# Patient Record
Sex: MALE | Race: WHITE | NOT HISPANIC OR LATINO | Employment: OTHER | URBAN - METROPOLITAN AREA
[De-identification: names, ages, dates, MRNs, and addresses within clinical notes are randomized per-mention and may not be internally consistent; named-entity substitution may affect disease eponyms.]

---

## 2017-02-07 ENCOUNTER — APPOINTMENT (OUTPATIENT)
Dept: LAB | Facility: CLINIC | Age: 82
End: 2017-02-07
Payer: COMMERCIAL

## 2017-02-07 ENCOUNTER — TRANSCRIBE ORDERS (OUTPATIENT)
Dept: LAB | Facility: CLINIC | Age: 82
End: 2017-02-07

## 2017-02-07 DIAGNOSIS — E78.5 HYPERLIPIDEMIA, UNSPECIFIED HYPERLIPIDEMIA TYPE: ICD-10-CM

## 2017-02-07 DIAGNOSIS — I10 ESSENTIAL HYPERTENSION, MALIGNANT: ICD-10-CM

## 2017-02-07 DIAGNOSIS — IMO0001 UNCONTROLLED TYPE 2 DIABETES MELLITUS WITHOUT COMPLICATION, WITH LONG-TERM CURRENT USE OF INSULIN: Primary | ICD-10-CM

## 2017-02-07 LAB
ALBUMIN SERPL BCP-MCNC: 3 G/DL (ref 3.5–5)
ALP SERPL-CCNC: 80 U/L (ref 46–116)
ALT SERPL W P-5'-P-CCNC: 20 U/L (ref 12–78)
ANION GAP SERPL CALCULATED.3IONS-SCNC: 8 MMOL/L (ref 4–13)
AST SERPL W P-5'-P-CCNC: 17 U/L (ref 5–45)
BASOPHILS # BLD AUTO: 0.02 THOUSANDS/ΜL (ref 0–0.1)
BASOPHILS NFR BLD AUTO: 0 % (ref 0–1)
BILIRUB SERPL-MCNC: 0.35 MG/DL (ref 0.2–1)
BUN SERPL-MCNC: 17 MG/DL (ref 5–25)
CALCIUM SERPL-MCNC: 8.6 MG/DL (ref 8.3–10.1)
CHLORIDE SERPL-SCNC: 102 MMOL/L (ref 100–108)
CHOLEST SERPL-MCNC: 107 MG/DL (ref 50–200)
CO2 SERPL-SCNC: 30 MMOL/L (ref 21–32)
CREAT SERPL-MCNC: 1 MG/DL (ref 0.6–1.3)
EOSINOPHIL # BLD AUTO: 0.37 THOUSAND/ΜL (ref 0–0.61)
EOSINOPHIL NFR BLD AUTO: 6 % (ref 0–6)
ERYTHROCYTE [DISTWIDTH] IN BLOOD BY AUTOMATED COUNT: 15.2 % (ref 11.6–15.1)
GFR SERPL CREATININE-BSD FRML MDRD: >60 ML/MIN/1.73SQ M
GLUCOSE SERPL-MCNC: 205 MG/DL (ref 65–140)
HCT VFR BLD AUTO: 35.6 % (ref 36.5–49.3)
HDLC SERPL-MCNC: 44 MG/DL (ref 40–60)
HGB BLD-MCNC: 11.6 G/DL (ref 12–17)
LDLC SERPL CALC-MCNC: 42 MG/DL (ref 0–100)
LYMPHOCYTES # BLD AUTO: 0.9 THOUSANDS/ΜL (ref 0.6–4.47)
LYMPHOCYTES NFR BLD AUTO: 14 % (ref 14–44)
MCH RBC QN AUTO: 29.6 PG (ref 26.8–34.3)
MCHC RBC AUTO-ENTMCNC: 32.6 G/DL (ref 31.4–37.4)
MCV RBC AUTO: 91 FL (ref 82–98)
MONOCYTES # BLD AUTO: 0.93 THOUSAND/ΜL (ref 0.17–1.22)
MONOCYTES NFR BLD AUTO: 15 % (ref 4–12)
NEUTROPHILS # BLD AUTO: 4.12 THOUSANDS/ΜL (ref 1.85–7.62)
NEUTS SEG NFR BLD AUTO: 65 % (ref 43–75)
NRBC BLD AUTO-RTO: 0 /100 WBCS
PLATELET # BLD AUTO: 177 THOUSANDS/UL (ref 149–390)
PMV BLD AUTO: 10.4 FL (ref 8.9–12.7)
POTASSIUM SERPL-SCNC: 3.9 MMOL/L (ref 3.5–5.3)
PROT SERPL-MCNC: 6.8 G/DL (ref 6.4–8.2)
RBC # BLD AUTO: 3.92 MILLION/UL (ref 3.88–5.62)
SODIUM SERPL-SCNC: 140 MMOL/L (ref 136–145)
TRIGL SERPL-MCNC: 103 MG/DL
TSH SERPL DL<=0.05 MIU/L-ACNC: 1.92 UIU/ML (ref 0.36–3.74)
WBC # BLD AUTO: 6.35 THOUSAND/UL (ref 4.31–10.16)

## 2017-02-07 PROCEDURE — 80053 COMPREHEN METABOLIC PANEL: CPT

## 2017-02-07 PROCEDURE — 84443 ASSAY THYROID STIM HORMONE: CPT

## 2017-02-07 PROCEDURE — 36415 COLL VENOUS BLD VENIPUNCTURE: CPT

## 2017-02-07 PROCEDURE — 80061 LIPID PANEL: CPT

## 2017-02-07 PROCEDURE — 85025 COMPLETE CBC W/AUTO DIFF WBC: CPT

## 2017-02-08 ENCOUNTER — TRANSCRIBE ORDERS (OUTPATIENT)
Dept: LAB | Facility: CLINIC | Age: 82
End: 2017-02-08

## 2017-02-08 ENCOUNTER — APPOINTMENT (OUTPATIENT)
Dept: LAB | Facility: CLINIC | Age: 82
End: 2017-02-08
Payer: COMMERCIAL

## 2017-02-08 DIAGNOSIS — IMO0001 UNCONTROLLED TYPE 2 DIABETES MELLITUS WITHOUT COMPLICATION, WITH LONG-TERM CURRENT USE OF INSULIN: Primary | ICD-10-CM

## 2017-02-08 LAB
BACTERIA UR QL AUTO: ABNORMAL /HPF
BILIRUB UR QL STRIP: NEGATIVE
CAOX CRY URNS QL MICRO: ABNORMAL /HPF
CLARITY UR: CLEAR
COLOR UR: ABNORMAL
GLUCOSE UR STRIP-MCNC: ABNORMAL MG/DL
HGB UR QL STRIP.AUTO: NEGATIVE
KETONES UR STRIP-MCNC: NEGATIVE MG/DL
LEUKOCYTE ESTERASE UR QL STRIP: ABNORMAL
NITRITE UR QL STRIP: NEGATIVE
NON-SQ EPI CELLS URNS QL MICRO: ABNORMAL /HPF
PH UR STRIP.AUTO: 5.5 [PH] (ref 4.5–8)
PROT UR STRIP-MCNC: ABNORMAL MG/DL
RBC #/AREA URNS AUTO: ABNORMAL /HPF
SP GR UR STRIP.AUTO: 1.04 (ref 1–1.03)
UROBILINOGEN UR QL STRIP.AUTO: 0.2 E.U./DL
WBC #/AREA URNS AUTO: ABNORMAL /HPF

## 2017-02-08 PROCEDURE — 81001 URINALYSIS AUTO W/SCOPE: CPT

## 2017-08-09 ENCOUNTER — TRANSCRIBE ORDERS (OUTPATIENT)
Dept: LAB | Facility: CLINIC | Age: 82
End: 2017-08-09

## 2017-08-09 ENCOUNTER — APPOINTMENT (OUTPATIENT)
Dept: LAB | Facility: CLINIC | Age: 82
End: 2017-08-09
Payer: COMMERCIAL

## 2017-08-09 DIAGNOSIS — C61 MALIGNANT NEOPLASM OF PROSTATE (HCC): Primary | ICD-10-CM

## 2017-08-09 LAB — PSA SERPL-MCNC: 0.2 NG/ML (ref 0–4)

## 2017-08-09 PROCEDURE — G0103 PSA SCREENING: HCPCS

## 2017-08-09 PROCEDURE — 36415 COLL VENOUS BLD VENIPUNCTURE: CPT

## 2018-05-02 ENCOUNTER — APPOINTMENT (OUTPATIENT)
Dept: LAB | Facility: CLINIC | Age: 83
End: 2018-05-02
Payer: COMMERCIAL

## 2018-05-02 ENCOUNTER — TRANSCRIBE ORDERS (OUTPATIENT)
Dept: LAB | Facility: CLINIC | Age: 83
End: 2018-05-02

## 2018-05-02 DIAGNOSIS — E78.2 MIXED HYPERLIPIDEMIA: ICD-10-CM

## 2018-05-02 DIAGNOSIS — I48.0 PAROXYSMAL ATRIAL FIBRILLATION (HCC): Primary | ICD-10-CM

## 2018-05-02 DIAGNOSIS — I25.10 ATHEROSCLEROSIS OF NATIVE CORONARY ARTERY WITHOUT ANGINA PECTORIS, UNSPECIFIED WHETHER NATIVE OR TRANSPLANTED HEART: ICD-10-CM

## 2018-05-02 DIAGNOSIS — Z79.4 ENCOUNTER FOR LONG-TERM (CURRENT) USE OF INSULIN (HCC): ICD-10-CM

## 2018-05-02 DIAGNOSIS — E11.8 DIABETIC COMPLICATION (HCC): ICD-10-CM

## 2018-05-02 DIAGNOSIS — Z79.01 LONG TERM (CURRENT) USE OF ANTICOAGULANTS: ICD-10-CM

## 2018-05-02 LAB
ALBUMIN SERPL BCP-MCNC: 3.3 G/DL (ref 3.5–5)
ALP SERPL-CCNC: 125 U/L (ref 46–116)
ALT SERPL W P-5'-P-CCNC: 20 U/L (ref 12–78)
ANION GAP SERPL CALCULATED.3IONS-SCNC: 6 MMOL/L (ref 4–13)
AST SERPL W P-5'-P-CCNC: 17 U/L (ref 5–45)
BILIRUB SERPL-MCNC: 0.59 MG/DL (ref 0.2–1)
BUN SERPL-MCNC: 17 MG/DL (ref 5–25)
CALCIUM SERPL-MCNC: 8.9 MG/DL (ref 8.3–10.1)
CHLORIDE SERPL-SCNC: 103 MMOL/L (ref 100–108)
CHOLEST SERPL-MCNC: 118 MG/DL (ref 50–200)
CO2 SERPL-SCNC: 32 MMOL/L (ref 21–32)
CREAT SERPL-MCNC: 1.03 MG/DL (ref 0.6–1.3)
DIGOXIN SERPL-MCNC: 0.6 NG/ML (ref 0.8–2)
ERYTHROCYTE [DISTWIDTH] IN BLOOD BY AUTOMATED COUNT: 15.3 % (ref 11.6–15.1)
EST. AVERAGE GLUCOSE BLD GHB EST-MCNC: 301 MG/DL
GFR SERPL CREATININE-BSD FRML MDRD: 68 ML/MIN/1.73SQ M
GLUCOSE P FAST SERPL-MCNC: 79 MG/DL (ref 65–99)
HBA1C MFR BLD: 12.1 % (ref 4.2–6.3)
HCT VFR BLD AUTO: 46.3 % (ref 36.5–49.3)
HDLC SERPL-MCNC: 38 MG/DL (ref 40–60)
HGB BLD-MCNC: 14.7 G/DL (ref 12–17)
LDLC SERPL CALC-MCNC: 58 MG/DL (ref 0–100)
MCH RBC QN AUTO: 28.2 PG (ref 26.8–34.3)
MCHC RBC AUTO-ENTMCNC: 31.7 G/DL (ref 31.4–37.4)
MCV RBC AUTO: 89 FL (ref 82–98)
NONHDLC SERPL-MCNC: 80 MG/DL
PLATELET # BLD AUTO: 254 THOUSANDS/UL (ref 149–390)
PMV BLD AUTO: 11.5 FL (ref 8.9–12.7)
POTASSIUM SERPL-SCNC: 3.4 MMOL/L (ref 3.5–5.3)
PROT SERPL-MCNC: 7.2 G/DL (ref 6.4–8.2)
RBC # BLD AUTO: 5.21 MILLION/UL (ref 3.88–5.62)
SODIUM SERPL-SCNC: 141 MMOL/L (ref 136–145)
TRIGL SERPL-MCNC: 108 MG/DL
WBC # BLD AUTO: 8.69 THOUSAND/UL (ref 4.31–10.16)

## 2018-05-02 PROCEDURE — 80162 ASSAY OF DIGOXIN TOTAL: CPT

## 2018-05-02 PROCEDURE — 85027 COMPLETE CBC AUTOMATED: CPT

## 2018-05-02 PROCEDURE — 80053 COMPREHEN METABOLIC PANEL: CPT

## 2018-05-02 PROCEDURE — 83036 HEMOGLOBIN GLYCOSYLATED A1C: CPT

## 2018-05-02 PROCEDURE — 80061 LIPID PANEL: CPT

## 2018-05-02 PROCEDURE — 36415 COLL VENOUS BLD VENIPUNCTURE: CPT

## 2018-08-17 ENCOUNTER — OFFICE VISIT (OUTPATIENT)
Dept: UROLOGY | Facility: CLINIC | Age: 83
End: 2018-08-17
Payer: COMMERCIAL

## 2018-08-17 VITALS — DIASTOLIC BLOOD PRESSURE: 72 MMHG | HEART RATE: 65 BPM | SYSTOLIC BLOOD PRESSURE: 142 MMHG

## 2018-08-17 DIAGNOSIS — C61 MALIGNANT NEOPLASM OF PROSTATE (HCC): Primary | ICD-10-CM

## 2018-08-17 LAB
SL AMB  POCT GLUCOSE, UA: NORMAL
SL AMB LEUKOCYTE ESTERASE,UA: NORMAL
SL AMB POCT BILIRUBIN,UA: NORMAL
SL AMB POCT BLOOD,UA: NORMAL
SL AMB POCT CLARITY,UA: CLEAR
SL AMB POCT COLOR,UA: YELLOW
SL AMB POCT KETONES,UA: NORMAL
SL AMB POCT NITRITE,UA: NORMAL
SL AMB POCT PH,UA: 5
SL AMB POCT SPECIFIC GRAVITY,UA: 1.01
SL AMB POCT URINE PROTEIN: NORMAL
SL AMB POCT UROBILINOGEN: NORMAL

## 2018-08-17 PROCEDURE — 99213 OFFICE O/P EST LOW 20 MIN: CPT | Performed by: PHYSICIAN ASSISTANT

## 2018-08-17 PROCEDURE — 81002 URINALYSIS NONAUTO W/O SCOPE: CPT | Performed by: PHYSICIAN ASSISTANT

## 2018-08-17 RX ORDER — CEPHALEXIN 500 MG/1
CAPSULE ORAL
COMMUNITY
Start: 2018-08-16 | End: 2020-05-06

## 2018-08-17 NOTE — PROGRESS NOTES
UROLOGY PROGRESS NOTE   Patient Identifiers: David Freeman (MRN [id number])  Date of Service: 8/17/2018    Subjective:      61-year-old male with history [place] 8 prostate cancer  He had a  40% single focus of 1 core  He was treated with 6 months of ADT and external beam radiation  His last PSA was 0 2  He has occasional leak  Urine is clear  Patient has  no complaints  Objective:     VITALS:    Vitals:    08/17/18 1140   BP: 142/72   Pulse: 65     AUA SYMPTOM SCORE      Most Recent Value   AUA SYMPTOM SCORE   How often have you had a sensation of not emptying your bladder completely after you finished urinating? 3   How often have you had to urinate again less than two hours after you finished urinating? 2   How often have you found you stopped and started again several times when you urinate? 3   How often have you found it difficult to postpone urination? 2   How often have you had a weak urinary stream?  2   How often have you had to push or strain to begin urination? 0   How many times did you most typically get up to urinate from the time you went to bed at night until the time you got up in the morning?   2   Quality of Life: If you were to spend the rest of your life with your urinary condition just the way it is now, how would you feel about that?  0   AUA SYMPTOM SCORE  14            LABS:  Lab Results   Component Value Date    HGB 14 7 05/02/2018    HCT 46 3 05/02/2018    WBC 8 69 05/02/2018     05/02/2018   ]    Lab Results   Component Value Date     05/02/2018    K 3 4 (L) 05/02/2018     05/02/2018    CO2 32 05/02/2018    BUN 17 05/02/2018    CREATININE 1 03 05/02/2018    CALCIUM 8 9 05/02/2018    GLUCOSE 205 (H) 02/07/2017   ]        INPATIENT MEDS:    Current Outpatient Prescriptions:     cephalexin (KEFLEX) 500 mg capsule, , Disp: , Rfl:     insulin glargine (LANTUS) 100 units/mL subcutaneous injection, Inject 52 Units under the skin daily at bedtime  , Disp: , Rfl:     amLODIPine (NORVASC) 10 mg tablet, Take 10 mg by mouth 2 (two) times a day , Disp: , Rfl:     calcium carbonate (OS-SHEILA) 600 MG tablet, Take 600 mg by mouth 2 (two) times a day with meals  , Disp: , Rfl:     carvedilol (COREG) 25 mg tablet, Take 25 mg by mouth 2 (two) times a day with meals  , Disp: , Rfl:     cloNIDine (CATAPRES) 0 1 mg tablet, Take 0 1 mg by mouth 2 (two) times a day , Disp: , Rfl:     cyanocobalamin 500 MCG tablet, Take 500 mcg by mouth daily  , Disp: , Rfl:     lisinopril (ZESTRIL) 20 mg tablet, Take 20 mg by mouth daily  , Disp: , Rfl:     Multiple Vitamins-Minerals (MULTIVITAMIN WITH MINERALS) tablet, Take 1 tablet by mouth daily  , Disp: , Rfl:     nitroglycerin (NITROSTAT) 0 4 mg SL tablet, Place 0 4 mg under the tongue every 5 (five) minutes as needed for chest pain , Disp: , Rfl:     simvastatin (ZOCOR) 5 MG tablet, Take 5 mg by mouth daily at bedtime  , Disp: , Rfl:     sitaGLIPtin-metFORMIN (JANUMET)  MG per tablet, Take 1 tablet by mouth 2 (two) times a day with meals  , Disp: , Rfl:     tamsulosin (FLOMAX) 0 4 mg, Take 0 4 mg by mouth daily with dinner , Disp: , Rfl:       Physical Exam:   /72 (Patient Position: Sitting, Cuff Size: Adult)   Pulse 65   GEN: no acute distress    RESP: breathing comfortably with no accessory muscle use    ABD: soft, non-tender, non-distended   INCISION:    EXT: no significant peripheral edema   (Male): Penis uncircumcised, phallus normal, meatus patent  Testicles descended into scrotum bilaterally without masses nor tenderness  No inguinal hernias bilaterally  ZACHARY: Prostate is not enlarged at 30 grams  The prostate is not boggy  The prostate is not tender  No nodules noted    RADIOLOGY:     None     Assessment:    1  Prostate cancer     Plan:   - follow-up in 1 year with PSA prior to visit    -  -  -

## 2020-01-11 ENCOUNTER — APPOINTMENT (EMERGENCY)
Dept: RADIOLOGY | Facility: HOSPITAL | Age: 85
DRG: 552 | End: 2020-01-11
Payer: COMMERCIAL

## 2020-01-11 ENCOUNTER — HOSPITAL ENCOUNTER (INPATIENT)
Facility: HOSPITAL | Age: 85
LOS: 6 days | Discharge: NURSING, REHAB OR LONG-TERM CARE | DRG: 552 | End: 2020-01-17
Attending: SURGERY | Admitting: SURGERY
Payer: COMMERCIAL
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
## 2020-01-11 DIAGNOSIS — L89.152 SACRAL DECUBITUS ULCER, STAGE II (HCC): ICD-10-CM

## 2020-01-11 DIAGNOSIS — S02.2XXA NASAL BONE FRACTURES: ICD-10-CM

## 2020-01-11 DIAGNOSIS — S12.000A C1 CERVICAL FRACTURE (HCC): ICD-10-CM

## 2020-01-11 DIAGNOSIS — S12.110A TYPE II DENS FRACTURE OF SECOND CERVICAL VERTEBRA (HCC): Primary | ICD-10-CM

## 2020-01-11 DIAGNOSIS — S01.81XA: ICD-10-CM

## 2020-01-11 DIAGNOSIS — S06.4X9A TRAUMATIC EPIDURAL HEMATOMA (HCC): ICD-10-CM

## 2020-01-11 PROBLEM — S06.4XAA TRAUMATIC EPIDURAL HEMATOMA: Status: ACTIVE | Noted: 2020-01-11

## 2020-01-11 LAB
ALBUMIN SERPL BCP-MCNC: 3.1 G/DL (ref 3.5–5)
ALP SERPL-CCNC: 114 U/L (ref 46–116)
ALT SERPL W P-5'-P-CCNC: 41 U/L (ref 12–78)
ANION GAP SERPL CALCULATED.3IONS-SCNC: 7 MMOL/L (ref 4–13)
AST SERPL W P-5'-P-CCNC: 71 U/L (ref 5–45)
BASE EXCESS BLDA CALC-SCNC: 6 MMOL/L (ref -2–3)
BILIRUB SERPL-MCNC: 0.53 MG/DL (ref 0.2–1)
BUN SERPL-MCNC: 20 MG/DL (ref 5–25)
CA-I BLD-SCNC: 1.11 MMOL/L (ref 1.12–1.32)
CA-I BLD-SCNC: 1.12 MMOL/L (ref 1.12–1.32)
CALCIUM SERPL-MCNC: 9.1 MG/DL (ref 8.3–10.1)
CHLORIDE SERPL-SCNC: 106 MMOL/L (ref 100–108)
CK MB SERPL-MCNC: 2 NG/ML (ref 0–5)
CK MB SERPL-MCNC: <1 % (ref 0–2.5)
CK SERPL-CCNC: 262 U/L (ref 39–308)
CO2 SERPL-SCNC: 29 MMOL/L (ref 21–32)
CREAT SERPL-MCNC: 1.12 MG/DL (ref 0.6–1.3)
ERYTHROCYTE [DISTWIDTH] IN BLOOD BY AUTOMATED COUNT: 15.7 % (ref 11.6–15.1)
GFR SERPL CREATININE-BSD FRML MDRD: 61 ML/MIN/1.73SQ M
GLUCOSE SERPL-MCNC: 119 MG/DL (ref 65–140)
GLUCOSE SERPL-MCNC: 134 MG/DL (ref 65–140)
GLUCOSE SERPL-MCNC: 39 MG/DL (ref 65–140)
GLUCOSE SERPL-MCNC: 56 MG/DL (ref 65–140)
GLUCOSE SERPL-MCNC: 64 MG/DL (ref 65–140)
HCO3 BLDA-SCNC: 32 MMOL/L (ref 24–30)
HCT VFR BLD AUTO: 44 % (ref 36.5–49.3)
HCT VFR BLD CALC: 45 % (ref 36.5–49.3)
HGB BLD-MCNC: 14 G/DL (ref 12–17)
HGB BLDA-MCNC: 15.3 G/DL (ref 12–17)
LACTATE SERPL-SCNC: 2.1 MMOL/L (ref 0.5–2)
MAGNESIUM SERPL-MCNC: 2.1 MG/DL (ref 1.6–2.6)
MCH RBC QN AUTO: 27.7 PG (ref 26.8–34.3)
MCHC RBC AUTO-ENTMCNC: 31.8 G/DL (ref 31.4–37.4)
MCV RBC AUTO: 87 FL (ref 82–98)
PCO2 BLD: 34 MMOL/L (ref 21–32)
PCO2 BLD: 50.4 MM HG (ref 42–50)
PH BLD: 7.41 [PH] (ref 7.3–7.4)
PHOSPHATE SERPL-MCNC: 2.6 MG/DL (ref 2.3–4.1)
PLATELET # BLD AUTO: 237 THOUSANDS/UL (ref 149–390)
PMV BLD AUTO: 12.4 FL (ref 8.9–12.7)
PO2 BLD: 29 MM HG (ref 35–45)
POTASSIUM BLD-SCNC: 5.2 MMOL/L (ref 3.5–5.3)
POTASSIUM SERPL-SCNC: 5.3 MMOL/L (ref 3.5–5.3)
PROT SERPL-MCNC: 7.4 G/DL (ref 6.4–8.2)
RBC # BLD AUTO: 5.06 MILLION/UL (ref 3.88–5.62)
SAO2 % BLD FROM PO2: 54 % (ref 60–85)
SODIUM BLD-SCNC: 141 MMOL/L (ref 136–145)
SODIUM SERPL-SCNC: 142 MMOL/L (ref 136–145)
SPECIMEN SOURCE: ABNORMAL
TROPONIN I SERPL-MCNC: 0.04 NG/ML
WBC # BLD AUTO: 8.03 THOUSAND/UL (ref 4.31–10.16)

## 2020-01-11 PROCEDURE — 0HQ1XZZ REPAIR FACE SKIN, EXTERNAL APPROACH: ICD-10-PCS | Performed by: SURGERY

## 2020-01-11 PROCEDURE — 93005 ELECTROCARDIOGRAM TRACING: CPT

## 2020-01-11 PROCEDURE — 36415 COLL VENOUS BLD VENIPUNCTURE: CPT | Performed by: PHYSICIAN ASSISTANT

## 2020-01-11 PROCEDURE — 83605 ASSAY OF LACTIC ACID: CPT | Performed by: PHYSICIAN ASSISTANT

## 2020-01-11 PROCEDURE — 82947 ASSAY GLUCOSE BLOOD QUANT: CPT

## 2020-01-11 PROCEDURE — 70496 CT ANGIOGRAPHY HEAD: CPT

## 2020-01-11 PROCEDURE — 82553 CREATINE MB FRACTION: CPT | Performed by: PHYSICIAN ASSISTANT

## 2020-01-11 PROCEDURE — 85027 COMPLETE CBC AUTOMATED: CPT | Performed by: PHYSICIAN ASSISTANT

## 2020-01-11 PROCEDURE — 90715 TDAP VACCINE 7 YRS/> IM: CPT | Performed by: PHYSICIAN ASSISTANT

## 2020-01-11 PROCEDURE — 82330 ASSAY OF CALCIUM: CPT | Performed by: PHYSICIAN ASSISTANT

## 2020-01-11 PROCEDURE — 82550 ASSAY OF CK (CPK): CPT | Performed by: PHYSICIAN ASSISTANT

## 2020-01-11 PROCEDURE — 70498 CT ANGIOGRAPHY NECK: CPT

## 2020-01-11 PROCEDURE — 83735 ASSAY OF MAGNESIUM: CPT | Performed by: PHYSICIAN ASSISTANT

## 2020-01-11 PROCEDURE — 99282 EMERGENCY DEPT VISIT SF MDM: CPT | Performed by: EMERGENCY MEDICINE

## 2020-01-11 PROCEDURE — 84484 ASSAY OF TROPONIN QUANT: CPT | Performed by: PHYSICIAN ASSISTANT

## 2020-01-11 PROCEDURE — 12011 RPR F/E/E/N/L/M 2.5 CM/<: CPT | Performed by: PHYSICIAN ASSISTANT

## 2020-01-11 PROCEDURE — 90471 IMMUNIZATION ADMIN: CPT

## 2020-01-11 PROCEDURE — 84100 ASSAY OF PHOSPHORUS: CPT | Performed by: PHYSICIAN ASSISTANT

## 2020-01-11 PROCEDURE — 84295 ASSAY OF SERUM SODIUM: CPT

## 2020-01-11 PROCEDURE — 85014 HEMATOCRIT: CPT

## 2020-01-11 PROCEDURE — 84132 ASSAY OF SERUM POTASSIUM: CPT

## 2020-01-11 PROCEDURE — NC001 PR NO CHARGE: Performed by: PHYSICIAN ASSISTANT

## 2020-01-11 PROCEDURE — 99285 EMERGENCY DEPT VISIT HI MDM: CPT

## 2020-01-11 PROCEDURE — 80053 COMPREHEN METABOLIC PANEL: CPT | Performed by: PHYSICIAN ASSISTANT

## 2020-01-11 PROCEDURE — 72125 CT NECK SPINE W/O DYE: CPT

## 2020-01-11 PROCEDURE — 82948 REAGENT STRIP/BLOOD GLUCOSE: CPT

## 2020-01-11 PROCEDURE — 82803 BLOOD GASES ANY COMBINATION: CPT

## 2020-01-11 PROCEDURE — 99223 1ST HOSP IP/OBS HIGH 75: CPT | Performed by: SURGERY

## 2020-01-11 PROCEDURE — NC001 PR NO CHARGE: Performed by: NEUROLOGICAL SURGERY

## 2020-01-11 PROCEDURE — 82330 ASSAY OF CALCIUM: CPT

## 2020-01-11 PROCEDURE — 99291 CRITICAL CARE FIRST HOUR: CPT | Performed by: PHYSICIAN ASSISTANT

## 2020-01-11 PROCEDURE — 70450 CT HEAD/BRAIN W/O DYE: CPT

## 2020-01-11 RX ORDER — DEXTROSE, SODIUM CHLORIDE, AND POTASSIUM CHLORIDE 5; .45; .15 G/100ML; G/100ML; G/100ML
75 INJECTION INTRAVENOUS CONTINUOUS
Status: DISCONTINUED | OUTPATIENT
Start: 2020-01-11 | End: 2020-01-13

## 2020-01-11 RX ORDER — METOPROLOL TARTRATE 5 MG/5ML
5 INJECTION INTRAVENOUS EVERY 6 HOURS PRN
Status: DISCONTINUED | OUTPATIENT
Start: 2020-01-11 | End: 2020-01-11

## 2020-01-11 RX ORDER — INSULIN GLARGINE 100 [IU]/ML
30 INJECTION, SOLUTION SUBCUTANEOUS DAILY
COMMUNITY
End: 2020-06-23 | Stop reason: HOSPADM

## 2020-01-11 RX ORDER — DEXTROSE MONOHYDRATE 25 G/50ML
50 INJECTION, SOLUTION INTRAVENOUS ONCE
Status: COMPLETED | OUTPATIENT
Start: 2020-01-11 | End: 2020-01-11

## 2020-01-11 RX ORDER — LABETALOL 20 MG/4 ML (5 MG/ML) INTRAVENOUS SYRINGE
10 EVERY 4 HOURS PRN
Status: DISCONTINUED | OUTPATIENT
Start: 2020-01-11 | End: 2020-01-18 | Stop reason: HOSPADM

## 2020-01-11 RX ORDER — ATORVASTATIN CALCIUM 20 MG/1
1 TABLET, FILM COATED ORAL DAILY
Status: ON HOLD | COMMUNITY

## 2020-01-11 RX ORDER — DEXTROSE AND SODIUM CHLORIDE 5; .9 G/100ML; G/100ML
100 INJECTION, SOLUTION INTRAVENOUS CONTINUOUS
Status: DISCONTINUED | OUTPATIENT
Start: 2020-01-11 | End: 2020-01-11

## 2020-01-11 RX ORDER — ONDANSETRON 2 MG/ML
4 INJECTION INTRAMUSCULAR; INTRAVENOUS EVERY 6 HOURS PRN
Status: DISCONTINUED | OUTPATIENT
Start: 2020-01-11 | End: 2020-01-18 | Stop reason: HOSPADM

## 2020-01-11 RX ORDER — FENTANYL CITRATE 50 UG/ML
25 INJECTION, SOLUTION INTRAMUSCULAR; INTRAVENOUS EVERY 2 HOUR PRN
Status: DISCONTINUED | OUTPATIENT
Start: 2020-01-11 | End: 2020-01-12

## 2020-01-11 RX ORDER — FUROSEMIDE 40 MG/1
40 TABLET ORAL 2 TIMES DAILY
COMMUNITY
End: 2020-05-06

## 2020-01-11 RX ORDER — METOPROLOL TARTRATE 5 MG/5ML
2.5 INJECTION INTRAVENOUS EVERY 6 HOURS
Status: DISCONTINUED | OUTPATIENT
Start: 2020-01-11 | End: 2020-01-12

## 2020-01-11 RX ORDER — PANTOPRAZOLE SODIUM 40 MG/1
40 INJECTION, POWDER, FOR SOLUTION INTRAVENOUS
Status: DISCONTINUED | OUTPATIENT
Start: 2020-01-12 | End: 2020-01-13

## 2020-01-11 RX ORDER — DILTIAZEM HYDROCHLORIDE 120 MG/1
240 TABLET, FILM COATED ORAL DAILY
COMMUNITY
End: 2020-01-17 | Stop reason: HOSPADM

## 2020-01-11 RX ORDER — CHLORHEXIDINE GLUCONATE 0.12 MG/ML
15 RINSE ORAL EVERY 12 HOURS SCHEDULED
Status: DISCONTINUED | OUTPATIENT
Start: 2020-01-11 | End: 2020-01-11

## 2020-01-11 RX ORDER — METOPROLOL TARTRATE 50 MG/1
1 TABLET, FILM COATED ORAL EVERY 12 HOURS SCHEDULED
COMMUNITY
End: 2021-08-24 | Stop reason: ALTCHOICE

## 2020-01-11 RX ORDER — DABIGATRAN ETEXILATE 150 MG/1
150 CAPSULE, COATED PELLETS ORAL 2 TIMES DAILY
COMMUNITY
End: 2020-01-17 | Stop reason: HOSPADM

## 2020-01-11 RX ORDER — OMEPRAZOLE 40 MG/1
1 CAPSULE, DELAYED RELEASE ORAL DAILY
Status: ON HOLD | COMMUNITY

## 2020-01-11 RX ADMIN — FENTANYL CITRATE 25 MCG: 50 INJECTION, SOLUTION INTRAMUSCULAR; INTRAVENOUS at 20:28

## 2020-01-11 RX ADMIN — IDARUCIZUMAB 2.5 G: 50 INJECTION INTRAVENOUS at 15:28

## 2020-01-11 RX ADMIN — DEXTROSE, SODIUM CHLORIDE, AND POTASSIUM CHLORIDE 75 ML/HR: 5; .45; .15 INJECTION INTRAVENOUS at 20:27

## 2020-01-11 RX ADMIN — FENTANYL CITRATE 25 MCG: 50 INJECTION, SOLUTION INTRAMUSCULAR; INTRAVENOUS at 23:54

## 2020-01-11 RX ADMIN — TETANUS TOXOID, REDUCED DIPHTHERIA TOXOID AND ACELLULAR PERTUSSIS VACCINE, ADSORBED 0.5 ML: 5; 2.5; 8; 8; 2.5 SUSPENSION INTRAMUSCULAR at 14:39

## 2020-01-11 RX ADMIN — ONDANSETRON 4 MG: 2 INJECTION INTRAMUSCULAR; INTRAVENOUS at 16:08

## 2020-01-11 RX ADMIN — DEXTROSE 50 % IN WATER (D50W) INTRAVENOUS SYRINGE 50 ML: at 16:12

## 2020-01-11 RX ADMIN — DEXTROSE AND SODIUM CHLORIDE 100 ML/HR: 5; .9 INJECTION, SOLUTION INTRAVENOUS at 16:32

## 2020-01-11 RX ADMIN — LABETALOL 20 MG/4 ML (5 MG/ML) INTRAVENOUS SYRINGE 10 MG: at 18:37

## 2020-01-11 RX ADMIN — METOPROLOL TARTRATE 2.5 MG: 5 INJECTION INTRAVENOUS at 20:29

## 2020-01-11 RX ADMIN — IDARUCIZUMAB 2.5 G: 50 INJECTION INTRAVENOUS at 15:22

## 2020-01-11 RX ADMIN — IOHEXOL 85 ML: 350 INJECTION, SOLUTION INTRAVENOUS at 15:42

## 2020-01-12 ENCOUNTER — APPOINTMENT (INPATIENT)
Dept: RADIOLOGY | Facility: HOSPITAL | Age: 85
DRG: 552 | End: 2020-01-12
Payer: COMMERCIAL

## 2020-01-12 LAB
ANION GAP SERPL CALCULATED.3IONS-SCNC: 4 MMOL/L (ref 4–13)
ATRIAL RATE: 113 BPM
BASOPHILS # BLD AUTO: 0.05 THOUSANDS/ΜL (ref 0–0.1)
BASOPHILS NFR BLD AUTO: 1 % (ref 0–1)
BUN SERPL-MCNC: 11 MG/DL (ref 5–25)
CALCIUM SERPL-MCNC: 9.1 MG/DL (ref 8.3–10.1)
CHLORIDE SERPL-SCNC: 107 MMOL/L (ref 100–108)
CO2 SERPL-SCNC: 29 MMOL/L (ref 21–32)
CREAT SERPL-MCNC: 0.92 MG/DL (ref 0.6–1.3)
EOSINOPHIL # BLD AUTO: 0.13 THOUSAND/ΜL (ref 0–0.61)
EOSINOPHIL NFR BLD AUTO: 1 % (ref 0–6)
ERYTHROCYTE [DISTWIDTH] IN BLOOD BY AUTOMATED COUNT: 15.7 % (ref 11.6–15.1)
GFR SERPL CREATININE-BSD FRML MDRD: 77 ML/MIN/1.73SQ M
GLUCOSE SERPL-MCNC: 113 MG/DL (ref 65–140)
GLUCOSE SERPL-MCNC: 178 MG/DL (ref 65–140)
GLUCOSE SERPL-MCNC: 272 MG/DL (ref 65–140)
GLUCOSE SERPL-MCNC: 62 MG/DL (ref 65–140)
HCT VFR BLD AUTO: 43.9 % (ref 36.5–49.3)
HGB BLD-MCNC: 13.9 G/DL (ref 12–17)
IMM GRANULOCYTES # BLD AUTO: 0.04 THOUSAND/UL (ref 0–0.2)
IMM GRANULOCYTES NFR BLD AUTO: 0 % (ref 0–2)
LYMPHOCYTES # BLD AUTO: 0.82 THOUSANDS/ΜL (ref 0.6–4.47)
LYMPHOCYTES NFR BLD AUTO: 7 % (ref 14–44)
MCH RBC QN AUTO: 27.4 PG (ref 26.8–34.3)
MCHC RBC AUTO-ENTMCNC: 31.7 G/DL (ref 31.4–37.4)
MCV RBC AUTO: 87 FL (ref 82–98)
MONOCYTES # BLD AUTO: 0.74 THOUSAND/ΜL (ref 0.17–1.22)
MONOCYTES NFR BLD AUTO: 7 % (ref 4–12)
NEUTROPHILS # BLD AUTO: 9.28 THOUSANDS/ΜL (ref 1.85–7.62)
NEUTS SEG NFR BLD AUTO: 84 % (ref 43–75)
NRBC BLD AUTO-RTO: 0 /100 WBCS
PLATELET # BLD AUTO: 207 THOUSANDS/UL (ref 149–390)
PMV BLD AUTO: 10.8 FL (ref 8.9–12.7)
POTASSIUM SERPL-SCNC: 4.3 MMOL/L (ref 3.5–5.3)
QRS AXIS: 243 DEGREES
QRSD INTERVAL: 164 MS
QT INTERVAL: 436 MS
QTC INTERVAL: 521 MS
RBC # BLD AUTO: 5.07 MILLION/UL (ref 3.88–5.62)
SODIUM SERPL-SCNC: 140 MMOL/L (ref 136–145)
T WAVE AXIS: 235 DEGREES
VENTRICULAR RATE: 86 BPM
WBC # BLD AUTO: 11.06 THOUSAND/UL (ref 4.31–10.16)

## 2020-01-12 PROCEDURE — 85025 COMPLETE CBC W/AUTO DIFF WBC: CPT | Performed by: EMERGENCY MEDICINE

## 2020-01-12 PROCEDURE — 93005 ELECTROCARDIOGRAM TRACING: CPT

## 2020-01-12 PROCEDURE — 72141 MRI NECK SPINE W/O DYE: CPT

## 2020-01-12 PROCEDURE — 93010 ELECTROCARDIOGRAM REPORT: CPT | Performed by: INTERNAL MEDICINE

## 2020-01-12 PROCEDURE — 92610 EVALUATE SWALLOWING FUNCTION: CPT

## 2020-01-12 PROCEDURE — 80048 BASIC METABOLIC PNL TOTAL CA: CPT | Performed by: EMERGENCY MEDICINE

## 2020-01-12 PROCEDURE — 99232 SBSQ HOSP IP/OBS MODERATE 35: CPT | Performed by: SURGERY

## 2020-01-12 PROCEDURE — 99223 1ST HOSP IP/OBS HIGH 75: CPT | Performed by: NEUROLOGICAL SURGERY

## 2020-01-12 PROCEDURE — C9113 INJ PANTOPRAZOLE SODIUM, VIA: HCPCS | Performed by: STUDENT IN AN ORGANIZED HEALTH CARE EDUCATION/TRAINING PROGRAM

## 2020-01-12 PROCEDURE — 82948 REAGENT STRIP/BLOOD GLUCOSE: CPT

## 2020-01-12 RX ORDER — HYDRALAZINE HYDROCHLORIDE 20 MG/ML
10 INJECTION INTRAMUSCULAR; INTRAVENOUS EVERY 6 HOURS PRN
Status: DISCONTINUED | OUTPATIENT
Start: 2020-01-12 | End: 2020-01-16

## 2020-01-12 RX ORDER — FENTANYL CITRATE 50 UG/ML
50 INJECTION, SOLUTION INTRAMUSCULAR; INTRAVENOUS EVERY 2 HOUR PRN
Status: DISCONTINUED | OUTPATIENT
Start: 2020-01-12 | End: 2020-01-12

## 2020-01-12 RX ORDER — HYDRALAZINE HYDROCHLORIDE 20 MG/ML
5 INJECTION INTRAMUSCULAR; INTRAVENOUS EVERY 6 HOURS PRN
Status: DISCONTINUED | OUTPATIENT
Start: 2020-01-12 | End: 2020-01-12

## 2020-01-12 RX ORDER — DILTIAZEM HYDROCHLORIDE 240 MG/1
240 CAPSULE, COATED, EXTENDED RELEASE ORAL DAILY
Status: DISCONTINUED | OUTPATIENT
Start: 2020-01-12 | End: 2020-01-18 | Stop reason: HOSPADM

## 2020-01-12 RX ORDER — ACETAMINOPHEN 325 MG/1
975 TABLET ORAL EVERY 8 HOURS SCHEDULED
Status: DISCONTINUED | OUTPATIENT
Start: 2020-01-12 | End: 2020-01-18 | Stop reason: HOSPADM

## 2020-01-12 RX ORDER — METOPROLOL TARTRATE 5 MG/5ML
5 INJECTION INTRAVENOUS ONCE
Status: COMPLETED | OUTPATIENT
Start: 2020-01-12 | End: 2020-01-12

## 2020-01-12 RX ORDER — DILTIAZEM HYDROCHLORIDE 5 MG/ML
10 INJECTION INTRAVENOUS ONCE
Status: COMPLETED | OUTPATIENT
Start: 2020-01-12 | End: 2020-01-12

## 2020-01-12 RX ORDER — DEXTROSE MONOHYDRATE 25 G/50ML
50 INJECTION, SOLUTION INTRAVENOUS ONCE
Status: COMPLETED | OUTPATIENT
Start: 2020-01-12 | End: 2020-01-12

## 2020-01-12 RX ORDER — LABETALOL 20 MG/4 ML (5 MG/ML) INTRAVENOUS SYRINGE
10 ONCE
Status: COMPLETED | OUTPATIENT
Start: 2020-01-12 | End: 2020-01-12

## 2020-01-12 RX ORDER — METOPROLOL TARTRATE 5 MG/5ML
5 INJECTION INTRAVENOUS ONCE
Status: DISCONTINUED | OUTPATIENT
Start: 2020-01-12 | End: 2020-01-12

## 2020-01-12 RX ORDER — OXYCODONE HYDROCHLORIDE 10 MG/1
10 TABLET ORAL EVERY 4 HOURS PRN
Status: DISCONTINUED | OUTPATIENT
Start: 2020-01-12 | End: 2020-01-16

## 2020-01-12 RX ORDER — METOPROLOL TARTRATE 5 MG/5ML
5 INJECTION INTRAVENOUS EVERY 6 HOURS
Status: DISCONTINUED | OUTPATIENT
Start: 2020-01-12 | End: 2020-01-12

## 2020-01-12 RX ORDER — DEXTROSE MONOHYDRATE 25 G/50ML
INJECTION, SOLUTION INTRAVENOUS
Status: COMPLETED
Start: 2020-01-12 | End: 2020-01-12

## 2020-01-12 RX ORDER — METHOCARBAMOL 500 MG/1
500 TABLET, FILM COATED ORAL EVERY 6 HOURS SCHEDULED
Status: DISCONTINUED | OUTPATIENT
Start: 2020-01-12 | End: 2020-01-18 | Stop reason: HOSPADM

## 2020-01-12 RX ORDER — OXYCODONE HYDROCHLORIDE 5 MG/1
5 TABLET ORAL EVERY 4 HOURS PRN
Status: DISCONTINUED | OUTPATIENT
Start: 2020-01-12 | End: 2020-01-16

## 2020-01-12 RX ORDER — METOPROLOL TARTRATE 50 MG/1
50 TABLET, FILM COATED ORAL EVERY 12 HOURS SCHEDULED
Status: DISCONTINUED | OUTPATIENT
Start: 2020-01-12 | End: 2020-01-18 | Stop reason: HOSPADM

## 2020-01-12 RX ADMIN — LABETALOL HYDROCHLORIDE 10 MG: 5 INJECTION INTRAVENOUS at 16:32

## 2020-01-12 RX ADMIN — POTASSIUM PHOSPHATE, MONOBASIC AND POTASSIUM PHOSPHATE, DIBASIC 12 MMOL: 224; 236 INJECTION, SOLUTION INTRAVENOUS at 11:00

## 2020-01-12 RX ADMIN — ACETAMINOPHEN 975 MG: 325 TABLET ORAL at 22:43

## 2020-01-12 RX ADMIN — DILTIAZEM HYDROCHLORIDE 2.5 MG/HR: 5 INJECTION INTRAVENOUS at 18:22

## 2020-01-12 RX ADMIN — FENTANYL CITRATE 50 MCG: 50 INJECTION, SOLUTION INTRAMUSCULAR; INTRAVENOUS at 05:43

## 2020-01-12 RX ADMIN — DILTIAZEM HYDROCHLORIDE 7.5 MG/HR: 5 INJECTION INTRAVENOUS at 19:25

## 2020-01-12 RX ADMIN — HYDRALAZINE HYDROCHLORIDE 5 MG: 20 INJECTION INTRAMUSCULAR; INTRAVENOUS at 13:11

## 2020-01-12 RX ADMIN — DILTIAZEM HYDROCHLORIDE 10 MG: 5 INJECTION INTRAVENOUS at 18:02

## 2020-01-12 RX ADMIN — OXYCODONE HYDROCHLORIDE 5 MG: 5 TABLET ORAL at 17:23

## 2020-01-12 RX ADMIN — DEXTROSE 50 % IN WATER (D50W) INTRAVENOUS SYRINGE 50 ML: at 01:46

## 2020-01-12 RX ADMIN — LABETALOL 20 MG/4 ML (5 MG/ML) INTRAVENOUS SYRINGE 10 MG: at 12:18

## 2020-01-12 RX ADMIN — METOPROLOL TARTRATE 5 MG: 5 INJECTION INTRAVENOUS at 07:47

## 2020-01-12 RX ADMIN — HYDRALAZINE HYDROCHLORIDE 10 MG: 20 INJECTION INTRAMUSCULAR; INTRAVENOUS at 17:23

## 2020-01-12 RX ADMIN — FENTANYL CITRATE 25 MCG: 50 INJECTION, SOLUTION INTRAMUSCULAR; INTRAVENOUS at 02:25

## 2020-01-12 RX ADMIN — DILTIAZEM HYDROCHLORIDE 240 MG: 240 CAPSULE, COATED, EXTENDED RELEASE ORAL at 15:57

## 2020-01-12 RX ADMIN — METOPROLOL TARTRATE 5 MG: 5 INJECTION INTRAVENOUS at 13:44

## 2020-01-12 RX ADMIN — LABETALOL 20 MG/4 ML (5 MG/ML) INTRAVENOUS SYRINGE 10 MG: at 02:25

## 2020-01-12 RX ADMIN — FENTANYL CITRATE 50 MCG: 50 INJECTION, SOLUTION INTRAMUSCULAR; INTRAVENOUS at 09:33

## 2020-01-12 RX ADMIN — METOPROLOL TARTRATE 5 MG: 5 INJECTION INTRAVENOUS at 09:45

## 2020-01-12 RX ADMIN — METOPROLOL TARTRATE 50 MG: 50 TABLET, FILM COATED ORAL at 17:25

## 2020-01-12 RX ADMIN — METHOCARBAMOL TABLETS 500 MG: 500 TABLET, COATED ORAL at 13:44

## 2020-01-12 RX ADMIN — PANTOPRAZOLE SODIUM 40 MG: 40 INJECTION, POWDER, FOR SOLUTION INTRAVENOUS at 11:01

## 2020-01-12 RX ADMIN — DEXTROSE, SODIUM CHLORIDE, AND POTASSIUM CHLORIDE 75 ML/HR: 5; .45; .15 INJECTION INTRAVENOUS at 07:46

## 2020-01-12 RX ADMIN — DEXTROSE MONOHYDRATE 50 ML: 25 INJECTION, SOLUTION INTRAVENOUS at 01:46

## 2020-01-12 RX ADMIN — ACETAMINOPHEN 975 MG: 325 TABLET ORAL at 13:44

## 2020-01-12 RX ADMIN — METHOCARBAMOL TABLETS 500 MG: 500 TABLET, COATED ORAL at 17:23

## 2020-01-12 RX ADMIN — METOPROLOL TARTRATE 2.5 MG: 5 INJECTION INTRAVENOUS at 01:38

## 2020-01-13 ENCOUNTER — APPOINTMENT (INPATIENT)
Dept: RADIOLOGY | Facility: HOSPITAL | Age: 85
DRG: 552 | End: 2020-01-13
Payer: COMMERCIAL

## 2020-01-13 PROBLEM — I50.9 CHF (CONGESTIVE HEART FAILURE) (HCC): Status: ACTIVE | Noted: 2020-01-13

## 2020-01-13 PROBLEM — R29.6 MULTIPLE FALLS: Status: ACTIVE | Noted: 2020-01-13

## 2020-01-13 PROBLEM — I10 HTN (HYPERTENSION): Status: ACTIVE | Noted: 2020-01-13

## 2020-01-13 PROBLEM — E11.9 TYPE 2 DIABETES MELLITUS (HCC): Status: ACTIVE | Noted: 2020-01-13

## 2020-01-13 PROBLEM — S01.81XA FOREHEAD LACERATION: Status: ACTIVE | Noted: 2020-01-13

## 2020-01-13 PROBLEM — E78.5 HLD (HYPERLIPIDEMIA): Status: ACTIVE | Noted: 2020-01-13

## 2020-01-13 PROBLEM — I77.74 VERTEBRAL ARTERY DISSECTION (HCC): Status: ACTIVE | Noted: 2020-01-13

## 2020-01-13 PROBLEM — R04.0 EPISTAXIS: Status: ACTIVE | Noted: 2020-01-13

## 2020-01-13 PROBLEM — I48.91 ATRIAL FIBRILLATION WITH RVR (HCC): Status: ACTIVE | Noted: 2020-01-13

## 2020-01-13 LAB
ATRIAL RATE: 144 BPM
ATRIAL RATE: 93 BPM
ATRIAL RATE: 95 BPM
GLUCOSE SERPL-MCNC: 295 MG/DL (ref 65–140)
GLUCOSE SERPL-MCNC: 308 MG/DL (ref 65–140)
GLUCOSE SERPL-MCNC: 316 MG/DL (ref 65–140)
GLUCOSE SERPL-MCNC: 335 MG/DL (ref 65–140)
GLUCOSE SERPL-MCNC: 340 MG/DL (ref 65–140)
P AXIS: 108 DEGREES
P AXIS: 85 DEGREES
PR INTERVAL: 317 MS
PR INTERVAL: 429 MS
QRS AXIS: -88 DEGREES
QRS AXIS: 224 DEGREES
QRS AXIS: 268 DEGREES
QRSD INTERVAL: 154 MS
QRSD INTERVAL: 158 MS
QRSD INTERVAL: 163 MS
QT INTERVAL: 329 MS
QT INTERVAL: 346 MS
QT INTERVAL: 433 MS
QTC INTERVAL: 499 MS
QTC INTERVAL: 510 MS
QTC INTERVAL: 539 MS
T WAVE AXIS: -58 DEGREES
T WAVE AXIS: 109 DEGREES
T WAVE AXIS: 157 DEGREES
VENTRICULAR RATE: 125 BPM
VENTRICULAR RATE: 144 BPM
VENTRICULAR RATE: 93 BPM

## 2020-01-13 PROCEDURE — 82948 REAGENT STRIP/BLOOD GLUCOSE: CPT

## 2020-01-13 PROCEDURE — 72040 X-RAY EXAM NECK SPINE 2-3 VW: CPT

## 2020-01-13 PROCEDURE — 93010 ELECTROCARDIOGRAM REPORT: CPT | Performed by: INTERNAL MEDICINE

## 2020-01-13 PROCEDURE — 99233 SBSQ HOSP IP/OBS HIGH 50: CPT | Performed by: NEUROLOGICAL SURGERY

## 2020-01-13 PROCEDURE — 92526 ORAL FUNCTION THERAPY: CPT

## 2020-01-13 PROCEDURE — 99222 1ST HOSP IP/OBS MODERATE 55: CPT | Performed by: PHYSICIAN ASSISTANT

## 2020-01-13 PROCEDURE — 99232 SBSQ HOSP IP/OBS MODERATE 35: CPT | Performed by: SURGERY

## 2020-01-13 RX ORDER — FUROSEMIDE 40 MG/1
40 TABLET ORAL
Status: DISCONTINUED | OUTPATIENT
Start: 2020-01-13 | End: 2020-01-13

## 2020-01-13 RX ORDER — HEPARIN SODIUM 5000 [USP'U]/ML
5000 INJECTION, SOLUTION INTRAVENOUS; SUBCUTANEOUS EVERY 8 HOURS SCHEDULED
Status: DISCONTINUED | OUTPATIENT
Start: 2020-01-14 | End: 2020-01-16

## 2020-01-13 RX ORDER — ATORVASTATIN CALCIUM 20 MG/1
20 TABLET, FILM COATED ORAL
Status: DISCONTINUED | OUTPATIENT
Start: 2020-01-13 | End: 2020-01-18 | Stop reason: HOSPADM

## 2020-01-13 RX ORDER — PANTOPRAZOLE SODIUM 40 MG/1
40 TABLET, DELAYED RELEASE ORAL
Status: DISCONTINUED | OUTPATIENT
Start: 2020-01-13 | End: 2020-01-18 | Stop reason: HOSPADM

## 2020-01-13 RX ORDER — FUROSEMIDE 40 MG/1
40 TABLET ORAL
Status: DISCONTINUED | OUTPATIENT
Start: 2020-01-14 | End: 2020-01-18 | Stop reason: HOSPADM

## 2020-01-13 RX ADMIN — METHOCARBAMOL TABLETS 500 MG: 500 TABLET, COATED ORAL at 23:56

## 2020-01-13 RX ADMIN — ENOXAPARIN SODIUM 40 MG: 40 INJECTION SUBCUTANEOUS at 09:55

## 2020-01-13 RX ADMIN — ACETAMINOPHEN 975 MG: 325 TABLET ORAL at 14:49

## 2020-01-13 RX ADMIN — INSULIN LISPRO 5 UNITS: 100 INJECTION, SOLUTION INTRAVENOUS; SUBCUTANEOUS at 18:00

## 2020-01-13 RX ADMIN — ACETAMINOPHEN 975 MG: 325 TABLET ORAL at 05:11

## 2020-01-13 RX ADMIN — METHOCARBAMOL TABLETS 500 MG: 500 TABLET, COATED ORAL at 00:29

## 2020-01-13 RX ADMIN — METHOCARBAMOL TABLETS 500 MG: 500 TABLET, COATED ORAL at 12:17

## 2020-01-13 RX ADMIN — METHOCARBAMOL TABLETS 500 MG: 500 TABLET, COATED ORAL at 18:06

## 2020-01-13 RX ADMIN — ACETAMINOPHEN 975 MG: 325 TABLET ORAL at 21:30

## 2020-01-13 RX ADMIN — INSULIN LISPRO 5 UNITS: 100 INJECTION, SOLUTION INTRAVENOUS; SUBCUTANEOUS at 21:31

## 2020-01-13 RX ADMIN — METOPROLOL TARTRATE 50 MG: 50 TABLET, FILM COATED ORAL at 21:30

## 2020-01-13 RX ADMIN — PANTOPRAZOLE SODIUM 40 MG: 40 TABLET, DELAYED RELEASE ORAL at 08:05

## 2020-01-13 RX ADMIN — DILTIAZEM HYDROCHLORIDE 240 MG: 240 CAPSULE, COATED, EXTENDED RELEASE ORAL at 09:52

## 2020-01-13 RX ADMIN — INSULIN LISPRO 12 UNITS: 100 INJECTION, SOLUTION INTRAVENOUS; SUBCUTANEOUS at 08:50

## 2020-01-13 RX ADMIN — ATORVASTATIN CALCIUM 20 MG: 20 TABLET, FILM COATED ORAL at 16:57

## 2020-01-13 RX ADMIN — INSULIN LISPRO 4 UNITS: 100 INJECTION, SOLUTION INTRAVENOUS; SUBCUTANEOUS at 12:19

## 2020-01-13 RX ADMIN — METOPROLOL TARTRATE 50 MG: 50 TABLET, FILM COATED ORAL at 09:53

## 2020-01-13 RX ADMIN — FUROSEMIDE 40 MG: 40 TABLET ORAL at 08:05

## 2020-01-13 RX ADMIN — METHOCARBAMOL TABLETS 500 MG: 500 TABLET, COATED ORAL at 05:11

## 2020-01-14 ENCOUNTER — APPOINTMENT (INPATIENT)
Dept: RADIOLOGY | Facility: HOSPITAL | Age: 85
DRG: 552 | End: 2020-01-14
Payer: COMMERCIAL

## 2020-01-14 PROBLEM — R13.10 DYSPHAGIA: Status: ACTIVE | Noted: 2020-01-14

## 2020-01-14 PROBLEM — R04.0 EPISTAXIS: Status: RESOLVED | Noted: 2020-01-13 | Resolved: 2020-01-14

## 2020-01-14 LAB
ANION GAP SERPL CALCULATED.3IONS-SCNC: 4 MMOL/L (ref 4–13)
BASOPHILS # BLD AUTO: 0.07 THOUSANDS/ΜL (ref 0–0.1)
BASOPHILS NFR BLD AUTO: 1 % (ref 0–1)
BUN SERPL-MCNC: 15 MG/DL (ref 5–25)
CALCIUM SERPL-MCNC: 8.4 MG/DL (ref 8.3–10.1)
CHLORIDE SERPL-SCNC: 106 MMOL/L (ref 100–108)
CO2 SERPL-SCNC: 29 MMOL/L (ref 21–32)
CREAT SERPL-MCNC: 0.8 MG/DL (ref 0.6–1.3)
EOSINOPHIL # BLD AUTO: 0.26 THOUSAND/ΜL (ref 0–0.61)
EOSINOPHIL NFR BLD AUTO: 2 % (ref 0–6)
ERYTHROCYTE [DISTWIDTH] IN BLOOD BY AUTOMATED COUNT: 15.5 % (ref 11.6–15.1)
GFR SERPL CREATININE-BSD FRML MDRD: 83 ML/MIN/1.73SQ M
GLUCOSE SERPL-MCNC: 207 MG/DL (ref 65–140)
GLUCOSE SERPL-MCNC: 255 MG/DL (ref 65–140)
GLUCOSE SERPL-MCNC: 265 MG/DL (ref 65–140)
GLUCOSE SERPL-MCNC: 331 MG/DL (ref 65–140)
GLUCOSE SERPL-MCNC: 344 MG/DL (ref 65–140)
GLUCOSE SERPL-MCNC: 81 MG/DL (ref 65–140)
HCT VFR BLD AUTO: 41.5 % (ref 36.5–49.3)
HGB BLD-MCNC: 13.3 G/DL (ref 12–17)
IMM GRANULOCYTES # BLD AUTO: 0.05 THOUSAND/UL (ref 0–0.2)
IMM GRANULOCYTES NFR BLD AUTO: 1 % (ref 0–2)
LYMPHOCYTES # BLD AUTO: 0.95 THOUSANDS/ΜL (ref 0.6–4.47)
LYMPHOCYTES NFR BLD AUTO: 9 % (ref 14–44)
MAGNESIUM SERPL-MCNC: 2.1 MG/DL (ref 1.6–2.6)
MCH RBC QN AUTO: 27.6 PG (ref 26.8–34.3)
MCHC RBC AUTO-ENTMCNC: 32 G/DL (ref 31.4–37.4)
MCV RBC AUTO: 86 FL (ref 82–98)
MONOCYTES # BLD AUTO: 0.97 THOUSAND/ΜL (ref 0.17–1.22)
MONOCYTES NFR BLD AUTO: 9 % (ref 4–12)
NEUTROPHILS # BLD AUTO: 8.32 THOUSANDS/ΜL (ref 1.85–7.62)
NEUTS SEG NFR BLD AUTO: 78 % (ref 43–75)
NRBC BLD AUTO-RTO: 0 /100 WBCS
PLATELET # BLD AUTO: 219 THOUSANDS/UL (ref 149–390)
PMV BLD AUTO: 11.6 FL (ref 8.9–12.7)
POTASSIUM SERPL-SCNC: 3.9 MMOL/L (ref 3.5–5.3)
RBC # BLD AUTO: 4.82 MILLION/UL (ref 3.88–5.62)
SODIUM SERPL-SCNC: 139 MMOL/L (ref 136–145)
WBC # BLD AUTO: 10.62 THOUSAND/UL (ref 4.31–10.16)

## 2020-01-14 PROCEDURE — 99233 SBSQ HOSP IP/OBS HIGH 50: CPT | Performed by: PHYSICIAN ASSISTANT

## 2020-01-14 PROCEDURE — 97163 PT EVAL HIGH COMPLEX 45 MIN: CPT

## 2020-01-14 PROCEDURE — 97167 OT EVAL HIGH COMPLEX 60 MIN: CPT

## 2020-01-14 PROCEDURE — 99232 SBSQ HOSP IP/OBS MODERATE 35: CPT | Performed by: EMERGENCY MEDICINE

## 2020-01-14 PROCEDURE — 99222 1ST HOSP IP/OBS MODERATE 55: CPT | Performed by: INTERNAL MEDICINE

## 2020-01-14 PROCEDURE — 82948 REAGENT STRIP/BLOOD GLUCOSE: CPT

## 2020-01-14 PROCEDURE — 92526 ORAL FUNCTION THERAPY: CPT

## 2020-01-14 PROCEDURE — 83735 ASSAY OF MAGNESIUM: CPT | Performed by: STUDENT IN AN ORGANIZED HEALTH CARE EDUCATION/TRAINING PROGRAM

## 2020-01-14 PROCEDURE — 80048 BASIC METABOLIC PNL TOTAL CA: CPT | Performed by: STUDENT IN AN ORGANIZED HEALTH CARE EDUCATION/TRAINING PROGRAM

## 2020-01-14 PROCEDURE — 85025 COMPLETE CBC W/AUTO DIFF WBC: CPT | Performed by: STUDENT IN AN ORGANIZED HEALTH CARE EDUCATION/TRAINING PROGRAM

## 2020-01-14 RX ORDER — INSULIN GLARGINE 100 [IU]/ML
20 INJECTION, SOLUTION SUBCUTANEOUS EVERY 12 HOURS SCHEDULED
Status: DISCONTINUED | OUTPATIENT
Start: 2020-01-14 | End: 2020-01-18 | Stop reason: HOSPADM

## 2020-01-14 RX ORDER — INSULIN GLARGINE 100 [IU]/ML
12 INJECTION, SOLUTION SUBCUTANEOUS EVERY 12 HOURS SCHEDULED
Status: DISCONTINUED | OUTPATIENT
Start: 2020-01-14 | End: 2020-01-14

## 2020-01-14 RX ADMIN — METOPROLOL TARTRATE 50 MG: 50 TABLET, FILM COATED ORAL at 08:09

## 2020-01-14 RX ADMIN — METHOCARBAMOL TABLETS 500 MG: 500 TABLET, COATED ORAL at 23:44

## 2020-01-14 RX ADMIN — FUROSEMIDE 40 MG: 40 TABLET ORAL at 05:30

## 2020-01-14 RX ADMIN — METHOCARBAMOL TABLETS 500 MG: 500 TABLET, COATED ORAL at 05:30

## 2020-01-14 RX ADMIN — PANTOPRAZOLE SODIUM 40 MG: 40 TABLET, DELAYED RELEASE ORAL at 05:30

## 2020-01-14 RX ADMIN — HEPARIN SODIUM 5000 UNITS: 5000 INJECTION INTRAVENOUS; SUBCUTANEOUS at 13:21

## 2020-01-14 RX ADMIN — ACETAMINOPHEN 975 MG: 325 TABLET ORAL at 22:02

## 2020-01-14 RX ADMIN — HEPARIN SODIUM 5000 UNITS: 5000 INJECTION INTRAVENOUS; SUBCUTANEOUS at 22:04

## 2020-01-14 RX ADMIN — METHOCARBAMOL TABLETS 500 MG: 500 TABLET, COATED ORAL at 17:58

## 2020-01-14 RX ADMIN — INSULIN LISPRO 8 UNITS: 100 INJECTION, SOLUTION INTRAVENOUS; SUBCUTANEOUS at 18:02

## 2020-01-14 RX ADMIN — ATORVASTATIN CALCIUM 20 MG: 20 TABLET, FILM COATED ORAL at 18:02

## 2020-01-14 RX ADMIN — HEPARIN SODIUM 5000 UNITS: 5000 INJECTION INTRAVENOUS; SUBCUTANEOUS at 05:30

## 2020-01-14 RX ADMIN — INSULIN LISPRO 5 UNITS: 100 INJECTION, SOLUTION INTRAVENOUS; SUBCUTANEOUS at 18:03

## 2020-01-14 RX ADMIN — DILTIAZEM HYDROCHLORIDE 240 MG: 240 CAPSULE, COATED, EXTENDED RELEASE ORAL at 08:09

## 2020-01-14 RX ADMIN — INSULIN LISPRO 16 UNITS: 100 INJECTION, SOLUTION INTRAVENOUS; SUBCUTANEOUS at 13:22

## 2020-01-14 RX ADMIN — INSULIN GLARGINE 12 UNITS: 100 INJECTION, SOLUTION SUBCUTANEOUS at 08:08

## 2020-01-14 RX ADMIN — METHOCARBAMOL TABLETS 500 MG: 500 TABLET, COATED ORAL at 13:21

## 2020-01-14 RX ADMIN — ACETAMINOPHEN 975 MG: 325 TABLET ORAL at 05:30

## 2020-01-14 RX ADMIN — INSULIN LISPRO 12 UNITS: 100 INJECTION, SOLUTION INTRAVENOUS; SUBCUTANEOUS at 08:08

## 2020-01-14 RX ADMIN — METOPROLOL TARTRATE 50 MG: 50 TABLET, FILM COATED ORAL at 22:03

## 2020-01-14 RX ADMIN — INSULIN LISPRO 8 UNITS: 100 INJECTION, SOLUTION INTRAVENOUS; SUBCUTANEOUS at 13:21

## 2020-01-14 RX ADMIN — ACETAMINOPHEN 975 MG: 325 TABLET ORAL at 13:21

## 2020-01-14 RX ADMIN — FUROSEMIDE 40 MG: 40 TABLET ORAL at 17:58

## 2020-01-14 RX ADMIN — INSULIN LISPRO 8 UNITS: 100 INJECTION, SOLUTION INTRAVENOUS; SUBCUTANEOUS at 08:08

## 2020-01-15 ENCOUNTER — TELEPHONE (OUTPATIENT)
Dept: NEUROSURGERY | Facility: CLINIC | Age: 85
End: 2020-01-15

## 2020-01-15 LAB
GLUCOSE SERPL-MCNC: 102 MG/DL (ref 65–140)
GLUCOSE SERPL-MCNC: 219 MG/DL (ref 65–140)
GLUCOSE SERPL-MCNC: 234 MG/DL (ref 65–140)
GLUCOSE SERPL-MCNC: 236 MG/DL (ref 65–140)
GLUCOSE SERPL-MCNC: 294 MG/DL (ref 65–140)

## 2020-01-15 PROCEDURE — 82948 REAGENT STRIP/BLOOD GLUCOSE: CPT

## 2020-01-15 PROCEDURE — 99233 SBSQ HOSP IP/OBS HIGH 50: CPT | Performed by: PHYSICIAN ASSISTANT

## 2020-01-15 PROCEDURE — 99232 SBSQ HOSP IP/OBS MODERATE 35: CPT | Performed by: PHYSICIAN ASSISTANT

## 2020-01-15 RX ORDER — ASPIRIN 81 MG/1
81 TABLET, CHEWABLE ORAL DAILY
Status: DISCONTINUED | OUTPATIENT
Start: 2020-01-15 | End: 2020-01-18 | Stop reason: HOSPADM

## 2020-01-15 RX ADMIN — INSULIN LISPRO 8 UNITS: 100 INJECTION, SOLUTION INTRAVENOUS; SUBCUTANEOUS at 09:00

## 2020-01-15 RX ADMIN — METHOCARBAMOL TABLETS 500 MG: 500 TABLET, COATED ORAL at 18:05

## 2020-01-15 RX ADMIN — HEPARIN SODIUM 5000 UNITS: 5000 INJECTION INTRAVENOUS; SUBCUTANEOUS at 18:05

## 2020-01-15 RX ADMIN — METHOCARBAMOL TABLETS 500 MG: 500 TABLET, COATED ORAL at 05:19

## 2020-01-15 RX ADMIN — HEPARIN SODIUM 5000 UNITS: 5000 INJECTION INTRAVENOUS; SUBCUTANEOUS at 05:19

## 2020-01-15 RX ADMIN — METOPROLOL TARTRATE 50 MG: 50 TABLET, FILM COATED ORAL at 08:54

## 2020-01-15 RX ADMIN — ATORVASTATIN CALCIUM 20 MG: 20 TABLET, FILM COATED ORAL at 18:05

## 2020-01-15 RX ADMIN — ACETAMINOPHEN 975 MG: 325 TABLET ORAL at 18:05

## 2020-01-15 RX ADMIN — INSULIN LISPRO 8 UNITS: 100 INJECTION, SOLUTION INTRAVENOUS; SUBCUTANEOUS at 18:12

## 2020-01-15 RX ADMIN — ACETAMINOPHEN 975 MG: 325 TABLET ORAL at 05:19

## 2020-01-15 RX ADMIN — METOPROLOL TARTRATE 50 MG: 50 TABLET, FILM COATED ORAL at 21:53

## 2020-01-15 RX ADMIN — METHOCARBAMOL TABLETS 500 MG: 500 TABLET, COATED ORAL at 12:44

## 2020-01-15 RX ADMIN — INSULIN LISPRO 12 UNITS: 100 INJECTION, SOLUTION INTRAVENOUS; SUBCUTANEOUS at 12:47

## 2020-01-15 RX ADMIN — PANTOPRAZOLE SODIUM 40 MG: 40 TABLET, DELAYED RELEASE ORAL at 05:19

## 2020-01-15 RX ADMIN — INSULIN LISPRO 4 UNITS: 100 INJECTION, SOLUTION INTRAVENOUS; SUBCUTANEOUS at 21:55

## 2020-01-15 RX ADMIN — DILTIAZEM HYDROCHLORIDE 240 MG: 240 CAPSULE, COATED, EXTENDED RELEASE ORAL at 08:54

## 2020-01-15 RX ADMIN — INSULIN GLARGINE 20 UNITS: 100 INJECTION, SOLUTION SUBCUTANEOUS at 21:54

## 2020-01-15 RX ADMIN — INSULIN GLARGINE 20 UNITS: 100 INJECTION, SOLUTION SUBCUTANEOUS at 08:59

## 2020-01-15 RX ADMIN — HEPARIN SODIUM 5000 UNITS: 5000 INJECTION INTRAVENOUS; SUBCUTANEOUS at 21:53

## 2020-01-15 RX ADMIN — ASPIRIN 81 MG 81 MG: 81 TABLET ORAL at 12:44

## 2020-01-15 RX ADMIN — FUROSEMIDE 40 MG: 40 TABLET ORAL at 18:05

## 2020-01-15 RX ADMIN — ACETAMINOPHEN 975 MG: 325 TABLET ORAL at 21:53

## 2020-01-15 RX ADMIN — FUROSEMIDE 40 MG: 40 TABLET ORAL at 08:54

## 2020-01-15 NOTE — TELEPHONE ENCOUNTER
01/16/2020-PT [address]    01/15/2020-PT [place] [address] 01/29/2020 APT W/CTA AND XRAY      01/14/2020-(TSERING)OUTPATIENT FOLLOW UP 01/29 WITH UPRIGHT XR CERVICAL AND CTA HEAD AND NECK  SIGNED OFF

## 2020-01-16 LAB
ANION GAP SERPL CALCULATED.3IONS-SCNC: 4 MMOL/L (ref 4–13)
BASOPHILS # BLD AUTO: 0.07 THOUSANDS/ΜL (ref 0–0.1)
BASOPHILS NFR BLD AUTO: 1 % (ref 0–1)
BUN SERPL-MCNC: 22 MG/DL (ref 5–25)
CALCIUM SERPL-MCNC: 8.5 MG/DL (ref 8.3–10.1)
CHLORIDE SERPL-SCNC: 105 MMOL/L (ref 100–108)
CO2 SERPL-SCNC: 33 MMOL/L (ref 21–32)
CREAT SERPL-MCNC: 0.97 MG/DL (ref 0.6–1.3)
EOSINOPHIL # BLD AUTO: 0.41 THOUSAND/ΜL (ref 0–0.61)
EOSINOPHIL NFR BLD AUTO: 4 % (ref 0–6)
ERYTHROCYTE [DISTWIDTH] IN BLOOD BY AUTOMATED COUNT: 15.8 % (ref 11.6–15.1)
GFR SERPL CREATININE-BSD FRML MDRD: 72 ML/MIN/1.73SQ M
GLUCOSE SERPL-MCNC: 126 MG/DL (ref 65–140)
GLUCOSE SERPL-MCNC: 136 MG/DL (ref 65–140)
GLUCOSE SERPL-MCNC: 158 MG/DL (ref 65–140)
GLUCOSE SERPL-MCNC: 164 MG/DL (ref 65–140)
GLUCOSE SERPL-MCNC: 181 MG/DL (ref 65–140)
GLUCOSE SERPL-MCNC: 199 MG/DL (ref 65–140)
GLUCOSE SERPL-MCNC: 200 MG/DL (ref 65–140)
GLUCOSE SERPL-MCNC: 41 MG/DL (ref 65–140)
GLUCOSE SERPL-MCNC: 58 MG/DL (ref 65–140)
HCT VFR BLD AUTO: 39.2 % (ref 36.5–49.3)
HGB BLD-MCNC: 12.5 G/DL (ref 12–17)
IMM GRANULOCYTES # BLD AUTO: 0.04 THOUSAND/UL (ref 0–0.2)
IMM GRANULOCYTES NFR BLD AUTO: 0 % (ref 0–2)
LYMPHOCYTES # BLD AUTO: 1.15 THOUSANDS/ΜL (ref 0.6–4.47)
LYMPHOCYTES NFR BLD AUTO: 12 % (ref 14–44)
MCH RBC QN AUTO: 27.6 PG (ref 26.8–34.3)
MCHC RBC AUTO-ENTMCNC: 31.9 G/DL (ref 31.4–37.4)
MCV RBC AUTO: 87 FL (ref 82–98)
MONOCYTES # BLD AUTO: 0.92 THOUSAND/ΜL (ref 0.17–1.22)
MONOCYTES NFR BLD AUTO: 10 % (ref 4–12)
NEUTROPHILS # BLD AUTO: 6.8 THOUSANDS/ΜL (ref 1.85–7.62)
NEUTS SEG NFR BLD AUTO: 73 % (ref 43–75)
NRBC BLD AUTO-RTO: 0 /100 WBCS
PLATELET # BLD AUTO: 262 THOUSANDS/UL (ref 149–390)
PMV BLD AUTO: 10.9 FL (ref 8.9–12.7)
POTASSIUM SERPL-SCNC: 3.4 MMOL/L (ref 3.5–5.3)
RBC # BLD AUTO: 4.53 MILLION/UL (ref 3.88–5.62)
SODIUM SERPL-SCNC: 142 MMOL/L (ref 136–145)
WBC # BLD AUTO: 9.39 THOUSAND/UL (ref 4.31–10.16)

## 2020-01-16 PROCEDURE — 99232 SBSQ HOSP IP/OBS MODERATE 35: CPT | Performed by: PHYSICIAN ASSISTANT

## 2020-01-16 PROCEDURE — 99232 SBSQ HOSP IP/OBS MODERATE 35: CPT | Performed by: INTERNAL MEDICINE

## 2020-01-16 PROCEDURE — 85025 COMPLETE CBC W/AUTO DIFF WBC: CPT | Performed by: PHYSICIAN ASSISTANT

## 2020-01-16 PROCEDURE — 80048 BASIC METABOLIC PNL TOTAL CA: CPT | Performed by: PHYSICIAN ASSISTANT

## 2020-01-16 PROCEDURE — 82948 REAGENT STRIP/BLOOD GLUCOSE: CPT

## 2020-01-16 RX ORDER — POTASSIUM CHLORIDE 20MEQ/15ML
40 LIQUID (ML) ORAL ONCE
Status: COMPLETED | OUTPATIENT
Start: 2020-01-16 | End: 2020-01-16

## 2020-01-16 RX ORDER — DEXTROSE MONOHYDRATE 25 G/50ML
INJECTION, SOLUTION INTRAVENOUS
Status: COMPLETED
Start: 2020-01-16 | End: 2020-01-16

## 2020-01-16 RX ORDER — HYDROMORPHONE HCL/PF 1 MG/ML
0.2 SYRINGE (ML) INJECTION ONCE
Status: COMPLETED | OUTPATIENT
Start: 2020-01-16 | End: 2020-01-16

## 2020-01-16 RX ORDER — HYDROMORPHONE HCL/PF 1 MG/ML
0.5 SYRINGE (ML) INJECTION ONCE
Status: DISCONTINUED | OUTPATIENT
Start: 2020-01-16 | End: 2020-01-16

## 2020-01-16 RX ORDER — OXYCODONE HYDROCHLORIDE 5 MG/1
2.5 TABLET ORAL EVERY 4 HOURS PRN
Status: DISCONTINUED | OUTPATIENT
Start: 2020-01-16 | End: 2020-01-18 | Stop reason: HOSPADM

## 2020-01-16 RX ORDER — DEXTROSE MONOHYDRATE 25 G/50ML
50 INJECTION, SOLUTION INTRAVENOUS ONCE
Status: COMPLETED | OUTPATIENT
Start: 2020-01-16 | End: 2020-01-16

## 2020-01-16 RX ORDER — OXYCODONE HYDROCHLORIDE 5 MG/1
5 TABLET ORAL EVERY 4 HOURS PRN
Status: DISCONTINUED | OUTPATIENT
Start: 2020-01-16 | End: 2020-01-18 | Stop reason: HOSPADM

## 2020-01-16 RX ADMIN — INSULIN LISPRO 4 UNITS: 100 INJECTION, SOLUTION INTRAVENOUS; SUBCUTANEOUS at 09:16

## 2020-01-16 RX ADMIN — ACETAMINOPHEN 975 MG: 325 TABLET ORAL at 13:45

## 2020-01-16 RX ADMIN — METHOCARBAMOL TABLETS 500 MG: 500 TABLET, COATED ORAL at 05:32

## 2020-01-16 RX ADMIN — ENOXAPARIN SODIUM 30 MG: 30 INJECTION SUBCUTANEOUS at 22:20

## 2020-01-16 RX ADMIN — METHOCARBAMOL TABLETS 500 MG: 500 TABLET, COATED ORAL at 23:34

## 2020-01-16 RX ADMIN — HEPARIN SODIUM 5000 UNITS: 5000 INJECTION INTRAVENOUS; SUBCUTANEOUS at 05:32

## 2020-01-16 RX ADMIN — INSULIN LISPRO 5 UNITS: 100 INJECTION, SOLUTION INTRAVENOUS; SUBCUTANEOUS at 09:16

## 2020-01-16 RX ADMIN — METHOCARBAMOL TABLETS 500 MG: 500 TABLET, COATED ORAL at 16:30

## 2020-01-16 RX ADMIN — DEXTROSE MONOHYDRATE 25 ML: 25 INJECTION, SOLUTION INTRAVENOUS at 17:32

## 2020-01-16 RX ADMIN — ACETAMINOPHEN 975 MG: 325 TABLET ORAL at 05:32

## 2020-01-16 RX ADMIN — ACETAMINOPHEN 975 MG: 325 TABLET ORAL at 22:10

## 2020-01-16 RX ADMIN — DEXTROSE MONOHYDRATE 25 ML: 500 INJECTION PARENTERAL at 17:32

## 2020-01-16 RX ADMIN — INSULIN LISPRO 2 UNITS: 100 INJECTION, SOLUTION INTRAVENOUS; SUBCUTANEOUS at 22:15

## 2020-01-16 RX ADMIN — INSULIN GLARGINE 20 UNITS: 100 INJECTION, SOLUTION SUBCUTANEOUS at 09:11

## 2020-01-16 RX ADMIN — ASPIRIN 81 MG 81 MG: 81 TABLET ORAL at 09:11

## 2020-01-16 RX ADMIN — PANTOPRAZOLE SODIUM 40 MG: 40 TABLET, DELAYED RELEASE ORAL at 05:32

## 2020-01-16 RX ADMIN — INSULIN LISPRO 5 UNITS: 100 INJECTION, SOLUTION INTRAVENOUS; SUBCUTANEOUS at 13:53

## 2020-01-16 RX ADMIN — HYDROMORPHONE HYDROCHLORIDE 0.2 MG: 1 INJECTION, SOLUTION INTRAMUSCULAR; INTRAVENOUS; SUBCUTANEOUS at 17:47

## 2020-01-16 RX ADMIN — DEXTROSE MONOHYDRATE 25 ML: 500 INJECTION PARENTERAL at 17:57

## 2020-01-16 RX ADMIN — POTASSIUM CHLORIDE 40 MEQ: 1.5 SOLUTION ORAL at 09:11

## 2020-01-16 RX ADMIN — FUROSEMIDE 40 MG: 40 TABLET ORAL at 16:30

## 2020-01-16 RX ADMIN — METOPROLOL TARTRATE 50 MG: 50 TABLET, FILM COATED ORAL at 22:10

## 2020-01-16 RX ADMIN — DILTIAZEM HYDROCHLORIDE 240 MG: 240 CAPSULE, COATED, EXTENDED RELEASE ORAL at 09:12

## 2020-01-16 RX ADMIN — ATORVASTATIN CALCIUM 20 MG: 20 TABLET, FILM COATED ORAL at 16:30

## 2020-01-16 RX ADMIN — METOPROLOL TARTRATE 50 MG: 50 TABLET, FILM COATED ORAL at 09:12

## 2020-01-16 RX ADMIN — INSULIN LISPRO 4 UNITS: 100 INJECTION, SOLUTION INTRAVENOUS; SUBCUTANEOUS at 13:54

## 2020-01-16 RX ADMIN — FUROSEMIDE 40 MG: 40 TABLET ORAL at 09:12

## 2020-01-17 VITALS
RESPIRATION RATE: 20 BRPM | DIASTOLIC BLOOD PRESSURE: 102 MMHG | HEIGHT: 68 IN | WEIGHT: 189.6 LBS | OXYGEN SATURATION: 97 % | HEART RATE: 90 BPM | BODY MASS INDEX: 28.73 KG/M2 | TEMPERATURE: 98.1 F | SYSTOLIC BLOOD PRESSURE: 172 MMHG

## 2020-01-17 LAB
ANION GAP SERPL CALCULATED.3IONS-SCNC: 6 MMOL/L (ref 4–13)
BASOPHILS # BLD AUTO: 0.07 THOUSANDS/ΜL (ref 0–0.1)
BASOPHILS NFR BLD AUTO: 1 % (ref 0–1)
BUN SERPL-MCNC: 16 MG/DL (ref 5–25)
CALCIUM SERPL-MCNC: 8.9 MG/DL (ref 8.3–10.1)
CHLORIDE SERPL-SCNC: 104 MMOL/L (ref 100–108)
CO2 SERPL-SCNC: 31 MMOL/L (ref 21–32)
CREAT SERPL-MCNC: 0.8 MG/DL (ref 0.6–1.3)
EOSINOPHIL # BLD AUTO: 0.48 THOUSAND/ΜL (ref 0–0.61)
EOSINOPHIL NFR BLD AUTO: 6 % (ref 0–6)
ERYTHROCYTE [DISTWIDTH] IN BLOOD BY AUTOMATED COUNT: 15.9 % (ref 11.6–15.1)
GFR SERPL CREATININE-BSD FRML MDRD: 83 ML/MIN/1.73SQ M
GLUCOSE SERPL-MCNC: 108 MG/DL (ref 65–140)
GLUCOSE SERPL-MCNC: 143 MG/DL (ref 65–140)
GLUCOSE SERPL-MCNC: 229 MG/DL (ref 65–140)
GLUCOSE SERPL-MCNC: 239 MG/DL (ref 65–140)
GLUCOSE SERPL-MCNC: 342 MG/DL (ref 65–140)
HCT VFR BLD AUTO: 40.6 % (ref 36.5–49.3)
HGB BLD-MCNC: 13.1 G/DL (ref 12–17)
IMM GRANULOCYTES # BLD AUTO: 0.06 THOUSAND/UL (ref 0–0.2)
IMM GRANULOCYTES NFR BLD AUTO: 1 % (ref 0–2)
LYMPHOCYTES # BLD AUTO: 1.14 THOUSANDS/ΜL (ref 0.6–4.47)
LYMPHOCYTES NFR BLD AUTO: 13 % (ref 14–44)
MCH RBC QN AUTO: 27.5 PG (ref 26.8–34.3)
MCHC RBC AUTO-ENTMCNC: 32.3 G/DL (ref 31.4–37.4)
MCV RBC AUTO: 85 FL (ref 82–98)
MONOCYTES # BLD AUTO: 0.9 THOUSAND/ΜL (ref 0.17–1.22)
MONOCYTES NFR BLD AUTO: 11 % (ref 4–12)
NEUTROPHILS # BLD AUTO: 5.84 THOUSANDS/ΜL (ref 1.85–7.62)
NEUTS SEG NFR BLD AUTO: 68 % (ref 43–75)
NRBC BLD AUTO-RTO: 0 /100 WBCS
PLATELET # BLD AUTO: 281 THOUSANDS/UL (ref 149–390)
PMV BLD AUTO: 10.7 FL (ref 8.9–12.7)
POTASSIUM SERPL-SCNC: 3.4 MMOL/L (ref 3.5–5.3)
RBC # BLD AUTO: 4.76 MILLION/UL (ref 3.88–5.62)
SODIUM SERPL-SCNC: 141 MMOL/L (ref 136–145)
WBC # BLD AUTO: 8.49 THOUSAND/UL (ref 4.31–10.16)

## 2020-01-17 PROCEDURE — 97535 SELF CARE MNGMENT TRAINING: CPT

## 2020-01-17 PROCEDURE — 97530 THERAPEUTIC ACTIVITIES: CPT

## 2020-01-17 PROCEDURE — 85025 COMPLETE CBC W/AUTO DIFF WBC: CPT | Performed by: PHYSICIAN ASSISTANT

## 2020-01-17 PROCEDURE — 92526 ORAL FUNCTION THERAPY: CPT

## 2020-01-17 PROCEDURE — 82948 REAGENT STRIP/BLOOD GLUCOSE: CPT

## 2020-01-17 PROCEDURE — 99238 HOSP IP/OBS DSCHRG MGMT 30/<: CPT | Performed by: NURSE PRACTITIONER

## 2020-01-17 PROCEDURE — 97116 GAIT TRAINING THERAPY: CPT

## 2020-01-17 PROCEDURE — 80048 BASIC METABOLIC PNL TOTAL CA: CPT | Performed by: PHYSICIAN ASSISTANT

## 2020-01-17 PROCEDURE — NC001 PR NO CHARGE: Performed by: NURSE PRACTITIONER

## 2020-01-17 RX ORDER — DILTIAZEM HYDROCHLORIDE 240 MG/1
240 CAPSULE, COATED, EXTENDED RELEASE ORAL DAILY
Qty: 10 CAPSULE | Refills: 0
Start: 2020-01-18 | End: 2021-03-11 | Stop reason: ALTCHOICE

## 2020-01-17 RX ORDER — ASPIRIN 81 MG/1
81 TABLET, CHEWABLE ORAL DAILY
Qty: 10 TABLET | Refills: 0
Start: 2020-01-18 | End: 2021-03-11 | Stop reason: ALTCHOICE

## 2020-01-17 RX ORDER — OXYCODONE HYDROCHLORIDE 5 MG/1
2.5 TABLET ORAL EVERY 4 HOURS PRN
Qty: 30 TABLET | Refills: 0 | Status: SHIPPED | OUTPATIENT
Start: 2020-01-17 | End: 2020-01-27

## 2020-01-17 RX ORDER — METHOCARBAMOL 500 MG/1
500 TABLET, FILM COATED ORAL EVERY 6 HOURS SCHEDULED
Qty: 10 TABLET | Refills: 0
Start: 2020-01-17 | End: 2021-06-22

## 2020-01-17 RX ORDER — POTASSIUM CHLORIDE 20 MEQ/1
20 TABLET, EXTENDED RELEASE ORAL ONCE
Status: COMPLETED | OUTPATIENT
Start: 2020-01-17 | End: 2020-01-17

## 2020-01-17 RX ORDER — ACETAMINOPHEN 325 MG/1
975 TABLET ORAL EVERY 8 HOURS SCHEDULED
Qty: 30 TABLET | Refills: 0 | Status: ON HOLD
Start: 2020-01-17 | End: 2021-03-10 | Stop reason: SDUPTHER

## 2020-01-17 RX ADMIN — POTASSIUM CHLORIDE 20 MEQ: 1500 TABLET, EXTENDED RELEASE ORAL at 17:29

## 2020-01-17 RX ADMIN — ATORVASTATIN CALCIUM 20 MG: 20 TABLET, FILM COATED ORAL at 17:29

## 2020-01-17 RX ADMIN — METHOCARBAMOL TABLETS 500 MG: 500 TABLET, COATED ORAL at 13:34

## 2020-01-17 RX ADMIN — METHOCARBAMOL TABLETS 500 MG: 500 TABLET, COATED ORAL at 17:29

## 2020-01-17 RX ADMIN — ACETAMINOPHEN 975 MG: 325 TABLET ORAL at 13:34

## 2020-01-17 RX ADMIN — METOPROLOL TARTRATE 50 MG: 50 TABLET, FILM COATED ORAL at 09:27

## 2020-01-17 RX ADMIN — FUROSEMIDE 40 MG: 40 TABLET ORAL at 17:29

## 2020-01-17 RX ADMIN — DILTIAZEM HYDROCHLORIDE 240 MG: 240 CAPSULE, COATED, EXTENDED RELEASE ORAL at 09:27

## 2020-01-17 RX ADMIN — ASPIRIN 81 MG 81 MG: 81 TABLET ORAL at 09:27

## 2020-01-17 RX ADMIN — ENOXAPARIN SODIUM 30 MG: 30 INJECTION SUBCUTANEOUS at 13:27

## 2020-01-17 RX ADMIN — METOPROLOL TARTRATE 50 MG: 50 TABLET, FILM COATED ORAL at 21:27

## 2020-01-17 RX ADMIN — FUROSEMIDE 40 MG: 40 TABLET ORAL at 09:27

## 2020-01-17 RX ADMIN — PANTOPRAZOLE SODIUM 40 MG: 40 TABLET, DELAYED RELEASE ORAL at 05:49

## 2020-01-17 RX ADMIN — METHOCARBAMOL TABLETS 500 MG: 500 TABLET, COATED ORAL at 05:48

## 2020-01-17 RX ADMIN — INSULIN LISPRO 5 UNITS: 100 INJECTION, SOLUTION INTRAVENOUS; SUBCUTANEOUS at 13:26

## 2020-01-17 RX ADMIN — INSULIN LISPRO 5 UNITS: 100 INJECTION, SOLUTION INTRAVENOUS; SUBCUTANEOUS at 17:35

## 2020-01-17 RX ADMIN — ACETAMINOPHEN 975 MG: 325 TABLET ORAL at 05:48

## 2020-01-17 RX ADMIN — ACETAMINOPHEN 975 MG: 325 TABLET ORAL at 21:24

## 2020-01-19 ENCOUNTER — TELEPHONE (OUTPATIENT)
Dept: OTHER | Facility: OTHER | Age: 85
End: 2020-01-19

## 2020-01-19 NOTE — TELEPHONE ENCOUNTER
Christian frankd @ [id number]  [phone] ext 774/[facility]/Pt   Sameer KIM/03-/PVJLQN due to Stat results

## 2020-01-20 ENCOUNTER — NURSING HOME VISIT (OUTPATIENT)
Dept: GERIATRICS | Facility: OTHER | Age: 85
End: 2020-01-20
Payer: COMMERCIAL

## 2020-01-20 VITALS
SYSTOLIC BLOOD PRESSURE: 119 MMHG | WEIGHT: 173.2 LBS | TEMPERATURE: 97.4 F | HEART RATE: 87 BPM | DIASTOLIC BLOOD PRESSURE: 71 MMHG | RESPIRATION RATE: 20 BRPM | BODY MASS INDEX: 26.33 KG/M2

## 2020-01-20 DIAGNOSIS — E11.9 TYPE 2 DIABETES MELLITUS WITHOUT COMPLICATION, UNSPECIFIED WHETHER LONG TERM INSULIN USE (HCC): ICD-10-CM

## 2020-01-20 DIAGNOSIS — R63.0 POOR APPETITE: Primary | ICD-10-CM

## 2020-01-20 PROCEDURE — 99308 SBSQ NF CARE LOW MDM 20: CPT | Performed by: NURSE PRACTITIONER

## 2020-01-20 NOTE — TELEPHONE ENCOUNTER
01/20/2020-CALLED [address], SPOKE TO MEAGAN, CONFIRMED 01/23/2020 CTA AND 01/29/2020 F/U APT

## 2020-01-20 NOTE — PROGRESS NOTES
[address] [address] Steve Bunch  [phone]  [facility]  STR Acute Note        NAME: Haley Wilder  AGE: 82 y o  SEX: male    DATE OF ENCOUNTER: 1/20/2020    Assessment and Plan     Problem List Items Addressed This Visit        Endocrine    Type 2 diabetes mellitus ([facility] )     · BS running high all throughout the day    · Asymptomatic  · Increase basaglar to 24units BID  · Increase humalog to 8units before meals  · algorithm #2  · Monitor BG closely              Other    Poor appetite - Primary     · RN reports pt not eating or drinking much  · Continue to monitor intake  · Dietician to evaluate pt and make recommendations  · Encourage meals and hydration  · Monitor BS               No orders of the defined types were placed in this encounter       - Counseling Documentation: patient was counseled regarding: diagnostic results, instructions for management, risk factor reductions, prognosis, patient and family education, impressions, risks and benefits of treatment options and importance of compliance with treatment    History of Present Illness     Haley Wilder 82 y o  male seen for follow-up of care and treatment plan for ambulatory dysfunction doing well at DM2 controlled with insulin, poor appetite addressed by dietician     The following portions of the patient's history were reviewed and updated as appropriate: allergies, current medications, past family history, past medical history, past social history, past surgical history and problem list     Active Problem List     Patient Active Problem List   Diagnosis    Fall    Type II dens fracture of second cervical vertebra ([facility] )    C1 cervical fracture ([facility] )    Nasal bone fractures    Atrial fibrillation with RVR ([facility] )    CHF (congestive heart failure) ([facility] )    HTN (hypertension)    HLD (hyperlipidemia)    Type 2 diabetes mellitus ([address] )    Multiple falls    Forehead laceration    Vertebral artery dissection ([address] )    Dysphagia    Poor appetite       Objective     /71   Pulse 87   Temp (!) 97 4 °F (36 3 °C)   Resp 20   Wt 78 6 kg (173 lb 3 2 oz)   BMI 26 33 kg/m²     Physical Exam   Constitutional: Vital signs are normal  He appears well-developed  He appears lethargic  HENT:   Head: Normocephalic and atraumatic  Eyes: Conjunctivae are normal    Neck: Normal range of motion  Cardiovascular: Normal rate, regular rhythm, S1 normal, S2 normal and normal heart sounds  No murmur heard  Pulmonary/Chest: Effort normal and breath sounds normal  No respiratory distress  He has no wheezes  Abdominal: Soft  Bowel sounds are normal  He exhibits no distension  There is no tenderness  Musculoskeletal: Normal range of motion  Neurological: He appears lethargic  Skin: Skin is warm, dry and intact  Psychiatric: He has a normal mood and affect  His speech is normal and behavior is normal  Thought content normal  Cognition and memory are impaired  He exhibits abnormal recent memory and abnormal remote memory  Vitals reviewed  Pertinent Laboratory/Diagnostic Studies:  HFM labs reviewed    Current Medications   Medication list reviewed and updated today  Please see paper chart for updated medication list      [facility]  20205:13 PM      Name: Azul Mendez  : 1935  MRN: [id number]  DOS: 2020    Diagnoses:   Diagnosis ICD-10-CM Associated Orders   1  Poor appetite R63 0    2   Type 2 diabetes mellitus without complication, unspecified whether long term insulin use (HCC) E11 9

## 2020-01-20 NOTE — ASSESSMENT & PLAN NOTE
· RN reports pt not eating or drinking much  · Continue to monitor intake  · Dietician to evaluate pt and make recommendations  · Encourage meals and hydration  · Monitor BS

## 2020-01-20 NOTE — ASSESSMENT & PLAN NOTE
· BS running high all throughout the day    · Asymptomatic  · Increase basaglar to 24units BID  · Increase humalog to 8units before meals  · algorithm #2  · Monitor BG closely

## 2020-01-21 ENCOUNTER — NURSING HOME VISIT (OUTPATIENT)
Dept: GERIATRICS | Facility: OTHER | Age: 85
End: 2020-01-21
Payer: COMMERCIAL

## 2020-01-21 DIAGNOSIS — I10 ESSENTIAL HYPERTENSION: ICD-10-CM

## 2020-01-21 DIAGNOSIS — E11.65 TYPE 2 DIABETES MELLITUS WITH HYPERGLYCEMIA, UNSPECIFIED WHETHER LONG TERM INSULIN USE (HCC): ICD-10-CM

## 2020-01-21 DIAGNOSIS — R26.2 AMBULATORY DYSFUNCTION: ICD-10-CM

## 2020-01-21 DIAGNOSIS — R13.10 DYSPHAGIA, UNSPECIFIED TYPE: ICD-10-CM

## 2020-01-21 DIAGNOSIS — I50.9 CHRONIC CONGESTIVE HEART FAILURE, UNSPECIFIED HEART FAILURE TYPE (HCC): ICD-10-CM

## 2020-01-21 DIAGNOSIS — S12.000D CLOSED DISPLACED FRACTURE OF FIRST CERVICAL VERTEBRA WITH ROUTINE HEALING, UNSPECIFIED FRACTURE MORPHOLOGY, SUBSEQUENT ENCOUNTER: Primary | ICD-10-CM

## 2020-01-21 DIAGNOSIS — S12.110D CLOSED ODONTOID FRACTURE WITH TYPE II MORPHOLOGY, ANTERIOR DISPLACEMENT, AND ROUTINE HEALING, SUBSEQUENT ENCOUNTER: ICD-10-CM

## 2020-01-21 DIAGNOSIS — I48.91 ATRIAL FIBRILLATION WITH RVR (HCC): ICD-10-CM

## 2020-01-21 DIAGNOSIS — S02.2XXD CLOSED FRACTURE OF NASAL BONE WITH ROUTINE HEALING, SUBSEQUENT ENCOUNTER: ICD-10-CM

## 2020-01-21 DIAGNOSIS — I77.74 VERTEBRAL ARTERY DISSECTION (HCC): ICD-10-CM

## 2020-01-21 PROCEDURE — 99306 1ST NF CARE HIGH MDM 50: CPT | Performed by: FAMILY MEDICINE

## 2020-01-21 NOTE — ASSESSMENT & PLAN NOTE
· Patient is not felt to be a good surgical candidate as per Neurosurgery  · Continue with conservative measures with [place] collar at all times  · Follow-up with neurosurgery as planned  · Monitor for new focal neurological symptoms  · Continue pain control

## 2020-01-21 NOTE — ASSESSMENT & PLAN NOTE
· Had episode of tachycardia in the hospital   · Currently rate controlled  Continue Cardizem and Metoprolol  · Patient with [id number] score of 5, would benefit from anticoagulation, however, per records reviewed, after discussion with family, decision was made to hold off on resuming Pradaxa due to significant ambulatory dysfunction with multiple recurrent falls, recent SDH and worsening balance/gait issues with wearing Aspen colllar

## 2020-01-21 NOTE — ASSESSMENT & PLAN NOTE
Wt Readings from Last 3 Encounters:   01/20/20 78 6 kg (173 lb 3 2 oz)   01/13/20 86 kg (189 lb 9 5 oz)   01/11/20 86 kg (189 lb 9 5 oz)   · euvolemic on exam   · Continue to monitor daily weights, fluid intake  · Monitor renal function and electrolytes

## 2020-01-21 NOTE — PROGRESS NOTES
[address] [facility] [address]  [address] [place] [address]  [phone]    [facility]  HISTORY & PHYSICAL        NAME: Vipul Marquez  AGE: 82 y o  SEX: male  : 1935  DATE OF ENCOUNTER: 20  POS: 31 (SNF)  PCP: Conrad Martel MD    Chief Complaint     Seen and examined today for admission to short term rehabilitation unit at [facility]  History of Present Illness     81 yo Gentleman with CAD, atrial fibrillation on Pradaxa, chronic combined congestive heart failure, atherosclerosis, mixed hyperlipidemia, COPD, essential hypertension, type 2 diabetes, will a alyx dysfunction and recurrent falls, history of previous TBI admitted to [facility] 2020-2020 after falling off the couch while attempting to get up  Per records reviewed, patient had fallen forward hitting his head on a 1st step of the staircase, uncertain whether patient had loss consciousness, patient was down for about 30-45 minutes before EMS arrived  EMS had found patient to be hypoxic with saturations down to 90% on room air, placed on 2 L nasal cannula  Larri Cely Upon initial evaluation in the ED he was found to have a frontal laceration, a CT H showed no acute intracranial abnormality  Bilateral nasal bone fractures  Overlying soft tissue injury extending superiorly, anterior to the frontal bone  CT C-spine positive for multilevel mild cervical spondylitic degenerative changes  Mild canal stenosis and foraminal narrowing  There was also finding of an acute type 2 dens fracture with posterior displacement of the dens approximately 50% and relation to the body of C2  Also visualized multiple fractures of the ring of C1 including an anterior comminuted fracture at 12:00 p m  position and the right-sided fracture at the 9 o'clock position    CTA of the head showed a small focal, acute dissection and a non dominant right vertebral artery which terminates in the posterior inferior cerebral artery, localize in the region of V3-V4 junction  Frontal laceration sutured in ED  Patient was admitted to ICU for frequent neurochecks and close monitoring  Evaluated by OMFS for nasal bone fracture, non-operative management recommended  Evaluated per Neurosurgery  MRI of the cervical spine showed an acute comminuted fracture of the anterior ring of C1, acute type 2 dens fracture with 2-3 mm of posterior distraction  No cord compression or cord signal abnormality  No epidural hematoma  Scattered mild to moderate spondylosis without critical central canal narrowing  Per Neurosurgical notes, patient felt not to be a good surgical candidate given other comorbidities and physical deconditioning  Conservative management was then recommended  Patient with dysphagia, recommendation made for pureed diet with NTL  He was also evaluated by wound care team in the hospital due to mild maceration of R sacral/buttock region, treated with Calazime TID and PRN  Heels found with blanchable erythema  Once considered medically stable, patient was discharged to [facility] for subacute rehab services  Today, patient seen and examined for admission to short-term rehab unit at [facility]  Patient is sitting comfortably in wheelchair, he reports feeling tired but otherwise denies any acute complaints  He is a very poor historian and mildly confused  However he is able to answer questions appropriately and follow single step commands  Denies any pain at this time  Review of Systems     Review of Systems   Constitutional: Positive for activity change and fatigue  Negative for appetite change and unexpected weight change  HENT: Positive for trouble swallowing  Negative for congestion, dental problem, hearing loss, mouth sores and voice change  Eyes: Negative for visual disturbance  Respiratory: Negative for cough, chest tightness and shortness of breath      Cardiovascular: Negative for chest pain, palpitations and leg swelling  Gastrointestinal: Negative for abdominal distention, abdominal pain, constipation, diarrhea, nausea and vomiting  Genitourinary: Negative for dysuria  Musculoskeletal: Positive for arthralgias and gait problem  Negative for back pain  Skin: Negative for color change, pallor, rash and wound  Neurological: Positive for weakness  Negative for light-headedness  All other systems reviewed and are negative  History   No Known Allergies    Past Medical History:   Diagnosis Date    Cardiac disease     COPD (chronic obstructive pulmonary disease) ([facility] )     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[facility] )     Hypertension      Past Surgical History:   Procedure Laterality Date    HAND SURGERY         Family History: non-contributory  Social History     Tobacco Use    Smoking status: Former Smoker    Smokeless tobacco: Never Used   Substance Use Topics    Alcohol use: Never     Frequency: Never    Drug use: Never         He lives with his wife Parul Polo in a split-level house with no steps to enter  There are 6 steps down to the basement, sick steps to the kitchen and living room and sick steps to bathroom and bedroom  Prior to admission patient ambulating minimally with roller walker/single-point cane  Objective   Vital Signs   BP:   119/49    HR:  73     T:  97 7°     RR:  18     O2Sat:  99% on RA      W:  171 lb    Physical Exam   Constitutional: He appears well-developed  No distress  HENT:   Head: Normocephalic  Mouth/Throat: Oropharynx is clear and moist  No oropharyngeal exudate  Small scabbed lesion on frontal area   Eyes: Pupils are equal, round, and reactive to light  Conjunctivae and EOM are normal  No scleral icterus  Neck:   [place] collar in place  Cardiovascular: Normal rate and normal heart sounds  No murmur heard  Irregular rhythm   Pulmonary/Chest: Effort normal and breath sounds normal  No respiratory distress  Abdominal: Soft   Bowel sounds are normal  He exhibits no distension  There is no tenderness  Genitourinary: Penile tenderness (there are erythematous lesions on the glans, no aparent open lesions  ) present  Musculoskeletal: He exhibits no edema, tenderness or deformity  Neurological: He is alert  He has normal reflexes  No cranial nerve deficit  He exhibits normal muscle tone  Oriented to self  It thinks is at the [facility] it is [facility]  He knows the president is Sara Gardner  Skin: Skin is warm and dry  No rash noted  He is not diaphoretic  No erythema  No pallor  Maceration present on right buttock with a small area measuring about 2 mm x 2 mm by 1 mm that is open with pink granulation tissue  Psychiatric:   Mood seems depressed  Vitals reviewed  Pertinent Laboratory/Diagnostic Studies:     Lab Results   Component Value Date    WBC 8 49 01/17/2020    HGB 13 1 01/17/2020    HCT 40 6 01/17/2020    MCV 85 01/17/2020     01/17/2020     Lab Results   Component Value Date    GLUCOSE 64 (L) 01/11/2020    CALCIUM 8 9 01/17/2020    K 3 4 (L) 01/17/2020    CO2 31 01/17/2020     01/17/2020    BUN 16 01/17/2020    CREATININE 0 80 01/17/2020     No results found for: [id number], TSH  No results found for: HGBA1C      Current Medications     All medications reviewed and updated in [facility] EMR/Chart  Assessment and Plan     C1 cervical fracture ([facility] [address] )  · Evaluated per Neurosurgery who felt patient is not a good candidate for surgery  · Continue [place] collar at all times except when showering, 1 of CR a collar should be worn  · Follow-up with neurosurgery 01/29/2020  Type II dens fracture of second cervical vertebra [facility])  · Patient is not felt to be a good surgical candidate as per Neurosurgery  · Continue with conservative measures with [place] collar at all times  · Follow-up with neurosurgery as planned  · Monitor for new focal neurological symptoms  · Continue pain control      Ambulatory dysfunction  · Patient with worsening ambulatory dysfunction in the past 3-4 months  · He has had recurrent falls and multiple ED/hospital visits  · PT/OT to evaluate and treat as appropriate  · Continue to assist with transfers as needed, patient most likely will benefit from higher level of care upon discharge  Dysphagia  · Secondary to cervical spine injury and presence of cervical collar  · Speech therapy to evaluate and treat as appropriate  · Continue pureed diet and nectar thickened liquids as recommended  · Aspiration precautions  Type 2 diabetes mellitus ([facility] )  · Most recent A1c available in epic chart done in May 2018 very elevated at 12  1  · Patient has had hyperglycemia since admission to [facility], yesterday evaluated by ARISTEO and his Basaglar insulin was increased to 24 units b i d  Ritchie Hubbard · Continue insulin sliding scale and Humalog t i d  with meals 8 units  · Continue to monitor closely  · Hypoglycemic precautions are advised  Atrial fibrillation with RVR ([facility] )  · Had episode of tachycardia in the hospital   · Currently rate controlled  Continue Cardizem and Metoprolol  · Patient with [id number] score of 5, would benefit from anticoagulation, however, per records reviewed, after discussion with family, decision was made to hold off on resuming Pradaxa due to significant ambulatory dysfunction with multiple recurrent falls, recent SDH and worsening balance/gait issues with wearing Aspen colllar  CHF (congestive heart failure) (HCC)  Wt Readings from Last 3 Encounters:   01/20/20 78 6 kg (173 lb 3 2 oz)   01/13/20 86 kg (189 lb 9 5 oz)   01/11/20 86 kg (189 lb 9 5 oz)   · euvolemic on exam   · Continue to monitor daily weights, fluid intake  · Monitor renal function and electrolytes  HTN (hypertension)  · Stable and well controlled  · Continue current regimen  Vertebral artery dissection (HCC)  · Continue ASA daily as per Neurosurgery recommendation       Nasal bone fractures  · Non operative management per OMFS  · Continue PRN OMFS follow up  Laurel Garvey MD  Geriatric Medicine  01/21/20    Please note:  Voice-recognition software may have been used in the preparation of this document  Occasional wrong word or "sound-alike" substitutions may have occurred due to the inherent limitations of voice recognition software  Interpretation should be guided by context

## 2020-01-21 NOTE — ASSESSMENT & PLAN NOTE
· Evaluated per Neurosurgery who felt patient is not a good candidate for surgery  · Continue [place] collar at all times except when showering, 1 of CR a collar should be worn  · Follow-up with neurosurgery 01/29/2020

## 2020-01-21 NOTE — ASSESSMENT & PLAN NOTE
· Most recent A1c available in epic chart done in May 2018 very elevated at 12  1  · Patient has had hyperglycemia since admission to [facility], yesterday evaluated by ARISTEO and his Basaglar insulin was increased to 24 units b i d  Radha Woods · Continue insulin sliding scale and Humalog t i d  with meals 8 units  · Continue to monitor closely  · Hypoglycemic precautions are advised

## 2020-01-21 NOTE — ASSESSMENT & PLAN NOTE
· Patient with worsening ambulatory dysfunction in the past 3-4 months  · He has had recurrent falls and multiple ED/hospital visits  · PT/OT to evaluate and treat as appropriate  · Continue to assist with transfers as needed, patient most likely will benefit from higher level of care upon discharge

## 2020-01-21 NOTE — ASSESSMENT & PLAN NOTE
· Secondary to cervical spine injury and presence of cervical collar  · Speech therapy to evaluate and treat as appropriate  · Continue pureed diet and nectar thickened liquids as recommended  · Aspiration precautions

## 2020-01-23 ENCOUNTER — TRANSCRIBE ORDERS (OUTPATIENT)
Dept: RADIOLOGY | Facility: HOSPITAL | Age: 85
End: 2020-01-23

## 2020-01-23 ENCOUNTER — HOSPITAL ENCOUNTER (OUTPATIENT)
Dept: RADIOLOGY | Facility: HOSPITAL | Age: 85
Discharge: HOME | End: 2020-01-23
Payer: COMMERCIAL

## 2020-01-23 DIAGNOSIS — S12.000A C1 CERVICAL FRACTURE (HCC): ICD-10-CM

## 2020-01-23 DIAGNOSIS — S12.110A TYPE II DENS FRACTURE OF SECOND CERVICAL VERTEBRA (HCC): ICD-10-CM

## 2020-01-23 PROCEDURE — 70496 CT ANGIOGRAPHY HEAD: CPT

## 2020-01-23 PROCEDURE — 72040 X-RAY EXAM NECK SPINE 2-3 VW: CPT

## 2020-01-23 PROCEDURE — 70498 CT ANGIOGRAPHY NECK: CPT

## 2020-01-23 RX ADMIN — IOHEXOL 85 ML: 350 INJECTION, SOLUTION INTRAVENOUS at 19:07

## 2020-01-27 ENCOUNTER — NURSING HOME VISIT (OUTPATIENT)
Dept: GERIATRICS | Facility: OTHER | Age: 85
End: 2020-01-27
Payer: COMMERCIAL

## 2020-01-27 VITALS
TEMPERATURE: 96.8 F | SYSTOLIC BLOOD PRESSURE: 139 MMHG | DIASTOLIC BLOOD PRESSURE: 74 MMHG | RESPIRATION RATE: 16 BRPM | WEIGHT: 171 LBS | HEART RATE: 83 BPM | BODY MASS INDEX: 26 KG/M2

## 2020-01-27 DIAGNOSIS — I10 ESSENTIAL HYPERTENSION: ICD-10-CM

## 2020-01-27 DIAGNOSIS — S12.000D CLOSED DISPLACED FRACTURE OF FIRST CERVICAL VERTEBRA WITH ROUTINE HEALING, UNSPECIFIED FRACTURE MORPHOLOGY, SUBSEQUENT ENCOUNTER: ICD-10-CM

## 2020-01-27 DIAGNOSIS — R26.2 AMBULATORY DYSFUNCTION: Primary | ICD-10-CM

## 2020-01-27 DIAGNOSIS — S12.110D CLOSED ODONTOID FRACTURE WITH TYPE II MORPHOLOGY, ANTERIOR DISPLACEMENT, AND ROUTINE HEALING, SUBSEQUENT ENCOUNTER: ICD-10-CM

## 2020-01-27 DIAGNOSIS — R13.10 DYSPHAGIA, UNSPECIFIED TYPE: ICD-10-CM

## 2020-01-27 DIAGNOSIS — E11.65 TYPE 2 DIABETES MELLITUS WITH HYPERGLYCEMIA, UNSPECIFIED WHETHER LONG TERM INSULIN USE (HCC): ICD-10-CM

## 2020-01-27 DIAGNOSIS — I50.9 CHRONIC CONGESTIVE HEART FAILURE, UNSPECIFIED HEART FAILURE TYPE (HCC): ICD-10-CM

## 2020-01-27 DIAGNOSIS — I48.91 ATRIAL FIBRILLATION WITH RVR (HCC): ICD-10-CM

## 2020-01-27 PROCEDURE — 99309 SBSQ NF CARE MODERATE MDM 30: CPT | Performed by: NURSE PRACTITIONER

## 2020-01-27 NOTE — ASSESSMENT & PLAN NOTE
· Doing well on mechanical soft diet with thin liquids  · Continue with ST  · [place] collar at all times  · Aspirate precautions

## 2020-01-27 NOTE — ASSESSMENT & PLAN NOTE
· Doing well at [facility]  · Continue with restorative care for ADL and IADL  · Fall precautions  · PT/OT

## 2020-01-27 NOTE — PROGRESS NOTES
[facility]  [address]  [phone]  [facility]  STR Progress Note        NAME: Keiry Hankins  AGE: 82 y o   SEX: male    DATE OF ENCOUNTER: 1/27/2020    Assessment and Plan     Problem List Items Addressed This Visit        Digestive    Dysphagia     · Doing well on mechanical soft diet with thin liquids  · Continue with ST  · Maskell collar at all times  · Aspirate precautions            Endocrine    Type 2 diabetes mellitus (HCC)     · Lower blood sugars in am  · Change basaglar to 20units in PM and 24units in PM  · Continue humalog  · Monitor BS daily            Cardiovascular and Mediastinum    Atrial fibrillation with RVR (HCC)     · Rate controlled  · Continue with metoprolol and aspirin  · Monitor BP and HR         CHF (congestive heart failure) ([facility])     · Asymptomatic at this time  · Continue to monitor daily weights  · Continue with furosemide             HTN (hypertension)     · Controlled  · Continue with diltiazem, metoprolol, furosemide  · Monitor BP            Musculoskeletal and Integument    Type II dens fracture of second cervical vertebra ([facility])     · Stable at this time  · Continue to wear collar at all times  · Pain currently controlled  · Monitor for any neuro changes  · F/u neurosurgery on 1/29/20         C1 cervical fracture ([address] )     · Seen by neurosurgery in hospital  · Continue with aspen collar at all times  · F/u neurosurgery on 1/29/20  · Continue with PT/OT            Other    Ambulatory dysfunction - Primary     · Doing well at STR  · Continue with restorative care for ADL and IADL  · Fall precautions  · PT/OT               No orders of the defined types were placed in this encounter       - Counseling Documentation: patient was counseled regarding: diagnostic results, instructions for management, risk factor reductions, prognosis, patient and family education, impressions, risks and benefits of treatment options and importance of compliance with treatment    History of Present Illness     Mark Bucio 82 y o  male seen for follow-up of care and treatment plan for ambulatory dysfunction doing well at STR, afib rate controlled with metoprolol, C1 fx healing conservatively, CHF asymptomatic, dysphagia doing well with mechanical soft diet, HTN controlled with BP meds     The following portions of the patient's history were reviewed and updated as appropriate: allergies, current medications, past family history, past medical history, past social history, past surgical history and problem list     Review of Systems     Review of Systems   Constitutional: Negative for activity change, appetite change and fatigue  HENT: Negative  Eyes: Negative  Respiratory: Negative for cough and shortness of breath  Cardiovascular: Negative for chest pain and leg swelling  Gastrointestinal: Negative for abdominal distention, abdominal pain, constipation and diarrhea  Genitourinary: Negative for difficulty urinating  Musculoskeletal: Negative  Skin: Negative for rash  Neurological: Positive for weakness  Negative for dizziness, light-headedness and headaches  Psychiatric/Behavioral: Positive for agitation  The patient is not nervous/anxious  Active Problem List     Patient Active Problem List   Diagnosis    Fall    Type II dens fracture of second cervical vertebra (HCC)    C1 cervical fracture (HCC)    Nasal bone fractures    Atrial fibrillation with RVR (HCC)    CHF (congestive heart failure) (HCC)    HTN (hypertension)    HLD (hyperlipidemia)    Type 2 diabetes mellitus (HCC)    Multiple falls    Forehead laceration    Vertebral artery dissection (HCC)    Dysphagia    Poor appetite    Ambulatory dysfunction       Objective     /74   Pulse 83   Temp (!) 96 8 °F (36 °C)   Resp 16   Wt 77 6 kg (171 lb)   BMI 26 00 kg/m²     Physical Exam   Constitutional: Vital signs are normal  He appears well-developed and well-nourished  HENT:   Head: Normocephalic and atraumatic  Eyes: Conjunctivae are normal    Neck: Normal range of motion  Cardiovascular: Normal rate, regular rhythm, S1 normal, S2 normal and normal heart sounds  No murmur heard  Pulmonary/Chest: Effort normal and breath sounds normal  No respiratory distress  He has no wheezes  Abdominal: Soft  Bowel sounds are normal  He exhibits no distension  There is no tenderness  Musculoskeletal: Normal range of motion  Decreased ROM head and neck   Neurological: He is alert  C1 fx   Skin: Skin is warm, dry and intact  Psychiatric: His speech is normal and behavior is normal  Thought content normal  Cognition and memory are impaired  He exhibits a depressed mood  Vitals reviewed  Pertinent Laboratory/Diagnostic Studies:  HFM labs reviewed    Current Medications   Medication list reviewed and updated today  Please see paper chart for updated medication list      Tai Belinda  202012:42 PM      Name: Shaheen Andersen  : 1935  MRN: [id number]  DOS: 2020    Diagnoses:   Diagnosis ICD-10-CM Associated Orders   1  Ambulatory dysfunction R26 2    2  Atrial fibrillation with RVR ([facility]) I48 91    3  Closed displaced fracture of first cervical vertebra with routine healing, unspecified fracture morphology, subsequent encounter S12 000D    4  Chronic congestive heart failure, unspecified heart failure type ([facility] ) I50 9    5  Dysphagia, unspecified type R13 10    6  Essential hypertension I10    7  Closed odontoid fracture with type II morphology, anterior displacement, and routine healing, subsequent encounter S12 110D    8   Type 2 diabetes mellitus with hyperglycemia, unspecified whether long term insulin use ([facility]) E11 65

## 2020-01-27 NOTE — ASSESSMENT & PLAN NOTE
· Lower blood sugars in am  · Change basaglar to 20units in PM and 24units in PM  · Continue humalog  · Monitor BS daily

## 2020-01-27 NOTE — ASSESSMENT & PLAN NOTE
· Seen by neurosurgery in hospital  · Continue with aspen collar at all times  · F/u neurosurgery on 1/29/20  · Continue with PT/OT

## 2020-01-27 NOTE — ASSESSMENT & PLAN NOTE
· Stable at this time  · Continue to wear collar at all times  · Pain currently controlled  · Monitor for any neuro changes  · F/u neurosurgery on 1/29/20

## 2020-01-29 ENCOUNTER — OFFICE VISIT (OUTPATIENT)
Dept: NEUROSURGERY | Facility: CLINIC | Age: 85
End: 2020-01-29
Payer: COMMERCIAL

## 2020-01-29 ENCOUNTER — NURSING HOME VISIT (OUTPATIENT)
Dept: GERIATRICS | Facility: OTHER | Age: 85
End: 2020-01-29
Payer: COMMERCIAL

## 2020-01-29 VITALS
HEIGHT: 68 IN | WEIGHT: 175 LBS | DIASTOLIC BLOOD PRESSURE: 82 MMHG | HEART RATE: 88 BPM | TEMPERATURE: 97.5 F | SYSTOLIC BLOOD PRESSURE: 120 MMHG | RESPIRATION RATE: 16 BRPM | BODY MASS INDEX: 26.52 KG/M2

## 2020-01-29 VITALS
DIASTOLIC BLOOD PRESSURE: 83 MMHG | WEIGHT: 171.6 LBS | TEMPERATURE: 96.8 F | RESPIRATION RATE: 20 BRPM | SYSTOLIC BLOOD PRESSURE: 159 MMHG | BODY MASS INDEX: 26.09 KG/M2 | HEART RATE: 85 BPM

## 2020-01-29 DIAGNOSIS — N48.89 PENIS PAIN: Primary | ICD-10-CM

## 2020-01-29 DIAGNOSIS — I77.74 VERTEBRAL ARTERY DISSECTION (HCC): ICD-10-CM

## 2020-01-29 DIAGNOSIS — R26.2 AMBULATORY DYSFUNCTION: ICD-10-CM

## 2020-01-29 DIAGNOSIS — S12.111D CLOSED ODONTOID FRACTURE WITH TYPE II MORPHOLOGY, POSTERIOR DISPLACEMENT, AND ROUTINE HEALING, SUBSEQUENT ENCOUNTER: ICD-10-CM

## 2020-01-29 DIAGNOSIS — S12.001D: Primary | ICD-10-CM

## 2020-01-29 PROCEDURE — 99308 SBSQ NF CARE LOW MDM 20: CPT | Performed by: FAMILY MEDICINE

## 2020-01-29 PROCEDURE — 99213 OFFICE O/P EST LOW 20 MIN: CPT | Performed by: PHYSICIAN ASSISTANT

## 2020-01-29 NOTE — ASSESSMENT & PLAN NOTE
· Doing well at [address]  · Continue with restorative care for ADL and IADL  · Fall precautions  · PT/OT

## 2020-01-29 NOTE — LETTER
January 29, 2020     Marjorie Joiner, [address] [id number]    Patient: Alonzo Rosas   YOB: 1935   Date of Visit: 1/29/2020       Dear Dr Immanuel Reynoso:    Thank you for referring Alonzo Rosas to me for evaluation  Below are my notes for this consultation  If you have questions, please do not hesitate to call me  I look forward to following your patient along with you  Sincerely,        Manish Rebolledo PA-C        CC: [facility]  Manish Rebolledo, [place]  1/29/2020 11:55 AM  Sign at close encounter  Patient ID: Alonzo Rosas is a 82 y o  male  Diagnoses and all orders for this visit:    Open nondisplaced fracture of first cervical vertebra with routine healing, unspecified fracture morphology, subsequent encounter  -     XR spine cervical 2 or 3 vw injury; Future    Closed odontoid fracture with type II morphology, posterior displacement, and routine healing, subsequent encounter  -     XR spine cervical 2 or 3 vw injury; Future    Vertebral artery dissection (HCC)  -     CTA head and neck w wo contrast; Future          Assessment/Plan:    Pleasant 29-year-old male, accompanied by his wife, arrives by wheelchair today, presents for hospital follow-up, approximately 10 days  He has a history of a fall, he was reported and found on the floor in front of his sofa thought to have fallen forward  He was initially seen at [facility], 1/11/20, had inpatient stay 1/11/20 through 1/17/20, studies at that time revealed a C1 comminuted fracture anterior arch, and a C2 dens type fracture with posterior displacement, as well as right vertebral artery injury (dissection)  He was subsequently discharged to [facility] were he remains  He arrives today with cervical collar in place, appropriately applied  The pads appear quite soiled, it is unclear as to whether they have been washed with any degree regularity    An Additional set of pads was provided today  He has been advised to continue aspirin 81 mg on a daily basis according to the chart this is ordered at [facility]  He reports he has minimal neck pain reports his 1-2 at the most on a 1-10 scale  He denies any upper extremity issues, motor or sensory difficulties, decreased strength  He has had updated imaging of his cervical spine 1/23/19 overall alignment appears stable when compared with prior studies of 1/13/20, and CT cervical, 1/11/20  The posterior displacement of the dens is again noted and appears to be stable at this time when compared to prior studies  He has also had a repeat CTA head and neck 1/23/20, this study was compared with prior study 1/11/20, the prior identified right vertebral artery dissection at about the V3/V4 junction repairs to be unchanged  On examination today motor exam of the upper extremities is 5 x 5 for power reflexes were intact and symmetric, sensation is also grossly intact  At this juncture further follow-up of is planned in approximately 4 weeks with repeat plain films of the cervical spine including open mouth views and neurosurgical consultation to follow  Relative to his vertebral artery dissection, a repeat CTA is planned in approximately 8 weeks and has been requested  Clinical follow-up with Neurosurgery will also be planned following this repeat study  In the interim he is to continue on aspirin 81 mg daily  He is to continue with cervical collar at all times, a Twyla collar is to be used for shower time  His cervical collar pads are to be changed every 1-2 days, wash with a gentle soap and water, allowed to air dry and replaced with alternating pads  Activities remain restricted at no lifting, pushing, pulling greater than 10 lb  Avoid bending  Ambulation as tolerated with appropriate assistance  Any new changes return to Neurosurgery sooner for reassessment      These findings, impressions and recommendations are reviewed in great detail with the patient and  his wife, they expressed understanding and agreement, their questions were answered completely and to their satisfaction  Return in about 4 weeks (around 2/26/2020) for Review plain films of the cervical spine  Chief Complaint  History of fall, history of cervical fracture, neck pain, history of a vertebral artery dissection  HPI       The following portions of the patient's history were reviewed and updated as appropriate: allergies, current medications, past family history, past medical history, past social history and past surgical history  Review of Systems   Constitutional: Positive for fatigue  HENT: Positive for trouble swallowing  Eyes: Negative  Respiratory: Negative  Gastrointestinal: Negative  Endocrine: Negative  Genitourinary: Negative  Musculoskeletal: Positive for gait problem (ambulates with walker) and neck stiffness (mild  )  Skin: Negative  Psychiatric/Behavioral: Negative  Objective:    Physical Exam   Constitutional: He is oriented to person, place, and time  He appears well-developed and well-nourished  HENT:   Head: Normocephalic and atraumatic  Eyes: Pupils are equal, round, and reactive to light  EOM are normal    Cardiovascular: Normal rate  Pulmonary/Chest: Effort normal and breath sounds normal    Neurological: He is alert and oriented to person, place, and time  Skin: Skin is warm and dry  Psychiatric: He has a normal mood and affect  Vitals reviewed  Neurologic Exam     Mental Status   Oriented to person, place, and time  Cranial Nerves     CN III, IV, VI   Pupils are equal, round, and reactive to light  Extraocular motions are normal               CERVICAL SPINE   1/23/20     INDICATION:   S12 110A: Anterior displaced type ii dens fracture, initial encounter for closed fracture   S12 000A: Unspecified displaced fracture of first cervical vertebra, initial encounter for closed fracture      COMPARISON:  1/13/2020 x-rays     VIEWS:  XR SPINE CERVICAL 2 OR 3 VW INJURY   Images: 6     FINDINGS:     Unchanged appearance of previously acute displaced type II odontoid process fracture and comminuted C1 fracture     Multilevel degenerative spondylosis     The prevertebral soft tissues are within normal limits        The lung apices are clear      IMPRESSION:     Unchanged alignment of patient's known previously acute displaced C2 fracture      CTA NECK AND BRAIN WITH AND WITHOUT CONTRAST   1/23/20     INDICATION: S12 110A: Anterior displaced type ii dens fracture, initial encounter for closed fracture  S12 000A: Unspecified displaced fracture of first cervical vertebra, initial encounter for closed fracture     COMPARISON:   1/11/2020 CTA     TECHNIQUE:  Routine CT imaging of the Brain without contrast   Post contrast imaging was performed after administration of iodinated contrast through the neck and brain  Post contrast axial 0 625 mm images timed to opacify the arterial system        3D rendering was performed on an independent workstation  MIP reconstructions performed  Coronal reconstructions were performed of the noncontrast portion of the brain        Radiation dose length product (DLP) for this visit:  1418 34 mGy-cm   This examination, like all CT scans performed in the [facility], was performed utilizing techniques to minimize radiation dose exposure, including the use of   iterative reconstruction and automated exposure control         IV Contrast:  85 mL of iohexol (OMNIPAQUE)     IMAGE QUALITY:   Diagnostic     FINDINGS:  NONCONTRAST BRAIN  PARENCHYMA:  No intracranial mass, mass effect or midline shift  No CT signs of acute infarction  No acute parenchymal hemorrhage  Advanced chronic microangiopathic changes involve supratentorial white matter  Small chronic basal ganglia lacunar   infarctions bilaterally  Chronic microangiopathic changes involve brainstem  Similar to prior study      VENTRICLES AND EXTRA-AXIAL SPACES:  Normal for the patient's age      VISUALIZED ORBITS AND PARANASAL SINUSES:  Persistent bilateral nasal bone fractures     CERVICAL VASCULATURE  AORTIC ARCH AND GREAT VESSELS no significant stenosis or dissection     RIGHT VERTEBRAL ARTERY CERVICAL SEGMENT:  Normal origin  The vessel is normal in caliber throughout the neck      LEFT VERTEBRAL ARTERY CERVICAL SEGMENT:  Normal origin  The vessel is normal in caliber throughout the neck      RIGHT EXTRACRANIAL CAROTID SEGMENT: mild atherosclerotic changes involve the distal common carotid and proximal internal carotid artery without significant stenosis, unchanged     LEFT EXTRACRANIAL CAROTID SEGMENT: Mild atherosclerotic changes involve the distal common carotid and proximal internal carotid artery without significant stenosis, unchanged      NASCET criteria was used to determine the degree of internal carotid artery diameter stenosis        INTRACRANIAL VASCULATURE   INTERNAL CAROTID ARTERIES: cavernous ICA calcification bilaterally  Normal ophthalmic artery origins  Normal ICA terminus       ANTERIOR CIRCULATION:  Symmetric A1 segments and anterior cerebral arteries with normal enhancement  Normal anterior communicating artery      MIDDLE CEREBRAL ARTERY CIRCULATION:  M1 segment and middle cerebral artery branches demonstrate normal enhancement bilaterally      DISTAL VERTEBRAL ARTERIES:  Tiny focal intimal flap at the V3/V4 junction of the right vertebral artery appears unchanged  Right vertebral artery terminates in PICA  Left PICA normal origin  Normal vertebral basilar junction      BASILAR ARTERY:  Basilar artery is normal in caliber  Normal superior cerebellar arteries      POSTERIOR CEREBRAL ARTERIES: Both posterior cerebral arteries arises from the basilar tip    Fetal origin of the left posterior cerebral artery      DURAL VENOUS SINUSES:  Normal         NON VASCULAR ANATOMY  BONY STRUCTURES:    Type II displaced odontoid fracture is again evident, consistent with prior CT  Approximately 50% posterior displacement of the dens  Multiple C1 ring fractures are unchanged    These findings were acute on the prior study      SOFT TISSUES OF THE NECK:  Multiple right-sided thyroid nodules     THORACIC INLET:  Unremarkable      IMPRESSION:  Residual focal dissection of the right vertebral artery at the V3/V4 junction, unchanged     Persistent displaced type II odontoid fracture     Similar to previous CTA

## 2020-01-29 NOTE — PATIENT INSTRUCTIONS
Continue with cervical collar at also times (24/7)  Continue with restricted activities, avoid lifting, pushing, or pulling greater than 10 lb  Avoid bending  Ambulation as tolerated  Continue aspirin 81 mg once daily  Change pads on cervical collar every 1-2 days, wash with mild soap and water, allowed to air dry  (additional set of pads were provided today)    Utilize Twyla collar for bath time  Further follow-up with Neurosurgery in approximately 4 weeks  Cervical spine x-ray few days prior to follow-up preferably at a [facility]  Further follow-up with Neurosurgery in approximately 8 weeks as well  CTA head and neck to assess vertebral artery injury a day or so prior to follow-up visit in 8 weeks

## 2020-01-29 NOTE — PROGRESS NOTES
Patient ID: Farhan Gurrola is a 82 y o  male  Diagnoses and all orders for this visit:    Open nondisplaced fracture of first cervical vertebra with routine healing, unspecified fracture morphology, subsequent encounter  -     XR spine cervical 2 or 3 vw injury; Future    Closed odontoid fracture with type II morphology, posterior displacement, and routine healing, subsequent encounter  -     XR spine cervical 2 or 3 vw injury; Future    Vertebral artery dissection (HCC)  -     CTA head and neck w wo contrast; Future          Assessment/Plan:    Pleasant 72-year-old male, accompanied by his wife, arrives by wheelchair today, presents for hospital follow-up, approximately 10 days  He has a history of a fall, he was reported and found on the floor in front of his sofa thought to have fallen forward  He was initially seen at [facility] Emergency Department, 1/11/20, had inpatient stay 1/11/20 through 1/17/20, studies at that time revealed a C1 comminuted fracture anterior arch, and a C2 dens type fracture with posterior displacement, as well as right vertebral artery injury (dissection)  He was subsequently discharged to [facility] were he remains  He arrives today with cervical collar in place, appropriately applied  The pads appear quite soiled, it is unclear as to whether they have been washed with any degree regularity  An Additional set of pads was provided today  He has been advised to continue aspirin 81 mg on a daily basis according to the chart this is ordered at [facility]  He reports he has minimal neck pain reports his 1-2 at the most on a 1-10 scale  He denies any upper extremity issues, motor or sensory difficulties, decreased strength  He has had updated imaging of his cervical spine 1/23/19 overall alignment appears stable when compared with prior studies of 1/13/20, and CT cervical, 1/11/20    The posterior displacement of the dens is again noted and appears to be stable at this time when compared to prior studies  He has also had a repeat CTA head and neck 1/23/20, this study was compared with prior study 1/11/20, the prior identified right vertebral artery dissection at about the V3/V4 junction repairs to be unchanged  On examination today motor exam of the upper extremities is 5 x 5 for power reflexes were intact and symmetric, sensation is also grossly intact  At this juncture further follow-up of is planned in approximately 4 weeks with repeat plain films of the cervical spine including open mouth views and neurosurgical consultation to follow  Relative to his vertebral artery dissection, a repeat CTA is planned in approximately 8 weeks and has been requested  Clinical follow-up with Neurosurgery will also be planned following this repeat study  In the interim he is to continue on aspirin 81 mg daily  He is to continue with cervical collar at all times, a Twyla collar is to be used for shower time  His cervical collar pads are to be changed every 1-2 days, wash with a gentle soap and water, allowed to air dry and replaced with alternating pads  Activities remain restricted at no lifting, pushing, pulling greater than 10 lb  Avoid bending  Ambulation as tolerated with appropriate assistance  Any new changes return to Neurosurgery sooner for reassessment  These findings, impressions and recommendations are reviewed in great detail with the patient and  his wife, they expressed understanding and agreement, their questions were answered completely and to their satisfaction  Return in about 4 weeks (around 2/26/2020) for Review plain films of the cervical spine  Chief Complaint  History of fall, history of cervical fracture, neck pain, history of a vertebral artery dissection      HPI       The following portions of the patient's history were reviewed and updated as appropriate: allergies, current medications, past family history, past medical history, past social history and past surgical history  Review of Systems   Constitutional: Positive for fatigue  HENT: Positive for trouble swallowing  Eyes: Negative  Respiratory: Negative  Gastrointestinal: Negative  Endocrine: Negative  Genitourinary: Negative  Musculoskeletal: Positive for gait problem (ambulates with walker) and neck stiffness (mild  )  Skin: Negative  Psychiatric/Behavioral: Negative  Objective:    Physical Exam   Constitutional: He is oriented to person, place, and time  He appears well-developed and well-nourished  HENT:   Head: Normocephalic and atraumatic  Eyes: Pupils are equal, round, and reactive to light  EOM are normal    Cardiovascular: Normal rate  Pulmonary/Chest: Effort normal and breath sounds normal    Neurological: He is alert and oriented to person, place, and time  Skin: Skin is warm and dry  Psychiatric: He has a normal mood and affect  Vitals reviewed  Neurologic Exam     Mental Status   Oriented to person, place, and time  Cranial Nerves     CN III, IV, VI   Pupils are equal, round, and reactive to light  Extraocular motions are normal               CERVICAL SPINE   1/23/20     INDICATION:   S12 110A: Anterior displaced type ii dens fracture, initial encounter for closed fracture   S12 000A: Unspecified displaced fracture of first cervical vertebra, initial encounter for closed fracture      COMPARISON:  1/13/2020 x-rays     VIEWS:  XR SPINE CERVICAL 2 OR 3 VW INJURY   Images: 6     FINDINGS:     Unchanged appearance of previously acute displaced type II odontoid process fracture and comminuted C1 fracture     Multilevel degenerative spondylosis     The prevertebral soft tissues are within normal limits        The lung apices are clear      IMPRESSION:     Unchanged alignment of patient's known previously acute displaced C2 fracture      CTA NECK AND BRAIN WITH AND WITHOUT CONTRAST   1/23/20     INDICATION: S12 110A: Anterior displaced type ii dens fracture, initial encounter for closed fracture  S12 000A: Unspecified displaced fracture of first cervical vertebra, initial encounter for closed fracture     COMPARISON:   1/11/2020 CTA     TECHNIQUE:  Routine CT imaging of the Brain without contrast   Post contrast imaging was performed after administration of iodinated contrast through the neck and brain  Post contrast axial 0 625 mm images timed to opacify the arterial system        3D rendering was performed on an independent workstation  MIP reconstructions performed  Coronal reconstructions were performed of the noncontrast portion of the brain        Radiation dose length product (DLP) for this visit:  1418 34 mGy-cm   This examination, like all CT scans performed in the [facility], was performed utilizing techniques to minimize radiation dose exposure, including the use of   iterative reconstruction and automated exposure control         IV Contrast:  85 mL of iohexol (OMNIPAQUE)     IMAGE QUALITY:   Diagnostic     FINDINGS:  NONCONTRAST BRAIN  PARENCHYMA:  No intracranial mass, mass effect or midline shift  No CT signs of acute infarction  No acute parenchymal hemorrhage  Advanced chronic microangiopathic changes involve supratentorial white matter  Small chronic basal ganglia lacunar   infarctions bilaterally  Chronic microangiopathic changes involve brainstem  Similar to prior study      VENTRICLES AND EXTRA-AXIAL SPACES:  Normal for the patient's age      VISUALIZED ORBITS AND PARANASAL SINUSES:  Persistent bilateral nasal bone fractures     CERVICAL VASCULATURE  AORTIC ARCH AND GREAT VESSELS no significant stenosis or dissection     RIGHT VERTEBRAL ARTERY CERVICAL SEGMENT:  Normal origin  The vessel is normal in caliber throughout the neck      LEFT VERTEBRAL ARTERY CERVICAL SEGMENT:  Normal origin   The vessel is normal in caliber throughout the neck      RIGHT EXTRACRANIAL CAROTID SEGMENT: mild atherosclerotic changes involve the distal common carotid and proximal internal carotid artery without significant stenosis, unchanged     LEFT EXTRACRANIAL CAROTID SEGMENT: Mild atherosclerotic changes involve the distal common carotid and proximal internal carotid artery without significant stenosis, unchanged      NASCET criteria was used to determine the degree of internal carotid artery diameter stenosis        INTRACRANIAL VASCULATURE   INTERNAL CAROTID ARTERIES: cavernous ICA calcification bilaterally  Normal ophthalmic artery origins  Normal ICA terminus       ANTERIOR CIRCULATION:  Symmetric A1 segments and anterior cerebral arteries with normal enhancement  Normal anterior communicating artery      MIDDLE CEREBRAL ARTERY CIRCULATION:  M1 segment and middle cerebral artery branches demonstrate normal enhancement bilaterally      DISTAL VERTEBRAL ARTERIES:  Tiny focal intimal flap at the V3/V4 junction of the right vertebral artery appears unchanged  Right vertebral artery terminates in PICA  Left PICA normal origin  Normal vertebral basilar junction      BASILAR ARTERY:  Basilar artery is normal in caliber  Normal superior cerebellar arteries      POSTERIOR CEREBRAL ARTERIES: Both posterior cerebral arteries arises from the basilar tip  Fetal origin of the left posterior cerebral artery      DURAL VENOUS SINUSES:  Normal         NON VASCULAR ANATOMY  BONY STRUCTURES:    Type II displaced odontoid fracture is again evident, consistent with prior CT  Approximately 50% posterior displacement of the dens  Multiple C1 ring fractures are unchanged    These findings were acute on the prior study      SOFT TISSUES OF THE NECK:  Multiple right-sided thyroid nodules     THORACIC INLET:  Unremarkable      IMPRESSION:  Residual focal dissection of the right vertebral artery at the V3/V4 junction, unchanged     Persistent displaced type II odontoid fracture     Similar to previous CTA

## 2020-01-29 NOTE — ASSESSMENT & PLAN NOTE
· POA penis pain  · Tip of penis very red and tender  · Pt states it burns  · No swelling at this time  · No blisters visible  · Apply nystatin ointment to reddened area under foreskin BID  · Pain control

## 2020-01-29 NOTE — PROGRESS NOTES
[id number] [id number] Steve Bunch  [phone]  [facility]  STR Acute Note        NAME: Lupe Traylor  AGE: 82 y o  SEX: male    DATE OF ENCOUNTER: 1/29/2020    Assessment and Plan     Problem List Items Addressed This Visit        Other    Ambulatory dysfunction     · Doing well at STR  · Continue with restorative care for ADL and IADL  · Fall precautions  · PT/OT         Penis pain - Primary     · POA penis pain  · Tip of penis very red and tender  · Pt states it burns  · No swelling at this time  · No blisters visible  · Apply nystatin ointment to reddened area under foreskin BID  · Pain control               No orders of the defined types were placed in this encounter       - Counseling Documentation: patient was counseled regarding: diagnostic results, instructions for management, risk factor reductions, prognosis, patient and family education, impressions, risks and benefits of treatment options and importance of compliance with treatment    History of Present Illness     Lupe Traylor 82 y o  male seen for follow-up of care and treatment plan for ambulatory dysfunction doing well at STR, penis pain controlled with pain meds and nystatin oitment      The following portions of the patient's history were reviewed and updated as appropriate: allergies, current medications, past family history, past medical history, past social history, past surgical history and problem list     Review of Systems     Review of Systems   Constitutional: Negative for activity change, appetite change and fatigue  HENT: Negative  Eyes: Negative  Respiratory: Negative for cough and shortness of breath  Cardiovascular: Negative for chest pain and leg swelling  Gastrointestinal: Negative for abdominal distention, abdominal pain, constipation and diarrhea  Genitourinary: Positive for penile pain  Negative for difficulty urinating, discharge, penile swelling and scrotal swelling     Musculoskeletal: Negative  Skin: Negative for rash  Neurological: Negative for headaches  Psychiatric/Behavioral: The patient is not nervous/anxious  Active Problem List     Patient Active Problem List   Diagnosis    Fall    Type II dens fracture of second cervical vertebra (HCC)    C1 cervical fracture (HCC)    Nasal bone fractures    Atrial fibrillation with RVR (HCC)    CHF (congestive heart failure) (HCC)    HTN (hypertension)    HLD (hyperlipidemia)    Type 2 diabetes mellitus (HCC)    Multiple falls    Forehead laceration    Vertebral artery dissection (HCC)    Dysphagia    Poor appetite    Ambulatory dysfunction    Penis pain       Objective     /83   Pulse 85   Temp (!) 96 8 °F (36 °C)   Resp 20   Wt 77 8 kg (171 lb 9 6 oz)   BMI 26 09 kg/m²     Physical Exam   Constitutional: Vital signs are normal  He appears well-developed and well-nourished  HENT:   Head: Normocephalic and atraumatic  Eyes: Conjunctivae are normal    Neck: Normal range of motion  Cardiovascular: Normal rate, regular rhythm, S1 normal, S2 normal and normal heart sounds  No murmur heard  Pulmonary/Chest: Effort normal and breath sounds normal  No respiratory distress  He has no wheezes  Abdominal: Soft  Bowel sounds are normal  He exhibits no distension  There is no tenderness  Genitourinary: Uncircumcised  Penile erythema and penile tenderness present  Musculoskeletal: Normal range of motion  Neurological: He is alert  Skin: Skin is warm, dry and intact  Psychiatric: He has a normal mood and affect  His speech is normal and behavior is normal  Thought content normal  Cognition and memory are normal    Vitals reviewed  Pertinent Laboratory/Diagnostic Studies:  HFM labs reviewed    Current Medications   Medication list reviewed and updated today   Please see paper chart for updated medication list      Maite Maria  20203:03 PM      Name: Enrico Aranda  : 1935  MRN: [id number]  DOS: 1/29/2020    Diagnoses:   Diagnosis ICD-10-CM Associated Orders   1  Penis pain N48 89    2   Ambulatory dysfunction R26 2

## 2020-02-03 ENCOUNTER — NURSING HOME VISIT (OUTPATIENT)
Dept: GERIATRICS | Facility: OTHER | Age: 85
End: 2020-02-03
Payer: COMMERCIAL

## 2020-02-03 DIAGNOSIS — S12.001D: ICD-10-CM

## 2020-02-03 DIAGNOSIS — I48.91 ATRIAL FIBRILLATION WITH RVR (HCC): ICD-10-CM

## 2020-02-03 DIAGNOSIS — I10 ESSENTIAL HYPERTENSION: ICD-10-CM

## 2020-02-03 DIAGNOSIS — R26.2 AMBULATORY DYSFUNCTION: Primary | ICD-10-CM

## 2020-02-03 DIAGNOSIS — E11.65 TYPE 2 DIABETES MELLITUS WITH HYPERGLYCEMIA, UNSPECIFIED WHETHER LONG TERM INSULIN USE (HCC): ICD-10-CM

## 2020-02-03 DIAGNOSIS — I50.9 CHRONIC CONGESTIVE HEART FAILURE, UNSPECIFIED HEART FAILURE TYPE (HCC): ICD-10-CM

## 2020-02-03 PROCEDURE — 99309 SBSQ NF CARE MODERATE MDM 30: CPT | Performed by: NURSE PRACTITIONER

## 2020-02-04 VITALS
WEIGHT: 171.6 LBS | TEMPERATURE: 97.8 F | RESPIRATION RATE: 18 BRPM | DIASTOLIC BLOOD PRESSURE: 74 MMHG | BODY MASS INDEX: 26.09 KG/M2 | HEART RATE: 80 BPM | SYSTOLIC BLOOD PRESSURE: 142 MMHG

## 2020-02-04 NOTE — ASSESSMENT & PLAN NOTE
· Doing well at [address] [place]  · Continue with restorative care for ADL and IADL  · Fall precautions  · PT/OT

## 2020-02-04 NOTE — ASSESSMENT & PLAN NOTE
· asymptomatic  · Slight left wheeze  · Continue to monitor   · Continue to encourage incentive spirometer  · PT/OT

## 2020-02-04 NOTE — ASSESSMENT & PLAN NOTE
· Conservative healing  · Continue with neck brace at all times  · Enoxaparin  · Continue with supportive care and pain management

## 2020-02-04 NOTE — PROGRESS NOTES
[address]   [phone]  [facility]  STR PRogress Note        NAME: Yoselin Gilman  AGE: 82 y o   SEX: male    DATE OF ENCOUNTER: 2/3/20    Assessment and Plan     Problem List Items Addressed This Visit        Endocrine    Type 2 diabetes mellitus ([address] )     · Controlled  · Continue with current insulin  · Monitor BS            Cardiovascular and Mediastinum    Atrial fibrillation with RVR (HCC)     · Rate controlled  · Continue with metoprolol  · Monitor BP and HR         CHF (congestive heart failure) (HCC)     · asymptomatic  · Slight left wheeze  · Continue to monitor   · Continue to encourage incentive spirometer  · PT/OT             HTN (hypertension)     · Doing well  · Continue with metoprolol, diltiazem, furosemide  · monitor            Musculoskeletal and Integument    C1 cervical fracture (HCC)     · Conservative healing  · Continue with neck brace at all times  · Enoxaparin  · Continue with supportive care and pain management            Other    Ambulatory dysfunction - Primary     · Doing well at STR  · Continue with restorative care for ADL and IADL  · Fall precautions  · PT/OT               No orders of the defined types were placed in this encounter       - Counseling Documentation: patient was counseled regarding: diagnostic results, instructions for management, risk factor reductions, prognosis, patient and family education, impressions, risks and benefits of treatment options and importance of compliance with treatment    History of Present Illness     Yoselin Gilman 82 y o  male seen for follow-up of care and treatment plan for ambulatory dysfunction doing well at STR, afib rate controlled, CHF asymptomatic, neck fx conservative healing, HTN controlled with BP meds, DM2 controlled with insulin    The following portions of the patient's history were reviewed and updated as appropriate: allergies, current medications, past family history, past medical history, past social history, past surgical history and problem list     Review of Systems     Review of Systems   Constitutional: Negative for activity change, appetite change and fatigue  HENT: Negative  Eyes: Negative  Respiratory: Negative for cough and shortness of breath  Cardiovascular: Negative for chest pain and leg swelling  Gastrointestinal: Negative for abdominal distention, abdominal pain, constipation and diarrhea  Genitourinary: Negative for difficulty urinating  Musculoskeletal: Positive for neck pain  Skin: Negative for rash  Neurological: Negative for headaches  Psychiatric/Behavioral: The patient is not nervous/anxious  Active Problem List     Patient Active Problem List   Diagnosis    Fall    Type II dens fracture of second cervical vertebra ([facility])    C1 cervical fracture ([facility])    Nasal bone fractures    Atrial fibrillation with RVR ([facility])    CHF (congestive heart failure) ([facility])    HTN (hypertension)    HLD (hyperlipidemia)    Type 2 diabetes mellitus ([facility])    Multiple falls    Forehead laceration    Vertebral artery dissection ([facility])    Dysphagia    Poor appetite    Ambulatory dysfunction    Penis pain       Objective     /74   Pulse 80   Temp 97 8 °F (36 6 °C)   Resp 18   Wt 77 8 kg (171 lb 9 6 oz)   BMI 26 09 kg/m²     Physical Exam   Constitutional: Vital signs are normal  He appears well-developed and well-nourished  HENT:   Head: Normocephalic and atraumatic  Eyes: Conjunctivae are normal    Neck: Normal range of motion  Cardiovascular: Normal rate, regular rhythm, S1 normal, S2 normal and normal heart sounds  No murmur heard  Pulmonary/Chest: Effort normal and breath sounds normal  No respiratory distress  He has no wheezes  Abdominal: Soft  Bowel sounds are normal  He exhibits no distension  There is no tenderness  Musculoskeletal: Normal range of motion  Neurological: He is alert  Skin: Skin is warm, dry and intact  Psychiatric: He has a normal mood and affect  His speech is normal  Thought content normal  He is withdrawn  Cognition and memory are impaired  Vitals reviewed  Pertinent Laboratory/Diagnostic Studies:  HFM labs reviewed    Current Medications   Medication list reviewed and updated today  Please see paper chart for updated medication list      Julieth Noble  :02 PM      Name: Haley Wilder  : 1935  MRN: [id number]  DOS: 2/3/20    Diagnoses:   Diagnosis ICD-10-CM Associated Orders   1  Ambulatory dysfunction R26 2    2  Atrial fibrillation with RVR ([facility]) I48 91    3  Chronic congestive heart failure, unspecified heart failure type ([facility]) I50 9    4  Open nondisplaced fracture of first cervical vertebra with routine healing, unspecified fracture morphology, subsequent encounter S12 001D    5  Essential hypertension I10    6   Type 2 diabetes mellitus with hyperglycemia, unspecified whether long term insulin use ([facility]) E11 65

## 2020-02-13 ENCOUNTER — NURSING HOME VISIT (OUTPATIENT)
Dept: GERIATRICS | Facility: OTHER | Age: 85
End: 2020-02-13
Payer: COMMERCIAL

## 2020-02-13 DIAGNOSIS — I50.9 CHRONIC CONGESTIVE HEART FAILURE, UNSPECIFIED HEART FAILURE TYPE (HCC): ICD-10-CM

## 2020-02-13 DIAGNOSIS — R26.2 AMBULATORY DYSFUNCTION: Primary | ICD-10-CM

## 2020-02-13 DIAGNOSIS — E11.65 TYPE 2 DIABETES MELLITUS WITH HYPERGLYCEMIA, UNSPECIFIED WHETHER LONG TERM INSULIN USE (HCC): ICD-10-CM

## 2020-02-13 DIAGNOSIS — I48.91 ATRIAL FIBRILLATION WITH RVR (HCC): ICD-10-CM

## 2020-02-13 DIAGNOSIS — I10 ESSENTIAL HYPERTENSION: ICD-10-CM

## 2020-02-13 PROCEDURE — 99309 SBSQ NF CARE MODERATE MDM 30: CPT | Performed by: NURSE PRACTITIONER

## 2020-02-14 ENCOUNTER — NURSING HOME VISIT (OUTPATIENT)
Dept: GERIATRICS | Facility: OTHER | Age: 85
End: 2020-02-14
Payer: COMMERCIAL

## 2020-02-14 DIAGNOSIS — E11.65 TYPE 2 DIABETES MELLITUS WITH HYPERGLYCEMIA, UNSPECIFIED WHETHER LONG TERM INSULIN USE (HCC): ICD-10-CM

## 2020-02-14 DIAGNOSIS — R26.2 AMBULATORY DYSFUNCTION: Primary | ICD-10-CM

## 2020-02-14 DIAGNOSIS — I10 ESSENTIAL HYPERTENSION: ICD-10-CM

## 2020-02-14 DIAGNOSIS — I50.9 CHRONIC CONGESTIVE HEART FAILURE, UNSPECIFIED HEART FAILURE TYPE (HCC): ICD-10-CM

## 2020-02-14 PROCEDURE — 99309 SBSQ NF CARE MODERATE MDM 30: CPT | Performed by: NURSE PRACTITIONER

## 2020-02-17 VITALS
DIASTOLIC BLOOD PRESSURE: 63 MMHG | SYSTOLIC BLOOD PRESSURE: 128 MMHG | HEART RATE: 65 BPM | RESPIRATION RATE: 20 BRPM | BODY MASS INDEX: 26.15 KG/M2 | TEMPERATURE: 97.3 F | WEIGHT: 172 LBS

## 2020-02-18 NOTE — ASSESSMENT & PLAN NOTE
· Asymptomatic at this time  · Continue to monitor weights  · Continue to regulate salt intake  · Monitor for any increased edema

## 2020-02-18 NOTE — PROGRESS NOTES
[address]   [phone]  [facility]  STR progress Note        NAME: Yoselin Gilman  AGE: 82 y o  SEX: male    DATE OF ENCOUNTER:  02/13/2020    Assessment and Plan     Problem List Items Addressed This Visit        Endocrine    Type 2 diabetes mellitus ([address] )     · Controlled  · Continue with insulin  · Continue to monitor daily            Cardiovascular and Mediastinum    Atrial fibrillation with RVR (HCC)     · Controlled at this time  · Continue with metoprolol  · Monitor BP and heart rate         CHF (congestive heart failure) (HCC)     · Asymptomatic at this time  · Continue to monitor weights  · Continue to regulate salt intake  · Monitor for any increased edema             HTN (hypertension)     · Controlled  · Continue with metoprolol, furosemide and diltiazem  · Continue to monitor BP            Other    Ambulatory dysfunction - Primary     · Doing well at short-term rehab  · Continue with fall precautions  · PT OT               No orders of the defined types were placed in this encounter       - Counseling Documentation: patient was counseled regarding: diagnostic results, instructions for management, risk factor reductions, prognosis, patient and family education, impressions, risks and benefits of treatment options and importance of compliance with treatment    History of Present Illness     Yoselin Gilman 82 y o  male seen for follow-up of care and treatment plan for ambulatory dysfunction doing well at STR, AFib rate controlled, CHF asymptomatic at this time, HTN controlled with BP meds, dm 2 doing well with insulin    The following portions of the patient's history were reviewed and updated as appropriate: allergies, current medications, past family history, past medical history, past social history, past surgical history and problem list     Review of Systems     Review of Systems   Constitutional: Negative for activity change, appetite change and fatigue  HENT: Negative  Eyes: Negative  Respiratory: Negative for cough and shortness of breath  Cardiovascular: Negative for chest pain and leg swelling  Gastrointestinal: Negative for abdominal distention, abdominal pain, constipation and diarrhea  Genitourinary: Positive for penile pain  Negative for difficulty urinating  Musculoskeletal: Positive for gait problem and neck pain  Skin: Negative for rash  Neurological: Negative for headaches  Psychiatric/Behavioral: Positive for confusion  The patient is not nervous/anxious  Active Problem List     Patient Active Problem List   Diagnosis    Fall    Type II dens fracture of second cervical vertebra ([facility])    C1 cervical fracture (HCC)    Nasal bone fractures    Atrial fibrillation with RVR ([facility])    CHF (congestive heart failure) ([facility])    HTN (hypertension)    HLD (hyperlipidemia)    Type 2 diabetes mellitus ([facility])    Multiple falls    Forehead laceration    Vertebral artery dissection ([facility])    Dysphagia    Poor appetite    Ambulatory dysfunction    Penis pain       Objective     /63   Pulse 65   Temp (!) 97 3 °F (36 3 °C)   Resp 20   Wt 78 kg (172 lb)   BMI 26 15 kg/m²     Physical Exam   Constitutional: Vital signs are normal  He appears well-developed and well-nourished  HENT:   Head: Normocephalic and atraumatic  Eyes: Conjunctivae are normal    Neck: Decreased range of motion present  Cardiovascular: Normal rate, regular rhythm, S1 normal, S2 normal and normal heart sounds  No murmur heard  Pulmonary/Chest: Effort normal and breath sounds normal  No respiratory distress  He has no wheezes  Abdominal: Soft  Bowel sounds are normal  He exhibits no distension  There is no tenderness  Neurological: He is alert  Skin: Skin is warm, dry and intact  Psychiatric: He has a normal mood and affect  His speech is normal and behavior is normal  Thought content normal  Cognition and memory are impaired     Vitals reviewed  Pertinent Laboratory/Diagnostic Studies:  HFM labs reviewed    Current Medications   Medication list reviewed and updated today  Please see paper chart for updated medication list      Price Wren  /43654:43 PM      Name: Anais Ceron  : 1935  MRN: [id number]  DOS:  2020    Diagnoses:   Diagnosis ICD-10-CM Associated Orders   1  Ambulatory dysfunction R26 2    2  Atrial fibrillation with RVR ([facility]) I48 91    3  Chronic congestive heart failure, unspecified heart failure type ([facility]) I50 9    4  Essential hypertension I10    5   Type 2 diabetes mellitus with hyperglycemia, unspecified whether long term insulin use ([facility]) E11 65

## 2020-02-23 VITALS
RESPIRATION RATE: 18 BRPM | HEART RATE: 93 BPM | SYSTOLIC BLOOD PRESSURE: 119 MMHG | WEIGHT: 174 LBS | BODY MASS INDEX: 26.46 KG/M2 | DIASTOLIC BLOOD PRESSURE: 75 MMHG | TEMPERATURE: 98.4 F

## 2020-02-24 NOTE — ASSESSMENT & PLAN NOTE
· Doing well at [facility]  · Continue with restorative care for ADL and IADL  · Fall precautions  · PT/OT

## 2020-02-24 NOTE — PROGRESS NOTES
[address]  [address]  [phone]  [facility]  STR Progress Note        NAME: Vanessa Schmid  AGE: 82 y o  SEX: male    DATE OF ENCOUNTER: 2/21/20    Assessment and Plan     Problem List Items Addressed This Visit        Endocrine    Type 2 diabetes mellitus ([address] )     · Controlled  · Continue with insulin  · Continue to monitor BS            Cardiovascular and Mediastinum    CHF (congestive heart failure) (HCC)     · Asymptomatic  · Continue to monitor weight  · Low Na diet  · Continue with furosemide             HTN (hypertension)     · Doing well  · Continue with diltiazem, metoprolol, furosemide  · Continue to monitor            Other    Ambulatory dysfunction - Primary     · Doing well at STR  · Continue with restorative care for ADL and IADL  · Fall precautions  · PT/OT               No orders of the defined types were placed in this encounter       - Counseling Documentation: patient was counseled regarding: diagnostic results, instructions for management, risk factor reductions, prognosis, patient and family education, impressions, risks and benefits of treatment options and importance of compliance with treatment    History of Present Illness     Vanessa Schmid 82 y o  male seen for follow-up of care and treatment plan for ambulatory dysfunction doing well at STR, CHF asymptomatic, HTN controlled with BP meds, DM2 controlled with insulin,     The following portions of the patient's history were reviewed and updated as appropriate: allergies, current medications, past family history, past medical history, past social history, past surgical history and problem list     Review of Systems     Review of Systems   Constitutional: Negative for activity change, appetite change and fatigue  HENT: Negative  Eyes: Negative  Respiratory: Negative for cough and shortness of breath  Cardiovascular: Negative for chest pain and leg swelling     Gastrointestinal: Negative for abdominal distention, abdominal pain, constipation and diarrhea  Genitourinary: Negative for difficulty urinating  Musculoskeletal: Negative  Skin: Negative for rash  Neurological: Negative for headaches  Psychiatric/Behavioral: The patient is not nervous/anxious  Active Problem List     Patient Active Problem List   Diagnosis    Fall    Type II dens fracture of second cervical vertebra (HCC)    [facility] cervical fracture ([facility])    Nasal bone fractures    Atrial fibrillation with RVR ([facility])    CHF (congestive heart failure) ([facility])    HTN (hypertension)    HLD (hyperlipidemia)    Type 2 diabetes mellitus ([facility])    Multiple falls    Forehead laceration    Vertebral artery dissection ([facility])    Dysphagia    Poor appetite    Ambulatory dysfunction    Penis pain       Objective     /75   Pulse 93   Temp 98 4 °F (36 9 °C)   Resp 18   Wt 78 9 kg (174 lb)   BMI 26 46 kg/m²     Physical Exam   Constitutional: Vital signs are normal  He appears well-developed and well-nourished  HENT:   Head: Normocephalic and atraumatic  Eyes: Conjunctivae are normal    Neck: Normal range of motion  Cardiovascular: Normal rate, regular rhythm, S1 normal, S2 normal and normal heart sounds  No murmur heard  Pulmonary/Chest: Effort normal and breath sounds normal  No respiratory distress  He has no wheezes  Abdominal: Soft  Bowel sounds are normal  He exhibits no distension  There is no tenderness  Musculoskeletal: Normal range of motion  Neurological: He is alert  Skin: Skin is warm, dry and intact  Psychiatric: He has a normal mood and affect  His speech is normal and behavior is normal  Thought content normal  Cognition and memory are impaired  Vitals reviewed  Pertinent Laboratory/Diagnostic Studies:  M labs reviewed    Current Medications   Medication list reviewed and updated today   Please see paper chart for updated medication list      Kan Rudd  8/03/85923:20 PM      Name: Mark Bucio  : 1935  MRN: [id number]  DOS: 20    Diagnoses:   Diagnosis ICD-10-CM Associated Orders   1  Ambulatory dysfunction R26 2    2  Chronic congestive heart failure, unspecified heart failure type ([facility] ) I50 9    3  Essential hypertension I10    4   Type 2 diabetes mellitus with hyperglycemia, unspecified whether long term insulin use (HCC) E11 65

## 2020-02-26 ENCOUNTER — OFFICE VISIT (OUTPATIENT)
Dept: NEUROSURGERY | Facility: CLINIC | Age: 85
End: 2020-02-26
Payer: COMMERCIAL

## 2020-02-26 ENCOUNTER — HOSPITAL ENCOUNTER (OUTPATIENT)
Dept: RADIOLOGY | Facility: HOSPITAL | Age: 85
Discharge: HOME | End: 2020-02-26
Payer: COMMERCIAL

## 2020-02-26 ENCOUNTER — TRANSCRIBE ORDERS (OUTPATIENT)
Dept: RADIOLOGY | Facility: HOSPITAL | Age: 85
End: 2020-02-26

## 2020-02-26 VITALS
TEMPERATURE: 98.2 F | RESPIRATION RATE: 16 BRPM | HEIGHT: 68 IN | DIASTOLIC BLOOD PRESSURE: 72 MMHG | HEART RATE: 76 BPM | SYSTOLIC BLOOD PRESSURE: 116 MMHG | BODY MASS INDEX: 26.46 KG/M2

## 2020-02-26 DIAGNOSIS — S12.110D CLOSED ODONTOID FRACTURE WITH TYPE II MORPHOLOGY, ANTERIOR DISPLACEMENT, AND ROUTINE HEALING, SUBSEQUENT ENCOUNTER: ICD-10-CM

## 2020-02-26 DIAGNOSIS — S12.001D CLOSED NONDISPLACED FRACTURE OF FIRST CERVICAL VERTEBRA WITH ROUTINE HEALING, UNSPECIFIED FRACTURE MORPHOLOGY, SUBSEQUENT ENCOUNTER: Primary | ICD-10-CM

## 2020-02-26 DIAGNOSIS — I77.74 VERTEBRAL ARTERY DISSECTION (HCC): ICD-10-CM

## 2020-02-26 DIAGNOSIS — S12.001D CLOSED NONDISPLACED FRACTURE OF FIRST CERVICAL VERTEBRA WITH ROUTINE HEALING, UNSPECIFIED FRACTURE MORPHOLOGY, SUBSEQUENT ENCOUNTER: ICD-10-CM

## 2020-02-26 PROCEDURE — 99213 OFFICE O/P EST LOW 20 MIN: CPT | Performed by: PHYSICIAN ASSISTANT

## 2020-02-26 PROCEDURE — 72040 X-RAY EXAM NECK SPINE 2-3 VW: CPT

## 2020-02-26 RX ORDER — INSULIN GLARGINE 100 [IU]/ML
INJECTION, SOLUTION SUBCUTANEOUS
COMMUNITY
Start: 2020-02-14 | End: 2020-05-06

## 2020-02-26 RX ORDER — OXYCODONE HYDROCHLORIDE 5 MG/1
5 CAPSULE ORAL EVERY 4 HOURS PRN
COMMUNITY
End: 2020-06-23 | Stop reason: HOSPADM

## 2020-02-26 NOTE — PATIENT INSTRUCTIONS
Continue with cervical collar at all times  (24/7)    Continue with restricted activities, avoid lifting pushing pulling greater than 10 lb  Ambulation as tolerated with assistance is encouraged  Utilize Twyla collar for shower,/bath time  Change pads on cervical collar every 1-2 days wash with mild soap, allowed to air dry  Continue aspirin 81 mg daily  Further follow-up with Neurosurgery, Dr Edmar [facility]) in approximately 1 month  CTA head and neck prior to follow-up visit in 1 month (currently scheduled for 3/30/20)    Plain films of the cervical spine prior to follow-up visit with Neurosurgery (note these may be performed the same day as the CTA head and neck )    Return sooner with any new changes

## 2020-02-26 NOTE — PROGRESS NOTES
Patient ID: Sneha Wilkerson is a 82 y o  male  Diagnoses and all orders for this visit:    Closed nondisplaced fracture of first cervical vertebra with routine healing, unspecified fracture morphology, subsequent encounter  -     XR spine cervical 2 or 3 vw injury; Future  -     XR spine cervical 2 or 3 vw injury; Future    Closed odontoid fracture with type II morphology, anterior displacement, and routine healing, subsequent encounter  -     XR spine cervical 2 or 3 vw injury; Future  -     XR spine cervical 2 or 3 vw injury; Future    Vertebral artery dissection (HCC)    Other orders  -     oxyCODONE (OXY-IR) 5 MG capsule; Take 5 mg by mouth every 4 (four) hours as needed for moderate pain  -     BASAGLAR KWIKPEN 100 units/mL injection pen          Assessment/Plan:  Pleasant 71-year-old male, arrives by wheelchair today, accompanied by his wife, one-month follow-up history of C1 and C2 fractures  History of a fall 1/11/20  Addendum addition sustained a right vertebral artery dissection (V3-V4 junction)  MAR from the nursing facility indicates he is taking aspirin 81 mg daily    He reports no cervical or neck pain regardless of activity or position at visit today  He is not sure but he thinks his collar is on at all times his wife concurs with this  He does arrive today missing the chin pad on his cervical collar  I did contact the nursing facility, [facility], they are unsure as to the reason but will investigate  He was provided with 2 additional sets of pads today, 1 set was placed on his collar, the other his wife is returning to the facility with  He denies motor sensory difficulties in his upper extremities, or bowel or bladder incontinence  There is no imaging prior to visit today, he will be sent for study upon completion of office visit  In addition a contacted the nursing facility and they are unsure as to why this was overlooked      On exam today he is awake, alert and oriented x3, there is no pain with palpation or percussion about cervical spine  Motor exam of the upper extremities is 5 x 5 for power, reflexes are intact and symmetric, Sarah was negative sensation was grossly intact  Further follow-up with Neurosurgery is planned in approximately 4 weeks  CTA head and neck prior to follow-up visit (currently scheduled for 3/30/20)  Plain films of the cervical spine including open mouth views prior to follow-up visit  Continue aspirin 81 mg daily  Continue to wear cervical collar 24/7  Utilize Twyla collar for shower/bath time  Change pads on cervical collar every 1-2 days, wash with mild soap and allowed to air dry  Continue with restricted activities, specifically avoid lifting, pushing, pulling greater than 10 lb  Ambulation with assistance as tolerated is encouraged  Return sooner with any new neck pain or new neurologic symptoms or changes  These findings, impressions and recommendations are reviewed in great detail with the patient and  his wife, they expressed understanding and agreement, their questions were answered completely and to their satisfaction  Return in about 4 weeks (around 3/25/2020) for Review CTA head, and plain films of the cervical spine  Chief Complaint  History of a fall, history of cervical fractures, denies neck pain at visit today    HPI       The following portions of the patient's history were reviewed and updated as appropriate: allergies, current medications, past family history, past medical history, past social history and past surgical history   Review of Systems   Constitutional: Negative  HENT: Negative  Eyes: Negative  Respiratory: Negative  Cardiovascular: Negative  Gastrointestinal: Negative  Endocrine: Negative  Genitourinary: Negative  Musculoskeletal: Negative  Skin: Negative  Allergic/Immunologic: Negative  Neurological: Negative      Hematological: Bruises/bleeds easily (on aspirin)  Psychiatric/Behavioral: Negative  All other systems reviewed and are negative  Objective:    Physical Exam   Constitutional: He is oriented to person, place, and time  He appears well-developed and well-nourished  HENT:   Head: Normocephalic and atraumatic  Eyes: Pupils are equal, round, and reactive to light  EOM are normal    Cardiovascular: Normal rate  Pulmonary/Chest: Effort normal and breath sounds normal    Neurological: He is alert and oriented to person, place, and time  Skin: Skin is warm and dry  Psychiatric: He has a normal mood and affect  Vitals reviewed  Neurologic Exam     Mental Status   Oriented to person, place, and time  Cranial Nerves     CN III, IV, VI   Pupils are equal, round, and reactive to light    Extraocular motions are normal

## 2020-02-27 ENCOUNTER — NURSING HOME VISIT (OUTPATIENT)
Dept: GERIATRICS | Facility: OTHER | Age: 85
End: 2020-02-27
Payer: COMMERCIAL

## 2020-02-27 DIAGNOSIS — R26.2 AMBULATORY DYSFUNCTION: Primary | ICD-10-CM

## 2020-02-27 DIAGNOSIS — S12.110D CLOSED ODONTOID FRACTURE WITH TYPE II MORPHOLOGY, ANTERIOR DISPLACEMENT, AND ROUTINE HEALING, SUBSEQUENT ENCOUNTER: ICD-10-CM

## 2020-02-27 DIAGNOSIS — E11.65 TYPE 2 DIABETES MELLITUS WITH HYPERGLYCEMIA, UNSPECIFIED WHETHER LONG TERM INSULIN USE (HCC): ICD-10-CM

## 2020-02-27 DIAGNOSIS — I10 ESSENTIAL HYPERTENSION: ICD-10-CM

## 2020-02-27 DIAGNOSIS — I50.9 CHRONIC CONGESTIVE HEART FAILURE, UNSPECIFIED HEART FAILURE TYPE (HCC): ICD-10-CM

## 2020-02-27 DIAGNOSIS — I48.91 ATRIAL FIBRILLATION WITH RVR (HCC): ICD-10-CM

## 2020-02-27 PROCEDURE — 99309 SBSQ NF CARE MODERATE MDM 30: CPT | Performed by: NURSE PRACTITIONER

## 2020-02-28 VITALS
SYSTOLIC BLOOD PRESSURE: 127 MMHG | HEART RATE: 97 BPM | WEIGHT: 176.9 LBS | TEMPERATURE: 97.1 F | BODY MASS INDEX: 26.9 KG/M2 | RESPIRATION RATE: 16 BRPM | DIASTOLIC BLOOD PRESSURE: 67 MMHG

## 2020-02-28 PROBLEM — S02.2XXA NASAL BONE FRACTURES: Status: RESOLVED | Noted: 2020-01-11 | Resolved: 2020-02-28

## 2020-02-28 PROBLEM — N48.89 PENIS PAIN: Status: RESOLVED | Noted: 2020-01-29 | Resolved: 2020-02-28
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
## 2020-02-28 PROBLEM — I77.74 VERTEBRAL ARTERY DISSECTION (HCC): Status: RESOLVED | Noted: 2020-01-13 | Resolved: 2020-02-28

## 2020-02-28 PROBLEM — S01.81XA FOREHEAD LACERATION: Status: RESOLVED | Noted: 2020-01-13 | Resolved: 2020-02-28

## 2020-02-28 NOTE — PROGRESS NOTES
[place] [address]  [phone]  [facility]  LTCF Progress Note        NAME: Mark Bucio  AGE: 82 y o   SEX: male    DATE OF ENCOUNTER: 2/27/20    Assessment and Plan     Problem List Items Addressed This Visit        Endocrine    Type 2 diabetes mellitus ([address] )     · Doing well  · Continue with insulin  · Monitor BS daily            Cardiovascular and Mediastinum    Atrial fibrillation with RVR (HCC)     · Rate controlled  · continue with metoprolol  · Continue to monitor BP and HR         CHF (congestive heart failure) (HCC)     · Asymptomatic  · Continue to monitor weights  · Continue to monitor for any increased edema           HTN (hypertension)     · Controlled  · Continue diltiazem, metoprolol, furosemide  · Continue to monitor            Musculoskeletal and Integument    Type II dens fracture of second cervical vertebra (HCC)     · Continue with brace at tall times  · May shower  · F/u with ortho            Other    Ambulatory dysfunction - Primary     · Doing well at LTCF  · Continue restorative care for ADL and IADL  · Fall precautions               No orders of the defined types were placed in this encounter       - Counseling Documentation: patient was counseled regarding: diagnostic results, instructions for management, risk factor reductions, prognosis, patient and family education, impressions, risks and benefits of treatment options and importance of compliance with treatment    History of Present Illness     Mark Bucio 82 y o  male seen for follow-up of care and treatment plan for ambulatory dysfunction doing well at LTCF, afib controlled, CHF asymptomatic, HTN controlled with BP meds, DM2 controlled with insulin, C2 fx healing     The following portions of the patient's history were reviewed and updated as appropriate: allergies, current medications, past family history, past medical history, past social history, past surgical history and problem list     Review of Systems     Review of Systems   Constitutional: Negative for activity change, appetite change and fatigue  HENT: Negative  Eyes: Negative  Respiratory: Negative for cough, shortness of breath and wheezing  Cardiovascular: Negative for chest pain and leg swelling  Gastrointestinal: Negative for abdominal distention, abdominal pain, constipation and diarrhea  Genitourinary: Negative for difficulty urinating  Musculoskeletal: Positive for neck stiffness  Skin: Negative for rash  Neurological: Negative for headaches  Psychiatric/Behavioral: The patient is not nervous/anxious  Active Problem List     Patient Active Problem List   Diagnosis    Type II dens fracture of second cervical vertebra (HCC)    C1 cervical fracture (HCC)    Atrial fibrillation with RVR (HCC)    [facility]) [facility]    HTN (hypertension)    HLD (hyperlipidemia)    Type 2 diabetes mellitus ([facility])    Multiple falls    Dysphagia    Poor appetite    Ambulatory dysfunction       Objective     /67   Pulse 97   Temp (!) 97 1 °F (36 2 °C)   Resp 16   Wt 80 2 kg (176 lb 14 4 oz)   BMI 26 90 kg/m²     Physical Exam   Constitutional: Vital signs are normal  He appears well-developed and well-nourished  HENT:   Head: Normocephalic and atraumatic  Eyes: Conjunctivae are normal    Neck: Decreased range of motion present  Cardiovascular: Normal rate, regular rhythm, S1 normal, S2 normal and normal heart sounds  No murmur heard  Pulmonary/Chest: Effort normal and breath sounds normal  No respiratory distress  He has no wheezes  Abdominal: Soft  Bowel sounds are normal  He exhibits no distension  There is no tenderness  Neurological: He is alert  Skin: Skin is warm, dry and intact  Psychiatric: He has a normal mood and affect  His speech is normal and behavior is normal  Thought content normal  Cognition and memory are impaired  Vitals reviewed      Pertinent Laboratory/Diagnostic Studies:  HFM labs reviewed    Current Medications   Medication list reviewed and updated today  Please see paper chart for updated medication list      David Mary  15312:23 PM      Name: Charity Lyon  : 1935  MRN: [id number]  DOS: 20    Diagnoses:   Diagnosis ICD-10-CM Associated Orders   1  Ambulatory dysfunction R26 2    2  Atrial fibrillation with RVR ([facility]) I48 91    3  Chronic congestive heart failure, unspecified heart failure type ([facility]) I50 9    4  Essential hypertension I10    5  Type 2 diabetes mellitus with hyperglycemia, unspecified whether long term insulin use ([facility]) E11 65    6   Closed odontoid fracture with type II morphology, anterior displacement, and routine healing, subsequent encounter S12 110D

## 2020-03-02 ENCOUNTER — TELEPHONE (OUTPATIENT)
Dept: NEUROSURGERY | Facility: CLINIC | Age: 85
End: 2020-03-02

## 2020-03-02 NOTE — TELEPHONE ENCOUNTER
Received a call from [address] [place] [address] requesting clearance to d/c lovenox  Noted this was prescribed by Trauma and he was discharged with it  Directed this question to trauma and provided their contact number

## 2020-03-03 ENCOUNTER — TELEPHONE (OUTPATIENT)
Dept: SURGERY | Facility: CLINIC | Age: 85
End: 2020-03-03

## 2020-03-05 ENCOUNTER — NURSING HOME VISIT (OUTPATIENT)
Dept: GERIATRICS | Facility: OTHER | Age: 85
End: 2020-03-05
Payer: COMMERCIAL

## 2020-03-05 VITALS
WEIGHT: 176.2 LBS | HEART RATE: 90 BPM | SYSTOLIC BLOOD PRESSURE: 108 MMHG | RESPIRATION RATE: 18 BRPM | DIASTOLIC BLOOD PRESSURE: 71 MMHG | TEMPERATURE: 96.9 F | BODY MASS INDEX: 26.79 KG/M2

## 2020-03-05 DIAGNOSIS — R26.2 AMBULATORY DYSFUNCTION: Primary | ICD-10-CM

## 2020-03-05 DIAGNOSIS — E11.65 TYPE 2 DIABETES MELLITUS WITH HYPERGLYCEMIA, UNSPECIFIED WHETHER LONG TERM INSULIN USE (HCC): ICD-10-CM

## 2020-03-05 PROCEDURE — 99308 SBSQ NF CARE LOW MDM 20: CPT | Performed by: NURSE PRACTITIONER

## 2020-03-06 NOTE — PROGRESS NOTES
[address] [place] [address] [address]  [phone]  [address] Acute Note        NAME: Lenny Mcknight  AGE: 82 y o   SEX: male    DATE OF ENCOUNTER: 3/5/2020    Assessment and Plan     Problem List Items Addressed This Visit        Endocrine    Type 2 diabetes mellitus (HCC)     · Low BS in am  · Continue with DM2 diet  · PM basaglar change to 16units  · Monitor BS daily  · Encourage snack prior to bed            Other    Ambulatory dysfunction - Primary     · Doing well at LTCF  · Continue with restorative care   · Fall precautions  · Ambulates with wheelchair               No orders of the defined types were placed in this encounter       - Counseling Documentation: patient was counseled regarding: diagnostic results, instructions for management, risk factor reductions, prognosis, patient and family education, impressions, risks and benefits of treatment options and importance of compliance with treatment    History of Present Illness     Lenny Mcknight 82 y o  male seen for follow-up of care and treatment plan for ambulatory dysfunction doing well at LTCF, DM2 controlled with insulin     The following portions of the patient's history were reviewed and updated as appropriate: allergies, current medications, past family history, past medical history, past social history, past surgical history and problem list     Active Problem List     Patient Active Problem List   Diagnosis    Type II dens fracture of second cervical vertebra ([facility]    C1 cervical fracture ([facility]    Atrial fibrillation with RVR ([facility]    CHF (congestive heart failure) ([facility]    HTN (hypertension)    HLD (hyperlipidemia)    Type 2 diabetes mellitus ([facility]    Multiple falls    Dysphagia    Poor appetite    Ambulatory dysfunction       Objective     /71   Pulse 90   Temp (!) 96 9 °F (36 1 °C)   Resp 18   Wt 79 9 kg (176 lb 3 2 oz)   BMI 26 79 kg/m²     Physical Exam   Constitutional: Vital signs are normal  He appears well-developed and well-nourished  HENT:   Head: Normocephalic and atraumatic  Eyes: Conjunctivae are normal    Neck: Normal range of motion  Cardiovascular: Normal rate, regular rhythm, S1 normal, S2 normal and normal heart sounds  No murmur heard  Pulmonary/Chest: Effort normal and breath sounds normal  No respiratory distress  He has no wheezes  Abdominal: Soft  Bowel sounds are normal  He exhibits no distension  There is no tenderness  Musculoskeletal: Normal range of motion  Neurological: He is alert  Skin: Skin is warm, dry and intact  Psychiatric: He has a normal mood and affect  His speech is normal and behavior is normal  Thought content normal  Cognition and memory are impaired  Vitals reviewed  Pertinent Laboratory/Diagnostic Studies:  HFM labs reviewed    Current Medications   Medication list reviewed and updated today  Please see paper chart for updated medication list      Shu Jewell  23076:41 PM      Name: Segun Grimm  : 1935  MRN: [id number]  DOS: 3/5/2020    Diagnoses:   Diagnosis ICD-10-CM Associated Orders   1  Ambulatory dysfunction R26 2    2   Type 2 diabetes mellitus with hyperglycemia, unspecified whether long term insulin use (HCC) E11 65

## 2020-03-06 NOTE — ASSESSMENT & PLAN NOTE
· Doing well at LTCF  · Continue with restorative care   · Fall precautions  · Ambulates with wheelchair

## 2020-03-06 NOTE — ASSESSMENT & PLAN NOTE
· Low BS in am  · Continue with DM2 diet  · PM basaglar change to 16units  · Monitor BS daily  · Encourage snack prior to bed

## 2020-03-25 ENCOUNTER — TELEPHONE (OUTPATIENT)
Dept: NEUROSURGERY | Facility: CLINIC | Age: 85
End: 2020-03-25

## 2020-03-25 NOTE — TELEPHONE ENCOUNTER
Spoke with [facility] and they will still be taking patient for his xr and CTA scheduled on 3/30/20  I informed them that we arent sure if the visit on 4/1/20 is going to be virtual or in person yet until the doctor reviews patient  They just asked that someone calls them to let them know if it's going to be virtual because they will cancel transportation

## 2020-03-27 ENCOUNTER — TELEPHONE (OUTPATIENT)
Dept: NEUROSURGERY | Facility: CLINIC | Age: 85
End: 2020-03-27

## 2020-03-27 NOTE — TELEPHONE ENCOUNTER
Received a call from [facility] at [facility] requesting if ok to cancel patients scheduled CTA and xray with OV 4/1/2020  Discussed with Dr Annamaria Hollis who said CTA can rescheduled for 3m from now and xray should be completed in the next 4-6 weeks  Rescheduled his OV with Dr Annamaria Hollis to 5/6/2020  With xray and if possible CTA  IF they are still uncomfortable sending the patient out for CTA at this time we can hold off longer but xray should be completed and if done there disc should be sent to the office  She was in agreement and states understanding

## 2020-04-03 ENCOUNTER — TELEMEDICINE (OUTPATIENT)
Dept: GERIATRICS | Facility: OTHER | Age: 85
End: 2020-04-03
Payer: COMMERCIAL

## 2020-04-03 VITALS
HEART RATE: 77 BPM | SYSTOLIC BLOOD PRESSURE: 125 MMHG | DIASTOLIC BLOOD PRESSURE: 68 MMHG | WEIGHT: 175.9 LBS | TEMPERATURE: 97.1 F | OXYGEN SATURATION: 97 % | BODY MASS INDEX: 26.75 KG/M2

## 2020-04-03 DIAGNOSIS — S12.001D CLOSED NONDISPLACED FRACTURE OF FIRST CERVICAL VERTEBRA WITH ROUTINE HEALING, UNSPECIFIED FRACTURE MORPHOLOGY, SUBSEQUENT ENCOUNTER: ICD-10-CM

## 2020-04-03 DIAGNOSIS — I50.9 CHRONIC CONGESTIVE HEART FAILURE, UNSPECIFIED HEART FAILURE TYPE (HCC): ICD-10-CM

## 2020-04-03 DIAGNOSIS — I10 ESSENTIAL HYPERTENSION: ICD-10-CM

## 2020-04-03 DIAGNOSIS — S12.110D CLOSED ODONTOID FRACTURE WITH TYPE II MORPHOLOGY, ANTERIOR DISPLACEMENT, AND ROUTINE HEALING, SUBSEQUENT ENCOUNTER: ICD-10-CM

## 2020-04-03 DIAGNOSIS — R13.10 DYSPHAGIA, UNSPECIFIED TYPE: ICD-10-CM

## 2020-04-03 DIAGNOSIS — E11.65 TYPE 2 DIABETES MELLITUS WITH HYPERGLYCEMIA, UNSPECIFIED WHETHER LONG TERM INSULIN USE (HCC): Primary | ICD-10-CM

## 2020-04-03 DIAGNOSIS — E78.2 MIXED HYPERLIPIDEMIA: ICD-10-CM

## 2020-04-03 DIAGNOSIS — R63.0 POOR APPETITE: ICD-10-CM

## 2020-04-03 DIAGNOSIS — R26.2 AMBULATORY DYSFUNCTION: ICD-10-CM

## 2020-04-03 DIAGNOSIS — I48.91 ATRIAL FIBRILLATION WITH RVR (HCC): ICD-10-CM

## 2020-04-03 PROCEDURE — 99309 SBSQ NF CARE MODERATE MDM 30: CPT | Performed by: FAMILY MEDICINE

## 2020-05-06 ENCOUNTER — TELEMEDICINE (OUTPATIENT)
Dept: NEUROSURGERY | Facility: CLINIC | Age: 85
End: 2020-05-06
Payer: COMMERCIAL

## 2020-05-06 VITALS — HEIGHT: 68 IN | WEIGHT: 180 LBS | BODY MASS INDEX: 27.28 KG/M2

## 2020-05-06 DIAGNOSIS — S12.110D CLOSED ODONTOID FRACTURE WITH TYPE II MORPHOLOGY, ANTERIOR DISPLACEMENT, AND ROUTINE HEALING, SUBSEQUENT ENCOUNTER: Primary | ICD-10-CM

## 2020-05-06 DIAGNOSIS — I77.74 VERTEBRAL ARTERY DISSECTION (HCC): ICD-10-CM

## 2020-05-06 DIAGNOSIS — S12.001D CLOSED NONDISPLACED FRACTURE OF FIRST CERVICAL VERTEBRA WITH ROUTINE HEALING, UNSPECIFIED FRACTURE MORPHOLOGY, SUBSEQUENT ENCOUNTER: ICD-10-CM

## 2020-05-06 PROCEDURE — G2012 BRIEF CHECK IN BY MD/QHP: HCPCS | Performed by: PHYSICIAN ASSISTANT

## 2020-05-06 RX ORDER — BISACODYL 10 MG
10 SUPPOSITORY, RECTAL RECTAL AS NEEDED
Status: ON HOLD | COMMUNITY

## 2020-05-06 RX ORDER — SODIUM PHOSPHATE, DIBASIC AND SODIUM PHOSPHATE, MONOBASIC 7; 19 G/133ML; G/133ML
1 ENEMA RECTAL ONCE AS NEEDED
COMMUNITY
End: 2020-06-23 | Stop reason: HOSPADM

## 2020-05-12 ENCOUNTER — TELEPHONE (OUTPATIENT)
Dept: GERIATRICS | Age: 85
End: 2020-05-12

## 2020-05-13 ENCOUNTER — TELEPHONE (OUTPATIENT)
Dept: NEUROSURGERY | Facility: CLINIC | Age: 85
End: 2020-05-13

## 2020-05-14 ENCOUNTER — TELEMEDICINE (OUTPATIENT)
Dept: NEUROSURGERY | Facility: CLINIC | Age: 85
End: 2020-05-14
Payer: COMMERCIAL

## 2020-05-14 DIAGNOSIS — S12.001D CLOSED NONDISPLACED FRACTURE OF FIRST CERVICAL VERTEBRA WITH ROUTINE HEALING, UNSPECIFIED FRACTURE MORPHOLOGY, SUBSEQUENT ENCOUNTER: Primary | ICD-10-CM

## 2020-05-14 DIAGNOSIS — I77.74 VERTEBRAL ARTERY DISSECTION (HCC): ICD-10-CM

## 2020-05-14 DIAGNOSIS — S12.111D CLOSED ODONTOID FRACTURE WITH TYPE II MORPHOLOGY, POSTERIOR DISPLACEMENT, AND ROUTINE HEALING, SUBSEQUENT ENCOUNTER: ICD-10-CM

## 2020-05-14 PROCEDURE — G2012 BRIEF CHECK IN BY MD/QHP: HCPCS | Performed by: PHYSICIAN ASSISTANT

## 2020-05-14 RX ORDER — FUROSEMIDE 40 MG/1
1.5 TABLET ORAL 2 TIMES DAILY
Status: ON HOLD | COMMUNITY

## 2020-05-14 RX ORDER — DABIGATRAN ETEXILATE 150 MG/1
150 CAPSULE, COATED PELLETS ORAL 2 TIMES DAILY
COMMUNITY
End: 2020-10-30 | Stop reason: HOSPADM

## 2020-05-22 ENCOUNTER — HOSPITAL ENCOUNTER (OUTPATIENT)
Dept: RADIOLOGY | Age: 85
End: 2020-05-22
Payer: COMMERCIAL

## 2020-05-22 ENCOUNTER — NURSING HOME VISIT (OUTPATIENT)
Dept: GERIATRICS | Facility: OTHER | Age: 85
End: 2020-05-22
Payer: COMMERCIAL

## 2020-05-22 ENCOUNTER — HOSPITAL ENCOUNTER (OUTPATIENT)
Dept: RADIOLOGY | Age: 85
Discharge: HOME | End: 2020-05-22
Payer: COMMERCIAL

## 2020-05-22 VITALS — BODY MASS INDEX: 28.13 KG/M2 | WEIGHT: 185 LBS

## 2020-05-22 DIAGNOSIS — S12.111D CLOSED ODONTOID FRACTURE WITH TYPE II MORPHOLOGY, POSTERIOR DISPLACEMENT, AND ROUTINE HEALING, SUBSEQUENT ENCOUNTER: ICD-10-CM

## 2020-05-22 DIAGNOSIS — I10 ESSENTIAL HYPERTENSION: ICD-10-CM

## 2020-05-22 DIAGNOSIS — I48.91 ATRIAL FIBRILLATION WITH RVR (HCC): ICD-10-CM

## 2020-05-22 DIAGNOSIS — E11.65 TYPE 2 DIABETES MELLITUS WITH HYPERGLYCEMIA, UNSPECIFIED WHETHER LONG TERM INSULIN USE (HCC): Primary | ICD-10-CM

## 2020-05-22 DIAGNOSIS — R63.0 POOR APPETITE: ICD-10-CM

## 2020-05-22 DIAGNOSIS — I77.74 VERTEBRAL ARTERY DISSECTION (HCC): ICD-10-CM

## 2020-05-22 DIAGNOSIS — I50.9 CHRONIC CONGESTIVE HEART FAILURE, UNSPECIFIED HEART FAILURE TYPE (HCC): ICD-10-CM

## 2020-05-22 DIAGNOSIS — E78.2 MIXED HYPERLIPIDEMIA: ICD-10-CM

## 2020-05-22 DIAGNOSIS — S12.001D CLOSED NONDISPLACED FRACTURE OF FIRST CERVICAL VERTEBRA WITH ROUTINE HEALING, UNSPECIFIED FRACTURE MORPHOLOGY, SUBSEQUENT ENCOUNTER: ICD-10-CM

## 2020-05-22 DIAGNOSIS — R26.2 AMBULATORY DYSFUNCTION: ICD-10-CM

## 2020-05-22 DIAGNOSIS — R13.10 DYSPHAGIA, UNSPECIFIED TYPE: ICD-10-CM

## 2020-05-22 PROCEDURE — 72125 CT NECK SPINE W/O DYE: CPT

## 2020-05-22 PROCEDURE — 70498 CT ANGIOGRAPHY NECK: CPT

## 2020-05-22 PROCEDURE — 99309 SBSQ NF CARE MODERATE MDM 30: CPT | Performed by: FAMILY MEDICINE

## 2020-05-22 PROCEDURE — 70496 CT ANGIOGRAPHY HEAD: CPT

## 2020-05-22 RX ADMIN — IOHEXOL 85 ML: 350 INJECTION, SOLUTION INTRAVENOUS at 12:24

## 2020-05-26 PROBLEM — R63.0 POOR APPETITE: Status: RESOLVED | Noted: 2020-01-20 | Resolved: 2020-05-26

## 2020-06-01 ENCOUNTER — TELEPHONE (OUTPATIENT)
Dept: NEUROSURGERY | Facility: CLINIC | Age: 85
End: 2020-06-01

## 2020-06-02 ENCOUNTER — OFFICE VISIT (OUTPATIENT)
Dept: NEUROSURGERY | Facility: CLINIC | Age: 85
End: 2020-06-02
Payer: COMMERCIAL

## 2020-06-02 DIAGNOSIS — S12.090K: ICD-10-CM

## 2020-06-02 DIAGNOSIS — S12.111G CLOSED ODONTOID FRACTURE WITH TYPE II MORPHOLOGY, POSTERIOR DISPLACEMENT, AND DELAYED HEALING, SUBSEQUENT ENCOUNTER: Primary | ICD-10-CM

## 2020-06-02 PROCEDURE — 99213 OFFICE O/P EST LOW 20 MIN: CPT | Performed by: PHYSICIAN ASSISTANT

## 2020-06-02 RX ORDER — LOPERAMIDE HYDROCHLORIDE 2 MG/1
2 CAPSULE ORAL AS NEEDED
COMMUNITY
End: 2020-06-16

## 2020-06-16 ENCOUNTER — NURSING HOME VISIT (OUTPATIENT)
Dept: GERIATRICS | Facility: OTHER | Age: 85
End: 2020-06-16
Payer: COMMERCIAL

## 2020-06-16 ENCOUNTER — TELEPHONE (OUTPATIENT)
Dept: NEUROSURGERY | Facility: CLINIC | Age: 85
End: 2020-06-16

## 2020-06-16 ENCOUNTER — TELEMEDICINE (OUTPATIENT)
Dept: NEUROSURGERY | Facility: CLINIC | Age: 85
End: 2020-06-16
Payer: COMMERCIAL

## 2020-06-16 DIAGNOSIS — E11.65 TYPE 2 DIABETES MELLITUS WITH HYPERGLYCEMIA, UNSPECIFIED WHETHER LONG TERM INSULIN USE (HCC): Primary | ICD-10-CM

## 2020-06-16 DIAGNOSIS — R26.2 AMBULATORY DYSFUNCTION: ICD-10-CM

## 2020-06-16 DIAGNOSIS — S12.100K CLOSED ODONTOID FRACTURE WITH NONUNION, SUBSEQUENT ENCOUNTER: ICD-10-CM

## 2020-06-16 DIAGNOSIS — Z11.59 SCREENING FOR VIRAL DISEASE: ICD-10-CM

## 2020-06-16 DIAGNOSIS — S12.000K: Primary | ICD-10-CM

## 2020-06-16 PROCEDURE — 99442 PR PHYS/QHP TELEPHONE EVALUATION 11-20 MIN: CPT | Performed by: NEUROLOGICAL SURGERY

## 2020-06-16 PROCEDURE — 99308 SBSQ NF CARE LOW MDM 20: CPT | Performed by: NURSE PRACTITIONER

## 2020-06-16 RX ORDER — CHLORHEXIDINE GLUCONATE 0.12 MG/ML
15 RINSE ORAL ONCE
Status: CANCELLED | OUTPATIENT
Start: 2020-06-16 | End: 2020-06-16

## 2020-06-16 RX ORDER — VANCOMYCIN HYDROCHLORIDE 1 G/200ML
1000 INJECTION, SOLUTION INTRAVENOUS ONCE
Status: CANCELLED | OUTPATIENT
Start: 2020-06-16 | End: 2020-06-16

## 2020-06-19 ENCOUNTER — NURSING HOME VISIT (OUTPATIENT)
Dept: GERIATRICS | Facility: OTHER | Age: 85
End: 2020-06-19
Payer: COMMERCIAL

## 2020-06-19 ENCOUNTER — HOSPITAL ENCOUNTER (INPATIENT)
Facility: HOSPITAL | Age: 85
LOS: 4 days | Discharge: NURSING, REHAB OR LONG-TERM CARE | DRG: 309 | End: 2020-06-23
Attending: INTERNAL MEDICINE | Admitting: INTERNAL MEDICINE
Payer: COMMERCIAL

## 2020-06-19 ENCOUNTER — APPOINTMENT (EMERGENCY)
Dept: RADIOLOGY | Facility: HOSPITAL | Age: 85
DRG: 309 | End: 2020-06-19
Payer: COMMERCIAL

## 2020-06-19 VITALS
RESPIRATION RATE: 17 BRPM | DIASTOLIC BLOOD PRESSURE: 60 MMHG | TEMPERATURE: 97.6 F | HEART RATE: 62 BPM | SYSTOLIC BLOOD PRESSURE: 138 MMHG

## 2020-06-19 DIAGNOSIS — R94.31 ABNORMAL EKG: Primary | ICD-10-CM

## 2020-06-19 DIAGNOSIS — R26.2 AMBULATORY DYSFUNCTION: Primary | ICD-10-CM

## 2020-06-19 DIAGNOSIS — I10 UNCONTROLLED HYPERTENSION: ICD-10-CM

## 2020-06-19 DIAGNOSIS — R29.6 MULTIPLE FALLS: ICD-10-CM

## 2020-06-19 PROBLEM — I50.32 CHRONIC DIASTOLIC CHF (CONGESTIVE HEART FAILURE) (HCC): Status: ACTIVE | Noted: 2020-06-19

## 2020-06-19 LAB
ANION GAP SERPL CALCULATED.3IONS-SCNC: 8 MMOL/L (ref 4–13)
ATRIAL RATE: 214 BPM
BASE EXCESS BLDA CALC-SCNC: 7 MMOL/L (ref -2–3)
BASOPHILS # BLD AUTO: 0.09 THOUSANDS/ΜL (ref 0–0.1)
BASOPHILS NFR BLD AUTO: 1 % (ref 0–1)
BUN SERPL-MCNC: 20 MG/DL (ref 5–25)
CA-I BLD-SCNC: 1.12 MMOL/L (ref 1.12–1.32)
CALCIUM SERPL-MCNC: 8.6 MG/DL (ref 8.3–10.1)
CHLORIDE SERPL-SCNC: 99 MMOL/L (ref 100–108)
CO2 SERPL-SCNC: 29 MMOL/L (ref 21–32)
CREAT SERPL-MCNC: 1.03 MG/DL (ref 0.6–1.3)
EOSINOPHIL # BLD AUTO: 0.68 THOUSAND/ΜL (ref 0–0.61)
EOSINOPHIL NFR BLD AUTO: 7 % (ref 0–6)
ERYTHROCYTE [DISTWIDTH] IN BLOOD BY AUTOMATED COUNT: 15.6 % (ref 11.6–15.1)
GFR SERPL CREATININE-BSD FRML MDRD: 67 ML/MIN/1.73SQ M
GLUCOSE SERPL-MCNC: 161 MG/DL (ref 65–140)
GLUCOSE SERPL-MCNC: 204 MG/DL (ref 65–140)
GLUCOSE SERPL-MCNC: 205 MG/DL (ref 65–140)
HCO3 BLDA-SCNC: 33.1 MMOL/L (ref 24–30)
HCT VFR BLD AUTO: 41.5 % (ref 36.5–49.3)
HCT VFR BLD CALC: 43 % (ref 36.5–49.3)
HGB BLD-MCNC: 13.4 G/DL (ref 12–17)
HGB BLDA-MCNC: 14.6 G/DL (ref 12–17)
IMM GRANULOCYTES # BLD AUTO: 0.05 THOUSAND/UL (ref 0–0.2)
IMM GRANULOCYTES NFR BLD AUTO: 1 % (ref 0–2)
LYMPHOCYTES # BLD AUTO: 1.52 THOUSANDS/ΜL (ref 0.6–4.47)
LYMPHOCYTES NFR BLD AUTO: 16 % (ref 14–44)
MCH RBC QN AUTO: 29.4 PG (ref 26.8–34.3)
MCHC RBC AUTO-ENTMCNC: 32.3 G/DL (ref 31.4–37.4)
MCV RBC AUTO: 91 FL (ref 82–98)
MONOCYTES # BLD AUTO: 0.84 THOUSAND/ΜL (ref 0.17–1.22)
MONOCYTES NFR BLD AUTO: 9 % (ref 4–12)
NEUTROPHILS # BLD AUTO: 6.18 THOUSANDS/ΜL (ref 1.85–7.62)
NEUTS SEG NFR BLD AUTO: 66 % (ref 43–75)
NRBC BLD AUTO-RTO: 0 /100 WBCS
P AXIS: 82 DEGREES
PCO2 BLD: 35 MMOL/L (ref 21–32)
PCO2 BLD: 50.5 MM HG (ref 42–50)
PH BLD: 7.42 [PH] (ref 7.3–7.4)
PLATELET # BLD AUTO: 285 THOUSANDS/UL (ref 149–390)
PMV BLD AUTO: 11.2 FL (ref 8.9–12.7)
PO2 BLD: 21 MM HG (ref 35–45)
POTASSIUM BLD-SCNC: 5.6 MMOL/L (ref 3.5–5.3)
POTASSIUM SERPL-SCNC: 4.1 MMOL/L (ref 3.5–5.3)
QRS AXIS: 241 DEGREES
QRSD INTERVAL: 164 MS
QT INTERVAL: 478 MS
QTC INTERVAL: 501 MS
RBC # BLD AUTO: 4.56 MILLION/UL (ref 3.88–5.62)
SAO2 % BLD FROM PO2: 35 % (ref 60–85)
SODIUM BLD-SCNC: 138 MMOL/L (ref 136–145)
SODIUM SERPL-SCNC: 136 MMOL/L (ref 136–145)
SPECIMEN SOURCE: ABNORMAL
T WAVE AXIS: 79 DEGREES
TROPONIN I SERPL-MCNC: 0.03 NG/ML
TROPONIN I SERPL-MCNC: <0.02 NG/ML
TROPONIN I SERPL-MCNC: <0.02 NG/ML
VENTRICULAR RATE: 66 BPM
WBC # BLD AUTO: 9.36 THOUSAND/UL (ref 4.31–10.16)

## 2020-06-19 PROCEDURE — 36415 COLL VENOUS BLD VENIPUNCTURE: CPT | Performed by: STUDENT IN AN ORGANIZED HEALTH CARE EDUCATION/TRAINING PROGRAM

## 2020-06-19 PROCEDURE — 80048 BASIC METABOLIC PNL TOTAL CA: CPT | Performed by: INTERNAL MEDICINE

## 2020-06-19 PROCEDURE — 82948 REAGENT STRIP/BLOOD GLUCOSE: CPT

## 2020-06-19 PROCEDURE — 85025 COMPLETE CBC W/AUTO DIFF WBC: CPT | Performed by: INTERNAL MEDICINE

## 2020-06-19 PROCEDURE — 93005 ELECTROCARDIOGRAM TRACING: CPT

## 2020-06-19 PROCEDURE — 84484 ASSAY OF TROPONIN QUANT: CPT | Performed by: INTERNAL MEDICINE

## 2020-06-19 PROCEDURE — 99308 SBSQ NF CARE LOW MDM 20: CPT | Performed by: NURSE PRACTITIONER

## 2020-06-19 PROCEDURE — 82947 ASSAY GLUCOSE BLOOD QUANT: CPT

## 2020-06-19 PROCEDURE — 82330 ASSAY OF CALCIUM: CPT

## 2020-06-19 PROCEDURE — 72125 CT NECK SPINE W/O DYE: CPT

## 2020-06-19 PROCEDURE — 99285 EMERGENCY DEPT VISIT HI MDM: CPT

## 2020-06-19 PROCEDURE — NC001 PR NO CHARGE: Performed by: EMERGENCY MEDICINE

## 2020-06-19 PROCEDURE — 99285 EMERGENCY DEPT VISIT HI MDM: CPT | Performed by: SURGERY

## 2020-06-19 PROCEDURE — 84484 ASSAY OF TROPONIN QUANT: CPT | Performed by: EMERGENCY MEDICINE

## 2020-06-19 PROCEDURE — 82803 BLOOD GASES ANY COMBINATION: CPT

## 2020-06-19 PROCEDURE — 96374 THER/PROPH/DIAG INJ IV PUSH: CPT

## 2020-06-19 PROCEDURE — 93010 ELECTROCARDIOGRAM REPORT: CPT | Performed by: INTERNAL MEDICINE

## 2020-06-19 PROCEDURE — 85014 HEMATOCRIT: CPT

## 2020-06-19 PROCEDURE — 99223 1ST HOSP IP/OBS HIGH 75: CPT | Performed by: INTERNAL MEDICINE

## 2020-06-19 PROCEDURE — 70450 CT HEAD/BRAIN W/O DYE: CPT

## 2020-06-19 PROCEDURE — 84132 ASSAY OF SERUM POTASSIUM: CPT

## 2020-06-19 PROCEDURE — 84295 ASSAY OF SERUM SODIUM: CPT

## 2020-06-19 RX ORDER — FUROSEMIDE 40 MG/1
40 TABLET ORAL 2 TIMES DAILY
Status: DISCONTINUED | OUTPATIENT
Start: 2020-06-19 | End: 2020-06-23 | Stop reason: HOSPADM

## 2020-06-19 RX ORDER — METHOCARBAMOL 500 MG/1
500 TABLET, FILM COATED ORAL EVERY 8 HOURS SCHEDULED
Status: DISCONTINUED | OUTPATIENT
Start: 2020-06-19 | End: 2020-06-23 | Stop reason: HOSPADM

## 2020-06-19 RX ORDER — ASPIRIN 81 MG/1
81 TABLET, CHEWABLE ORAL DAILY
Status: DISCONTINUED | OUTPATIENT
Start: 2020-06-20 | End: 2020-06-23 | Stop reason: HOSPADM

## 2020-06-19 RX ORDER — DILTIAZEM HYDROCHLORIDE 240 MG/1
240 CAPSULE, COATED, EXTENDED RELEASE ORAL DAILY
Status: DISCONTINUED | OUTPATIENT
Start: 2020-06-20 | End: 2020-06-23 | Stop reason: HOSPADM

## 2020-06-19 RX ORDER — ONDANSETRON 2 MG/ML
4 INJECTION INTRAMUSCULAR; INTRAVENOUS EVERY 6 HOURS PRN
Status: DISCONTINUED | OUTPATIENT
Start: 2020-06-19 | End: 2020-06-23 | Stop reason: HOSPADM

## 2020-06-19 RX ORDER — ACETAMINOPHEN 325 MG/1
975 TABLET ORAL EVERY 8 HOURS SCHEDULED
Status: DISCONTINUED | OUTPATIENT
Start: 2020-06-19 | End: 2020-06-23 | Stop reason: HOSPADM

## 2020-06-19 RX ORDER — INSULIN GLARGINE 100 [IU]/ML
10 INJECTION, SOLUTION SUBCUTANEOUS
Status: DISCONTINUED | OUTPATIENT
Start: 2020-06-19 | End: 2020-06-23 | Stop reason: HOSPADM

## 2020-06-19 RX ORDER — DABIGATRAN ETEXILATE 150 MG/1
150 CAPSULE, COATED PELLETS ORAL 2 TIMES DAILY
Status: DISCONTINUED | OUTPATIENT
Start: 2020-06-19 | End: 2020-06-23 | Stop reason: HOSPADM

## 2020-06-19 RX ORDER — ONDANSETRON 2 MG/ML
4 INJECTION INTRAMUSCULAR; INTRAVENOUS ONCE
Status: COMPLETED | OUTPATIENT
Start: 2020-06-19 | End: 2020-06-19

## 2020-06-19 RX ORDER — BISACODYL 10 MG
10 SUPPOSITORY, RECTAL RECTAL DAILY PRN
Status: DISCONTINUED | OUTPATIENT
Start: 2020-06-19 | End: 2020-06-23 | Stop reason: HOSPADM

## 2020-06-19 RX ORDER — ONDANSETRON 2 MG/ML
INJECTION INTRAMUSCULAR; INTRAVENOUS
Status: COMPLETED
Start: 2020-06-19 | End: 2020-06-19

## 2020-06-19 RX ORDER — METOPROLOL TARTRATE 50 MG/1
50 TABLET, FILM COATED ORAL EVERY 12 HOURS SCHEDULED
Status: DISCONTINUED | OUTPATIENT
Start: 2020-06-19 | End: 2020-06-23 | Stop reason: HOSPADM

## 2020-06-19 RX ORDER — ATORVASTATIN CALCIUM 20 MG/1
20 TABLET, FILM COATED ORAL DAILY
Status: DISCONTINUED | OUTPATIENT
Start: 2020-06-20 | End: 2020-06-23 | Stop reason: HOSPADM

## 2020-06-19 RX ORDER — OXYCODONE HYDROCHLORIDE 5 MG/1
5 TABLET ORAL EVERY 4 HOURS PRN
Status: DISCONTINUED | OUTPATIENT
Start: 2020-06-19 | End: 2020-06-23 | Stop reason: HOSPADM

## 2020-06-19 RX ORDER — PANTOPRAZOLE SODIUM 40 MG/1
40 TABLET, DELAYED RELEASE ORAL
Status: DISCONTINUED | OUTPATIENT
Start: 2020-06-20 | End: 2020-06-23 | Stop reason: HOSPADM

## 2020-06-19 RX ADMIN — METHOCARBAMOL TABLETS 500 MG: 500 TABLET, COATED ORAL at 23:02

## 2020-06-19 RX ADMIN — INSULIN GLARGINE 10 UNITS: 100 INJECTION, SOLUTION SUBCUTANEOUS at 23:03

## 2020-06-19 RX ADMIN — FUROSEMIDE 40 MG: 40 TABLET ORAL at 23:07

## 2020-06-19 RX ADMIN — METOPROLOL TARTRATE 50 MG: 50 TABLET, FILM COATED ORAL at 23:02

## 2020-06-19 RX ADMIN — DABIGATRAN ETEXILATE MESYLATE 150 MG: 150 CAPSULE ORAL at 23:02

## 2020-06-19 RX ADMIN — ACETAMINOPHEN 975 MG: 325 TABLET ORAL at 23:03

## 2020-06-19 RX ADMIN — ONDANSETRON 4 MG: 2 INJECTION INTRAMUSCULAR; INTRAVENOUS at 15:31

## 2020-06-20 LAB
ALBUMIN SERPL BCP-MCNC: 3.3 G/DL (ref 3.5–5)
ALP SERPL-CCNC: 104 U/L (ref 46–116)
ALT SERPL W P-5'-P-CCNC: 12 U/L (ref 12–78)
ANION GAP SERPL CALCULATED.3IONS-SCNC: 5 MMOL/L (ref 4–13)
AST SERPL W P-5'-P-CCNC: 8 U/L (ref 5–45)
BASOPHILS # BLD AUTO: 0.06 THOUSANDS/ΜL (ref 0–0.1)
BASOPHILS NFR BLD AUTO: 1 % (ref 0–1)
BILIRUB SERPL-MCNC: 0.49 MG/DL (ref 0.2–1)
BUN SERPL-MCNC: 16 MG/DL (ref 5–25)
CALCIUM SERPL-MCNC: 9.2 MG/DL (ref 8.3–10.1)
CHLORIDE SERPL-SCNC: 105 MMOL/L (ref 100–108)
CO2 SERPL-SCNC: 30 MMOL/L (ref 21–32)
CREAT SERPL-MCNC: 0.97 MG/DL (ref 0.6–1.3)
EOSINOPHIL # BLD AUTO: 0.27 THOUSAND/ΜL (ref 0–0.61)
EOSINOPHIL NFR BLD AUTO: 3 % (ref 0–6)
ERYTHROCYTE [DISTWIDTH] IN BLOOD BY AUTOMATED COUNT: 15.3 % (ref 11.6–15.1)
GFR SERPL CREATININE-BSD FRML MDRD: 72 ML/MIN/1.73SQ M
GLUCOSE SERPL-MCNC: 174 MG/DL (ref 65–140)
GLUCOSE SERPL-MCNC: 212 MG/DL (ref 65–140)
GLUCOSE SERPL-MCNC: 85 MG/DL (ref 65–140)
GLUCOSE SERPL-MCNC: 90 MG/DL (ref 65–140)
GLUCOSE SERPL-MCNC: 99 MG/DL (ref 65–140)
HCT VFR BLD AUTO: 41.1 % (ref 36.5–49.3)
HGB BLD-MCNC: 13.4 G/DL (ref 12–17)
IMM GRANULOCYTES # BLD AUTO: 0.02 THOUSAND/UL (ref 0–0.2)
IMM GRANULOCYTES NFR BLD AUTO: 0 % (ref 0–2)
LYMPHOCYTES # BLD AUTO: 1.25 THOUSANDS/ΜL (ref 0.6–4.47)
LYMPHOCYTES NFR BLD AUTO: 16 % (ref 14–44)
MAGNESIUM SERPL-MCNC: 2.1 MG/DL (ref 1.6–2.6)
MCH RBC QN AUTO: 28.7 PG (ref 26.8–34.3)
MCHC RBC AUTO-ENTMCNC: 32.6 G/DL (ref 31.4–37.4)
MCV RBC AUTO: 88 FL (ref 82–98)
MONOCYTES # BLD AUTO: 0.79 THOUSAND/ΜL (ref 0.17–1.22)
MONOCYTES NFR BLD AUTO: 10 % (ref 4–12)
NEUTROPHILS # BLD AUTO: 5.63 THOUSANDS/ΜL (ref 1.85–7.62)
NEUTS SEG NFR BLD AUTO: 70 % (ref 43–75)
NRBC BLD AUTO-RTO: 0 /100 WBCS
PHOSPHATE SERPL-MCNC: 3.2 MG/DL (ref 2.3–4.1)
PLATELET # BLD AUTO: 266 THOUSANDS/UL (ref 149–390)
PMV BLD AUTO: 10 FL (ref 8.9–12.7)
POTASSIUM SERPL-SCNC: 3.5 MMOL/L (ref 3.5–5.3)
PROT SERPL-MCNC: 7.4 G/DL (ref 6.4–8.2)
RBC # BLD AUTO: 4.67 MILLION/UL (ref 3.88–5.62)
SODIUM SERPL-SCNC: 140 MMOL/L (ref 136–145)
TROPONIN I SERPL-MCNC: 0.03 NG/ML
WBC # BLD AUTO: 8.02 THOUSAND/UL (ref 4.31–10.16)

## 2020-06-20 PROCEDURE — 80053 COMPREHEN METABOLIC PANEL: CPT | Performed by: INTERNAL MEDICINE

## 2020-06-20 PROCEDURE — 99221 1ST HOSP IP/OBS SF/LOW 40: CPT | Performed by: INTERNAL MEDICINE

## 2020-06-20 PROCEDURE — 99232 SBSQ HOSP IP/OBS MODERATE 35: CPT | Performed by: PHYSICIAN ASSISTANT

## 2020-06-20 PROCEDURE — 82948 REAGENT STRIP/BLOOD GLUCOSE: CPT

## 2020-06-20 PROCEDURE — 84100 ASSAY OF PHOSPHORUS: CPT | Performed by: INTERNAL MEDICINE

## 2020-06-20 PROCEDURE — 99232 SBSQ HOSP IP/OBS MODERATE 35: CPT | Performed by: INTERNAL MEDICINE

## 2020-06-20 PROCEDURE — 84484 ASSAY OF TROPONIN QUANT: CPT | Performed by: INTERNAL MEDICINE

## 2020-06-20 PROCEDURE — 83735 ASSAY OF MAGNESIUM: CPT | Performed by: INTERNAL MEDICINE

## 2020-06-20 PROCEDURE — 85025 COMPLETE CBC W/AUTO DIFF WBC: CPT | Performed by: INTERNAL MEDICINE

## 2020-06-20 RX ORDER — HYDRALAZINE HYDROCHLORIDE 25 MG/1
25 TABLET, FILM COATED ORAL EVERY 8 HOURS SCHEDULED
Status: DISCONTINUED | OUTPATIENT
Start: 2020-06-20 | End: 2020-06-23 | Stop reason: HOSPADM

## 2020-06-20 RX ADMIN — DABIGATRAN ETEXILATE MESYLATE 150 MG: 150 CAPSULE ORAL at 08:16

## 2020-06-20 RX ADMIN — ACETAMINOPHEN 975 MG: 325 TABLET ORAL at 21:56

## 2020-06-20 RX ADMIN — ACETAMINOPHEN 975 MG: 325 TABLET ORAL at 17:12

## 2020-06-20 RX ADMIN — FUROSEMIDE 40 MG: 40 TABLET ORAL at 17:11

## 2020-06-20 RX ADMIN — INSULIN LISPRO 1 UNITS: 100 INJECTION, SOLUTION INTRAVENOUS; SUBCUTANEOUS at 12:46

## 2020-06-20 RX ADMIN — METHOCARBAMOL TABLETS 500 MG: 500 TABLET, COATED ORAL at 21:56

## 2020-06-20 RX ADMIN — INSULIN LISPRO 5 UNITS: 100 INJECTION, SOLUTION INTRAVENOUS; SUBCUTANEOUS at 12:46

## 2020-06-20 RX ADMIN — ATORVASTATIN CALCIUM 20 MG: 20 TABLET, FILM COATED ORAL at 08:15

## 2020-06-20 RX ADMIN — DABIGATRAN ETEXILATE MESYLATE 150 MG: 150 CAPSULE ORAL at 17:14

## 2020-06-20 RX ADMIN — METOPROLOL TARTRATE 50 MG: 50 TABLET, FILM COATED ORAL at 08:12

## 2020-06-20 RX ADMIN — ASPIRIN 81 MG 81 MG: 81 TABLET ORAL at 08:15

## 2020-06-20 RX ADMIN — METHOCARBAMOL TABLETS 500 MG: 500 TABLET, COATED ORAL at 17:12

## 2020-06-20 RX ADMIN — HYDRALAZINE HYDROCHLORIDE 25 MG: 25 TABLET, FILM COATED ORAL at 17:12

## 2020-06-20 RX ADMIN — FUROSEMIDE 40 MG: 40 TABLET ORAL at 08:15

## 2020-06-20 RX ADMIN — HYDRALAZINE HYDROCHLORIDE 25 MG: 25 TABLET, FILM COATED ORAL at 21:58

## 2020-06-20 RX ADMIN — METOPROLOL TARTRATE 50 MG: 50 TABLET, FILM COATED ORAL at 21:58

## 2020-06-20 RX ADMIN — INSULIN GLARGINE 10 UNITS: 100 INJECTION, SOLUTION SUBCUTANEOUS at 21:56

## 2020-06-20 RX ADMIN — INSULIN LISPRO 1 UNITS: 100 INJECTION, SOLUTION INTRAVENOUS; SUBCUTANEOUS at 21:55

## 2020-06-20 RX ADMIN — DILTIAZEM HYDROCHLORIDE 240 MG: 240 CAPSULE, COATED, EXTENDED RELEASE ORAL at 08:12

## 2020-06-20 RX ADMIN — INSULIN LISPRO 5 UNITS: 100 INJECTION, SOLUTION INTRAVENOUS; SUBCUTANEOUS at 08:16

## 2020-06-21 LAB
GLUCOSE SERPL-MCNC: 156 MG/DL (ref 65–140)
GLUCOSE SERPL-MCNC: 242 MG/DL (ref 65–140)
GLUCOSE SERPL-MCNC: 258 MG/DL (ref 65–140)
GLUCOSE SERPL-MCNC: 261 MG/DL (ref 65–140)

## 2020-06-21 PROCEDURE — 99232 SBSQ HOSP IP/OBS MODERATE 35: CPT | Performed by: INTERNAL MEDICINE

## 2020-06-21 PROCEDURE — 82948 REAGENT STRIP/BLOOD GLUCOSE: CPT

## 2020-06-21 PROCEDURE — 97163 PT EVAL HIGH COMPLEX 45 MIN: CPT

## 2020-06-21 RX ADMIN — INSULIN LISPRO 5 UNITS: 100 INJECTION, SOLUTION INTRAVENOUS; SUBCUTANEOUS at 11:23

## 2020-06-21 RX ADMIN — INSULIN LISPRO 2 UNITS: 100 INJECTION, SOLUTION INTRAVENOUS; SUBCUTANEOUS at 16:52

## 2020-06-21 RX ADMIN — ACETAMINOPHEN 975 MG: 325 TABLET ORAL at 05:59

## 2020-06-21 RX ADMIN — HYDRALAZINE HYDROCHLORIDE 25 MG: 25 TABLET, FILM COATED ORAL at 21:50

## 2020-06-21 RX ADMIN — PANTOPRAZOLE SODIUM 40 MG: 40 TABLET, DELAYED RELEASE ORAL at 05:59

## 2020-06-21 RX ADMIN — HYDRALAZINE HYDROCHLORIDE 25 MG: 25 TABLET, FILM COATED ORAL at 06:06

## 2020-06-21 RX ADMIN — INSULIN LISPRO 2 UNITS: 100 INJECTION, SOLUTION INTRAVENOUS; SUBCUTANEOUS at 11:24

## 2020-06-21 RX ADMIN — ASPIRIN 81 MG 81 MG: 81 TABLET ORAL at 08:30

## 2020-06-21 RX ADMIN — ATORVASTATIN CALCIUM 20 MG: 20 TABLET, FILM COATED ORAL at 08:30

## 2020-06-21 RX ADMIN — INSULIN GLARGINE 10 UNITS: 100 INJECTION, SOLUTION SUBCUTANEOUS at 21:49

## 2020-06-21 RX ADMIN — INSULIN LISPRO 2 UNITS: 100 INJECTION, SOLUTION INTRAVENOUS; SUBCUTANEOUS at 21:50

## 2020-06-21 RX ADMIN — DABIGATRAN ETEXILATE MESYLATE 150 MG: 150 CAPSULE ORAL at 17:40

## 2020-06-21 RX ADMIN — METOPROLOL TARTRATE 50 MG: 50 TABLET, FILM COATED ORAL at 08:30

## 2020-06-21 RX ADMIN — METHOCARBAMOL TABLETS 500 MG: 500 TABLET, COATED ORAL at 21:49

## 2020-06-21 RX ADMIN — DABIGATRAN ETEXILATE MESYLATE 150 MG: 150 CAPSULE ORAL at 08:30

## 2020-06-21 RX ADMIN — FUROSEMIDE 40 MG: 40 TABLET ORAL at 08:30

## 2020-06-21 RX ADMIN — ACETAMINOPHEN 975 MG: 325 TABLET ORAL at 13:47

## 2020-06-21 RX ADMIN — ACETAMINOPHEN 975 MG: 325 TABLET ORAL at 21:49

## 2020-06-21 RX ADMIN — HYDRALAZINE HYDROCHLORIDE 25 MG: 25 TABLET, FILM COATED ORAL at 13:47

## 2020-06-21 RX ADMIN — INSULIN LISPRO 1 UNITS: 100 INJECTION, SOLUTION INTRAVENOUS; SUBCUTANEOUS at 08:29

## 2020-06-21 RX ADMIN — FUROSEMIDE 40 MG: 40 TABLET ORAL at 17:40

## 2020-06-21 RX ADMIN — METHOCARBAMOL TABLETS 500 MG: 500 TABLET, COATED ORAL at 06:06

## 2020-06-21 RX ADMIN — METHOCARBAMOL TABLETS 500 MG: 500 TABLET, COATED ORAL at 13:48

## 2020-06-21 RX ADMIN — DILTIAZEM HYDROCHLORIDE 240 MG: 240 CAPSULE, COATED, EXTENDED RELEASE ORAL at 08:30

## 2020-06-21 RX ADMIN — INSULIN LISPRO 5 UNITS: 100 INJECTION, SOLUTION INTRAVENOUS; SUBCUTANEOUS at 08:32

## 2020-06-21 RX ADMIN — METOPROLOL TARTRATE 50 MG: 50 TABLET, FILM COATED ORAL at 21:50

## 2020-06-22 LAB
GLUCOSE SERPL-MCNC: 241 MG/DL (ref 65–140)
GLUCOSE SERPL-MCNC: 274 MG/DL (ref 65–140)
GLUCOSE SERPL-MCNC: 297 MG/DL (ref 65–140)
GLUCOSE SERPL-MCNC: 334 MG/DL (ref 65–140)

## 2020-06-22 PROCEDURE — 97167 OT EVAL HIGH COMPLEX 60 MIN: CPT

## 2020-06-22 PROCEDURE — 82948 REAGENT STRIP/BLOOD GLUCOSE: CPT

## 2020-06-22 PROCEDURE — 99239 HOSP IP/OBS DSCHRG MGMT >30: CPT | Performed by: PHYSICIAN ASSISTANT

## 2020-06-22 RX ORDER — OXYCODONE HYDROCHLORIDE 5 MG/1
5 CAPSULE ORAL EVERY 4 HOURS PRN
Qty: 5 CAPSULE | Refills: 0 | Status: CANCELLED | OUTPATIENT
Start: 2020-06-22 | End: 2020-07-02

## 2020-06-22 RX ADMIN — DABIGATRAN ETEXILATE MESYLATE 150 MG: 150 CAPSULE ORAL at 08:40

## 2020-06-22 RX ADMIN — INSULIN LISPRO 2 UNITS: 100 INJECTION, SOLUTION INTRAVENOUS; SUBCUTANEOUS at 21:22

## 2020-06-22 RX ADMIN — ACETAMINOPHEN 975 MG: 325 TABLET ORAL at 05:14

## 2020-06-22 RX ADMIN — METHOCARBAMOL TABLETS 500 MG: 500 TABLET, COATED ORAL at 21:23

## 2020-06-22 RX ADMIN — HYDRALAZINE HYDROCHLORIDE 25 MG: 25 TABLET, FILM COATED ORAL at 17:31

## 2020-06-22 RX ADMIN — FUROSEMIDE 40 MG: 40 TABLET ORAL at 17:31

## 2020-06-22 RX ADMIN — INSULIN LISPRO 2 UNITS: 100 INJECTION, SOLUTION INTRAVENOUS; SUBCUTANEOUS at 08:40

## 2020-06-22 RX ADMIN — ATORVASTATIN CALCIUM 20 MG: 20 TABLET, FILM COATED ORAL at 08:39

## 2020-06-22 RX ADMIN — INSULIN LISPRO 2 UNITS: 100 INJECTION, SOLUTION INTRAVENOUS; SUBCUTANEOUS at 17:32

## 2020-06-22 RX ADMIN — METHOCARBAMOL TABLETS 500 MG: 500 TABLET, COATED ORAL at 05:14

## 2020-06-22 RX ADMIN — HYDRALAZINE HYDROCHLORIDE 25 MG: 25 TABLET, FILM COATED ORAL at 21:23

## 2020-06-22 RX ADMIN — PANTOPRAZOLE SODIUM 40 MG: 40 TABLET, DELAYED RELEASE ORAL at 05:14

## 2020-06-22 RX ADMIN — METOPROLOL TARTRATE 50 MG: 50 TABLET, FILM COATED ORAL at 21:23

## 2020-06-22 RX ADMIN — ACETAMINOPHEN 975 MG: 325 TABLET ORAL at 17:31

## 2020-06-22 RX ADMIN — INSULIN LISPRO 5 UNITS: 100 INJECTION, SOLUTION INTRAVENOUS; SUBCUTANEOUS at 08:40

## 2020-06-22 RX ADMIN — DILTIAZEM HYDROCHLORIDE 240 MG: 240 CAPSULE, COATED, EXTENDED RELEASE ORAL at 08:39

## 2020-06-22 RX ADMIN — INSULIN GLARGINE 10 UNITS: 100 INJECTION, SOLUTION SUBCUTANEOUS at 21:22

## 2020-06-22 RX ADMIN — METOPROLOL TARTRATE 50 MG: 50 TABLET, FILM COATED ORAL at 08:39

## 2020-06-22 RX ADMIN — ASPIRIN 81 MG 81 MG: 81 TABLET ORAL at 08:39

## 2020-06-22 RX ADMIN — FUROSEMIDE 40 MG: 40 TABLET ORAL at 08:39

## 2020-06-22 RX ADMIN — METHOCARBAMOL TABLETS 500 MG: 500 TABLET, COATED ORAL at 17:31

## 2020-06-22 RX ADMIN — ACETAMINOPHEN 975 MG: 325 TABLET ORAL at 21:23

## 2020-06-22 RX ADMIN — INSULIN LISPRO 5 UNITS: 100 INJECTION, SOLUTION INTRAVENOUS; SUBCUTANEOUS at 11:48

## 2020-06-22 RX ADMIN — INSULIN LISPRO 3 UNITS: 100 INJECTION, SOLUTION INTRAVENOUS; SUBCUTANEOUS at 11:48

## 2020-06-22 RX ADMIN — HYDRALAZINE HYDROCHLORIDE 25 MG: 25 TABLET, FILM COATED ORAL at 05:14

## 2020-06-22 RX ADMIN — DABIGATRAN ETEXILATE MESYLATE 150 MG: 150 CAPSULE ORAL at 17:32

## 2020-06-23 ENCOUNTER — TELEPHONE (OUTPATIENT)
Dept: OTHER | Facility: OTHER | Age: 85
End: 2020-06-23

## 2020-06-23 LAB
GLUCOSE SERPL-MCNC: 220 MG/DL (ref 65–140)
GLUCOSE SERPL-MCNC: 249 MG/DL (ref 65–140)
GLUCOSE SERPL-MCNC: 251 MG/DL (ref 65–140)
GLUCOSE SERPL-MCNC: 457 MG/DL (ref 65–140)
SARS-COV-2 RNA RESP QL NAA+PROBE: NEGATIVE

## 2020-06-23 PROCEDURE — 99232 SBSQ HOSP IP/OBS MODERATE 35: CPT | Performed by: PHYSICIAN ASSISTANT

## 2020-06-23 PROCEDURE — 97110 THERAPEUTIC EXERCISES: CPT

## 2020-06-23 PROCEDURE — 82948 REAGENT STRIP/BLOOD GLUCOSE: CPT

## 2020-06-23 PROCEDURE — 97535 SELF CARE MNGMENT TRAINING: CPT

## 2020-06-23 PROCEDURE — U0003 INFECTIOUS AGENT DETECTION BY NUCLEIC ACID (DNA OR RNA); SEVERE ACUTE RESPIRATORY SYNDROME CORONAVIRUS 2 (SARS-COV-2) (CORONAVIRUS DISEASE [COVID-19]), AMPLIFIED PROBE TECHNIQUE, MAKING USE OF HIGH THROUGHPUT TECHNOLOGIES AS DESCRIBED BY CMS-2020-01-R: HCPCS | Performed by: PHYSICIAN ASSISTANT

## 2020-06-23 PROCEDURE — 97116 GAIT TRAINING THERAPY: CPT

## 2020-06-23 RX ORDER — HYDRALAZINE HYDROCHLORIDE 25 MG/1
25 TABLET, FILM COATED ORAL EVERY 8 HOURS SCHEDULED
Qty: 90 TABLET | Refills: 0
Start: 2020-06-23 | End: 2020-10-19 | Stop reason: DRUGHIGH

## 2020-06-23 RX ADMIN — ATORVASTATIN CALCIUM 20 MG: 20 TABLET, FILM COATED ORAL at 09:52

## 2020-06-23 RX ADMIN — HYDRALAZINE HYDROCHLORIDE 25 MG: 25 TABLET, FILM COATED ORAL at 06:01

## 2020-06-23 RX ADMIN — DILTIAZEM HYDROCHLORIDE 240 MG: 240 CAPSULE, COATED, EXTENDED RELEASE ORAL at 09:52

## 2020-06-23 RX ADMIN — PANTOPRAZOLE SODIUM 40 MG: 40 TABLET, DELAYED RELEASE ORAL at 06:01

## 2020-06-23 RX ADMIN — METHOCARBAMOL TABLETS 500 MG: 500 TABLET, COATED ORAL at 14:07

## 2020-06-23 RX ADMIN — HYDRALAZINE HYDROCHLORIDE 25 MG: 25 TABLET, FILM COATED ORAL at 14:07

## 2020-06-23 RX ADMIN — INSULIN LISPRO 2 UNITS: 100 INJECTION, SOLUTION INTRAVENOUS; SUBCUTANEOUS at 09:53

## 2020-06-23 RX ADMIN — INSULIN LISPRO 5 UNITS: 100 INJECTION, SOLUTION INTRAVENOUS; SUBCUTANEOUS at 09:53

## 2020-06-23 RX ADMIN — ACETAMINOPHEN 975 MG: 325 TABLET ORAL at 06:01

## 2020-06-23 RX ADMIN — FUROSEMIDE 40 MG: 40 TABLET ORAL at 17:26

## 2020-06-23 RX ADMIN — DABIGATRAN ETEXILATE MESYLATE 150 MG: 150 CAPSULE ORAL at 17:26

## 2020-06-23 RX ADMIN — METHOCARBAMOL TABLETS 500 MG: 500 TABLET, COATED ORAL at 06:01

## 2020-06-23 RX ADMIN — METOPROLOL TARTRATE 50 MG: 50 TABLET, FILM COATED ORAL at 09:52

## 2020-06-23 RX ADMIN — INSULIN LISPRO 5 UNITS: 100 INJECTION, SOLUTION INTRAVENOUS; SUBCUTANEOUS at 11:18

## 2020-06-23 RX ADMIN — DABIGATRAN ETEXILATE MESYLATE 150 MG: 150 CAPSULE ORAL at 09:52

## 2020-06-23 RX ADMIN — INSULIN LISPRO 2 UNITS: 100 INJECTION, SOLUTION INTRAVENOUS; SUBCUTANEOUS at 17:27

## 2020-06-23 RX ADMIN — ACETAMINOPHEN 975 MG: 325 TABLET ORAL at 14:07

## 2020-06-23 RX ADMIN — ASPIRIN 81 MG 81 MG: 81 TABLET ORAL at 09:52

## 2020-06-23 RX ADMIN — FUROSEMIDE 40 MG: 40 TABLET ORAL at 09:52

## 2020-06-24 VITALS
SYSTOLIC BLOOD PRESSURE: 146 MMHG | OXYGEN SATURATION: 97 % | HEART RATE: 73 BPM | BODY MASS INDEX: 28.23 KG/M2 | DIASTOLIC BLOOD PRESSURE: 93 MMHG | WEIGHT: 186.29 LBS | HEIGHT: 68 IN | RESPIRATION RATE: 16 BRPM | TEMPERATURE: 100.1 F

## 2020-06-25 ENCOUNTER — NURSING HOME VISIT (OUTPATIENT)
Dept: GERIATRICS | Facility: OTHER | Age: 85
End: 2020-06-25
Payer: COMMERCIAL

## 2020-06-25 DIAGNOSIS — I50.32 CHRONIC DIASTOLIC CHF (CONGESTIVE HEART FAILURE) (HCC): ICD-10-CM

## 2020-06-25 DIAGNOSIS — R26.2 AMBULATORY DYSFUNCTION: ICD-10-CM

## 2020-06-25 DIAGNOSIS — I10 UNCONTROLLED HYPERTENSION: ICD-10-CM

## 2020-06-25 DIAGNOSIS — R13.10 DYSPHAGIA, UNSPECIFIED TYPE: ICD-10-CM

## 2020-06-25 DIAGNOSIS — R94.31 ABNORMAL EKG: ICD-10-CM

## 2020-06-25 DIAGNOSIS — S12.111G CLOSED ODONTOID FRACTURE WITH TYPE II MORPHOLOGY, POSTERIOR DISPLACEMENT, AND DELAYED HEALING, SUBSEQUENT ENCOUNTER: ICD-10-CM

## 2020-06-25 DIAGNOSIS — E78.2 MIXED HYPERLIPIDEMIA: ICD-10-CM

## 2020-06-25 DIAGNOSIS — E11.65 TYPE 2 DIABETES MELLITUS WITH HYPERGLYCEMIA, UNSPECIFIED WHETHER LONG TERM INSULIN USE (HCC): ICD-10-CM

## 2020-06-25 DIAGNOSIS — I48.91 ATRIAL FIBRILLATION, UNSPECIFIED TYPE (HCC): Primary | ICD-10-CM

## 2020-06-25 PROBLEM — I50.9 CHF (CONGESTIVE HEART FAILURE) (HCC): Status: RESOLVED | Noted: 2020-01-13 | Resolved: 2020-06-25

## 2020-06-25 PROCEDURE — 99306 1ST NF CARE HIGH MDM 50: CPT | Performed by: FAMILY MEDICINE

## 2020-06-29 ENCOUNTER — NURSING HOME VISIT (OUTPATIENT)
Dept: GERIATRICS | Facility: OTHER | Age: 85
End: 2020-06-29
Payer: COMMERCIAL

## 2020-06-29 VITALS
HEART RATE: 78 BPM | BODY MASS INDEX: 28.13 KG/M2 | RESPIRATION RATE: 18 BRPM | DIASTOLIC BLOOD PRESSURE: 76 MMHG | TEMPERATURE: 98.2 F | WEIGHT: 185 LBS | SYSTOLIC BLOOD PRESSURE: 138 MMHG

## 2020-06-29 DIAGNOSIS — E11.65 TYPE 2 DIABETES MELLITUS WITH HYPERGLYCEMIA, UNSPECIFIED WHETHER LONG TERM INSULIN USE (HCC): ICD-10-CM

## 2020-06-29 DIAGNOSIS — R26.2 AMBULATORY DYSFUNCTION: Primary | ICD-10-CM

## 2020-06-29 DIAGNOSIS — I48.91 ATRIAL FIBRILLATION, UNSPECIFIED TYPE (HCC): ICD-10-CM

## 2020-06-29 DIAGNOSIS — I50.32 CHRONIC DIASTOLIC CHF (CONGESTIVE HEART FAILURE) (HCC): ICD-10-CM

## 2020-06-29 PROCEDURE — 99309 SBSQ NF CARE MODERATE MDM 30: CPT | Performed by: NURSE PRACTITIONER

## 2020-06-30 ENCOUNTER — NURSING HOME VISIT (OUTPATIENT)
Dept: GERIATRICS | Facility: OTHER | Age: 85
End: 2020-06-30
Payer: COMMERCIAL

## 2020-06-30 DIAGNOSIS — I50.32 CHRONIC DIASTOLIC CHF (CONGESTIVE HEART FAILURE) (HCC): ICD-10-CM

## 2020-06-30 DIAGNOSIS — R26.2 AMBULATORY DYSFUNCTION: Primary | ICD-10-CM

## 2020-06-30 PROCEDURE — 99308 SBSQ NF CARE LOW MDM 20: CPT | Performed by: NURSE PRACTITIONER

## 2020-07-01 VITALS
RESPIRATION RATE: 1 BRPM | DIASTOLIC BLOOD PRESSURE: 60 MMHG | SYSTOLIC BLOOD PRESSURE: 142 MMHG | BODY MASS INDEX: 28.28 KG/M2 | WEIGHT: 186 LBS | TEMPERATURE: 97.1 F | HEART RATE: 86 BPM

## 2020-07-01 NOTE — PROGRESS NOTES
[place] [address] [address]  [phone]  [address] Acute Note        NAME: Heidi Hood  AGE: 82 y o  SEX: male    DATE OF ENCOUNTER: 6/30/20    Assessment and Plan     Problem List Items Addressed This Visit        Cardiovascular and Mediastinum    Chronic diastolic CHF (congestive heart failure) (HCC)     · Increase in weight  · Increase lasix to 60mg x 3 days  · Continue to monitor weights  · Lungs clear  · Monitor diet with Na  · monitor for any edema                Other    Ambulatory dysfunction - Primary     · Doing well at LTCF  · Continue with restorative care  · Assist with ADL and IADL  · Fall precautions  · Ambulates with wheelchair independantly, wheeled walker with supervision               No orders of the defined types were placed in this encounter       - Counseling Documentation: patient was counseled regarding: diagnostic results, instructions for management, risk factor reductions, prognosis, patient and family education, impressions, risks and benefits of treatment options and importance of compliance with treatment    History of Present Illness     Heidi Hood 82 y o  male seen for follow-up of care and treatment plan for ambulatory dysfunction doing well at LTCF, CHF increase in weights     The following portions of the patient's history were reviewed and updated as appropriate: allergies, current medications, past family history, past medical history, past social history, past surgical history and problem list     Review of Systems     Review of Systems   Constitutional: Negative for activity change, appetite change and fatigue  HENT: Negative  Eyes: Negative  Respiratory: Negative for cough and shortness of breath  Cardiovascular: Negative for chest pain and leg swelling  Gastrointestinal: Negative for abdominal distention, abdominal pain, constipation and diarrhea  Genitourinary: Negative for difficulty urinating     Musculoskeletal: Positive for gait problem  Skin: Negative for rash  Neurological: Negative for headaches  Psychiatric/Behavioral: The patient is not nervous/anxious  Active Problem List     Patient Active Problem List   Diagnosis    Fall    Type II dens fracture of second cervical vertebra (HCC)    C1 cervical fracture (HCC)    Atrial fibrillation (HCC)    Uncontrolled hypertension    HLD (hyperlipidemia)    Type 2 diabetes mellitus (HCC)    Multiple falls    Vertebral artery dissection (HCC)    Dysphagia    Ambulatory dysfunction    Abnormal EKG    Chronic diastolic CHF (congestive heart failure) (HCC)       Objective     /60   Pulse 86   Temp (!) 97 1 °F (36 2 °C)   Resp (!) 1   Wt 84 4 kg (186 lb)   BMI 28 28 kg/m²     Physical Exam   Constitutional: Vital signs are normal  He appears well-developed and well-nourished  HENT:   Head: Normocephalic and atraumatic  Eyes: Conjunctivae are normal    Neck: Normal range of motion  Cardiovascular: Normal rate, regular rhythm, S1 normal, S2 normal and normal heart sounds  No murmur heard  Pulmonary/Chest: Effort normal and breath sounds normal  No respiratory distress  He has no wheezes  Abdominal: Soft  Bowel sounds are normal  He exhibits no distension  There is no tenderness  Musculoskeletal: Normal range of motion  Neurological: He is alert  Skin: Skin is warm, dry and intact  Psychiatric: He has a normal mood and affect  His speech is normal and behavior is normal  Thought content normal  Cognition and memory are normal    Vitals reviewed  Pertinent Laboratory/Diagnostic Studies:  HFM Labs reviewed    Current Medications   Medication list reviewed and updated today  Please see paper chart for updated medication list      Dinorah Eagle  20202:18 PM      Name: Pamela Sandoval  : 1935  MRN: [id number]  DOS: 20    Diagnoses:   Diagnosis ICD-10-CM Associated Orders   1  Ambulatory dysfunction R26 2    2   Chronic diastolic CHF (congestive heart failure) ([facility]) I50 32

## 2020-07-01 NOTE — ASSESSMENT & PLAN NOTE
· Doing well at LTCF  · Continue with restorative care  · Assist with ADL and IADL  · Fall precautions  · Ambulates with wheelchair independantly, wheeled walker with supervision

## 2020-07-01 NOTE — ASSESSMENT & PLAN NOTE
· Increase in weight  · Increase lasix to 60mg x 3 days  · Continue to monitor weights  · Lungs clear  · Monitor diet with Na  · monitor for any edema

## 2020-08-05 ENCOUNTER — NURSING HOME VISIT (OUTPATIENT)
Dept: GERIATRICS | Facility: OTHER | Age: 85
End: 2020-08-05
Payer: COMMERCIAL

## 2020-08-05 DIAGNOSIS — I10 UNCONTROLLED HYPERTENSION: ICD-10-CM

## 2020-08-05 DIAGNOSIS — I48.91 ATRIAL FIBRILLATION, UNSPECIFIED TYPE (HCC): ICD-10-CM

## 2020-08-05 DIAGNOSIS — R13.10 DYSPHAGIA, UNSPECIFIED TYPE: ICD-10-CM

## 2020-08-05 DIAGNOSIS — E11.65 TYPE 2 DIABETES MELLITUS WITH HYPERGLYCEMIA, UNSPECIFIED WHETHER LONG TERM INSULIN USE (HCC): ICD-10-CM

## 2020-08-05 DIAGNOSIS — I50.32 CHRONIC DIASTOLIC CHF (CONGESTIVE HEART FAILURE) (HCC): ICD-10-CM

## 2020-08-05 DIAGNOSIS — R26.2 AMBULATORY DYSFUNCTION: Primary | ICD-10-CM

## 2020-08-05 DIAGNOSIS — S12.111G CLOSED ODONTOID FRACTURE WITH TYPE II MORPHOLOGY, POSTERIOR DISPLACEMENT, AND DELAYED HEALING, SUBSEQUENT ENCOUNTER: ICD-10-CM

## 2020-08-05 PROCEDURE — 99309 SBSQ NF CARE MODERATE MDM 30: CPT | Performed by: NURSE PRACTITIONER

## 2020-08-06 VITALS
SYSTOLIC BLOOD PRESSURE: 130 MMHG | DIASTOLIC BLOOD PRESSURE: 74 MMHG | WEIGHT: 187.8 LBS | BODY MASS INDEX: 28.55 KG/M2 | TEMPERATURE: 97.1 F | RESPIRATION RATE: 20 BRPM

## 2020-08-06 NOTE — ASSESSMENT & PLAN NOTE
· Weights remain stable  · Continue with furosemide 40mg twice daily  · Monitor weights  · Monitor for any increased edema

## 2020-08-06 NOTE — ASSESSMENT & PLAN NOTE
· Doing well at LTCF  · Continue with restorative care  · Assist with ADL and IADL  · Fall precautions  · ambulates with wheelchair independently

## 2020-08-06 NOTE — PROGRESS NOTES
[address]  [address] [place]  [phone]  [facility]  LTCF Progress Note        NAME: Misbah Venegas  AGE: 82 y o   SEX: male    DATE OF ENCOUNTER: 8/5/20    Assessment and Plan     Problem List Items Addressed This Visit        Digestive    Dysphagia     · Doing well  · Currently with regular diet with thin liquids  · Continue with aspirate precautions            Endocrine    Type 2 diabetes mellitus (HCC)     · BS continue to be elevated  · Will increase humalog to 8 units at breakfast and lunch  · Monitor BS daily            Cardiovascular and Mediastinum    Atrial fibrillation (HCC)     · Rate controlled  · Continue with pradaxa for anticoagulation         Uncontrolled hypertension     · Doing well with lowered hydrazaline dose (10mg TID)  · Continue with diltiazem, furosemide, hydralazine, metoprolol  · Continue to monitor BP  · Monitor renal function, labs at baseline as of 7/6/20         Chronic diastolic CHF (congestive heart failure) (HCC)     · Weights remain stable  · Continue with furosemide 40mg twice daily  · Monitor weights  · Monitor for any increased edema                Musculoskeletal and Integument    Type II dens fracture of second cervical vertebra (HCC)     · Non healing C2 fx  · Continues with cervical collar at all times  · Recommendation for posterior cervical lateral fixation  · Pt has surgery date scheduled but still is a bit reluctant  · Continue with supportive care for cervical collar  · Awaiting surgery            Other    Ambulatory dysfunction - Primary     · Doing well at LTCF  · Continue with restorative care  · Assist with ADL and IADL  · Fall precautions  · ambulates with wheelchair independently               No orders of the defined types were placed in this encounter       - Counseling Documentation: patient was counseled regarding: diagnostic results, instructions for management, risk factor reductions, prognosis, patient and family education, impressions, risks and benefits of treatment options and importance of compliance with treatment    History of Present Illness     Swapnil Latham 82 y o  male seen for follow-up of care and treatment plan for ambulatory dysfunction doing well at LTCF, afib rate controlled, CHF asymptomatic, dysphagia doing well with regular diet, C1/C2 fx stable with Ccollar, HTN doing well with current BP meds, DM2 controlled with increased insulin     The following portions of the patient's history were reviewed and updated as appropriate: allergies, current medications, past family history, past medical history, past social history, past surgical history and problem list     Review of Systems     Review of Systems   Constitutional: Negative for activity change, appetite change and fatigue  HENT: Negative  Eyes: Negative  Respiratory: Negative for cough, shortness of breath and wheezing  Cardiovascular: Negative for chest pain and leg swelling  Gastrointestinal: Negative for abdominal distention, abdominal pain, constipation and diarrhea  Genitourinary: Negative for difficulty urinating  Musculoskeletal: Positive for gait problem and neck stiffness (cervical collar)  Skin: Negative for rash  Neurological: Negative for headaches  Psychiatric/Behavioral: Positive for confusion (at times)  The patient is not nervous/anxious          Active Problem List     Patient Active Problem List   Diagnosis    Fall    Type II dens fracture of second cervical vertebra (HCC)    C1 cervical fracture (HCC)    Atrial fibrillation (HCC)    Uncontrolled hypertension    HLD (hyperlipidemia)    Type 2 diabetes mellitus (HCC)    Multiple falls    Vertebral artery dissection (HCC)    Dysphagia    Ambulatory dysfunction    Abnormal EKG    Chronic diastolic CHF (congestive heart failure) (HCC)       Objective     /74   Temp (!) 97 1 °F (36 2 °C)   Resp 20   Wt 85 2 kg (187 lb 12 8 oz)   BMI 28 55 kg/m² Physical Exam   Constitutional: He appears well-developed  HENT:   Head: Normocephalic and atraumatic  Eyes: Conjunctivae are normal    Neck: Normal range of motion  Cardiovascular: Normal rate, regular rhythm, S1 normal, S2 normal and normal heart sounds  No murmur heard  Pulmonary/Chest: Effort normal and breath sounds normal  No respiratory distress  He has no wheezes  Abdominal: Soft  Bowel sounds are normal  He exhibits no distension  There is no abdominal tenderness  Musculoskeletal:      Cervical back: He exhibits decreased range of motion  Comments: Generalized weakness   Neurological: He is alert  Skin: Skin is warm and dry  Psychiatric: His speech is normal and behavior is normal  Thought content normal  Cognition and memory are impaired  Vitals reviewed  Pertinent Laboratory/Diagnostic Studies:  HFM labs reviewed    Current Medications   Medication list reviewed and updated today  Please see paper chart for updated medication list      Argueta Ce  7384:23 AM      Name: Misbah Venegas  : 1935  MRN: [id number]  DOS: 20    Diagnoses:   Diagnosis ICD-10-CM Associated Orders   1  Ambulatory dysfunction  R26 2    2  Atrial fibrillation, unspecified type ([facility] )  I48 91    3  Chronic diastolic CHF (congestive heart failure) ([facility])  I50 32    4  Dysphagia, unspecified type  R13 10    5  Closed odontoid fracture with type II morphology, posterior displacement, and delayed healing, subsequent encounter  S12 111G    6  Uncontrolled hypertension  I10    7   Type 2 diabetes mellitus with hyperglycemia, unspecified whether long term insulin use ([facility])  E11 65

## 2020-08-06 NOTE — ASSESSMENT & PLAN NOTE
· BS continue to be elevated  · Will increase humalog to 8 units at breakfast and lunch  · Monitor BS daily

## 2020-08-06 NOTE — ASSESSMENT & PLAN NOTE
· Non healing C2 fx  · Continues with cervical collar at all times  · Recommendation for posterior cervical lateral fixation  · Pt has surgery date scheduled but still is a bit reluctant  · Continue with supportive care for cervical collar  · Awaiting surgery

## 2020-08-06 NOTE — ASSESSMENT & PLAN NOTE
· Doing well with lowered hydrazaline dose (10mg TID)  · Continue with diltiazem, furosemide, hydralazine, metoprolol  · Continue to monitor BP  · Monitor renal function, labs at baseline as of 7/6/20

## 2020-08-19 ENCOUNTER — NURSING HOME VISIT (OUTPATIENT)
Dept: GERIATRICS | Facility: OTHER | Age: 85
End: 2020-08-19
Payer: COMMERCIAL

## 2020-08-19 VITALS
WEIGHT: 191.6 LBS | DIASTOLIC BLOOD PRESSURE: 62 MMHG | BODY MASS INDEX: 29.13 KG/M2 | RESPIRATION RATE: 20 BRPM | HEART RATE: 65 BPM | SYSTOLIC BLOOD PRESSURE: 120 MMHG | TEMPERATURE: 97.1 F

## 2020-08-19 DIAGNOSIS — R26.2 AMBULATORY DYSFUNCTION: Primary | ICD-10-CM

## 2020-08-19 DIAGNOSIS — I50.32 CHRONIC DIASTOLIC CHF (CONGESTIVE HEART FAILURE) (HCC): ICD-10-CM

## 2020-08-19 PROCEDURE — 99308 SBSQ NF CARE LOW MDM 20: CPT | Performed by: NURSE PRACTITIONER

## 2020-08-19 NOTE — PROGRESS NOTES
[facility]  [address]  [phone]  [address] Acute Note        NAME: Tanner Rivera  AGE: 82 y o  SEX: male    DATE OF ENCOUNTER: 8/19/2020    Assessment and Plan     Problem List Items Addressed This Visit        Cardiovascular and Mediastinum    Chronic diastolic CHF (congestive heart failure) ([facility] )     · Weights are increasing  · 7/19/20 187 4lb; 8/19/20 191 6lb  · Pt not always compliant with low Na or DM diet  · Denies shortness of breath  · Trace bilateral lower extremity edema  · No wheezing  · Increase furosemide to 60mg BID  · BMP in 2 weeks to monitor renal function  · Monitor weights daily                Other    Ambulatory dysfunction - Primary     · Doing well at long-term care facility  · Continue with restorative care  · Ambulates in wheelchair independently  · Fall precautions               No orders of the defined types were placed in this encounter       - Counseling Documentation: patient was counseled regarding: diagnostic results, instructions for management, risk factor reductions, prognosis, patient and family education, impressions, risks and benefits of treatment options and importance of compliance with treatment    History of Present Illness     Tanner Rivera 82 y o  male seen for follow-up of care and treatment plan for ambulatory dysfunction doing well at LTCF, weight gain controlled with furosemide    The following portions of the patient's history were reviewed and updated as appropriate: allergies, current medications, past family history, past medical history, past social history, past surgical history and problem list     Review of Systems     Review of Systems   Constitutional: Positive for unexpected weight change (gain)  Negative for activity change, appetite change and fatigue  HENT: Negative  Eyes: Negative  Respiratory: Negative for cough and shortness of breath  Cardiovascular: Positive for leg swelling   Negative for chest pain  Gastrointestinal: Negative for abdominal distention, abdominal pain, constipation and diarrhea  Genitourinary: Negative for difficulty urinating  Musculoskeletal: Positive for gait problem  Skin: Negative for rash  Neurological: Positive for weakness  Negative for headaches  Psychiatric/Behavioral: Positive for confusion (at times)  The patient is not nervous/anxious  Active Problem List     Patient Active Problem List   Diagnosis    Fall    Type II dens fracture of second cervical vertebra (HCC)    C1 cervical fracture (HCC)    Atrial fibrillation (HCC)    Uncontrolled hypertension    HLD (hyperlipidemia)    Type 2 diabetes mellitus (HCC)    Multiple falls    Vertebral artery dissection (HCC)    Dysphagia    Ambulatory dysfunction    Abnormal EKG    Chronic diastolic CHF (congestive heart failure) ([facility])       Objective     /62   Pulse 65   Temp (!) 97 1 °F (36 2 °C)   Resp 20   Wt 86 9 kg (191 lb 9 6 oz)   BMI 29 13 kg/m²     Physical Exam  Vitals signs reviewed  Constitutional:       Appearance: He is well-developed  HENT:      Head: Normocephalic and atraumatic  Eyes:      Conjunctiva/sclera: Conjunctivae normal    Neck:      Musculoskeletal: Normal range of motion  Cardiovascular:      Rate and Rhythm: Normal rate and regular rhythm  Heart sounds: Normal heart sounds, S1 normal and S2 normal  No murmur  Pulmonary:      Effort: Pulmonary effort is normal  No respiratory distress  Breath sounds: Normal breath sounds  No wheezing  Abdominal:      General: Bowel sounds are normal  There is no distension  Palpations: Abdomen is soft  Tenderness: There is no abdominal tenderness  Musculoskeletal: Normal range of motion  Right lower leg: Edema present  Left lower leg: Edema present  Skin:     General: Skin is warm and dry  Neurological:      Mental Status: He is alert     Psychiatric:         Attention and Perception: Attention normal          Mood and Affect: Mood normal          Speech: Speech normal          Behavior: Behavior normal          Thought Content: Thought content normal          Cognition and Memory: Cognition is impaired  Pertinent Laboratory/Diagnostic Studies:  HFM labs reviewed    Current Medications   Medication list reviewed and updated today  Please see paper chart for updated medication list      Abby Morales  20203:29 PM      Name: Kathi Funes  : 1935  MRN: [id number]  DOS: 2020    Diagnoses:   Diagnosis ICD-10-CM Associated Orders   1  Ambulatory dysfunction  R26 2    2   Chronic diastolic CHF (congestive heart failure) (HCC)  I50 32

## 2020-08-19 NOTE — ASSESSMENT & PLAN NOTE
· Weights are increasing  · 7/19/20 187 4lb; 8/19/20 191 6lb  · Pt not always compliant with low Na or DM diet  · Denies shortness of breath  · Trace bilateral lower extremity edema  · No wheezing  · Increase furosemide to 60mg BID  · BMP in 2 weeks to monitor renal function  · Monitor weights daily

## 2020-08-19 NOTE — ASSESSMENT & PLAN NOTE
· Doing well at long-term care facility  · Continue with restorative care  · Ambulates in wheelchair independently  · Fall precautions

## 2020-09-09 ENCOUNTER — NURSING HOME VISIT (OUTPATIENT)
Dept: GERIATRICS | Facility: OTHER | Age: 85
End: 2020-09-09
Payer: COMMERCIAL

## 2020-09-09 DIAGNOSIS — R63.5 WEIGHT GAIN: ICD-10-CM

## 2020-09-09 DIAGNOSIS — I10 ESSENTIAL HYPERTENSION: ICD-10-CM

## 2020-09-09 DIAGNOSIS — E78.2 MIXED HYPERLIPIDEMIA: ICD-10-CM

## 2020-09-09 DIAGNOSIS — S12.111G CLOSED ODONTOID FRACTURE WITH TYPE II MORPHOLOGY, POSTERIOR DISPLACEMENT, AND DELAYED HEALING, SUBSEQUENT ENCOUNTER: ICD-10-CM

## 2020-09-09 DIAGNOSIS — R94.31 ABNORMAL EKG: ICD-10-CM

## 2020-09-09 DIAGNOSIS — R26.2 AMBULATORY DYSFUNCTION: ICD-10-CM

## 2020-09-09 DIAGNOSIS — I48.91 ATRIAL FIBRILLATION, UNSPECIFIED TYPE (HCC): ICD-10-CM

## 2020-09-09 DIAGNOSIS — E11.65 TYPE 2 DIABETES MELLITUS WITH HYPERGLYCEMIA, UNSPECIFIED WHETHER LONG TERM INSULIN USE (HCC): Primary | ICD-10-CM

## 2020-09-09 DIAGNOSIS — I50.32 CHRONIC DIASTOLIC CHF (CONGESTIVE HEART FAILURE) (HCC): ICD-10-CM

## 2020-09-09 PROCEDURE — 99309 SBSQ NF CARE MODERATE MDM 30: CPT | Performed by: FAMILY MEDICINE

## 2020-09-09 NOTE — ASSESSMENT & PLAN NOTE
Lab Results   Component Value Date    HGBA1C 8 7 (H) 04/06/2020   ·  Due for repeat A1c, will order now  · Blood sugar trends reviewed and stable between 90s-210s  · Continue Humalog 12 units t i d  with meals and Lantus 30 units daily  · Will await hemoglobin A1c results and react just insulin as necessary

## 2020-09-09 NOTE — ASSESSMENT & PLAN NOTE
· Patient has gained significant amount of weight slowly over time since admission in January 2020  · Per records reviewed, he has gained about 27 lb since admission  · Dietitian on board to monitor caloric intake  · Per nursing staff, patient upon admission had very poor oral intake which has improved significantly  He eats 100% of his meals old time plus snacks in between meals  · Check CMP, CBC and lipid panel

## 2020-09-09 NOTE — ASSESSMENT & PLAN NOTE
· Along with C1 fracture status post fall in January 2020  · Patient initially managed with conservative measures  · During most recent telemedicine visit with Neurosurgery was felt his C2 fracture is not healing appropriately and recommendation was made to consider a posterior cervical lateral fixation fusion  · At this time patient is still hesitant to undergoing surgery  He does not understand why this is not healing as he has been compliant with the collar  · We had discussed at length the 3 possibilities offered previously per nurse surgery including wearing the vista collar for the rest of his life versus undergoing surgical procedure versus early weaning off of the collar and taking the risk for re-injury  · Will schedule a follow-up with Neurosurgery, patient will think about it and discuss with his wife  · Patient will require cardiology clearance

## 2020-09-09 NOTE — ASSESSMENT & PLAN NOTE
· Blood pressure currently stable and well controlled  · During most recent hospitalization patient had poorly controlled blood pressure and hydralazine was added to his regimen  · Continue to monitor renal function electrolytes

## 2020-09-09 NOTE — PROGRESS NOTES
Amanda [address]  [address] [facility] [address]  [phone]    [facility]  PROGRESS NOTE        NAME: Arash Johns  AGE: 82 y o  SEX: male  :  1935  DATE OF ENCOUNTER:   2020  POS: 28 ([facility])    Assessment and Plan     Type 2 diabetes mellitus ([facility] [address] )    Lab Results   Component Value Date    HGBA1C 8 7 (H) 2020   ·  Due for repeat A1c, will order now  · Blood sugar trends reviewed and stable between 90s-210s  · Continue Humalog 12 units t i d  with meals and Lantus 30 units daily  · Will await hemoglobin A1c results and react just insulin as necessary  Atrial fibrillation (HCC)  · Currently rate controlled  Continue metoprolol  · Continue Pradaxa for anticoagulation  Essential hypertension  · Blood pressure currently stable and well controlled  · During most recent hospitalization patient had poorly controlled blood pressure and hydralazine was added to his regimen  · Continue to monitor renal function electrolytes  Chronic diastolic CHF (congestive heart failure) ([facility])  Wt Readings from Last 3 Encounters:   20 86 9 kg (191 lb 9 6 oz)   20 85 2 kg (187 lb 12 8 oz)   20 84 4 kg (186 lb)   ·  Euvolemic on exam   · Continue furosemide 40 mg b i d  Gilbert Bernal · Continue to monitor renal function and electrolytes  Type II dens fracture of second cervical vertebra ([facility] [address] )  · Along with C1 fracture status post fall in 2020  · Patient initially managed with conservative measures  · During most recent telemedicine visit with Neurosurgery was felt his C2 fracture is not healing appropriately and recommendation was made to consider a posterior cervical lateral fixation fusion  · At this time patient is still hesitant to undergoing surgery  He does not understand why this is not healing as he has been compliant with the collar    · We had discussed at length the 3 possibilities offered previously per nurse surgery including wearing the [place] collar for the rest of his life versus undergoing surgical procedure versus early weaning off of the collar and taking the risk for re-injury  · Will schedule a follow-up with Neurosurgery, patient will think about it and discuss with his wife  · Patient will require cardiology clearance  Ambulatory dysfunction  · Continue restorative therapy  · Patient is ambulating about 100 ft with walker and supervision  HLD (hyperlipidemia)  · Continue atorvastatin 20 mg daily  · In the setting of recent weight gain, check lipid panel  Weight gain  · Patient has gained significant amount of weight slowly over time since admission in January 2020  · Per records reviewed, he has gained about 27 lb since admission  · Dietitian on board to monitor caloric intake  · Per nursing staff, patient upon admission had very poor oral intake which has improved significantly  He eats 100% of his meals old time plus snacks in between meals  · Check CMP, CBC and lipid panel  Abnormal EKG  · Abnormal EKG noted during most recent hospitalization  · Evaluated by cardiology who felt there was no evidence of complete heart block/high-grade AV block on ECG and no further evaluation was recommended at that time  · Patient follows regularly with [facility]iology, will request follow-up appointment  Dylon Ma MD  Geriatric Medicine  09/09/2020       Chief Complaint   Patient seen and examined for follow up on chronic conditions  History of Present Illness     Manus Jose is a 82 y o  male with CAD, atrial fibrillation on Pradaxa, chronic combined congestive heart failure, atherosclerosis, mixed hyperlipidemia, COPD, and statin shell hypertension, type 2 diabetes, ambulatory dysfunction and recurrent falls    Back in mid January 2020 patient had a fall and sustained bilateral nasal bone fractures that were managed nonoperatively per OMFS at the hospital   He was also found to have acute type 2 dens fracture with posterior displacement of the dense approximately 50% in relation to the body of C2, as well as multiple fractures of the ring of C1 including in the anterior comminuted fracture at 12:00 o'clock  He was evaluated by neurosurgical team MRI was done that did not show any evidence of cord compression or signal abnormality  He was felt not to be a good surgical candidate given other multiple comorbidities and physical deconditioning  Conservative management was then recommended      Following that hospitalization, patient was discharged to [facility] for short-term rehab services  After completion of his rehab course patient was transitioned to long-term care at this facility where he has been residing since      Unfortunately, Mr Martín Sterling fell while transferring to the toilet at the facility  He hit his head which resulted in a frontal hematoma  He was evaluated the same day per CRNP, no acute issues found after falling, he was being monitored with neurochecks, but later that day he was noted to be more confused, and given that he is on chronic anticoagulation for atrial fibrillation, he was sent out to the ED for further evaluation      Initially evaluated by trauma team  No acute osseous abnormality on trauma survey  Initial chest Xray showed no acure cardiopulmonary abnormality  Mild cardiomegaly and aortic atherosclerotic disease  CT H showed atrophy of the brain, changes consistent with microangiopathic changes and chronic lacunar infarcts but NO evidence of acute intracranial abnormality  CT C-Spine showed overall no appreciable change in the appearance of pre-existing C1 and type II C2 odontoid fractures as compared with study from 5/22/2020  No new fractures seen  No appreciable critical central canal stenosis      EKG in the ED showed atrial flutter with variable A-V block, left bundle branch block and was admitted for further work up      Evaluated by cardiology    Modifications were made to his hypertension medication regimen due to poorly controlled hypertension, hydralazine 25 mg p o  t i d  was added to his regimen      Patient was evaluated by PT/OT and recommendation was made for subacute rehab services       Patient and was readmitted to short-term rehab unit at [facility] and eventually transitioned back to his previous long-term care unit      He was seen and examined with nursing staff today for follow-up on chronic conditions  He is sitting in wheelchair  Reports feeling well  Denies any pain at this time  He reports feeling well  Appetite is great  Per nursing staff, patient has been eating very well  He admits to feeling stressed about his cervical fracture  states he is constantly worrying about how to proceed  He has been given 3 options by neurosurgery including proceding with surgical intervention vs continuing to wear [address] for the rest of his life or weaning off of the collar with a great risk for re-injury  Denies any other acute complaints  Denies any fever/chills, chest pain/shortness of breath, abdominal discomfort, nausea/vomiting, diarrhea/constipation or dysuria  The following portions of the patient's history were reviewed and updated as appropriate: allergies, current medications, past family history, past medical history, past social history, past surgical history and problem list     Review of Systems     A complete review of systems was performed  All negative, except as per HPI      History     Past Medical History:   Diagnosis Date    A-fib ([facility])     Cancer ([facility])     Cardiac disease     [facility]congestive [facility]) ([facility])     Chronic obstructive pulmonary disease (COPD) ([facility])     COPD (chronic obstructive pulmonary disease) (HCC)     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HCC)     Dysphagia     Fracture of nasal bone     Gait instability     H/O cervical fracture     Hypertension     Muscle weakness     TBI (traumatic brain injury) ([facility]) Allergies   Allergen Reactions    Amoxicillin Diarrhea       Objective     Vital Signs  BP:   100/71       HR:  90 T:  97 3    RR:  18 O2Sat:  95% on RA W:  194 4 lb    Physical Exam  GEN:         NAD  Non-toxic appearing  Well nourished  HEENT:     NC/AT  JG  EIOMI  Oropharynx moist and clear  No oral lesions  [place] Collar in place  CV:            S1, S2  Irregular rhythm, regular rate  No murmur appreciated  PULM:       Non-labored respirations  CTA bilaterally  ABD:          Soft, NT/ND  BSx4  EXT:          No peripheral edema  NEURO:          Awake, alert and oriented to self, year/month, still thinks he is at the                       hospital   [facility]:           Mood stable, talkative and very pleasant  Pertinent Laboratory/Diagnostic Studies     09/02/2020:  , K 4 4, Cl 105, CO2 29, BUN 20, CR 0 96, , EGFR 73  06/29/2020:  WBC 5 7, HB 12 8, HCT 39 7,   04/06/2020:  A1c 8 7%    Current Medications     Current Medications Reviewed and updated in EMR  Monthly orders reviewed and signed in chart  Please note:  Voice-recognition software may have been used in the preparation of this document  Occasional wrong word or "sound-alike" substitutions may have occurred due to the inherent limitations of voice recognition software  Interpretation should be guided by context

## 2020-09-09 NOTE — ASSESSMENT & PLAN NOTE
Wt Readings from Last 3 Encounters:   08/19/20 86 9 kg (191 lb 9 6 oz)   08/06/20 85 2 kg (187 lb 12 8 oz)   07/01/20 84 4 kg (186 lb)   ·  Euvolemic on exam   · Continue furosemide 40 mg b i d  [facility] · Continue to monitor renal function and electrolytes

## 2020-09-09 NOTE — ASSESSMENT & PLAN NOTE
· Abnormal EKG noted during most recent hospitalization  · Evaluated by cardiology who felt there was no evidence of complete heart block/high-grade AV block on ECG and no further evaluation was recommended at that time  · Patient follows regularly with [facility]iology, will request follow-up appointment

## 2020-10-06 ENCOUNTER — NURSING HOME VISIT (OUTPATIENT)
Dept: GERIATRICS | Age: 85
End: 2020-10-06
Payer: COMMERCIAL

## 2020-10-06 ENCOUNTER — PREP FOR PROCEDURE (OUTPATIENT)
Dept: NEUROSURGERY | Facility: CLINIC | Age: 85
End: 2020-10-06

## 2020-10-06 DIAGNOSIS — S12.000K: Primary | ICD-10-CM

## 2020-10-06 DIAGNOSIS — Z71.89 COUNSELING REGARDING ADVANCE CARE PLANNING AND GOALS OF CARE: ICD-10-CM

## 2020-10-06 DIAGNOSIS — S12.100K: ICD-10-CM

## 2020-10-06 DIAGNOSIS — S12.090K: ICD-10-CM

## 2020-10-06 DIAGNOSIS — Z11.59 SCREENING FOR VIRAL DISEASE: ICD-10-CM

## 2020-10-06 DIAGNOSIS — R26.2 AMBULATORY DYSFUNCTION: Primary | ICD-10-CM

## 2020-10-06 PROCEDURE — 99308 SBSQ NF CARE LOW MDM 20: CPT | Performed by: NURSE PRACTITIONER

## 2020-10-07 PROBLEM — Z71.89 COUNSELING REGARDING ADVANCE CARE PLANNING AND GOALS OF CARE: Status: ACTIVE | Noted: 2020-10-07

## 2020-10-12 ENCOUNTER — NURSING HOME VISIT (OUTPATIENT)
Dept: GERIATRICS | Age: 85
End: 2020-10-12
Payer: COMMERCIAL

## 2020-10-12 VITALS
SYSTOLIC BLOOD PRESSURE: 141 MMHG | WEIGHT: 194.8 LBS | RESPIRATION RATE: 18 BRPM | HEART RATE: 52 BPM | BODY MASS INDEX: 29.62 KG/M2 | TEMPERATURE: 97.3 F | DIASTOLIC BLOOD PRESSURE: 68 MMHG

## 2020-10-12 DIAGNOSIS — I48.91 ATRIAL FIBRILLATION, UNSPECIFIED TYPE (HCC): ICD-10-CM

## 2020-10-12 DIAGNOSIS — I50.32 CHRONIC DIASTOLIC CHF (CONGESTIVE HEART FAILURE) (HCC): ICD-10-CM

## 2020-10-12 DIAGNOSIS — I10 ESSENTIAL HYPERTENSION: ICD-10-CM

## 2020-10-12 DIAGNOSIS — R26.2 AMBULATORY DYSFUNCTION: Primary | ICD-10-CM

## 2020-10-12 DIAGNOSIS — S12.111G CLOSED ODONTOID FRACTURE WITH TYPE II MORPHOLOGY, POSTERIOR DISPLACEMENT, AND DELAYED HEALING, SUBSEQUENT ENCOUNTER: ICD-10-CM

## 2020-10-12 DIAGNOSIS — R13.10 DYSPHAGIA, UNSPECIFIED TYPE: ICD-10-CM

## 2020-10-12 PROCEDURE — 99309 SBSQ NF CARE MODERATE MDM 30: CPT | Performed by: NURSE PRACTITIONER

## 2020-10-19 RX ORDER — HYDRALAZINE HYDROCHLORIDE 10 MG/1
10 TABLET, FILM COATED ORAL 3 TIMES DAILY
Status: ON HOLD | COMMUNITY

## 2020-10-22 ENCOUNTER — OFFICE VISIT (OUTPATIENT)
Dept: NEUROSURGERY | Facility: CLINIC | Age: 85
End: 2020-10-22
Payer: COMMERCIAL

## 2020-10-22 ENCOUNTER — TELEPHONE (OUTPATIENT)
Dept: NEUROSURGERY | Facility: CLINIC | Age: 85
End: 2020-10-22

## 2020-10-22 VITALS
BODY MASS INDEX: 28.79 KG/M2 | HEIGHT: 68 IN | RESPIRATION RATE: 16 BRPM | HEART RATE: 86 BPM | TEMPERATURE: 97.5 F | SYSTOLIC BLOOD PRESSURE: 133 MMHG | WEIGHT: 190 LBS | DIASTOLIC BLOOD PRESSURE: 63 MMHG

## 2020-10-22 DIAGNOSIS — S12.100D CLOSED DISPLACED FRACTURE OF SECOND CERVICAL VERTEBRA WITH ROUTINE HEALING, UNSPECIFIED FRACTURE MORPHOLOGY, SUBSEQUENT ENCOUNTER: ICD-10-CM

## 2020-10-22 DIAGNOSIS — S12.000D CLOSED DISPLACED FRACTURE OF FIRST CERVICAL VERTEBRA WITH ROUTINE HEALING, UNSPECIFIED FRACTURE MORPHOLOGY, SUBSEQUENT ENCOUNTER: Primary | ICD-10-CM

## 2020-10-22 PROCEDURE — 99213 OFFICE O/P EST LOW 20 MIN: CPT | Performed by: NEUROLOGICAL SURGERY

## 2020-10-22 RX ORDER — CHLORHEXIDINE GLUCONATE 0.12 MG/ML
15 RINSE ORAL ONCE
Status: CANCELLED | OUTPATIENT
Start: 2020-10-22 | End: 2020-10-22

## 2020-10-22 RX ORDER — SODIUM PHOSPHATE, DIBASIC AND SODIUM PHOSPHATE, MONOBASIC 7; 19 G/133ML; G/133ML
1 ENEMA RECTAL ONCE AS NEEDED
Status: ON HOLD | COMMUNITY

## 2020-10-23 ENCOUNTER — ANESTHESIA EVENT (OUTPATIENT)
Dept: PERIOP | Facility: HOSPITAL | Age: 85
DRG: 472 | End: 2020-10-23
Payer: COMMERCIAL

## 2020-10-23 ENCOUNTER — DOCUMENTATION (OUTPATIENT)
Dept: NEUROSURGERY | Facility: CLINIC | Age: 85
End: 2020-10-23

## 2020-10-26 ENCOUNTER — HOSPITAL ENCOUNTER (INPATIENT)
Facility: HOSPITAL | Age: 85
LOS: 4 days | Discharge: NURSING, REHAB OR LONG-TERM CARE | DRG: 472 | End: 2020-10-30
Attending: NEUROLOGICAL SURGERY | Admitting: NEUROLOGICAL SURGERY
Payer: COMMERCIAL

## 2020-10-26 ENCOUNTER — APPOINTMENT (OUTPATIENT)
Dept: RADIOLOGY | Facility: HOSPITAL | Age: 85
DRG: 472 | End: 2020-10-26
Payer: COMMERCIAL

## 2020-10-26 ENCOUNTER — ANESTHESIA (OUTPATIENT)
Dept: PERIOP | Facility: HOSPITAL | Age: 85
DRG: 472 | End: 2020-10-26
Payer: COMMERCIAL

## 2020-10-26 VITALS — HEART RATE: 69 BPM

## 2020-10-26 DIAGNOSIS — R73.09 HEMOGLOBIN A1C GREATER THAN 8.0%: Primary | ICD-10-CM

## 2020-10-26 DIAGNOSIS — R29.6 MULTIPLE FALLS: ICD-10-CM

## 2020-10-26 DIAGNOSIS — Z98.1 STATUS POST CERVICAL SPINAL FUSION: ICD-10-CM

## 2020-10-26 DIAGNOSIS — R41.0 CONFUSION: ICD-10-CM

## 2020-10-26 DIAGNOSIS — S12.111G CLOSED ODONTOID FRACTURE WITH TYPE II MORPHOLOGY, POSTERIOR DISPLACEMENT, AND DELAYED HEALING, SUBSEQUENT ENCOUNTER: ICD-10-CM

## 2020-10-26 DIAGNOSIS — S12.090K: ICD-10-CM

## 2020-10-26 DIAGNOSIS — E11.65 TYPE 2 DIABETES MELLITUS WITH HYPERGLYCEMIA, UNSPECIFIED WHETHER LONG TERM INSULIN USE (HCC): ICD-10-CM

## 2020-10-26 DIAGNOSIS — R77.0 ABNORMAL ALBUMIN: ICD-10-CM

## 2020-10-26 LAB
ABO GROUP BLD: NORMAL
BASE EXCESS BLDA CALC-SCNC: 0 MMOL/L (ref -2–3)
BLD GP AB SCN SERPL QL: NEGATIVE
CA-I BLD-SCNC: 1.12 MMOL/L (ref 1.12–1.32)
GLUCOSE SERPL-MCNC: 150 MG/DL (ref 65–140)
GLUCOSE SERPL-MCNC: 154 MG/DL (ref 65–140)
GLUCOSE SERPL-MCNC: 205 MG/DL (ref 65–140)
GLUCOSE SERPL-MCNC: 221 MG/DL (ref 65–140)
GLUCOSE SERPL-MCNC: 268 MG/DL (ref 65–140)
HCO3 BLDA-SCNC: 24.6 MMOL/L (ref 24–30)
HCT VFR BLD CALC: 34 % (ref 36.5–49.3)
HGB BLDA-MCNC: 11.6 G/DL (ref 12–17)
PCO2 BLD: 26 MMOL/L (ref 21–32)
PCO2 BLD: 39.4 MM HG (ref 42–50)
PH BLD: 7.4 [PH] (ref 7.3–7.4)
PLATELET # BLD AUTO: 274 THOUSANDS/UL (ref 149–390)
PMV BLD AUTO: 10.5 FL (ref 8.9–12.7)
PO2 BLD: 130 MM HG (ref 35–45)
POTASSIUM BLD-SCNC: 3.9 MMOL/L (ref 3.5–5.3)
RH BLD: NEGATIVE
SAO2 % BLD FROM PO2: 99 % (ref 60–85)
SODIUM BLD-SCNC: 142 MMOL/L (ref 136–145)
SPECIMEN EXPIRATION DATE: NORMAL
SPECIMEN SOURCE: ABNORMAL

## 2020-10-26 PROCEDURE — C1713 ANCHOR/SCREW BN/BN,TIS/BN: HCPCS | Performed by: NEUROLOGICAL SURGERY

## 2020-10-26 PROCEDURE — 20930 SP BONE ALGRFT MORSEL ADD-ON: CPT | Performed by: NEUROLOGICAL SURGERY

## 2020-10-26 PROCEDURE — 4A10X4G MONITORING OF CENTRAL NERVOUS ELECTRICAL ACTIVITY, INTRAOPERATIVE, EXTERNAL APPROACH: ICD-10-PCS | Performed by: NEUROLOGICAL SURGERY

## 2020-10-26 PROCEDURE — 86901 BLOOD TYPING SEROLOGIC RH(D): CPT | Performed by: NEUROLOGICAL SURGERY

## 2020-10-26 PROCEDURE — 0RG20K1 FUSION OF 2 OR MORE CERVICAL VERTEBRAL JOINTS WITH NONAUTOLOGOUS TISSUE SUBSTITUTE, POSTERIOR APPROACH, POSTERIOR COLUMN, OPEN APPROACH: ICD-10-PCS | Performed by: NEUROLOGICAL SURGERY

## 2020-10-26 PROCEDURE — 72040 X-RAY EXAM NECK SPINE 2-3 VW: CPT

## 2020-10-26 PROCEDURE — 82803 BLOOD GASES ANY COMBINATION: CPT

## 2020-10-26 PROCEDURE — 82330 ASSAY OF CALCIUM: CPT

## 2020-10-26 PROCEDURE — 82948 REAGENT STRIP/BLOOD GLUCOSE: CPT

## 2020-10-26 PROCEDURE — 85049 AUTOMATED PLATELET COUNT: CPT | Performed by: PHYSICIAN ASSISTANT

## 2020-10-26 PROCEDURE — 22600 ARTHRD PST TQ 1NTRSPC CRV: CPT | Performed by: NEUROLOGICAL SURGERY

## 2020-10-26 PROCEDURE — 86850 RBC ANTIBODY SCREEN: CPT | Performed by: NEUROLOGICAL SURGERY

## 2020-10-26 PROCEDURE — 22590 ARTHRD PST TQ CRANIOCERVICAL: CPT | Performed by: NEUROLOGICAL SURGERY

## 2020-10-26 PROCEDURE — 84295 ASSAY OF SERUM SODIUM: CPT

## 2020-10-26 PROCEDURE — 0RG00K1 FUSION OF OCCIPITAL-CERVICAL JOINT WITH NONAUTOLOGOUS TISSUE SUBSTITUTE, POSTERIOR APPROACH, POSTERIOR COLUMN, OPEN APPROACH: ICD-10-PCS | Performed by: NEUROLOGICAL SURGERY

## 2020-10-26 PROCEDURE — 84132 ASSAY OF SERUM POTASSIUM: CPT

## 2020-10-26 PROCEDURE — 82947 ASSAY GLUCOSE BLOOD QUANT: CPT

## 2020-10-26 PROCEDURE — 85014 HEMATOCRIT: CPT

## 2020-10-26 PROCEDURE — 86900 BLOOD TYPING SEROLOGIC ABO: CPT | Performed by: NEUROLOGICAL SURGERY

## 2020-10-26 PROCEDURE — 22842 INSERT SPINE FIXATION DEVICE: CPT | Performed by: NEUROLOGICAL SURGERY

## 2020-10-26 PROCEDURE — 22614 ARTHRD PST TQ 1NTRSPC EA ADD: CPT | Performed by: NEUROLOGICAL SURGERY

## 2020-10-26 DEVICE — SET SCREW 3600315 STANDARD
Type: IMPLANTABLE DEVICE | Site: SPINE CERVICAL | Status: FUNCTIONAL
Brand: INFINITY™ OCCIPITOCERVICAL UPPER THORACIC SYSTEM

## 2020-10-26 DEVICE — DBM 006010 PROGENIX PLUS 10CC
Type: IMPLANTABLE DEVICE | Site: SPINE CERVICAL | Status: FUNCTIONAL
Brand: PROGENIX® PUTTY AND PROGENIX® PLUS

## 2020-10-26 RX ORDER — KETAMINE HYDROCHLORIDE 50 MG/ML
INJECTION, SOLUTION, CONCENTRATE INTRAMUSCULAR; INTRAVENOUS AS NEEDED
Status: DISCONTINUED | OUTPATIENT
Start: 2020-10-26 | End: 2020-10-26

## 2020-10-26 RX ORDER — DEXMEDETOMIDINE HYDROCHLORIDE 100 UG/ML
INJECTION, SOLUTION INTRAVENOUS AS NEEDED
Status: DISCONTINUED | OUTPATIENT
Start: 2020-10-26 | End: 2020-10-26

## 2020-10-26 RX ORDER — SODIUM PHOSPHATE, DIBASIC AND SODIUM PHOSPHATE, MONOBASIC 7; 19 G/133ML; G/133ML
1 ENEMA RECTAL ONCE AS NEEDED
Status: DISCONTINUED | OUTPATIENT
Start: 2020-10-26 | End: 2020-10-27

## 2020-10-26 RX ORDER — CALCIUM CARBONATE 200(500)MG
1000 TABLET,CHEWABLE ORAL DAILY PRN
Status: DISCONTINUED | OUTPATIENT
Start: 2020-10-26 | End: 2020-10-30 | Stop reason: HOSPADM

## 2020-10-26 RX ORDER — ACETAMINOPHEN 325 MG/1
975 TABLET ORAL EVERY 8 HOURS SCHEDULED
Status: DISCONTINUED | OUTPATIENT
Start: 2020-10-26 | End: 2020-10-30 | Stop reason: HOSPADM

## 2020-10-26 RX ORDER — OXYCODONE HYDROCHLORIDE 5 MG/1
2.5 TABLET ORAL EVERY 4 HOURS PRN
Status: DISCONTINUED | OUTPATIENT
Start: 2020-10-26 | End: 2020-10-30 | Stop reason: HOSPADM

## 2020-10-26 RX ORDER — SODIUM CHLORIDE 9 MG/ML
INJECTION, SOLUTION INTRAVENOUS CONTINUOUS PRN
Status: DISCONTINUED | OUTPATIENT
Start: 2020-10-26 | End: 2020-10-26

## 2020-10-26 RX ORDER — FENTANYL CITRATE/PF 50 MCG/ML
25 SYRINGE (ML) INJECTION
Status: DISCONTINUED | OUTPATIENT
Start: 2020-10-26 | End: 2020-10-26 | Stop reason: HOSPADM

## 2020-10-26 RX ORDER — ONDANSETRON 2 MG/ML
INJECTION INTRAMUSCULAR; INTRAVENOUS AS NEEDED
Status: DISCONTINUED | OUTPATIENT
Start: 2020-10-26 | End: 2020-10-26

## 2020-10-26 RX ORDER — METHOCARBAMOL 500 MG/1
250 TABLET, FILM COATED ORAL EVERY 6 HOURS PRN
Status: DISCONTINUED | OUTPATIENT
Start: 2020-10-26 | End: 2020-10-30 | Stop reason: HOSPADM

## 2020-10-26 RX ORDER — ONDANSETRON 2 MG/ML
4 INJECTION INTRAMUSCULAR; INTRAVENOUS ONCE AS NEEDED
Status: DISCONTINUED | OUTPATIENT
Start: 2020-10-26 | End: 2020-10-26 | Stop reason: HOSPADM

## 2020-10-26 RX ORDER — LIDOCAINE HYDROCHLORIDE 10 MG/ML
INJECTION, SOLUTION EPIDURAL; INFILTRATION; INTRACAUDAL; PERINEURAL AS NEEDED
Status: DISCONTINUED | OUTPATIENT
Start: 2020-10-26 | End: 2020-10-26 | Stop reason: HOSPADM

## 2020-10-26 RX ORDER — ALBUMIN, HUMAN INJ 5% 5 %
SOLUTION INTRAVENOUS CONTINUOUS PRN
Status: DISCONTINUED | OUTPATIENT
Start: 2020-10-26 | End: 2020-10-26

## 2020-10-26 RX ORDER — ATORVASTATIN CALCIUM 20 MG/1
20 TABLET, FILM COATED ORAL
Status: DISCONTINUED | OUTPATIENT
Start: 2020-10-26 | End: 2020-10-30 | Stop reason: HOSPADM

## 2020-10-26 RX ORDER — DILTIAZEM HYDROCHLORIDE 240 MG/1
240 CAPSULE, COATED, EXTENDED RELEASE ORAL DAILY
Status: DISCONTINUED | OUTPATIENT
Start: 2020-10-27 | End: 2020-10-30 | Stop reason: HOSPADM

## 2020-10-26 RX ORDER — PANTOPRAZOLE SODIUM 40 MG/1
40 TABLET, DELAYED RELEASE ORAL
Status: DISCONTINUED | OUTPATIENT
Start: 2020-10-27 | End: 2020-10-30 | Stop reason: HOSPADM

## 2020-10-26 RX ORDER — EPHEDRINE SULFATE 50 MG/ML
INJECTION INTRAVENOUS AS NEEDED
Status: DISCONTINUED | OUTPATIENT
Start: 2020-10-26 | End: 2020-10-26

## 2020-10-26 RX ORDER — FENTANYL CITRATE 50 UG/ML
INJECTION, SOLUTION INTRAMUSCULAR; INTRAVENOUS AS NEEDED
Status: DISCONTINUED | OUTPATIENT
Start: 2020-10-26 | End: 2020-10-26

## 2020-10-26 RX ORDER — DEXAMETHASONE SODIUM PHOSPHATE 10 MG/ML
INJECTION, SOLUTION INTRAMUSCULAR; INTRAVENOUS AS NEEDED
Status: DISCONTINUED | OUTPATIENT
Start: 2020-10-26 | End: 2020-10-26

## 2020-10-26 RX ORDER — VANCOMYCIN HYDROCHLORIDE 1 G/200ML
1000 INJECTION, SOLUTION INTRAVENOUS EVERY 12 HOURS
Status: COMPLETED | OUTPATIENT
Start: 2020-10-26 | End: 2020-10-26

## 2020-10-26 RX ORDER — BISACODYL 10 MG
10 SUPPOSITORY, RECTAL RECTAL DAILY PRN
Status: DISCONTINUED | OUTPATIENT
Start: 2020-10-26 | End: 2020-10-27

## 2020-10-26 RX ORDER — PROPOFOL 10 MG/ML
INJECTION, EMULSION INTRAVENOUS CONTINUOUS PRN
Status: DISCONTINUED | OUTPATIENT
Start: 2020-10-26 | End: 2020-10-26

## 2020-10-26 RX ORDER — SODIUM CHLORIDE, SODIUM LACTATE, POTASSIUM CHLORIDE, CALCIUM CHLORIDE 600; 310; 30; 20 MG/100ML; MG/100ML; MG/100ML; MG/100ML
INJECTION, SOLUTION INTRAVENOUS CONTINUOUS PRN
Status: DISCONTINUED | OUTPATIENT
Start: 2020-10-26 | End: 2020-10-26

## 2020-10-26 RX ORDER — VANCOMYCIN HYDROCHLORIDE 1 G/20ML
INJECTION, POWDER, LYOPHILIZED, FOR SOLUTION INTRAVENOUS AS NEEDED
Status: DISCONTINUED | OUTPATIENT
Start: 2020-10-26 | End: 2020-10-26 | Stop reason: HOSPADM

## 2020-10-26 RX ORDER — PROPOFOL 10 MG/ML
INJECTION, EMULSION INTRAVENOUS AS NEEDED
Status: DISCONTINUED | OUTPATIENT
Start: 2020-10-26 | End: 2020-10-26

## 2020-10-26 RX ORDER — ONDANSETRON 2 MG/ML
4 INJECTION INTRAMUSCULAR; INTRAVENOUS EVERY 6 HOURS PRN
Status: DISCONTINUED | OUTPATIENT
Start: 2020-10-26 | End: 2020-10-30 | Stop reason: HOSPADM

## 2020-10-26 RX ORDER — LIDOCAINE HYDROCHLORIDE 10 MG/ML
INJECTION, SOLUTION EPIDURAL; INFILTRATION; INTRACAUDAL; PERINEURAL
Status: DISPENSED
Start: 2020-10-26 | End: 2020-10-26

## 2020-10-26 RX ORDER — LIDOCAINE HYDROCHLORIDE AND EPINEPHRINE 10; 10 MG/ML; UG/ML
INJECTION, SOLUTION INFILTRATION; PERINEURAL AS NEEDED
Status: DISCONTINUED | OUTPATIENT
Start: 2020-10-26 | End: 2020-10-26 | Stop reason: HOSPADM

## 2020-10-26 RX ORDER — INSULIN GLARGINE 100 [IU]/ML
30 INJECTION, SOLUTION SUBCUTANEOUS
Status: DISCONTINUED | OUTPATIENT
Start: 2020-10-27 | End: 2020-10-28

## 2020-10-26 RX ORDER — METHOCARBAMOL 500 MG/1
500 TABLET, FILM COATED ORAL EVERY 6 HOURS SCHEDULED
Status: DISCONTINUED | OUTPATIENT
Start: 2020-10-26 | End: 2020-10-26

## 2020-10-26 RX ORDER — HYDROMORPHONE HCL/PF 1 MG/ML
0.2 SYRINGE (ML) INJECTION
Status: DISCONTINUED | OUTPATIENT
Start: 2020-10-26 | End: 2020-10-26 | Stop reason: HOSPADM

## 2020-10-26 RX ORDER — CHLORHEXIDINE GLUCONATE 0.12 MG/ML
15 RINSE ORAL ONCE
Status: COMPLETED | OUTPATIENT
Start: 2020-10-26 | End: 2020-10-26

## 2020-10-26 RX ORDER — HYDRALAZINE HYDROCHLORIDE 10 MG/1
10 TABLET, FILM COATED ORAL 3 TIMES DAILY
Status: DISCONTINUED | OUTPATIENT
Start: 2020-10-26 | End: 2020-10-30 | Stop reason: HOSPADM

## 2020-10-26 RX ORDER — SODIUM CHLORIDE 9 MG/ML
75 INJECTION, SOLUTION INTRAVENOUS CONTINUOUS
Status: DISCONTINUED | OUTPATIENT
Start: 2020-10-26 | End: 2020-10-27

## 2020-10-26 RX ORDER — HEPARIN SODIUM 5000 [USP'U]/ML
5000 INJECTION, SOLUTION INTRAVENOUS; SUBCUTANEOUS EVERY 8 HOURS SCHEDULED
Status: DISCONTINUED | OUTPATIENT
Start: 2020-10-27 | End: 2020-10-30 | Stop reason: HOSPADM

## 2020-10-26 RX ORDER — METOPROLOL TARTRATE 50 MG/1
50 TABLET, FILM COATED ORAL EVERY 12 HOURS SCHEDULED
Status: DISCONTINUED | OUTPATIENT
Start: 2020-10-26 | End: 2020-10-30 | Stop reason: HOSPADM

## 2020-10-26 RX ORDER — BUPIVACAINE HYDROCHLORIDE AND EPINEPHRINE 5; 5 MG/ML; UG/ML
INJECTION, SOLUTION EPIDURAL; INTRACAUDAL; PERINEURAL AS NEEDED
Status: DISCONTINUED | OUTPATIENT
Start: 2020-10-26 | End: 2020-10-26 | Stop reason: HOSPADM

## 2020-10-26 RX ADMIN — ALBUMIN (HUMAN): 12.5 INJECTION, SOLUTION INTRAVENOUS at 08:24

## 2020-10-26 RX ADMIN — INSULIN HUMAN 4 UNITS: 100 INJECTION, SOLUTION PARENTERAL at 11:35

## 2020-10-26 RX ADMIN — ACETAMINOPHEN 975 MG: 325 TABLET, FILM COATED ORAL at 21:00

## 2020-10-26 RX ADMIN — KETAMINE HYDROCHLORIDE 50 MG: 50 INJECTION, SOLUTION INTRAMUSCULAR; INTRAVENOUS at 07:48

## 2020-10-26 RX ADMIN — PHENYLEPHRINE HYDROCHLORIDE 50 MCG/MIN: 10 INJECTION INTRAVENOUS at 07:53

## 2020-10-26 RX ADMIN — PROPOFOL 50 MG: 10 INJECTION, EMULSION INTRAVENOUS at 08:04

## 2020-10-26 RX ADMIN — FENTANYL CITRATE 50 MCG: 50 INJECTION, SOLUTION INTRAMUSCULAR; INTRAVENOUS at 08:04

## 2020-10-26 RX ADMIN — PROPOFOL 100 MCG/KG/MIN: 10 INJECTION, EMULSION INTRAVENOUS at 07:53

## 2020-10-26 RX ADMIN — VANCOMYCIN HYDROCHLORIDE 1000 MG: 1 INJECTION, SOLUTION INTRAVENOUS at 20:49

## 2020-10-26 RX ADMIN — ACETAMINOPHEN 975 MG: 325 TABLET, FILM COATED ORAL at 14:19

## 2020-10-26 RX ADMIN — METOPROLOL TARTRATE 50 MG: 50 TABLET, FILM COATED ORAL at 20:59

## 2020-10-26 RX ADMIN — SODIUM CHLORIDE, SODIUM LACTATE, POTASSIUM CHLORIDE, AND CALCIUM CHLORIDE: .6; .31; .03; .02 INJECTION, SOLUTION INTRAVENOUS at 07:35

## 2020-10-26 RX ADMIN — SODIUM CHLORIDE 75 ML/HR: 0.9 INJECTION, SOLUTION INTRAVENOUS at 12:55

## 2020-10-26 RX ADMIN — EPHEDRINE SULFATE 5 MG: 50 INJECTION, SOLUTION INTRAVENOUS at 08:49

## 2020-10-26 RX ADMIN — CHLORHEXIDINE GLUCONATE 15 ML: 1.2 RINSE ORAL at 06:11

## 2020-10-26 RX ADMIN — INSULIN LISPRO 2 UNITS: 100 INJECTION, SOLUTION INTRAVENOUS; SUBCUTANEOUS at 18:07

## 2020-10-26 RX ADMIN — DEXMEDETOMIDINE 45 MCG: 100 INJECTION, SOLUTION, CONCENTRATE INTRAVENOUS at 07:48

## 2020-10-26 RX ADMIN — SODIUM CHLORIDE: 0.9 INJECTION, SOLUTION INTRAVENOUS at 07:45

## 2020-10-26 RX ADMIN — INSULIN LISPRO 2 UNITS: 100 INJECTION, SOLUTION INTRAVENOUS; SUBCUTANEOUS at 21:01

## 2020-10-26 RX ADMIN — ATORVASTATIN CALCIUM 20 MG: 20 TABLET, FILM COATED ORAL at 17:20

## 2020-10-26 RX ADMIN — PHENYLEPHRINE HYDROCHLORIDE 100 MCG: 10 INJECTION INTRAVENOUS at 10:53

## 2020-10-26 RX ADMIN — METHOCARBAMOL TABLETS 500 MG: 500 TABLET, COATED ORAL at 17:34

## 2020-10-26 RX ADMIN — Medication 2000 MG: at 07:54

## 2020-10-26 RX ADMIN — HYDRALAZINE HYDROCHLORIDE 10 MG: 10 TABLET, FILM COATED ORAL at 20:59

## 2020-10-26 RX ADMIN — PROPOFOL 90 MG: 10 INJECTION, EMULSION INTRAVENOUS at 07:48

## 2020-10-26 RX ADMIN — FUROSEMIDE 60 MG: 40 TABLET ORAL at 17:20

## 2020-10-26 RX ADMIN — DEXAMETHASONE SODIUM PHOSPHATE 6 MG: 10 INJECTION, SOLUTION INTRAMUSCULAR; INTRAVENOUS at 08:24

## 2020-10-26 RX ADMIN — HYDRALAZINE HYDROCHLORIDE 10 MG: 10 TABLET, FILM COATED ORAL at 17:20

## 2020-10-26 RX ADMIN — DEXMEDETOMIDINE 0.4 MCG/KG/HR: 100 INJECTION, SOLUTION, CONCENTRATE INTRAVENOUS at 07:53

## 2020-10-26 RX ADMIN — PHENYLEPHRINE HYDROCHLORIDE 100 MCG: 10 INJECTION INTRAVENOUS at 08:18

## 2020-10-26 RX ADMIN — ONDANSETRON 4 MG: 2 INJECTION INTRAMUSCULAR; INTRAVENOUS at 08:24

## 2020-10-27 ENCOUNTER — APPOINTMENT (INPATIENT)
Dept: RADIOLOGY | Facility: HOSPITAL | Age: 85
DRG: 472 | End: 2020-10-27
Payer: COMMERCIAL

## 2020-10-27 LAB
ANION GAP SERPL CALCULATED.3IONS-SCNC: 5 MMOL/L (ref 4–13)
BUN SERPL-MCNC: 25 MG/DL (ref 5–25)
CALCIUM SERPL-MCNC: 9.3 MG/DL (ref 8.3–10.1)
CHLORIDE SERPL-SCNC: 104 MMOL/L (ref 100–108)
CO2 SERPL-SCNC: 29 MMOL/L (ref 21–32)
CREAT SERPL-MCNC: 1.26 MG/DL (ref 0.6–1.3)
ERYTHROCYTE [DISTWIDTH] IN BLOOD BY AUTOMATED COUNT: 15 % (ref 11.6–15.1)
GFR SERPL CREATININE-BSD FRML MDRD: 52 ML/MIN/1.73SQ M
GLUCOSE SERPL-MCNC: 190 MG/DL (ref 65–140)
GLUCOSE SERPL-MCNC: 296 MG/DL (ref 65–140)
GLUCOSE SERPL-MCNC: 313 MG/DL (ref 65–140)
GLUCOSE SERPL-MCNC: 338 MG/DL (ref 65–140)
GLUCOSE SERPL-MCNC: 387 MG/DL (ref 65–140)
HCT VFR BLD AUTO: 36.6 % (ref 36.5–49.3)
HGB BLD-MCNC: 11.8 G/DL (ref 12–17)
INR PPP: 1.05 (ref 0.84–1.19)
MCH RBC QN AUTO: 27.9 PG (ref 26.8–34.3)
MCHC RBC AUTO-ENTMCNC: 32.2 G/DL (ref 31.4–37.4)
MCV RBC AUTO: 87 FL (ref 82–98)
PLATELET # BLD AUTO: 281 THOUSANDS/UL (ref 149–390)
PMV BLD AUTO: 10.9 FL (ref 8.9–12.7)
POTASSIUM SERPL-SCNC: 4.4 MMOL/L (ref 3.5–5.3)
PROTHROMBIN TIME: 13.7 SECONDS (ref 11.6–14.5)
RBC # BLD AUTO: 4.23 MILLION/UL (ref 3.88–5.62)
SODIUM SERPL-SCNC: 138 MMOL/L (ref 136–145)
WBC # BLD AUTO: 13.3 THOUSAND/UL (ref 4.31–10.16)

## 2020-10-27 PROCEDURE — 97163 PT EVAL HIGH COMPLEX 45 MIN: CPT

## 2020-10-27 PROCEDURE — 82948 REAGENT STRIP/BLOOD GLUCOSE: CPT

## 2020-10-27 PROCEDURE — 80048 BASIC METABOLIC PNL TOTAL CA: CPT | Performed by: PHYSICIAN ASSISTANT

## 2020-10-27 PROCEDURE — 72040 X-RAY EXAM NECK SPINE 2-3 VW: CPT

## 2020-10-27 PROCEDURE — 97167 OT EVAL HIGH COMPLEX 60 MIN: CPT

## 2020-10-27 PROCEDURE — 99222 1ST HOSP IP/OBS MODERATE 55: CPT | Performed by: INTERNAL MEDICINE

## 2020-10-27 PROCEDURE — 99024 POSTOP FOLLOW-UP VISIT: CPT | Performed by: NURSE PRACTITIONER

## 2020-10-27 PROCEDURE — 85610 PROTHROMBIN TIME: CPT | Performed by: PHYSICIAN ASSISTANT

## 2020-10-27 PROCEDURE — 85027 COMPLETE CBC AUTOMATED: CPT | Performed by: PHYSICIAN ASSISTANT

## 2020-10-27 RX ORDER — SODIUM PHOSPHATE, DIBASIC AND SODIUM PHOSPHATE, MONOBASIC 7; 19 G/133ML; G/133ML
1 ENEMA RECTAL ONCE AS NEEDED
Status: DISCONTINUED | OUTPATIENT
Start: 2020-10-27 | End: 2020-10-30 | Stop reason: HOSPADM

## 2020-10-27 RX ORDER — BISACODYL 10 MG
10 SUPPOSITORY, RECTAL RECTAL DAILY PRN
Status: DISCONTINUED | OUTPATIENT
Start: 2020-10-27 | End: 2020-10-30 | Stop reason: HOSPADM

## 2020-10-27 RX ADMIN — INSULIN LISPRO 12 UNITS: 100 INJECTION, SOLUTION INTRAVENOUS; SUBCUTANEOUS at 12:51

## 2020-10-27 RX ADMIN — ATORVASTATIN CALCIUM 20 MG: 20 TABLET, FILM COATED ORAL at 17:49

## 2020-10-27 RX ADMIN — HYDRALAZINE HYDROCHLORIDE 10 MG: 10 TABLET, FILM COATED ORAL at 17:49

## 2020-10-27 RX ADMIN — INSULIN LISPRO 17 UNITS: 100 INJECTION, SOLUTION INTRAVENOUS; SUBCUTANEOUS at 17:51

## 2020-10-27 RX ADMIN — INSULIN LISPRO 1 UNITS: 100 INJECTION, SOLUTION INTRAVENOUS; SUBCUTANEOUS at 22:05

## 2020-10-27 RX ADMIN — INSULIN GLARGINE 30 UNITS: 100 INJECTION, SOLUTION SUBCUTANEOUS at 09:11

## 2020-10-27 RX ADMIN — INSULIN LISPRO 6 UNITS: 100 INJECTION, SOLUTION INTRAVENOUS; SUBCUTANEOUS at 12:51

## 2020-10-27 RX ADMIN — HYDRALAZINE HYDROCHLORIDE 10 MG: 10 TABLET, FILM COATED ORAL at 22:05

## 2020-10-27 RX ADMIN — INSULIN LISPRO 5 UNITS: 100 INJECTION, SOLUTION INTRAVENOUS; SUBCUTANEOUS at 09:07

## 2020-10-27 RX ADMIN — INSULIN LISPRO 4 UNITS: 100 INJECTION, SOLUTION INTRAVENOUS; SUBCUTANEOUS at 17:51

## 2020-10-27 RX ADMIN — METOPROLOL TARTRATE 50 MG: 50 TABLET, FILM COATED ORAL at 22:05

## 2020-10-27 RX ADMIN — INSULIN LISPRO 12 UNITS: 100 INJECTION, SOLUTION INTRAVENOUS; SUBCUTANEOUS at 09:07

## 2020-10-27 RX ADMIN — METOPROLOL TARTRATE 50 MG: 50 TABLET, FILM COATED ORAL at 09:07

## 2020-10-27 RX ADMIN — ACETAMINOPHEN 975 MG: 325 TABLET, FILM COATED ORAL at 22:05

## 2020-10-27 RX ADMIN — PANTOPRAZOLE SODIUM 40 MG: 40 TABLET, DELAYED RELEASE ORAL at 05:05

## 2020-10-27 RX ADMIN — FUROSEMIDE 60 MG: 40 TABLET ORAL at 09:06

## 2020-10-27 RX ADMIN — ACETAMINOPHEN 975 MG: 325 TABLET, FILM COATED ORAL at 05:05

## 2020-10-27 RX ADMIN — HYDRALAZINE HYDROCHLORIDE 10 MG: 10 TABLET, FILM COATED ORAL at 09:06

## 2020-10-27 RX ADMIN — DILTIAZEM HYDROCHLORIDE 240 MG: 240 CAPSULE, COATED, EXTENDED RELEASE ORAL at 09:06

## 2020-10-27 RX ADMIN — HEPARIN SODIUM 5000 UNITS: 5000 INJECTION INTRAVENOUS; SUBCUTANEOUS at 22:05

## 2020-10-27 RX ADMIN — HEPARIN SODIUM 5000 UNITS: 5000 INJECTION INTRAVENOUS; SUBCUTANEOUS at 15:00

## 2020-10-27 RX ADMIN — ACETAMINOPHEN 975 MG: 325 TABLET, FILM COATED ORAL at 12:53

## 2020-10-27 RX ADMIN — FUROSEMIDE 60 MG: 40 TABLET ORAL at 17:49

## 2020-10-28 LAB
ANION GAP SERPL CALCULATED.3IONS-SCNC: 4 MMOL/L (ref 4–13)
BUN SERPL-MCNC: 31 MG/DL (ref 5–25)
CALCIUM SERPL-MCNC: 8.5 MG/DL (ref 8.3–10.1)
CHLORIDE SERPL-SCNC: 103 MMOL/L (ref 100–108)
CO2 SERPL-SCNC: 32 MMOL/L (ref 21–32)
CREAT SERPL-MCNC: 1.08 MG/DL (ref 0.6–1.3)
ERYTHROCYTE [DISTWIDTH] IN BLOOD BY AUTOMATED COUNT: 15.2 % (ref 11.6–15.1)
GFR SERPL CREATININE-BSD FRML MDRD: 63 ML/MIN/1.73SQ M
GLUCOSE SERPL-MCNC: 229 MG/DL (ref 65–140)
GLUCOSE SERPL-MCNC: 250 MG/DL (ref 65–140)
GLUCOSE SERPL-MCNC: 327 MG/DL (ref 65–140)
GLUCOSE SERPL-MCNC: 82 MG/DL (ref 65–140)
GLUCOSE SERPL-MCNC: 92 MG/DL (ref 65–140)
GLUCOSE SERPL-MCNC: 93 MG/DL (ref 65–140)
HCT VFR BLD AUTO: 36.3 % (ref 36.5–49.3)
HGB BLD-MCNC: 11.2 G/DL (ref 12–17)
MCH RBC QN AUTO: 26.9 PG (ref 26.8–34.3)
MCHC RBC AUTO-ENTMCNC: 30.9 G/DL (ref 31.4–37.4)
MCV RBC AUTO: 87 FL (ref 82–98)
PLATELET # BLD AUTO: 275 THOUSANDS/UL (ref 149–390)
PMV BLD AUTO: 11.3 FL (ref 8.9–12.7)
POTASSIUM SERPL-SCNC: 3.8 MMOL/L (ref 3.5–5.3)
RBC # BLD AUTO: 4.16 MILLION/UL (ref 3.88–5.62)
SODIUM SERPL-SCNC: 139 MMOL/L (ref 136–145)
WBC # BLD AUTO: 13.61 THOUSAND/UL (ref 4.31–10.16)

## 2020-10-28 PROCEDURE — 85027 COMPLETE CBC AUTOMATED: CPT | Performed by: NURSE PRACTITIONER

## 2020-10-28 PROCEDURE — 80048 BASIC METABOLIC PNL TOTAL CA: CPT | Performed by: NURSE PRACTITIONER

## 2020-10-28 PROCEDURE — 99232 SBSQ HOSP IP/OBS MODERATE 35: CPT | Performed by: INTERNAL MEDICINE

## 2020-10-28 PROCEDURE — 99024 POSTOP FOLLOW-UP VISIT: CPT | Performed by: PHYSICIAN ASSISTANT

## 2020-10-28 PROCEDURE — 82948 REAGENT STRIP/BLOOD GLUCOSE: CPT

## 2020-10-28 RX ORDER — INSULIN GLARGINE 100 [IU]/ML
40 INJECTION, SOLUTION SUBCUTANEOUS
Status: DISCONTINUED | OUTPATIENT
Start: 2020-10-29 | End: 2020-10-28

## 2020-10-28 RX ORDER — INSULIN GLARGINE 100 [IU]/ML
40 INJECTION, SOLUTION SUBCUTANEOUS
Status: DISCONTINUED | OUTPATIENT
Start: 2020-10-28 | End: 2020-10-29

## 2020-10-28 RX ADMIN — METOPROLOL TARTRATE 50 MG: 50 TABLET, FILM COATED ORAL at 08:49

## 2020-10-28 RX ADMIN — INSULIN LISPRO 16 UNITS: 100 INJECTION, SOLUTION INTRAVENOUS; SUBCUTANEOUS at 12:27

## 2020-10-28 RX ADMIN — DILTIAZEM HYDROCHLORIDE 240 MG: 240 CAPSULE, COATED, EXTENDED RELEASE ORAL at 08:49

## 2020-10-28 RX ADMIN — FUROSEMIDE 60 MG: 40 TABLET ORAL at 08:50

## 2020-10-28 RX ADMIN — ACETAMINOPHEN 975 MG: 325 TABLET, FILM COATED ORAL at 06:01

## 2020-10-28 RX ADMIN — INSULIN LISPRO 5 UNITS: 100 INJECTION, SOLUTION INTRAVENOUS; SUBCUTANEOUS at 12:27

## 2020-10-28 RX ADMIN — HYDRALAZINE HYDROCHLORIDE 10 MG: 10 TABLET, FILM COATED ORAL at 17:34

## 2020-10-28 RX ADMIN — INSULIN GLARGINE 40 UNITS: 100 INJECTION, SOLUTION SUBCUTANEOUS at 11:17

## 2020-10-28 RX ADMIN — ACETAMINOPHEN 975 MG: 325 TABLET, FILM COATED ORAL at 14:31

## 2020-10-28 RX ADMIN — HYDRALAZINE HYDROCHLORIDE 10 MG: 10 TABLET, FILM COATED ORAL at 08:50

## 2020-10-28 RX ADMIN — HEPARIN SODIUM 5000 UNITS: 5000 INJECTION INTRAVENOUS; SUBCUTANEOUS at 06:01

## 2020-10-28 RX ADMIN — INSULIN LISPRO 16 UNITS: 100 INJECTION, SOLUTION INTRAVENOUS; SUBCUTANEOUS at 17:52

## 2020-10-28 RX ADMIN — FUROSEMIDE 60 MG: 40 TABLET ORAL at 17:34

## 2020-10-28 RX ADMIN — ATORVASTATIN CALCIUM 20 MG: 20 TABLET, FILM COATED ORAL at 17:34

## 2020-10-28 RX ADMIN — HYDRALAZINE HYDROCHLORIDE 10 MG: 10 TABLET, FILM COATED ORAL at 21:46

## 2020-10-28 RX ADMIN — PANTOPRAZOLE SODIUM 40 MG: 40 TABLET, DELAYED RELEASE ORAL at 06:01

## 2020-10-28 RX ADMIN — INSULIN LISPRO 16 UNITS: 100 INJECTION, SOLUTION INTRAVENOUS; SUBCUTANEOUS at 08:52

## 2020-10-28 RX ADMIN — INSULIN LISPRO 3 UNITS: 100 INJECTION, SOLUTION INTRAVENOUS; SUBCUTANEOUS at 08:52

## 2020-10-28 RX ADMIN — ACETAMINOPHEN 975 MG: 325 TABLET, FILM COATED ORAL at 21:46

## 2020-10-28 RX ADMIN — METOPROLOL TARTRATE 50 MG: 50 TABLET, FILM COATED ORAL at 21:46

## 2020-10-28 RX ADMIN — HEPARIN SODIUM 5000 UNITS: 5000 INJECTION INTRAVENOUS; SUBCUTANEOUS at 21:46

## 2020-10-29 LAB
GLUCOSE SERPL-MCNC: 103 MG/DL (ref 65–140)
GLUCOSE SERPL-MCNC: 190 MG/DL (ref 65–140)
GLUCOSE SERPL-MCNC: 204 MG/DL (ref 65–140)
GLUCOSE SERPL-MCNC: 220 MG/DL (ref 65–140)
GLUCOSE SERPL-MCNC: 273 MG/DL (ref 65–140)

## 2020-10-29 PROCEDURE — 82948 REAGENT STRIP/BLOOD GLUCOSE: CPT

## 2020-10-29 PROCEDURE — 99232 SBSQ HOSP IP/OBS MODERATE 35: CPT | Performed by: INTERNAL MEDICINE

## 2020-10-29 PROCEDURE — 92610 EVALUATE SWALLOWING FUNCTION: CPT

## 2020-10-29 PROCEDURE — 97535 SELF CARE MNGMENT TRAINING: CPT

## 2020-10-29 PROCEDURE — 99024 POSTOP FOLLOW-UP VISIT: CPT | Performed by: PHYSICIAN ASSISTANT

## 2020-10-29 PROCEDURE — 99222 1ST HOSP IP/OBS MODERATE 55: CPT | Performed by: INTERNAL MEDICINE

## 2020-10-29 PROCEDURE — 97530 THERAPEUTIC ACTIVITIES: CPT

## 2020-10-29 PROCEDURE — 97110 THERAPEUTIC EXERCISES: CPT

## 2020-10-29 RX ORDER — ASPIRIN 81 MG/1
81 TABLET, CHEWABLE ORAL DAILY
Status: DISCONTINUED | OUTPATIENT
Start: 2020-10-30 | End: 2020-10-30 | Stop reason: HOSPADM

## 2020-10-29 RX ORDER — INSULIN GLARGINE 100 [IU]/ML
44 INJECTION, SOLUTION SUBCUTANEOUS
Status: DISCONTINUED | OUTPATIENT
Start: 2020-10-30 | End: 2020-10-30 | Stop reason: HOSPADM

## 2020-10-29 RX ORDER — DOCUSATE SODIUM 100 MG/1
100 CAPSULE, LIQUID FILLED ORAL 2 TIMES DAILY
Status: DISCONTINUED | OUTPATIENT
Start: 2020-10-29 | End: 2020-10-30 | Stop reason: HOSPADM

## 2020-10-29 RX ORDER — SENNOSIDES 8.6 MG
1 TABLET ORAL
Status: DISCONTINUED | OUTPATIENT
Start: 2020-10-29 | End: 2020-10-30 | Stop reason: HOSPADM

## 2020-10-29 RX ADMIN — METOPROLOL TARTRATE 50 MG: 50 TABLET, FILM COATED ORAL at 09:11

## 2020-10-29 RX ADMIN — INSULIN LISPRO 2 UNITS: 100 INJECTION, SOLUTION INTRAVENOUS; SUBCUTANEOUS at 09:09

## 2020-10-29 RX ADMIN — PANTOPRAZOLE SODIUM 40 MG: 40 TABLET, DELAYED RELEASE ORAL at 05:52

## 2020-10-29 RX ADMIN — ATORVASTATIN CALCIUM 20 MG: 20 TABLET, FILM COATED ORAL at 16:37

## 2020-10-29 RX ADMIN — HYDRALAZINE HYDROCHLORIDE 10 MG: 10 TABLET, FILM COATED ORAL at 16:37

## 2020-10-29 RX ADMIN — INSULIN LISPRO 16 UNITS: 100 INJECTION, SOLUTION INTRAVENOUS; SUBCUTANEOUS at 13:00

## 2020-10-29 RX ADMIN — HYDRALAZINE HYDROCHLORIDE 10 MG: 10 TABLET, FILM COATED ORAL at 22:35

## 2020-10-29 RX ADMIN — HEPARIN SODIUM 5000 UNITS: 5000 INJECTION INTRAVENOUS; SUBCUTANEOUS at 22:32

## 2020-10-29 RX ADMIN — FUROSEMIDE 60 MG: 40 TABLET ORAL at 09:10

## 2020-10-29 RX ADMIN — INSULIN LISPRO 16 UNITS: 100 INJECTION, SOLUTION INTRAVENOUS; SUBCUTANEOUS at 09:09

## 2020-10-29 RX ADMIN — ACETAMINOPHEN 975 MG: 325 TABLET, FILM COATED ORAL at 13:00

## 2020-10-29 RX ADMIN — INSULIN LISPRO 4 UNITS: 100 INJECTION, SOLUTION INTRAVENOUS; SUBCUTANEOUS at 13:00

## 2020-10-29 RX ADMIN — HEPARIN SODIUM 5000 UNITS: 5000 INJECTION INTRAVENOUS; SUBCUTANEOUS at 05:52

## 2020-10-29 RX ADMIN — HEPARIN SODIUM 5000 UNITS: 5000 INJECTION INTRAVENOUS; SUBCUTANEOUS at 13:00

## 2020-10-29 RX ADMIN — HYDRALAZINE HYDROCHLORIDE 10 MG: 10 TABLET, FILM COATED ORAL at 09:10

## 2020-10-29 RX ADMIN — DILTIAZEM HYDROCHLORIDE 240 MG: 240 CAPSULE, COATED, EXTENDED RELEASE ORAL at 09:10

## 2020-10-29 RX ADMIN — INSULIN LISPRO 16 UNITS: 100 INJECTION, SOLUTION INTRAVENOUS; SUBCUTANEOUS at 16:39

## 2020-10-29 RX ADMIN — ACETAMINOPHEN 975 MG: 325 TABLET, FILM COATED ORAL at 22:34

## 2020-10-29 RX ADMIN — ACETAMINOPHEN 975 MG: 325 TABLET, FILM COATED ORAL at 05:52

## 2020-10-29 RX ADMIN — SENNOSIDES 8.6 MG: 8.6 TABLET, FILM COATED ORAL at 22:34

## 2020-10-29 RX ADMIN — INSULIN LISPRO 2 UNITS: 100 INJECTION, SOLUTION INTRAVENOUS; SUBCUTANEOUS at 16:39

## 2020-10-29 RX ADMIN — METOPROLOL TARTRATE 50 MG: 50 TABLET, FILM COATED ORAL at 22:35

## 2020-10-29 RX ADMIN — FUROSEMIDE 60 MG: 40 TABLET ORAL at 16:37

## 2020-10-29 RX ADMIN — INSULIN GLARGINE 40 UNITS: 100 INJECTION, SOLUTION SUBCUTANEOUS at 09:08

## 2020-10-30 VITALS
RESPIRATION RATE: 18 BRPM | SYSTOLIC BLOOD PRESSURE: 139 MMHG | HEIGHT: 68 IN | HEART RATE: 69 BPM | TEMPERATURE: 98.9 F | DIASTOLIC BLOOD PRESSURE: 69 MMHG | BODY MASS INDEX: 30.16 KG/M2 | WEIGHT: 199 LBS | OXYGEN SATURATION: 94 %

## 2020-10-30 LAB
GLUCOSE SERPL-MCNC: 241 MG/DL (ref 65–140)
GLUCOSE SERPL-MCNC: 271 MG/DL (ref 65–140)
SARS-COV-2 RNA RESP QL NAA+PROBE: NEGATIVE

## 2020-10-30 PROCEDURE — 82948 REAGENT STRIP/BLOOD GLUCOSE: CPT

## 2020-10-30 PROCEDURE — 99232 SBSQ HOSP IP/OBS MODERATE 35: CPT | Performed by: INTERNAL MEDICINE

## 2020-10-30 PROCEDURE — U0003 INFECTIOUS AGENT DETECTION BY NUCLEIC ACID (DNA OR RNA); SEVERE ACUTE RESPIRATORY SYNDROME CORONAVIRUS 2 (SARS-COV-2) (CORONAVIRUS DISEASE [COVID-19]), AMPLIFIED PROBE TECHNIQUE, MAKING USE OF HIGH THROUGHPUT TECHNOLOGIES AS DESCRIBED BY CMS-2020-01-R: HCPCS | Performed by: PHYSICIAN ASSISTANT

## 2020-10-30 PROCEDURE — 99024 POSTOP FOLLOW-UP VISIT: CPT | Performed by: NEUROLOGICAL SURGERY

## 2020-10-30 PROCEDURE — 99024 POSTOP FOLLOW-UP VISIT: CPT | Performed by: PHYSICIAN ASSISTANT

## 2020-10-30 RX ORDER — OXYCODONE HYDROCHLORIDE 5 MG/1
5 TABLET ORAL EVERY 4 HOURS PRN
Qty: 10 TABLET | Refills: 0
Start: 2020-10-30 | End: 2020-10-30 | Stop reason: HOSPADM

## 2020-10-30 RX ORDER — OXYCODONE HYDROCHLORIDE 5 MG/1
2.5 TABLET ORAL EVERY 4 HOURS PRN
Qty: 10 TABLET | Refills: 0
Start: 2020-10-30 | End: 2020-10-30

## 2020-10-30 RX ORDER — OXYCODONE HYDROCHLORIDE 5 MG/1
2.5 TABLET ORAL EVERY 4 HOURS PRN
Qty: 10 TABLET | Refills: 0
Start: 2020-10-30 | End: 2020-10-30 | Stop reason: HOSPADM

## 2020-10-30 RX ADMIN — DOCUSATE SODIUM 100 MG: 100 CAPSULE ORAL at 09:00

## 2020-10-30 RX ADMIN — FUROSEMIDE 60 MG: 40 TABLET ORAL at 09:00

## 2020-10-30 RX ADMIN — DILTIAZEM HYDROCHLORIDE 240 MG: 240 CAPSULE, COATED, EXTENDED RELEASE ORAL at 09:00

## 2020-10-30 RX ADMIN — HEPARIN SODIUM 5000 UNITS: 5000 INJECTION INTRAVENOUS; SUBCUTANEOUS at 05:59

## 2020-10-30 RX ADMIN — INSULIN LISPRO 3 UNITS: 100 INJECTION, SOLUTION INTRAVENOUS; SUBCUTANEOUS at 09:01

## 2020-10-30 RX ADMIN — ASPIRIN 81 MG CHEWABLE TABLET 81 MG: 81 TABLET CHEWABLE at 09:00

## 2020-10-30 RX ADMIN — INSULIN LISPRO 16 UNITS: 100 INJECTION, SOLUTION INTRAVENOUS; SUBCUTANEOUS at 09:02

## 2020-10-30 RX ADMIN — HEPARIN SODIUM 5000 UNITS: 5000 INJECTION INTRAVENOUS; SUBCUTANEOUS at 13:29

## 2020-10-30 RX ADMIN — PANTOPRAZOLE SODIUM 40 MG: 40 TABLET, DELAYED RELEASE ORAL at 05:59

## 2020-10-30 RX ADMIN — HYDRALAZINE HYDROCHLORIDE 10 MG: 10 TABLET, FILM COATED ORAL at 09:00

## 2020-10-30 RX ADMIN — INSULIN LISPRO 4 UNITS: 100 INJECTION, SOLUTION INTRAVENOUS; SUBCUTANEOUS at 13:31

## 2020-10-30 RX ADMIN — INSULIN GLARGINE 44 UNITS: 100 INJECTION, SOLUTION SUBCUTANEOUS at 09:01

## 2020-10-30 RX ADMIN — ACETAMINOPHEN 975 MG: 325 TABLET, FILM COATED ORAL at 05:59

## 2020-10-30 RX ADMIN — METOPROLOL TARTRATE 50 MG: 50 TABLET, FILM COATED ORAL at 09:00

## 2020-10-30 RX ADMIN — INSULIN LISPRO 16 UNITS: 100 INJECTION, SOLUTION INTRAVENOUS; SUBCUTANEOUS at 13:30

## 2020-11-02 ENCOUNTER — TELEPHONE (OUTPATIENT)
Dept: NEUROSURGERY | Facility: CLINIC | Age: 85
End: 2020-11-02

## 2020-11-03 ENCOUNTER — NURSING HOME VISIT (OUTPATIENT)
Dept: GERIATRICS | Facility: OTHER | Age: 85
End: 2020-11-03
Payer: COMMERCIAL

## 2020-11-03 DIAGNOSIS — Z79.4 TYPE 2 DIABETES MELLITUS WITH HYPERGLYCEMIA, WITH LONG-TERM CURRENT USE OF INSULIN (HCC): ICD-10-CM

## 2020-11-03 DIAGNOSIS — I48.91 ATRIAL FIBRILLATION, UNSPECIFIED TYPE (HCC): ICD-10-CM

## 2020-11-03 DIAGNOSIS — E78.5 HYPERLIPIDEMIA, UNSPECIFIED HYPERLIPIDEMIA TYPE: ICD-10-CM

## 2020-11-03 DIAGNOSIS — I50.32 CHRONIC DIASTOLIC CHF (CONGESTIVE HEART FAILURE) (HCC): ICD-10-CM

## 2020-11-03 DIAGNOSIS — I77.74 VERTEBRAL ARTERY DISSECTION (HCC): ICD-10-CM

## 2020-11-03 DIAGNOSIS — I10 HYPERTENSION, UNSPECIFIED TYPE: ICD-10-CM

## 2020-11-03 DIAGNOSIS — E11.65 TYPE 2 DIABETES MELLITUS WITH HYPERGLYCEMIA, WITH LONG-TERM CURRENT USE OF INSULIN (HCC): ICD-10-CM

## 2020-11-03 DIAGNOSIS — S12.111G CLOSED ODONTOID FRACTURE WITH TYPE II MORPHOLOGY, POSTERIOR DISPLACEMENT, AND DELAYED HEALING, SUBSEQUENT ENCOUNTER: ICD-10-CM

## 2020-11-03 DIAGNOSIS — S12.090A OTHER CLOSED DISPLACED FRACTURE OF FIRST CERVICAL VERTEBRA, INITIAL ENCOUNTER (HCC): Primary | ICD-10-CM

## 2020-11-03 PROCEDURE — 99305 1ST NF CARE MODERATE MDM 35: CPT | Performed by: INTERNAL MEDICINE

## 2020-11-03 RX ORDER — DABIGATRAN ETEXILATE 150 MG/1
1 CAPSULE, COATED PELLETS ORAL 2 TIMES DAILY
COMMUNITY
End: 2021-12-14 | Stop reason: ALTCHOICE

## 2020-11-09 ENCOUNTER — CLINICAL SUPPORT (OUTPATIENT)
Dept: NEUROSURGERY | Facility: CLINIC | Age: 85
End: 2020-11-09

## 2020-11-09 VITALS — SYSTOLIC BLOOD PRESSURE: 128 MMHG | TEMPERATURE: 97.7 F | DIASTOLIC BLOOD PRESSURE: 76 MMHG

## 2020-11-09 DIAGNOSIS — Z98.890 POST-OPERATIVE STATE: Primary | ICD-10-CM

## 2020-11-09 PROCEDURE — 99024 POSTOP FOLLOW-UP VISIT: CPT

## 2020-11-10 ENCOUNTER — NURSING HOME VISIT (OUTPATIENT)
Dept: GERIATRICS | Facility: OTHER | Age: 85
End: 2020-11-10
Payer: COMMERCIAL

## 2020-11-10 DIAGNOSIS — I50.32 CHRONIC DIASTOLIC CHF (CONGESTIVE HEART FAILURE) (HCC): ICD-10-CM

## 2020-11-10 DIAGNOSIS — Z79.4 TYPE 2 DIABETES MELLITUS WITH HYPERGLYCEMIA, WITH LONG-TERM CURRENT USE OF INSULIN (HCC): ICD-10-CM

## 2020-11-10 DIAGNOSIS — E11.65 TYPE 2 DIABETES MELLITUS WITH HYPERGLYCEMIA, WITH LONG-TERM CURRENT USE OF INSULIN (HCC): ICD-10-CM

## 2020-11-10 DIAGNOSIS — R13.10 DYSPHAGIA, UNSPECIFIED TYPE: ICD-10-CM

## 2020-11-10 DIAGNOSIS — I10 HYPERTENSION, UNSPECIFIED TYPE: ICD-10-CM

## 2020-11-10 DIAGNOSIS — I77.74 VERTEBRAL ARTERY DISSECTION (HCC): ICD-10-CM

## 2020-11-10 DIAGNOSIS — I48.91 ATRIAL FIBRILLATION, UNSPECIFIED TYPE (HCC): Primary | ICD-10-CM

## 2020-11-10 PROCEDURE — 99309 SBSQ NF CARE MODERATE MDM 30: CPT | Performed by: NURSE PRACTITIONER

## 2020-11-11 VITALS
SYSTOLIC BLOOD PRESSURE: 130 MMHG | HEART RATE: 71 BPM | WEIGHT: 193.8 LBS | DIASTOLIC BLOOD PRESSURE: 64 MMHG | TEMPERATURE: 98.1 F | BODY MASS INDEX: 29.47 KG/M2 | RESPIRATION RATE: 18 BRPM

## 2020-11-12 ENCOUNTER — NURSING HOME VISIT (OUTPATIENT)
Dept: GERIATRICS | Facility: OTHER | Age: 85
End: 2020-11-12
Payer: COMMERCIAL

## 2020-11-12 DIAGNOSIS — R26.2 AMBULATORY DYSFUNCTION: Primary | ICD-10-CM

## 2020-11-12 DIAGNOSIS — L30.9 DERMATITIS: ICD-10-CM

## 2020-11-12 PROCEDURE — 99308 SBSQ NF CARE LOW MDM 20: CPT | Performed by: NURSE PRACTITIONER

## 2020-11-16 VITALS
TEMPERATURE: 97.4 F | BODY MASS INDEX: 29.8 KG/M2 | WEIGHT: 196 LBS | HEART RATE: 69 BPM | RESPIRATION RATE: 18 BRPM | DIASTOLIC BLOOD PRESSURE: 72 MMHG | SYSTOLIC BLOOD PRESSURE: 133 MMHG

## 2020-11-16 PROBLEM — L30.9 DERMATITIS: Status: ACTIVE | Noted: 2020-11-16

## 2020-12-07 ENCOUNTER — HOSPITAL ENCOUNTER (OUTPATIENT)
Dept: RADIOLOGY | Facility: HOSPITAL | Age: 85
Discharge: HOME | End: 2020-12-07
Payer: COMMERCIAL

## 2020-12-07 ENCOUNTER — TRANSCRIBE ORDERS (OUTPATIENT)
Dept: RADIOLOGY | Facility: HOSPITAL | Age: 85
End: 2020-12-07

## 2020-12-07 DIAGNOSIS — S12.111G CLOSED ODONTOID FRACTURE WITH TYPE II MORPHOLOGY, POSTERIOR DISPLACEMENT, AND DELAYED HEALING, SUBSEQUENT ENCOUNTER: ICD-10-CM

## 2020-12-07 DIAGNOSIS — S12.090K: ICD-10-CM

## 2020-12-07 DIAGNOSIS — Z98.1 STATUS POST CERVICAL SPINAL FUSION: ICD-10-CM

## 2020-12-07 PROCEDURE — 72040 X-RAY EXAM NECK SPINE 2-3 VW: CPT

## 2020-12-08 ENCOUNTER — TELEMEDICINE (OUTPATIENT)
Dept: NEUROSURGERY | Facility: CLINIC | Age: 85
End: 2020-12-08

## 2020-12-08 ENCOUNTER — NURSING HOME VISIT (OUTPATIENT)
Dept: GERIATRICS | Facility: OTHER | Age: 85
End: 2020-12-08
Payer: COMMERCIAL

## 2020-12-08 VITALS
HEART RATE: 65 BPM | BODY MASS INDEX: 29.68 KG/M2 | DIASTOLIC BLOOD PRESSURE: 66 MMHG | RESPIRATION RATE: 17 BRPM | WEIGHT: 195.2 LBS | TEMPERATURE: 97.4 F | SYSTOLIC BLOOD PRESSURE: 132 MMHG

## 2020-12-08 DIAGNOSIS — E11.65 TYPE 2 DIABETES MELLITUS WITH HYPERGLYCEMIA, WITH LONG-TERM CURRENT USE OF INSULIN (HCC): ICD-10-CM

## 2020-12-08 DIAGNOSIS — I50.32 CHRONIC DIASTOLIC CHF (CONGESTIVE HEART FAILURE) (HCC): ICD-10-CM

## 2020-12-08 DIAGNOSIS — Z98.1 ARTHRODESIS STATUS: Primary | ICD-10-CM

## 2020-12-08 DIAGNOSIS — I10 HYPERTENSION, UNSPECIFIED TYPE: ICD-10-CM

## 2020-12-08 DIAGNOSIS — Z79.4 TYPE 2 DIABETES MELLITUS WITH HYPERGLYCEMIA, WITH LONG-TERM CURRENT USE OF INSULIN (HCC): ICD-10-CM

## 2020-12-08 DIAGNOSIS — I48.91 ATRIAL FIBRILLATION, UNSPECIFIED TYPE (HCC): Primary | ICD-10-CM

## 2020-12-08 DIAGNOSIS — R13.10 DYSPHAGIA, UNSPECIFIED TYPE: ICD-10-CM

## 2020-12-08 DIAGNOSIS — S12.111G CLOSED ODONTOID FRACTURE WITH TYPE II MORPHOLOGY, POSTERIOR DISPLACEMENT, AND DELAYED HEALING, SUBSEQUENT ENCOUNTER: ICD-10-CM

## 2020-12-08 PROCEDURE — 99024 POSTOP FOLLOW-UP VISIT: CPT | Performed by: NEUROLOGICAL SURGERY

## 2020-12-08 PROCEDURE — 99309 SBSQ NF CARE MODERATE MDM 30: CPT | Performed by: NURSE PRACTITIONER

## 2020-12-08 RX ORDER — KETOCONAZOLE 20 MG/G
CREAM TOPICAL DAILY
COMMUNITY
End: 2021-01-14 | Stop reason: ALTCHOICE

## 2020-12-12 ENCOUNTER — TELEPHONE (OUTPATIENT)
Dept: OTHER | Facility: OTHER | Age: 85
End: 2020-12-12

## 2020-12-22 ENCOUNTER — NURSING HOME VISIT (OUTPATIENT)
Dept: GERIATRICS | Facility: OTHER | Age: 85
End: 2020-12-22
Payer: COMMERCIAL

## 2020-12-22 DIAGNOSIS — U07.1 COVID-19: Primary | ICD-10-CM

## 2020-12-22 PROCEDURE — 99308 SBSQ NF CARE LOW MDM 20: CPT | Performed by: NURSE PRACTITIONER

## 2020-12-23 ENCOUNTER — NURSING HOME VISIT (OUTPATIENT)
Dept: GERIATRICS | Facility: OTHER | Age: 85
End: 2020-12-23
Payer: COMMERCIAL

## 2020-12-23 VITALS
TEMPERATURE: 98.3 F | DIASTOLIC BLOOD PRESSURE: 75 MMHG | RESPIRATION RATE: 15 BRPM | BODY MASS INDEX: 29.95 KG/M2 | HEART RATE: 78 BPM | SYSTOLIC BLOOD PRESSURE: 126 MMHG | WEIGHT: 197 LBS

## 2020-12-23 DIAGNOSIS — I50.32 CHRONIC DIASTOLIC CHF (CONGESTIVE HEART FAILURE) (HCC): ICD-10-CM

## 2020-12-23 DIAGNOSIS — Z79.4 TYPE 2 DIABETES MELLITUS WITH HYPERGLYCEMIA, WITH LONG-TERM CURRENT USE OF INSULIN (HCC): ICD-10-CM

## 2020-12-23 DIAGNOSIS — U07.1 COVID-19: Primary | ICD-10-CM

## 2020-12-23 DIAGNOSIS — E11.65 TYPE 2 DIABETES MELLITUS WITH HYPERGLYCEMIA, WITH LONG-TERM CURRENT USE OF INSULIN (HCC): ICD-10-CM

## 2020-12-23 PROCEDURE — 99310 SBSQ NF CARE HIGH MDM 45: CPT | Performed by: NURSE PRACTITIONER

## 2021-01-04 ENCOUNTER — NURSING HOME VISIT (OUTPATIENT)
Dept: GERIATRICS | Facility: OTHER | Age: 86
End: 2021-01-04
Payer: COMMERCIAL

## 2021-01-04 DIAGNOSIS — R13.10 DYSPHAGIA, UNSPECIFIED TYPE: ICD-10-CM

## 2021-01-04 DIAGNOSIS — E11.65 TYPE 2 DIABETES MELLITUS WITH HYPERGLYCEMIA, WITH LONG-TERM CURRENT USE OF INSULIN (HCC): ICD-10-CM

## 2021-01-04 DIAGNOSIS — Z79.4 TYPE 2 DIABETES MELLITUS WITH HYPERGLYCEMIA, WITH LONG-TERM CURRENT USE OF INSULIN (HCC): ICD-10-CM

## 2021-01-04 DIAGNOSIS — U07.1 COVID-19: Primary | ICD-10-CM

## 2021-01-04 PROCEDURE — 99309 SBSQ NF CARE MODERATE MDM 30: CPT | Performed by: NURSE PRACTITIONER

## 2021-01-05 VITALS
WEIGHT: 194.2 LBS | SYSTOLIC BLOOD PRESSURE: 134 MMHG | RESPIRATION RATE: 18 BRPM | BODY MASS INDEX: 29.53 KG/M2 | DIASTOLIC BLOOD PRESSURE: 77 MMHG | TEMPERATURE: 97.9 F | HEART RATE: 74 BPM

## 2021-01-05 NOTE — ASSESSMENT & PLAN NOTE
· Asymptomatic  · Latest test negative  · Pt to be moved off of the covid unit back to second floor  · Continue to  Monitor pt fluid intake and meal completion  · Assist with ADL and IADL  · Restorative care

## 2021-01-05 NOTE — PROGRESS NOTES
[facility]  [address]  [phone]  [address] Acute Note        NAME: Hannah Rivas  AGE: 82 y o  SEX: male    DATE OF ENCOUNTER: 1/4/21    Assessment and Plan     1  COVID-19  Assessment & Plan:  · Asymptomatic  · Latest test negative  · Pt to be moved off of the covid unit back to second floor  · Continue to  Monitor pt fluid intake and meal completion  · Assist with ADL and IADL  · Restorative care      2  Dysphagia, unspecified type  Assessment & Plan:  · Doing well with altered texture diet  · Continue with aspiration precautions      3  Type 2 diabetes mellitus with hyperglycemia, with long-term current use of insulin (HCC)  Assessment & Plan:  · Daily control  · Continue with insulin  · Monitor BS daily          No orders of the defined types were placed in this encounter  History of Present Illness     Hannah Rivas 82 y o  male seen for follow-up of care and treatment plan for ambulatory dysfunction doing well at LTCF, [address] [facility] asymptomatic, dysphagia doing well with altered texture diet, DM2 daily BS control    The following portions of the patient's history were reviewed and updated as appropriate: allergies, current medications, past family history, past medical history, past social history, past surgical history and problem list     Review of Systems     Review of Systems   Constitutional: Positive for activity change and fatigue  Negative for chills and fever  HENT: Negative for ear pain and sore throat  Eyes: Negative for pain and visual disturbance  Respiratory: Negative for cough and shortness of breath  Cardiovascular: Negative for chest pain and palpitations  Gastrointestinal: Negative for abdominal pain, constipation, diarrhea and vomiting  Genitourinary: Negative for dysuria and hematuria  Musculoskeletal: Positive for gait problem  Negative for arthralgias and back pain  Skin: Negative for color change and rash     Neurological: Positive for weakness  Negative for seizures and syncope  All other systems reviewed and are negative  Active Problem List     Patient Active Problem List   Diagnosis    Fall    Type II dens fracture of second cervical vertebra (HCC)    C1 cervical fracture (HCC)    Atrial fibrillation (HCC)    Hypertension    Hyperlipidemia    Type 2 diabetes mellitus (HCC)    Multiple falls    Vertebral artery dissection (HCC)    Dysphagia    Ambulatory dysfunction    Abnormal EKG    Chronic diastolic CHF (congestive heart failure) (HCC)    Weight gain    Counseling regarding advance care planning and goals of care    Dermatitis    COVID-19       Objective     /77   Pulse 74   Temp 97 9 °F (36 6 °C)   Resp 18   Wt 88 1 kg (194 lb 3 2 oz)   BMI 29 53 kg/m²     Physical Exam  Vitals signs reviewed  Constitutional:       Appearance: He is well-developed  HENT:      Head: Normocephalic and atraumatic  Eyes:      Conjunctiva/sclera: Conjunctivae normal    Neck:      Musculoskeletal: Normal range of motion  Cardiovascular:      Rate and Rhythm: Normal rate and regular rhythm  Heart sounds: Normal heart sounds, S1 normal and S2 normal  No murmur  Pulmonary:      Effort: Pulmonary effort is normal  No respiratory distress  Breath sounds: Normal breath sounds  No wheezing  Abdominal:      General: Bowel sounds are normal  There is no distension  Palpations: Abdomen is soft  Tenderness: There is no abdominal tenderness  Musculoskeletal: Normal range of motion  Comments: Generalized weakness   Skin:     General: Skin is warm and dry  Neurological:      Mental Status: He is alert  Psychiatric:         Attention and Perception: Attention normal          Mood and Affect: Mood normal          Speech: Speech normal          Behavior: Behavior normal          Thought Content: Thought content normal          Cognition and Memory: Cognition is impaired       Pertinent Laboratory/Diagnostic Studies:  HFM Labs Reviewed    Current Medications   Medication list reviewed and updated today  Please see paper chart for updated medication list      Sathish Mohs  13:27 PM      Name: Rupali Krishnan  : 1935  MRN: [id number]  DOS: 21    Diagnoses:   Diagnosis ICD-10-CM Associated Orders   1  COVID-19  U07 1    2  Dysphagia, unspecified type  R13 10    3   Type 2 diabetes mellitus with hyperglycemia, with long-term current use of insulin ([facility])  E11 65     Z79 4

## 2021-01-13 ENCOUNTER — NURSING HOME VISIT (OUTPATIENT)
Dept: GERIATRICS | Facility: OTHER | Age: 86
End: 2021-01-13
Payer: COMMERCIAL

## 2021-01-13 VITALS
DIASTOLIC BLOOD PRESSURE: 84 MMHG | TEMPERATURE: 97.1 F | BODY MASS INDEX: 29.71 KG/M2 | SYSTOLIC BLOOD PRESSURE: 161 MMHG | WEIGHT: 195.4 LBS | RESPIRATION RATE: 18 BRPM | HEART RATE: 65 BPM

## 2021-01-13 DIAGNOSIS — H61.23 CERUMEN DEBRIS ON TYMPANIC MEMBRANE OF BOTH EARS: Primary | ICD-10-CM

## 2021-01-13 PROCEDURE — 69210 REMOVE IMPACTED EAR WAX UNI: CPT | Performed by: NURSE PRACTITIONER

## 2021-01-13 PROCEDURE — 99307 SBSQ NF CARE SF MDM 10: CPT | Performed by: NURSE PRACTITIONER

## 2021-01-13 NOTE — PROGRESS NOTES
Ear cerumen removal    Date/Time: 1/13/2021 2:13 PM  Performed by: ARISTEO Castellano  Authorized by: ARISTEO Castellano   Universal Protocol:  Consent: Verbal consent obtained  Written consent not obtained  Risks and benefits: risks, benefits and alternatives were discussed  Consent given by: patient  Patient understanding: patient states understanding of the procedure being performed  Patient identity confirmed: verbally with patient      Patient location:  Bedside  Procedure details:     Local anesthetic:  None    Location:  L ear and R ear    Procedure type: irrigation with instrumentation      Instrumentation: curette      Approach:  External  Post-procedure details:     Complication:  None    Hearing quality:  Normal    Patient tolerance of procedure:   Tolerated well, no immediate complications

## 2021-01-14 ENCOUNTER — NURSING HOME VISIT (OUTPATIENT)
Dept: GERIATRICS | Facility: OTHER | Age: 86
End: 2021-01-14
Payer: COMMERCIAL

## 2021-01-14 DIAGNOSIS — E11.65 TYPE 2 DIABETES MELLITUS WITH HYPERGLYCEMIA, WITH LONG-TERM CURRENT USE OF INSULIN (HCC): ICD-10-CM

## 2021-01-14 DIAGNOSIS — I50.32 CHRONIC HEART FAILURE WITH PRESERVED EJECTION FRACTION (HCC): ICD-10-CM

## 2021-01-14 DIAGNOSIS — D64.9 ANEMIA, UNSPECIFIED TYPE: ICD-10-CM

## 2021-01-14 DIAGNOSIS — Z71.89 GOALS OF CARE, COUNSELING/DISCUSSION: ICD-10-CM

## 2021-01-14 DIAGNOSIS — Z79.4 TYPE 2 DIABETES MELLITUS WITH HYPERGLYCEMIA, WITH LONG-TERM CURRENT USE OF INSULIN (HCC): ICD-10-CM

## 2021-01-14 DIAGNOSIS — L01.00 IMPETIGO: ICD-10-CM

## 2021-01-14 DIAGNOSIS — I48.91 ATRIAL FIBRILLATION, UNSPECIFIED TYPE (HCC): ICD-10-CM

## 2021-01-14 DIAGNOSIS — R53.81 DEBILITY: Primary | ICD-10-CM
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
## 2021-01-14 PROBLEM — R29.6 MULTIPLE FALLS: Status: RESOLVED | Noted: 2020-01-13 | Resolved: 2021-01-14

## 2021-01-14 PROBLEM — Z79.899 MEDICATION MANAGEMENT: Status: ACTIVE | Noted: 2021-01-14

## 2021-01-14 PROBLEM — Z86.16 HISTORY OF 2019 NOVEL CORONAVIRUS DISEASE (COVID-19): Status: ACTIVE | Noted: 2020-12-23

## 2021-01-14 PROCEDURE — 99309 SBSQ NF CARE MODERATE MDM 30: CPT | Performed by: INTERNAL MEDICINE

## 2021-01-15 NOTE — ASSESSMENT & PLAN NOTE
· Review of his weight log shows that he is doing well with furosemide 60 mg twice daily  · I will have him continue with this medication  · Will continue with clinical and periodic laboratory monitoring for change in condition

## 2021-01-15 NOTE — ASSESSMENT & PLAN NOTE
· Will treat with mupirocin ointment 3 times daily for 7 days  · Continue to monitor for change in condition

## 2021-01-15 NOTE — ASSESSMENT & PLAN NOTE
· He is doing well with Lantus and Humalog insulins  · I will have him continue with these medications  · Will continue with clinical and periodic laboratory monitoring for change in condition

## 2021-01-15 NOTE — PROGRESS NOTES
Amanda 11  [address]  Facility: [facility]    NAME: Azul Mendez  AGE: 82 y o  SEX: male    DATE OF ENCOUNTER: 1/14/2021    Code status:  CPR    Assessment and Plan     1  Debility  Assessment & Plan:  · Secondary to his chronic medical conditions  · He continues to require 24/7 care/support of his ADLs  · I recommend continued care/support of his ADLs as a long-term resident at the nursing facility  · Continue to monitor for change in condition      2  Impetigo  Assessment & Plan:  · Will treat with mupirocin ointment 3 times daily for 7 days  · Continue to monitor for change in condition      3  Type 2 diabetes mellitus with hyperglycemia, with long-term current use of insulin ([facility])  Assessment & Plan:    · He is doing well with Lantus and Humalog insulins  · I will have him continue with these medications  · Will continue with clinical and periodic laboratory monitoring for change in condition      4  Atrial fibrillation, unspecified type [facility])  Assessment & Plan:  · He is doing well with metoprolol tartrate for rate control  · He is doing well with dabigatran for anticoagulation  · I will have him continue with these medications  · Will continue with clinical and periodic laboratory monitoring for change in condition      5  Anemia, unspecified type  Assessment & Plan:  · Question related to blood loss from prior surgery verses secondary to acute illness  · Will continue with clinical and periodic laboratory monitoring for change in condition      6  Chronic heart failure with preserved ejection fraction (HCC)  Assessment & Plan:  · Review of his weight log shows that he is doing well with furosemide 60 mg twice daily  · I will have him continue with this medication  · Will continue with clinical and periodic laboratory monitoring for change in condition          7   Goals of care, counseling/discussion  Assessment & Plan:  · His resuscitation status is CPR         All medications and routine orders were reviewed and updated as needed  Plan discussed with:  Resident and nursing staff    Chief Complaint     He is seen for a follow-up visit to update the care and treatment of his debility secondary to his chronic medical conditions, atrial fibrillation, chronic heart failure with preserved ejection fraction, and insulin-requiring type 2 diabetes mellitus  History of Present Illness     He is an 28-year-old gentleman who is seen for a follow-up visit to update the care and treatment of his debility secondary to his chronic medical conditions-he still requires 24/7 care/support of his ADLs, atrial fibrillation-doing well with metoprolol tartrate/dabigatran, chronic heart failure with preserved ejection fraction-doing well with furosemide, and insulin-requiring type 2 diabetes mellitus-doing well with Lantus/Humalog  He would like me to look at a rash on the back of his neck  He endorses that it is not itchy  He feels that it has been present for about a week  He is not sure if it is getting better or worse  The following portions of the patient's history were reviewed and updated as appropriate: current medications, past family history, past medical history, past social history, past surgical history and problem list     Allergies: Allergies   Allergen Reactions    Amoxicillin Diarrhea       Review of Systems     Review of Systems   Constitutional: Negative for appetite change  Respiratory: Negative for chest tightness and shortness of breath  Cardiovascular: Positive for leg swelling  Negative for chest pain  Skin: Positive for rash  Medications and orders     All medications reviewed and updated in Nursing Home EMR  Objective     Vitals:  Weight 195 4 lb, pulse 89, blood pressure 144/59, fingerstick fasting blood sugar 238  Physical Exam  Vitals signs reviewed     Constitutional:       General: He is awake  Appearance: Normal appearance  He is well-developed  He is obese  Comments: He appears comfortable sitting in his wheelchair, stated age, and frail  HENT:      Head: Normocephalic and atraumatic  Eyes:      Conjunctiva/sclera: Conjunctivae normal    Cardiovascular:      Rate and Rhythm: Normal rate and regular rhythm  Heart sounds: Normal heart sounds  No murmur  No friction rub  No gallop  Pulmonary:      Effort: Pulmonary effort is normal  No respiratory distress  Breath sounds: Normal breath sounds  No stridor  No wheezing, rhonchi or rales  Abdominal:      General: Abdomen is flat  There is no distension  Palpations: Abdomen is soft  There is no mass  Tenderness: There is no abdominal tenderness  There is no guarding or rebound  Skin:     Findings: Rash present  Comments: Rash consistent with impetigo on posterior left scalp  Crusty yellowish discharge present  Neurological:      Mental Status: He is alert  Psychiatric:         Mood and Affect: Mood normal          Behavior: Behavior normal  Behavior is cooperative  Pertinent Laboratory/Diagnostic Studies: The following labs were reviewed please see chart or hospital paperwork for details  October 15, 2020:    Hemoglobin A1c: 8 1%    January 5, 2021:    CBC with diff:  WBC count 5 1, hemoglobin 10 7, hematocrit 32 5, platelet count 196,636    BMP:  Sodium 141, potassium 4 2, BUN 20, creatinine 0 81, fasting blood sugar 55, EGFR 79    - Monthly order summary reviewed and signed  Treatment plan reviewed with resident      CLIFF Burgess   1/14/2021 10:39 PM

## 2021-01-15 NOTE — ASSESSMENT & PLAN NOTE
· He is doing well with metoprolol tartrate for rate control  · He is doing well with dabigatran for anticoagulation  · I will have him continue with these medications  · Will continue with clinical and periodic laboratory monitoring for change in condition

## 2021-01-15 NOTE — ASSESSMENT & PLAN NOTE
· Secondary to his chronic medical conditions  · He continues to require 24/7 care/support of his ADLs  · I recommend continued care/support of his ADLs as a long-term resident at the nursing facility  · Continue to monitor for change in condition

## 2021-01-15 NOTE — ASSESSMENT & PLAN NOTE
· Question related to blood loss from prior surgery verses secondary to acute illness  · Will continue with clinical and periodic laboratory monitoring for change in condition

## 2021-01-21 ENCOUNTER — NURSING HOME VISIT (OUTPATIENT)
Dept: GERIATRICS | Facility: OTHER | Age: 86
End: 2021-01-21
Payer: COMMERCIAL

## 2021-01-21 VITALS
RESPIRATION RATE: 18 BRPM | HEART RATE: 68 BPM | WEIGHT: 201 LBS | SYSTOLIC BLOOD PRESSURE: 134 MMHG | BODY MASS INDEX: 30.56 KG/M2 | DIASTOLIC BLOOD PRESSURE: 74 MMHG | TEMPERATURE: 96.9 F

## 2021-01-21 DIAGNOSIS — R53.81 DEBILITY: ICD-10-CM

## 2021-01-21 DIAGNOSIS — L01.00 IMPETIGO: Primary | ICD-10-CM

## 2021-01-21 PROBLEM — L30.9 DERMATITIS: Status: RESOLVED | Noted: 2020-11-16 | Resolved: 2021-01-21

## 2021-01-21 PROCEDURE — 99308 SBSQ NF CARE LOW MDM 20: CPT | Performed by: NURSE PRACTITIONER

## 2021-01-21 NOTE — ASSESSMENT & PLAN NOTE
· Continues to do well at LTCF  · Assist with ADL and IADL  · Fall precautions  · Ambulates in wheelchair independant

## 2021-01-21 NOTE — PROGRESS NOTES
[address] [address] Steve Bunch  [phone]  [address] Acute Note        NAME: Mark Garner  AGE: 82 y o  SEX: male    DATE OF ENCOUNTER: 1/21/2021    Assessment and Plan     1  Impetigo  Assessment & Plan:  · Resolved  · Continue to periodically monitor for reoccurrence       2  Debility  Assessment & Plan:  · Continues to do well at LTCF  · Assist with ADL and IADL  · Fall precautions  · Ambulates in wheelchair independant          No orders of the defined types were placed in this encounter  History of Present Illness     Mark Garner 82 y o  male seen for follow-up of care and treatment plan for ambulatory dysfunction doing well at LTCF, impetigo resolved    The following portions of the patient's history were reviewed and updated as appropriate: allergies, current medications, past family history, past medical history, past social history, past surgical history and problem list     Review of Systems     Review of Systems   Constitutional: Negative for chills and fever  HENT: Negative for ear pain and sore throat  Eyes: Negative for pain and visual disturbance  Respiratory: Negative for cough and shortness of breath  Cardiovascular: Negative for chest pain and palpitations  Gastrointestinal: Negative for abdominal pain and vomiting  Genitourinary: Negative for dysuria and hematuria  Musculoskeletal: Positive for gait problem  Negative for arthralgias and back pain  Skin: Negative for color change and rash  Neurological: Positive for weakness  Negative for seizures and syncope  Psychiatric/Behavioral: Positive for confusion (at times)  All other systems reviewed and are negative        Active Problem List     Patient Active Problem List   Diagnosis    Type II dens fracture of second cervical vertebra (HCC)    C1 cervical fracture (HCC)    Atrial fibrillation (HCC)    Hypertension    Hyperlipidemia    Type 2 diabetes mellitus ([facility] [address] )    Vertebral artery dissection (HCC)    Dysphagia    Ambulatory dysfunction    Abnormal EKG    Chronic heart failure with preserved ejection fraction (HCC)    Weight gain    Counseling regarding advance care planning and goals of care    History of 2019 novel coronavirus disease (COVID-19)    Debility    Impetigo    Medication management    Goals of care, counseling/discussion    Anemia       Objective     /74   Pulse 68   Temp (!) 96 9 °F (36 1 °C)   Resp 18   Wt 91 2 kg (201 lb)   BMI 30 56 kg/m²     Physical Exam  Vitals signs reviewed  Constitutional:       Appearance: He is well-developed  HENT:      Head: Normocephalic and atraumatic  Eyes:      Conjunctiva/sclera: Conjunctivae normal    Neck:      Musculoskeletal: Normal range of motion  Cardiovascular:      Rate and Rhythm: Normal rate and regular rhythm  Heart sounds: Normal heart sounds, S1 normal and S2 normal  No murmur  Pulmonary:      Effort: Pulmonary effort is normal  No respiratory distress  Breath sounds: Normal breath sounds  No wheezing  Abdominal:      General: Bowel sounds are normal  There is no distension  Palpations: Abdomen is soft  Tenderness: There is no abdominal tenderness  Musculoskeletal: Normal range of motion  Comments: Generalized weakness   Skin:     General: Skin is warm and dry  Neurological:      Mental Status: He is alert  Psychiatric:         Attention and Perception: Attention normal          Mood and Affect: Mood normal          Speech: Speech normal          Behavior: Behavior normal          Thought Content: Thought content normal          Cognition and Memory: Cognition is impaired  Pertinent Laboratory/Diagnostic Studies:  HFM Labs Reviewed    Current Medications   Medication list reviewed and updated today   Please see paper chart for updated medication list      Purnima Rodriguez  112:15 PM      Name: Ruba Jenkins  : 1935  MRN: [id number]  DOS: 1/21/2021    Diagnoses:   Diagnosis ICD-10-CM Associated Orders   1  Impetigo  L01 00    2   Debility  R53 81

## 2021-02-19 ENCOUNTER — NURSING HOME VISIT (OUTPATIENT)
Dept: GERIATRICS | Facility: OTHER | Age: 86
End: 2021-02-19
Payer: COMMERCIAL

## 2021-02-19 VITALS
RESPIRATION RATE: 18 BRPM | HEART RATE: 65 BPM | SYSTOLIC BLOOD PRESSURE: 140 MMHG | DIASTOLIC BLOOD PRESSURE: 79 MMHG | BODY MASS INDEX: 30.26 KG/M2 | TEMPERATURE: 97.4 F | WEIGHT: 199 LBS

## 2021-02-19 DIAGNOSIS — I48.91 ATRIAL FIBRILLATION, UNSPECIFIED TYPE (HCC): Primary | ICD-10-CM

## 2021-02-19 DIAGNOSIS — R53.81 DEBILITY: ICD-10-CM

## 2021-02-19 DIAGNOSIS — I50.32 CHRONIC HEART FAILURE WITH PRESERVED EJECTION FRACTION (HCC): ICD-10-CM

## 2021-02-19 DIAGNOSIS — I10 HYPERTENSION, UNSPECIFIED TYPE: ICD-10-CM

## 2021-02-19 DIAGNOSIS — R13.10 DYSPHAGIA, UNSPECIFIED TYPE: ICD-10-CM

## 2021-02-19 DIAGNOSIS — Z79.4 TYPE 2 DIABETES MELLITUS WITH HYPERGLYCEMIA, WITH LONG-TERM CURRENT USE OF INSULIN (HCC): ICD-10-CM

## 2021-02-19 DIAGNOSIS — E11.65 TYPE 2 DIABETES MELLITUS WITH HYPERGLYCEMIA, WITH LONG-TERM CURRENT USE OF INSULIN (HCC): ICD-10-CM

## 2021-02-19 PROCEDURE — 99309 SBSQ NF CARE MODERATE MDM 30: CPT | Performed by: NURSE PRACTITIONER

## 2021-02-19 NOTE — PROGRESS NOTES
[address] [address] Steve Bunch  [phone]  [facility]  LTCF Progress Note        NAME: Azul Mendez  AGE: 82 y o  SEX: male    DATE OF ENCOUNTER: 2/19/2021    Assessment and Plan     1  Atrial fibrillation, unspecified type ([address] )  Assessment & Plan:  · Rate controlled  · Continue with metoprolol  · pradaxa for anticoagulation      2  Chronic heart failure with preserved ejection fraction (HCC)  Assessment & Plan:  · Asymptomatic  · Continue to monitor weights  · Monitor for any edema          3  Debility  Assessment & Plan:  · Doing well at LTCF  · Continue with restorative care  · Assist with ADL and IADL  · Fall precautions      4  Dysphagia, unspecified type  Assessment & Plan:  · Doing well with current diet  · Continue with aspirate precautions      5  Hypertension, unspecified type  Assessment & Plan:  · Controlled  · Continue with hydralazine, metoprolol, furosemide  · Monitor BP      6  Type 2 diabetes mellitus with hyperglycemia, with long-term current use of insulin ([facility])  Assessment & Plan:  · Daily control  · Continue with insulin  · DM2 diet  · Monitor BS daily          No orders of the defined types were placed in this encounter  History of Present Illness     Azul Mendez 82 y o  male seen for follow-up of care and treatment plan for ambulatory dysfunction doing well at LTCF, afib rate controlled, CHF asymptomatic, dysphagia doing well with current diet, HTN controlled with BP meds, DM2 controlled with insulin     The following portions of the patient's history were reviewed and updated as appropriate: allergies, current medications, past family history, past medical history, past social history, past surgical history and problem list     Review of Systems     Review of Systems   Constitutional: Negative for chills and fever  HENT: Negative for ear pain and sore throat  Eyes: Negative for pain and visual disturbance     Respiratory: Negative for cough, shortness of breath and wheezing  Cardiovascular: Negative for chest pain and palpitations  Gastrointestinal: Negative for abdominal pain and vomiting  Genitourinary: Negative for dysuria and hematuria  Musculoskeletal: Positive for gait problem  Negative for arthralgias and back pain  Skin: Negative for color change and rash  Neurological: Positive for weakness  Negative for seizures and syncope  Psychiatric/Behavioral: Positive for confusion  All other systems reviewed and are negative  Active Problem List     Patient Active Problem List   Diagnosis    Type II dens fracture of second cervical vertebra (HCC)    C1 cervical fracture (HCC)    Atrial fibrillation (HCC)    Hypertension    Hyperlipidemia    Type 2 diabetes mellitus (HCC)    Vertebral artery dissection (HCC)    Dysphagia    Ambulatory dysfunction    Abnormal EKG    Chronic heart failure with preserved ejection fraction (HCC)    Weight gain    Counseling regarding advance care planning and goals of care    History of 2019 novel coronavirus disease (COVID-19)    Debility    Impetigo    Medication management    Goals of care, counseling/discussion    Anemia       Objective     /79   Pulse 65   Temp (!) 97 4 °F (36 3 °C)   Resp 18   Wt 90 3 kg (199 lb)   BMI 30 26 kg/m²     Physical Exam  Vitals signs reviewed  Constitutional:       Appearance: He is well-developed  HENT:      Head: Normocephalic and atraumatic  Eyes:      Conjunctiva/sclera: Conjunctivae normal    Neck:      Musculoskeletal: Normal range of motion  Cardiovascular:      Rate and Rhythm: Normal rate and regular rhythm  Heart sounds: Normal heart sounds, S1 normal and S2 normal  No murmur  Pulmonary:      Effort: Pulmonary effort is normal  No respiratory distress  Breath sounds: Normal breath sounds  No wheezing  Abdominal:      General: Bowel sounds are normal  There is no distension  Palpations: Abdomen is soft  Tenderness: There is no abdominal tenderness  Musculoskeletal: Normal range of motion  Comments: Generalized weakness   Skin:     General: Skin is warm and dry  Neurological:      Mental Status: He is alert  Psychiatric:         Attention and Perception: Attention normal          Mood and Affect: Mood normal          Speech: Speech normal          Behavior: Behavior normal          Thought Content: Thought content normal          Cognition and Memory: Cognition is impaired  Pertinent Laboratory/Diagnostic Studies:  HFM Labs Reviewed    Current Medications   Medication list reviewed and updated today  Please see paper chart for updated medication list      Carry Coho  13:00 PM      Name: Margie Brown  : 1935  MRN: [id number]  DOS: 2021    Diagnoses:   Diagnosis ICD-10-CM Associated Orders   1  Atrial fibrillation, unspecified type ([facility] [address] )  I48 91    2  Chronic heart failure with preserved ejection fraction ([facility])  I50 32    3  Debility  R53 81    4  Dysphagia, unspecified type  R13 10    5  Hypertension, unspecified type  I10    6   Type 2 diabetes mellitus with hyperglycemia, with long-term current use of insulin ([facility])  E11 65     Z79 4

## 2021-02-19 NOTE — ASSESSMENT & PLAN NOTE
· Doing well at LTCF  · Continue with restorative care  · Assist with ADL and IADL  · Fall precautions

## 2021-03-01 ENCOUNTER — TELEPHONE (OUTPATIENT)
Dept: NEUROSURGERY | Facility: CLINIC | Age: 86
End: 2021-03-01

## 2021-03-01 NOTE — TELEPHONE ENCOUNTER
3/1/21- [facility]  SHANICEINE XRAYS DONE 2/19/21  GAVE DISC TO ANNIE

## 2021-03-06 ENCOUNTER — APPOINTMENT (EMERGENCY)
Dept: RADIOLOGY | Facility: HOSPITAL | Age: 86
DRG: 536 | End: 2021-03-06
Payer: COMMERCIAL

## 2021-03-06 ENCOUNTER — HOSPITAL ENCOUNTER (INPATIENT)
Facility: HOSPITAL | Age: 86
LOS: 4 days | DRG: 536 | End: 2021-03-10
Attending: EMERGENCY MEDICINE | Admitting: SURGERY
Payer: COMMERCIAL

## 2021-03-06 ENCOUNTER — TELEPHONE (OUTPATIENT)
Dept: OTHER | Facility: OTHER | Age: 86
End: 2021-03-06

## 2021-03-06 DIAGNOSIS — S12.110A TYPE II DENS FRACTURE OF SECOND CERVICAL VERTEBRA (HCC): ICD-10-CM

## 2021-03-06 DIAGNOSIS — N50.1 PELVIC HEMATOMA, MALE: ICD-10-CM

## 2021-03-06 DIAGNOSIS — S32.401A RIGHT ACETABULAR FRACTURE (HCC): Primary | ICD-10-CM

## 2021-03-06 DIAGNOSIS — R26.2 AMBULATORY DYSFUNCTION: ICD-10-CM

## 2021-03-06 PROBLEM — Z79.01 ANTICOAGULATED BY ANTICOAGULATION TREATMENT: Status: ACTIVE | Noted: 2021-03-06

## 2021-03-06 PROBLEM — R58 ABDOMINAL HEMORRHAGE: Status: ACTIVE | Noted: 2021-03-06

## 2021-03-06 LAB
ABO GROUP BLD: NORMAL
ANION GAP SERPL CALCULATED.3IONS-SCNC: 4 MMOL/L (ref 4–13)
ATRIAL RATE: 136 BPM
BASOPHILS # BLD AUTO: 0.08 THOUSANDS/ΜL (ref 0–0.1)
BASOPHILS NFR BLD AUTO: 1 % (ref 0–1)
BLD GP AB SCN SERPL QL: NEGATIVE
BUN SERPL-MCNC: 25 MG/DL (ref 5–25)
CALCIUM SERPL-MCNC: 8.9 MG/DL (ref 8.3–10.1)
CHLORIDE SERPL-SCNC: 111 MMOL/L (ref 100–108)
CO2 SERPL-SCNC: 27 MMOL/L (ref 21–32)
CREAT SERPL-MCNC: 1.19 MG/DL (ref 0.6–1.3)
EOSINOPHIL # BLD AUTO: 0.33 THOUSAND/ΜL (ref 0–0.61)
EOSINOPHIL NFR BLD AUTO: 3 % (ref 0–6)
ERYTHROCYTE [DISTWIDTH] IN BLOOD BY AUTOMATED COUNT: 17.4 % (ref 11.6–15.1)
GFR SERPL CREATININE-BSD FRML MDRD: 56 ML/MIN/1.73SQ M
GLUCOSE SERPL-MCNC: 177 MG/DL (ref 65–140)
GLUCOSE SERPL-MCNC: 201 MG/DL (ref 65–140)
HCT VFR BLD AUTO: 34.8 % (ref 36.5–49.3)
HGB BLD-MCNC: 10.1 G/DL (ref 12–17)
HGB BLD-MCNC: 10.6 G/DL (ref 12–17)
HGB BLD-MCNC: 9.9 G/DL (ref 12–17)
IMM GRANULOCYTES # BLD AUTO: 0.06 THOUSAND/UL (ref 0–0.2)
IMM GRANULOCYTES NFR BLD AUTO: 1 % (ref 0–2)
LYMPHOCYTES # BLD AUTO: 1.16 THOUSANDS/ΜL (ref 0.6–4.47)
LYMPHOCYTES NFR BLD AUTO: 10 % (ref 14–44)
MCH RBC QN AUTO: 25.9 PG (ref 26.8–34.3)
MCHC RBC AUTO-ENTMCNC: 30.5 G/DL (ref 31.4–37.4)
MCV RBC AUTO: 85 FL (ref 82–98)
MONOCYTES # BLD AUTO: 1.1 THOUSAND/ΜL (ref 0.17–1.22)
MONOCYTES NFR BLD AUTO: 10 % (ref 4–12)
NEUTROPHILS # BLD AUTO: 8.61 THOUSANDS/ΜL (ref 1.85–7.62)
NEUTS SEG NFR BLD AUTO: 75 % (ref 43–75)
NRBC BLD AUTO-RTO: 0 /100 WBCS
NT-PROBNP SERPL-MCNC: 1240 PG/ML
P AXIS: 100 DEGREES
PLATELET # BLD AUTO: 301 THOUSANDS/UL (ref 149–390)
PMV BLD AUTO: 10.5 FL (ref 8.9–12.7)
POTASSIUM SERPL-SCNC: 4.9 MMOL/L (ref 3.5–5.3)
QRS AXIS: -84 DEGREES
QRSD INTERVAL: 142 MS
QT INTERVAL: 344 MS
QTC INTERVAL: 513 MS
RBC # BLD AUTO: 4.09 MILLION/UL (ref 3.88–5.62)
RH BLD: NEGATIVE
SODIUM SERPL-SCNC: 142 MMOL/L (ref 136–145)
SPECIMEN EXPIRATION DATE: NORMAL
T WAVE AXIS: 94 DEGREES
TROPONIN I SERPL-MCNC: 0.02 NG/ML
VENTRICULAR RATE: 134 BPM
WBC # BLD AUTO: 11.34 THOUSAND/UL (ref 4.31–10.16)

## 2021-03-06 PROCEDURE — 93005 ELECTROCARDIOGRAM TRACING: CPT

## 2021-03-06 PROCEDURE — 82948 REAGENT STRIP/BLOOD GLUCOSE: CPT

## 2021-03-06 PROCEDURE — 96374 THER/PROPH/DIAG INJ IV PUSH: CPT

## 2021-03-06 PROCEDURE — 72190 X-RAY EXAM OF PELVIS: CPT

## 2021-03-06 PROCEDURE — 36415 COLL VENOUS BLD VENIPUNCTURE: CPT | Performed by: EMERGENCY MEDICINE

## 2021-03-06 PROCEDURE — 85025 COMPLETE CBC W/AUTO DIFF WBC: CPT | Performed by: EMERGENCY MEDICINE

## 2021-03-06 PROCEDURE — 99285 EMERGENCY DEPT VISIT HI MDM: CPT | Performed by: EMERGENCY MEDICINE

## 2021-03-06 PROCEDURE — 70450 CT HEAD/BRAIN W/O DYE: CPT

## 2021-03-06 PROCEDURE — 93010 ELECTROCARDIOGRAM REPORT: CPT | Performed by: INTERNAL MEDICINE

## 2021-03-06 PROCEDURE — 83880 ASSAY OF NATRIURETIC PEPTIDE: CPT | Performed by: NURSE PRACTITIONER

## 2021-03-06 PROCEDURE — G1004 CDSM NDSC: HCPCS

## 2021-03-06 PROCEDURE — 74177 CT ABD & PELVIS W/CONTRAST: CPT

## 2021-03-06 PROCEDURE — 86901 BLOOD TYPING SEROLOGIC RH(D): CPT | Performed by: SURGERY

## 2021-03-06 PROCEDURE — 80048 BASIC METABOLIC PNL TOTAL CA: CPT | Performed by: EMERGENCY MEDICINE

## 2021-03-06 PROCEDURE — 73552 X-RAY EXAM OF FEMUR 2/>: CPT

## 2021-03-06 PROCEDURE — 99291 CRITICAL CARE FIRST HOUR: CPT | Performed by: SURGERY

## 2021-03-06 PROCEDURE — 99285 EMERGENCY DEPT VISIT HI MDM: CPT

## 2021-03-06 PROCEDURE — 99222 1ST HOSP IP/OBS MODERATE 55: CPT | Performed by: SURGERY

## 2021-03-06 PROCEDURE — 72192 CT PELVIS W/O DYE: CPT

## 2021-03-06 PROCEDURE — 96375 TX/PRO/DX INJ NEW DRUG ADDON: CPT

## 2021-03-06 PROCEDURE — 86850 RBC ANTIBODY SCREEN: CPT | Performed by: SURGERY

## 2021-03-06 PROCEDURE — 85018 HEMOGLOBIN: CPT | Performed by: NURSE PRACTITIONER

## 2021-03-06 PROCEDURE — 71045 X-RAY EXAM CHEST 1 VIEW: CPT

## 2021-03-06 PROCEDURE — 86900 BLOOD TYPING SEROLOGIC ABO: CPT | Performed by: SURGERY

## 2021-03-06 PROCEDURE — 84484 ASSAY OF TROPONIN QUANT: CPT | Performed by: NURSE PRACTITIONER

## 2021-03-06 PROCEDURE — 72170 X-RAY EXAM OF PELVIS: CPT

## 2021-03-06 PROCEDURE — 72125 CT NECK SPINE W/O DYE: CPT

## 2021-03-06 RX ORDER — OXYCODONE HYDROCHLORIDE 5 MG/1
5 TABLET ORAL EVERY 4 HOURS PRN
Status: DISCONTINUED | OUTPATIENT
Start: 2021-03-06 | End: 2021-03-10 | Stop reason: HOSPADM

## 2021-03-06 RX ORDER — ATORVASTATIN CALCIUM 20 MG/1
20 TABLET, FILM COATED ORAL
Status: DISCONTINUED | OUTPATIENT
Start: 2021-03-06 | End: 2021-03-10 | Stop reason: HOSPADM

## 2021-03-06 RX ORDER — CHLORHEXIDINE GLUCONATE 0.12 MG/ML
15 RINSE ORAL EVERY 12 HOURS SCHEDULED
Status: DISCONTINUED | OUTPATIENT
Start: 2021-03-06 | End: 2021-03-07

## 2021-03-06 RX ORDER — HYDROMORPHONE HCL/PF 1 MG/ML
0.2 SYRINGE (ML) INJECTION EVERY 4 HOURS PRN
Status: DISCONTINUED | OUTPATIENT
Start: 2021-03-06 | End: 2021-03-07

## 2021-03-06 RX ORDER — LANOLIN ALCOHOL/MO/W.PET/CERES
3 CREAM (GRAM) TOPICAL
Status: DISCONTINUED | OUTPATIENT
Start: 2021-03-06 | End: 2021-03-10 | Stop reason: HOSPADM

## 2021-03-06 RX ORDER — DILTIAZEM HYDROCHLORIDE 60 MG/1
60 TABLET, FILM COATED ORAL EVERY 6 HOURS SCHEDULED
Status: DISCONTINUED | OUTPATIENT
Start: 2021-03-06 | End: 2021-03-10 | Stop reason: HOSPADM

## 2021-03-06 RX ORDER — ONDANSETRON 2 MG/ML
4 INJECTION INTRAMUSCULAR; INTRAVENOUS EVERY 4 HOURS PRN
Status: DISCONTINUED | OUTPATIENT
Start: 2021-03-06 | End: 2021-03-10 | Stop reason: HOSPADM

## 2021-03-06 RX ORDER — OXYCODONE HYDROCHLORIDE 5 MG/1
2.5 TABLET ORAL EVERY 4 HOURS PRN
Status: DISCONTINUED | OUTPATIENT
Start: 2021-03-06 | End: 2021-03-10 | Stop reason: HOSPADM

## 2021-03-06 RX ORDER — HYDROMORPHONE HCL/PF 1 MG/ML
0.5 SYRINGE (ML) INJECTION ONCE
Status: COMPLETED | OUTPATIENT
Start: 2021-03-06 | End: 2021-03-06

## 2021-03-06 RX ORDER — PANTOPRAZOLE SODIUM 40 MG/1
40 TABLET, DELAYED RELEASE ORAL
Status: DISCONTINUED | OUTPATIENT
Start: 2021-03-07 | End: 2021-03-10 | Stop reason: HOSPADM

## 2021-03-06 RX ORDER — BISACODYL 10 MG
10 SUPPOSITORY, RECTAL RECTAL DAILY PRN
Status: DISCONTINUED | OUTPATIENT
Start: 2021-03-06 | End: 2021-03-07

## 2021-03-06 RX ORDER — POLYETHYLENE GLYCOL 3350 17 G/17G
17 POWDER, FOR SOLUTION ORAL DAILY PRN
Status: DISCONTINUED | OUTPATIENT
Start: 2021-03-06 | End: 2021-03-10 | Stop reason: HOSPADM

## 2021-03-06 RX ORDER — DILTIAZEM HYDROCHLORIDE 240 MG/1
240 CAPSULE, COATED, EXTENDED RELEASE ORAL DAILY
Status: DISCONTINUED | OUTPATIENT
Start: 2021-03-06 | End: 2021-03-06

## 2021-03-06 RX ORDER — ACETAMINOPHEN 325 MG/1
975 TABLET ORAL EVERY 8 HOURS SCHEDULED
Status: DISCONTINUED | OUTPATIENT
Start: 2021-03-06 | End: 2021-03-10 | Stop reason: HOSPADM

## 2021-03-06 RX ORDER — SIMETHICONE 80 MG
80 TABLET,CHEWABLE ORAL EVERY 6 HOURS PRN
Status: DISCONTINUED | OUTPATIENT
Start: 2021-03-06 | End: 2021-03-10 | Stop reason: HOSPADM

## 2021-03-06 RX ADMIN — ATORVASTATIN CALCIUM 20 MG: 20 TABLET, FILM COATED ORAL at 17:36

## 2021-03-06 RX ADMIN — DILTIAZEM HYDROCHLORIDE 2.5 MG/HR: 5 INJECTION INTRAVENOUS at 14:34

## 2021-03-06 RX ADMIN — IDARUCIZUMAB 2.5 G: 50 INJECTION INTRAVENOUS at 13:48

## 2021-03-06 RX ADMIN — DILTIAZEM HYDROCHLORIDE 240 MG: 240 CAPSULE, COATED, EXTENDED RELEASE ORAL at 11:16

## 2021-03-06 RX ADMIN — INSULIN LISPRO 2 UNITS: 100 INJECTION, SOLUTION INTRAVENOUS; SUBCUTANEOUS at 18:32

## 2021-03-06 RX ADMIN — ACETAMINOPHEN 975 MG: 325 TABLET ORAL at 15:31

## 2021-03-06 RX ADMIN — HYDROMORPHONE HYDROCHLORIDE 0.5 MG: 1 INJECTION, SOLUTION INTRAMUSCULAR; INTRAVENOUS; SUBCUTANEOUS at 11:17

## 2021-03-06 RX ADMIN — IOHEXOL 100 ML: 350 INJECTION, SOLUTION INTRAVENOUS at 11:02

## 2021-03-06 RX ADMIN — FUROSEMIDE 60 MG: 40 TABLET ORAL at 17:36

## 2021-03-06 RX ADMIN — METOPROLOL TARTRATE 25 MG: 25 TABLET, FILM COATED ORAL at 15:32

## 2021-03-06 RX ADMIN — OXYCODONE HYDROCHLORIDE 2.5 MG: 5 TABLET ORAL at 21:02

## 2021-03-06 RX ADMIN — CHLORHEXIDINE GLUCONATE 0.12% ORAL RINSE 15 ML: 1.2 LIQUID ORAL at 20:59

## 2021-03-06 RX ADMIN — DILTIAZEM HYDROCHLORIDE 2.5 MG/HR: 5 INJECTION INTRAVENOUS at 14:30

## 2021-03-06 RX ADMIN — IDARUCIZUMAB 2.5 G: 50 INJECTION INTRAVENOUS at 13:57

## 2021-03-06 RX ADMIN — DILTIAZEM HYDROCHLORIDE 60 MG: 60 TABLET, FILM COATED ORAL at 17:36

## 2021-03-06 NOTE — TELEPHONE ENCOUNTER
[phone] ext/Deepa from [facility] Kevin/Pt is grant G[place]  37/Pt has x-ray results  Dr Sherryle Imperial was paged at [phone] and called    Please allow 20-30 mins for the provider to call you back  If you do not here from them, please call us back

## 2021-03-06 NOTE — PLAN OF CARE
Problem: Prexisting or High Potential for Compromised Skin Integrity  Goal: Skin integrity is maintained or improved  Description: INTERVENTIONS:  - Identify patients at risk for skin breakdown  - Assess and monitor skin integrity  - Assess and monitor nutrition and hydration status  - Monitor labs   - Assess for incontinence   - Turn and reposition patient  - Assist with mobility/ambulation  - Relieve pressure over bony prominences  - Avoid friction and shearing  - Provide appropriate hygiene as needed including keeping skin clean and dry  - Evaluate need for skin moisturizer/barrier cream  - Collaborate with interdisciplinary team   - Patient/family teaching  - Consider wound care consult   Outcome: Progressing     Problem: Potential for Falls  Goal: Patient will remain free of falls  Description: INTERVENTIONS:  - Assess patient frequently for physical needs  -  Identify cognitive and physical deficits and behaviors that affect risk of falls    -  [place] fall precautions as indicated by assessment   - Educate patient/family on patient safety including physical limitations  - Instruct patient to call for assistance with activity based on assessment  - Modify environment to reduce risk of injury  - Consider OT/PT consult to assist with strengthening/mobility  Outcome: Progressing     Problem: PAIN - ADULT  Goal: Verbalizes/displays adequate comfort level or baseline comfort level  Description: Interventions:  - Encourage patient to monitor pain and request assistance  - Assess pain using appropriate pain scale  - Administer analgesics based on type and severity of pain and evaluate response  - Implement non-pharmacological measures as appropriate and evaluate response  - Consider cultural and social influences on pain and pain management  - Notify physician/advanced practitioner if interventions unsuccessful or patient reports new pain  Outcome: Progressing     Problem: INFECTION - ADULT  Goal: Absence or prevention of progression during hospitalization  Description: INTERVENTIONS:  - Assess and monitor for signs and symptoms of infection  - Monitor lab/diagnostic results  - Monitor all insertion sites, i e  indwelling lines, tubes, and drains  - Monitor endotracheal if appropriate and nasal secretions for changes in amount and color  - [place] appropriate cooling/warming therapies per order  - Administer medications as ordered  - Instruct and encourage patient and family to use good hand hygiene technique  - Identify and instruct in appropriate isolation precautions for identified infection/condition  Outcome: Progressing     Problem: SAFETY ADULT  Goal: Patient will remain free of falls  Description: INTERVENTIONS:  - Assess patient frequently for physical needs  -  Identify cognitive and physical deficits and behaviors that affect risk of falls    -  [place] fall precautions as indicated by assessment   - Educate patient/family on patient safety including physical limitations  - Instruct patient to call for assistance with activity based on assessment  - Modify environment to reduce risk of injury  - Consider OT/PT consult to assist with strengthening/mobility  Outcome: Progressing  Goal: Maintain or return to baseline ADL function  Description: INTERVENTIONS:  -  Assess patient's ability to carry out ADLs; assess patient's baseline for ADL function and identify physical deficits which impact ability to perform ADLs (bathing, care of mouth/teeth, toileting, grooming, dressing, etc )  - Assess/evaluate cause of self-care deficits   - Assess range of motion  - Assess patient's mobility; develop plan if impaired  - Assess patient's need for assistive devices and provide as appropriate  - Encourage maximum independence but intervene and supervise when necessary  - Involve family in performance of ADLs  - Assess for home care needs following discharge   - Consider OT consult to assist with ADL evaluation and planning for discharge  - Provide patient education as appropriate  Outcome: Progressing  Goal: Maintain or return mobility status to optimal level  Description: INTERVENTIONS:  - Assess patient's baseline mobility status (ambulation, transfers, stairs, etc )    - Identify cognitive and physical deficits and behaviors that affect mobility  - Identify mobility aids required to assist with transfers and/or ambulation (gait belt, sit-to-stand, lift, walker, cane, etc )  - [place] fall precautions as indicated by assessment  - Record patient progress and toleration of activity level on Mobility SBAR; progress patient to next Phase/Stage  - Instruct patient to call for assistance with activity based on assessment  - Consider rehabilitation consult to assist with strengthening/weightbearing, etc   Outcome: Progressing     Problem: DISCHARGE PLANNING  Goal: Discharge to home or other facility with appropriate resources  Description: INTERVENTIONS:  - Identify barriers to discharge w/patient and caregiver  - Arrange for needed discharge resources and transportation as appropriate  - Identify discharge learning needs (meds, wound care, etc )  - Arrange for interpretive services to assist at discharge as needed  - Refer to Case Management Department for coordinating discharge planning if the patient needs post-hospital services based on physician/advanced practitioner order or complex needs related to functional status, cognitive ability, or social support system  Outcome: Progressing     Problem: Knowledge Deficit  Goal: Patient/family/caregiver demonstrates understanding of disease process, treatment plan, medications, and discharge instructions  Description: Complete learning assessment and assess knowledge base    Interventions:  - Provide teaching at level of understanding  - Provide teaching via preferred learning methods  Outcome: Progressing     Problem: METABOLIC, FLUID AND ELECTROLYTES - ADULT  Goal: Electrolytes maintained within normal limits  Description: INTERVENTIONS:  - Monitor labs and assess patient for signs and symptoms of electrolyte imbalances  - Administer electrolyte replacement as ordered  - Monitor response to electrolyte replacements, including repeat lab results as appropriate  - Instruct patient on fluid and nutrition as appropriate  Outcome: Progressing  Goal: Fluid balance maintained  Description: INTERVENTIONS:  - Monitor labs   - Monitor I/O and WT  - Instruct patient on fluid and nutrition as appropriate  - Assess for signs & symptoms of volume excess or deficit  Outcome: Progressing  Goal: Glucose maintained within target range  Description: INTERVENTIONS:  - Monitor Blood Glucose as ordered  - Assess for signs and symptoms of hyperglycemia and hypoglycemia  - Administer ordered medications to maintain glucose within target range  - Assess nutritional intake and initiate nutrition service referral as needed  Outcome: Progressing     Problem: HEMATOLOGIC - ADULT  Goal: Maintains hematologic stability  Description: INTERVENTIONS  - Assess for signs and symptoms of bleeding or hemorrhage  - Monitor labs  - Administer supportive blood products/factors as ordered and appropriate  Outcome: Progressing     Problem: MUSCULOSKELETAL - ADULT  Goal: Maintain or return mobility to safest level of function  Description: INTERVENTIONS:  - Assess patient's ability to carry out ADLs; assess patient's baseline for ADL function and identify physical deficits which impact ability to perform ADLs (bathing, care of mouth/teeth, toileting, grooming, dressing, etc )  - Assess/evaluate cause of self-care deficits   - Assess range of motion  - Assess patient's mobility  - Assess patient's need for assistive devices and provide as appropriate  - Encourage maximum independence but intervene and supervise when necessary  - Involve family in performance of ADLs  - Assess for home care needs following discharge   - Consider OT consult to assist with ADL evaluation and planning for discharge  - Provide patient education as appropriate  Outcome: Progressing  Goal: Maintain proper alignment of affected body part  Description: INTERVENTIONS:  - Support, maintain and protect limb and body alignment  - Provide patient/ family with appropriate education  Outcome: Progressing

## 2021-03-06 NOTE — CONSULTS
Orthopedics   Natalya Hayes 82 y o  male MRN: [id number]  Unit/Bed#: Cat Scan      Chief Complaint:   right hip pain    HPI:   82 y o male ambulates with walker status post a mechanical fall complaining of right hip pain and inability to bear weight  Pain to the hip localizes to the groin, is worse with palpation or attempted motion and improves with rest  Patient denies lower extremity numbness and tingling  Patient is currently residing at [facility] for rehab from a prior cervical fusion performed couple months ago secondary to fractures  He has a complex past medical history including COPD, CHF, CAD, hypertension, diabetes, hyperlipidemia, TBI and atrial fibrillation currently on Pradaxa      Review Of Systems:   · Skin: Normal  · Neuro: See HPI  · Musculoskeletal: See HPI  · 14 point review of systems negative except as stated above     Past Medical History:   Past Medical History:   Diagnosis Date    [facility]fib ([facility] )     [facility] )     Cardiac disease     [facility]     Chronic obstructive pulmonary disease (COPD) ([facility] )     [facility]ch[facility]     Coronary artery disease     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[facility] )     TYPE 2    Dysphagia     Fracture of nasal bone     Gait instability     H/O cervical fracture     Hyperlipidemia     Hypertension     Muscle weakness     TBI (traumatic brain injury) ([facility] )        Past Surgical History:   Past Surgical History:   Procedure Laterality Date    HAND SURGERY      MI ARTHRODESIS POSTERIOR CRANIOCERVICAL N/A 10/26/2020    Procedure: Occipital cervical fusion to C4;  Surgeon: Aleshia Bonilla MD;  Location: [facility];  Service: Neurosurgery       Family History:  Family history reviewed and non-contributory  Family History   Problem Relation Age of Onset    Other Other         urinary tract malignancy       Social History:  Social History     Socioeconomic History    Marital status: /Civil Union Spouse name: None    Number of children: None    Years of education: None    Highest education level: None   Occupational History    None   Social Needs    Financial resource strain: None    Food insecurity     Worry: None     Inability: None    Transportation needs     Medical: None     Non-medical: None   Tobacco Use    Smoking status: Never Smoker    Smokeless tobacco: Never Used   Substance and Sexual Activity    Alcohol use: Never     Frequency: Never    Drug use: Never    Sexual activity: None   Lifestyle    Physical activity     Days per week: None     Minutes per session: None    Stress: None   Relationships    Social connections     Talks on phone: None     Gets together: None     Attends Sikhism service: None     Active member of club or organization: None     Attends meetings of clubs or organizations: None     Relationship status: None    Intimate partner violence     Fear of current or ex partner: None     Emotionally abused: None     Physically abused: None     Forced sexual activity: None   Other Topics Concern    None   Social History Narrative    ** Merged History Encounter **            Allergies:    Allergies   Allergen Reactions    Amoxicillin Diarrhea           Labs:  0   Lab Value Date/Time    HCT 34 8 (L) 03/06/2021 0913    HCT 36 3 (L) 10/28/2020 0537    HCT 36 6 10/27/2020 0504    HGB 10 6 (L) 03/06/2021 0913    HGB 11 2 (L) 10/28/2020 0537    HGB 11 8 (L) 10/27/2020 0504    INR 1 05 10/27/2020 0504    WBC 11 34 (H) 03/06/2021 0913    WBC 13 61 (H) 10/28/2020 0537    WBC 13 30 (H) 10/27/2020 0504       Meds:    Current Facility-Administered Medications:     diltiazem (CARDIZEM CD) 24 hr capsule 240 mg, 240 mg, Oral, Daily, Shawn Jane DO, 240 mg at 03/06/21 1116    Current Outpatient Medications:     acetaminophen (TYLENOL) 325 mg tablet, Take 3 tablets (975 mg total) by mouth every 8 (eight) hours, Disp: 30 tablet, Rfl: 0    aspirin 81 mg chewable tablet, Chew 1 tablet (81 mg total) daily, Disp: 10 tablet, Rfl: 0    atorvastatin (LIPITOR) 20 mg tablet, Take 20 mg by mouth daily after dinner , Disp: , Rfl:     bisacodyl (Dulcolax) 10 mg suppository, Insert 10 mg into the rectum as needed for constipation, Disp: , Rfl:     dabigatran etexilate (PRADAXA) 150 mg capsu, Take 150 mg by mouth 2 (two) times a day, Disp: , Rfl:     diltiazem (CARDIZEM CD) 240 mg 24 hr capsule, Take 1 capsule (240 mg total) by mouth daily, Disp: 10 capsule, Rfl: 0    furosemide (LASIX) 40 mg tablet, Take 1 5 tablets by mouth 2 (two) times a day 60 MG BID, Disp: , Rfl:     hydrALAZINE (APRESOLINE) 10 mg tablet, Take 10 mg by mouth 3 (three) times a day, Disp: , Rfl:     insulin glargine (LANTUS) 100 units/mL subcutaneous injection, Inject 30 Units under the skin daily at bedtime , Disp: , Rfl:     insulin lispro (HumaLOG) 100 units/mL injection, Inject 14 Units under the skin 3 (three) times a day with meals, Disp: , Rfl:     magnesium hydroxide (MILK OF MAGNESIA) 400 mg/5 mL oral suspension, Take 30 mL by mouth daily as needed for constipation, Disp: , Rfl:     methocarbamol (ROBAXIN) 500 mg tablet, Take 1 tablet (500 mg total) by mouth every 6 (six) hours, Disp: 10 tablet, Rfl: 0    metoprolol tartrate (LOPRESSOR) 50 mg tablet, Take 50 mg by mouth every 12 (twelve) hours HOLD SBP <100 HR < 55, Disp: , Rfl:     omeprazole (PriLOSEC) 40 MG capsule, Take 40 mg by mouth daily, Disp: , Rfl:     sodium phosphate-biphosphate (FLEET) 7-19 g 118 mL enema, Insert 1 enema into the rectum once as needed, Disp: , Rfl:     Blood Culture:   No results found for: BLOODCX    Wound Culture:   No results found for: WOUNDCULT    Ins and Outs:  No intake/output data recorded            Physical Exam:   BP (!) 189/97   Pulse (!) 132   Temp 97 6 °F (36 4 °C)   Resp 18   Wt 90 3 kg (199 lb)   SpO2 97%   BMI 30 26 kg/m²   Gen: Alert and oriented to person, place, time  HEENT: EOMI, eyes clear, moist mucus membranes, hearing intact  Respiratory: Bilateral chest rise  No audible wheezing found  Cardiovascular: Regular Rate and Rhythm  Abdomen: soft nontender/nondistended  Musculoskeletal: right lower extremity  · Skin intact, limb slightly shortened and externally rotated  · Tender to palpation over hip  · Pain with passive motion about the hip  · Cannot perform straight leg raise secondary to pain   · Sensation appears grossly intact DP/SP/Tib/Benton/Saph nerve distributions  · Motor intact EHL/FHL/TA/GS  · Palpable DP and PT pulses   · No effusion in the knee    Radiology:   I personally reviewed the films  X-rays pelvis and CT scan pelvis show and anterior column posterior javi transverse right acetabular fracture, there is mild protrusio and there is moderate to severe degenerative changes  Poor bone quality     _*_*_*_*_*_*_*_*_*_*_*_*_*_*_*_*_*_*_*_*_*_*_*_*_*_*_*_*_*_*_*_*_*_*_*_*_*_*_*_*_*    Assessment:  86 y o male  status post a mechanical fall with right acetabular fracture  Based on multiple comorbidities and poor bone quality, nonoperative management is warranted at this time      Plan:   · TTWB RLE  · Pain control per primary team  · Recommend restarting DVT prophylaxis when able  · Physical therapy  · Will continue to follow patient while in hospital    Claire Osorio MD

## 2021-03-06 NOTE — ED PROVIDER NOTES
History  Chief Complaint   Patient presents with    Hip Pain     fall yesterday after losing balance with walker  x ray done over night and was inconclusive  sent to ed  From [facility], there for rehab      72-year-old male history of hypertension, hyperlipidemia, atrial fibrillation on Pradaxa, CAD, TBI, COPD, CHF, cervical fractures presents with right hip pain  Patient was walking with his walker yesterday and fell to the ground  Does not think he struck his head  Denies pain anywhere other than his right hip  Patient was unable to walk  X-rays were taken which were inconclusive  Sent for further evaluation  Patient denies neurological deficits, shortness breath, chest pain, fatigue, nausea, vomiting, numbness, tingling in his extremities  Hip Pain  Severity:  Severe  Onset quality:  Gradual  Timing:  Constant  Progression:  Worsening  Chronicity:  New  Relieved by:  Not moving  Worsened by: Movement      Prior to Admission Medications   Prescriptions Last Dose Informant Patient Reported?  Taking?   acetaminophen (TYLENOL) 325 mg tablet  Outside Facility (Specify) No No   Sig: Take 3 tablets (975 mg total) by mouth every 8 (eight) hours   aspirin 81 mg chewable tablet  Outside Facility (Specify) No No   Sig: Chew 1 tablet (81 mg total) daily   atorvastatin (LIPITOR) 20 mg tablet  Outside Facility (Specify) Yes No   Sig: Take 20 mg by mouth daily after dinner    bisacodyl (Dulcolax) 10 mg suppository  Outside Facility (Specify) Yes No   Sig: Insert 10 mg into the rectum as needed for constipation   dabigatran etexilate (PRADAXA) 150 mg capsu  Outside Facility (Specify) Yes No   Sig: Take 150 mg by mouth 2 (two) times a day   diltiazem (CARDIZEM CD) 240 mg 24 hr capsule  Outside Facility (Specify) No No   Sig: Take 1 capsule (240 mg total) by mouth daily   furosemide (LASIX) 40 mg tablet  Outside Facility (Specify) Yes No   Sig: Take 1 5 tablets by mouth 2 (two) times a day 60 MG BID hydrALAZINE (APRESOLINE) 10 mg tablet  Outside Facility (Specify) Yes No   Sig: Take 10 mg by mouth 3 (three) times a day   insulin glargine (LANTUS) 100 units/mL subcutaneous injection  Outside Facility (Specify) Yes No   Sig: Inject 30 Units under the skin daily at bedtime    insulin lispro (HumaLOG) 100 units/mL injection  Outside Facility (Specify) Yes No   Sig: Inject 14 Units under the skin 3 (three) times a day with meals   magnesium hydroxide (MILK OF MAGNESIA) 400 mg/5 mL oral suspension  Outside Facility (Specify) Yes No   Sig: Take 30 mL by mouth daily as needed for constipation   methocarbamol (ROBAXIN) 500 mg tablet  Outside Facility (Specify) No No   Sig: Take 1 tablet (500 mg total) by mouth every 6 (six) hours   metoprolol tartrate (LOPRESSOR) 50 mg tablet  Outside Facility (Specify) Yes No   Sig: Take 50 mg by mouth every 12 (twelve) hours HOLD SBP <100 HR < 55   omeprazole (PriLOSEC) 40 MG capsule  Outside Facility (Specify) Yes No   Sig: Take 40 mg by mouth daily   sodium phosphate-biphosphate (FLEET) 7-19 g 118 mL enema  Outside Facility (Specify) Yes No   Sig: Insert 1 enema into the rectum once as needed      Facility-Administered Medications: None       Past Medical History:   Diagnosis Date    A-fib ([place] [address] )     Cancer ([place] [address] )     Cardiac disease     CHF (congestive heart failure) (HCC)     [facility]D) [facility]     COPD (chronic obstructive pulmonary disease) ([place] [address] )     Coronary artery disease     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    Dysphagia     Fracture of nasal bone     Gait instability     H/O cervical fracture     Hyperlipidemia     Hypertension     Muscle weakness     TBI (traumatic brain injury) ([facility] )        Past Surgical History:   Procedure Laterality Date    HAND SURGERY      CA ARTHRODESIS POSTERIOR CRANIOCERVICAL N/A 10/26/2020    Procedure: Occipital cervical fusion to C4;  Surgeon: Dileep Grace MD;  Location: [facility]; Service: Neurosurgery       Family History   Problem Relation Age of Onset    Other Other         urinary tract malignancy     I have reviewed and agree with the history as documented  E-Cigarette/Vaping    E-Cigarette Use Never User      E-Cigarette/Vaping Substances     Social History     Tobacco Use    Smoking status: Never Smoker    Smokeless tobacco: Never Used   Substance Use Topics    Alcohol use: Never     Frequency: Never    Drug use: Never        Review of Systems   Musculoskeletal: Positive for arthralgias and gait problem  All other systems reviewed and are negative  Physical Exam  ED Triage Vitals   Temperature Pulse Respirations Blood Pressure SpO2   03/06/21 0811 03/06/21 0811 03/06/21 0811 03/06/21 0811 03/06/21 0811   97 6 °F (36 4 °C) (!) 136 18 155/85 97 %      Temp Source Heart Rate Source Patient Position - Orthostatic VS BP Location FiO2 (%)   03/06/21 1500 03/06/21 1124 -- -- --   Axillary Monitor         Pain Score       03/06/21 0811       5             Orthostatic Vital Signs  Vitals:    03/06/21 1230 03/06/21 1408 03/06/21 1430 03/06/21 1500   BP: (!) 175/82 156/84 163/96 142/81   Pulse: (!) 134 (!) 134 (!) 136 (!) 136       Physical Exam  Vitals signs and nursing note reviewed  Constitutional:       General: He is not in acute distress  Appearance: He is well-developed  He is not diaphoretic  HENT:      Head: Normocephalic and atraumatic  Right Ear: External ear normal       Left Ear: External ear normal    Eyes:      Conjunctiva/sclera: Conjunctivae normal    Neck:      Vascular: No JVD  Trachea: No tracheal deviation  Cardiovascular:      Rate and Rhythm: Normal rate and regular rhythm  Heart sounds: Normal heart sounds  No murmur  Comments: DP and PT pulses intact  Pulmonary:      Effort: No respiratory distress  Breath sounds: Normal breath sounds  No stridor  No wheezing or rales     Abdominal:      General: Bowel sounds are normal  There is no distension  Palpations: Abdomen is soft  There is no mass  Tenderness: There is no abdominal tenderness  There is no guarding or rebound  Genitourinary:     Comments: Deferred  Musculoskeletal:         General: Tenderness and deformity present  Comments: Right leg is shortened  Skin:     General: Skin is warm and dry  Capillary Refill: Capillary refill takes less than 2 seconds  Coloration: Skin is not pale  Findings: No erythema or rash  Neurological:      Motor: No abnormal muscle tone  Coordination: Coordination normal       Comments: Patient has muscular strength 5/5 in his ankles, toe movements  Unable to flex right hip  Sensation intact light touch throughout the lower extremities  Psychiatric:         Behavior: Behavior normal          Thought Content:  Thought content normal          Judgment: Judgment normal          ED Medications  Medications   acetaminophen (TYLENOL) tablet 975 mg (has no administration in time range)   oxyCODONE (ROXICODONE) IR tablet 2 5 mg (has no administration in time range)   oxyCODONE (ROXICODONE) IR tablet 5 mg (has no administration in time range)   HYDROmorphone (DILAUDID) injection 0 2 mg (has no administration in time range)   polyethylene glycol (MIRALAX) packet 17 g (has no administration in time range)   ondansetron (ZOFRAN) injection 4 mg (has no administration in time range)   naloxone (NARCAN) 0 04 mg/mL syringe 0 04 mg (has no administration in time range)   atorvastatin (LIPITOR) tablet 20 mg (has no administration in time range)   bisacodyl (DULCOLAX) rectal suppository 10 mg (has no administration in time range)   furosemide (LASIX) tablet 60 mg (has no administration in time range)   pantoprazole (PROTONIX) EC tablet 40 mg (has no administration in time range)   chlorhexidine (PERIDEX) 0 12 % oral rinse 15 mL (has no administration in time range)   enoxaparin (LOVENOX) subcutaneous injection 40 mg (has no administration in time range)   metoprolol tartrate (LOPRESSOR) tablet 25 mg (has no administration in time range)   diltiazem (CARDIZEM) tablet 60 mg (has no administration in time range)   diltiazem (CARDIZEM) 125 mg in sodium chloride 0 9 % 125 mL infusion (2 5 mg/hr Intravenous New Bag 3/6/21 1430)   HYDROmorphone (DILAUDID) injection 0 5 mg (0 5 mg Intravenous Given 3/6/21 1117)   iohexol (OMNIPAQUE) 350 MG/ML injection (MULTI-DOSE) 100 mL (100 mL Intravenous Given 3/6/21 1102)   diltiazem (CARDIZEM) 125 mg in sodium chloride 0 9 % 125 mL infusion (2 5 mg/hr Intravenous New Bag 3/6/21 1434)   idaruCIZUmab (PRAXBIND) IV 2 5 g (2 5 g Intravenous Given 3/6/21 1357)       Diagnostic Studies  Results Reviewed     Procedure Component Value Units Date/Time    NT-BNP PRO [[id number]]  (Abnormal) Collected: 03/06/21 0913    Lab Status: Final result Specimen: Blood from Arm, Left Updated: 03/06/21 1413     NT-proBNP 1,240 pg/mL     Basic metabolic panel [[id number]]  (Abnormal) Collected: 03/06/21 0913    Lab Status: Final result Specimen: Blood from Arm, Left Updated: 03/06/21 0946     Sodium 142 mmol/L      Potassium 4 9 mmol/L      Chloride 111 mmol/L      CO2 27 mmol/L      ANION GAP 4 mmol/L      BUN 25 mg/dL      Creatinine 1 19 mg/dL      Glucose 177 mg/dL      Calcium 8 9 mg/dL      eGFR 56 ml/min/1 73sq m     Narrative:      [facility] guidelines for Chronic Kidney Disease (CKD):     Stage 1 with normal or high GFR (GFR > 90 mL/min/1 73 square meters)    Stage 2 Mild CKD (GFR = 60-89 mL/min/1 73 square meters)    Stage 3A Moderate CKD (GFR = 45-59 mL/min/1 73 square meters)    Stage 3B Moderate CKD (GFR = 30-44 mL/min/1 73 square meters)    Stage 4 Severe CKD (GFR = 15-29 mL/min/1 73 square meters)    Stage 5 End Stage CKD (GFR <15 mL/min/1 73 square meters)  Note: GFR calculation is accurate only with a steady state creatinine    CBC and differential [[id number]]  (Abnormal) Collected: 03/06/21 0913    Lab Status: Final result Specimen: Blood from Arm, Left Updated: 03/06/21 0936     WBC 11 34 Thousand/uL      RBC 4 09 Million/uL      Hemoglobin 10 6 g/dL      Hematocrit 34 8 %      MCV 85 fL      MCH 25 9 pg      MCHC 30 5 g/dL      RDW 17 4 %      MPV 10 5 fL      Platelets 666 Thousands/uL      nRBC 0 /100 WBCs      Neutrophils Relative 75 %      Immat GRANS % 1 %      Lymphocytes Relative 10 %      Monocytes Relative 10 %      Eosinophils Relative 3 %      Basophils Relative 1 %      Neutrophils Absolute 8 61 Thousands/µL      Immature Grans Absolute 0 06 Thousand/uL      Lymphocytes Absolute 1 16 Thousands/µL      Monocytes Absolute 1 10 Thousand/µL      Eosinophils Absolute 0 33 Thousand/µL      Basophils Absolute 0 08 Thousands/µL                  CT abdomen pelvis with contrast   Final Result by Cas Loya MD (03/06 1202)      Hemorrhage into the space of Retzius  No evidence of contrast extravasation from the bladder on the delayed images although only opacifying the posterior wall  Comminuted complex right acetabular fracture  Findings are suspicious for a small pseudoaneurysm medial to the junction of the iliac and common femoral vein adjacent to the anterior column  This measures 1 2 cm in size  A hyperenhancing lymph node is a possibility as a similar nonenhancing    structure seen on the left but less likely  Focused vascular ultrasound in the area may be useful  This was discussed with the Dr Renaldo Loomis at 12 noon            Workstation performed: [id number]         CT pelvis wo contrast   Final Result by Vanessa Chester MD (03/06 1008)      1  Comminuted fracture of the right acetabulum also involving the right iliac bone  2  Hemorrhage involves the space of Retzius, perivesical soft tissues as well as intramuscular hematoma in multiple locations as above    If there is concern of hemodynamic contrast motility, contrast-enhanced CT may be of benefit to exclude active    extravasation  3  Other findings include a large amount of stool in the rectum and colonic diverticulosis          I personally discussed this study with DR HUEY PIEDRA on 3/6/2021 at 9:58 AM                   Workstation performed: [id number]         CT head without contrast   Final Result by Allyson Guzman MD (03/06 4301)      No acute intracranial abnormality  Workstation performed: [id number]         CT spine cervical without contrast   Final Result by Allyson Guzman MD (03/06 1021)      1  Status post occipital cervical fusion secondary to fractures of C1 and C2  No change in their alignment  2  No new fractures  Workstation performed: [id number]         XR femur 2 views RIGHT    (Results Pending)   XR pelvis ap only 1 or 2 vw    (Results Pending)   XR chest 1 view portable    (Results Pending)   XR pelvis complete 3+ vw    (Results Pending)         Procedures  Procedures      ED Course               Identification of Seniors at Risk      Most Recent Value   (ISAR) Identification of Seniors at Risk   Before the illness or injury that brought you to the Emergency, did you need someone to help you on a regular basis? 1 Filed at: 03/06/2021 0813   In the last 24 hours, have you needed more help than usual?  1 Filed at: 03/06/2021 7329   Have you been hospitalized for one or more nights during the past 6 months? 1 Filed at: 03/06/2021 0813   In general, do you see well?  0 Filed at: 03/06/2021 0813   In general, do you have serious problems with your memory? 0 Filed at: 03/06/2021 8512   Do you take more than three different medications every day?   1 Filed at: 03/06/2021 0813   ISAR Score  4 Filed at: 03/06/2021 8571                              MDM  Number of Diagnoses or Management Options  Ambulatory dysfunction: new and requires workup  Pelvic hematoma, male: new and requires workup  Right acetabular fracture ([address] ): new and requires workup  Diagnosis management comments: 26-year-old male presents with ambulatory dysfunction, right hip pain  Will evaluate for dislocation/fracture  X-ray shows acetabular fracture  Will order CT of the pelvis  CT of the pelvis and this shows acetabular fracture as well as intra-pelvic hemorrhage  Will order CT with contrast to evaluate for active extravasation  No active extravasation  Patient is anticoagulated on Pradaxa  Has been approximately 12 hours since the fall  Will leave choice of reversing Pradaxa to inpatient team   Patient's hemoglobin appears stable  No blood loss anemia  CT also shows dilation of the iliac vein  Workup ordered for clearance for surgery  Orthopedics consulted whom I spoke to  Patient admitted to Trauma surgery             Amount and/or Complexity of Data Reviewed  Clinical lab tests: ordered and reviewed  Tests in the radiology section of CPT®: ordered and reviewed  Decide to obtain previous medical records or to obtain history from someone other than the patient: yes  Review and summarize past medical records: yes  Independent visualization of images, tracings, or specimens: yes    Risk of Complications, Morbidity, and/or Mortality  Presenting problems: moderate  Diagnostic procedures: moderate  Management options: moderate    Patient Progress  Patient progress: stable      Disposition  Final diagnoses:   Right acetabular fracture ([address] )   Pelvic hematoma, male   Ambulatory dysfunction     Time reflects when diagnosis was documented in both MDM as applicable and the Disposition within this note     Time User Action Codes Description Comment    3/6/2021  3:08 PM Pooja Minor Add [S32 401A] Right acetabular fracture ([address] )     3/6/2021  3:08 PM Pooja Minor Add [N50 1] Pelvic hematoma, male     3/6/2021  3:09 PM Pooja Minor Add [R26 2] Ambulatory dysfunction       ED Disposition     ED Disposition Condition Date/Time Comment    Admit  Sat Mar 6, 2021  1:01 PM Level of Care: Critical Care [15]        Follow-up Information    None         Current Discharge Medication List      CONTINUE these medications which have NOT CHANGED    Details   acetaminophen (TYLENOL) 325 mg tablet Take 3 tablets (975 mg total) by mouth every 8 (eight) hours  Qty: 30 tablet, Refills: 0    Associated Diagnoses: Type II dens fracture of second cervical vertebra (HCC)      aspirin 81 mg chewable tablet Chew 1 tablet (81 mg total) daily  Qty: 10 tablet, Refills: 0    Associated Diagnoses: Type II dens fracture of second cervical vertebra (HCC)      atorvastatin (LIPITOR) 20 mg tablet Take 20 mg by mouth daily after dinner       bisacodyl (Dulcolax) 10 mg suppository Insert 10 mg into the rectum as needed for constipation      dabigatran etexilate (PRADAXA) 150 mg capsu Take 150 mg by mouth 2 (two) times a day      diltiazem (CARDIZEM CD) 240 mg 24 hr capsule Take 1 capsule (240 mg total) by mouth daily  Qty: 10 capsule, Refills: 0    Associated Diagnoses: Type II dens fracture of second cervical vertebra (HCC)      furosemide (LASIX) 40 mg tablet Take 1 5 tablets by mouth 2 (two) times a day 60 MG BID      hydrALAZINE (APRESOLINE) 10 mg tablet Take 10 mg by mouth 3 (three) times a day      insulin glargine (LANTUS) 100 units/mL subcutaneous injection Inject 30 Units under the skin daily at bedtime       insulin lispro (HumaLOG) 100 units/mL injection Inject 14 Units under the skin 3 (three) times a day with meals      magnesium hydroxide (MILK OF MAGNESIA) 400 mg/5 mL oral suspension Take 30 mL by mouth daily as needed for constipation      methocarbamol (ROBAXIN) 500 mg tablet Take 1 tablet (500 mg total) by mouth every 6 (six) hours  Qty: 10 tablet, Refills: 0    Associated Diagnoses: Type II dens fracture of second cervical vertebra (HCC)      metoprolol tartrate (LOPRESSOR) 50 mg tablet Take 50 mg by mouth every 12 (twelve) hours HOLD SBP <100 HR < 55      omeprazole (PriLOSEC) 40 MG capsule Take 40 mg by mouth daily      sodium phosphate-biphosphate (FLEET) 7-19 g 118 mL enema Insert 1 enema into the rectum once as needed           No discharge procedures on file  PDMP Review       Value Time User    PDMP Reviewed  Yes 10/30/2020  2:05 PM Natalie Lovell PA-C           ED Provider  Attending physically available and evaluated Yoselin Kalina PASTOR managed the patient along with the ED Attending      Electronically Signed by         Aleshia Rodriguez DO  03/06/21 0801

## 2021-03-06 NOTE — H&P
H&P Exam - Critical Care   Irven Dancer 82 y o  male MRN: [id number]  Unit/Bed#: ED 27 Encounter: [id number]      -------------------------------------------------------------------------------------------------------------  Chief Complaint: "I fell"    History of Present Illness   HX and PE limited by: None  Irven Dancer is a 82 y o  male who presents on 3/6 after a mechanical fall  He has a past medical history of heart failure, atrial fibrillation on Pradaxa, hypertension, hyperlipidemia, type 2 diabetes, and ambulatory dysfunction  His work-up in the emergency room demonstrated a comminuted right acetabular fracture as well as an area of hemorrhage in his abdomen  Praxbind was given in the emergency room  He was found to be in atrial fibrillation with rapid ventricular response and was referred to the critical care unit for further stabilization      History obtained from chart review and the patient   -------------------------------------------------------------------------------------------------------------  Assessment and Plan:    Neuro:    Diagnosis: Acute pain  o Plan: Frequent neurologic exams, continue scheduled Tylenol with PRN oxycodone/hydromorphone      CV:    Diagnosis: Atrial fibrillation with rapid ventricular response, heart failure, hypertension, hyperlipidemia  o Plan: Resume patient's home rate control regimen, titrate diltiazem infusion for heart rate below 120, continue home statin, hold home hydralazine, check BNP and most likely resume home diuretics      Pulm:  o Plan: Encourage good pulmonary hygiene      GI:    Diagnosis: Abdominal hemorrhage, GERD  o Plan: Trend hemoglobin q6 for now, continue home PPI, aggressive bowel regimen      :   o Plan: Close intake and output, daily weights, trend serum creatinine      F/E/N:    Plan: NPO for now, replete electrolytes as needed      Heme/Onc:    Diagnosis: Anticoagulated  o Plan: Patient given Praxbind in ER, begin Lovenox for DVT prophylaxis, follow hemoglobin q6      Endo:    Diagnosis: Type 2 diabetes  o Plan: Begin sliding scale insulin while NPO, recheck A1c      ID:   o Plan: Follow temperature and white count      MSK/Skin:    Diagnosis: Right acetabular fracture  o Plan: Orthopedic consult, weight-bearing per orthopedic department      Disposition: Admit to Critical Care   Code Status: Level 3 - DNAR and DNI  --------------------------------------------------------------------------------------------------------------  Review of Systems   Constitutional: Positive for fatigue  Negative for appetite change and chills  HENT: Negative for trouble swallowing  Respiratory: Negative for shortness of breath  Cardiovascular: Negative for chest pain  Gastrointestinal: Positive for nausea  Negative for constipation, diarrhea and vomiting  Genitourinary: Negative for difficulty urinating  Musculoskeletal: Positive for arthralgias and gait problem  Neurological: Positive for weakness  Negative for syncope and headaches  Physical Exam  Constitutional:       Appearance: He is obese  HENT:      Head: Normocephalic and atraumatic  Mouth/Throat:      Mouth: Mucous membranes are dry  Eyes:      Pupils: Pupils are equal, round, and reactive to light  Neck:      Musculoskeletal: No neck rigidity  Cardiovascular:      Rate and Rhythm: Regular rhythm  Tachycardia present  Pulses: Normal pulses  Pulmonary:      Effort: Pulmonary effort is normal  No respiratory distress  Breath sounds: Normal breath sounds  Abdominal:      General: Abdomen is flat  Bowel sounds are normal  There is no distension  Palpations: Abdomen is soft  Tenderness: There is no abdominal tenderness  Musculoskeletal:         General: Tenderness (over right hip) present  Right lower leg: No edema  Left lower leg: No edema        Comments: Sensation grossly intact right lower extremity, not accurate when touching individual toes of the right foot   Skin:     General: Skin is warm and dry  Neurological:      Mental Status: He is alert  GCS: GCS eye subscore is 4  GCS verbal subscore is 5  GCS motor subscore is 6  Psychiatric:         Behavior: Behavior is cooperative        --------------------------------------------------------------------------------------------------------------  Vitals:   Vitals:    03/06/21 0811 03/06/21 1124 03/06/21 1230   BP: 155/85 (!) 189/97 (!) 175/82   Pulse: (!) 136 (!) 132 (!) 134   Resp: 18 18 18   Temp: 97 6 °F (36 4 °C)     SpO2: 97% 97% 97%   Weight: 90 3 kg (199 lb)       Temp  Min: 97 6 °F (36 4 °C)  Max: 97 6 °F (36 4 °C)  IBW: -88 kg     Body mass index is 30 26 kg/m²    N/A    Laboratory and Diagnostics:  Results from last 7 days   Lab Units 03/06/21  0913   WBC Thousand/uL 11 34*   HEMOGLOBIN g/dL 10 6*   HEMATOCRIT % 34 8*   PLATELETS Thousands/uL 301   NEUTROS PCT % 75   MONOS PCT % 10     Results from last 7 days   Lab Units 03/06/21  0913   SODIUM mmol/L 142   POTASSIUM mmol/L 4 9   CHLORIDE mmol/L 111*   CO2 mmol/L 27   ANION GAP mmol/L 4   BUN mg/dL 25   CREATININE mg/dL 1 19   CALCIUM mg/dL 8 9   GLUCOSE RANDOM mg/dL 177*                       ABG:    VBG:          Micro:        EKG: Abnormal sinus-appearing tachycardia, my interpretation  Imaging:   3/6 CT A/P- hemorrhage into space of Retzius, no evidence of contrast extravasation from the bladder, comminuted complex right acetabular fracture, suspicion for a small pseudoaneurysm medical to the junction of the iliac and common femoral vein adjacen to the anterior column, a hyperenhancing lymph node is a possibility, large amount of stool in the rectum with colonic diverticulosis  3/6 CT Head- no acute intracranial abnormality  3/6 CT Cervical spine- status post occipital cervical fusion secondary to fracture of C1 and C2 with no change in alignment    Historical Information   Past Medical History:   Diagnosis Date    [facility]fib ([facility] [address] )  Cancer [facility])     Cardiac disease     [facility]     Chronic obstructive pulmonary disease [facility]COPD) ([facility])     COPD (chronic obstructive pulmonary disease) ([facility])     Coronary artery disease     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[facility] [address] )     TYPE 2    Dysphagia     Fracture of nasal bone     Gait instability     H/O cervical fracture     Hyperlipidemia     Hypertension     Muscle weakness     TBI (traumatic brain injury) ([facility] [address] )      Past Surgical History:   Procedure Laterality Date    HAND SURGERY      [place] ARTHRODESIS POSTERIOR CRANIOCERVICAL N/A 10/26/2020    Procedure: Occipital cervical fusion to C4;  Surgeon: Aylin Marques MD;  Location: [facility];  Service: Neurosurgery     Social History   Social History     Substance and Sexual Activity   Alcohol Use Never    Frequency: Never     Social History     Substance and Sexual Activity   Drug Use Never     Social History     Tobacco Use   Smoking Status Never Smoker   Smokeless Tobacco Never Used     Exercise History: Ambulates with walker  Family History:   Family History   Problem Relation Age of Onset    Other Other         urinary tract malignancy     I have reviewed this patient's family history and commented on sigificant items within the HPI      Medications:  Current Facility-Administered Medications   Medication Dose Route Frequency    acetaminophen (TYLENOL) tablet 975 mg  975 mg Oral Q8H [address]    diltiazem (CARDIZEM CD) 24 hr capsule 240 mg  240 mg Oral Daily    diltiazem (CARDIZEM) 125 mg in sodium chloride 0 9 % 125 mL infusion  1-15 mg/hr Intravenous Once    HYDROmorphone (DILAUDID) injection 0 2 mg  0 2 mg Intravenous Q4H PRN    naloxone (NARCAN) 0 04 mg/mL syringe 0 04 mg  0 04 mg Intravenous Q1MIN PRN    ondansetron (ZOFRAN) injection 4 mg  4 mg Intravenous Q4H PRN    oxyCODONE (ROXICODONE) IR tablet 2 5 mg  2 5 mg Oral Q4H PRN    oxyCODONE (ROXICODONE) IR tablet 5 mg  5 mg Oral Q4H PRN    polyethylene glycol (MIRALAX) packet 17 g  17 g Oral Daily PRN     Home medications:  Prior to Admission Medications   Prescriptions Last Dose Informant Patient Reported?  Taking?   acetaminophen (TYLENOL) 325 mg tablet  Outside Facility (Specify) No No   Sig: Take 3 tablets (975 mg total) by mouth every 8 (eight) hours   aspirin 81 mg chewable tablet  Outside Facility (Specify) No No   Sig: Chew 1 tablet (81 mg total) daily   atorvastatin (LIPITOR) 20 mg tablet  Outside Facility (Specify) Yes No   Sig: Take 20 mg by mouth daily after dinner    bisacodyl (Dulcolax) 10 mg suppository  Outside Facility (Specify) Yes No   Sig: Insert 10 mg into the rectum as needed for constipation   dabigatran etexilate (PRADAXA) 150 mg capsu  Outside Facility (Specify) Yes No   Sig: Take 150 mg by mouth 2 (two) times a day   diltiazem (CARDIZEM CD) 240 mg 24 hr capsule  Outside Facility (Specify) No No   Sig: Take 1 capsule (240 mg total) by mouth daily   furosemide (LASIX) 40 mg tablet  Outside Facility (Specify) Yes No   Sig: Take 1 5 tablets by mouth 2 (two) times a day 60 MG BID   hydrALAZINE (APRESOLINE) 10 mg tablet  Outside Facility (Specify) Yes No   Sig: Take 10 mg by mouth 3 (three) times a day   insulin glargine (LANTUS) 100 units/mL subcutaneous injection  Outside Facility (Specify) Yes No   Sig: Inject 30 Units under the skin daily at bedtime    insulin lispro (HumaLOG) 100 units/mL injection  Outside Facility (Specify) Yes No   Sig: Inject 14 Units under the skin 3 (three) times a day with meals   magnesium hydroxide (MILK OF MAGNESIA) 400 mg/5 mL oral suspension  Outside Facility (Specify) Yes No   Sig: Take 30 mL by mouth daily as needed for constipation   methocarbamol (ROBAXIN) 500 mg tablet  Outside Facility (Specify) No No   Sig: Take 1 tablet (500 mg total) by mouth every 6 (six) hours   metoprolol tartrate (LOPRESSOR) 50 mg tablet  Outside Facility (Specify) Yes No   Sig: Take 50 mg by mouth every 12 (twelve) hours HOLD SBP <100 HR < 55   omeprazole (PriLOSEC) 40 MG capsule  Outside Facility (Specify) Yes No   Sig: Take 40 mg by mouth daily   sodium phosphate-biphosphate (FLEET) 7-19 g 118 mL enema  Outside Facility (Specify) Yes No   Sig: Insert 1 enema into the rectum once as needed      Facility-Administered Medications: None     Allergies: Allergies   Allergen Reactions    Amoxicillin Diarrhea     ------------------------------------------------------------------------------------------------------------  Advance Directive and Living Will:      Power of :    POLST:    ------------------------------------------------------------------------------------------------------------  Anticipated Length of Stay is > 2 midnights    Care Time Delivered:   No Critical Care time spent       [facility]ics[facility]AMES        Portions of the record may have been created with voice recognition software  Occasional wrong word or "sound a like" substitutions may have occurred due to the inherent limitations of voice recognition software    Read the chart carefully and recognize, using context, where substitutions have occurred

## 2021-03-06 NOTE — ED ATTENDING ATTESTATION
3/6/2021  I, Abel Warner MD, saw and evaluated the patient  I have discussed the patient with the resident/non-physician practitioner and agree with the resident's/non-physician practitioner's findings, Plan of Care, and MDM as documented in the resident's/non-physician practitioner's note, except where noted  All available labs and Radiology studies were reviewed  I was present for key portions of any procedure(s) performed by the resident/non-physician practitioner and I was immediately available to provide assistance  At this point I agree with the current assessment done in the Emergency Department  I have conducted an independent evaluation of this patient a history and physical is as follows:    ED Course     Patient presents for evaluation due to persistent right hip pain after a fall yesterday  Patient had an x-ray at his facility but it was inconclusive  Patient denies any additional injuries or complaints  Patient is on Pradaxa  A/P: R Hp injury  Will check xrays and CT head/cspine  Critical Care Time  ECG 12 Lead Documentation Only    Date/Time: 3/6/2021 9:29 AM  Performed by: Abel Warner MD  Authorized by: Abel Warner MD     Indications / Diagnosis:  Tachycardia  ECG reviewed by me, the ED Provider: yes    Patient location:  ED  Previous ECG:     Previous ECG:  Compared to current    Similarity:  Changes noted  Interpretation:     Interpretation: abnormal    Rate:     ECG rate:  134    ECG rate assessment: tachycardic    Rhythm:     Rhythm: sinus rhythm    Ectopy:     Ectopy: none    QRS:     QRS axis:  Indeterminate    QRS intervals:   Wide  Conduction:     Conduction: abnormal      Abnormal conduction: non-specific intraventricular conduction delay    ST segments:     ST segments:  Normal  T waves:     T waves: normal

## 2021-03-06 NOTE — CONSULTS
Evaluation - Trauma   Segun Grimm 82 y o  male MRN: [id number]  Unit/Bed#: ED 27 Encounter: [id number]    Assessment/Plan   Trauma Alert: Evaluation  Model of Arrival: Ambulance  Trauma Team: Attending Cosme Torres and Shantanu Garcia  Consultants: Orthopaedics: Right hip fracture after fall  Returned call: Yes Aware of patient    Trauma Active Problems: Active Problems:    * No active hospital problems  *      Trauma Plan:   Comminuted acetabular fracture on right-orthopedic evaluation pending, further x-rays to establish extent of fractures  Will provide analgesia here  Hemorrhage-CT scan shows right hip effusion likely secondary to hemorrhage as well as blood collection in the suprapubic area  Will continue to monitor with serial hemoglobins  Patient is on Pradaxa, will provide with Praxbind here  AFib with RVR-patient did not take his home Cardizem this morning, this was provided here in the emergency department  Will start on a Cardizem drip for heart rate of less than a 100    Patient will be admitted to the ICU for serial hemoglobins, heart rate management, further evaluation  Chief Complaint:   Right hip pain  History of Present Illness    Physician Requesting Evaluation: Nickolas Oviedo MD  Reason for Evaluation / Principal Problem:  Fall with acetabular fracture  HPI:  Segun Grimm is a 82 y o  male who presents with  patient states that he was walking with a walker and then fell, landing on his right side  He denies head strike or loss of consciousness  Currently complains only of right-sided hip pain with palpation  He does take Pradaxa for his history of AFib  Patient overall is unable to provide much of a medical history  When asked about medications he says as my wife  Patient denies any chest pain, difficulty breathing, recent fever chills     Mechanism:Fall    Trauma evaluation (ED Only)  Consult performed by: Glynn Tijerina MD  Consult ordered by:  Nickolas Oviedo MD          Review of Systems   Constitutional: Negative for activity change, chills, fatigue and fever  Respiratory: Negative for cough and shortness of breath  Cardiovascular: Negative for chest pain and palpitations  Gastrointestinal: Negative for abdominal distention, abdominal pain, constipation, diarrhea, nausea and vomiting  Genitourinary: Negative for dysuria and hematuria  Musculoskeletal: Positive for arthralgias, gait problem and myalgias  Negative for back pain and neck pain  Neurological: Negative for dizziness, syncope, light-headedness and headaches  All other systems reviewed and are negative  Historical Information   History is unobtainable from the patient due to none    Efforts to obtain history included the following sources: family member    Past Medical History:   Diagnosis Date    A-fib ([place] [address] )     Cancer [facility])     Cardiac disease     [facility]     Chronic obstructive pulmonary disease (COPD) ([place] [address] )     COPD (chronic obstructive pulmonary disease) ([place] [address] )     Coronary artery disease     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[place] [address] )     TYPE 2    Dysphagia     Fracture of nasal bone     Gait instability     H/O cervical fracture     Hyperlipidemia     Hypertension     Muscle weakness     TBI (traumatic brain injury) ([place] [address] )      Past Surgical History:   Procedure Laterality Date    HAND SURGERY      [place] ARTHRODESIS POSTERIOR CRANIOCERVICAL N/A 10/26/2020    Procedure: Occipital cervical fusion to C4;  Surgeon: Arnaldo Wagner MD;  Location:  MAIN OR;  Service: Neurosurgery     Social History   Social History     Substance and Sexual Activity   Alcohol Use Never    Frequency: Never     Social History     Substance and Sexual Activity   Drug Use Never     E-Cigarette/Vaping    E-Cigarette Use Never User      E-Cigarette/Vaping Substances     Social History     Tobacco Use   Smoking Status Never Smoker   Smokeless Tobacco Never Used     Immunization History Administered Date(s) Administered    H1N1, All Formulations 01/01/2010, 02/01/2010    INFLUENZA 10/01/2004, 11/01/2005, 11/01/2006, 10/06/2007, 10/17/2008, 10/01/2009, 10/01/2010, 10/31/2011, 10/01/2012, 10/29/2013, 10/01/2016, 10/10/2017, 11/10/2018    Influenza Split 10/03/2011, 09/30/2013    Influenza Split High Dose Preservative Free IM 10/06/2014, 10/22/2015, 09/14/2016    Influenza, injectable, quadrivalent, preservative free 0 5 mL 09/20/2019    Pneumococcal 09/13/2005, 10/01/2010    Pneumococcal Conjugate 13-Valent 10/24/2016, 11/08/2017    Pneumococcal Polysaccharide PPV23 08/26/2014    Tdap 03/06/2012, 03/06/2015, 01/11/2020     Last Tetanus:  As above  Family History: Non-contributory  Unable to obtain/limited by none      Meds/Allergies   all current active meds have been reviewed    Allergies   Allergen Reactions    Amoxicillin Diarrhea         Objective   Vitals:   First set: Temperature: 97 6 °F (36 4 °C) (03/06/21 0811)  Pulse: (!) 136 (03/06/21 0811)  Respirations: 18 (03/06/21 0811)  Blood Pressure: 155/85 (03/06/21 0811)    Invasive Devices     None                 Neurologic Exam     Mental Status   Oriented to person, place, and time  Cranial Nerves     CN III, IV, VI   Pupils are equal, round, and reactive to light  Physical Exam  Vitals signs reviewed  Constitutional:       General: He is not in acute distress  Appearance: He is well-developed  He is not diaphoretic  HENT:      Head: Normocephalic and atraumatic  Right Ear: External ear normal       Left Ear: External ear normal    Eyes:      General:         Right eye: No discharge  Left eye: No discharge  Conjunctiva/sclera: Conjunctivae normal       Pupils: Pupils are equal, round, and reactive to light  Neck:      Musculoskeletal: Normal range of motion and neck supple  Vascular: No JVD  Trachea: No tracheal deviation  Cardiovascular:      Rate and Rhythm: Tachycardia present  Rhythm irregular  Heart sounds: Normal heart sounds  No murmur  No friction rub  No gallop  Pulmonary:      Effort: Pulmonary effort is normal  No respiratory distress  Breath sounds: Normal breath sounds  No wheezing or rales  Abdominal:      General: Bowel sounds are normal  There is no distension  Palpations: Abdomen is soft  There is no mass  Tenderness: There is no abdominal tenderness  There is no guarding  Comments: Large abdomen is soft with diffuse palpation  No ecchymosis   Musculoskeletal:         General: Tenderness and signs of injury present  No deformity  Comments: Patient has tenderness with palpation over right hip  Increased pain with passive motion  No active motion secondary to discomfort  He has diffuse sensation intact in bilateral lower extremities  5/5 strength in left lower extremity  Neurological:      Mental Status: He is alert and oriented to person, place, and time  Cranial Nerves: No cranial nerve deficit  Sensory: No sensory deficit  Motor: No abnormal muscle tone  Coordination: Coordination normal       Comments: GCS 15  Sensation grossly intact  No cranial nerve deficits noted  5/5 strength bilateral upper and lower extremities, range of motion limited in right lower extremity secondary to pain  Finger-to-nose good  Psychiatric:         Behavior: Behavior normal          Thought Content:  Thought content normal          Judgment: Judgment normal        PE limited by:  None    Lab Results:   BMP/CMP:   Lab Results   Component Value Date    SODIUM 142 03/06/2021    K 4 9 03/06/2021     (H) 03/06/2021    CO2 27 03/06/2021    BUN 25 03/06/2021    CREATININE 1 19 03/06/2021    CALCIUM 8 9 03/06/2021    EGFR 56 03/06/2021   , CBC:   Lab Results   Component Value Date    WBC 11 34 (H) 03/06/2021    HGB 10 6 (L) 03/06/2021    HCT 34 8 (L) 03/06/2021    MCV 85 03/06/2021     03/06/2021    MCH 25 9 (L) 03/06/2021 MCHC 30 5 (L) 03/06/2021    RDW 17 4 (H) 03/06/2021    MPV 10 5 03/06/2021    NRBC 0 03/06/2021    and Coagulation: No results found for: PT, INR, APTT  Imaging/EKG Studies: CT Scan Abdomen/Pelvis: Shows aforementioned comminuted fracture, hematoma  Other Studies:     Code Status: Level 3 - DNAR and DNI  Advance Directive and Living Will:      Power of :    POLST:      Counseling / Coordination of Care  Total Critical Care time spent 37 minutes excluding procedures, teaching and family updates

## 2021-03-07 LAB
ALBUMIN SERPL BCP-MCNC: 3.3 G/DL (ref 3.5–5)
ALP SERPL-CCNC: 107 U/L (ref 46–116)
ALT SERPL W P-5'-P-CCNC: 15 U/L (ref 12–78)
ANION GAP SERPL CALCULATED.3IONS-SCNC: 5 MMOL/L (ref 4–13)
AST SERPL W P-5'-P-CCNC: 10 U/L (ref 5–45)
BASOPHILS # BLD AUTO: 0.09 THOUSANDS/ΜL (ref 0–0.1)
BASOPHILS NFR BLD AUTO: 1 % (ref 0–1)
BILIRUB SERPL-MCNC: 0.49 MG/DL (ref 0.2–1)
BUN SERPL-MCNC: 24 MG/DL (ref 5–25)
CALCIUM ALBUM COR SERPL-MCNC: 9.6 MG/DL (ref 8.3–10.1)
CALCIUM SERPL-MCNC: 9 MG/DL (ref 8.3–10.1)
CHLORIDE SERPL-SCNC: 109 MMOL/L (ref 100–108)
CO2 SERPL-SCNC: 30 MMOL/L (ref 21–32)
CREAT SERPL-MCNC: 1.23 MG/DL (ref 0.6–1.3)
EOSINOPHIL # BLD AUTO: 0.45 THOUSAND/ΜL (ref 0–0.61)
EOSINOPHIL NFR BLD AUTO: 5 % (ref 0–6)
ERYTHROCYTE [DISTWIDTH] IN BLOOD BY AUTOMATED COUNT: 17.3 % (ref 11.6–15.1)
EST. AVERAGE GLUCOSE BLD GHB EST-MCNC: 166 MG/DL
GFR SERPL CREATININE-BSD FRML MDRD: 54 ML/MIN/1.73SQ M
GLUCOSE SERPL-MCNC: 187 MG/DL (ref 65–140)
GLUCOSE SERPL-MCNC: 193 MG/DL (ref 65–140)
GLUCOSE SERPL-MCNC: 208 MG/DL (ref 65–140)
GLUCOSE SERPL-MCNC: 343 MG/DL (ref 65–140)
GLUCOSE SERPL-MCNC: 369 MG/DL (ref 65–140)
GLUCOSE SERPL-MCNC: 479 MG/DL (ref 65–140)
HBA1C MFR BLD: 7.4 %
HCT VFR BLD AUTO: 32.1 % (ref 36.5–49.3)
HGB BLD-MCNC: 9.7 G/DL (ref 12–17)
HGB BLD-MCNC: 9.7 G/DL (ref 12–17)
HGB BLD-MCNC: 9.8 G/DL (ref 12–17)
IMM GRANULOCYTES # BLD AUTO: 0.05 THOUSAND/UL (ref 0–0.2)
IMM GRANULOCYTES NFR BLD AUTO: 1 % (ref 0–2)
INR PPP: 1.05 (ref 0.84–1.19)
LYMPHOCYTES # BLD AUTO: 1.24 THOUSANDS/ΜL (ref 0.6–4.47)
LYMPHOCYTES NFR BLD AUTO: 13 % (ref 14–44)
MAGNESIUM SERPL-MCNC: 2.3 MG/DL (ref 1.6–2.6)
MCH RBC QN AUTO: 25.9 PG (ref 26.8–34.3)
MCHC RBC AUTO-ENTMCNC: 30.5 G/DL (ref 31.4–37.4)
MCV RBC AUTO: 85 FL (ref 82–98)
MONOCYTES # BLD AUTO: 0.92 THOUSAND/ΜL (ref 0.17–1.22)
MONOCYTES NFR BLD AUTO: 10 % (ref 4–12)
NEUTROPHILS # BLD AUTO: 6.7 THOUSANDS/ΜL (ref 1.85–7.62)
NEUTS SEG NFR BLD AUTO: 70 % (ref 43–75)
NRBC BLD AUTO-RTO: 0 /100 WBCS
PLATELET # BLD AUTO: 280 THOUSANDS/UL (ref 149–390)
PMV BLD AUTO: 10.2 FL (ref 8.9–12.7)
POTASSIUM SERPL-SCNC: 4.3 MMOL/L (ref 3.5–5.3)
PROT SERPL-MCNC: 7.2 G/DL (ref 6.4–8.2)
PROTHROMBIN TIME: 13.7 SECONDS (ref 11.6–14.5)
RBC # BLD AUTO: 3.79 MILLION/UL (ref 3.88–5.62)
SODIUM SERPL-SCNC: 144 MMOL/L (ref 136–145)
WBC # BLD AUTO: 9.45 THOUSAND/UL (ref 4.31–10.16)

## 2021-03-07 PROCEDURE — 83735 ASSAY OF MAGNESIUM: CPT | Performed by: NURSE PRACTITIONER

## 2021-03-07 PROCEDURE — 83036 HEMOGLOBIN GLYCOSYLATED A1C: CPT | Performed by: NURSE PRACTITIONER

## 2021-03-07 PROCEDURE — 82948 REAGENT STRIP/BLOOD GLUCOSE: CPT

## 2021-03-07 PROCEDURE — 27220 TREAT HIP SOCKET FRACTURE: CPT | Performed by: ORTHOPAEDIC SURGERY

## 2021-03-07 PROCEDURE — 99222 1ST HOSP IP/OBS MODERATE 55: CPT | Performed by: ORTHOPAEDIC SURGERY

## 2021-03-07 PROCEDURE — 97163 PT EVAL HIGH COMPLEX 45 MIN: CPT

## 2021-03-07 PROCEDURE — NC001 PR NO CHARGE: Performed by: ORTHOPAEDIC SURGERY

## 2021-03-07 PROCEDURE — 85018 HEMOGLOBIN: CPT | Performed by: NURSE PRACTITIONER

## 2021-03-07 PROCEDURE — 85610 PROTHROMBIN TIME: CPT | Performed by: NURSE PRACTITIONER

## 2021-03-07 PROCEDURE — 99233 SBSQ HOSP IP/OBS HIGH 50: CPT | Performed by: SURGERY

## 2021-03-07 PROCEDURE — 80053 COMPREHEN METABOLIC PANEL: CPT | Performed by: NURSE PRACTITIONER

## 2021-03-07 PROCEDURE — 97167 OT EVAL HIGH COMPLEX 60 MIN: CPT

## 2021-03-07 PROCEDURE — NC001 PR NO CHARGE: Performed by: SURGERY

## 2021-03-07 PROCEDURE — 85025 COMPLETE CBC W/AUTO DIFF WBC: CPT | Performed by: NURSE PRACTITIONER

## 2021-03-07 RX ORDER — AMOXICILLIN 250 MG
1 CAPSULE ORAL 2 TIMES DAILY
Status: DISCONTINUED | OUTPATIENT
Start: 2021-03-07 | End: 2021-03-10 | Stop reason: HOSPADM

## 2021-03-07 RX ORDER — INSULIN GLARGINE 100 [IU]/ML
30 INJECTION, SOLUTION SUBCUTANEOUS
Status: DISCONTINUED | OUTPATIENT
Start: 2021-03-07 | End: 2021-03-07

## 2021-03-07 RX ORDER — BISACODYL 10 MG
10 SUPPOSITORY, RECTAL RECTAL DAILY PRN
Status: DISCONTINUED | OUTPATIENT
Start: 2021-03-07 | End: 2021-03-10 | Stop reason: HOSPADM

## 2021-03-07 RX ORDER — INSULIN GLARGINE 100 [IU]/ML
20 INJECTION, SOLUTION SUBCUTANEOUS
Status: DISCONTINUED | OUTPATIENT
Start: 2021-03-07 | End: 2021-03-08

## 2021-03-07 RX ADMIN — METOPROLOL TARTRATE 25 MG: 25 TABLET, FILM COATED ORAL at 21:41

## 2021-03-07 RX ADMIN — ACETAMINOPHEN 975 MG: 325 TABLET ORAL at 00:09

## 2021-03-07 RX ADMIN — INSULIN LISPRO 5 UNITS: 100 INJECTION, SOLUTION INTRAVENOUS; SUBCUTANEOUS at 11:58

## 2021-03-07 RX ADMIN — DILTIAZEM HYDROCHLORIDE 60 MG: 60 TABLET, FILM COATED ORAL at 11:58

## 2021-03-07 RX ADMIN — ACETAMINOPHEN 975 MG: 325 TABLET ORAL at 14:03

## 2021-03-07 RX ADMIN — OXYCODONE HYDROCHLORIDE 2.5 MG: 5 TABLET ORAL at 15:33

## 2021-03-07 RX ADMIN — ENOXAPARIN SODIUM 40 MG: 40 INJECTION SUBCUTANEOUS at 08:50

## 2021-03-07 RX ADMIN — MELATONIN TAB 3 MG 3 MG: 3 TAB at 21:42

## 2021-03-07 RX ADMIN — DILTIAZEM HYDROCHLORIDE 60 MG: 60 TABLET, FILM COATED ORAL at 00:08

## 2021-03-07 RX ADMIN — METOPROLOL TARTRATE 25 MG: 25 TABLET, FILM COATED ORAL at 15:05

## 2021-03-07 RX ADMIN — INSULIN LISPRO 6 UNITS: 100 INJECTION, SOLUTION INTRAVENOUS; SUBCUTANEOUS at 17:22

## 2021-03-07 RX ADMIN — SENNOSIDES, DOCUSATE SODIUM 1 TABLET: 8.6; 5 TABLET ORAL at 11:58

## 2021-03-07 RX ADMIN — ACETAMINOPHEN 975 MG: 325 TABLET ORAL at 05:39

## 2021-03-07 RX ADMIN — PANTOPRAZOLE SODIUM 40 MG: 40 TABLET, DELAYED RELEASE ORAL at 05:40

## 2021-03-07 RX ADMIN — INSULIN LISPRO 2 UNITS: 100 INJECTION, SOLUTION INTRAVENOUS; SUBCUTANEOUS at 00:05

## 2021-03-07 RX ADMIN — ATORVASTATIN CALCIUM 20 MG: 20 TABLET, FILM COATED ORAL at 17:22

## 2021-03-07 RX ADMIN — FUROSEMIDE 60 MG: 40 TABLET ORAL at 08:50

## 2021-03-07 RX ADMIN — FUROSEMIDE 60 MG: 40 TABLET ORAL at 15:33

## 2021-03-07 RX ADMIN — DILTIAZEM HYDROCHLORIDE 60 MG: 60 TABLET, FILM COATED ORAL at 17:27

## 2021-03-07 RX ADMIN — SENNOSIDES, DOCUSATE SODIUM 1 TABLET: 8.6; 5 TABLET ORAL at 17:22

## 2021-03-07 RX ADMIN — DILTIAZEM HYDROCHLORIDE 60 MG: 60 TABLET, FILM COATED ORAL at 05:40

## 2021-03-07 RX ADMIN — ACETAMINOPHEN 975 MG: 325 TABLET ORAL at 21:41

## 2021-03-07 RX ADMIN — METOPROLOL TARTRATE 25 MG: 25 TABLET, FILM COATED ORAL at 08:50

## 2021-03-07 RX ADMIN — INSULIN LISPRO 1 UNITS: 100 INJECTION, SOLUTION INTRAVENOUS; SUBCUTANEOUS at 05:58

## 2021-03-07 RX ADMIN — MELATONIN TAB 3 MG 3 MG: 3 TAB at 00:09

## 2021-03-07 NOTE — PHYSICAL THERAPY NOTE
PHYSICAL THERAPY EVALUATION  NAME:  Mark Bucio  DATE: 03/07/21    AGE:   82 y o  Mrn:   [id number]  ADMIT DX:  Unspecified multiple injuries, initial encounter [T07  XXXA]    Past Medical History:   Diagnosis Date    A-fib ([place] [address] )     Cancer ([place] [address] )     Cardiac disease     [facility]) [facility])     Chronic obstructive pulmonary disease (COPD) ([place] [address] )     COPD [facility]chronic obstructive pulmonary disease) ([facility])     Coronary artery disease     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[place] [address] )     TYPE 2    Dysphagia     Fracture of nasal bone     Gait instability     H/O cervical fracture     Hyperlipidemia     Hypertension     Muscle weakness     TBI (traumatic brain injury) ([place] [address] )        Past Surgical History:   Procedure Laterality Date    HAND SURGERY      [place] ARTHRODESIS POSTERIOR CRANIOCERVICAL N/A 10/26/2020    Procedure: Occipital cervical fusion to C4;  Surgeon: Suzanne Finley MD;  Location: [facility];  Service: Neurosurgery       Length Of Stay: 1    PHYSICAL THERAPY EVALUATION:        03/07/21 1035   Note Type   Note type Evaluation   Pain Assessment   Pain Assessment Tool 0-10   Pain Score 4  (4/10 with activty; 0/10 at rest )   Pain Location/Orientation Orientation: Right;Location: Leg   Pain Onset/Description Onset: Ongoing;Frequency: Constant/Continuous; Descriptor: Aching   Effect of Pain on Daily Activities increased pain with activity    Patient's Stated Pain Goal No pain   Hospital Pain Intervention(s) Ambulation/increased activity;Repositioned   Home Living   Type of Home Assisted living  [facility] )   Home Layout One level   Home Equipment Walker;Cane   Additional Comments Pt reports living at [facility]   Pt questionable historian, need to clarify setup and level of support available to pt    Prior Function   Level of [place] Independent with ADLs and functional mobility   Lives With Facility staff   Receives Help From Family;Personal care attendant   ADL Assistance Needs assistance   Falls in the last 6 months 1 to 4   Comments Pt reports the use of a RW for short distances and the use of a WC for greater distance mobility PTA    Restrictions/Precautions   Weight Bearing Precautions Per Order Yes   RUE Weight Bearing Per Order WBAT   LUE Weight Bearing Per Order WBAT   RLE Weight Bearing Per Order TTWB   LLE Weight Bearing Per Order WBAT   Other Precautions Cognitive; Chair Alarm; Bed Alarm; Impulsive;WBS;Multiple lines; Fall Risk;Pain   General   Family/Caregiver Present No   Cognition   Overall Cognitive Status Impaired   Arousal/Participation Alert   Attention Attends with cues to redirect   Orientation Level Oriented to person;Oriented to time;Oriented to situation  (general time )   Memory Decreased recall of precautions;Decreased recall of recent events   Following Commands Follows one step commands with increased time or repetition   RUE Assessment   RUE Assessment WFL   LUE Assessment   LUE Assessment WFL   RLE Assessment   RLE Assessment X  (AROM limited by pain )   Strength RLE   RLE Overall Strength 2+/5   LLE Assessment   LLE Assessment WFL   Strength LLE   LLE Overall Strength 3+/5   Bed Mobility   Supine to Sit 3  Moderate assistance   Additional items Assist x 2; Increased time required;Verbal cues   Transfers   Sit pivot 2  Maximal assistance   Additional items Assist x 2; Increased time required;Verbal cues   Additional Comments sit <-> stand transfer attempted with Max A x 2 however pt unable to complete   sit pivot was performed with Max Ax2 to drop arm recliner chair    Ambulation/Elevation   Gait pattern Not appropriate   Balance   Static Sitting Fair -   Dynamic Sitting Poor +   Endurance Deficit   Endurance Deficit Yes   Endurance Deficit Description fatigue and pain    Activity Tolerance   Activity Tolerance Patient limited by fatigue;Patient limited by pain   Medical Staff 221 Promise Santos, OT; Orquidea Oscar, SPT    Nurse Made Aware Pt appropriate to be seen and mobilize per nsg    Assessment   Prognosis Good   Problem List Decreased strength;Decreased range of motion;Decreased endurance; Impaired balance;Decreased mobility; Decreased cognition; Impaired judgement;Decreased safety awareness;Pain;Orthopedic restrictions   Assessment Pt is 82 y o  male seen for PT evaluation s/p admit to [facility] on 3/6/2021  Two pt identifiers were used to confirm  Pt presented s/p mechanical fall [facility]  Pt was admitted with a primary dx of: R acetabular fx, abdominal hemorrhage   Ortho recommending TTWB to RLE and non op management for R acetabular fx at this time  PT now consulted for assessment of mobility and d/c needs  Pts current co morbidities affecting treatment include: AFib, cancer, CHF, COPD, CAD, DM, dysphagia, gait instability, HLD, HTN, TBI  Thejasa Mikhail Pts current clinical presentation is Unstable/ Unpredictable (high complexity) due to Ongoing medical management for primary dx, Decreased activity tolerance compared to baseline, Fall risk, Increased assistance needed from caregiver at current time, Ongoing telemetry monitoring, Cog status, Increased O2 via NC from pts baseline, Current WBS, Continuous pulse oximetry monitoring     Upon evaluation, pt currently is requiring Mod Ax2 for bed mobility; Max Ax2 for sit pivot transfers  Pt presents at PT eval functioning below baseline and currently w/ overall mobility deficits 2* to: BLE weakness, decreased ROM, impaired balance, decreased endurance, pain, decreased activity tolerance compared to baseline, decreased safety awareness, fall risk, orthopedic restrictions, decreased cognition  Pt currently at a fall risk 2* to impairments listed above  Based on the aforementioned PT evaluation, pt will continue to benefit from skilled Acute PT interventions to address stated impairments; to maximize functional mobility; for ongoing pt/ family training; and DME needs   At conclusion of PT session pt returned back in chair and chair alarm engaged with phone and call bell within reach  Pt denies any further questions at this time  PT is currently recommending Rehab  PT will continue to follow during hospital stay  Barriers to Discharge None   Goals   Patient Goals " to walk"   STG Expiration Date 03/17/21   Short Term Goal #1 In 10 days pt will complete: 1) Bed mobility skills with min Ax1 to increase safety and independence as well as decrease caregiver burden  2) Functional sit pivot transfers with min Ax1 to promote increased independence, safety, and QOL  3) Improve balance grades by 1/2 grade to increase safety with all mobility and decrease fall risk  4) Improve BLE strength by 1/2 grade to help increase overall functional mobility and decrease fall risk  5) pt will be able to tolerate sitting EOB x 30 min while participating with PT in order to increase pts overall activity tolerance  6) further OOB transfers and ambulation to be assessed when appropriate  Plan   Treatment/Interventions Functional transfer training;LE strengthening/ROM; Therapeutic exercise; Endurance training;Patient/family training;Equipment eval/education; Bed mobility;Continued evaluation;Spoke to nursing;OT   PT Frequency Other (Comment)  (3-6x a week )   Recommendation   PT Discharge Recommendation Post-Acute Rehabilitation Services   Equipment Recommended Wheelchair   Wheelchair Package Recommended Standard   PT - OK to Discharge Yes  (to rehab when medically cleared )   [facility] [address] Mobility Inpatient   Turning in Bed Without Bedrails 3   Lying on Back to Sitting on Edge of Flat Bed 2   Moving Bed to Chair 1   Standing Up From Chair 1   Walk in Room 1   Climb 3-5 Stairs 1   Basic Mobility Inpatient Raw Score 9   Turning Head Towards Sound 4   Follow Simple Instructions 3   Low Function Basic Mobility Raw Score 16   Low Function Basic Mobility Standardized Score 25 72   Modified Willis Scale   Modified [place] Scale 4   Barthel Index   Feeding 10   Bathing 0 Grooming Score 0   Dressing Score 0   Bladder Score 10   Bowels Score 10   Toilet Use Score 5   Transfers (Bed/Chair) Score 5   Mobility (Level Surface) Score 0   Stairs Score 0   Barthel Index Score 40   Jacques Vazquez, PT, DPT

## 2021-03-07 NOTE — PROGRESS NOTES
Subjective: No acute events overnight  No acute distress  Pain controlled    Objective:  A 10 point ROS was performed; negative except as noted above       Lab Results   Component Value Date/Time    WBC 9 45 03/07/2021 05:46 AM    HGB 9 8 (L) 03/07/2021 05:46 AM       Vitals:    03/07/21 0700   BP: 135/66   Pulse: 66   Resp: 21   Temp:    SpO2: 99%     Right lower extremity  Skin intact  Leg mildly shortened  Motor intact to EHL/FHL/TA/GS  Sensation grossly intact, but globally diminished  Toes warm and well perfused with brisk capillary refill    Assessment: 82 y o  male with Right acetabular fracture     Plan:  TTWB  right lower extremity   Pain control  DVT ppx  PT/OT  Dispo: Ortho will follow

## 2021-03-07 NOTE — PROGRESS NOTES
Daily Progress Note - Critical Care   Jessica Rodney 82 y o  male MRN: [id number]  Unit/Bed#: ICU 09 Encounter: [id number]        ----------------------------------------------------------------------------------------  HPI/24hr events: Patient with significant improvement in heart rate, hemoglobin stable, orthopedic plan presently non-operative    ---------------------------------------------------------------------------------------  SUBJECTIVE  "I'm hungry!"    Review of Systems   Constitutional: Positive for appetite change (hungry)  HENT: Negative for trouble swallowing  Eyes: Negative for visual disturbance  Respiratory: Negative for shortness of breath  Cardiovascular: Negative for chest pain  Gastrointestinal: Negative for abdominal pain, nausea and vomiting  Genitourinary: Negative for difficulty urinating  Musculoskeletal: Positive for arthralgias and gait problem  Neurological: Positive for weakness         ---------------------------------------------------------------------------------------  Assessment and Plan:    Neuro:    Diagnosis: Acute pain  o Plan: Frequent neurologic exams, continue scheduled Tylenol with PRN oxycodone/hydromorphone      CV:    Diagnosis: Atrial fibrillation with rapid ventricular response, heart failure, hypertension, hyperlipidemia  o Plan: Continue home rate control regimen, continue home statin, continue to hold home hydralazine for now, continue home diuretics    Pulm:  o Plan: Encourage good pulmonary hygiene      GI:    Diagnosis: Abdominal hemorrhage  o Plan: Can likely check hemoglobin q12 today then daily, contninue home PPI, bowel regimen      :   o Plan: Close intake and output, daily weights, trend serum creatinine      F/E/N:    Plan: Would begin diet today, replete electrolytes as needed      Heme/Onc:    Diagnosis: Anticoagulated  o Plan: Begin DVT prophylaxis today, would begin systemic anticoagulation tomorrow    Endo:    Diagnosis: Type 2 diabetes  o Plan: Continue sliding scale and if tolerates diet return to home insulin regimen      ID:   o Plan: Follow temperature and white count      MSK/Skin:    Diagnosis: Right acetabular fracture  o Plan: Toe-touch weight bearing, non-operative for now, appreciate orthopedic recommendations      Disposition: Improving  Code Status: Level 3 - DNAR and DNI  ---------------------------------------------------------------------------------------  ICU CORE MEASURES    Prophylaxis   VTE Pharmacologic Prophylaxis: Enoxaparin (Lovenox)  VTE Mechanical Prophylaxis: sequential compression device  Stress Ulcer Prophylaxis: Prophylaxis Not Indicated     ABCDE Protocol (if indicated)  Plan to perform spontaneous awakening trial today? Not applicable  Plan to perform spontaneous breathing trial today? Not applicable  Obvious barriers to extubation? Not applicable  CAM-ICU: Negative    Invasive Devices Review  Invasive Devices     Peripheral Intravenous Line            Peripheral IV 21 Left Antecubital less than 1 day    Peripheral IV 21 Right Hand less than 1 day              Can any invasive devices be discontinued today?  No  ---------------------------------------------------------------------------------------  OBJECTIVE    Vitals   Vitals:    21 0008 21 0100 21 0200 21 0540   BP: 111/64 114/54 138/73 133/72   Pulse:  84 76    Resp:  18 (!) 30    Temp:       TempSrc:       SpO2:  91% 94%    Weight:         Temp (24hrs), Av 6 °F (37 °C), Min:97 6 °F (36 4 °C), Max:99 °F (37 2 °C)  Current: Temperature: 98 6 °F (37 °C)  HR: 72  BP: 143/65  RR: 22  SpO2: 99    Respiratory:  SpO2: SpO2: 99 %, SpO2 Activity: SpO2 Activity: At Rest, SpO2 Device: O2 Device: None (Room air)       Invasive/non-invasive ventilation settings   Respiratory    Lab Data (Last 4 hours)    None         O2/Vent Data (Last 4 hours)    None                Physical Exam  Constitutional:       Appearance: Normal appearance  Interventions: Nasal cannula in place  HENT:      Head: Normocephalic and atraumatic  Mouth/Throat:      Mouth: Mucous membranes are dry  Eyes:      Pupils: Pupils are equal, round, and reactive to light  Cardiovascular:      Rate and Rhythm: Normal rate and regular rhythm  Pulses: Normal pulses  Pulmonary:      Effort: Pulmonary effort is normal  No respiratory distress  Breath sounds: Normal breath sounds  Abdominal:      General: Abdomen is flat  Bowel sounds are normal  There is no distension  Palpations: Abdomen is soft  Tenderness: There is no abdominal tenderness  Musculoskeletal:         General: Tenderness (right hip) present  Right lower leg: Edema present  Left lower leg: Edema present  Comments: Sensation grossly intact right lower extremity   Skin:     General: Skin is warm and dry  Neurological:      General: No focal deficit present  Mental Status: He is alert  GCS: GCS eye subscore is 4  GCS verbal subscore is 5  GCS motor subscore is 6           Laboratory and Diagnostics:  Results from last 7 days   Lab Units 03/07/21  0546 03/06/21  2351 03/06/21  1846 03/06/21  1521 03/06/21  0913   WBC Thousand/uL 9 45  --   --   --  11 34*   HEMOGLOBIN g/dL 9 8* 9 7* 9 9* 10 1* 10 6*   HEMATOCRIT % 32 1*  --   --   --  34 8*   PLATELETS Thousands/uL 280  --   --   --  301   NEUTROS PCT % 70  --   --   --  75   MONOS PCT % 10  --   --   --  10     Results from last 7 days   Lab Units 03/07/21  0545 03/06/21  0913   SODIUM mmol/L 144 142   POTASSIUM mmol/L 4 3 4 9   CHLORIDE mmol/L 109* 111*   CO2 mmol/L 30 27   ANION GAP mmol/L 5 4   BUN mg/dL 24 25   CREATININE mg/dL 1 23 1 19   CALCIUM mg/dL 9 0 8 9   GLUCOSE RANDOM mg/dL 193* 177*   ALT U/L 15  --    AST U/L 10  --    ALK PHOS U/L 107  --    ALBUMIN g/dL 3 3*  --    TOTAL BILIRUBIN mg/dL 0 49  --      Results from last 7 days   Lab Units 03/07/21  0545   MAGNESIUM mg/dL 2 3 Results from last 7 days   Lab Units 03/07/21  0545   INR  1 05      Results from last 7 days   Lab Units 03/06/21  1521   TROPONIN I ng/mL 0 02         ABG:    VBG:          Micro        EKG: Review of EKGs demonstrate atrial fibrillation  Imaging:  I have personally reviewed pertinent reports  Intake and Output  I/O       03/05 0701 - 03/06 0700 03/06 0701 - 03/07 0700    P  O   120    I V  (mL/kg)  17 2 (0 2)    Total Intake(mL/kg)  137 2 (1 5)    Urine (mL/kg/hr)  375    Total Output  375    Net  -237 9          Unmeasured Urine Occurrence  3 x        UOP: Unmeasured    Height and Weights      IBW: -88 kg  Body mass index is 30 26 kg/m²  Weight (last 2 days)     Date/Time   Weight    03/06/21 0811   90 3 (199)                Nutrition       Diet Orders   (From admission, onward)             Start     Ordered    03/06/21 1505  Diet NPO; Sips of clear liquids  Diet effective now     Question Answer Comment   Diet Type NPO    NPO Except: Sips of clear liquids    RD to adjust diet per protocol?  Yes        03/06/21 1504              Active Medications  Scheduled Meds:  Current Facility-Administered Medications   Medication Dose Route Frequency Provider Last Rate    acetaminophen  975 mg Oral Q8H [address] ARISTEO Jones      atorvastatin  20 mg Oral After [employer], ARISTEO      bisacodyl  10 mg Rectal Daily PRN ARISTEO Glaser      chlorhexidine  15 mL Swish & Spit Q12H [address] [place], [address]      diltiazem  1-15 mg/hr Intravenous Titrated ARISTEO Glaser Stopped (03/06/21 1730)    diltiazem  60 mg Oral Q6H [address] ARISTEO Jones      enoxaparin  40 mg Subcutaneous Daily ARISTEO Glaser      furosemide  60 mg Oral BID ARISTEO Glaser      HYDROmorphone  0 2 mg Intravenous Q4H PRN ARISTEO Glaser      insulin lispro  1-6 Units Subcutaneous Q6H [address] ARISTEO Jones      melatonin  3 mg Oral HS ARISTEO Glaser      metoprolol tartrate  25 mg Oral Q6H Doren Ching, CRNP      naloxone  0 04 mg Intravenous Q1MIN PRN Doren Ching, CRNP      ondansetron  4 mg Intravenous Q4H PRN Doren Ching, CRNP      oxyCODONE  2 5 mg Oral Q4H PRN Doren Ching, CRNP      oxyCODONE  5 mg Oral Q4H PRN Doren Ching, CRNP      pantoprazole  40 mg Oral Early Morning Doren Ching, CRNP      polyethylene glycol  17 g Oral Daily PRN Doren Ching, CRNP      simethicone  80 mg Oral Q6H PRN Doren Ching, CRNP       Continuous Infusions:  diltiazem, 1-15 mg/hr, Last Rate: Stopped (03/06/21 8900)      PRN Meds:   bisacodyl, 10 mg, Daily PRN  HYDROmorphone, 0 2 mg, Q4H PRN  naloxone, 0 04 mg, Q1MIN PRN  ondansetron, 4 mg, Q4H PRN  oxyCODONE, 2 5 mg, Q4H PRN  oxyCODONE, 5 mg, Q4H PRN  polyethylene glycol, 17 g, Daily PRN  simethicone, 80 mg, Q6H PRN        Allergies   Allergies   Allergen Reactions    Amoxicillin Diarrhea     ---------------------------------------------------------------------------------------  Advance Directive and Living Will:      Power of :    POLST:    ---------------------------------------------------------------------------------------  Care Time Delivered:   No Critical Care time spent     ARISTEO Alberts      Portions of the record may have been created with voice recognition software  Occasional wrong word or "sound a like" substitutions may have occurred due to the inherent limitations of voice recognition software    Read the chart carefully and recognize, using context, where substitutions have occurred

## 2021-03-07 NOTE — PLAN OF CARE
Problem: OCCUPATIONAL THERAPY ADULT  Goal: Performs self-care activities at highest level of function for planned discharge setting  See evaluation for individualized goals  Description: Treatment Interventions: ADL retraining, Functional transfer training, Endurance training, Patient/family training, Cognitive reorientation, Equipment evaluation/education, Compensatory technique education, Activityengagement          See flowsheet documentation for full assessment, interventions and recommendations  Note: Limitation: Decreased ADL status, Decreased Safe judgement during ADL, Decreased cognition, Decreased endurance, Decreased self-care trans  Prognosis: Good       OT Discharge Recommendation: Post-Acute Rehabilitation Services  OT - OK to Discharge:  Yes

## 2021-03-07 NOTE — OCCUPATIONAL THERAPY NOTE
Occupational Therapy Evaluation     Patient Name: Sneha Wilkerson  Today's Date: 3/7/2021  Problem List  Principal Problem:    Right acetabular fracture ([facility] )  Active Problems:    Atrial fibrillation with rapid ventricular response ([place] [address] )    Hypertension    Hyperlipidemia    Type 2 diabetes mellitus ([facility] )    Ambulatory dysfunction    Chronic heart failure with preserved ejection fraction (HCC)    Abdominal hemorrhage    Anticoagulated by anticoagulation treatment    Past Medical History  Past Medical History:   Diagnosis Date    A-fib ([place] [address] )     Cancer ([place] [address] )     Cardiac disease     CHF (congestive heart failure) ([place] [address] )     Chronic obstructive pulmonary disease (COPD) ([place] [address] )     COPD (chronic obstructive pulmonary disease) ([place] [address] )     Coronary artery disease     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[place] [address] )     TYPE 2    Dysphagia     Fracture of nasal bone     Gait instability     H/O cervical fracture     Hyperlipidemia     Hypertension     Muscle weakness     TBI (traumatic brain injury) ([place] [address] )      Past Surgical History  Past Surgical History:   Procedure Laterality Date    HAND SURGERY      [place] ARTHRODESIS POSTERIOR CRANIOCERVICAL N/A 10/26/2020    Procedure: Occipital cervical fusion to C4;  Surgeon: Bossman Suárez MD;  [facility]: [facility];  Service: Neurosurgery         03/07/21 1040   OT Last Visit   OT Visit Date 03/07/21   Note Type   Note type Evaluation   Restrictions/Precautions   Weight Bearing Precautions Per Order Yes   RUE Weight Bearing Per Order WBAT   LUE Weight Bearing Per Order WBAT   RLE Weight Bearing Per Order TTWB   LLE Weight Bearing Per Order WBAT   Other Precautions Impulsive;Cognitive; Chair Alarm; Bed Alarm;WBS;Fall Risk;Pain   Pain Assessment   Pain Assessment Tool 0-10   Pain Score 4   Pain Location/Orientation Orientation: Right;Location: Leg  (With movement)   Hospital Pain Intervention(s) Repositioned; Ambulation/increased activity; Emotional support;Relaxation technique   Home Living   Type of Home SNF   Additional Comments Cervical fusion   Prior Function   Level of Avery Needs assistance with IADLs; Needs assistance with ADLs and functional mobility   Lives With Facility staff   Receives Help From Family;Personal care attendant   ADL Assistance Needs assistance   IADLs Needs assistance   Falls in the last 6 months 1 to 4   Vocational Retired   [address] assistance for ADLs reports he is able to  transfer and ambulate short distances with a roller walker however primarily uses wheelchair meals and homemaking are provided by facility   Reciprocal Relationships Supportive family and facility staff   Service to Others Retired   Intrinsic Gratification Mostly sedentary   Subjective   Subjective They tell me I need to get therapy on my leg they are not doing surgery   ADL   Eating Assistance 5  Supervision/Setup   Grooming Assistance 5  Supervision/Setup   UB Bathing Assistance 4  Minimal Assistance   LB Bathing Assistance 2  Maximal Assistance   UB Dressing Assistance 4  Minimal Assistance   LB Dressing Assistance 2  Maximal [address]  2  Maximal Assistance   Bed Mobility   Supine to Sit 3  Moderate assistance   Additional items Assist x 2   Transfers   Sit pivot 2  Maximal assistance   Additional items Assist x 2   Balance   Static Sitting Fair   Dynamic Sitting Fair -   Static Standing Zero   Activity Tolerance   Activity Tolerance Patient limited by fatigue;Patient limited by pain;Treatment limited secondary to medical complications (Comment)   RUE Assessment   RUE Assessment WFL   LUE Assessment   LUE Assessment WFL   Cognition   Arousal/Participation Alert; Cooperative   Attention Attends with cues to redirect   Orientation Level Oriented to person;Oriented to place;Oriented to situation  (General time)   Memory Decreased short term memory;Decreased recall of recent events;Decreased recall of precautions   Following Commands Follows one step commands with increased time or repetition   Assessment   Limitation Decreased ADL status; Decreased Safe judgement during ADL;Decreased cognition;Decreased endurance;Decreased self-care trans   Prognosis Good   Assessment Pt is a 82 y o  male who was admitted to [facility] on 3/6/2021 with Right acetabular fracture ([address] ) to be managed non operatively toe-touch weight-bearing  Pt's problem list also includes PMH of HTN, COPD, cancer history and AFib, cardiac disease, CHF, CAD, history of cervical fraction, history of cervical fusion, hyperlipidemia, TBI  At baseline pt was completing ADLs with assistance from  facility staff, transfers and ambulates short distances with a roller walker with assistance primarily uses wheelchair for mobility, meals and homemaking are provided by facility  Pt lives at [facility] where he has been status post cervical fusion several months ago  Currently pt requires max assist for overall ADLS and max assist mod to max assist of 2 for functional mobility/transfers with difficulty maintaining toe-touch weight-bearing to right lower extremity  Pt currently presents with impairments in the following categories -difficulty performing ADLS, limited insight into deficits, compliance, decreased initiation and engagement  and health management  activity tolerance, endurance, standing balance/tolerance, sitting balance/tolerance, memory, insight, safety , attention  and sequencing   These impairments, as well as pt's fatigue, pain, orthopedic restricitions , WBS  and risk for falls  limit pt's ability to safely engage in all baseline areas of occupation, includingbathing, dressing, toileting, functional mobility/transfers, community mobility, social participation  and leisure activities  From OT standpoint, recommend inpatient rehab upon D/C  OT will continue to follow to address the below stated goals      Goals   Patient Goals walk    LTG Time Frame 10-14   Long Term Goal #1 refer to established goals below   Plan   Treatment Interventions ADL retraining;Functional transfer training; Endurance training;Patient/family training;Cognitive reorientation;Equipment evaluation/education; Compensatory technique education; Activityengagement   Goal Expiration Date 03/21/21   OT Frequency 3-5x/wk   Recommendation   OT Discharge Recommendation Post-Acute Rehabilitation Services   OT - OK to Discharge Yes   Additional Comments  to rehab when medically cleared    [facility] Daily Activity Inpatient   Lower Body Dressing 2   Bathing 2   Toileting 2   Upper Body Dressing 2   Grooming 3   Eating 4   Daily Activity Raw Score 15   Daily Activity Standardized Score (Calc for Raw Score >=11) 34 69   -[facility] Applied Cognition Inpatient   Following a Speech/Presentation 2   Understanding Ordinary Conversation 4   Taking Medications 3   Remembering Where Things Are Placed or Put Away 3   Remembering List of 4-5 Errands 2   Taking Care of Complicated Tasks 2   Applied Cognition Raw Score 16   Applied Cognition Standardized Score 35 03       OCCUPATIONAL THERAPY GOALS:    *Min a adls after setup with use of AE PRN  *Min a toileting and clothing management   *Min a functional  transfers to/from all surfaces with fair to fair+ dynamic balance and safety for participation in dynamic adls and iadl tasks   *Demonstrate fair to fair+ carryover with safe use of RW and TTWB RLE during functional tasks   *Assess DME needs   *Increase activity tolerance to 35-40 minutes for participation in adls and enjoyable activities  *Assist with safe d/c recommendations    The patient's raw score on the AM-PAC Daily Activity inpatient short form is 15, standardized score is 34 69, less than 39 4  Patients at this level are likely to benefit from discharge to post-acute rehabilitation services  Please refer to the recommendation of the Occupational Therapist for safe discharge planning        **Please note: Portions of the documentation may have been created using voice recognition software  Occasional wrong word or sound alike substitutions may have occurred due to the inherent limitations of the voice recognition software   Read the chart carefully and recognize, using context, where substitutions have occurred      [facility], OT

## 2021-03-07 NOTE — PLAN OF CARE
Problem: PHYSICAL THERAPY ADULT  Goal: Performs mobility at highest level of function for planned discharge setting  See evaluation for individualized goals  Description: Treatment/Interventions: Functional transfer training, LE strengthening/ROM, Therapeutic exercise, Endurance training, Patient/family training, Equipment eval/education, Bed mobility, Continued evaluation, Spoke to nursing, OT  Equipment Recommended: Wheelchair       See flowsheet documentation for full assessment, interventions and recommendations  Note: Prognosis: Good  Problem List: Decreased strength, Decreased range of motion, Decreased endurance, Impaired balance, Decreased mobility, Decreased cognition, Impaired judgement, Decreased safety awareness, Pain, Orthopedic restrictions  Assessment: Pt is 82 y o  male seen for PT evaluation s/p admit to [facility] on 3/6/2021  Two pt identifiers were used to confirm  Pt presented s/p mechanical fall at [facility]  Pt was admitted with a primary dx of: R acetabular fx, abdominal hemorrhage   Ortho recommending TTWB to RLE and non op management for R acetabular fx at this time  PT now consulted for assessment of mobility and d/c needs  Pts current co morbidities affecting treatment include: AFib, cancer, CHF, COPD, CAD, DM, dysphagia, gait instability, HLD, HTN, TBI  Serjio Doshi Pts current clinical presentation is Unstable/ Unpredictable (high complexity) due to Ongoing medical management for primary dx, Decreased activity tolerance compared to baseline, Fall risk, Increased assistance needed from caregiver at current time, Ongoing telemetry monitoring, Cog status, Increased O2 via NC from pts baseline, Current WBS, Continuous pulse oximetry monitoring     Upon evaluation, pt currently is requiring Mod Ax2 for bed mobility; Max Ax2 for sit pivot transfers   Pt presents at PT eval functioning below baseline and currently w/ overall mobility deficits 2* to: BLE weakness, decreased ROM, impaired balance, decreased endurance, pain, decreased activity tolerance compared to baseline, decreased safety awareness, fall risk, orthopedic restrictions, decreased cognition  Pt currently at a fall risk 2* to impairments listed above  Based on the aforementioned PT evaluation, pt will continue to benefit from skilled Acute PT interventions to address stated impairments; to maximize functional mobility; for ongoing pt/ family training; and DME needs  At conclusion of PT session pt returned back in chair and chair alarm engaged with phone and call bell within reach  Pt denies any further questions at this time  PT is currently recommending Rehab  PT will continue to follow during hospital stay  Barriers to Discharge: None     PT Discharge Recommendation: Post-Acute Rehabilitation Services     PT - OK to Discharge: Yes(to rehab when medically cleared )    See flowsheet documentation for full assessment

## 2021-03-07 NOTE — PROGRESS NOTES
Code Status: Level 3 - DNAR and DNI  POA:    POLST:      Reason for ICU admission:   Atrial fibrillation with rapid ventricular response    Active problems:   Principal Problem:    Right acetabular fracture ([address] )  Active Problems:    Type 2 diabetes mellitus (HCC)    Atrial fibrillation with rapid ventricular response (HCC)    Hyperlipidemia    Hypertension    Ambulatory dysfunction    Chronic heart failure with preserved ejection fraction (HCC)    Abdominal hemorrhage    Anticoagulated by anticoagulation treatment  Resolved Problems:    * No resolved hospital problems  *      Consultants:   Orthopedics    History of Present Illness:   Patient is an 82year old male presenting on 3/6 after a mechanical fall  He has a past medical history of heart failure, atrial fibrillation on Pradaxa, hypertension, hyperlipidemia, type 2 diabetes, ambulatory dysfunction  Traumatic injuries included a comminuted right acetabular fracture as well as evidence of hemorrhage into the area of red CS on CT scan  Praxbind was given in the emergency room and patient atrial fibrillation with a ventricular response for which was referred to the step-down unit for stabilization  Summary of clinical course:   He was initially started on a diltiazem infusion, however after administration of his oral rate control medications able to transition off the diltiazem infusion  His hemoglobin remained stable overnight and at this point his fracture is nonoperative per the Orthopedic Service  He has been started on DVT prophylaxis and if hemoglobin remains stable would resume systemic anticoagulation  At this point is felt to be stable for transition to a medical-surgical floor with telemetry        Recent or scheduled procedures:   3/6 CT A/P- hemorrhage into the space of Retzius, comminuted complex right acetabular fracture, suspicion for small pseudoaneurysm medial to the junction of the iliac, femoral vein adjacent to the anterior column, large amount of stool in the rectal and colonic diverticulosis  3/6 CXR- mild prominence of the heart showed on a atherosclerotic disease of aorta lungs are clear  3/6 Right femur xray- complex right acetabular fracture with mild displacement, severe tricompartmental osteoarthritis of the knee  3/6 CT Head- no acute intracranial abnormality  3/6 CT Cervical spine- status post occipital cervical fusion secondary to fractures of C1 and C2    Outstanding/pending diagnostics:   PT/OT, transition to systemic anticoagulation    Cultures:   None       Mobilization Plan: With significant assistance    Nutrition Plan:   Oral diet    Invasive Devices Review  Invasive Devices     Peripheral Intravenous Line            Peripheral IV 03/06/21 Left Antecubital 1 day    Peripheral IV 03/06/21 Right Hand less than 1 day                Rationale for remaining devices: IV access    VTE Pharmacologic Prophylaxis: Enoxaparin (Lovenox)  VTE Mechanical Prophylaxis: sequential compression device    Discharge Plan:   Patient should be ready for discharge to facility/rehab after PT/OT, restart anticoagulation    Initial Physical Therapy Recommendations: Pending  Initial Occupational Therapy Recommendations: Pending  Initial /Plan: Pending    Home medications that are not reordered and reason why:   Insulin- assessing oral intake prior to beginning full regimen    Spoke with Humberto Borrego PA-C  regarding transfer  Please contact critical care via [employer] with any questions or concerns  Portions of the record may have been created with voice recognition software  Occasional wrong word or "sound a like" substitutions may have occurred due to the inherent limitations of voice recognition software  Read the chart carefully and recognize, using context, where substitutions have occurred

## 2021-03-08 LAB
ANION GAP SERPL CALCULATED.3IONS-SCNC: 5 MMOL/L (ref 4–13)
BASOPHILS # BLD AUTO: 0.08 THOUSANDS/ΜL (ref 0–0.1)
BASOPHILS NFR BLD AUTO: 1 % (ref 0–1)
BUN SERPL-MCNC: 26 MG/DL (ref 5–25)
CALCIUM SERPL-MCNC: 8.5 MG/DL (ref 8.3–10.1)
CHLORIDE SERPL-SCNC: 104 MMOL/L (ref 100–108)
CO2 SERPL-SCNC: 31 MMOL/L (ref 21–32)
CREAT SERPL-MCNC: 1.31 MG/DL (ref 0.6–1.3)
EOSINOPHIL # BLD AUTO: 0.76 THOUSAND/ΜL (ref 0–0.61)
EOSINOPHIL NFR BLD AUTO: 7 % (ref 0–6)
ERYTHROCYTE [DISTWIDTH] IN BLOOD BY AUTOMATED COUNT: 17 % (ref 11.6–15.1)
GFR SERPL CREATININE-BSD FRML MDRD: 50 ML/MIN/1.73SQ M
GLUCOSE SERPL-MCNC: 108 MG/DL (ref 65–140)
GLUCOSE SERPL-MCNC: 227 MG/DL (ref 65–140)
GLUCOSE SERPL-MCNC: 312 MG/DL (ref 65–140)
GLUCOSE SERPL-MCNC: 324 MG/DL (ref 65–140)
GLUCOSE SERPL-MCNC: 341 MG/DL (ref 65–140)
GLUCOSE SERPL-MCNC: 368 MG/DL (ref 65–140)
HCT VFR BLD AUTO: 28.6 % (ref 36.5–49.3)
HCT VFR BLD AUTO: 29 % (ref 36.5–49.3)
HGB BLD-MCNC: 8.8 G/DL (ref 12–17)
HGB BLD-MCNC: 8.9 G/DL (ref 12–17)
IMM GRANULOCYTES # BLD AUTO: 0.1 THOUSAND/UL (ref 0–0.2)
IMM GRANULOCYTES NFR BLD AUTO: 1 % (ref 0–2)
LYMPHOCYTES # BLD AUTO: 1.13 THOUSANDS/ΜL (ref 0.6–4.47)
LYMPHOCYTES NFR BLD AUTO: 10 % (ref 14–44)
MCH RBC QN AUTO: 26.5 PG (ref 26.8–34.3)
MCHC RBC AUTO-ENTMCNC: 31.1 G/DL (ref 31.4–37.4)
MCV RBC AUTO: 85 FL (ref 82–98)
MONOCYTES # BLD AUTO: 1.08 THOUSAND/ΜL (ref 0.17–1.22)
MONOCYTES NFR BLD AUTO: 10 % (ref 4–12)
NEUTROPHILS # BLD AUTO: 7.98 THOUSANDS/ΜL (ref 1.85–7.62)
NEUTS SEG NFR BLD AUTO: 71 % (ref 43–75)
NRBC BLD AUTO-RTO: 0 /100 WBCS
PLATELET # BLD AUTO: 251 THOUSANDS/UL (ref 149–390)
PMV BLD AUTO: 10.9 FL (ref 8.9–12.7)
POTASSIUM SERPL-SCNC: 4.2 MMOL/L (ref 3.5–5.3)
RBC # BLD AUTO: 3.36 MILLION/UL (ref 3.88–5.62)
SODIUM SERPL-SCNC: 140 MMOL/L (ref 136–145)
WBC # BLD AUTO: 11.13 THOUSAND/UL (ref 4.31–10.16)

## 2021-03-08 PROCEDURE — 82948 REAGENT STRIP/BLOOD GLUCOSE: CPT

## 2021-03-08 PROCEDURE — 99232 SBSQ HOSP IP/OBS MODERATE 35: CPT | Performed by: EMERGENCY MEDICINE

## 2021-03-08 PROCEDURE — 80048 BASIC METABOLIC PNL TOTAL CA: CPT | Performed by: NURSE PRACTITIONER

## 2021-03-08 PROCEDURE — 99222 1ST HOSP IP/OBS MODERATE 55: CPT | Performed by: INTERNAL MEDICINE

## 2021-03-08 PROCEDURE — 85018 HEMOGLOBIN: CPT | Performed by: PHYSICIAN ASSISTANT

## 2021-03-08 PROCEDURE — 85014 HEMATOCRIT: CPT | Performed by: PHYSICIAN ASSISTANT

## 2021-03-08 PROCEDURE — 85025 COMPLETE CBC W/AUTO DIFF WBC: CPT | Performed by: NURSE PRACTITIONER

## 2021-03-08 RX ORDER — INSULIN GLARGINE 100 [IU]/ML
30 INJECTION, SOLUTION SUBCUTANEOUS
Status: DISCONTINUED | OUTPATIENT
Start: 2021-03-08 | End: 2021-03-10 | Stop reason: HOSPADM

## 2021-03-08 RX ADMIN — SENNOSIDES, DOCUSATE SODIUM 1 TABLET: 8.6; 5 TABLET ORAL at 17:21

## 2021-03-08 RX ADMIN — ENOXAPARIN SODIUM 40 MG: 40 INJECTION SUBCUTANEOUS at 09:16

## 2021-03-08 RX ADMIN — METOPROLOL TARTRATE 25 MG: 25 TABLET, FILM COATED ORAL at 21:11

## 2021-03-08 RX ADMIN — METOPROLOL TARTRATE 25 MG: 25 TABLET, FILM COATED ORAL at 14:46

## 2021-03-08 RX ADMIN — DILTIAZEM HYDROCHLORIDE 60 MG: 60 TABLET, FILM COATED ORAL at 11:54

## 2021-03-08 RX ADMIN — INSULIN LISPRO 4 UNITS: 100 INJECTION, SOLUTION INTRAVENOUS; SUBCUTANEOUS at 17:22

## 2021-03-08 RX ADMIN — INSULIN LISPRO 4 UNITS: 100 INJECTION, SOLUTION INTRAVENOUS; SUBCUTANEOUS at 00:05

## 2021-03-08 RX ADMIN — DILTIAZEM HYDROCHLORIDE 60 MG: 60 TABLET, FILM COATED ORAL at 17:22

## 2021-03-08 RX ADMIN — METOPROLOL TARTRATE 25 MG: 25 TABLET, FILM COATED ORAL at 03:10

## 2021-03-08 RX ADMIN — ACETAMINOPHEN 975 MG: 325 TABLET ORAL at 14:47

## 2021-03-08 RX ADMIN — DILTIAZEM HYDROCHLORIDE 60 MG: 60 TABLET, FILM COATED ORAL at 23:50

## 2021-03-08 RX ADMIN — INSULIN GLARGINE 20 UNITS: 100 INJECTION, SOLUTION SUBCUTANEOUS at 00:05

## 2021-03-08 RX ADMIN — FUROSEMIDE 60 MG: 40 TABLET ORAL at 17:21

## 2021-03-08 RX ADMIN — PANTOPRAZOLE SODIUM 40 MG: 40 TABLET, DELAYED RELEASE ORAL at 05:55

## 2021-03-08 RX ADMIN — ATORVASTATIN CALCIUM 20 MG: 20 TABLET, FILM COATED ORAL at 17:21

## 2021-03-08 RX ADMIN — METOPROLOL TARTRATE 25 MG: 25 TABLET, FILM COATED ORAL at 09:16

## 2021-03-08 RX ADMIN — INSULIN LISPRO 8 UNITS: 100 INJECTION, SOLUTION INTRAVENOUS; SUBCUTANEOUS at 12:08

## 2021-03-08 RX ADMIN — INSULIN LISPRO 14 UNITS: 100 INJECTION, SOLUTION INTRAVENOUS; SUBCUTANEOUS at 12:07

## 2021-03-08 RX ADMIN — INSULIN LISPRO 5 UNITS: 100 INJECTION, SOLUTION INTRAVENOUS; SUBCUTANEOUS at 08:42

## 2021-03-08 RX ADMIN — INSULIN GLARGINE 30 UNITS: 100 INJECTION, SOLUTION SUBCUTANEOUS at 21:11

## 2021-03-08 RX ADMIN — MELATONIN TAB 3 MG 3 MG: 3 TAB at 21:11

## 2021-03-08 RX ADMIN — FUROSEMIDE 60 MG: 40 TABLET ORAL at 09:16

## 2021-03-08 RX ADMIN — SENNOSIDES, DOCUSATE SODIUM 1 TABLET: 8.6; 5 TABLET ORAL at 09:16

## 2021-03-08 RX ADMIN — OXYCODONE HYDROCHLORIDE 5 MG: 5 TABLET ORAL at 03:33

## 2021-03-08 RX ADMIN — INSULIN LISPRO 14 UNITS: 100 INJECTION, SOLUTION INTRAVENOUS; SUBCUTANEOUS at 17:21

## 2021-03-08 RX ADMIN — ACETAMINOPHEN 975 MG: 325 TABLET ORAL at 05:55

## 2021-03-08 RX ADMIN — ACETAMINOPHEN 975 MG: 325 TABLET ORAL at 21:11

## 2021-03-08 RX ADMIN — DILTIAZEM HYDROCHLORIDE 60 MG: 60 TABLET, FILM COATED ORAL at 05:56

## 2021-03-08 NOTE — UTILIZATION REVIEW
Initial Clinical Review    Admission: Date/Time/Statement:   Admission Orders (From admission, onward)     Ordered        03/06/21 1441  [address] [place]  Once                   Orders Placed This Encounter   Procedures    INPATIENT ADMISSION     Standing Status:   Standing     Number of Occurrences:   1     Order Specific Question:   Level of Care     Answer:   Level 1 Stepdown [13]     Order Specific Question:   Estimated length of stay     Answer:   More than 2 Midnights     Order Specific Question:   Certification     Answer:   I certify that inpatient services are medically necessary for this patient for a duration of greater than two midnights  See H&P and MD Progress Notes for additional information about the patient's course of treatment  ED Arrival Information     Expected Arrival Acuity Means of Arrival Escorted By Service Admission Type    3/6/2021  3/6/2021 08:00 Urgent Ambulance R [facility] EMS Trauma Urgent    Arrival Complaint    fall        Chief Complaint   Patient presents with    Hip Pain     fall yesterday after losing balance with walker  x ray done over night and was inconclusive  sent to ed  From [facility], there for rehab  Assessment/Plan: 81 y/o male with PMhx of HF, atrial fib on Pradexa, HTN, HLD, T2DM and ambulatory dysfunction who presents to ED via EMS s/p fall  In ED imaging revealed comminuted right acetabular fracture as well as an area of hemorrhage in his abdomen  Praxbind was given in the ED  Found to be in a-fib with RVR in ED  Admit inpatient to critical care unit -- s/p fall, R acetabular fx, hemorrhage into space of Retzius  Acute pain 2/2 trauma  A-fib with RVR  Acute blood loss anemia  Pradaxa-induced coagulopathy - reversed  Plan: Telem monitoring  cardizem gtt, restart home meds  Npo  GI consult  Trend H&H, transfuse for Hgb <7  Monitor Hgb q6h  PPI  Aggressive bowel regimen  Fingerstick glucose checks w/ssi    Ortho consult for R acetabular fx  Analgesics prn  SCD's/lovenox sq  3/7 --  Patient with improvement in HR, Hgb stable, orthopedic plan presently non-operative management  H&H stable  Started on regular diet  Continue pain control  Off cardizem gtt  Continue home meds  D/c GI ppx  Monitor and replete electrolytes as needed  Fingerstick glucose checks w/ ssi   PT/OT     ED Triage Vitals   Temperature Pulse Respirations Blood Pressure SpO2   03/06/21 0811 03/06/21 0811 03/06/21 0811 03/06/21 0811 03/06/21 0811   97 6 °F (36 4 °C) (!) 136 18 155/85 97 %      Temp Source Heart Rate Source Patient Position - Orthostatic VS BP Location FiO2 (%)   03/06/21 1500 03/06/21 1124 -- -- --   Axillary Monitor         Pain Score       03/06/21 0811       5          Wt Readings from Last 1 Encounters:   03/06/21 90 3 kg (199 lb)     Additional Vital Signs:   Date/Time  Temp  Pulse  Resp  BP  MAP (mmHg)  SpO2  O2 Device   03/08/21 11:17:05  98 5 °F (36 9 °C)  66  15  121/78  92  99 %  --   03/08/21 0008  --  --  --  101/66  --  --  --   03/07/21 21:41:26  --  117Abnormal   --  131/81  98  96 %  --   03/07/21 2141  --  --  --  131/81  --  --  --   03/07/21 1945  --  --  --  --  --  --  None (Room air)   03/07/21 18:40:10  97 5 °F (36 4 °C)  66  17  132/69  90  94 %  --   03/07/21 1630  --  68  --  --  --  94 %  --   03/07/21 1532  --  --  --  --  --  --  None (Room air)   03/07/21 1523  97 8 °F (36 6 °C)  83  14  137/75  96  96 %  --   03/07/21 1200  97 7 °F (36 5 °C)  66  21  169/92  132  98 %  --   03/07/21 0000  --  120Abnormal   14  111/64  98  97 %  --   03/06/21 1600  99 °F (37 2 °C)  136Abnormal   24Abnormal   104/78  91  96 %  --   03/06/21 1545  --  134Abnormal   25Abnormal   141/91  104  98 %  --   03/06/21 1500  99 °F (37 2 °C)  136Abnormal   22  142/81  112  98 %  None (Room air)   03/06/21 1430  --  136Abnormal   18  163/96  135  98 %  --   03/06/21 1408  --  134Abnormal   18  156/84  110  97 %  --   03/06/21 1230  --  134Abnormal   18 175/82Abnormal   116  97 %  --   03/06/21 1124  --  132Abnormal   18  189/97Abnormal   130  97 %  --   03/06/21 0811  97 6 °F (36 4 °C)  136Abnormal   18  155/85  --  97 %  None (Room air)       Pertinent Labs/Diagnostic Test Results:       Results from last 7 days   Lab Units 03/08/21  0603 03/07/21  1411 03/07/21  0546 03/06/21  2351 03/06/21  1846 03/06/21  0913   WBC Thousand/uL 11 13*  --  9 45  --   --  11 34*   HEMOGLOBIN g/dL 8 9* 9 7* 9 8* 9 7* 9 9* 10 6*   HEMATOCRIT % 28 6*  --  32 1*  --   --  34 8*   PLATELETS Thousands/uL 251  --  280  --   --  301   NEUTROS ABS Thousands/µL 7 98*  --  6 70  --   --  8 61*     Results from last 7 days   Lab Units 03/08/21  0603 03/07/21  0545 03/06/21  0913   SODIUM mmol/L 140 144 142   POTASSIUM mmol/L 4 2 4 3 4 9   CHLORIDE mmol/L 104 109* 111*   CO2 mmol/L 31 30 27   ANION GAP mmol/L 5 5 4   BUN mg/dL 26* 24 25   CREATININE mg/dL 1 31* 1 23 1 19   EGFR ml/min/1 73sq m 50 54 56   CALCIUM mg/dL 8 5 9 0 8 9   MAGNESIUM mg/dL  --  2 3  --      Results from last 7 days   Lab Units 03/07/21  0545   AST U/L 10   ALT U/L 15   ALK PHOS U/L 107   TOTAL PROTEIN g/dL 7 2   ALBUMIN g/dL 3 3*   TOTAL BILIRUBIN mg/dL 0 49     Results from last 7 days   Lab Units 03/08/21  1114 03/08/21  0748 03/08/21  0004 03/07/21  2050 03/07/21  1633 03/07/21  1157 03/07/21  0557 03/07/21  0004 03/06/21  1829   POC GLUCOSE mg/dl 341* 312* 368* 369* 479* 343* 187* 208* 201*     Results from last 7 days   Lab Units 03/08/21  0603 03/07/21  0545 03/06/21  0913   GLUCOSE RANDOM mg/dL 324* 193* 177*     Results from last 7 days   Lab Units 03/07/21  0546   HEMOGLOBIN A1C % 7 4*   EAG mg/dl 166     Results from last 7 days   Lab Units 03/06/21  1521   TROPONIN I ng/mL 0 02     Results from last 7 days   Lab Units 03/07/21  0545   PROTIME seconds 13 7   INR  1 05     Results from last 7 days   Lab Units 03/06/21  0913   NT-PRO BNP pg/mL 1,240*         ED Treatment:   Medication Administration from 03/06/2021 0731 to 03/06/2021 1448       Date/Time Order Dose Route Action     03/06/2021 1116 diltiazem (CARDIZEM CD) 24 hr capsule 240 mg 240 mg Oral Given     03/06/2021 1117 HYDROmorphone (DILAUDID) injection 0 5 mg 0 5 mg Intravenous Given     03/06/2021 1434 diltiazem (CARDIZEM) 125 mg in sodium chloride 0 9 % 125 mL infusion 2 5 mg/hr Intravenous New Bag     03/06/2021 1357 idaruCIZUmab (PRAXBIND) IV 2 5 g 2 5 g Intravenous Given     03/06/2021 1348 idaruCIZUmab (PRAXBIND) IV 2 5 g 2 5 g Intravenous Given     03/06/2021 1445 diltiazem (CARDIZEM) 125 mg in sodium chloride 0 9 % 125 mL infusion 5 mg/hr Intravenous Rate/Dose Change     03/06/2021 1430 diltiazem (CARDIZEM) 125 mg in sodium chloride 0 9 % 125 mL infusion 2 5 mg/hr Intravenous New Bag     Past Medical History:   Diagnosis Date    [facility]fib ([facility] )     Cancer ([facility] )     Cardiac disease     [facility]congestive [facility] failure) [facility]     Chronic obstructive pulmonary disease (COPD) ([facility]     COPD (chronic obstructive pulmonary disease) ([facility] )     Coronary artery disease     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[facility] )     TYPE 2    Dysphagia     Fracture of nasal bone     Gait instability     H/O cervical fracture     Hyperlipidemia     Hypertension     Muscle weakness     TBI (traumatic brain injury) ([facility] )      Present on Admission:   Type 2 diabetes mellitus ([facility] )   Atrial fibrillation with rapid ventricular response ([facility] )   Hyperlipidemia   Hypertension   Ambulatory dysfunction   Chronic heart failure with preserved ejection fraction ([facility] )   Right acetabular fracture ([facility] )   Abdominal hemorrhage      Admitting Diagnosis: Unspecified multiple injuries, initial encounter [T07  XXXA]  Age/Sex: 82 y o  male  Admission Orders:  Scheduled Medications:  acetaminophen, 975 mg, Oral, Q8H GABY  atorvastatin, 20 mg, Oral, After Dinner  diltiazem, 60 mg, Oral, Q6H GABY  enoxaparin, 40 mg, Subcutaneous, Daily  furosemide, 60 mg, Oral, BID  insulin glargine, 30 Units, Subcutaneous, HS  insulin lispro, 14 Units, Subcutaneous, TID With Meals  insulin lispro, 2-12 Units, Subcutaneous, TID AC  melatonin, 3 mg, Oral, HS  metoprolol tartrate, 25 mg, Oral, Q6H  pantoprazole, 40 mg, Oral, Early Morning  senna-docusate sodium, 1 tablet, Oral, BID      PRN Meds:  [MAR Hold] bisacodyl, 10 mg, Rectal, Daily PRN  naloxone, 0 04 mg, Intravenous, Q1MIN PRN  ondansetron, 4 mg, Intravenous, Q4H PRN  oxyCODONE, 2 5 mg, Oral, Q4H PRN 3/6 x1, 3/7 x1  oxyCODONE, 5 mg, Oral, Q4H PRN 3/8 x1  polyethylene glycol, 17 g, Oral, Daily PRN  simethicone, 80 mg, Oral, Q6H PRN        IP CONSULT TO ORTHOPEDIC SURGERY  IP CONSULT TO TRAUMA SURGERY  IP CONSULT TO CASE MANAGEMENT  IP CONSULT TO GERONTOLOGY    Network Utilization Review Department  ATTENTION: Please call with any questions or concerns to [phone] and carefully listen to the prompts so that you are directed to the right person  All voicemails are confidential   Jocelynn Pichardo all requests for admission clinical reviews, approved or denied determinations and any other requests to dedicated fax number below belonging to the campus where the patient is receiving treatment   List of dedicated fax numbers for the Facilities:  [address] DENIALS (Administrative/Medical Necessity) [phone]   [address] (Maternity/NICU/Pediatrics) [phone]   [address] Casey Huggins 0987 (  Rad[facility] Bruno [facility] [address] [place] [address] Ariadna Gregorio [address] [place]) [address] [place] [address] [address] [place] [address] [phone]

## 2021-03-08 NOTE — ASSESSMENT & PLAN NOTE
Wt Readings from Last 3 Encounters:   03/06/21 90 3 kg (199 lb)   02/19/21 90 3 kg (199 lb)   01/21/21 91 2 kg (201 lb)     · Continue Lasix  · Strict I&O monitoring  · Continue volume status assessment  · Continue Lopressor

## 2021-03-08 NOTE — PROGRESS NOTES
Progress Note - Charity Lyon 1935, 82 y o  male MRN: [id number]    [facility] Encounter: [id number]    Primary Care Provider: Vladimir Wilder MD   Date and time admitted to hospital: 3/6/2021  8:00 AM     Type 2 diabetes mellitus [facility])  Assessment & Plan  Lab Results   Component Value Date    HGBA1C 7 4 (H) 03/07/2021       Recent Labs     03/07/21  1633 03/07/21 2050 03/08/21  0004 03/08/21  0748   POCGLU 479* 369* 368* 312*       Blood Sugar Average: Last 72 hrs:  (P) 308 375     · Patient with blood sugars in the 304 100 overnight into this morning  · Per Dr Thomas Nolen, patient is on 14 units of Humalog with meals and 30 units of Lantus at bedtime  · Will attempt to get patient under better glycemic control before rehab placement tomorrow  · ISS Algorithm 4    Atrial fibrillation with rapid ventricular response (HCC)  Assessment & Plan  · Continue Cardizem  · Discontinue telemetry today - continue monitoring as needed    Hyperlipidemia  Assessment & Plan  · Continue Lipitor    Hypertension  Assessment & Plan  · Continue Lopressor  · Continue Lasix    Anticoagulated by anticoagulation treatment  Assessment & Plan  · Patient is currently anticoagulated with Lovenox; patient is on Pradaxa at home  · Will continue Pradaxa when patient is medically discharged    Abdominal hemorrhage  Assessment & Plan  · Patient had alleged suprapubic hematoma on admission  · Hemoglobin is remaining stable at this time; most recent is 8 9  Will recheck this afternoon    · Continue monitoring with daily hemoglobin    Chronic heart failure with preserved ejection fraction [facility])  Assessment & Plan  Wt Readings from Last 3 Encounters:   03/06/21 90 3 kg (199 lb)   02/19/21 90 3 kg (199 lb)   01/21/21 91 2 kg (201 lb)     · Continue Lasix  · Strict I&O monitoring  · Continue volume status assessment  · Continue Lopressor        Ambulatory dysfunction  Assessment & Plan  · Patient is status post fall from walker on Pradaxa  · Physical and occupational therapy recommending rehab facility on discharge; will attempt for discharge tomorrow when patient has better glycemic control    * Right acetabular fracture ([address] )  Assessment & Plan  · Patient's injury includes comminuted right-sided acetabular fracture with right hip in suprapubic region hematoma; this is status post fall from walker on Pradaxa  · Orthopedics is recommending non operative management with toe-touch weight-bearing on right lower extremity  · Physical and occupational therapy recommending rehab facility  · Serial hemoglobins reveals current hemoglobin at 8 9 from 9 7 yesterday  Continue monitoring  Disposition: Stable    SUBJECTIVE:  Chief Complaint: "This place is no good"    Subjective: This is an 79-year-old gentleman who presented to the Trauma Service status post fall from walker on Pradaxa  Injuries include comminuted fracture of right acetabulum in addition to right hip/suprapubic region hematoma  Patient was seen by Orthopedic surgery who was recommending non operative management with toe-touch weight-bearing on right lower extremity  Physical and occupational therapy is recommending rehabilitation facility post medical discharge  On subjective examination today, the patient notes that he is no good and has mild right-sided hip pain  The patient does not like the food here and notes he is frustrated that he cannot feed himself like he can at his home facility  The patient notes only mild right-sided hip pain but otherwise does not have any pain anywhere else  The patient denies chest pain or shortness of breath  The patient denies nausea, vomiting, diarrhea, constipation, abdominal pain, and dysuria  No lower extremity pain or edema  The patient denies other complaints at this time and notes he needs to get out of the hospital"      OBJECTIVE:     Meds/Allergies     Current Facility-Administered Medications:     acetaminophen (TYLENOL) tablet 975 mg, 975 mg, Oral, Q8H [address], ARISTEO Pope, 975 mg at 03/08/21 0555    atorvastatin (LIPITOR) tablet 20 mg, 20 mg, Oral, After Dinner, ARISTEO Pope, 20 mg at 03/07/21 1722    [MAR Hold] bisacodyl (DULCOLAX) rectal suppository 10 mg, 10 mg, Rectal, Daily PRN, ARISTEO Pope    diltiazem (CARDIZEM) tablet 60 mg, 60 mg, Oral, Q6H [address], ARISTEO Palafox, 60 mg at 03/08/21 0556    enoxaparin (LOVENOX) subcutaneous injection 40 mg, 40 mg, Subcutaneous, Daily, ARISTEO Pope, 40 mg at 03/08/21 0916    furosemide (LASIX) tablet 60 mg, 60 mg, Oral, BID, ARISTEO Pope, 60 mg at 03/08/21 0916    insulin glargine (LANTUS) subcutaneous injection 30 Units 0 3 mL, 30 Units, Subcutaneous, HS, Jose Latham PA-C    insulin lispro (HumaLOG) 100 units/mL subcutaneous injection 14 Units, 14 Units, Subcutaneous, TID With Meals, Frances Gallardo PA-C    insulin lispro (HumaLOG) 100 units/mL subcutaneous injection 2-12 Units, 2-12 Units, Subcutaneous, TID AC **AND** Fingerstick Glucose (POCT), , , TID ACJose PA-C    melatonin tablet 3 mg, 3 mg, Oral, HS, ARISTEO Pope, 3 mg at 03/07/21 2142    metoprolol tartrate (LOPRESSOR) tablet 25 mg, 25 mg, Oral, Q6H, ARISTEO Palafox, 25 mg at 03/08/21 0916    naloxone (NARCAN) 0 04 mg/mL syringe 0 04 mg, 0 04 mg, Intravenous, Q1MIN PRN, ARISTEO Pope    ondansetron (ZOFRAN) injection 4 mg, 4 mg, Intravenous, Q4H PRN, ARISTEO Pope    oxyCODONE (ROXICODONE) IR tablet 2 5 mg, 2 5 mg, Oral, Q4H PRN, ARISTEO Pope, 2 5 mg at 03/07/21 1533    oxyCODONE (ROXICODONE) IR tablet 5 mg, 5 mg, Oral, Q4H PRN, ARISTEO Pope, 5 mg at 03/08/21 0333    pantoprazole (PROTONIX) EC tablet 40 mg, 40 mg, Oral, Early Morning, ARISTEO Palafox, 40 mg at 03/08/21 0555    polyethylene glycol (MIRALAX) packet 17 g, 17 g, Oral, Daily PRN, Alexis Brennan, ARISTEO    senna-docusate sodium (SENOKOT S) 8 6-50 mg per tablet 1 tablet, 1 tablet, Oral, BID, ARISTEO Peck, 1 tablet at 03/08/21 0916    simethicone (MYLICON) chewable tablet 80 mg, 80 mg, Oral, Q6H PRN, TrinidadARISTEO Chiu     Vitals:   Vitals:    03/08/21 1117   BP: 121/78   Pulse: 66   Resp: 15   Temp: 98 5 °F (36 9 °C)   SpO2: 99%     Intake/Output:  I/O       03/06 0701 - 03/07 0700 03/07 0701 - 03/08 0700 03/08 0701 - 03/09 0700    P  O  120 920     I V  (mL/kg) 17 2 (0 2)      Total Intake(mL/kg) 137 2 (1 5) 920 (10 2)     Urine (mL/kg/hr) 525 834 (0 4)     Total Output 525 834     Net -387 9 +86            Unmeasured Urine Occurrence 3 x 3 x          Nutrition:  Diet regular; regular house; sodium 4 g with consistent carbohydrate diet level 2  GI Prophylaxis:  Pantoprazole  Bowel Reg:  Senokot    Physical Exam:   Physical Exam  Vitals signs and nursing note reviewed  Constitutional:       General: He is not in acute distress  Appearance: Normal appearance  He is normal weight  He is not ill-appearing, toxic-appearing or diaphoretic  HENT:      Head: Normocephalic and atraumatic  Nose: Nose normal       Comments: Atraumatic     Mouth/Throat:      Mouth: Mucous membranes are moist       Comments: Atraumatic  Eyes:      General: No scleral icterus  Right eye: No discharge  Left eye: No discharge  Extraocular Movements: Extraocular movements intact  Conjunctiva/sclera: Conjunctivae normal       Comments: Atraumatic   Neck:      Musculoskeletal: Normal range of motion  Comments: No cervical or paracervical musculature tenderness to palpation bilaterally  Cardiovascular:      Rate and Rhythm: Normal rate  Rhythm irregular  Pulses: Normal pulses  Heart sounds: Normal heart sounds  No murmur  No friction rub  No gallop         Comments: Irregular rhythm consistent with known history of atrial fibrillation  2+ DP and radial pulses bilaterally  No heaves lifts or thrills  Pulmonary:      Effort: Pulmonary effort is normal  No respiratory distress  Breath sounds: Normal breath sounds  No stridor  No wheezing, rhonchi or rales  Chest:      Chest wall: No tenderness  Abdominal:      General: Abdomen is flat  Comments: Nontender, nondistended, and without organomegaly   Musculoskeletal:      Right lower leg: No edema  Left lower leg: No edema  Comments: Patient has decreased range of motion in right lower extremity but is still able to wiggle his toes and move his ankles  Normal range of motion of left lower extremity and bilateral upper extremities  Tenderness to palpation of right hip to the lateral aspect; no tenderness to palpation of left lower extremity or upper extremities bilaterally  Negative Ladarius bilaterally   Skin:     General: Skin is warm and dry  Capillary Refill: Capillary refill takes less than 2 seconds  Coloration: Skin is not jaundiced or pale  Neurological:      General: No focal deficit present  Mental Status: He is alert and oriented to person, place, and time  Mental status is at baseline        Comments: Sensation equal and intact in bilateral upper and lower extremities  5/5 strength in bilateral upper extremities and left lower extremity; patient with decreased strength in right lower extremity but retains ability to wiggle toes, move ankles, and bend knee   Psychiatric:         Mood and Affect: Mood normal          Behavior: Behavior normal        Invasive Devices     Peripheral Intravenous Line            Peripheral IV 03/06/21 Left Antecubital 2 days    Peripheral IV 03/06/21 Right Hand 1 day          Drain            External Urinary Catheter Extra large less than 1 day                 Lab Results:   BMP/CMP:   Lab Results   Component Value Date    SODIUM 140 03/08/2021    K 4 2 03/08/2021     03/08/2021    CO2 31 03/08/2021    BUN 26 (H) 03/08/2021    CREATININE 1 31 (H) 03/08/2021 CALCIUM 8 5 03/08/2021    EGFR 50 03/08/2021    and CBC:   Lab Results   Component Value Date    WBC 11 13 (H) 03/08/2021    HGB 8 9 (L) 03/08/2021    HCT 28 6 (L) 03/08/2021    MCV 85 03/08/2021     03/08/2021    MCH 26 5 (L) 03/08/2021    MCHC 31 1 (L) 03/08/2021    RDW 17 0 (H) 03/08/2021    MPV 10 9 03/08/2021    NRBC 0 03/08/2021     Imaging/EKG Studies: reviewed  VTE Prophylaxis: Enoxaparin (Lovenox)

## 2021-03-08 NOTE — PLAN OF CARE
Problem: Prexisting or High Potential for Compromised Skin Integrity  Goal: Skin integrity is maintained or improved  Description: INTERVENTIONS:  - Identify patients at risk for skin breakdown  - Assess and monitor skin integrity  - Assess and monitor nutrition and hydration status  - Monitor labs   - Assess for incontinence   - Turn and reposition patient  - Assist with mobility/ambulation  - Relieve pressure over bony prominences  - Avoid friction and shearing  - Provide appropriate hygiene as needed including keeping skin clean and dry  - Evaluate need for skin moisturizer/barrier cream  - Collaborate with interdisciplinary team   - Patient/family teaching  - Consider wound care consult   Outcome: Progressing     Problem: Potential for Falls  Goal: Patient will remain free of falls  Description: INTERVENTIONS:  - Assess patient frequently for physical needs  -  Identify cognitive and physical deficits and behaviors that affect risk of falls    -  [place] fall precautions as indicated by assessment   - Educate patient/family on patient safety including physical limitations  - Instruct patient to call for assistance with activity based on assessment  - Modify environment to reduce risk of injury  - Consider OT/PT consult to assist with strengthening/mobility  Outcome: Progressing     Problem: PAIN - ADULT  Goal: Verbalizes/displays adequate comfort level or baseline comfort level  Description: Interventions:  - Encourage patient to monitor pain and request assistance  - Assess pain using appropriate pain scale  - Administer analgesics based on type and severity of pain and evaluate response  - Implement non-pharmacological measures as appropriate and evaluate response  - Consider cultural and social influences on pain and pain management  - Notify physician/advanced practitioner if interventions unsuccessful or patient reports new pain  Outcome: Progressing     Problem: INFECTION - ADULT  Goal: Absence or prevention of progression during hospitalization  Description: INTERVENTIONS:  - Assess and monitor for signs and symptoms of infection  - Monitor lab/diagnostic results  - Monitor all insertion sites, i e  indwelling lines, tubes, and drains  - Monitor endotracheal if appropriate and nasal secretions for changes in amount and color  - [place] appropriate cooling/warming therapies per order  - Administer medications as ordered  - Instruct and encourage patient and family to use good hand hygiene technique  - Identify and instruct in appropriate isolation precautions for identified infection/condition  Outcome: Progressing     Problem: SAFETY ADULT  Goal: Patient will remain free of falls  Description: INTERVENTIONS:  - Assess patient frequently for physical needs  -  Identify cognitive and physical deficits and behaviors that affect risk of falls    -  [place] fall precautions as indicated by assessment   - Educate patient/family on patient safety including physical limitations  - Instruct patient to call for assistance with activity based on assessment  - Modify environment to reduce risk of injury  - Consider OT/PT consult to assist with strengthening/mobility  Outcome: Progressing  Goal: Maintain or return to baseline ADL function  Description: INTERVENTIONS:  -  Assess patient's ability to carry out ADLs; assess patient's baseline for ADL function and identify physical deficits which impact ability to perform ADLs (bathing, care of mouth/teeth, toileting, grooming, dressing, etc )  - Assess/evaluate cause of self-care deficits   - Assess range of motion  - Assess patient's mobility; develop plan if impaired  - Assess patient's need for assistive devices and provide as appropriate  - Encourage maximum independence but intervene and supervise when necessary  - Involve family in performance of ADLs  - Assess for home care needs following discharge   - Consider OT consult to assist with ADL evaluation and planning for discharge  - Provide patient education as appropriate  Outcome: Progressing  Goal: Maintain or return mobility status to optimal level  Description: INTERVENTIONS:  - Assess patient's baseline mobility status (ambulation, transfers, stairs, etc )    - Identify cognitive and physical deficits and behaviors that affect mobility  - Identify mobility aids required to assist with transfers and/or ambulation (gait belt, sit-to-stand, lift, walker, cane, etc )  - [place] fall precautions as indicated by assessment  - Record patient progress and toleration of activity level on Mobility SBAR; progress patient to next Phase/Stage  - Instruct patient to call for assistance with activity based on assessment  - Consider rehabilitation consult to assist with strengthening/weightbearing, etc   Outcome: Progressing     Problem: DISCHARGE PLANNING  Goal: Discharge to home or other facility with appropriate resources  Description: INTERVENTIONS:  - Identify barriers to discharge w/patient and caregiver  - Arrange for needed discharge resources and transportation as appropriate  - Identify discharge learning needs (meds, wound care, etc )  - Arrange for interpretive services to assist at discharge as needed  - Refer to Case Management Department for coordinating discharge planning if the patient needs post-hospital services based on physician/advanced practitioner order or complex needs related to functional status, cognitive ability, or social support system  Outcome: Progressing     Problem: Knowledge Deficit  Goal: Patient/family/caregiver demonstrates understanding of disease process, treatment plan, medications, and discharge instructions  Description: Complete learning assessment and assess knowledge base    Interventions:  - Provide teaching at level of understanding  - Provide teaching via preferred learning methods  Outcome: Progressing     Problem: METABOLIC, FLUID AND ELECTROLYTES - ADULT  Goal: Electrolytes maintained within normal limits  Description: INTERVENTIONS:  - Monitor labs and assess patient for signs and symptoms of electrolyte imbalances  - Administer electrolyte replacement as ordered  - Monitor response to electrolyte replacements, including repeat lab results as appropriate  - Instruct patient on fluid and nutrition as appropriate  Outcome: Progressing  Goal: Fluid balance maintained  Description: INTERVENTIONS:  - Monitor labs   - Monitor I/O and WT  - Instruct patient on fluid and nutrition as appropriate  - Assess for signs & symptoms of volume excess or deficit  Outcome: Progressing  Goal: Glucose maintained within target range  Description: INTERVENTIONS:  - Monitor Blood Glucose as ordered  - Assess for signs and symptoms of hyperglycemia and hypoglycemia  - Administer ordered medications to maintain glucose within target range  - Assess nutritional intake and initiate nutrition service referral as needed  Outcome: Progressing     Problem: HEMATOLOGIC - ADULT  Goal: Maintains hematologic stability  Description: INTERVENTIONS  - Assess for signs and symptoms of bleeding or hemorrhage  - Monitor labs  - Administer supportive blood products/factors as ordered and appropriate  Outcome: Progressing     Problem: MUSCULOSKELETAL - ADULT  Goal: Maintain or return mobility to safest level of function  Description: INTERVENTIONS:  - Assess patient's ability to carry out ADLs; assess patient's baseline for ADL function and identify physical deficits which impact ability to perform ADLs (bathing, care of mouth/teeth, toileting, grooming, dressing, etc )  - Assess/evaluate cause of self-care deficits   - Assess range of motion  - Assess patient's mobility  - Assess patient's need for assistive devices and provide as appropriate  - Encourage maximum independence but intervene and supervise when necessary  - Involve family in performance of ADLs  - Assess for home care needs following discharge   - Consider OT consult to assist with ADL evaluation and planning for discharge  - Provide patient education as appropriate  Outcome: Progressing  Goal: Maintain proper alignment of affected body part  Description: INTERVENTIONS:  - Support, maintain and protect limb and body alignment  - Provide patient/ family with appropriate education  Outcome: Progressing

## 2021-03-08 NOTE — ASSESSMENT & PLAN NOTE
· Patient's injury includes comminuted right-sided acetabular fracture with right hip in suprapubic region hematoma; this is status post fall from walker on Pradaxa  · Orthopedics is recommending non operative management with toe-touch weight-bearing on right lower extremity  · Physical and occupational therapy recommending rehab facility  · Serial hemoglobins reveals current hemoglobin at 8 9 from 9 7 yesterday  Continue monitoring

## 2021-03-08 NOTE — ASSESSMENT & PLAN NOTE
· Patient is status post fall from walker on Pradaxa  · Physical and occupational therapy recommending rehab facility on discharge; will attempt for discharge tomorrow when patient has better glycemic control

## 2021-03-08 NOTE — CONSULTS
Consultation - Geriatric Medicine   Segun Breed 82 y o  male MRN: [id number]  [facility] Encounter: [id number]      Assessment/Plan     Ambulatory dysfunction with fall  -requires use of walker for ambulation at baseline  -mechanical per patient  -hx recurrent falls  -currently TTWB to RLE due to acetabular fracture  -keep personal items and call bell close to prevent reaching  -continue fall precautions and assist with all transfers    Right acetabular fracture  -s/p fall at long term care facility  -confirmed on XR femur on admission   -Ortho following, non operative management due to age and comorbidities  -continue acute pain control  -TTWB per Ortho  -continue acute pain control  -PT/OT    Right psoas hematoma  -noted on CT chest/abd/pelvis on admission  -continue neurovascular checks per protocol  -admitted to Trauma, Ortho following   -trend Hb which appears to currently be slightly lower than baseline of 10-11, currently at 8 9    Pradaxa induced coagulopathy  -s/p reversed with Praxbind on initial presentation  -currently on Lovenox for DVT ppx  -continue to consider risk vs benefit of AC/AP given age and co-morbidities in frail pt with recurrent falls     Acute pain due to trauma  -pain control per Geriatric pain protocol:  Tylenol 975mg Q8H scheduled  Roxicodone 2 5mg Q4H PRN moderate pain  Roxicodone 5mg Q4H PRN severe pain  Dilaudid 0 2mg Q4H PRN  -consider adjuncts such as Gabapentin 100mg HS and lidocaine patch topically  -encourage addition of non-pharmacologic pain treatment including ice and frequent repositioning  -recommend  bowel regimen to prevent and treat constipation due to increased risk with acute pain and opiate pain medications    AFib with RVR  -required diltiazem infusion and ICU level care on initial presentation however has been transitioned to oral Cardizem and transferred out to bed CO2  -continue rate control  -Pradaxa held on admission due to coagulopathy and traumatic injury, restart systemic anticoagulation as appropriate once cleared by trauma/ortho as no surgical intervention anticipated at this time  -continue to wear risk versus benefit anticoagulation given patient's age recurrent falls as well as history of SDH/TBI    COPD  -currently on supplemental O2, does not use at baseline   -maintain goal O2>88%  -wean supplemental O2 as tolerated     Cognitive impairment  -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, mixed Vascular/Alz type  -no formal cognitive screening on record as pt refused, given acuity of injury/illness deferred on initial evaluation however can be re-attempted on return to long-term care facility  -multifactorial including history of TBI and subdural hematoma  -mentation waxes and wanes at baseline   -underlying cognitive impairment increases patient's risk developing delirium/encephalopathy during hospitalization  -CTH on admission revealed no acute intracranial abnormalities  -recommend checking TSH/FT4 and B12  -encourage patient remain physically, socially, and cognitively active and engaged to maintain cognitive acuity    DM-II  -HBA1c 7 4  -given age and comorbidities A1c goal could be relaxed slightly to 8 0-8 5  -recommend goal glucose 140-180 during hospitalization to reduce hypoglycemia in patient who may have hypoglycemia unawareness  -maintained on basal bolus regimen as outpatient, awaiting updated med list from facility to confirm dosing units  -continue regularly scheduled diabetic cares including annual eye exam, and podiatry exam    Osteoporosis  -evidence by fragility fracture of right hip sustained in fall from standing height  -recommend own the bone consult  -recommend diet rich in calcium and vitamin-D  -recommend check vitamin-D level to ensure adequate daily repletion for needs not met by diet alone  -given age and co-morbidities as well as non-operative management anticipated for current fx, DXA as o/p may not be indicated and recommend further discussion with PCP as o/p     Impaired Vision  -recommend use of corrective lenses at all appropriate times  -encourage adequate lighting and encourage use of assistance with ambulation  -keep personal belongings close to person to avoid reaching  -encourage appropriate footwear at all times  -recommend large print font for any printed materials provided to patient     Deconditioning/debility/frailty  -clinical frailty scale stage 5/6, mildl-moderately frail  -multifactorial including age, cognitive impairment, TBI, s/p SDH, and presentation with acute fragility fracture of right femur  -albumin low at 3 3, recommend well-balanced nutrition  -continue good control chronic medical conditions  -encourage ongoing fall risk reduction strategies and long term prevention given morbidity and mortality associated with hip fractures in frail elderly population    Delirium precautions  -Patient is high risk of delirium due to age, fall, traumatic injury, acute pain, hospitalization, and cognitive impairment at baseline  -Initiate delirium precautions  -maintain normal sleep/wake cycle  -minimize overnight interruptions, group overnight vitals/labs/nursing checks as possible  -dim lights, close blinds and turn off tv to minimize stimulation and encourage sleep environment in evenings  -ensure that pain is well controlled   -monitor for fecal and urinary retention which may precipitate delirium  -encourage early mobilization and ambulation with assistance  -provide frequent reorientation and redirection as indicated and appropriate  -limit use of medications which may precipitate or worsen delirium such as tramadol, benzodiazepine, anticholinergics, and benadryl  -encourage hydration and nutrition   -redirect unwanted behaviors as first line    Home medication review  [facility]    Tylenol  Aspirin 81 mg daily  Atorvastatin 20 mg daily  Diltiazem 240 mg daily  Furosemide 60 mg BID  Humalog 14 units 3 times daily with meals  Hydralazine 10 mg 3 times daily  Lantus- will re-confirm dose with SNF, cannot read dose on MAR from facility   -confirmed Lantus dose 30U daily HS  Metoprolol tartrate 50 mg Q12H  Omeprazole 40 mg daily  Pradaxa 150 mg Q12H  Fluid restriction 2000 cc  Bowel regimen    Care coordination:  Rounded with Han Dickinson (RN)    History of Present Illness   Physician Requesting Consult: Iris Montana DO  Reason for Consult / Principal Problem:  Fall  Hx and PE limited by: N/A  Additional history obtained from: Care team at [facility]     HPI: Lindsay Sawyer is a 82y o  year old male with hypertension, hyperlipidemia, atrial fibrillation, type 2 diabetes, ambulatory dysfunction, cognitive impairment, COPD, Congestive Heart Failure and history of TBI who is admitted to [address] [place] [address] with ambulatory dysfunction and fall found to have right acetabular fracture on admission imaging  He is being seen in consultation by Geriatrics for risk of developing delirium during hospitalization  Seen and examined at bedside, he tells me that he had a fall when he fell off his walker and is unable/unwilling to describe further, he tells me he feels miserable but does not describe further, he states that his pain is 6/10, he is awaiting the onset of most recently dosed medication as given just prior to my visit per nursing  Per record review and discussion with inpatient/outpatient care teams he had a fall on 3/6/21 and imaging obtained at the facility demonstrated a right acetabular fracture prompting his evaluation in the emergency department  Sunita Son states that he resides at [facility] and does not like that he requires cares that require his living in a long term care facility  He requires use of walker for ambulation at baseline and has history of prior falls, he wears glasses but does not use hearing aids or dentures   His mentation fluctuates at times due to his prior SHD/TBI  He is on supplemental oxygen as time evaluation but does not require use of supplemental oxygen at baseline  Inpatient consult to Gerontology  Consult performed by: Janet De La Rosa DO  Consult ordered by: ARISTEO Arellano        Review of Systems   Constitutional: Negative for appetite change, chills and fever  HENT: Negative for dental problem (duane use of dentures), hearing loss and trouble swallowing  Eyes: Positive for visual disturbance (wears glasses which are not in place at time of evaluation, pt unsure where they are)  Respiratory: Negative for cough, shortness of breath and wheezing  Cardiovascular: Negative for chest pain and palpitations  Gastrointestinal: Negative for abdominal pain, nausea and vomiting  Endocrine: Negative  Genitourinary: Negative  Musculoskeletal: Positive for arthralgias (right hip 6/10 pain) and gait problem (uses walker at baseline)  Skin: Negative  Allergic/Immunologic: Negative  Neurological: Positive for weakness  Negative for dizziness, syncope and light-headedness  Hematological: Bruises/bleeds easily (due to pradaxa)  Psychiatric/Behavioral: Negative for sleep disturbance  All other systems reviewed and are negative      Historical Information   Past Medical History:   Diagnosis Date    [facility]fib ([facility] )     Cancer ([facility] )     Cardiac disease     [facility]     [facility]     COPD [facility] obstructive [facility] disease[facility]     Coronary artery disease     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[facility] )     TYPE 2    Dysphagia     Fracture of nasal bone     Gait instability     H/O cervical fracture     Hyperlipidemia     Hypertension     Muscle weakness     TBI [facility]traumatic [facility] injury) [facility]      Past Surgical History:   Procedure Laterality Date    HAND SURGERY      [place] ARTHRODESIS POSTERIOR CRANIOCERVICAL N/A 10/26/2020    Procedure: Occipital cervical fusion to C4;  Surgeon: Aimee Stewart MD;  Location: [facility];  Service: Neurosurgery     Social History   Social History     Substance and Sexual Activity   Alcohol Use Never    Frequency: Never     Social History     Substance and Sexual Activity   Drug Use Never     Social History     Tobacco Use   Smoking Status Never Smoker   Smokeless Tobacco Never Used     Family History:   Family History   Problem Relation Age of Onset    Other Other         urinary tract malignancy     Meds/Allergies   all current active meds have been reviewed    Allergies   Allergen Reactions    Amoxicillin Diarrhea     Objective     Intake/Output Summary (Last 24 hours) at 3/8/2021 0622  Last data filed at 3/8/2021 0601  Gross per 24 hour   Intake 920 ml   Output 834 ml   Net 86 ml     Invasive Devices     Peripheral Intravenous Line            Peripheral IV 03/06/21 Left Antecubital 1 day    Peripheral IV 03/06/21 Right Hand 1 day          Drain            External Urinary Catheter Extra large less than 1 day              Physical Exam  Vitals signs and nursing note reviewed  Constitutional:       Comments: Appears stated age, no acute distress, lying in bed resting   HENT:      Head: Normocephalic and atraumatic  Nose: Nose normal       Comments: O2 via NC     Mouth/Throat:      Mouth: Mucous membranes are moist       Comments: Poor dentition  Eyes:      General:         Right eye: No discharge  Left eye: No discharge  Conjunctiva/sclera: Conjunctivae normal    Neck:      Comments: Trachea appears midline, no JVD noted  Cardiovascular:      Rate and Rhythm: Normal rate  Pulses: Normal pulses  Comments: Distant heart sounds  Pulmonary:      Effort: Pulmonary effort is normal  No respiratory distress  Breath sounds: No wheezing  Abdominal:      General: Bowel sounds are normal  There is no distension  Palpations: Abdomen is soft  Tenderness: There is no abdominal tenderness     Musculoskeletal: Comments: Reduced overall muscle mass   Skin:     General: Skin is warm and dry  Neurological:      Mental Status: He is alert  Comments: Awake and, oriented to person place, month, situation, reports year as 2020 and intermittently has statements which are not in line with questions asked or are generally not fitting to situation, repeatedly asks for oranges when inquire about pain levels   Psychiatric:         Attention and Perception: He is inattentive  Mood and Affect: Affect is blunt         Lab Results:   I have personally reviewed pertinent lab results including the following:  -sodium 134, potassium 4 3 chloride, CO2 30, BUN 24, creatinine 1 23, calcium 9, AST 10, ALT 15, alk-phos 2, t bili 0 49, GFR 54  -magnesium 2 3  -hemoglobin 9 8, hematocrit 32 1, WBC 10 4, platelets 478  -I2A 7 4  -INR 1 05    I have personally reviewed the following imaging study reports in PACS:    [facility] without contrast 03/06/2021:  No acute intracranial abnormality, on personal review mild to moderate chronic microangiopathic changes appreciated  CT C-spine without contrast 03/06/2021:  Occipital cervical fusion secondary to fractures C1 and C2, no change in alignment, no new fractures  Right femur XR 03/06/2021:  Complex right acetabular fracture with mild displacement, severe tricompartmental OA knee  CT pelvis without contrast 03/06/2021:  Comminuted fracture the time involving right iliac, hemorrhage involves space of Retzuis, perivesical soft tissue as well as intramuscular hematoma    Therapies:   PT: Following  OT: Following     VTE Prophylaxis: Enoxaparin (Lovenox)    Code Status: Level 3 - DNAR and DNI  Advance Directive and Living Will:      Power of :    POLST:      Family and Social Support: Wife    Goals of Care: Get oranges

## 2021-03-08 NOTE — CASE MANAGEMENT
Pt is NOT a Bundle  Pt is NOT a 30 Day Readmission    CM spoke to pt's wife to discuss d/c plan  She explains that the pt is a bed hold at [facility] and she would like him to return there when medically stable  A post acute care recommendation was made by your care team for STR  Discussed Freedom of Choice with caregiver  List of facilities given to caregiver via in person  patient aware the list is custom filtered for them by preferred and that [facility] post acute providers are designated  CM placed referral with [facility] and will follow up to see what they need for the pt to return  Pt's wife in agreement  CM reviewed d/c planning process including the following: identifying help at home, patient preference for d/c planning needs, Discharge Lounge, [facility] Meds to Bed program, availability of treatment team to discuss questions or concerns patient and/or family may have regarding understanding medications and recognizing signs and symptoms once discharged  CM also encouraged patient to follow up with all recommended appointments after discharge  Patient advised of importance for patient and family to participate in managing patients medical well being

## 2021-03-08 NOTE — ASSESSMENT & PLAN NOTE
Lab Results   Component Value Date    HGBA1C 7 4 (H) 03/07/2021       Recent Labs     03/07/21  1633 03/07/21 2050 03/08/21  0004 03/08/21  0748   POCGLU 479* 369* 368* 312*       Blood Sugar Average: Last 72 hrs:  (P) 308 375     · Patient with blood sugars in the 304 100 overnight into this morning  · Per Dr Susan Fowler, patient is on 14 units of Humalog with meals and 30 units of Lantus at bedtime  · Will attempt to get patient under better glycemic control before rehab placement tomorrow    · ISS Algorithm 4

## 2021-03-08 NOTE — ASSESSMENT & PLAN NOTE
· Patient is currently anticoagulated with Lovenox; patient is on Pradaxa at home  · Will continue Pradaxa when patient is medically discharged

## 2021-03-09 ENCOUNTER — TELEPHONE (OUTPATIENT)
Dept: NEUROSURGERY | Facility: CLINIC | Age: 86
End: 2021-03-09

## 2021-03-09 LAB
GLUCOSE SERPL-MCNC: 178 MG/DL (ref 65–140)
GLUCOSE SERPL-MCNC: 191 MG/DL (ref 65–140)
GLUCOSE SERPL-MCNC: 211 MG/DL (ref 65–140)
GLUCOSE SERPL-MCNC: 224 MG/DL (ref 65–140)

## 2021-03-09 PROCEDURE — 82948 REAGENT STRIP/BLOOD GLUCOSE: CPT

## 2021-03-09 PROCEDURE — 99232 SBSQ HOSP IP/OBS MODERATE 35: CPT | Performed by: INTERNAL MEDICINE

## 2021-03-09 PROCEDURE — 99232 SBSQ HOSP IP/OBS MODERATE 35: CPT | Performed by: EMERGENCY MEDICINE

## 2021-03-09 RX ADMIN — INSULIN GLARGINE 30 UNITS: 100 INJECTION, SOLUTION SUBCUTANEOUS at 21:32

## 2021-03-09 RX ADMIN — PANTOPRAZOLE SODIUM 40 MG: 40 TABLET, DELAYED RELEASE ORAL at 06:25

## 2021-03-09 RX ADMIN — ACETAMINOPHEN 975 MG: 325 TABLET ORAL at 21:32

## 2021-03-09 RX ADMIN — ACETAMINOPHEN 975 MG: 325 TABLET ORAL at 06:18

## 2021-03-09 RX ADMIN — METOPROLOL TARTRATE 25 MG: 25 TABLET, FILM COATED ORAL at 15:10

## 2021-03-09 RX ADMIN — OXYCODONE HYDROCHLORIDE 5 MG: 5 TABLET ORAL at 03:41

## 2021-03-09 RX ADMIN — INSULIN LISPRO 14 UNITS: 100 INJECTION, SOLUTION INTRAVENOUS; SUBCUTANEOUS at 17:17

## 2021-03-09 RX ADMIN — INSULIN LISPRO 4 UNITS: 100 INJECTION, SOLUTION INTRAVENOUS; SUBCUTANEOUS at 17:17

## 2021-03-09 RX ADMIN — FUROSEMIDE 60 MG: 40 TABLET ORAL at 08:43

## 2021-03-09 RX ADMIN — METOPROLOL TARTRATE 25 MG: 25 TABLET, FILM COATED ORAL at 03:15

## 2021-03-09 RX ADMIN — MELATONIN TAB 3 MG 3 MG: 3 TAB at 21:32

## 2021-03-09 RX ADMIN — SENNOSIDES, DOCUSATE SODIUM 1 TABLET: 8.6; 5 TABLET ORAL at 17:11

## 2021-03-09 RX ADMIN — INSULIN LISPRO 2 UNITS: 100 INJECTION, SOLUTION INTRAVENOUS; SUBCUTANEOUS at 08:44

## 2021-03-09 RX ADMIN — ATORVASTATIN CALCIUM 20 MG: 20 TABLET, FILM COATED ORAL at 17:11

## 2021-03-09 RX ADMIN — DILTIAZEM HYDROCHLORIDE 60 MG: 60 TABLET, FILM COATED ORAL at 17:12

## 2021-03-09 RX ADMIN — FUROSEMIDE 60 MG: 40 TABLET ORAL at 17:11

## 2021-03-09 RX ADMIN — INSULIN LISPRO 14 UNITS: 100 INJECTION, SOLUTION INTRAVENOUS; SUBCUTANEOUS at 08:44

## 2021-03-09 RX ADMIN — METOPROLOL TARTRATE 25 MG: 25 TABLET, FILM COATED ORAL at 21:33

## 2021-03-09 RX ADMIN — DILTIAZEM HYDROCHLORIDE 60 MG: 60 TABLET, FILM COATED ORAL at 06:19

## 2021-03-09 RX ADMIN — SENNOSIDES, DOCUSATE SODIUM 1 TABLET: 8.6; 5 TABLET ORAL at 08:43

## 2021-03-09 RX ADMIN — METOPROLOL TARTRATE 25 MG: 25 TABLET, FILM COATED ORAL at 08:41

## 2021-03-09 RX ADMIN — OXYCODONE HYDROCHLORIDE 2.5 MG: 5 TABLET ORAL at 19:58

## 2021-03-09 RX ADMIN — ENOXAPARIN SODIUM 40 MG: 40 INJECTION SUBCUTANEOUS at 08:43

## 2021-03-09 RX ADMIN — DILTIAZEM HYDROCHLORIDE 60 MG: 60 TABLET, FILM COATED ORAL at 12:21

## 2021-03-09 RX ADMIN — ACETAMINOPHEN 975 MG: 325 TABLET ORAL at 15:10

## 2021-03-09 NOTE — TELEPHONE ENCOUNTER
3/9/21- PT IN HOSPITAL WITH BROKEN HIP  JESSIE FROM [facility] WILL CALL WHEN PT IS BACK IN THEIR FACILITY TO R/S THIS APT

## 2021-03-09 NOTE — PROGRESS NOTES
Progress Note - Geriatric Medicine   Irven Dancer 82 y o  male MRN: [id number]  Unit/Bed#: [facility] 629-01 Encounter: [id number]      Assessment/Plan:    Type 2 diabetes with hyperglycemia  -blood sugars improved with resumption of home regimen, Lantus 30U daily Humalog 14U TIDWM   -continue good glucose control, monitor for hypoglycemia due to decreased insulin clearance with fluctuations in renal function    Ambulatory dysfunction with fall  -requires use of walker for ambulation at baseline  -remains fall risk due to history of fall, current acetabular fracture, psoas hematoma, poor safety awareness, and age  -continue to encourage good body mechanics assist with all transfers  -continue fall precautions including appropriate footwear and adequate lighting  -PT/OT following     Right acetabular fracture  -confirmed on XR femur on admission  -TTWB per Ortho  -continue acute pain control   -non operative management at this time per Orthopedics  -PT/OT    Right psoas hematoma  -s/p fall on Pradaxa (reversed on admission)  -Hb stable at 8 8  -acute pain controlled  -admitted to Trauma, Ortho following     Pradaxa induced coagulopathy  -reversed with Praxbind on admission   -Pradaxa on hold, currently on Lovenox for dvt ppx    Acute pain due to trauma  -well controlled with geriatric pain protocol   -continue to taper pain regimen as improves with healing     AFib with RVR  -required IV diltiazem on admission now maintained on oral regimen, slightly tachycardic this morning however may be in part related to pain, bp remains stable  -continue rate control  -Pradaxa reversed and held on admit due to hematoma and injuries, consider restart once cleared by Trauma     Cognitive impairment  -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, mixed vascular/Alz type  -multifactorial including history of TBI with subdural hematoma  -continue to encourage patient remain physically, socially, and cognitively active and engaged  -limit transitions between rooms, units, facility in afternoon or evening due to risk of sundowning  -encourage continuity of staff and care team as much as possible to limit disruption to patient routine as continuity and familiarity are especially important for patients with underlying cognitive impairments to reduce cognitive resources spent trying to adapt to these frequent changes    High risk developing delirium  -continue acute pain control  -maintain normal circadian rhythm  -good blood sugar control (avoid extremes)  -maintain normal circadian rhythm  -continue to encourage calm and quiet environment to reduce risk of overstimulation  -provide frequent reorientation as appropriate    Care coordination: Rounded with Fahad Joseph (RN) and and Krishna Lemons (Trauma PA), discussed with patients primary care team at his facility who will be following up with patient after d/c as would benefit from same or next day f/u on return to facility     Subjective:     Patient seen examined at bedside where he sitting resting, nursing reports no acute events overnight  Sugars much better controlled this morning, appetite is good and tolerating diet, he reports mild right leg pain and that he wants to go back to [facility], denies other complaints  He is is much better spirits today  Review of Systems   Constitutional: Negative for appetite change, chills and fever  HENT: Negative for hearing loss and trouble swallowing  Eyes: Negative  Respiratory: Negative  Cardiovascular: Negative  Gastrointestinal: Negative  Endocrine: Negative  Genitourinary: Negative  Musculoskeletal: Positive for arthralgias (right hip/thigh pain) and gait problem  Skin: Negative  Allergic/Immunologic: Negative  Neurological: Negative for speech difficulty  Hematological: Negative  Psychiatric/Behavioral: Positive for sleep disturbance (due to different environment )  All other systems reviewed and are negative      Objective: Vitals: Blood pressure 122/85, pulse (!) 129, temperature 99 3 °F (37 4 °C), resp  rate 17, weight 91 2 kg (201 lb 1 oz), SpO2 97 %  ,Body mass index is 30 57 kg/m²  Intake/Output Summary (Last 24 hours) at 3/9/2021 0956  Last data filed at 3/8/2021 2230  Gross per 24 hour   Intake 460 ml   Output 1263 ml   Net -803 ml     Current Medications: Reviewed    Physical Exam:   Physical Exam  Vitals signs and nursing note reviewed  Constitutional:       General: He is not in acute distress  Appearance: He is not ill-appearing  Comments: Sitting comfortably in bed, NAD   HENT:      Head: Normocephalic and atraumatic  Nose: Nose normal       Mouth/Throat:      Mouth: Mucous membranes are moist       Comments: Dentition intact  Eyes:      General: No scleral icterus  Right eye: No discharge  Left eye: No discharge  Conjunctiva/sclera: Conjunctivae normal       Comments: Pupils appear equal and round   Neck:      Musculoskeletal: Neck supple  Comments: Trachea midline, no JVD  Phonation normal  Cardiovascular:      Rate and Rhythm: Normal rate  Pulses: Normal pulses  Pulmonary:      Effort: No respiratory distress  Breath sounds: No wheezing  Abdominal:      General: There is no distension  Palpations: Abdomen is soft  Comments: Obese, soft, nontender   Musculoskeletal:      Comments: Mildly reduced overall muscle mass   Skin:     General: Skin is warm and dry  Neurological:      Mental Status: He is alert        Comments: Awake and alert, answers questions appropriately  Much more prompt and appropriate responses to questions this morning   Psychiatric:      Comments: Patient is much more pleasant and cooperative than initial encounter         Invasive Devices     Peripheral Intravenous Line            Peripheral IV 03/06/21 Left Antecubital 3 days    Peripheral IV 03/06/21 Right Hand 2 days          Drain            External Urinary Catheter Extra large 1 day              Lab, Imaging and other studies: I have personally reviewed pertinent reports

## 2021-03-09 NOTE — ASSESSMENT & PLAN NOTE
· Patient is status post fall from walker on Pradaxa  · Physical and occupational therapy recommending rehab facility on discharge; will attempt discharge for today

## 2021-03-09 NOTE — ASSESSMENT & PLAN NOTE
Lab Results   Component Value Date    HGBA1C 7 4 (H) 03/07/2021       Recent Labs     03/08/21  1114 03/08/21  1653 03/08/21 2054 03/09/21  0747   POCGLU 341* 227* 108 191*       Blood Sugar Average: Last 72 hrs:  (P) 725 [id number]     · Patient with blood sugars in the 304 100 overnight into this morning  · Per Dr Maximus Rincon, patient is on 14 units of Humalog with meals and 30 units of Lantus at bedtime    · Better glycemic control today; will plan for discharge with case management to rehab facility; still pending - requires prior auth per case management  · ISS Algorithm 4

## 2021-03-09 NOTE — CASE MANAGEMENT
Pt accepted to [facility] but they claim the pt needs insurance auth prior to arriving     CM reached out to Valerie Wilder to initiate [facility]  Pending Ref # [id number]

## 2021-03-09 NOTE — ASSESSMENT & PLAN NOTE
Wt Readings from Last 3 Encounters:   03/09/21 91 2 kg (201 lb 1 oz)   02/19/21 90 3 kg (199 lb)   01/21/21 91 2 kg (201 lb)     · Continue Lasix  · Strict I&O monitoring  · Continue volume status assessment - euvolemic today  · Continue Lopressor

## 2021-03-09 NOTE — DISCHARGE INSTRUCTIONS
A-fib (Atrial Fibrillation)   AMBULATORY CARE:   Atrial fibrillation (a-fib)  is an irregular heartbeat  It reduces your heart's ability to pump blood through your body  A-fib may come and go, or it may be a long-term condition  A-fib can cause life-threatening blood clots, stroke, or heart failure  It is important to treat and manage a-fib to help prevent these problems  Common signs and symptoms include the following:   · A heartbeat that races, pounds, or flutters    · Weakness, severe tiredness, or confusion    · Feeling lightheaded, sweaty, dizzy, or faint    · Shortness of breath or anxiety    · Chest pain or pressure    Call your local emergency number (911 in the [address] [facility]) if:   · You have any of the following signs of a heart attack:      ? Squeezing, pressure, or pain in your chest    ? You may  also have any of the following:     ? Discomfort or pain in your back, neck, jaw, stomach, or arm    ? Shortness of breath    ? Nausea or vomiting    ? Lightheadedness or a sudden cold sweat    · You have any of the following signs of a stroke:      ? Numbness or drooping on one side of your face     ? Weakness in an arm or leg    ? Confusion or difficulty speaking    ? Dizziness, a severe headache, or vision loss    Call your cardiologist if:   · Your arm or leg feels warm, tender, and painful  It may look swollen and red  · Your heart rate is more than 110 beats per minute  · You have new or worsening swelling in your legs, feet, ankles, or abdomen  · You are short of breath, even at rest      · You have questions or concerns about your condition or care  Treatment for A-fib:  Conditions that cause a-fib, such as thyroid disease, will be treated  You may also need any of the following:  · Heart medicines  help control your heart rate or rhythm  You may need more than one medicine to treat your symptoms  · Antiplatelet or blood thinner medicines  help prevent blood clots and stroke  · Cardioversion  is a procedure to return your heart rate and rhythm to normal  It can be done using medicines or electric shock  · A-fib ablation  is a procedure that uses energy to burn a small area of heart tissue  This creates scar tissue and prevents electrical signals that cause a-fib  You may need this procedure more than once  Ask for more information on a-fib ablation  · A pacemaker  may be inserted into your heart  A pacemaker is a device that controls your heartbeat  A pacemaker may be inserted during an ablation procedure or surgery  Ask your healthcare provider for more information on pacemakers  · Surgery  may be needed if other procedures do not work  During surgery your healthcare provider will make cuts in the upper part of your heart  The provider will stitch the cuts together to create scar tissue  The scar tissue will prevent electrical signals that cause a-fib  Manage A-fib:   · Know your target heart rate  Learn how to check your pulse and monitor your heart rate  · Know the risks if you choose to drink alcohol  Alcohol can make a-fib hard to manage  Ask your healthcare provider if it is safe for you to drink alcohol  A drink of alcohol is 12 ounces of beer, 5 ounces of wine, or 1½ ounces of liquor  · Do not smoke  Nicotine can cause heart damage and make it more difficult to manage your a-fib  Do not use e-cigarettes or smokeless tobacco in place of cigarettes or to help you quit  They still contain nicotine  Ask your healthcare provider for information if you currently smoke and need help quitting  · Eat heart-healthy foods  Heart healthy foods will help keep your cholesterol low  These include fruits, vegetables, whole-grain breads, low-fat dairy products, beans, lean meats, and fish  Replace butter and margarine with heart-healthy oils such as olive oil and canola oil  · Maintain a healthy weight  Ask your healthcare provider how much you should weigh  Ask him or her to help you create a safe weight loss plan if you are overweight  Even a small goal of a 10% weight loss can improve your heart health  · Get regular physical activity  Physical activity helps improve your heart health  Get at least 150 minutes of moderate aerobic physical activity each week  Your healthcare provider can help you create an activity plan  · Manage other health conditions  This includes high blood pressure or cholesterol, sleep apnea, diabetes, and other heart conditions  Take medicine as directed and follow your treatment plan  Follow up with your cardiologist as directed: You will need regular blood tests and monitoring  Write down your questions so you remember to ask them during your visits  © Copyright [address] Information is for End User's use only and may not be sold, redistributed or otherwise used for commercial purposes  All illustrations and images included in CareNotes® are the copyrighted property of A D A M , Inc  or [employer]   The above information is an  only  It is not intended as medical advice for individual conditions or treatments  Talk to your doctor, nurse or pharmacist before following any medical regimen to see if it is safe and effective for you  Discharge Instructions - Orthopedics  Rupali Krishnan 82 y o  male MRN: [id number]  Unit/Bed#: [facility] 629-01    Weight Bearing Status: Toe touch weightbearing right lower extremity     DVT prophylaxis  Lovenox for 28 days following injury     Pain:  Continue analgesics as directed by primary team     Dressing Instructions:   Please keep clean, dry and intact until follow up     Appt Instructions:    If you do not have your appointment, please call the clinic at [phone] to schedule follow up in 1 week for repeat xrays   Otherwise followup as scheduled     Contact the office sooner if you experience any increased numbness/tingling in the extremities        Miscellaneous:  None

## 2021-03-09 NOTE — ASSESSMENT & PLAN NOTE
· Patient had alleged suprapubic hematoma on admission  · Hemoglobin is remaining stable at this time

## 2021-03-09 NOTE — PROGRESS NOTES
[facility]  Progress Note - Alivia Del Real 1935, 82 y o  male MRN: [id number]  [facility] Encounter: [id number]  Primary Care Provider: Eva Elizondo MD   Date and time admitted to hospital: 3/6/2021  8:00 AM    Type 2 diabetes mellitus [facility])  Assessment & Plan  Lab Results   Component Value Date    HGBA1C 7 4 (H) 03/07/2021       Recent Labs     03/08/21  1114 03/08/21  1653 03/08/21  2054 03/09/21  0747   POCGLU 341* 227* 108 191*       Blood Sugar Average: Last 72 hrs:  (P) 962 [id number]     · Patient with blood sugars in the 304 100 overnight into this morning  · Per Dr Delia Patrick, patient is on 14 units of Humalog with meals and 30 units of Lantus at bedtime  · Better glycemic control today; will plan for discharge with case management to rehab facility; still pending - requires prior auth per case management  · ISS Algorithm 4    Atrial fibrillation with rapid ventricular response (HCC)  Assessment & Plan  · Continue Cardizem  · Discontinue telemetry today - continue monitoring as needed    Hyperlipidemia  Assessment & Plan  · Continue Lipitor    Hypertension  Assessment & Plan  · Continue Lopressor  · Continue Lasix  · Euvolemic today  Anticoagulated by anticoagulation treatment  Assessment & Plan  · Patient is currently anticoagulated with Lovenox; patient is on Pradaxa at home  · Will continue Pradaxa when patient is medically discharged    Abdominal hemorrhage  Assessment & Plan  · Patient had alleged suprapubic hematoma on admission  · Hemoglobin is remaining stable at this time      Chronic heart failure with preserved ejection fraction [facility])  Assessment & Plan  Wt Readings from Last 3 Encounters:   03/09/21 91 2 kg (201 lb 1 oz)   02/19/21 90 3 kg (199 lb)   01/21/21 91 2 kg (201 lb)     · Continue Lasix  · Strict I&O monitoring  · Continue volume status assessment - euvolemic today  · Continue Lopressor        Ambulatory dysfunction  Assessment & Plan  · Patient is status post fall from walker on Pradaxa  · Physical and occupational therapy recommending rehab facility on discharge; will attempt discharge for today  * Right acetabular fracture ([address] )  Assessment & Plan  · Patient's injury includes comminuted right-sided acetabular fracture with right hip in suprapubic region hematoma; this is status post fall from walker on Pradaxa  · Orthopedics is recommending non operative management with toe-touch weight-bearing on right lower extremity  · Physical and occupational therapy recommending rehab facility  · Serial hemoglobins reveals current hemoglobin at 8 9 from 9 7 yesterday  Stable  Disposition: Improved      SUBJECTIVE:  Chief Complaint: "I don't know"    Subjective: This is an 82year old male who presented to the trauma bay with a right acetabular fracture  Patient notes today he is not in pain but "does not know how I am doing " Patient notes his right hip is sore but otherwise denies any complaints  He denies CP, SOB, palpitations, NVDC, abdominal pain, dysuria, and lower extremity pain/swelling   He denies other complaints at this time and is excited for discharge back to his facility        OBJECTIVE:     Meds/Allergies     Current Facility-Administered Medications:     acetaminophen (TYLENOL) tablet 975 mg, 975 mg, Oral, Q8H [facility], ARISTEO Palafox, 975 mg at 03/09/21 1510    atorvastatin (LIPITOR) tablet 20 mg, 20 mg, Oral, After Dinner, Phylliss Prime, CRNP, 20 mg at 03/08/21 1721    [MAR Hold] bisacodyl (DULCOLAX) rectal suppository 10 mg, 10 mg, Rectal, Daily PRN, Phylliss Prime, CRNP    diltiazem (CARDIZEM) tablet 60 mg, 60 mg, Oral, Q6H [facility], JAMES PalafoxNP, 60 mg at 03/09/21 1221    enoxaparin (LOVENOX) subcutaneous injection 40 mg, 40 mg, Subcutaneous, Daily, Phylliss Prime, CRNP, 40 mg at 03/09/21 0843    furosemide (LASIX) tablet 60 mg, 60 mg, Oral, BID, Phylliss Prime, ARISTEO, 60 mg at 03/09/21 0843    insulin glargine (LANTUS) subcutaneous injection 30 Units 0 3 mL, 30 Units, Subcutaneous, HS, Solange Shabazz PA-C, 30 Units at 03/08/21 2111    insulin lispro (HumaLOG) 100 units/mL subcutaneous injection 14 Units, 14 Units, Subcutaneous, TID With Meals, Solange Shabazz PA-C, 14 Units at 03/09/21 0844    insulin lispro (HumaLOG) 100 units/mL subcutaneous injection 2-12 Units, 2-12 Units, Subcutaneous, TID AC, 2 Units at 03/09/21 0844 **AND** Fingerstick Glucose (POCT), , , TID AC, Jose Latham PA-C    melatonin tablet 3 mg, 3 mg, Oral, HS, JAMES PosadasNP, 3 mg at 03/08/21 2111    metoprolol tartrate (LOPRESSOR) tablet 25 mg, 25 mg, Oral, Q6H, ARISTEO Palafox, 25 mg at 03/09/21 1510    naloxone (NARCAN) 0 04 mg/mL syringe 0 04 mg, 0 04 mg, Intravenous, Q1MIN PRN, ARISTEO Posadas    ondansetron (ZOFRAN) injection 4 mg, 4 mg, Intravenous, Q4H PRN, ARISTEO Posadas    oxyCODONE (ROXICODONE) IR tablet 2 5 mg, 2 5 mg, Oral, Q4H PRN, ARISTEO Posadas, 2 5 mg at 03/07/21 1533    oxyCODONE (ROXICODONE) IR tablet 5 mg, 5 mg, Oral, Q4H PRN, ARISTEO Posadas, 5 mg at 03/09/21 0341    pantoprazole (PROTONIX) EC tablet 40 mg, 40 mg, Oral, Early Morning, ARISTEO Posadas, 40 mg at 03/09/21 9045    polyethylene glycol (MIRALAX) packet 17 g, 17 g, Oral, Daily PRN, ARISTEO Posadas    senna-docusate sodium (SENOKOT S) 8 6-50 mg per tablet 1 tablet, 1 tablet, Oral, BID, ARISTEO Posadas, 1 tablet at 03/09/21 0843    simethicone (MYLICON) chewable tablet 80 mg, 80 mg, Oral, Q6H PRN, ARISTEO Posadas     Vitals:   Vitals:    03/09/21 1449   BP: 132/75   Pulse: 68   Resp:    Temp: 98 3 °F (36 8 °C)   SpO2: 95%       Intake/Output:  I/O       03/07 0701 - 03/08 0700 03/08 0701 - 03/09 0700 03/09 0701 - 03/10 0700    P  O  920 660 240    I V  (mL/kg)       Total Intake(mL/kg) 920 (10 2) 660 (7 2) 240 (2 6) Urine (mL/kg/hr) 834 (0 4) 1263 (0 6)     Total Output 834 1263     Net +86 -603 +240           Unmeasured Urine Occurrence 3 x 2 x            Nutrition/GI Proph/Bowel Reg: Diet per orders - Protonix - Senokot + Miralax    Physical Exam:   Physical Exam  Vitals signs and nursing note reviewed  Constitutional:       General: He is not in acute distress  Appearance: Normal appearance  He is normal weight  He is not ill-appearing, toxic-appearing or diaphoretic  HENT:      Head: Normocephalic and atraumatic  Nose: Nose normal       Mouth/Throat:      Mouth: Mucous membranes are moist    Eyes:      General: No scleral icterus  Right eye: No discharge  Left eye: No discharge  Extraocular Movements: Extraocular movements intact  Conjunctiva/sclera: Conjunctivae normal    Neck:      Comments: No c spine or paracervical musculature ttp  Cardiovascular:      Rate and Rhythm: Normal rate and regular rhythm  Pulses: Normal pulses  Heart sounds: Normal heart sounds  No murmur  No friction rub  No gallop  Comments: 2+ radial and DP pulses bilaterally  Pulmonary:      Effort: Pulmonary effort is normal  No respiratory distress  Breath sounds: Normal breath sounds  No stridor  No wheezing, rhonchi or rales  Chest:      Chest wall: No tenderness  Abdominal:      General: Abdomen is flat  Comments: Soft, not tender, not distended, and without organomegaly    Musculoskeletal:      Right lower leg: No edema  Left lower leg: No edema  Comments: Decreased ROM in RLE secondary to injury; otherwise, normal ROM in b/l upper and lower extremities  Mild TTP to right hip joint; otherwise, no TTP to bilateral upper and lower extremities  Negative Ladarius bilaterally  No pelvic instability   Skin:     General: Skin is warm and dry  Capillary Refill: Capillary refill takes less than 2 seconds  Coloration: Skin is not jaundiced or pale     Neurological: General: No focal deficit present  Mental Status: He is alert and oriented to person, place, and time  Mental status is at baseline        Comments: 5/5 strength in bilateral upper and lower extremities with exception of at right hip joint  Sensation equal and intact in bilateral upper and lover extremities  No stroke-like symptoms   Psychiatric:         Mood and Affect: Mood normal          Behavior: Behavior normal          Invasive Devices     Peripheral Intravenous Line            Peripheral IV 03/06/21 Left Antecubital 3 days    Peripheral IV 03/06/21 Right Hand 3 days          Drain            External Urinary Catheter Extra large 1 day                 Lab Results: reviewed  Imaging/EKG Studies: reviewed  Other Studies: reviewed   VTE Prophylaxis: Enoxaparin (Lovenox)

## 2021-03-10 ENCOUNTER — TELEPHONE (OUTPATIENT)
Dept: OTHER | Facility: OTHER | Age: 86
End: 2021-03-10

## 2021-03-10 VITALS
OXYGEN SATURATION: 100 % | RESPIRATION RATE: 18 BRPM | WEIGHT: 201.06 LBS | SYSTOLIC BLOOD PRESSURE: 137 MMHG | DIASTOLIC BLOOD PRESSURE: 83 MMHG | BODY MASS INDEX: 30.57 KG/M2 | TEMPERATURE: 98.4 F | HEART RATE: 90 BPM

## 2021-03-10 PROBLEM — R58 ABDOMINAL HEMORRHAGE: Status: RESOLVED | Noted: 2021-03-06 | Resolved: 2021-03-10

## 2021-03-10 LAB
FLUAV RNA RESP QL NAA+PROBE: NEGATIVE
FLUBV RNA RESP QL NAA+PROBE: NEGATIVE
GLUCOSE SERPL-MCNC: 212 MG/DL (ref 65–140)
GLUCOSE SERPL-MCNC: 314 MG/DL (ref 65–140)
RSV RNA RESP QL NAA+PROBE: NEGATIVE
SARS-COV-2 RNA RESP QL NAA+PROBE: NEGATIVE

## 2021-03-10 PROCEDURE — 82948 REAGENT STRIP/BLOOD GLUCOSE: CPT

## 2021-03-10 PROCEDURE — 0241U HB NFCT DS VIR RESP RNA 4 TRGT: CPT | Performed by: SURGERY

## 2021-03-10 PROCEDURE — NC001 PR NO CHARGE: Performed by: EMERGENCY MEDICINE

## 2021-03-10 PROCEDURE — 99238 HOSP IP/OBS DSCHRG MGMT 30/<: CPT | Performed by: EMERGENCY MEDICINE

## 2021-03-10 RX ORDER — ACETAMINOPHEN 325 MG/1
975 TABLET ORAL EVERY 6 HOURS PRN
Qty: 30 TABLET | Refills: 0
Start: 2021-03-10 | End: 2021-03-11 | Stop reason: ALTCHOICE

## 2021-03-10 RX ADMIN — INSULIN LISPRO 14 UNITS: 100 INJECTION, SOLUTION INTRAVENOUS; SUBCUTANEOUS at 11:48

## 2021-03-10 RX ADMIN — METOPROLOL TARTRATE 25 MG: 25 TABLET, FILM COATED ORAL at 14:37

## 2021-03-10 RX ADMIN — INSULIN LISPRO 4 UNITS: 100 INJECTION, SOLUTION INTRAVENOUS; SUBCUTANEOUS at 08:27

## 2021-03-10 RX ADMIN — SENNOSIDES, DOCUSATE SODIUM 1 TABLET: 8.6; 5 TABLET ORAL at 08:27

## 2021-03-10 RX ADMIN — DILTIAZEM HYDROCHLORIDE 60 MG: 60 TABLET, FILM COATED ORAL at 06:42

## 2021-03-10 RX ADMIN — ACETAMINOPHEN 975 MG: 325 TABLET ORAL at 06:41

## 2021-03-10 RX ADMIN — OXYCODONE HYDROCHLORIDE 5 MG: 5 TABLET ORAL at 11:52

## 2021-03-10 RX ADMIN — FUROSEMIDE 60 MG: 40 TABLET ORAL at 08:26

## 2021-03-10 RX ADMIN — DILTIAZEM HYDROCHLORIDE 60 MG: 60 TABLET, FILM COATED ORAL at 01:13

## 2021-03-10 RX ADMIN — PANTOPRAZOLE SODIUM 40 MG: 40 TABLET, DELAYED RELEASE ORAL at 06:42

## 2021-03-10 RX ADMIN — INSULIN LISPRO 8 UNITS: 100 INJECTION, SOLUTION INTRAVENOUS; SUBCUTANEOUS at 11:48

## 2021-03-10 RX ADMIN — DILTIAZEM HYDROCHLORIDE 60 MG: 60 TABLET, FILM COATED ORAL at 11:49

## 2021-03-10 RX ADMIN — INSULIN LISPRO 14 UNITS: 100 INJECTION, SOLUTION INTRAVENOUS; SUBCUTANEOUS at 08:27

## 2021-03-10 RX ADMIN — METOPROLOL TARTRATE 25 MG: 25 TABLET, FILM COATED ORAL at 04:03

## 2021-03-10 RX ADMIN — METOPROLOL TARTRATE 25 MG: 25 TABLET, FILM COATED ORAL at 08:27

## 2021-03-10 RX ADMIN — ACETAMINOPHEN 975 MG: 325 TABLET ORAL at 14:37

## 2021-03-10 RX ADMIN — ENOXAPARIN SODIUM 40 MG: 40 INJECTION SUBCUTANEOUS at 08:28

## 2021-03-10 NOTE — TELEPHONE ENCOUNTER
Michael vanegas from [facility] regarding new admission orders     [phone] [id number]    Paged Dr Aubrie Denney via Enedina Gonzalez

## 2021-03-10 NOTE — TELEPHONE ENCOUNTER
03/26/2021-CALLED [address], SPOKE TO JESSIE, CONFIRMED 04/01/2021 APT    03/10/2021-PT STILL IN HOSPITAL  04/01/2021 APT ON HOLD

## 2021-03-10 NOTE — ASSESSMENT & PLAN NOTE
· Patient's injury includes comminuted right-sided acetabular fracture with right hip in suprapubic region hematoma; this is status post fall from walker on Pradaxa  · Orthopedics is recommending non operative management with toe-touch weight-bearing on right lower extremity  · Physical and occupational therapy recommending rehab facility    · Hemoglobin stable  · Repeat imaging in 1 week per Orthopedics recommendations  · Will require DVT prophylaxis for a minimum of 4 weeks

## 2021-03-10 NOTE — INCIDENTAL FINDINGS
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

## 2021-03-10 NOTE — ASSESSMENT & PLAN NOTE
Lab Results   Component Value Date    HGBA1C 7 4 (H) 03/07/2021       Recent Labs     03/09/21  1134 03/09/21  1709 03/09/21  2036 03/10/21  0749   POCGLU 178* 224* 211* 212*       Blood Sugar Average: Last 72 hrs:  (P) 008 [id number]     · Per Dr Kacy Hidalgo, patient is on 14 units of Humalog with meals and 30 units of Lantus at bedtime    · Better glycemic control today; will plan for discharge with case management to rehab facility; still pending - requires prior auth per case management  · ISS Algorithm 4

## 2021-03-10 NOTE — CASE MANAGEMENT
Blaine obtained  [id number]  F/U sunny L  [phone] fax  transport at 1500 via [place] and wife made aware

## 2021-03-10 NOTE — TRANSPORTATION MEDICAL NECESSITY
Section I - General Information    Name of Patient: Mark Garner                 : 1935    Medicare #: [id number]  Transport Date: 03/10/21 (PCS is valid for round trips on this date and for all repetitive trips in the 60-day range as noted below )  Origin: [facility]                                                         Destination: [facility]  Is the pt's stay covered under Medicare Part A (PPS/DRG)   []     Closest appropriate facility? If no, why is transport to more distant facility required? Yes  If hospice pt, is this transport related to pt's terminal illness? No       Section II - Medical Necessity Questionnaire  Ambulance transportation is medically necessary only if other means of transport are contraindicated or would be potentially harmful to the patient  To meet this requirement, the patient must either be "bed confined" or suffer from a condition such that transport by means other than ambulance is contraindicated by the patient's condition  The following questions must be answered by the medical professional signing below for this form to be valid:    1)  Describe the MEDICAL CONDITION (physical and/or mental) of this patient AT [address] [place] [address] that requires the patient to be transported in an ambulance and why transport by other means is contraindicated by the patient's condition: Right acetabular fracture, Abdominal hemorrhage    2) Is the patient "bed confined" as defined below? Yes  To be "be confined" the patient must satisfy all three of the following conditions: (1) unable to get up from bed without Assistance; AND (2) unable to ambulate; AND (3) unable to sit in a chair or wheelchair  3) Can this patient safely be transported by car or wheelchair van (i e , seated during transport without a medical attendant or monitoring)?    No    4) In addition to completing questions 1-3 above, please check any of the following conditions that apply*:   *Note: supporting documentation for any boxes checked must be maintained in the patient's medical records  If hosp-hosp transfer, describe services needed at 2nd facility not available at 1st facility? Patient is confused  Moderate/severe pain on movement   Unable to tolerate seated position for time needed to transport       Section III - Signature of Physician or Healthcare Professional  I certify that the above information is true and correct based on my evaluation of this patient, and represent that the patient requires transport by ambulance and that other forms of transport are contraindicated  I understand that this information will be used by the Centers for Medicare and Medicaid Services (CMS) to support the determination of medical necessity for ambulance services, and I represent that I have personal knowledge of the patient's condition at time of transport  []  If this box is checked, I also certify that the patient is physically or mentally incapable of signing the ambulance service's claim and that the institution with which I am affiliated has furnished care, services, or assistance to the patient  My signature below is made on behalf of the patient pursuant to 42 CFR §424 36(b)(4)  In accordance with 42 CFR §424 37, the specific reason(s) that the patient is physically or mentally incapable of signing the claim form is as follows:       Signature of Physician* or Healthcare Professional______________________________________________________________  Signature Date 03/10/21 (For scheduled repetitive transports, this form is not valid for transports performed more than 60 days after this date)    Printed Name & Credentials of Physician or Healthcare Professional (MD, DO, RN, etc )____Kvng Hull ____________________________  *Form must be signed by patient's attending physician for scheduled, repetitive transports   For non-repetitive, unscheduled ambulance transports, if unable to obtain the signature of the attending physician, any of the following may sign (choose appropriate option below)  [] Physician Assistant []  Clinical Nurse Specialist []  Registered Nurse  []  Nurse Practitioner  [x] Discharge Planner

## 2021-03-10 NOTE — PROGRESS NOTES
[address] [facility]  Progress Note - Lalo Res 1935, 82 y o  male MRN: [id number]  [facility] Encounter: [id number]  Primary Care Provider: Minerva Shannon MD   Date and time admitted to hospital: 3/6/2021  8:00 AM    Anticoagulated by anticoagulation treatment  Assessment & Plan  · Patient is currently anticoagulated with Lovenox; patient is on Pradaxa at home  · Will continue Pradaxa when patient is medically discharged    Abdominal hemorrhage  Assessment & Plan  · Patient had alleged suprapubic hematoma on admission  · Hemoglobin is remaining stable at this time  Chronic heart failure with preserved ejection fraction (HCC)  Assessment & Plan  Wt Readings from Last 3 Encounters:   03/09/21 91 2 kg (201 lb 1 oz)   02/19/21 90 3 kg (199 lb)   01/21/21 91 2 kg (201 lb)     · Continue Lasix  · Strict I&O monitoring  · Continue volume status assessment - euvolemic today  · Continue Lopressor        Ambulatory dysfunction  Assessment & Plan  · Patient is status post fall from walker on Pradaxa  · Physical and occupational therapy recommending rehab facility on discharge; will attempt discharge for today  Type 2 diabetes mellitus [facility])  Assessment & Plan  Lab Results   Component Value Date    HGBA1C 7 4 (H) 03/07/2021       Recent Labs     03/09/21  1134 03/09/21  1709 03/09/21  2036 03/10/21  0749   POCGLU 178* 224* 211* 212*       Blood Sugar Average: Last 72 hrs:  (P) 498 [id number]     · Per Dr Stephanie Caceres, patient is on 14 units of Humalog with meals and 30 units of Lantus at bedtime  · Better glycemic control today; will plan for discharge with case management to rehab facility; still pending - requires prior auth per case management  · ISS Algorithm 4    Hyperlipidemia  Assessment & Plan  · Continue Lipitor    Hypertension  Assessment & Plan  · Continue Lopressor  · Continue Lasix  · Euvolemic today      Atrial fibrillation with rapid ventricular response [facility])  Assessment & Plan  · Continue Cardizem and metoprolol      * Right acetabular fracture (HCC)  Assessment & Plan  · Patient's injury includes comminuted right-sided acetabular fracture with right hip in suprapubic region hematoma; this is status post fall from walker on Pradaxa  · Orthopedics is recommending non operative management with toe-touch weight-bearing on right lower extremity  · Physical and occupational therapy recommending rehab facility  · Hemoglobin stable  · Repeat imaging in 1 week per Orthopedics recommendations  · Will require DVT prophylaxis for a minimum of 4 weeks        Disposition:  Awaiting insurance authorization for discharge to [facility]:  Chief Complaint:  No complaints    Subjective:   56-year-old male with right acetabular fracture, right psoas hematoma, ambulatory dysfunction  Patient seen and evaluated this morning, offer no complaints at this time  He denies any headache, fever, chest pain, shortness of breath, abdominal pain, nausea or vomiting        OBJECTIVE:     Meds/Allergies     Current Facility-Administered Medications:     acetaminophen (TYLENOL) tablet 975 mg, 975 mg, Oral, Q8H [facility], [place] ARISTEO Fenton, 975 mg at 03/10/21 0641    atorvastatin (LIPITOR) tablet 20 mg, 20 mg, Oral, After Dinner, ARISTEO Barron, 20 mg at 03/09/21 1711    bisacodyl (DULCOLAX) rectal suppository 10 mg, 10 mg, Rectal, Daily PRN, ARISTEO Barron    diltiazem (CARDIZEM) tablet 60 mg, 60 mg, Oral, Q6H [facility], [place] ARISTEO Fenton, 60 mg at 03/10/21 4975    enoxaparin (LOVENOX) subcutaneous injection 40 mg, 40 mg, Subcutaneous, Daily, ARISTEO Barron, 40 mg at 03/10/21 0532    furosemide (LASIX) tablet 60 mg, 60 mg, Oral, BID, JAMES BarronNP, 60 mg at 03/10/21 0826    insulin glargine (LANTUS) subcutaneous injection 30 Units 0 3 mL, 30 Units, Subcutaneous, HS, Jose Latham PA-C, 30 Units at 03/09/21 2132    insulin lispro (HumaLOG) 100 units/mL subcutaneous injection 14 Units, 14 Units, Subcutaneous, TID With Meals, Candace Lopez PA-C, 14 Units at 03/10/21 0827    insulin lispro (HumaLOG) 100 units/mL subcutaneous injection 2-12 Units, 2-12 Units, Subcutaneous, TID AC, 4 Units at 03/10/21 0827 **AND** Fingerstick Glucose (POCT), , , TID AC, Jose Latham PA-C    melatonin tablet 3 mg, 3 mg, Oral, HS, Donnadee French, CRNP, 3 mg at 03/09/21 2132    metoprolol tartrate (LOPRESSOR) tablet 25 mg, 25 mg, Oral, Q6H, ARISTEO Palafox, 25 mg at 03/10/21 0827    naloxone (NARCAN) 0 04 mg/mL syringe 0 04 mg, 0 04 mg, Intravenous, Q1MIN PRN, Donna Alias, CRNP    ondansetron (ZOFRAN) injection 4 mg, 4 mg, Intravenous, Q4H PRN, Donna Alias, CRNP    oxyCODONE (ROXICODONE) IR tablet 2 5 mg, 2 5 mg, Oral, Q4H PRN, Donna Alias, CRNP, 2 5 mg at 03/09/21 1958    oxyCODONE (ROXICODONE) IR tablet 5 mg, 5 mg, Oral, Q4H PRN, Donna Alias, CRNP, 5 mg at 03/09/21 0341    pantoprazole (PROTONIX) EC tablet 40 mg, 40 mg, Oral, Early Morning, Donna Alias, CRNP, 40 mg at 03/10/21 0553    polyethylene glycol (MIRALAX) packet 17 g, 17 g, Oral, Daily PRN, Donna Alias, CRNP    senna-docusate sodium (SENOKOT S) 8 6-50 mg per tablet 1 tablet, 1 tablet, Oral, BID, Donna Alias, CRNP, 1 tablet at 03/10/21 0827    simethicone (MYLICON) chewable tablet 80 mg, 80 mg, Oral, Q6H PRN, Donna Alias, CRNP     Vitals:   Vitals:    03/10/21 0748   BP: 137/83   Pulse: 90   Resp: 18   Temp: 98 4 °F (36 9 °C)   SpO2: 100%       Intake/Output:  I/O       03/08 0701 - 03/09 0700 03/09 0701 - 03/10 0700 03/10 0701 - 03/11 0700    P  O  660 240 240    Total Intake(mL/kg) 660 (7 2) 240 (2 6) 240 (2 6)    Urine (mL/kg/hr) 1263 (0 6)      Total Output 1263      Net -603 +240 +240           Unmeasured Urine Occurrence 2 x             Nutrition/GI Proph/Bowel Reg:  Diabetic diet, Protonix, Senokot daily    Physical Exam:   GENERAL APPEARANCE:  Well-appearing, no acute distress  NEURO:  Alert and oriented x3, GCS 15, sensation grossly intact, no focal neurologic deficits  HEENT:  Normocephalic, atraumatic, pupils equal round reactive to light accommodation, EOMI  CV:  Regular rate and rhythm without murmurs, intact distal pulses  LUNGS:  Clear to auscultation bilaterally, no wheezing, no respiratory distress  GI:  Soft, nontender, nondistended, no peritoneal signs  :  Deferred  MSK:  Mild tenderness to palpation, right hip, patient has full range of motion of bilateral upper extremities, and left lower extremity  SKIN:  Dry and warm    Invasive Devices     Peripheral Intravenous Line            Peripheral IV 03/06/21 Left Antecubital 4 days    Peripheral IV 03/06/21 Right Hand 3 days          Drain            External Urinary Catheter Extra large 2 days                 Lab Results: Results: I have personally reviewed pertinent reports  Imaging/EKG Studies: Results: I have personally reviewed pertinent reports      VTE Prophylaxis: Enoxaparin (Lovenox)

## 2021-03-10 NOTE — DISCHARGE SUMMARY
Discharge Summary - Azul Mendez 82 y o  male MRN: [id number]    [facility] En[facility]: [id number]    Admission Date:   Admission Orders (From admission, onward)     Ordered        03/06/21 1441  INPATIENT ADMISSION  Once                     Admitting Diagnosis: Unspecified multiple injuries, initial encounter [T07  XXXA]    HPI:    30-year-old male with AFib, CHF, COPD, CAD, diabetes, hypertension, hyperlipidemia, and prior tbi with right acetabular fracture following a fall will be discharged to [facility]  Procedures Performed:   Orders Placed This Encounter   Procedures    ED ECG Documentation Only       Summary of Hospital Course:   30-year-old male with a history of AFib, CHF, COPD, CAD, diabetes, hypertension, hyperlipidemia, prior TBI who presented on 03/06 after mechanical fall  He was found to have a comminuted right acetabular fracture and an area of hemorrhage in his abdomen  He was treated with [employer] in the emergency department  While in the emergency department, patient was found to be in atrial fibrillation with RVR and was admitted to the critical care unit for further evaluation and stabilization  He was evaluated by Orthopedics for his fracture who decided to treat non operatively  He was initially started on a diltiazem infusion however following administration of his oral rate control medications he was able to be transitioned off of the drip  He was transferred out of the ICU on 3/7  He was evaluated by PT/OT who recommended post acute rehab services  Significant Findings, Care, Treatment and Services Provided:   CT pelvis: comminuted fracture of the right acetabulum  Hemorrhage involves the space of Retzius  CT abdomen and pelvis:  Possible small pseudoaneurysm medial to the junction of the iliac and common femoral vein        Discharge Diagnosis:   Atrial fibrillation  Right acetabular fracture  Ambulatory dysfunction    Resolved Problems  Date Reviewed: 3/10/2021          Resolved    Abdominal hemorrhage 3/10/2021     Resolved by  Jc Antony MD          Condition at Discharge: stable         Discharge instructions/Information to patient and family:   See after visit summary for information provided to patient and family  Provisions for Follow-Up Care:  See after visit summary for information related to follow-up care and any pertinent home health orders  PCP: Kwasi Keys MD    Disposition: See After Visit Summary for discharge disposition information  Planned Readmission: No      Discharge Statement   I spent 30 minutes discharging the patient  This time was spent on the day of discharge  I had direct contact with the patient on the day of discharge  Additional documentation is required if more than 30 minutes were spent on discharge  Discharge Medications:  See after visit summary for reconciled discharge medications provided to patient and family

## 2021-03-11 ENCOUNTER — NURSING HOME VISIT (OUTPATIENT)
Dept: GERIATRICS | Facility: OTHER | Age: 86
End: 2021-03-11
Payer: COMMERCIAL

## 2021-03-11 ENCOUNTER — TELEPHONE (OUTPATIENT)
Dept: OTHER | Facility: OTHER | Age: 86
End: 2021-03-11

## 2021-03-11 DIAGNOSIS — I10 HYPERTENSION, UNSPECIFIED TYPE: ICD-10-CM

## 2021-03-11 DIAGNOSIS — I48.91 ATRIAL FIBRILLATION, UNSPECIFIED TYPE (HCC): ICD-10-CM

## 2021-03-11 DIAGNOSIS — R53.81 DEBILITY: ICD-10-CM

## 2021-03-11 DIAGNOSIS — I50.32 CHRONIC HEART FAILURE WITH PRESERVED EJECTION FRACTION (HCC): ICD-10-CM

## 2021-03-11 DIAGNOSIS — S32.401D CLOSED NONDISPLACED FRACTURE OF RIGHT ACETABULUM WITH ROUTINE HEALING, UNSPECIFIED PORTION OF ACETABULUM, SUBSEQUENT ENCOUNTER: Primary | ICD-10-CM

## 2021-03-11 DIAGNOSIS — Z71.89 GOALS OF CARE, COUNSELING/DISCUSSION: ICD-10-CM

## 2021-03-11 DIAGNOSIS — Z79.4 TYPE 2 DIABETES MELLITUS WITH HYPERGLYCEMIA, WITH LONG-TERM CURRENT USE OF INSULIN (HCC): ICD-10-CM

## 2021-03-11 DIAGNOSIS — D64.9 ANEMIA, UNSPECIFIED TYPE: ICD-10-CM

## 2021-03-11 DIAGNOSIS — R26.2 AMBULATORY DYSFUNCTION: ICD-10-CM

## 2021-03-11 DIAGNOSIS — E11.65 TYPE 2 DIABETES MELLITUS WITH HYPERGLYCEMIA, WITH LONG-TERM CURRENT USE OF INSULIN (HCC): ICD-10-CM

## 2021-03-11 PROBLEM — L01.00 IMPETIGO: Status: RESOLVED | Noted: 2021-01-14 | Resolved: 2021-03-11

## 2021-03-11 PROCEDURE — 99306 1ST NF CARE HIGH MDM 50: CPT | Performed by: INTERNAL MEDICINE

## 2021-03-11 RX ORDER — ACETAMINOPHEN 325 MG/1
3 TABLET ORAL EVERY 8 HOURS SCHEDULED
COMMUNITY
End: 2021-12-13 | Stop reason: ALTCHOICE

## 2021-03-11 RX ORDER — DILTIAZEM HYDROCHLORIDE 240 MG/1
1 CAPSULE, COATED, EXTENDED RELEASE ORAL DAILY
Status: ON HOLD | COMMUNITY

## 2021-03-11 RX ORDER — ASPIRIN 81 MG/1
1 TABLET, CHEWABLE ORAL DAILY
COMMUNITY
End: 2022-06-08

## 2021-03-11 NOTE — PROGRESS NOTES
[place] [address]  [address]  Facility:   [facility]       HISTORY AND PHYSICAL      NAME: Segun Grimm  AGE: 82 y o  SEX: male    DATE OF ENCOUNTER: 3/11/2021    Code status:  CPR    Assessment and Plan     1  Closed nondisplaced fracture of right acetabulum with routine healing, unspecified portion of acetabulum, subsequent encounter  Assessment & Plan:  · He is status post evaluation by the orthopedic and trauma services during his hospitalization   · Non-surgical treatment is recommended, at this time   · He has been cleared to toe-touch weight-bearing on his right lower extremity   · I will order PT/OT evaluation and treatment to assist him in returning to his prior level of functioning   · Will continue his care in collaboration with the orthopedic service  · Will continue with pain management  · At this time, will start routine Tylenol 975 mg every 8 hours  · Continue to monitor for change in condition      2  Ambulatory dysfunction  Assessment & Plan:  · Secondary to his acute on chronic medical conditions   · I will order PT/ OT evaluation and treatment to assist him in returning to his prior level function   · Will continue to monitor for change in condition      3  Type 2 diabetes mellitus with hyperglycemia, with long-term current use of insulin ([facility])  Assessment & Plan:    Lab Results   Component Value Date    HGBA1C 7 4 (H) 03/07/2021   · Currently, his fingerstick blood sugars are elevated despite routine long-acting and short-acting insulins  · Will increase his Basaglar insulin from 30 units to 34 units nightly  · I will increase his Admelog insulin from 14 units with meals to 18 units with meals  · Will continue with fingerstick monitoring for change in condition      4   Anemia, unspecified type  Assessment & Plan:  · Component of acute blood loss anemia secondary to hemorrhage from injury with fracture   · Will continue with clinical and periodic laboratory monitoring for change in condition      5  Atrial fibrillation, unspecified type [facility])  Assessment & Plan:  · He is doing well with metoprolol tartrate and diltiazem CD for rate control  · He is doing well with Pradaxa for anticoagulation   · I will continue him with these medications   · Will continue with clinical and periodic laboratory monitoring for change in condition      6  Chronic heart failure with preserved ejection fraction (HCC)  Assessment & Plan:  · He is doing well with routine furosemide therapy   · Will continue him on this medication  · Will continue with clinical and periodic laboratory monitoring for change in condition            7  Debility  Assessment & Plan:  · Secondary to his acute on chronic medical conditions   · He continues to require 24/7 care /support of his ADLs   · I recommend continued care /support of his ADLs at the rehabilitation facility and then as a resident at his long-term care facility  · I will order PT / OT evaluation and treatment to assist him in returning to his prior level of functioning  · Will continue to monitor for change in condition      8  Hypertension, unspecified type  Assessment & Plan:  · He is doing well with metoprolol tartrate and diltiazem CD  · Will continue with monitoring for change in condition      9  Goals of care, counseling/discussion  Assessment & Plan:  ·  His resuscitation status is "CPR "        All medications and routine orders were reviewed and updated as needed  Plan discussed with:  Nursing staff  Chief Complaint     He is seen for a visit to perform a history and physical exam to be admitted to the rehabilitation facility      History of Present Illness     He is an 82-year-old gentleman with the comorbidities of atrial fibrillation, coronary artery disease, insulin requiring type 2 diabetes mellitus, hypertension, hyperlipidemia, vertebral artery dissection, history of cervical vertebral fracture, and recent hospitalization for a comminuted right acetabular fracture suffered during a fall  He presented to the emergency room on March 6, 2021 after falling at his long-term care facility  He was self ambulating to the bathroom per his record  When he complained of persisting pain, imaging was obtained of his right hip  Portable x-rays were inconclusive and he was referred to the emergency room for further evaluation and management  Evaluation in the emergency room on March 6, 2021 did reveal the comminuted right acetabular fracture  There was hemorrhage into the retropubic space and intramuscularly  He was given PrevBind and transferred to the intensive care unit for monitoring and treatment  He was under the care of the trauma, orthopedic, and geriatric services  Treatment was determined to be nonsurgical   He was released to toe-touch weight-bearing on right leg  PT/ OT evaluation during his hospitalization recommended a post acute care rehabilitation stay prior to returning to his long-term care facility  He was felt medically stable for transfer to the rehabilitation facility on March 10, 2021  His only complaint during my visit was of right leg discomfort      HISTORY:  Past Medical History:   Diagnosis Date    A-fib ([facility] )     Cancer ([place] [address] )     Cardiac disease     CH[facility] (congestive heart failure) ([facility])     Chronic obstructive pulmonary disease (COPD) ([facility])     COPD (chronic obstructive pulmonary disease) ([facility])     Coronary artery disease     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[facility] )     TYPE 2    Dysphagia     Fracture of nasal bone     Gait instability     H/O cervical fracture     Hyperlipidemia     Hypertension     Muscle weakness     TBI (traumatic brain injury) ([facility] )      Past Surgical History:   Procedure Laterality Date    HAND SURGERY      SD ARTHRODESIS POSTERIOR CRANIOCERVICAL N/A 10/26/2020    Procedure: Occipital cervical fusion to C4;  Surgeon: Wen Bartlett MD;  Location: [facility]; Service: Neurosurgery     Family History   Problem Relation Age of Onset    Other Other         urinary tract malignancy    Diabetes Mother      Social History     Socioeconomic History    Marital status: /Civil Union     Spouse name: Not on file    Number of children: Not on file    Years of education: Not on file    Highest education level: Not on file   Occupational History    Not on file   Social Needs    Financial resource strain: Not on file    Food insecurity     Worry: Not on file     Inability: Not on file   [employer] needs     Medical: Not on file     Non-medical: Not on file   Tobacco Use    Smoking status: Never Smoker    Smokeless tobacco: Never Used   Substance and Sexual Activity    Alcohol use: Never     Frequency: Never    Drug use: Never    Sexual activity: Not on file   Lifestyle    Physical activity     Days per week: Not on file     Minutes per session: Not on file    Stress: Not on file   Relationships    Social connections     Talks on phone: Not on file     Gets together: Not on file     Attends Gnosticist service: Not on file     Active member of club or organization: Not on file     Attends meetings of clubs or organizations: Not on file     Relationship status: Not on file    Intimate partner violence     Fear of current or ex partner: Not on file     Emotionally abused: Not on file     Physically abused: Not on file     Forced sexual activity: Not on file   Other Topics Concern    Not on file   Social History Narrative    ** Merged History Encounter **            Allergies: Allergies   Allergen Reactions    Amoxicillin Diarrhea       Review of Systems     Review of Systems   Constitutional: Negative for fever  HENT: Positive for hearing loss  Negative for trouble swallowing  Eyes: Negative for visual disturbance  Respiratory: Negative for shortness of breath  Cardiovascular: Negative for chest pain     Gastrointestinal: Negative for abdominal pain and constipation  Genitourinary: Negative for difficulty urinating  Musculoskeletal: Negative for arthralgias  Skin: Negative for rash  Neurological: Negative for headaches  Medications and orders     All medications reviewed and updated in Nursing Home EMR  Objective     Vitals:  Weight 101 1 lb, pulse 98 1° F, pulse 70, blood pressure 146/81, pulse oximetry 91% on room air, fasting fingerstick blood sugar 363  Physical Exam  Vitals signs reviewed  Constitutional:       General: He is awake  Appearance: He is well-developed  He is not ill-appearing, toxic-appearing or diaphoretic  Comments: He appears comfortable lying in bed, stated age, and frail  HENT:      Head: Normocephalic  Eyes:      General: No scleral icterus  Conjunctiva/sclera: Conjunctivae normal    Neck:      Musculoskeletal: Neck supple  Cardiovascular:      Rate and Rhythm: Normal rate and regular rhythm  Heart sounds: Normal heart sounds  No murmur  No friction rub  No gallop  Comments: There is no pretibial edema, bilaterally  Pulmonary:      Effort: Pulmonary effort is normal  No respiratory distress  Breath sounds: Normal breath sounds  No wheezing, rhonchi or rales  Abdominal:      General: Abdomen is flat  There is no distension  Palpations: Abdomen is soft  There is no mass  Tenderness: There is no abdominal tenderness  There is no guarding or rebound  Musculoskeletal:         General: No deformity  Skin:     Findings: No rash  Neurological:      Mental Status: He is oriented to person, place, and time and easily aroused  Psychiatric:         Mood and Affect: Mood normal          Behavior: Behavior is cooperative  Pertinent Laboratory/Diagnostic Studies: The following labs/studies were reviewed please see chart or hospital paperwork for details      Lab Results   Component Value Date    WBC 11 13 (H) 03/08/2021    HGB 8 8 (L) 03/08/2021    HCT 29 0 (L) 03/08/2021    MCV 85 03/08/2021     03/08/2021     Lab Results   Component Value Date    SODIUM 140 03/08/2021    K 4 2 03/08/2021     03/08/2021    CO2 31 03/08/2021    BUN 26 (H) 03/08/2021    CREATININE 1 31 (H) 03/08/2021    GLUC 324 (H) 03/08/2021    CALCIUM 8 5 03/08/2021     Lab Results   Component Value Date    JDE8WERDQJHR 1 920 02/07/2017     Lab Results   Component Value Date    ALT 15 03/07/2021    AST 10 03/07/2021    ALKPHOS 107 03/07/2021     Lab Results   Component Value Date    HGBA1C 7 4 (H) 03/07/2021 March 6, 2021:     Two-view x-ray of right femur:     FINDINGS:     There is complex appearing right acetabular fracture which is minimally displaced  The femur appears intact  There is some bone spurring at the greater trochanter      There is severe osteoarthritis at the knee  There is at least mild osteoarthritis at the hips      No lytic or blastic osseous lesion      Soft tissues are unremarkable      IMPRESSION:     Complex right acetabular fracture with mild displacement      Severe tricompartmental osteoarthritis of the knee      At least mild osteoarthritis of the right hip      Personally reviewed the images of his right femur x-ray in the PACS system and agree with the radiologist's interpretation  March 6, 2021:     Twelve lead ECG:     Study Result    Age and gender specific ECG analysis  Sinus tachycardia with Fusion complexes  Left axis deviation  Non-specific intra-ventricular conduction block  Abnormal QRS-T angle, consider primary T wave abnormality  Abnormal ECG  When compared with ECG of 19-JUN-2020 15:35,  Sinus rhythm has replaced Atrial fibrillation  Vent  rate has increased BY  68 BPM  QRS duration has decreased  T wave inversion no longer evident in Lateral leads  Confirmed by Katlyn Raphael ([id number]) on 3/6/2021 1:26:58 PM       - His admission order summary was reviewed and signed  Treatment plan reviewed with nursing staff      Kraig Arceo M D   3/11/2021 11:31 PM

## 2021-03-11 NOTE — UTILIZATION REVIEW
Continued Stay Review    Date: 3/10/21      HPI:  37-year-old male with AFib, CHF, COPD, CAD, diabetes, hypertension, hyperlipidemia, and prior tbi with right acetabular fracture following a fall will be discharged to [facility]      3/10 Summary of Hospital Course:   37-year-old male with a history of AFib, CHF, COPD, CAD, diabetes, hypertension, hyperlipidemia, prior TBI who presented on 03/06 after mechanical fall  He was found to have a comminuted right acetabular fracture and an area of hemorrhage in his abdomen  He was treated with Praxbind in the emergency department  While in the emergency department, patient was found to be in atrial fibrillation with RVR and was admitted to the critical care unit for further evaluation and stabilization  He was evaluated by Orthopedics for his fracture who decided to treat non operatively  He was initially started on a diltiazem infusion however following administration of his oral rate control medications he was able to be transitioned off of the drip  He was transferred out of the ICU on 3/7  He was evaluated by PT/OT who recommended post acute rehab services  3/6 Orthopedics Consult:  Assessment:  86 y o male  status post a mechanical fall with right acetabular fracture  Based on multiple comorbidities and poor bone quality, nonoperative management is warranted at this time  Plan: TTWB RLE, Pain control per primary team, recommend restarting DVT prophylaxis when able, Physical therapy            Significant Findings, Care, Treatment and Services Provided:   CT pelvis: comminuted fracture of the right acetabulum  Hemorrhage involves the space of Retzius  CT abdomen and pelvis:  Possible small pseudoaneurysm medial to the junction of the iliac and common femoral vein      Pertinent Labs/Diagnostic Results:   Results from last 7 days   Lab Units 03/10/21  1147   SARS-COV-2  Negative     Results from last 7 days   Lab Units 03/08/21  1433 03/08/21  0603 03/07/21  1411 03/07/21  0546 03/06/21  2351  03/06/21  0913   WBC Thousand/uL  --  11 13*  --  9 45  --   --  11 34*   HEMOGLOBIN g/dL 8 8* 8 9* 9 7* 9 8* 9 7*   < > 10 6*   HEMATOCRIT % 29 0* 28 6*  --  32 1*  --   --  34 8*   PLATELETS Thousands/uL  --  251  --  280  --   --  301   NEUTROS ABS Thousands/µL  --  7 98*  --  6 70  --   --  8 61*    < > = values in this interval not displayed           Results from last 7 days   Lab Units 03/08/21  0603 03/07/21  0545 03/06/21  0913   SODIUM mmol/L 140 144 142   POTASSIUM mmol/L 4 2 4 3 4 9   CHLORIDE mmol/L 104 109* 111*   CO2 mmol/L 31 30 27   ANION GAP mmol/L 5 5 4   BUN mg/dL 26* 24 25   CREATININE mg/dL 1 31* 1 23 1 19   EGFR ml/min/1 73sq m 50 54 56   CALCIUM mg/dL 8 5 9 0 8 9   MAGNESIUM mg/dL  --  2 3  --      Results from last 7 days   Lab Units 03/07/21  0545   AST U/L 10   ALT U/L 15   ALK PHOS U/L 107   TOTAL PROTEIN g/dL 7 2   ALBUMIN g/dL 3 3*   TOTAL BILIRUBIN mg/dL 0 49     Results from last 7 days   Lab Units 03/10/21  1050 03/10/21  0749 03/09/21  2036 03/09/21  1709 03/09/21  1134 03/09/21  0747 03/08/21  2054 03/08/21  1653 03/08/21  1114 03/08/21  0748 03/08/21  0004 03/07/21  2050   POC GLUCOSE mg/dl 314* 212* 211* 224* 178* 191* 108 227* 341* 312* 368* 369*     Results from last 7 days   Lab Units 03/08/21  0603 03/07/21  0545 03/06/21  0913   GLUCOSE RANDOM mg/dL 324* 193* 177*         Results from last 7 days   Lab Units 03/07/21  0546   HEMOGLOBIN A1C % 7 4*   EAG mg/dl 166             Results from last 7 days   Lab Units 03/06/21  1521   TROPONIN I ng/mL 0 02         Results from last 7 days   Lab Units 03/07/21  0545   PROTIME seconds 13 7   INR  1 05         Results from last 7 days   Lab Units 03/06/21  0913   NT-PRO BNP pg/mL 1,240*               Results from last 7 days   Lab Units 03/10/21  1147   INFLUENZA A PCR  Negative   INFLUENZA B PCR  Negative   RSV PCR  Negative         Vital Signs:     Date/Time  Temp  Pulse Resp  BP  MAP   SpO2  Calculated FIO2 (%) - Nasal Cannula  Nasal Cannula O2 Flow Rate (L/min)  O2 Device   03/10/21 0748  98 4 °F (36 9 °C)  90  18  137/83  101  100 %  --  --  --   03/10/21 06:42:51  --  89  --  138/85  103  97 %  --  --  --   03/10/21 0642  --  --  --  138/85  --  --  --  --  --   03/10/21 04:02:10  --  90  --  136/83  101  99 %  --  --  --   03/10/21 01:14:29  --  --  --  139/83  102  --  --  --  --   03/10/21 0113  --  --  --  139/83  --  --  --  --  --   03/09/21 22:38:44  98 4 °F (36 9 °C)  87  17  141/83  102  97 %  --  --  --   03/09/21 21:33:09  --  90  --  140/85  103  96 %  --  --  --   03/09/21 2133  --  90  --  140/85  --  --  --  --  --   03/09/21 2001  --  --  --  --  --  98 %  --  --  None (Room air)   03/09/21 14:49:40  98 3 °F (36 8 °C)  68  --  132/75  94  95 %  --  --  --   03/09/21 12:24:16  --  95  --  143/82  102  96 %  --  --  --   03/09/21 1221  --  --  --  143/82  --  --  --  --  --   03/09/21 0800  --  --  --  --  --  --  26  1 5 L/min  Nasal cannula   03/09/21 06:21:10  --  129Abnormal   --  122/85  97  97 %  --  --  --   03/09/21 0618  --  --  --  122/85  --  --  --  --  --   03/09/21 05:58:18  99 3 °F (37 4 °C)  121Abnormal   17  121/84  96  95 %  --  --  --   03/09/21 03:15:35  --  84  --  131/78  96  94 %  --  --  --   03/09/21 01:57:29  98 5 °F (36 9 °C)  80  17  132/72  92  98 %  --  --  --   03/09/21 0011  --  --  --  --  --  93 %  24  1 L/min  Nasal cannula   03/08/21 22:57:18  98 2 °F (36 8 °C)  82  18  117/66  83  96 %  24  1 L/min  Nasal cannula   03/08/21 21:05:35  --  76  --  120/72  88  97 %  --  --  --   03/08/21 17:15:37  --  60  --  143/78  100  97 %  --  --  --   03/08/21 15:42:55  98 5 °F (36 9 °C)  52Abnormal   16  119/64  82  97 %  --  --  --   03/08/21 14:45:59  97 4 °F (36 3 °C)Abnormal   85  --  128/63  85  100 %  --  --  --   03/08/21 11:17:05  98 5 °F (36 9 °C)  66  15  121/78  92  99 %  --  --  --   03/08/21 07:49:33  98 5 °F (36 9 °C)  65  16 113/85  94  98 %  --  --  --   03/08/21 05:54:37  --  87  --  142/80  101  98 %  --  --  --   03/08/21 03:30:08  97 7 °F (36 5 °C)  98  16  --  --  98 %  --  --  --   03/08/21 03:06:15  --  110Abnormal   --  148/80  103  97 %  --  --  --   03/08/21 00:08:09  --  103  --  101/66  78  92 %  --  --  --   03/08/21 0008  --  --  --  101/66  --  --  --  --  --         Medications:   Scheduled Medications:    acetaminophen, 975 mg, Oral, Q8H GABY  atorvastatin, 20 mg, Oral, After Dinner  diltiazem, 60 mg, Oral, Q6H GABY  enoxaparin, 40 mg, Subcutaneous, Daily  furosemide, 60 mg, Oral, BID  insulin glargine, 30 Units, Subcutaneous, HS  insulin lispro, 14 Units, Subcutaneous, TID With Meals  insulin lispro, 2-12 Units, Subcutaneous, TID AC  melatonin, 3 mg, Oral, HS  metoprolol tartrate, 25 mg, Oral, Q6H  pantoprazole, 40 mg, Oral, Early Morning  senna-docusate sodium, 1 tablet, Oral, BID       diltiazem (CARDIZEM) 125 mg in sodium chloride 0 9 % 125 mL infusion   Rate: 1-15 mL/hr Dose: 1-15 mg/hr  Freq: Titrated Route: IV  Last Dose: Stopped (03/06/21 1730)  Start: 03/06/21 1515     diltiazem (CARDIZEM) 125 mg in sodium chloride 0 9 % 125 mL infusion   Dose: 1-15 mg/hr  Freq: Once Route: IV  Last Dose: Stopped (03/06/21 1510)  Start: 03/06/21 1300 End: 03/06/21 1510           PRN Meds:  [UZR Hold] bisacodyl, 10 mg, Rectal, Daily PRN  naloxone, 0 04 mg, Intravenous, Q1MIN PRN  ondansetron, 4 mg, Intravenous, Q4H PRN  oxyCODONE, 2 5 mg, Oral, Q4H PRN 3/6 x1, 3/7 x1, 3/9 x 1  oxyCODONE, 5 mg, Oral, Q4H PRN 3/8 x1, 3/9 x 1, 3/10 x 1  polyethylene glycol, 17 g, Oral, Daily PRN  simethicone, 80 mg, Oral, Q6H PRN                  Network Utilization Review Department  ATTENTION: Please call with any questions or concerns to [phone] and carefully listen to the prompts so that you are directed to the right person   All voicemails are confidential   end all requests for admission clinical reviews, approved or denied determinations and any other requests to dedicated fax number below belonging to the campus where the patient is receiving treatment   List of dedicated fax numbers for the Facilities:  [address] [place] [address] DENIALS (Administrative/Medical Necessity) [phone]   [address] (Maternity/NICU/Pediatrics) [phone] [address] Casey Huggins 4031 (  Rad[facility]) [address] [address] Gregorio Ryder 1481 [address] [place]) [address] [place] [address] [phone]

## 2021-03-12 NOTE — ASSESSMENT & PLAN NOTE
· He is status post evaluation by the orthopedic and trauma services during his hospitalization   · Non-surgical treatment is recommended, at this time   · He has been cleared to toe-touch weight-bearing on his right lower extremity   · I will order PT/OT evaluation and treatment to assist him in returning to his prior level of functioning   · Will continue his care in collaboration with the orthopedic service  · Will continue with pain management  · At this time, will start routine Tylenol 975 mg every 8 hours  · Continue to monitor for change in condition

## 2021-03-12 NOTE — TELEPHONE ENCOUNTER
TigerText:    [phone] Ext [phone]/ Jane/ [facility] [place]/ Pt Deloise Breed  1935/ Hyperglycemia, reading 433

## 2021-03-12 NOTE — ASSESSMENT & PLAN NOTE
· He is doing well with metoprolol tartrate and diltiazem CD  · Will continue with monitoring for change in condition

## 2021-03-12 NOTE — ASSESSMENT & PLAN NOTE
· He is doing well with routine furosemide therapy   · Will continue him on this medication  · Will continue with clinical and periodic laboratory monitoring for change in condition

## 2021-03-12 NOTE — ASSESSMENT & PLAN NOTE
· Secondary to his acute on chronic medical conditions   · I will order PT/ OT evaluation and treatment to assist him in returning to his prior level function   · Will continue to monitor for change in condition

## 2021-03-12 NOTE — ASSESSMENT & PLAN NOTE
· Component of acute blood loss anemia secondary to hemorrhage from injury with fracture   · Will continue with clinical and periodic laboratory monitoring for change in condition

## 2021-03-12 NOTE — ASSESSMENT & PLAN NOTE
· He is doing well with metoprolol tartrate and diltiazem CD for rate control  · He is doing well with Pradaxa for anticoagulation   · I will continue him with these medications   · Will continue with clinical and periodic laboratory monitoring for change in condition

## 2021-03-12 NOTE — ASSESSMENT & PLAN NOTE
· Secondary to his acute on chronic medical conditions   · He continues to require 24/7 care /support of his ADLs   · I recommend continued care /support of his ADLs at the rehabilitation facility and then as a resident at his long-term care facility  · I will order PT / OT evaluation and treatment to assist him in returning to his prior level of functioning  · Will continue to monitor for change in condition

## 2021-03-12 NOTE — ASSESSMENT & PLAN NOTE
Lab Results   Component Value Date    HGBA1C 7 4 (H) 03/07/2021   · Currently, his fingerstick blood sugars are elevated despite routine long-acting and short-acting insulins  · Will increase his Basaglar insulin from 30 units to 34 units nightly  · I will increase his Admelog insulin from 14 units with meals to 18 units with meals  · Will continue with fingerstick monitoring for change in condition

## 2021-03-14 DIAGNOSIS — S32.491D OTHER CLOSED FRACTURE OF RIGHT ACETABULUM WITH ROUTINE HEALING, SUBSEQUENT ENCOUNTER: Primary | ICD-10-CM

## 2021-03-15 ENCOUNTER — NURSING HOME VISIT (OUTPATIENT)
Dept: GERIATRICS | Facility: OTHER | Age: 86
End: 2021-03-15
Payer: COMMERCIAL

## 2021-03-15 VITALS
SYSTOLIC BLOOD PRESSURE: 177 MMHG | HEART RATE: 80 BPM | DIASTOLIC BLOOD PRESSURE: 86 MMHG | WEIGHT: 196.3 LBS | RESPIRATION RATE: 18 BRPM | TEMPERATURE: 97.5 F | BODY MASS INDEX: 29.85 KG/M2

## 2021-03-15 DIAGNOSIS — I10 HYPERTENSION, UNSPECIFIED TYPE: ICD-10-CM

## 2021-03-15 DIAGNOSIS — I50.32 CHRONIC HEART FAILURE WITH PRESERVED EJECTION FRACTION (HCC): ICD-10-CM

## 2021-03-15 DIAGNOSIS — Z79.4 TYPE 2 DIABETES MELLITUS WITH HYPERGLYCEMIA, WITH LONG-TERM CURRENT USE OF INSULIN (HCC): ICD-10-CM

## 2021-03-15 DIAGNOSIS — R53.81 DEBILITY: ICD-10-CM

## 2021-03-15 DIAGNOSIS — R13.10 DYSPHAGIA, UNSPECIFIED TYPE: ICD-10-CM

## 2021-03-15 DIAGNOSIS — I48.91 ATRIAL FIBRILLATION, UNSPECIFIED TYPE (HCC): Primary | ICD-10-CM

## 2021-03-15 DIAGNOSIS — F32.9 REACTIVE DEPRESSION: ICD-10-CM

## 2021-03-15 DIAGNOSIS — S32.401D CLOSED NONDISPLACED FRACTURE OF RIGHT ACETABULUM WITH ROUTINE HEALING, UNSPECIFIED PORTION OF ACETABULUM, SUBSEQUENT ENCOUNTER: ICD-10-CM

## 2021-03-15 DIAGNOSIS — E11.65 TYPE 2 DIABETES MELLITUS WITH HYPERGLYCEMIA, WITH LONG-TERM CURRENT USE OF INSULIN (HCC): ICD-10-CM

## 2021-03-15 PROBLEM — S12.110A TYPE II DENS FRACTURE OF SECOND CERVICAL VERTEBRA (HCC): Status: RESOLVED | Noted: 2020-01-11 | Resolved: 2021-03-15

## 2021-03-15 PROBLEM — S12.000A C1 CERVICAL FRACTURE (HCC): Status: RESOLVED | Noted: 2020-01-11 | Resolved: 2021-03-15

## 2021-03-15 PROBLEM — F32.A DEPRESSION: Status: ACTIVE | Noted: 2021-03-15

## 2021-03-15 PROCEDURE — 99309 SBSQ NF CARE MODERATE MDM 30: CPT | Performed by: NURSE PRACTITIONER

## 2021-03-15 NOTE — PROGRESS NOTES
[facility]  [address]  [phone]  [place] [facility]  STR Progress Note        NAME: Vilma Ferguson  AGE: 82 y o  SEX: male    DATE OF ENCOUNTER: 3/15/2021    Assessment and Plan     1  Atrial fibrillation, unspecified type [facility])  Assessment & Plan:  · Currently controlled  · Continue with metoprolol  · pradaxa for anticoagulation      2  Chronic heart failure with preserved ejection fraction (HCC)  Assessment & Plan:  · Weights stable  · Continue to monitor daily weights  · Monitor for any increased edema  · Continue with furosemide          3  Debility  Assessment & Plan:  · Decreased mobility due to recent hip fx  · Increased pain  · Continue with restorative care  · Assist with ADL and IADL  · Fall precautions  · PT/OT      4  Dysphagia, unspecified type  Assessment & Plan:  · Doing well  · Continue current diet  · Monitor weights  · Aspiration precautions      5  Closed nondisplaced fracture of right acetabulum with routine healing, unspecified portion of acetabulum, subsequent encounter  Assessment & Plan:  · Non surgical healing  · Increased pain  · Continue with methocarbamol and tylenol 975mg Q8hr  · Monitor pain levels  · Monitor effort in PT      6  Hypertension, unspecified type  Assessment & Plan:  · Slightly high today  · Continue with diltiazem, hydralazine, metoprolol and furosemide  · continue to monitor BP      7  Type 2 diabetes mellitus with hyperglycemia, with long-term current use of insulin ([facility])  Assessment & Plan:  · Recent insulin changes  · BS trending down  · Continue current doses of insulin  · Monitor BS daily      8   Reactive depression  Assessment & Plan:  · Decreased mobility with increased pain with right hip fx  · Pt mood is more depressed  · Feels he will not walk anymore  · Pt is on McShae for 10 days for STR and quarantine, pt is not at baseline with cognition or mood  · Will consult Psychologist         No orders of the defined types were placed in this encounter  History of Present Illness     Jessica Rodney 82 y o  male seen for follow-up of care and treatment plan for ambulatory dysfunction doing well at STR, afib rate controlled, CHF asymptomatic, dysphagia doing well with current diet, acetabulum fx non surgical healing, HTN doing well with BP meds, DM2 doing well with insulin     The following portions of the patient's history were reviewed and updated as appropriate: allergies, current medications, past family history, past medical history, past social history, past surgical history and problem list     Review of Systems     Review of Systems   Constitutional: Positive for activity change (decreased)  Negative for chills and fever  HENT: Negative for ear pain and sore throat  Eyes: Negative for pain and visual disturbance  Respiratory: Negative for cough, shortness of breath and wheezing  Cardiovascular: Positive for leg swelling (right)  Negative for chest pain and palpitations  Gastrointestinal: Negative for abdominal pain and vomiting  Genitourinary: Negative for dysuria and hematuria  Musculoskeletal: Positive for gait problem  Negative for arthralgias and back pain  Skin: Negative for color change and rash  Neurological: Positive for weakness  Negative for seizures and syncope  Psychiatric/Behavioral: Positive for confusion (at times)  All other systems reviewed and are negative        Active Problem List     Patient Active Problem List   Diagnosis    Atrial fibrillation ([address] )    Hypertension    Hyperlipidemia    Type 2 diabetes mellitus ([address] )    Vertebral artery dissection (HCC)    Dysphagia    Ambulatory dysfunction    Abnormal EKG    Chronic heart failure with preserved ejection fraction (HCC)    Weight gain    Counseling regarding advance care planning and goals of care    History of 2019 novel coronavirus disease (COVID-19)    Debility    Medication management    Goals of care, counseling/discussion    Anemia    Right acetabular fracture (HCC)    Anticoagulated by anticoagulation treatment    Depression       Objective     BP (!) 177/86   Pulse 80   Temp 97 5 °F (36 4 °C)   Resp 18   Wt 89 kg (196 lb 4 8 oz)   BMI 29 85 kg/m²     Physical Exam  Vitals signs reviewed  Constitutional:       Appearance: He is well-developed  HENT:      Head: Normocephalic and atraumatic  Eyes:      Conjunctiva/sclera: Conjunctivae normal    Neck:      Musculoskeletal: Normal range of motion  Cardiovascular:      Rate and Rhythm: Normal rate and regular rhythm  Heart sounds: Normal heart sounds, S1 normal and S2 normal  No murmur  Pulmonary:      Effort: Pulmonary effort is normal  No respiratory distress  Breath sounds: Normal breath sounds  No wheezing  Abdominal:      General: Bowel sounds are normal  There is no distension  Palpations: Abdomen is soft  Tenderness: There is no abdominal tenderness  Musculoskeletal:      Right hip: He exhibits decreased range of motion, decreased strength, tenderness and swelling  Comments: Generalized weakness   Skin:     General: Skin is warm and dry  Neurological:      Mental Status: He is alert  Psychiatric:         Attention and Perception: Attention normal          Mood and Affect: Mood is depressed  Speech: Speech normal          Behavior: Behavior normal          Thought Content: Thought content normal          Cognition and Memory: Cognition is impaired  Pertinent Laboratory/Diagnostic Studies:  HFM Labs Reviewed    Current Medications   Medication list reviewed and updated today  Please see paper chart for updated medication list      Travon Delay  3/15/64505:24 PM      Name: Rosaura Chadwick  : 1935  MRN: [id number]  DOS: 3/15/2021    Diagnoses:   Diagnosis ICD-10-CM Associated Orders   1  Atrial fibrillation, unspecified type ([facility] )  I48 91    2   Chronic heart failure with preserved ejection fraction ([facility] )  I50 32    3  Debility  R53 81    4  Dysphagia, unspecified type  R13 10    5  Closed nondisplaced fracture of right acetabulum with routine healing, unspecified portion of acetabulum, subsequent encounter  S32 401D    6  Hypertension, unspecified type  I10    7  Type 2 diabetes mellitus with hyperglycemia, with long-term current use of insulin ([facility])  E11 65     Z79 4    8   Reactive depression  F32 9

## 2021-03-15 NOTE — ASSESSMENT & PLAN NOTE
· Recent insulin changes  · BS trending down  · Continue current doses of insulin  · Monitor BS daily

## 2021-03-15 NOTE — ASSESSMENT & PLAN NOTE
· Decreased mobility due to recent hip fx  · Increased pain  · Continue with restorative care  · Assist with ADL and IADL  · Fall precautions  · PT/OT

## 2021-03-15 NOTE — ASSESSMENT & PLAN NOTE
· Non surgical healing  · Increased pain  · Continue with methocarbamol and tylenol 975mg Q8hr  · Monitor pain levels  · Monitor effort in PT

## 2021-03-15 NOTE — ASSESSMENT & PLAN NOTE
· Slightly high today  · Continue with diltiazem, hydralazine, metoprolol and furosemide  · continue to monitor BP

## 2021-03-15 NOTE — ASSESSMENT & PLAN NOTE
· Decreased mobility with increased pain with right hip fx  · Pt mood is more depressed  · Feels he will not walk anymore  · Pt is on McShae for 10 days for STR and quarantine, pt is not at baseline with cognition or mood  · Will consult Psychologist

## 2021-03-15 NOTE — ASSESSMENT & PLAN NOTE
· Weights stable  · Continue to monitor daily weights  · Monitor for any increased edema  · Continue with furosemide

## 2021-03-17 ENCOUNTER — HOSPITAL ENCOUNTER (OUTPATIENT)
Dept: RADIOLOGY | Facility: HOSPITAL | Age: 86
Discharge: HOME | End: 2021-03-17
Attending: ORTHOPAEDIC SURGERY
Payer: COMMERCIAL

## 2021-03-17 ENCOUNTER — OFFICE VISIT (OUTPATIENT)
Dept: OBGYN CLINIC | Facility: HOSPITAL | Age: 86
End: 2021-03-17

## 2021-03-17 VITALS — HEIGHT: 68 IN | BODY MASS INDEX: 29.85 KG/M2

## 2021-03-17 DIAGNOSIS — S32.411D CLOSED DISPLACED FRACTURE OF ANTERIOR WALL OF RIGHT ACETABULUM WITH ROUTINE HEALING, SUBSEQUENT ENCOUNTER: Primary | ICD-10-CM

## 2021-03-17 DIAGNOSIS — S32.491D OTHER CLOSED FRACTURE OF RIGHT ACETABULUM WITH ROUTINE HEALING, SUBSEQUENT ENCOUNTER: ICD-10-CM

## 2021-03-17 PROCEDURE — 99024 POSTOP FOLLOW-UP VISIT: CPT | Performed by: ORTHOPAEDIC SURGERY

## 2021-03-17 PROCEDURE — 73503 X-RAY EXAM HIP UNI 4/> VIEWS: CPT

## 2021-03-17 NOTE — PROGRESS NOTES
Assessment:   Diagnosis ICD-10-CM Associated Orders   1  Closed displaced fracture of anterior and medial walls of right acetabulum with routine healing, subsequent encounter  S32 411D        Plan:    X-rays taken reviewed, physical exam performed  X-rays do not show any significant displacement of his right acetabular fracture  He does have mild leg-length inequality of about 1 cm  Recommendation is continue toe-touch weight-bearing on his right lower extremity and physical therapy for gait and lower extremity strengthening  Repeat evaluation with x-rays upon arrival in 6 weeks  Continue DVT prophylaxis  To do next visit:  Return in about 6 weeks (around 4/28/2021) for re-check with x-rays  The above stated was discussed in layman's terms and the patient expressed understanding  All questions were answered to the patient's satisfaction  Scribe Attestation    I,:  Bridger Rich am acting as a scribe while in the presence of the attending physician :       I,:  Maria D Jay MD personally performed the services described in this documentation    as scribed in my presence :             Subjective:   Lenny Mcknight is a 82 y o  male who presents   In the office for follow-up evaluation of his right pelvis fracture  He has displaced fractures of both the medial and anterior wall  He has been residing at [facility] manner on toe-touch however states he has not been walking  He is on Pradaxa  He is very agitated upon arrival as well as during his exam in the office    His injury occurred 2 weeks ago      Review of systems negative unless otherwise specified in HPI  Review of Systems    Past Medical History:   Diagnosis Date    A-fib ([facility] )     Cancer ([facility] )     Cardiac disease     CHF (congestive heart failure) (HCC)     Chronic obstructive pulmonary disease (COPD) ([facility] )     [facility]     Coronary artery disease     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[facility] )     TYPE 2    Dysphagia     Fracture of nasal bone     Gait instability     H/O cervical fracture     Hyperlipidemia     Hypertension     Muscle weakness     TBI (traumatic brain injury) ([facility] )        Past Surgical History:   Procedure Laterality Date    HAND SURGERY      [place] ARTHRODESIS POSTERIOR CRANIOCERVICAL N/A 10/26/2020    Procedure: Occipital cervical fusion to C4;  Surgeon: Lashell Michaud MD;  Location: [facility];  Service: Neurosurgery       Family History   Problem Relation Age of Onset    Other Other         urinary tract malignancy    Diabetes Mother        Social History     Occupational History    Not on file   Tobacco Use    Smoking status: Never Smoker    Smokeless tobacco: Never Used   Substance and Sexual Activity    Alcohol use: Never     Frequency: Never    Drug use: Never    Sexual activity: Not on file         Current Outpatient Medications:     acetaminophen (TYLENOL) 325 mg tablet, Take 3 tablets by mouth every 8 (eight) hours, Disp: , Rfl:     aspirin 81 mg chewable tablet, Chew 1 tablet daily, Disp: , Rfl:     atorvastatin (LIPITOR) 20 mg tablet, Take 1 tablet by mouth daily, Disp: , Rfl:     bisacodyl (Dulcolax) 10 mg suppository, Insert 10 mg into the rectum as needed for constipation, Disp: , Rfl:     dabigatran etexilate (PRADAXA) 150 mg capsu, Take 1 capsule by mouth 2 (two) times a day, Disp: , Rfl:     diltiazem (CARDIZEM CD) 240 mg 24 hr capsule, Take 1 capsule by mouth daily, Disp: , Rfl:     furosemide (LASIX) 40 mg tablet, Take 1 5 tablets by mouth 2 (two) times a day 60 MG BID, Disp: , Rfl:     hydrALAZINE (APRESOLINE) 10 mg tablet, Take 10 mg by mouth 3 (three) times a day, Disp: , Rfl:     insulin glargine (LANTUS) 100 units/mL subcutaneous injection, Inject 34 Units under the skin daily at bedtime, Disp: , Rfl:     insulin lispro (HumaLOG) 100 units/mL injection, Inject 18 Units under the skin 3 (three) times a day with meals, Disp: , Rfl:     magnesium hydroxide (MILK OF MAGNESIA) 400 mg/5 mL oral suspension, Take 30 mL by mouth daily as needed for constipation, Disp: , Rfl:     methocarbamol (ROBAXIN) 500 mg tablet, Take 1 tablet (500 mg total) by mouth every 6 (six) hours, Disp: 10 tablet, Rfl: 0    metoprolol tartrate (LOPRESSOR) 50 mg tablet, Take 1 tablet by mouth every 12 (twelve) hours HOLD SBP <100 HR < 55, Disp: , Rfl:     omeprazole (PriLOSEC) 40 MG capsule, Take 1 capsule by mouth daily, Disp: , Rfl:     sodium phosphate-biphosphate (FLEET) 7-19 g 118 mL enema, Insert 1 enema into the rectum once as needed, Disp: , Rfl:     Allergies   Allergen Reactions    Amoxicillin Diarrhea          There were no vitals filed for this visit  Objective:                    Right Hip Exam     Tenderness   The patient is experiencing no tenderness  Other   Sensation: normal    Comments:      Slight leg length inequality of approximately 1 cm right being shorter than left  Calf and thigh are soft nontender no signs DVT  No pain with logroll  Neurovascularly intact distally          No intra-articular hip pain  + ankle df/pf, ehl/fhl motor functions RLE    Diagnostics, reviewed and taken today if performed as documented: The attending physician has personally reviewed the pertinent films in PACS and interpretation is as follows:     right hip and pelvis x-rays taken and reviewed in the office today show:   Right acetabular fractures  Involving the medial and anterior walls remained mildly displaced however in otherwise acceptable position alignment  No protrusio and stable superior migration of femoral head  Procedures, if performed today:    Procedures    None performed      Portions of the record may have been created with voice recognition software  Occasional wrong word or "sound a like" substitutions may have occurred due to the inherent limitations of voice recognition software    Read the chart carefully and recognize, using context, where substitutions have occurred

## 2021-03-23 ENCOUNTER — NURSING HOME VISIT (OUTPATIENT)
Dept: GERIATRICS | Facility: OTHER | Age: 86
End: 2021-03-23
Payer: COMMERCIAL

## 2021-03-23 VITALS
BODY MASS INDEX: 29.19 KG/M2 | TEMPERATURE: 98 F | RESPIRATION RATE: 18 BRPM | DIASTOLIC BLOOD PRESSURE: 65 MMHG | HEART RATE: 66 BPM | WEIGHT: 192 LBS | SYSTOLIC BLOOD PRESSURE: 153 MMHG

## 2021-03-23 DIAGNOSIS — F32.9 REACTIVE DEPRESSION: ICD-10-CM

## 2021-03-23 DIAGNOSIS — S32.411D CLOSED DISPLACED FRACTURE OF ANTERIOR WALL OF RIGHT ACETABULUM WITH ROUTINE HEALING, SUBSEQUENT ENCOUNTER: ICD-10-CM

## 2021-03-23 DIAGNOSIS — R53.81 DEBILITY: Primary | ICD-10-CM

## 2021-03-23 PROCEDURE — 99309 SBSQ NF CARE MODERATE MDM 30: CPT | Performed by: NURSE PRACTITIONER

## 2021-03-23 NOTE — ASSESSMENT & PLAN NOTE
· Increased agitation and sadness  · Pt can be short and angry with staff at times  · Pt has more flat affect and can be angry at times  · Not currently on any medications  · PAB and Psychiatrist consult ordered  · Monitor mood and affect

## 2021-03-23 NOTE — ASSESSMENT & PLAN NOTE
· Doing well at [facility]  · Toe touch on right leg  · Continue with restorative care  · Fall precautions  · Assist with ADL and IADL  · PT/OT

## 2021-03-23 NOTE — PROGRESS NOTES
[address] [address] [address]  [phone]  [address] Acute Note        NAME: Azul Mendez  AGE: 82 y o  SEX: male    DATE OF ENCOUNTER: 3/23/2021    Assessment and Plan     1  Debility  Assessment & Plan:  · Doing well at [facility]  · Toe touch on right leg  · Continue with restorative care  · Fall precautions  · Assist with ADL and IADL  · PT/OT      2  Reactive depression  Assessment & Plan:  · Increased agitation and sadness  · Pt can be short and angry with staff at times  · Pt has more flat affect and can be angry at times  · Not currently on any medications  · PAB and Psychiatrist consult ordered  · Monitor mood and affect      3  Closed displaced fracture of anterior wall of right acetabulum with routine healing, subsequent encounter  Assessment & Plan:  · Pain controlled  · Routine healing  · Recent Ortho appt showed routine healing  · Next f/u in 6 weeks  · Continue with supportive care  · PT/OT          No orders of the defined types were placed in this encounter  History of Present Illness     Azul Mendez 82 y o  male seen for follow-up of care and treatment plan for ambulatory dysfunction doing well at [facility], reactive depression increasing, right hip fx routine healing     The following portions of the patient's history were reviewed and updated as appropriate: allergies, current medications, past family history, past medical history, past social history, past surgical history and problem list     Review of Systems     Review of Systems   Constitutional: Negative for chills and fever  HENT: Negative for ear pain and sore throat  Eyes: Negative for pain and visual disturbance  Respiratory: Negative for cough and shortness of breath  Cardiovascular: Negative for chest pain and palpitations  Gastrointestinal: Negative for abdominal pain and vomiting  Genitourinary: Negative for dysuria and hematuria  Musculoskeletal: Positive for gait problem   Negative for arthralgias and back pain  Skin: Negative for color change and rash  Neurological: Positive for weakness  Negative for seizures and syncope  All other systems reviewed and are negative  Active Problem List     Patient Active Problem List   Diagnosis    At[facility] fibrillation ([facility] )    Hypertension    Hyperlipidemia    Type 2 diabetes mellitus ([facility] )    Vertebral artery dissection (HCC)    Dysphagia    Ambulatory dysfunction    Abnormal EKG    Chronic heart failure with preserved ejection fraction (HCC)    Weight gain    Counseling regarding advance care planning and goals of care    History of 2019 novel coronavirus disease (COVID-19)    Debility    Medication management    Goals of care, counseling/discussion    Anemia    Right acetabular fracture ([facility] )    Anticoagulated by anticoagulation treatment    Depression       Objective     /65   Pulse 66   Temp 98 °F (36 7 °C)   Resp 18   Wt 87 1 kg (192 lb)   BMI 29 19 kg/m²     Physical Exam  Vitals signs reviewed  Constitutional:       Appearance: He is well-developed  HENT:      Head: Normocephalic and atraumatic  Eyes:      Conjunctiva/sclera: Conjunctivae normal    Neck:      Musculoskeletal: Normal range of motion  Cardiovascular:      Rate and Rhythm: Normal rate and regular rhythm  Heart sounds: Normal heart sounds, S1 normal and S2 normal  No murmur  Pulmonary:      Effort: Pulmonary effort is normal  No respiratory distress  Breath sounds: Normal breath sounds  No wheezing  Abdominal:      General: Bowel sounds are normal  There is no distension  Palpations: Abdomen is soft  Tenderness: There is no abdominal tenderness  Musculoskeletal:      Right hip: He exhibits decreased range of motion, decreased strength, tenderness and swelling  Skin:     General: Skin is warm and dry  Neurological:      Mental Status: He is alert  Psychiatric:         Mood and Affect: Mood is depressed  Speech: Speech normal          Behavior: Behavior is agitated  Thought Content: Thought content normal          Cognition and Memory: Memory is impaired  Pertinent Laboratory/Diagnostic Studies:  HFM Labs Reviewed    Current Medications   Medication list reviewed and updated today  Please see paper chart for updated medication list      Naga Cornell  3/23/04163:22 PM      Name: Tomasa Edward  : 1935  MRN: [id number]  DOS: 3/23/2021    Diagnoses:   Diagnosis ICD-10-CM Associated Orders   1  Debility  R53 81    2  Reactive depression  F32 9    3   Closed displaced fracture of anterior wall of right acetabulum with routine healing, subsequent encounter  S32 461P

## 2021-03-23 NOTE — ASSESSMENT & PLAN NOTE
· Pain controlled  · Routine healing  · Recent Ortho appt showed routine healing  · Next f/u in 6 weeks  · Continue with supportive care  · PT/OT

## 2021-03-30 ENCOUNTER — TELEPHONE (OUTPATIENT)
Dept: OTHER | Facility: OTHER | Age: 86
End: 2021-03-30

## 2021-03-31 ENCOUNTER — NURSING HOME VISIT (OUTPATIENT)
Dept: GERIATRICS | Facility: OTHER | Age: 86
End: 2021-03-31
Payer: COMMERCIAL

## 2021-03-31 DIAGNOSIS — Z79.4 TYPE 2 DIABETES MELLITUS WITH HYPERGLYCEMIA, WITH LONG-TERM CURRENT USE OF INSULIN (HCC): ICD-10-CM

## 2021-03-31 DIAGNOSIS — E11.65 TYPE 2 DIABETES MELLITUS WITH HYPERGLYCEMIA, WITH LONG-TERM CURRENT USE OF INSULIN (HCC): ICD-10-CM

## 2021-03-31 DIAGNOSIS — R53.81 DEBILITY: Primary | ICD-10-CM

## 2021-03-31 PROCEDURE — 99308 SBSQ NF CARE LOW MDM 20: CPT | Performed by: NURSE PRACTITIONER

## 2021-04-01 ENCOUNTER — TELEMEDICINE (OUTPATIENT)
Dept: NEUROSURGERY | Facility: CLINIC | Age: 86
End: 2021-04-01
Payer: COMMERCIAL

## 2021-04-01 VITALS
TEMPERATURE: 97.4 F | HEIGHT: 68 IN | BODY MASS INDEX: 29.13 KG/M2 | WEIGHT: 192.2 LBS | RESPIRATION RATE: 17 BRPM | SYSTOLIC BLOOD PRESSURE: 92 MMHG | DIASTOLIC BLOOD PRESSURE: 73 MMHG | HEART RATE: 82 BPM

## 2021-04-01 VITALS
SYSTOLIC BLOOD PRESSURE: 92 MMHG | WEIGHT: 192 LBS | RESPIRATION RATE: 18 BRPM | DIASTOLIC BLOOD PRESSURE: 73 MMHG | TEMPERATURE: 98 F | BODY MASS INDEX: 29.19 KG/M2 | HEART RATE: 91 BPM

## 2021-04-01 DIAGNOSIS — Z48.89 ENCOUNTER FOR POSTOPERATIVE CARE RELATED TO SURGICAL JOINT FUSION: Primary | ICD-10-CM

## 2021-04-01 DIAGNOSIS — Z98.1 ENCOUNTER FOR POSTOPERATIVE CARE RELATED TO SURGICAL JOINT FUSION: Primary | ICD-10-CM

## 2021-04-01 PROCEDURE — 99212 OFFICE O/P EST SF 10 MIN: CPT | Performed by: NEUROLOGICAL SURGERY

## 2021-04-01 RX ORDER — SENNA PLUS 8.6 MG/1
2 TABLET ORAL DAILY
COMMUNITY
End: 2021-05-19 | Stop reason: ALTCHOICE

## 2021-04-01 NOTE — PROGRESS NOTES
Virtual Regular Visit      Assessment/Plan:    Problem List Items Addressed This Visit     None      Visit Diagnoses     Encounter for postoperative care related to surgical joint fusion    -  Primary          Reason for visit is   Chief Complaint   Patient presents with    Virtual Regular Visit     Reason for Visit: 3 month f/u with CT Cspine 3/6/21      Encounter provider Claribel Bernardo MD    Provider located at [address] [place]  [address] [facility] [address] [phone]      Recent Visits  No visits were found meeting these conditions  Showing recent visits within past 7 days and meeting all other requirements     Today's Visits  Date Type Provider Dept   04/01/21 Telemedicine Claribel Bernardo, [address] [facility] today's visits and meeting all other requirements     Future Appointments  No visits were found meeting these conditions  Showing future appointments within next 150 days and meeting all other requirements        The patient was identified by name and date of birth  Arash Johns was informed that this is a telemedicine visit and that the visit is being conducted through telephone  My office door was closed  No one else was in the room  He acknowledged consent and understanding of privacy and security of the video platform  The patient has agreed to participate and understands they can discontinue the visit at any time  Patient is aware this is a billable service  HPI HPI: This patient is known to our office for a posteriorly displaced C2 dens fracture and C1 anterior arch fracture S/p fall 1/11/20  He had a nonhealing C1 and C2 fracture for which we recommended surgery  He underwent an Occipital cervical fusion to C4 on 10/26/20 (DKO)  He was last evaluated via Telemedicine on 12/8/20 to discuss removal of cervical collar   We recommended weaning off the collar and a follow up visit in 3 months time with flex/ext films  Patient had Cspine xrays completed through [facility]; a report is available but eventhough they requested the disc to be mailed we did not receive  He did have a CT Cspine on 3/6/21 s/p fall which we can review to make sure instrumentation is intact  Per nursing facility, patient fell recently and is not walking much in facility due to hip fracture          Past Medical History:   Diagnosis Date    A-fib ([facility] )     Cancer ([place] [address] )     Cardiac disease     [facility]congestive [facility] failure) [facility])     Chronic obstructive pulmonary disease (COPD) ([place] [address] )     COPD (chronic obstructive pulmonary disease) ([facility])     Coronary artery disease     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[place] [address] )     TYPE 2    Dysphagia     Fracture of nasal bone     Gait instability     H/O cervical fracture     Hyperlipidemia     Hypertension     Muscle weakness     TBI (traumatic brain injury) ([place] [address] )        Past Surgical History:   Procedure Laterality Date    HAND SURGERY      MO ARTHRODESIS POSTERIOR CRANIOCERVICAL N/A 10/26/2020    Procedure: Occipital cervical fusion to C4;  Surgeon: Roda Eisenmenger, MD;  Location: [facility];  Service: Neurosurgery       Current Outpatient Medications   Medication Sig Dispense Refill    acetaminophen (TYLENOL) 325 mg tablet Take 3 tablets by mouth every 8 (eight) hours      aspirin 81 mg chewable tablet Chew 1 tablet daily      atorvastatin (LIPITOR) 20 mg tablet Take 1 tablet by mouth daily      bisacodyl (Dulcolax) 10 mg suppository Insert 10 mg into the rectum as needed for constipation      dabigatran etexilate (PRADAXA) 150 mg capsu Take 1 capsule by mouth 2 (two) times a day      diltiazem (CARDIZEM CD) 240 mg 24 hr capsule Take 1 capsule by mouth daily      furosemide (LASIX) 40 mg tablet Take 1 5 tablets by mouth 2 (two) times a day 60 MG BID      hydrALAZINE (APRESOLINE) 10 mg tablet Take 10 mg by mouth 3 (three) times a day      insulin glargine (LANTUS) 100 units/mL subcutaneous injection Inject 34 Units under the skin daily at bedtime      insulin lispro (HumaLOG) 100 units/mL injection Inject 20 Units under the skin 3 (three) times a day with meals       magnesium hydroxide (MILK OF MAGNESIA) 400 mg/5 mL oral suspension Take 30 mL by mouth daily as needed for constipation      methocarbamol (ROBAXIN) 500 mg tablet Take 1 tablet (500 mg total) by mouth every 6 (six) hours 10 tablet 0    metoprolol tartrate (LOPRESSOR) 50 mg tablet Take 1 tablet by mouth every 12 (twelve) hours HOLD SBP <100 HR < 55      omeprazole (PriLOSEC) 40 MG capsule Take 1 capsule by mouth daily      senna (SENOKOT) 8 6 MG tablet Take 2 tablets by mouth daily      sodium phosphate-biphosphate (FLEET) 7-19 g 118 mL enema Insert 1 enema into the rectum once as needed       No current facility-administered medications for this visit  Allergies   Allergen Reactions    Amoxicillin Diarrhea and Hives       Review of Systems   Constitutional: Positive for activity change (fall risk)  HENT: Negative  Eyes: Negative  Respiratory: Negative  Cardiovascular: Negative  Gastrointestinal: Negative  Endocrine: Negative  Genitourinary: Negative  Musculoskeletal: Positive for gait problem  Skin: Negative  Allergic/Immunologic: Negative  Hematological: Negative  Psychiatric/Behavioral: Negative  All other systems reviewed and are negative  I reviewed the ROS  Video Exam    Vitals:    04/01/21 0930   BP: 92/73   Pulse: 82   Resp: 17   Temp: (!) 97 4 °F (36 3 °C)   Weight: 87 2 kg (192 lb 3 2 oz)   Height: 5' 8" (1 727 m)       Physical Exam   Aw on toe-touch precaution currently  gladis and alert  Speech is clear and comprehensible  His arms are strong enough to feed himself and he does not perceive any weakness  He has a hip fracture and is on toe touch should precautions    I spent 15 minutes directly with the patient during this visit     CT scan of the cervical spine is carefully reviewed  I have reviewed the images verbally with the patient  This demonstrates instrumentation to be well seated and the bones are relatively good position  The C3 screws to demonstrate a lucency around them however the remain well seated and so I am not concerned about this  VIRTUAL VISIT DISCLAIMER    Amarjit Dawson acknowledges that he has consented to an online visit or consultation  He understands that the online visit is based solely on information provided by him, and that, in the absence of a face-to-face physical evaluation by the physician, the diagnosis he receives is both limited and provisional in terms of accuracy and completeness  This is not intended to replace a full medical face-to-face evaluation by the physician  Amarjit Dawson understands and accepts these terms

## 2021-04-01 NOTE — ASSESSMENT & PLAN NOTE
· Doing well at LTCF  · Continue with restorative care  · Fall precautions  · Self propells with wheel chair  · PT/OT at this time

## 2021-04-01 NOTE — ASSESSMENT & PLAN NOTE
· Some low BS readings during the day  · RN states his sugars are running very low  · Adjustments made to insulin  · Continue to monitor

## 2021-04-01 NOTE — PROGRESS NOTES
[facility]  [address]  [phone]  [address] Acute Note        NAME: Germán Dixon  AGE: 82 y o  SEX: male    DATE OF ENCOUNTER: 3/31/21    Assessment and Plan     1  Debility  Assessment & Plan:  · Doing well at LTCF  · Continue with restorative care  · Fall precautions  · Self propells with wheel chair  · PT/OT at this time      2  Type 2 diabetes mellitus with hyperglycemia, with long-term current use of insulin ([facility])  Assessment & Plan:  · Some low BS readings during the day  · RN states his sugars are running very low  · Adjustments made to insulin  · Continue to monitor          No orders of the defined types were placed in this encounter  History of Present Illness     Germán Dixon 82 y o  male seen for follow-up of care and treatment plan for ambulatory dysfunction doing well at LTCF, DM2 controlled with insulin     The following portions of the patient's history were reviewed and updated as appropriate: allergies, current medications, past family history, past medical history, past social history, past surgical history and problem list       Active Problem List     Patient Active Problem List   Diagnosis    Atrial fibrillation ([address] )    Hypertension    Hyperlipidemia    Type 2 diabetes mellitus ([address] )    Vertebral artery dissection ([address] )    Dysphagia    Ambulatory dysfunction    Abnormal EKG    Chronic heart failure with preserved ejection fraction ([address] )    Weight gain    Counseling regarding advance care planning and goals of care    History of 2019 novel coronavirus disease (COVID-19)    Debility    Medication management    Goals of care, counseling/discussion    Anemia    Right acetabular fracture ([address] )    Anticoagulated by anticoagulation treatment    Depression       Objective     There were no vitals taken for this visit  Physical Exam  Vitals signs reviewed  Constitutional:       Appearance: He is well-developed     HENT: Head: Normocephalic and atraumatic  Eyes:      Conjunctiva/sclera: Conjunctivae normal    Neck:      Musculoskeletal: Normal range of motion  Cardiovascular:      Rate and Rhythm: Normal rate and regular rhythm  Heart sounds: Normal heart sounds, S1 normal and S2 normal  No murmur  Pulmonary:      Effort: Pulmonary effort is normal  No respiratory distress  Breath sounds: Normal breath sounds  No wheezing  Abdominal:      General: Bowel sounds are normal  There is no distension  Palpations: Abdomen is soft  Tenderness: There is no abdominal tenderness  Musculoskeletal: Normal range of motion  Comments: Generalized weakness   Skin:     General: Skin is warm and dry  Neurological:      Mental Status: He is alert  Psychiatric:         Attention and Perception: Attention normal          Mood and Affect: Mood normal          Speech: Speech normal          Behavior: Behavior normal          Thought Content: Thought content normal          Cognition and Memory: Cognition is impaired  Pertinent Laboratory/Diagnostic Studies:  HFM Labs Reviewed    Current Medications   Medication list reviewed and updated today  Please see paper chart for updated medication list      Elizabeth Tashi  :13 PM      Name: Zena Garcia  : 1935  MRN: [id number]  DOS: 3/31/21    Diagnoses:   Diagnosis ICD-10-CM Associated Orders   1  Debility  R53 81    2   Type 2 diabetes mellitus with hyperglycemia, with long-term current use of insulin ([facility])  E11 65     Z79 4

## 2021-04-13 ENCOUNTER — NURSING HOME VISIT (OUTPATIENT)
Dept: GERIATRICS | Facility: OTHER | Age: 86
End: 2021-04-13
Payer: COMMERCIAL

## 2021-04-13 VITALS
SYSTOLIC BLOOD PRESSURE: 128 MMHG | RESPIRATION RATE: 18 BRPM | BODY MASS INDEX: 29.53 KG/M2 | HEART RATE: 91 BPM | TEMPERATURE: 98 F | DIASTOLIC BLOOD PRESSURE: 78 MMHG | WEIGHT: 194.2 LBS

## 2021-04-13 DIAGNOSIS — Z79.4 TYPE 2 DIABETES MELLITUS WITH HYPERGLYCEMIA, WITH LONG-TERM CURRENT USE OF INSULIN (HCC): ICD-10-CM

## 2021-04-13 DIAGNOSIS — E11.65 TYPE 2 DIABETES MELLITUS WITH HYPERGLYCEMIA, WITH LONG-TERM CURRENT USE OF INSULIN (HCC): ICD-10-CM

## 2021-04-13 DIAGNOSIS — F32.9 REACTIVE DEPRESSION: ICD-10-CM

## 2021-04-13 DIAGNOSIS — I50.32 CHRONIC HEART FAILURE WITH PRESERVED EJECTION FRACTION (HCC): ICD-10-CM

## 2021-04-13 DIAGNOSIS — R13.10 DYSPHAGIA, UNSPECIFIED TYPE: ICD-10-CM

## 2021-04-13 DIAGNOSIS — I10 HYPERTENSION, UNSPECIFIED TYPE: ICD-10-CM

## 2021-04-13 DIAGNOSIS — R53.81 DEBILITY: ICD-10-CM

## 2021-04-13 DIAGNOSIS — I48.91 ATRIAL FIBRILLATION, UNSPECIFIED TYPE (HCC): Primary | ICD-10-CM

## 2021-04-13 PROCEDURE — 99309 SBSQ NF CARE MODERATE MDM 30: CPT | Performed by: NURSE PRACTITIONER

## 2021-04-13 NOTE — ASSESSMENT & PLAN NOTE
· Doing well with current altered diet  · Continue to monitor intake  · Aspirate precautions  · Monitor weights

## 2021-04-13 NOTE — ASSESSMENT & PLAN NOTE
· Doing well  · Continue with insulin  · Monitor blood sugars daily  · Encourage protein rich snack before bed to help with low BS in am  · Report frequent low/high blood sugars

## 2021-04-13 NOTE — ASSESSMENT & PLAN NOTE
· Controlled  · continue with metoprolol, furosemide, diltiazem and hydralazine  · Continue to monitor BP

## 2021-04-13 NOTE — ASSESSMENT & PLAN NOTE
· Mood waxes and wanes  · Pt doing better since moving back to 2nd floor  · continue with supportive care

## 2021-04-13 NOTE — PROGRESS NOTES
[place] [address] [address]  [phone]  [facility]  LTCF Progress Note        NAME: Nirmal Puentes  AGE: 82 y o  SEX: male    DATE OF ENCOUNTER: 4/13/2021    Assessment and Plan     1  Atrial fibrillation, unspecified type ([address] )  Assessment & Plan:  · Controlled  · Continue with pradaxa       2  Chronic heart failure with preserved ejection fraction (HCC)  Assessment & Plan:  · Asymptomatic  · Trace BLLE edema chronic  · Monitor weights daily  · Continue with furosemide          3  Debility  Assessment & Plan:  · Doing well at LTCF  · Continue with restorative care  · Assist with ADL and IaDL  · Fall precautions      4  Reactive depression  Assessment & Plan:  · Mood waxes and wanes  · Pt doing better since moving back to 2nd floor  · continue with supportive care        5  Dysphagia, unspecified type  Assessment & Plan:  · Doing well with current altered diet  · Continue to monitor intake  · Aspirate precautions  · Monitor weights      6  Hypertension, unspecified type  Assessment & Plan:  · Controlled  · continue with metoprolol, furosemide, diltiazem and hydralazine  · Continue to monitor BP      7  Type 2 diabetes mellitus with hyperglycemia, with long-term current use of insulin ([facility])  Assessment & Plan:  · Doing well  · Continue with insulin  · Monitor blood sugars daily  · Encourage protein rich snack before bed to help with low BS in am  · Report frequent low/high blood sugars          No orders of the defined types were placed in this encounter        History of Present Illness     Nirmal Puentes 82 y o  male seen for follow-up of care and treatment plan for ambulatory dysfunction doing well at LTCF, afib rate controlled, CHF asymptomatic, depression improving, dysphagia doing well with altered diet, HTN controlled with BP meds, DM2 controlled with insulin    The following portions of the patient's history were reviewed and updated as appropriate: allergies, current medications, past family history, past medical history, past social history, past surgical history and problem list     Review of Systems     Review of Systems   Constitutional: Negative for chills and fever  HENT: Negative for ear pain and sore throat  Eyes: Negative for pain and visual disturbance  Respiratory: Negative for cough and shortness of breath  Cardiovascular: Negative for chest pain and palpitations  Gastrointestinal: Negative for abdominal pain and vomiting  Genitourinary: Negative for dysuria and hematuria  Musculoskeletal: Positive for gait problem  Negative for arthralgias and back pain  Skin: Negative for color change and rash  Neurological: Positive for weakness  Negative for seizures and syncope  All other systems reviewed and are negative  Active Problem List     Patient Active Problem List   Diagnosis    [facility]    Hypertension    Hyperlipidemia    Type 2 diabetes mellitus ([facility] )    Vertebral artery dissection (HCC)    Dysphagia    Ambulatory dysfunction    Abnormal EKG    Chronic heart failure with preserved ejection fraction (HCC)    Weight gain    Counseling regarding advance care planning and goals of care    History of 2019 novel coronavirus disease (COVID-19)    Debility    Medication management    Goals of care, counseling/discussion    Anemia    Right acetabular fracture ([facility] )    Anticoagulated by anticoagulation treatment    Depression       Objective     /78   Pulse 91   Temp 98 °F (36 7 °C)   Resp 18   Wt 88 1 kg (194 lb 3 2 oz)   BMI 29 53 kg/m²     Physical Exam  Vitals signs reviewed  Constitutional:       Appearance: He is well-developed  HENT:      Head: Normocephalic and atraumatic  Eyes:      Conjunctiva/sclera: Conjunctivae normal    Neck:      Musculoskeletal: Normal range of motion  Cardiovascular:      Rate and Rhythm: Normal rate and regular rhythm        Heart sounds: Normal heart sounds, S1 normal and S2 normal  No murmur  Pulmonary:      Effort: Pulmonary effort is normal  No respiratory distress  Breath sounds: Normal breath sounds  No wheezing  Abdominal:      General: Bowel sounds are normal  There is no distension  Palpations: Abdomen is soft  Tenderness: There is no abdominal tenderness  Musculoskeletal:      Right hip: He exhibits decreased range of motion and tenderness  Skin:     General: Skin is warm and dry  Neurological:      Mental Status: He is alert  Psychiatric:         Attention and Perception: Attention normal          Mood and Affect: Mood is depressed  Speech: Speech normal          Behavior: Behavior normal          Thought Content: Thought content normal          Cognition and Memory: Cognition is impaired  Pertinent Laboratory/Diagnostic Studies:  HFM Labs Reviewed    Current Medications   Medication list reviewed and updated today  Please see paper chart for updated medication list      Christian Chew  :26 PM      Name: Neri Lopez  : 1935  MRN: [id number]  DOS: 2021    Diagnoses:   Diagnosis ICD-10-CM Associated Orders   1  Atrial fibrillation, unspecified type ([facility] )  I48 91    2  Chronic heart failure with preserved ejection fraction ([facility])  I50 32    3  Debility  R53 81    4  Reactive depression  F32 9    5  Dysphagia, unspecified type  R13 10    6  Hypertension, unspecified type  I10    7   Type 2 diabetes mellitus with hyperglycemia, with long-term current use of insulin ([facility])  E11 65     Z79 4

## 2021-04-20 ENCOUNTER — NURSING HOME VISIT (OUTPATIENT)
Dept: GERIATRICS | Facility: OTHER | Age: 86
End: 2021-04-20
Payer: COMMERCIAL

## 2021-04-20 DIAGNOSIS — F01.50 VASCULAR DEMENTIA WITHOUT BEHAVIORAL DISTURBANCE (HCC): ICD-10-CM

## 2021-04-20 DIAGNOSIS — F32.9 REACTIVE DEPRESSION: Primary | ICD-10-CM

## 2021-04-20 PROCEDURE — 99308 SBSQ NF CARE LOW MDM 20: CPT | Performed by: NURSE PRACTITIONER

## 2021-04-22 PROBLEM — F01.50 VASCULAR DEMENTIA (HCC): Status: ACTIVE | Noted: 2021-04-22

## 2021-04-22 NOTE — PROGRESS NOTES
[facility] [place] [address]  POS: 32: [address] [facility]    MEDICATION MANAGEMENT NOTE    NAME: Neri Lopez  AGE: 82 y o  SEX: male [id number]    DATE OF ENCOUNTER: 4/20/2021    Assessment and Plan      Diagnosis ICD-10-CM Associated Orders   1  Reactive depression  F32 9    2  Vascular dementia without behavioral disturbance (HCC)  F01 50    CT scan of January 2020 shows,   Decreased attenuation is noted in periventricular and subcortical white matter demonstrating an appearance that is statistically most likely to represent advanced microangiopathic change  Small old lacunar infarcts within the basal ganglia bilaterally  Decreased attenuation within the brainstem suggestive of mild chronic microangiopathy  Continue medications the same    Treatment Recommendations/Precautions:    Medication management every only as needed   Medications Risks/Benefits      Risks, Benefits And Possible Side Effects Of Medications:    Risks, benefits, and possible side effects of medications explained to Boris and he verbalizes understanding  At this time Brenda David does not want to start medications  Controlled Medication Discussion:     Not applicable - controlled prescriptions are not prescribed by this practice    Evaluation of Psychotropic Drugs for possible gradual dose reductions    Psychotropic medications have been reviewed  Patient continues with symptoms of mood lability/irritability as noted below  Any dose reductions at this time would be clinically contraindicated, as it would be likely to cause worsening of symptoms  Psychotherapy Provided:     Individual psychotherapy provided: Medications, treatment progress and treatment plan reviewed with Brenda David  Reassurance and supportive therapy provided        Chief Complaint     Follow up for concerns of depression/anxiety    History of Present Illness     Brenda David is seen in follow up for concerns of depression, adjustment disorder  He was seen last month, but minimized symptoms and did not wish to start medications  He was referred for psychotherapy  Staff report irritability, negativity, but generally he is somewhat improved and less depressed, no longer tearful, adjusting to unit  He is seen in the dining room, reluctantly participates in interview  Minimizes any depressive or anxiety symptoms, but often responds "I don't know" to questions  He does continue to state he does not want any other medications  Although I feel he may benefit from an antidepressant, will hold off for now - unless symptoms worsen again,  Re-consult psychiatry as needed  Depression  The current episode started more than 1 month ago  The problem has been gradually improving  The treatment provided no relief  He reports fluctuating sleep pattern, fluctuating appetite, fluctuating energy levels    The following portions of the patient's history were reviewed and updated as appropriate: allergies, current medications, past family history, past medical history, past social history, past surgical history and problem list     Review of Systems     Review of Systems   Psychiatric/Behavioral: Positive for decreased concentration, depression and dysphoric mood  All other systems reviewed and are negative        Active Problem List     Patient Active Problem List   Diagnosis    [facility]    Hypertension    Hyperlipidemia    Type [facility] diabetes mellitus [facility]    Vertebral artery dissection (HCC)    Dysphagia    Ambulatory dysfunction    Abnormal EKG    Chronic heart failure with preserved ejection fraction (HCC)    Weight gain    Counseling regarding advance care planning and goals of care    History of 2019 novel coronavirus disease (COVID-19)    Debility    Medication management    Goals of care, counseling/discussion    Anemia    Right acetabular fracture ([facility] )    Anticoagulated by anticoagulation treatment  Depression    Vascular dementia ([facility] )       Objective       Physical Exam  Vitals signs and nursing note reviewed  Psychiatric:         Mood and Affect: Affect is flat  Speech: Speech is delayed  Behavior: Behavior is slowed  Cognition and Memory: Cognition is impaired  Pertinent Laboratory/Diagnostic Studies:  I have personally reviewed pertinent lab results        Mental Status Evaluation:    Appearance age appropriate, casually dressed   Behavior cooperative, calm   Speech delayed   Mood depressed   Affect flat   Thought Processes circumstantial   Associations intact associations   Thought Content no overt delusions   Perceptual Disturbances: no auditory hallucinations, no visual hallucinations   Abnormal Thoughts  Risk Potential Suicidal ideation - None  Homicidal ideation - None  Potential for aggression - No   Orientation oriented to person and place   Memory recent memory mildly impaired   Consciousness alert and awake   Attention Span Concentration Span decreased attention span  decreased concentration   Intellect appears to be of average intelligence   Insight limited   Judgement limited   Muscle Strength and  Gait uses wheelchair   Motor activity no abnormal movements   Language no difficulty naming common objects, no difficulty repeating a phrase, no difficulty writing a sentence   Fund of Knowledge adequate knowledge of current events  adequate fund of knowledge regarding past history  adequate fund of knowledge regarding vocabulary        Current Medications       Current Outpatient Medications:     acetaminophen (TYLENOL) 325 mg tablet, Take 3 tablets by mouth every 8 (eight) hours, Disp: , Rfl:     aspirin 81 mg chewable tablet, Chew 1 tablet daily, Disp: , Rfl:     atorvastatin (LIPITOR) 20 mg tablet, Take 1 tablet by mouth daily, Disp: , Rfl:     bisacodyl (Dulcolax) 10 mg suppository, Insert 10 mg into the rectum as needed for constipation, Disp: , Rfl:    dabigatran etexilate (PRADAXA) 150 mg capsu, Take 1 capsule by mouth 2 (two) times a day, Disp: , Rfl:     diltiazem (CARDIZEM CD) 240 mg 24 hr capsule, Take 1 capsule by mouth daily, Disp: , Rfl:     furosemide (LASIX) 40 mg tablet, Take 1 5 tablets by mouth 2 (two) times a day 60 MG BID, Disp: , Rfl:     hydrALAZINE (APRESOLINE) 10 mg tablet, Take 10 mg by mouth 3 (three) times a day, Disp: , Rfl:     insulin glargine (LANTUS) 100 units/mL subcutaneous injection, Inject 34 Units under the skin daily at bedtime, Disp: , Rfl:     insulin lispro (HumaLOG) 100 units/mL injection, Inject 20 Units under the skin 3 (three) times a day with meals , Disp: , Rfl:     magnesium hydroxide (MILK OF MAGNESIA) 400 mg/5 mL oral suspension, Take 30 mL by mouth daily as needed for constipation, Disp: , Rfl:     methocarbamol (ROBAXIN) 500 mg tablet, Take 1 tablet (500 mg total) by mouth every 6 (six) hours, Disp: 10 tablet, Rfl: 0    metoprolol tartrate (LOPRESSOR) 50 mg tablet, Take 1 tablet by mouth every 12 (twelve) hours HOLD SBP <100 HR < 55, Disp: , Rfl:     omeprazole (PriLOSEC) 40 MG capsule, Take 1 capsule by mouth daily, Disp: , Rfl:     senna (SENOKOT) 8 6 MG tablet, Take 2 tablets by mouth daily, Disp: , Rfl:     sodium phosphate-biphosphate (FLEET) 7-19 g 118 mL enema, Insert 1 enema into the rectum once as needed, Disp: , Rfl:       Counseling / Coordination of Care  Total floor / unit time spent today 15 minutes  Greater than 50% of total time was spent with the patient and / or family counseling and / or coordination of care       ARISTEO Galindo 4/20/2021

## 2021-04-25 DIAGNOSIS — S32.491D OTHER CLOSED FRACTURE OF RIGHT ACETABULUM WITH ROUTINE HEALING, SUBSEQUENT ENCOUNTER: Primary | ICD-10-CM

## 2021-04-27 ENCOUNTER — TELEPHONE (OUTPATIENT)
Dept: OTHER | Facility: OTHER | Age: 86
End: 2021-04-27

## 2021-04-29 ENCOUNTER — OFFICE VISIT (OUTPATIENT)
Dept: OBGYN CLINIC | Facility: HOSPITAL | Age: 86
End: 2021-04-29

## 2021-04-29 ENCOUNTER — HOSPITAL ENCOUNTER (OUTPATIENT)
Dept: RADIOLOGY | Facility: HOSPITAL | Age: 86
Discharge: HOME | End: 2021-04-29
Attending: ORTHOPAEDIC SURGERY
Payer: COMMERCIAL

## 2021-04-29 VITALS
SYSTOLIC BLOOD PRESSURE: 149 MMHG | BODY MASS INDEX: 29.4 KG/M2 | HEART RATE: 76 BPM | WEIGHT: 194 LBS | DIASTOLIC BLOOD PRESSURE: 76 MMHG | HEIGHT: 68 IN

## 2021-04-29 DIAGNOSIS — S32.491D OTHER CLOSED FRACTURE OF RIGHT ACETABULUM WITH ROUTINE HEALING, SUBSEQUENT ENCOUNTER: ICD-10-CM

## 2021-04-29 DIAGNOSIS — S32.411D CLOSED DISPLACED FRACTURE OF ANTERIOR WALL OF RIGHT ACETABULUM WITH ROUTINE HEALING, SUBSEQUENT ENCOUNTER: Primary | ICD-10-CM

## 2021-04-29 PROCEDURE — 99024 POSTOP FOLLOW-UP VISIT: CPT | Performed by: ORTHOPAEDIC SURGERY

## 2021-04-29 PROCEDURE — 72190 X-RAY EXAM OF PELVIS: CPT

## 2021-04-29 NOTE — PATIENT INSTRUCTIONS
- Discussed conservative treatment with patient at length  - weight-bearing as tolerated right lower extremity  - physical therapy as directed for to ambulation training, gait assistance    - tylenol as needed / directed   - Ice / heat as directed   - follow-up 6 weeks with repeat x-ray

## 2021-04-29 NOTE — PROGRESS NOTES
Orthopaedics Office Visit - Follow Up Patient Visit    ASSESSMENT/PLAN:    Assessment:   8 weeks s/p fall which resulted in a right nondisplaced acetabular fracture treated non operatively, healed with no further displacement of dome or femoral head    Plan:   - Discussed conservative treatment with patient at length  - weight-bearing as tolerated right lower extremity to begin  - physical therapy as directed for to ambulation training, gait assistance  - tylenol as needed / directed   - Ice / heat as directed   - follow-up 6 weeks with repeat x-ray      To Do Next Visit:  X-ray right hip    _____________________________________________________  CHIEF COMPLAINT:  Chief Complaint   Patient presents with    Right Hip - Follow-up         SUBJECTIVE:  Kathi Funes is a 82 y o  male who presents 8 weeks s/p fall which resulted in a right nondisplaced acetabular fracture treated non operatively  Patient states that his hip is doing well overall  Patient states that he has no pain in the hip currently  Patient states that he has been nonweightbearing to the right lower extremity since the injury  Patient was instructed to be toe-touch weight-bearing last visit  Patient denies any new worsening symptoms in leg  Patient offers no complaints this time        PAST MEDICAL HISTORY:  Past Medical History:   Diagnosis Date    A-fib ([place] [address] )     Cancer ([place] [address] )     Cardiac disease     [facility]     Chronic obstructive pulmonary disease (COPD) ([place] [address] )     COPD (chronic obstructive pulmonary disease) [facility]     Coronary artery disease     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[facility] )     TYPE 2    Dysphagia     Fracture of nasal bone     Gait instability     H/O cervical fracture     Hyperlipidemia     Hypertension     Muscle weakness     TBI (traumatic brain injury) ([facility] )        PAST SURGICAL HISTORY:  Past Surgical History:   Procedure Laterality Date    HAND SURGERY      CA ARTHRODESIS POSTERIOR CRANIOCERVICAL N/A 10/26/2020    Procedure: Occipital cervical fusion to C4;  Surgeon: Anoop De MD;  [facility]: [facility];  Service: Neurosurgery       FAMILY HISTORY:  Family History   Problem Relation Age of Onset    Other Other         urinary tract malignancy    Diabetes Mother        SOCIAL HISTORY:  Social History     Tobacco Use    Smoking status: Never Smoker    Smokeless tobacco: Never Used   Substance Use Topics    Alcohol use: Never     Frequency: Never    Drug use: Never       MEDICATIONS:    Current Outpatient Medications:     acetaminophen (TYLENOL) 325 mg tablet, Take 3 tablets by mouth every 8 (eight) hours, Disp: , Rfl:     aspirin 81 mg chewable tablet, Chew 1 tablet daily, Disp: , Rfl:     atorvastatin (LIPITOR) 20 mg tablet, Take 1 tablet by mouth daily, Disp: , Rfl:     bisacodyl (Dulcolax) 10 mg suppository, Insert 10 mg into the rectum as needed for constipation, Disp: , Rfl:     dabigatran etexilate (PRADAXA) 150 mg capsu, Take 1 capsule by mouth 2 (two) times a day, Disp: , Rfl:     diltiazem (CARDIZEM CD) 240 mg 24 hr capsule, Take 1 capsule by mouth daily, Disp: , Rfl:     furosemide (LASIX) 40 mg tablet, Take 1 5 tablets by mouth 2 (two) times a day 60 MG BID, Disp: , Rfl:     hydrALAZINE (APRESOLINE) 10 mg tablet, Take 10 mg by mouth 3 (three) times a day, Disp: , Rfl:     insulin glargine (LANTUS) 100 units/mL subcutaneous injection, Inject 34 Units under the skin daily at bedtime, Disp: , Rfl:     insulin lispro (HumaLOG) 100 units/mL injection, Inject 20 Units under the skin 3 (three) times a day with meals , Disp: , Rfl:     magnesium hydroxide (MILK OF MAGNESIA) 400 mg/5 mL oral suspension, Take 30 mL by mouth daily as needed for constipation, Disp: , Rfl:     methocarbamol (ROBAXIN) 500 mg tablet, Take 1 tablet (500 mg total) by mouth every 6 (six) hours, Disp: 10 tablet, Rfl: 0    metoprolol tartrate (LOPRESSOR) 50 mg tablet, Take 1 tablet by mouth every 12 (twelve) hours HOLD SBP <100 HR < 55, Disp: , Rfl:     omeprazole (PriLOSEC) 40 MG capsule, Take 1 capsule by mouth daily, Disp: , Rfl:     senna (SENOKOT) 8 6 MG tablet, Take 2 tablets by mouth daily, Disp: , Rfl:     sodium phosphate-biphosphate (FLEET) 7-19 g 118 mL enema, Insert 1 enema into the rectum once as needed, Disp: , Rfl:     ALLERGIES:  Allergies   Allergen Reactions    Amoxicillin Diarrhea and Hives       REVIEW OF SYSTEMS:  MSK:  Right hip pain  Neuro: WNL   Pertinent items are otherwise noted in HPI  A comprehensive review of systems was otherwise negative  LABS:  HgA1c:   Lab Results   Component Value Date    HGBA1C 7 4 (H) 03/07/2021     BMP:   Lab Results   Component Value Date    GLUCOSE 150 (H) 10/26/2020    CALCIUM 8 5 03/08/2021     06/17/2014    K 4 2 03/08/2021    CO2 31 03/08/2021     03/08/2021    BUN 26 (H) 03/08/2021    CREATININE 1 31 (H) 03/08/2021     CBC: No components found for: CBC    _____________________________________________________  PHYSICAL EXAMINATION:  Vital signs: /76   Pulse 76   Ht 5' 8" (1 727 m)   Wt 88 kg (194 lb)   BMI 29 50 kg/m²   General: No acute distress, awake and alert  Psychiatric: Mood and affect appear appropriate  HEENT: Trachea Midline, No torticollis, no apparent facial trauma  Cardiovascular: No audible murmurs; Extremities appear perfused  Pulmonary: No audible wheezing or stridor  Skin: No open lesions; see further details (if any) below      MUSCULOSKELETAL EXAMINATION:  Right hip examination:  - Patient sitting comfortably in the office in no acute distress   - no acute visible abnormalities present in the right lower extremity  Extremity appears well-perfused overall   - no apparent tenderness palpation present    No pain whatsoever with axial loading of leg/hip or internal/external rotation of hip  - NV intact    _____________________________________________________  STUDIES REVIEWED:  I personally reviewed the images and interpretation is as follows:  Right hip x-ray three views:  Healed acetabular fracture in acceptable alignment with no acute osseous abnormality present      PROCEDURES PERFORMED:  none    Montserrat Brewster PA-C - assisting    Zulma Figueroa MD

## 2021-05-19 ENCOUNTER — NURSING HOME VISIT (OUTPATIENT)
Dept: GERIATRICS | Facility: OTHER | Age: 86
End: 2021-05-19
Payer: COMMERCIAL

## 2021-05-19 DIAGNOSIS — I50.32 CHRONIC HEART FAILURE WITH PRESERVED EJECTION FRACTION (HCC): ICD-10-CM

## 2021-05-19 DIAGNOSIS — I10 HYPERTENSION, UNSPECIFIED TYPE: ICD-10-CM

## 2021-05-19 DIAGNOSIS — K59.00 CONSTIPATION, UNSPECIFIED CONSTIPATION TYPE: ICD-10-CM

## 2021-05-19 DIAGNOSIS — R53.81 DEBILITY: ICD-10-CM

## 2021-05-19 DIAGNOSIS — Z71.89 GOALS OF CARE, COUNSELING/DISCUSSION: ICD-10-CM

## 2021-05-19 DIAGNOSIS — Z79.4 TYPE 2 DIABETES MELLITUS WITHOUT COMPLICATION, WITH LONG-TERM CURRENT USE OF INSULIN (HCC): ICD-10-CM

## 2021-05-19 DIAGNOSIS — I48.91 ATRIAL FIBRILLATION, UNSPECIFIED TYPE (HCC): Primary | ICD-10-CM

## 2021-05-19 DIAGNOSIS — E11.9 TYPE 2 DIABETES MELLITUS WITHOUT COMPLICATION, WITH LONG-TERM CURRENT USE OF INSULIN (HCC): ICD-10-CM

## 2021-05-19 PROCEDURE — 99309 SBSQ NF CARE MODERATE MDM 30: CPT | Performed by: INTERNAL MEDICINE

## 2021-05-19 RX ORDER — POLYETHYLENE GLYCOL 3350 17 G/17G
17 POWDER, FOR SOLUTION ORAL EVERY OTHER DAY
Status: ON HOLD | COMMUNITY
Start: 2021-07-17

## 2021-05-19 NOTE — PROGRESS NOTES
[facility]  [address] [place] [address]  Facility:  [facility]  32    NAME: Ashutosh Miller  AGE: 82 y o  SEX: male    DATE OF ENCOUNTER: 5/19/20201    Code status:  CPR    Assessment and Plan     1  Atrial fibrillation, unspecified type [facility])  Assessment & Plan:  · He is doing well with metoprolol tartrate and diltiazem CD 24 hour capsule for rate control  · He is doing well with Pradaxa for anticoagulation  · Will continue him on these medications   · Will continue with clinical and periodic laboratory monitoring for change in his condition      2  Chronic heart failure with preserved ejection fraction (HCC)  Assessment & Plan:  · He is doing well and his weight is stable with his current dosage of furosemide  · Will continue him on this medication  · Will continue with clinical and periodic laboratory monitoring for change in his condition          3  Constipation, unspecified constipation type  Assessment & Plan:  · He reports occasional fecal urgency   · Nursing endorses no issues with constipation  · Will discontinue his senna and continue with daily MiraLax  · Will continue with monitoring for change in his condition   · Further recommendations, pending results of change in treatment      4  Debility  Assessment & Plan:  · Secondary to his chronic medical conditions   · He continues to require 24/7 care/support of his ADLs   · I recommend continued care/support of his ADLs as a long-term resident at the nursing facility  · Will continue to monitor for change in his condition      5  Hypertension, unspecified type  Assessment & Plan:  · Review of his BP and AP log shows that he is doing well with Cardizem CD 24 hour capsule and metoprolol tartrate  · Will continue him on this medication regimen  · Will continue with monitoring for change in his condition      6   Type 2 diabetes mellitus without complication, with long-term current use of insulin ([facility])  Assessment & Plan:    · Review of his fingerstick log shows his values to be consistent with his current dosages of glargine and lispro insulins  · I will continue him on this insulin regimen with the addition of a holding parameter for lispro if he is not eating  · Will reduce the frequency of his fingerstick blood sugar monitoring to fasting 3 times weekly and as needed for change in condition  · Will continue with clinical and periodic laboratory monitoring for change in his condition      7  Goals of care, counseling/discussion  Assessment & Plan:  ·  His resuscitation status is "CPR"      See my note of March 11, 2021 for further assessment and plan  All medications and routine orders were reviewed and updated as needed  Plan discussed with:    Resident and nursing staff  Chief Complaint     He is seen for a follow-up visit to update the care and treatment of his debility secondary to his chronic medical conditions, insulin-requiring type 2 diabetes mellitus, atrial fibrillation, hypertension, and chronic heart failure with preserved ejection fraction  History of Present Illness     He is a pleasant 68-year-old gentleman who is seen for a follow-up visit to update the care and treatment of his debility secondary to his chronic medical conditions-he continues to require 24/7 care/support of his ADLs, insulin-requiring type 2 diabetes mellitus-doing well with glargine and lispro insulins, atrial fibrillation-doing well with Cardizem CD 24 hour capsule, metoprolol tartrate, and Pradaxa, hypertension-doing well Cardizem CD 24 hour capsule and metoprolol tartrate, and chronic heart failure with preserved ejection fraction-doing well with furosemide  He denies chest pain and shortness of breath  He reports occasional fecal urgency, especially at night  He reports going on a field trip with other residents, today      I discussed the possibility of discontinuing his routine Tylenol and he wishes to continue with it, at this time  The following portions of the patient's history were reviewed and updated as appropriate: current medications, past family history, past medical history, past social history, past surgical history and problem list     Allergies: Allergies   Allergen Reactions    Amoxicillin Diarrhea and Hives       Review of Systems     Review of Systems   Respiratory: Negative for shortness of breath  Cardiovascular: Negative for chest pain  Gastrointestinal:        See HPI       Medications and orders     All medications reviewed and updated in Nursing Home EMR  Objective     Vitals:   Monthly vitals:  Weight 198 4 lb (stable), pulse 66, blood pressure 120/82  Review BP and AP logs looks well  Physical Exam  Vitals signs reviewed  Constitutional:       General: He is awake  He is not in acute distress  Appearance: He is well-developed  He is not ill-appearing, toxic-appearing or diaphoretic  Comments: He appears comfortable sitting in his wheelchair, stated age, and frail  HENT:      Head: Normocephalic  Cardiovascular:      Rate and Rhythm: Normal rate and regular rhythm  Heart sounds: Normal heart sounds  No murmur  No friction rub  No gallop  Comments: There is no pretibial edema, bilaterally  Pulmonary:      Effort: Pulmonary effort is normal  No respiratory distress  Breath sounds: Normal breath sounds  No stridor  No wheezing, rhonchi or rales  Abdominal:      General: Abdomen is flat  There is no distension  Palpations: Abdomen is soft  There is no mass  Tenderness: There is no abdominal tenderness  There is no guarding or rebound  Neurological:      Mental Status: He is alert  Psychiatric:         Mood and Affect: Mood normal          Behavior: Behavior normal  Behavior is cooperative  Pertinent Laboratory/Diagnostic Studies: The following labs were reviewed please see chart or hospital paperwork for details        March 22, 2021:     BMP:  Sodium 141, potassium 4 5, BUN 23, creatinine 1, fasting blood sugar 169, calcium 8 4, EGFR 69    - His monthly order summary was reviewed and signed  Treatment plan reviewed with nursing staff      CLIFF Medina   5/19/2021 11:38 PM

## 2021-05-20 NOTE — ASSESSMENT & PLAN NOTE
· Review of his fingerstick log shows his values to be consistent with his current dosages of glargine and lispro insulins  · I will continue him on this insulin regimen with the addition of a holding parameter for lispro if he is not eating  · Will reduce the frequency of his fingerstick blood sugar monitoring to fasting 3 times weekly and as needed for change in condition  · Will continue with clinical and periodic laboratory monitoring for change in his condition

## 2021-05-20 NOTE — ASSESSMENT & PLAN NOTE
· Review of his BP and AP log shows that he is doing well with Cardizem CD 24 hour capsule and metoprolol tartrate  · Will continue him on this medication regimen  · Will continue with monitoring for change in his condition

## 2021-05-20 NOTE — ASSESSMENT & PLAN NOTE
· He is doing well and his weight is stable with his current dosage of furosemide  · Will continue him on this medication  · Will continue with clinical and periodic laboratory monitoring for change in his condition

## 2021-05-20 NOTE — ASSESSMENT & PLAN NOTE
· He reports occasional fecal urgency   · Nursing endorses no issues with constipation  · Will discontinue his senna and continue with daily MiraLax  · Will continue with monitoring for change in his condition   · Further recommendations, pending results of change in treatment

## 2021-06-17 ENCOUNTER — NURSING HOME VISIT (OUTPATIENT)
Dept: GERIATRICS | Age: 86
End: 2021-06-17
Payer: COMMERCIAL

## 2021-06-17 VITALS
TEMPERATURE: 96.9 F | SYSTOLIC BLOOD PRESSURE: 134 MMHG | WEIGHT: 204.6 LBS | DIASTOLIC BLOOD PRESSURE: 74 MMHG | HEART RATE: 70 BPM | BODY MASS INDEX: 31.11 KG/M2 | RESPIRATION RATE: 20 BRPM

## 2021-06-17 DIAGNOSIS — R53.81 DEBILITY: ICD-10-CM

## 2021-06-17 DIAGNOSIS — I50.32 CHRONIC HEART FAILURE WITH PRESERVED EJECTION FRACTION (HCC): Primary | ICD-10-CM

## 2021-06-17 PROCEDURE — 99308 SBSQ NF CARE LOW MDM 20: CPT | Performed by: NURSE PRACTITIONER

## 2021-06-17 NOTE — ASSESSMENT & PLAN NOTE
· 6lb weight gain in 9 days  · Some pitting edema trace to +1  · Increase furosemide to 80mg BID x 3 days starting on 6/18/21  · Continue to monitor weight   · Continue to monitor diet and any increased edema

## 2021-06-17 NOTE — PROGRESS NOTES
[place] [address] [place]  [phone]  [address] Acute Note        NAME: Heidi Hood  AGE: 82 y o  SEX: male    DATE OF ENCOUNTER: 6/17/2021    Assessment and Plan     1  Chronic heart failure with preserved ejection fraction (HCC)  Assessment & Plan:  · 6lb weight gain in 9 days  · Some pitting edema trace to +1  · Increase furosemide to 80mg BID x 3 days starting on 6/18/21  · Continue to monitor weight   · Continue to monitor diet and any increased edema          2  Debility  Assessment & Plan:  · Doing well at long-term care facility   · Continue with restorative care   · Continue to assist with ADL and I ADL  · Fall precautions        No orders of the defined types were placed in this encounter  History of Present Illness     Heidi Hood 82 y o  male seen for follow-up of care and treatment plan for ambulatory dysfunction doing well at [facility] with weight gain     The following portions of the patient's history were reviewed and updated as appropriate: allergies, current medications, past family history, past medical history, past social history, past surgical history and problem list     Review of Systems     Review of Systems   Constitutional: Negative for chills and fever  HENT: Negative for ear pain and sore throat  Eyes: Negative for pain and visual disturbance  Respiratory: Negative for cough, shortness of breath and wheezing  Cardiovascular: Positive for leg swelling  Negative for chest pain and palpitations  Gastrointestinal: Negative for abdominal pain and vomiting  Genitourinary: Negative for dysuria and hematuria  Musculoskeletal: Positive for gait problem  Negative for arthralgias and back pain  Skin: Negative for color change and rash  Neurological: Positive for weakness  Negative for seizures and syncope  All other systems reviewed and are negative        Active Problem List     Patient Active Problem List   Diagnosis    Atrial fibrillation ([facility] )    Hypertension    Hyperlipidemia    Type 2 diabetes mellitus ([facility] )    Vertebral artery dissection (HCC)    Dysphagia    Ambulatory dysfunction    Abnormal EKG    Chronic heart failure with preserved ejection fraction (HCC)    Weight gain    History of 2019 novel coronavirus disease (COVID-19)    Debility    Medication management    Goals of care, counseling/discussion    Anemia    Right acetabular fracture (HCC)    Anticoagulated by anticoagulation treatment    Depression    Vascular dementia (HCC)    Constipation       Objective     /74   Pulse 70   Temp (!) 96 9 °F (36 1 °C)   Resp 20   Wt 92 8 kg (204 lb 9 6 oz)   BMI 31 11 kg/m²     Physical Exam  Vitals reviewed  Constitutional:       Appearance: He is well-developed  HENT:      Head: Normocephalic and atraumatic  Eyes:      Conjunctiva/sclera: Conjunctivae normal    Cardiovascular:      Rate and Rhythm: Normal rate and regular rhythm  Heart sounds: Normal heart sounds, S1 normal and S2 normal  No murmur heard  Pulmonary:      Effort: Pulmonary effort is normal  No respiratory distress  Breath sounds: Normal breath sounds  No wheezing  Abdominal:      General: Bowel sounds are normal  There is no distension  Palpations: Abdomen is soft  Tenderness: There is no abdominal tenderness  Musculoskeletal:         General: Normal range of motion  Cervical back: Normal range of motion  Right lower leg: Edema present  Left lower leg: Edema present  Comments:  Generalized weakness   Skin:     General: Skin is warm and dry  Neurological:      Mental Status: He is alert  Psychiatric:         Attention and Perception: Attention normal          Mood and Affect: Mood normal          Speech: Speech normal          Behavior: Behavior normal          Thought Content: Thought content normal          Cognition and Memory: Cognition is impaired           Pertinent Laboratory/Diagnostic Studies:  HFM Labs Reviewed    Current Medications   Medication list reviewed and updated today  Please see paper chart for updated medication list      [place]  :10 PM      Name: Tamar Mobley  : 1935  MRN: [id number]  DOS: 2021    Diagnoses:   Diagnosis ICD-10-CM Associated Orders   1  Chronic heart failure with preserved ejection fraction (HCC)  I50 32    2   Debility  R53 81

## 2021-06-17 NOTE — ASSESSMENT & PLAN NOTE
· Doing well at long-term care facility   · Continue with restorative care   · Continue to assist with ADL and I ADL  · Fall precautions

## 2021-06-21 ENCOUNTER — NURSING HOME VISIT (OUTPATIENT)
Dept: GERIATRICS | Age: 86
End: 2021-06-21
Payer: COMMERCIAL

## 2021-06-21 DIAGNOSIS — R13.10 DYSPHAGIA, UNSPECIFIED TYPE: ICD-10-CM

## 2021-06-21 DIAGNOSIS — R53.81 DEBILITY: Primary | ICD-10-CM

## 2021-06-21 DIAGNOSIS — S12.110A TYPE II DENS FRACTURE OF SECOND CERVICAL VERTEBRA (HCC): ICD-10-CM

## 2021-06-21 DIAGNOSIS — I10 HYPERTENSION, UNSPECIFIED TYPE: ICD-10-CM

## 2021-06-21 DIAGNOSIS — E11.9 TYPE 2 DIABETES MELLITUS WITHOUT COMPLICATION, WITH LONG-TERM CURRENT USE OF INSULIN (HCC): ICD-10-CM

## 2021-06-21 DIAGNOSIS — Z79.4 TYPE 2 DIABETES MELLITUS WITHOUT COMPLICATION, WITH LONG-TERM CURRENT USE OF INSULIN (HCC): ICD-10-CM

## 2021-06-21 DIAGNOSIS — F01.50 VASCULAR DEMENTIA WITHOUT BEHAVIORAL DISTURBANCE (HCC): ICD-10-CM

## 2021-06-21 DIAGNOSIS — Z79.899 MEDICATION MANAGEMENT: ICD-10-CM

## 2021-06-21 PROCEDURE — 99309 SBSQ NF CARE MODERATE MDM 30: CPT | Performed by: NURSE PRACTITIONER

## 2021-06-22 VITALS
BODY MASS INDEX: 30.62 KG/M2 | HEART RATE: 73 BPM | SYSTOLIC BLOOD PRESSURE: 151 MMHG | WEIGHT: 201.4 LBS | TEMPERATURE: 97.2 F | DIASTOLIC BLOOD PRESSURE: 79 MMHG | RESPIRATION RATE: 20 BRPM

## 2021-06-22 DIAGNOSIS — S32.411D CLOSED DISPLACED FRACTURE OF ANTERIOR WALL OF RIGHT ACETABULUM WITH ROUTINE HEALING, SUBSEQUENT ENCOUNTER: Primary | ICD-10-CM

## 2021-06-22 RX ORDER — METHOCARBAMOL 500 MG/1
500 TABLET, FILM COATED ORAL 2 TIMES DAILY
Qty: 10 TABLET | Refills: 0
Start: 2021-06-22 | End: 2021-12-13 | Stop reason: ALTCHOICE

## 2021-06-22 NOTE — ASSESSMENT & PLAN NOTE
· Doing well  · Continue with diltiazem, hydralazine, metoprolol and furosemide  · Continue to monitor BP

## 2021-06-22 NOTE — ASSESSMENT & PLAN NOTE
· Will start med reduction with methocarbamol  · Methocarbamol TID will change to BID scheduled  · Monitor for any changes

## 2021-06-22 NOTE — ASSESSMENT & PLAN NOTE
· Daily control  · Recent change with insulin, doing well  · Continue to monitor BS daily   · Periodic review for any insulin changes

## 2021-06-22 NOTE — PROGRESS NOTES
[facility]  [address]  [phone]  [address] [facility]  LTCF Progress Note        NAME: Misbah Venegas  AGE: 82 y o  SEX: male    DATE OF ENCOUNTER: 6/21/21    Assessment and Plan     1  Debility  Assessment & Plan:  · Doing well at LTCF  · Continue with restorative care  · Assist with ADL and IADL  · Fall precautions  · Self propels wheelchair       2  Dysphagia, unspecified type  Assessment & Plan:  · intermittent bouts of coughing over the weekend reported by RN  · Slight wheeze lower lobes  · Stat chest xray 2 view ordered to r/o aspiration  · Continue to monitor when eating  · Aspiration precautions      3  Hypertension, unspecified type  Assessment & Plan:  · Doing well  · Continue with diltiazem, hydralazine, metoprolol and furosemide  · Continue to monitor BP      4  Type 2 diabetes mellitus without complication, with long-term current use of insulin (HCC)  Assessment & Plan:  · Daily control  · Recent change with insulin, doing well  · Continue to monitor BS daily   · Periodic review for any insulin changes      5  Vascular dementia without behavioral disturbance (HCC)  Assessment & Plan:  · Has some memory issues with intermittent confusion at times  · Continue with supportive care  · Reorient and redirect as needed  · Monitor for delirium          No orders of the defined types were placed in this encounter        History of Present Illness     Misbah Venegas 82 y o  male seen for follow-up of care and treatment plan for ambulatory dysfunction doing well at LTCF, dysphagia doing well with current diet, DM2 controlled with insulin, dementia doing well with supportive care     The following portions of the patient's history were reviewed and updated as appropriate: allergies, current medications, past family history, past medical history, past social history, past surgical history and problem list     Review of Systems     Review of Systems   Constitutional: Negative for chills, fatigue and fever  HENT: Negative for ear pain and sore throat  Eyes: Negative for pain and visual disturbance  Respiratory: Positive for cough (intermittent) and wheezing (slight bases)  Negative for shortness of breath  Cardiovascular: Positive for leg swelling (trace)  Negative for chest pain and palpitations  Gastrointestinal: Negative for abdominal pain and vomiting  Genitourinary: Negative for dysuria and hematuria  Musculoskeletal: Positive for gait problem  Negative for arthralgias and back pain  Skin: Negative for color change and rash  Neurological: Positive for weakness  Negative for seizures and syncope  Psychiatric/Behavioral: Positive for confusion (at times)  All other systems reviewed and are negative  Active Problem List     Patient Active Problem List   Diagnosis    Atrial fibrillation ([facility] )    Hypertension    Hyperlipidemia    Type 2 diabetes mellitus ([facility] )    Vertebral artery dissection (HCC)    Dysphagia    Ambulatory dysfunction    Abnormal EKG    Chronic heart failure with preserved ejection fraction (HCC)    Weight gain    History of 2019 novel coronavirus disease (COVID-19)    Debility    Medication management    Goals of care, counseling/discussion    Anemia    Right acetabular fracture ([facility] )    Anticoagulated by anticoagulation treatment    Depression    Vascular dementia ([facility] )    Constipation       Objective     /79   Pulse 73   Temp (!) 97 2 °F (36 2 °C)   Resp 20   Wt 91 4 kg (201 lb 6 4 oz)   BMI 30 62 kg/m²     Physical Exam  Vitals reviewed  Constitutional:       Appearance: He is well-developed  HENT:      Head: Normocephalic and atraumatic  Eyes:      Conjunctiva/sclera: Conjunctivae normal    Cardiovascular:      Rate and Rhythm: Normal rate and regular rhythm  Heart sounds: Normal heart sounds, S1 normal and S2 normal  No murmur heard       Pulmonary:      Effort: Pulmonary effort is normal  No respiratory distress  Breath sounds: Normal breath sounds  No wheezing  Abdominal:      General: Bowel sounds are normal  There is no distension  Palpations: Abdomen is soft  Tenderness: There is no abdominal tenderness  Musculoskeletal:         General: Normal range of motion  Cervical back: Normal range of motion  Right lower leg: Edema (trace) present  Left lower leg: Edema (trace) present  Comments: Generalized weakness   Skin:     General: Skin is warm and dry  Neurological:      Mental Status: He is alert  Psychiatric:         Attention and Perception: Attention normal          Mood and Affect: Mood normal          Speech: Speech normal          Behavior: Behavior normal          Thought Content: Thought content normal          Cognition and Memory: Cognition is impaired  Pertinent Laboratory/Diagnostic Studies:  HFM Labs Reviewed    Current Medications   Medication list reviewed and updated today  Please see paper chart for updated medication list      [place] Fauzia  [id number]:06 AM      Name: Reta Fagan  : 1935  MRN: [id number]  DOS: 21    Diagnoses:   Diagnosis ICD-10-CM Associated Orders   1  Debility  R53 81    2  Dysphagia, unspecified type  R13 10    3  Hypertension, unspecified type  I10    4  Type 2 diabetes mellitus without complication, with long-term current use of insulin ([facility])  E11 9     Z79 4    5   Vascular dementia without behavioral disturbance ([facility])  F01 50

## 2021-06-22 NOTE — ASSESSMENT & PLAN NOTE
· Doing well at LTCF  · Continue with restorative care  · Assist with ADL and IADL  · Fall precautions  · Self propels wheelchair

## 2021-06-22 NOTE — ASSESSMENT & PLAN NOTE
· intermittent bouts of coughing over the weekend reported by RN  · Slight wheeze lower lobes  · Stat chest xray 2 view ordered to r/o aspiration  · Continue to monitor when eating  · Aspiration precautions

## 2021-06-22 NOTE — ASSESSMENT & PLAN NOTE
· Has some memory issues with intermittent confusion at times  · Continue with supportive care  · Reorient and redirect as needed  · Monitor for delirium

## 2021-06-23 ENCOUNTER — HOSPITAL ENCOUNTER (OUTPATIENT)
Dept: RADIOLOGY | Facility: HOSPITAL | Age: 86
Discharge: HOME | End: 2021-06-23
Attending: ORTHOPAEDIC SURGERY
Payer: COMMERCIAL

## 2021-06-23 ENCOUNTER — OFFICE VISIT (OUTPATIENT)
Dept: OBGYN CLINIC | Facility: HOSPITAL | Age: 86
End: 2021-06-23
Payer: COMMERCIAL

## 2021-06-23 VITALS
BODY MASS INDEX: 30.46 KG/M2 | DIASTOLIC BLOOD PRESSURE: 74 MMHG | WEIGHT: 201 LBS | HEIGHT: 68 IN | HEART RATE: 76 BPM | SYSTOLIC BLOOD PRESSURE: 121 MMHG

## 2021-06-23 DIAGNOSIS — S32.411D CLOSED DISPLACED FRACTURE OF ANTERIOR WALL OF RIGHT ACETABULUM WITH ROUTINE HEALING, SUBSEQUENT ENCOUNTER: ICD-10-CM

## 2021-06-23 DIAGNOSIS — S32.411D CLOSED DISPLACED FRACTURE OF ANTERIOR WALL OF RIGHT ACETABULUM WITH ROUTINE HEALING, SUBSEQUENT ENCOUNTER: Primary | ICD-10-CM

## 2021-06-23 PROCEDURE — 73503 X-RAY EXAM HIP UNI 4/> VIEWS: CPT

## 2021-06-23 PROCEDURE — 99213 OFFICE O/P EST LOW 20 MIN: CPT | Performed by: ORTHOPAEDIC SURGERY

## 2021-06-23 NOTE — PATIENT INSTRUCTIONS
Continue Weight bearing as tolerated right lower extremity with assistance with a walker   Continue with physical therapy   Follow up in 3 months

## 2021-06-23 NOTE — PROGRESS NOTES
Assessment:   Diagnosis ICD-10-CM Associated Orders   1  Closed displaced fracture of anterior wall of right acetabulum with routine healing, subsequent encounter  S32 411D        Plan:    · X-ray of the right hip demonstrates nondisplaced acetabular fracture treated non operatively, healed with no further displacement of dome or protrusio of femoral head  ·  Continue weight-bearing as tolerated right lower extremity with use of a walker  · Continue physical therapy    To do next visit:  Return in about 3 months (around 9/23/2021) for Recheck Right hip / repeat x-rays   The above stated was discussed in layman's terms and the patient expressed understanding  All questions were answered to the patient's satisfaction  Scribe Attestation    I,:  Ramon Corona am acting as a scribe while in the presence of the attending physician :       I,:  Cullen Galeano MD personally performed the services described in this documentation    as scribed in my presence :             Subjective:   Swapnil Latham is a 82 y o  male who presents today 3 5 months s/p right nondisplaced acetabular fracture treated non operatively  He is present in a stretcher in the room today  He is currently resides at [facility]  He states he has been doing minimal walking with assistance of walker at the nursing facility  He is currently doing physical therapy  He is taking Tylenol as needed for pain  Review of systems negative unless otherwise specified in HPI  Review of Systems   All other systems reviewed and are negative        Past Medical History:   Diagnosis Date    A-fib ([facility] )     Cancer [facility])     Cardiac disease     [facility]est[facility]     Chronic obstructive pulmonary disease (COPD) ([facility] )     COPD (chronic obstructive pulmonary disease) (HCC)     Coronary artery disease     Counseling regarding advance care planning and goals of care 10/7/2020    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[facility] )     TYPE 2    Dysphagia     Fracture of nasal bone     Gait instability     H/O cervical fracture     Hyperlipidemia     Hypertension     Muscle weakness     TBI (traumatic brain injury) ([facility] )        Past Surgical History:   Procedure Laterality Date    HAND SURGERY      HI ARTHRODESIS POSTERIOR CRANIOCERVICAL N/A 10/26/2020    Procedure: Occipital cervical fusion to C4;  Surgeon: Robe Do MD;  Location: [facility];  Service: Neurosurgery       Family History   Problem Relation Age of Onset    Other Other         urinary tract malignancy    Diabetes Mother        Social History     Occupational History    Not on file   Tobacco Use    Smoking status: Never Smoker    Smokeless tobacco: Never Used   Vaping Use    Vaping Use: Never used   Substance and Sexual Activity    Alcohol use: Never    Drug use: Never    Sexual activity: Not on file         Current Outpatient Medications:     acetaminophen (TYLENOL) 325 mg tablet, Take 3 tablets by mouth every 8 (eight) hours, Disp: , Rfl:     aspirin 81 mg chewable tablet, Chew 1 tablet daily, Disp: , Rfl:     atorvastatin (LIPITOR) 20 mg tablet, Take 1 tablet by mouth daily, Disp: , Rfl:     bisacodyl (Dulcolax) 10 mg suppository, Insert 10 mg into the rectum as needed for constipation, Disp: , Rfl:     dabigatran etexilate (PRADAXA) 150 mg capsu, Take 1 capsule by mouth 2 (two) times a day, Disp: , Rfl:     diltiazem (CARDIZEM CD) 240 mg 24 hr capsule, Take 1 capsule by mouth daily, Disp: , Rfl:     furosemide (LASIX) 40 mg tablet, Take 1 5 tablets by mouth 2 (two) times a day 60 MG BID, Disp: , Rfl:     hydrALAZINE (APRESOLINE) 10 mg tablet, Take 10 mg by mouth 3 (three) times a day, Disp: , Rfl:     insulin glargine (LANTUS) 100 units/mL subcutaneous injection, Inject 34 Units under the skin daily at bedtime, Disp: , Rfl:     insulin lispro (HumaLOG) 100 units/mL injection, Inject 20 Units under the skin 3 (three) times a day with meals , Disp: , Rfl:   magnesium hydroxide (MILK OF MAGNESIA) 400 mg/5 mL oral suspension, Take 30 mL by mouth daily as needed for constipation, Disp: , Rfl:     methocarbamol (ROBAXIN) 500 mg tablet, Take 1 tablet (500 mg total) by mouth 2 (two) times a day, Disp: 10 tablet, Rfl: 0    metoprolol tartrate (LOPRESSOR) 50 mg tablet, Take 1 tablet by mouth every 12 (twelve) hours HOLD SBP <100 HR < 55, Disp: , Rfl:     omeprazole (PriLOSEC) 40 MG capsule, Take 1 capsule by mouth daily, Disp: , Rfl:     polyethylene glycol (MIRALAX) 17 g packet, Take 17 g by mouth daily, Disp: , Rfl:     sodium phosphate-biphosphate (FLEET) 7-19 g 118 mL enema, Insert 1 enema into the rectum once as needed, Disp: , Rfl:     Allergies   Allergen Reactions    Amoxicillin Diarrhea and Hives            Vitals:    06/23/21 1121   BP: 121/74   Pulse: 76       Objective:                    Right Hip Exam     Tenderness   The patient is experiencing no tenderness  Other   Erythema: absent  Scars: absent  Sensation: normal  Pulse: present    Comments:  No pain log rolling             Diagnostics, reviewed and taken today if performed as documented: The attending physician has personally reviewed the pertinent films in PACS and interpretation is as follows: x-ray right hip demonstrates nondisplaced acetabular fracture treated non operatively, healed with no further displacement of dome or femoral head      Procedures, if performed today:    Procedures    None performed      Portions of the record may have been created with voice recognition software  Occasional wrong word or "sound a like" substitutions may have occurred due to the inherent limitations of voice recognition software  Read the chart carefully and recognize, using context, where substitutions have occurred

## 2021-07-23 ENCOUNTER — NURSING HOME VISIT (OUTPATIENT)
Dept: GERIATRICS | Facility: OTHER | Age: 86
End: 2021-07-23
Payer: COMMERCIAL

## 2021-07-23 VITALS
HEART RATE: 77 BPM | BODY MASS INDEX: 30.74 KG/M2 | WEIGHT: 202.2 LBS | SYSTOLIC BLOOD PRESSURE: 158 MMHG | TEMPERATURE: 96.8 F | RESPIRATION RATE: 17 BRPM | DIASTOLIC BLOOD PRESSURE: 74 MMHG

## 2021-07-23 DIAGNOSIS — D72.9 ABNORMAL WHITE BLOOD CELL (WBC): Primary | ICD-10-CM

## 2021-07-23 DIAGNOSIS — R53.81 DEBILITY: ICD-10-CM

## 2021-07-23 PROCEDURE — 99308 SBSQ NF CARE LOW MDM 20: CPT | Performed by: NURSE PRACTITIONER

## 2021-07-23 NOTE — ASSESSMENT & PLAN NOTE
·  Currently staying at long-term care facility  · Continue with restorative care  · Fall precautions  · Assist with ADL and I ADL

## 2021-07-23 NOTE — PROGRESS NOTES
[place] [address] [address] [place]  [phone]  [address] Acute Note        NAME: Mike Tyler  AGE: 82 y o  SEX: male    DATE OF ENCOUNTER: 7/23/2021    Assessment and Plan     1  Abnormal white blood cell (WBC)  Assessment & Plan:  · Patient complained of not feeling well x2 days   · RN states patient has no appetite, very fatigued and tired,  ·  stat blood work white blood count 15   · Ordered UA C&S stat   · Bactrim 160 mg q 12h x5 days  · Encourage fluids up to 2000 mL fluid restriction   · Continue to monitor for any changes  · Vitals Q shift a m  and 3-11 x 5 days      2  Debility  Assessment & Plan:  ·  Currently staying at long-term care facility  · Continue with restorative care  · Fall precautions  · Assist with ADL and I ADL        No orders of the defined types were placed in this encounter  History of Present Illness     Mike Tyler 82 y o  male seen for follow-up of care and treatment plan for ambulatory dysfunction doing well at Long-term care facility, abnormal white blood count started on Bactrim    The following portions of the patient's history were reviewed and updated as appropriate: allergies, current medications, past family history, past medical history, past social history, past surgical history and problem list     Review of Systems     Review of Systems   Constitutional: Positive for activity change ( decreased), appetite change ( decreased) and fatigue  Negative for chills and fever  HENT: Negative for ear pain and sore throat  Eyes: Negative for pain and visual disturbance  Respiratory: Negative for cough and shortness of breath  Cardiovascular: Negative for chest pain and palpitations  Gastrointestinal: Positive for nausea  Negative for abdominal pain and vomiting  Genitourinary: Negative for dysuria and hematuria  Musculoskeletal: Positive for gait problem  Negative for arthralgias and back pain     Skin: Negative for color change and rash  Neurological: Positive for weakness  Negative for seizures and syncope  Psychiatric/Behavioral: Positive for confusion  All other systems reviewed and are negative  Active Problem List     Patient Active Problem List   Diagnosis    At[facility] fibrillation ([facility] )    Hypertension    Hyperlipidemia    Type 2 diabetes mellitus ([facility] )    Vertebral artery dissection (HCC)    Dysphagia    Ambulatory dysfunction    Abnormal EKG    Chronic heart failure with preserved ejection fraction (HCC)    Weight gain    History of 2019 novel coronavirus disease (COVID-19)    Debility    Medication management    Goals of care, counseling/discussion    Anemia    Right acetabular fracture ([facility] )    Anticoagulated by anticoagulation treatment    Depression    Vascular dementia ([facility] )    Constipation    Abnormal white blood cell (WBC)       Objective     /74   Pulse 77   Temp (!) 96 8 °F (36 °C)   Resp 17   Wt 91 7 kg (202 lb 3 2 oz)   BMI 30 74 kg/m²     Physical Exam  Vitals reviewed  Constitutional:       Appearance: He is well-developed  HENT:      Head: Normocephalic and atraumatic  Eyes:      Conjunctiva/sclera: Conjunctivae normal    Cardiovascular:      Rate and Rhythm: Normal rate and regular rhythm  Heart sounds: Normal heart sounds, S1 normal and S2 normal  No murmur heard  Pulmonary:      Effort: Pulmonary effort is normal  No respiratory distress  Breath sounds: Normal breath sounds  No wheezing  Abdominal:      General: Bowel sounds are normal  There is no distension  Palpations: Abdomen is soft  Tenderness: There is no abdominal tenderness  Musculoskeletal:         General: Normal range of motion  Cervical back: Normal range of motion  Comments:  Generalized weakness   Skin:     General: Skin is warm and dry  Neurological:      Mental Status: He is alert     Psychiatric:         Attention and Perception: Attention normal          Mood and Affect: Affect is angry  Speech: Speech normal          Behavior: Behavior normal          Thought Content: Thought content normal          Cognition and Memory: Cognition is impaired  Pertinent Laboratory/Diagnostic Studies:  HFM Labs Reviewed    Current Medications   Medication list reviewed and updated today  Please see paper chart for updated medication list      Marquiserubio Emery  58454:93 PM      Name: Baron Dutton  : 1935  MRN: [id number]  DOS: 2021    Diagnoses:   Diagnosis ICD-10-CM Associated Orders   1  Abnormal white blood cell (WBC)  D72 9    2   Debility  R53 81

## 2021-07-26 ENCOUNTER — NURSING HOME VISIT (OUTPATIENT)
Dept: GERIATRICS | Facility: OTHER | Age: 86
End: 2021-07-26
Payer: COMMERCIAL

## 2021-07-26 DIAGNOSIS — E11.9 TYPE 2 DIABETES MELLITUS WITHOUT COMPLICATION, WITH LONG-TERM CURRENT USE OF INSULIN (HCC): ICD-10-CM

## 2021-07-26 DIAGNOSIS — D72.829 LEUKOCYTOSIS, UNSPECIFIED TYPE: Primary | ICD-10-CM

## 2021-07-26 DIAGNOSIS — K30 STOMACH UPSET: ICD-10-CM

## 2021-07-26 DIAGNOSIS — Z79.4 TYPE 2 DIABETES MELLITUS WITHOUT COMPLICATION, WITH LONG-TERM CURRENT USE OF INSULIN (HCC): ICD-10-CM

## 2021-07-26 PROCEDURE — 99308 SBSQ NF CARE LOW MDM 20: CPT | Performed by: INTERNAL MEDICINE

## 2021-07-27 NOTE — PROGRESS NOTES
[facility] [address]  Facility: [facility]    NAME: Anish Sandhu  AGE: 82 y o  SEX: male    DATE OF ENCOUNTER: 7/26/2021    Code status:  CPR    Assessment and Plan     1  Leukocytosis, unspecified type  Assessment & Plan:  · Improving on laboratory testing  · Will continue without antibiotic therapy   · Likely secondary to transient gastroenteritis   · Will continue with clinical and periodic laboratory monitoring for change in his condition      2  Stomach upset  Assessment & Plan:  · Likely secondary to gastroenteritis   · His symptoms are improving  · Will advance diet, as requested  · Will order LFTs to be performed for completeness   · Will continue with monitoring for change in his condition      3  Type 2 diabetes mellitus without complication, with long-term current use of insulin (HCC)  Assessment & Plan:    · Will continue with his current insulin regimen, as his diet is to be advanced  · Will continue with monitoring for change in his condition   · Further recommendations, pending fingerstick blood sugar trending        All medications and routine orders were reviewed and updated as needed  Plan discussed with:  Resident and nursing staff  Chief Complaint     He is seen for a follow-up visit to update the care and treatment of his not feeling well and elevated white blood cell count  History of Present Illness     He is a an 72-year-old gentleman with the comorbidities of chronic heart failure with preserved ejection fraction, atrial fibrillation, depression, hypertension, and type 2 diabetes mellitus who is seen for a follow-up visit to update the care and treatment of his not feeling well and elevated white blood cell count  He was started on Bactrim DS for an elevated white blood cell count and presumed UTI last week    However, his urinalysis was negative for leukocyte esterase and nitrates and his urine culture was negative for growth  Therefore, his Bactrim DS was discontinued  Nursing reports that he had vomited once last week  He endorses having an upset stomach , but not vomiting  He denies chest pain, shortness of breath, and a cough  He denies constipation and urinating difficulties  He does not like the bland diet and would like to go back to his usual diet  The following portions of the patient's history were reviewed and updated as appropriate: current medications, past family history, past medical history, past social history, past surgical history and problem list     Allergies: Allergies   Allergen Reactions    Amoxicillin Diarrhea and Hives       Review of Systems     Review of Systems   Respiratory: Negative for cough and shortness of breath  Cardiovascular: Negative for chest pain  Gastrointestinal: Negative for abdominal pain  See HPI  Genitourinary: Negative for difficulty urinating  Medications and orders     All medications reviewed and updated in Nursing Home EMR  Objective     Vitals:  Temperature 96 8° F, pulse 70, respiratory 18, blood pressure 133/65, pulse oximetry 97% on room air, fasting fingerstick blood sugar 88  Physical Exam  Vitals reviewed  Constitutional:       General: He is awake  Appearance: Normal appearance  He is well-developed  He is not ill-appearing, toxic-appearing or diaphoretic  Comments: He appears comfortable sitting in his wheelchair, stated age, and healthy  Cardiovascular:      Rate and Rhythm: Normal rate and regular rhythm  Pulmonary:      Effort: Pulmonary effort is normal  No respiratory distress  Breath sounds: Normal breath sounds  No stridor  No wheezing, rhonchi or rales  Abdominal:      General: Abdomen is flat  There is no distension  Palpations: Abdomen is soft  There is no mass  Tenderness: There is no abdominal tenderness  There is no guarding or rebound     Neurological:      Mental Status: He is alert    Psychiatric:         Mood and Affect: Mood normal          Behavior: Behavior normal  Behavior is cooperative  Pertinent Laboratory/Diagnostic Studies: The following labs were reviewed please see chart or hospital paperwork for details  July 23, 2021:     Urinalysis:  Leukocyte esterase negative, nitrite negative,    Urine culture:  Negative for growth    July 26, 2021:     CBC with diff:  WBC count 11 1 (was 15 5), hemoglobin 9 5, hematocrit 29 4, platelet count [id number]    - Treatment plan reviewed with resident and nursing staff      CLIFF Paniagua   8/8/2021 1:08 PM

## 2021-08-03 ENCOUNTER — NURSING HOME VISIT (OUTPATIENT)
Dept: GERIATRICS | Facility: OTHER | Age: 86
End: 2021-08-03
Payer: COMMERCIAL

## 2021-08-03 DIAGNOSIS — F33.1 MODERATE EPISODE OF RECURRENT MAJOR DEPRESSIVE DISORDER (HCC): Primary | ICD-10-CM

## 2021-08-03 DIAGNOSIS — F01.50 VASCULAR DEMENTIA WITHOUT BEHAVIORAL DISTURBANCE (HCC): ICD-10-CM

## 2021-08-03 PROCEDURE — 99309 SBSQ NF CARE MODERATE MDM 30: CPT | Performed by: NURSE PRACTITIONER

## 2021-08-08 PROBLEM — Z79.01 ANTICOAGULATED BY ANTICOAGULATION TREATMENT: Status: RESOLVED | Noted: 2021-03-06 | Resolved: 2021-08-08

## 2021-08-08 PROBLEM — D72.829 LEUKOCYTOSIS: Status: ACTIVE | Noted: 2021-07-23

## 2021-08-08 PROBLEM — K30 STOMACH UPSET: Status: ACTIVE | Noted: 2021-08-08

## 2021-08-08 NOTE — ASSESSMENT & PLAN NOTE
· Likely secondary to gastroenteritis   · His symptoms are improving  · Will advance diet, as requested  · Will order LFTs to be performed for completeness   · Will continue with monitoring for change in his condition

## 2021-08-08 NOTE — ASSESSMENT & PLAN NOTE
· Will continue with his current insulin regimen, as his diet is to be advanced  · Will continue with monitoring for change in his condition   · Further recommendations, pending fingerstick blood sugar trending

## 2021-08-08 NOTE — ASSESSMENT & PLAN NOTE
· Improving on laboratory testing  · Will continue without antibiotic therapy   · Likely secondary to transient gastroenteritis   · Will continue with clinical and periodic laboratory monitoring for change in his condition

## 2021-08-10 NOTE — PROGRESS NOTES
MEDICATION MANAGEMENT NOTE        [facility]  POS: 28: NF- Long Term, [facility]      Name and Date of Birth:  Rekha Kim 82 y o  1935 MRN: [id number]    Date of Visit: August 3,2021    Allergies   Allergen Reactions    Amoxicillin Diarrhea and Hives     SUBJECTIVE:    Piyush Phelps is seen today for a follow up for depression  He continues to experience on and off depressive symptoms since the last visit  He was seen in April for concerns of depression  However, at that time he denied/minimized symptoms and did not want to start an antidepressant  However, per staff, he continues to present with irritability/low mood/increased tearfulness  When confronted, he becomes tearful and agrees with staff assessment and now willing for antidepressant and recommend starting Zoloft  PLAN:    All medications and routine orders were reviewed and updated as needed  Recommend adding Zoloft      Medication management every 2 weeks  Plan discussed with: Patient    Diagnoses and all orders for this visit:    Moderate episode of recurrent major depressive disorder ([facility] )    Vascular dementia without behavioral disturbance ([facility] )        Current Outpatient Medications on File Prior to Visit   Medication Sig Dispense Refill    acetaminophen (TYLENOL) 325 mg tablet Take 3 tablets by mouth every 8 (eight) hours      aspirin 81 mg chewable tablet Chew 1 tablet daily      atorvastatin (LIPITOR) 20 mg tablet Take 1 tablet by mouth daily      bisacodyl (Dulcolax) 10 mg suppository Insert 10 mg into the rectum as needed for constipation      dabigatran etexilate (PRADAXA) 150 mg capsu Take 1 capsule by mouth 2 (two) times a day      diltiazem (CARDIZEM CD) 240 mg 24 hr capsule Take 1 capsule by mouth daily      furosemide (LASIX) 40 mg tablet Take 1 5 tablets by mouth 2 (two) times a day 60 MG BID      hydrALAZINE (APRESOLINE) 10 mg tablet Take 10 mg by mouth 3 (three) times a day      insulin glargine (LANTUS) 100 units/mL subcutaneous injection Inject 34 Units under the skin daily at bedtime      insulin lispro (HumaLOG) 100 units/mL injection Inject 20 Units under the skin 3 (three) times a day with meals       magnesium hydroxide (MILK OF MAGNESIA) 400 mg/5 mL oral suspension Take 30 mL by mouth daily as needed for constipation      methocarbamol (ROBAXIN) 500 mg tablet Take 1 tablet (500 mg total) by mouth 2 (two) times a day 10 tablet 0    metoprolol tartrate (LOPRESSOR) 50 mg tablet Take 1 tablet by mouth every 12 (twelve) hours HOLD SBP <100 HR < 55      omeprazole (PriLOSEC) 40 MG capsule Take 1 capsule by mouth daily      polyethylene glycol (MIRALAX) 17 g packet Take 17 g by mouth daily      sodium phosphate-biphosphate (FLEET) 7-19 g 118 mL enema Insert 1 enema into the rectum once as needed       No current facility-administered medications on file prior to visit  Psychotherapy Provided:     Individual psychotherapy provided: Yes  Supportive counseling provided  Reassurance and supportive therapy provided  HPI ROS Appetite Changes and Sleep:     He reports fluctuating sleep pattern, fluctuating appetite, fluctuating energy levels   Patient denies suicidal or homicidal ideation    Review Of Systems:   all other systems are negative         Mental Status Evaluation:    Appearance:  age appropriate   Behavior:  cooperative   Speech:  decreased rate   Mood:  depressed   Affect:  tearful, flat   Thought Process:  circumstantial   Associations: concrete associations   Thought Content:  no overt delusions   Perceptual Disturbances: none   Risk Potential: Suicidal ideation - None  Homicidal ideation - None  Potential for aggression - No   Sensorium:  oriented to person and place   Memory:  recent memory mildly impaired   Consciousness:  alert and awake   Attention: decreased concentration and decreased attention span   Insight:  limited   Judgment: limited Gait/Station: in wheelchair   Motor Activity: no abnormal movements     Past Psychiatric History Update:     Inpatient Psychiatric Admission Since Last Encounter:   no  Suicide Attempt Or Self Mutilation Since Last Encounter:   no  Incidence of Violent Behavior Since Last Encounter:   no    Traumatic History Update:     New Onset of Abuse Since Last Encounter:   no  Traumatic Events Since Last Encounter:   no    Past Medical History:    Past Medical History:   Diagnosis Date    A-fib ([place] [address] )     Cancer ([place] [address] )     Cardiac disease     [facility]congest[facility] failure) [facility]     Chronic obstructive pulmonary disease (COPD) ([place] [address] )     COPD (chronic obstructive pulmonary disease) ([place] [address] )     Coronary artery disease     Counseling regarding advance care planning and goals of care 10/7/2020    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[place] [address] )     TYPE 2    Dysphagia     Fracture of nasal bone     Gait instability     H/O cervical fracture     Hyperlipidemia     Hypertension     Muscle weakness     TBI (traumatic brain injury) ([place] [address] )      No past medical history pertinent negatives  Past Surgical History:   Procedure Laterality Date    HAND SURGERY      [place] ARTHRODESIS POSTERIOR CRANIOCERVICAL N/A 10/26/2020    Procedure: Occipital cervical fusion to C4;  Surgeon: Soledad Newby MD;  [facility];  Service: Neurosurgery     Allergies   Allergen Reactions    Amoxicillin Diarrhea and Hives     Substance Abuse History:    Social History     Substance and Sexual Activity   Alcohol Use Never     Social History     Substance and Sexual Activity   Drug Use Never     Social History:    Changes since last encounter:  no  Family Psychiatric History:     Family History   Problem Relation Age of Onset    Other Other         urinary tract malignancy    Diabetes Mother      History Review: The following portions of the patient's history were reviewed and updated as appropriate: allergies, current medications, past family history, past medical history, past social history, past surgical history and problem list     OBJECTIVE:     Vital signs in last 24 hours: Vital signs and nursing notes reviewed in facility chart  Laboratory Results: Lab results reviewed in facility chart  Medications Risks/Benefits:      Risks, Benefits And Possible Side Effects Of Medications:    Discussed risks and benefits of treatment with patient including risk of suicidality, serotonin syndrome and SIADH related to treatment with antidepressants; Risk of induction of manic symptoms in certain patient populations     Controlled Medication Discussion:     Not applicable - controlled prescriptions are not prescribed by this practice    Evaluation of Psychotropic Drugs for possible gradual dose reductions    Psychotropic medications have been reviewed  Patient continues with symptoms of depression as noted above  Any/or further dose reductions at this time would be clinically contraindicated, as it would be likely to cause worsening of symptoms          ARISTEO Singh

## 2021-08-17 ENCOUNTER — NURSING HOME VISIT (OUTPATIENT)
Dept: GERIATRICS | Facility: OTHER | Age: 86
End: 2021-08-17
Payer: COMMERCIAL

## 2021-08-17 DIAGNOSIS — F33.1 MODERATE EPISODE OF RECURRENT MAJOR DEPRESSIVE DISORDER (HCC): Primary | ICD-10-CM

## 2021-08-17 DIAGNOSIS — F01.50 VASCULAR DEMENTIA WITHOUT BEHAVIORAL DISTURBANCE (HCC): ICD-10-CM

## 2021-08-17 PROCEDURE — 99309 SBSQ NF CARE MODERATE MDM 30: CPT | Performed by: NURSE PRACTITIONER

## 2021-08-23 RX ORDER — SERTRALINE HYDROCHLORIDE 25 MG/1
50 TABLET, FILM COATED ORAL DAILY
COMMUNITY
End: 2022-08-03 | Stop reason: ALTCHOICE

## 2021-08-23 NOTE — PROGRESS NOTES
MEDICATION MANAGEMENT NOTE        [facility]  POS: 28: NF- Long Term, [facility]      Name and Date of Birth:  Ofe Luna 82 y o  1935 MRN: [id number]    Date of Visit: August 17, 2021    Allergies   Allergen Reactions    Amoxicillin Diarrhea and Hives     SUBJECTIVE:    Juan Antonio Khan is seen today for a follow up for Major Depressive Disorder  He continues to experience on and off depressive symptoms since the last visit  At last visit, was started on Zoloft for symptoms and increased tearful episodes  Staff have not yet noticed any improvements, but Juan Antonio Khan states he is doing better  No noted adverse effects and zoloft is already on a schedule titration to 50 mg in 2 days  Will follow up in 2 weeks  He denies any side effects from current psychiatric medications  PLAN:    All medications and routine orders were reviewed and updated as needed  Continue titration up to 50 mg of Zoloft    Medication management every 2 weeks  Plan discussed with: Nurse    Diagnoses and all orders for this visit:    Moderate episode of recurrent major depressive disorder ([facility] )    Vascular dementia without behavioral disturbance ([facility] )    Other orders  -     sertraline (ZOLOFT) 25 mg tablet;  Take 25 mg by mouth daily        Current Outpatient Medications on File Prior to Visit   Medication Sig Dispense Refill    sertraline (ZOLOFT) 25 mg tablet Take 25 mg by mouth daily      acetaminophen (TYLENOL) 325 mg tablet Take 3 tablets by mouth every 8 (eight) hours      aspirin 81 mg chewable tablet Chew 1 tablet daily      atorvastatin (LIPITOR) 20 mg tablet Take 1 tablet by mouth daily      bisacodyl (Dulcolax) 10 mg suppository Insert 10 mg into the rectum as needed for constipation      dabigatran etexilate (PRADAXA) 150 mg capsu Take 1 capsule by mouth 2 (two) times a day      diltiazem (CARDIZEM CD) 240 mg 24 hr capsule Take 1 capsule by mouth daily      furosemide (LASIX) 40 mg tablet Take 1 5 tablets by mouth 2 (two) times a day 60 MG BID      hydrALAZINE (APRESOLINE) 10 mg tablet Take 10 mg by mouth 3 (three) times a day      insulin glargine (LANTUS) 100 units/mL subcutaneous injection Inject 34 Units under the skin daily at bedtime      insulin lispro (HumaLOG) 100 units/mL injection Inject 20 Units under the skin 3 (three) times a day with meals       magnesium hydroxide (MILK OF MAGNESIA) 400 mg/5 mL oral suspension Take 30 mL by mouth daily as needed for constipation      methocarbamol (ROBAXIN) 500 mg tablet Take 1 tablet (500 mg total) by mouth 2 (two) times a day 10 tablet 0    metoprolol tartrate (LOPRESSOR) 50 mg tablet Take 1 tablet by mouth every 12 (twelve) hours HOLD SBP <100 HR < 55      omeprazole (PriLOSEC) 40 MG capsule Take 1 capsule by mouth daily      polyethylene glycol (MIRALAX) 17 g packet Take 17 g by mouth daily      sodium phosphate-biphosphate (FLEET) 7-19 g 118 mL enema Insert 1 enema into the rectum once as needed       No current facility-administered medications on file prior to visit  Psychotherapy Provided:     Individual psychotherapy provided: Yes  Supportive counseling provided  Reassurance and supportive therapy provided  HPI ROS Appetite Changes and Sleep:     He reports fluctuating sleep pattern, fluctuating appetite, fluctuating energy levels   Patient denies suicidal or homicidal ideation    Review Of Systems:   all other systems are negative         Mental Status Evaluation:    Appearance:  age appropriate   Behavior:  cooperative   Speech:  slow, soft   Mood:  depressed   Affect:  flat   Thought Process:  decreased rate of thoughts   Associations: concrete associations   Thought Content:  no overt delusions   Perceptual Disturbances: none   Risk Potential: Suicidal ideation - None  Homicidal ideation - None  Potential for aggression - No   Sensorium:  oriented to person, place and time/date   Memory:  recent memory impaired   Consciousness:  alert and awake   Attention: decreased concentration and decreased attention span   Insight:  limited   Judgment: limited   Gait/Station: normal gait/station, normal balance   Motor Activity: no abnormal movements     Past Psychiatric History Update:     Inpatient Psychiatric Admission Since Last Encounter:   no  Suicide Attempt Or Self Mutilation Since Last Encounter:   no  Incidence of Violent Behavior Since Last Encounter:   no    Traumatic History Update:     New Onset of Abuse Since Last Encounter:   no  Traumatic Events Since Last Encounter:   no    Past Medical History:    Past Medical History:   Diagnosis Date    A-fib ([place] [address] )     Cancer ([place] [address] )     Cardiac disease     CHF (congestive heart failure) (HCC)     Chronic obstructive pulmonary disease (COPD) ([place] [address] )     COPD (chronic obstructive pulmonary disease) ([place] [address] )     Coronary artery disease     Counseling regarding advance care planning and goals of care 10/7/2020    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[place] [address] )     TYPE 2    Dysphagia     Fracture of nasal bone     Gait instability     H/O cervical fracture     Hyperlipidemia     Hypertension     Muscle weakness     TBI (traumatic brain injury) ([place] [address] )      No past medical history pertinent negatives    Past Surgical History:   Procedure Laterality Date    HAND SURGERY      [place] ARTHRODESIS POSTERIOR CRANIOCERVICAL N/A 10/26/2020    Procedure: Occipital cervical fusion to C4;  Surgeon: Dirk Larry MD;  Location:  MAIN OR;  Service: Neurosurgery     Allergies   Allergen Reactions    Amoxicillin Diarrhea and Hives     Substance Abuse History:    Social History     Substance and Sexual Activity   Alcohol Use Never     Social History     Substance and Sexual Activity   Drug Use Never     Social History:    Changes since last encounter:  no  Family Psychiatric History:     Family History   Problem Relation Age of Onset    Other Other         urinary tract malignancy    Diabetes Mother      History Review: The following portions of the patient's history were reviewed and updated as appropriate: allergies, current medications, past family history, past medical history, past social history, past surgical history and problem list     OBJECTIVE:     Vital signs in last 24 hours: Vital signs and nursing notes reviewed in facility chart  Laboratory Results: Lab results reviewed in facility chart  Medications Risks/Benefits:      Risks, Benefits And Possible Side Effects Of Medications:    Discussed risks and benefits of treatment with patient including risk of suicidality, serotonin syndrome and SIADH related to treatment with antidepressants; Risk of induction of manic symptoms in certain patient populations     Controlled Medication Discussion:     Not applicable - controlled prescriptions are not prescribed by this practice    Evaluation of Psychotropic Drugs for possible gradual dose reductions    Psychotropic medications have been reviewed  Patient continues with symptoms of depression as noted above  Any/or further dose reductions at this time would be clinically contraindicated, as it would be likely to cause worsening of symptoms          ARISTEO Meier

## 2021-08-24 ENCOUNTER — NURSING HOME VISIT (OUTPATIENT)
Dept: GERIATRICS | Facility: OTHER | Age: 86
End: 2021-08-24
Payer: COMMERCIAL

## 2021-08-24 DIAGNOSIS — E11.9 TYPE 2 DIABETES MELLITUS WITHOUT COMPLICATION, WITH LONG-TERM CURRENT USE OF INSULIN (HCC): ICD-10-CM

## 2021-08-24 DIAGNOSIS — I10 HYPERTENSION, UNSPECIFIED TYPE: ICD-10-CM

## 2021-08-24 DIAGNOSIS — Z79.4 TYPE 2 DIABETES MELLITUS WITHOUT COMPLICATION, WITH LONG-TERM CURRENT USE OF INSULIN (HCC): ICD-10-CM

## 2021-08-24 DIAGNOSIS — I77.74 VERTEBRAL ARTERY DISSECTION (HCC): ICD-10-CM

## 2021-08-24 DIAGNOSIS — F33.1 MODERATE EPISODE OF RECURRENT MAJOR DEPRESSIVE DISORDER (HCC): ICD-10-CM

## 2021-08-24 DIAGNOSIS — I50.32 CHRONIC HEART FAILURE WITH PRESERVED EJECTION FRACTION (HCC): ICD-10-CM

## 2021-08-24 DIAGNOSIS — Z71.89 GOALS OF CARE, COUNSELING/DISCUSSION: ICD-10-CM

## 2021-08-24 DIAGNOSIS — I48.91 ATRIAL FIBRILLATION, UNSPECIFIED TYPE (HCC): Primary | ICD-10-CM

## 2021-08-24 DIAGNOSIS — R53.81 DEBILITY: ICD-10-CM

## 2021-08-24 DIAGNOSIS — E78.5 HYPERLIPIDEMIA, UNSPECIFIED HYPERLIPIDEMIA TYPE: ICD-10-CM

## 2021-08-24 PROCEDURE — 99309 SBSQ NF CARE MODERATE MDM 30: CPT | Performed by: INTERNAL MEDICINE

## 2021-08-24 NOTE — PROGRESS NOTES
Amanda [address]  [address]  Facility: [facility]  32    NAME: Amelia Sierra  AGE: 82 y o  SEX: male    DATE OF ENCOUNTER: 8/24/2021    Code status:  CPR    Assessment and Plan     1  Atrial fibrillation, unspecified type [facility])  Assessment & Plan:  · He is doing well with extended-release diltiazem and metoprolol tartrate for rate control  · Will change his metoprolol tartrate 50 mg twice daily to metoprolol succinate  mg daily to help decrease his pill burden  · He is doing well with dabigatran for anticoagulation  · Will continue with this medication regimen   · Will continue with clinical and periodic laboratory monitoring for change in his condition      2  Debility  Assessment & Plan:  · Secondary to his chronic medical conditions   · He continues to require 24/7 care/support of his ADLs   · I recommend continued care/support of his ADLs as a long-term resident at the nursing facility   · Will continue to monitor for change in his condition      3  Chronic heart failure with preserved ejection fraction (HCC)  Assessment & Plan:  · He is clinically euvolemic and his weight log shows stability   · Will continue him on furosemide 60 mg twice daily  · Will continue with clinical and periodic laboratory monitoring for change in his condition          4  Hypertension, unspecified type  Assessment & Plan:  · Review his BP and AP logs looks well with extended-release diltiazem, metoprolol tartrate (will be changing to metoprolol succinate), and hydralazine  · Will continue with monitoring for change in his condition      5   Type 2 diabetes mellitus without complication, with long-term current use of insulin [facility])  Assessment & Plan:  · May 26, 2021:  Hemoglobin A1c:  7 2%,   · After reviewing his fingerstick blood sugar log, I will lower the dosage of his insulin glargine from 30 units nightly to 25 units nightly to avoid fasting hypoglycemia · Will change his fingerstick blood sugar monitoring to fasting on Tuesdays, before lunch on Wednesdays, and before supper on Thursdays  · Will continue with insulins glargine and lispro  · Will continue with clinical and periodic laboratory monitoring for change in his condition      6  Vertebral artery dissection (HCC)  Assessment & Plan:  · Previously followed with neurosurgery service   · On aspirin therapy   · Continues on atorvastatin   · Will continue with current medication regimen  · Will continue with clinical and periodic laboratory monitoring for change in his condition      7  Hyperlipidemia, unspecified hyperlipidemia type  Assessment & Plan:  · He continues on atorvastatin  · Will continue with periodic laboratory monitoring for change in his condition       8  Moderate episode of recurrent major depressive disorder [facility])  Assessment & Plan:  · He is doing well with sertraline  · He is being followed by the psychiatry service having last been seen on August 17, 2021  · Will continue his care in collaboration with the psychiatry service   · Will continue with monitoring for change in his condition      9  Goals of care, counseling/discussion  Assessment & Plan:  ·  His resuscitation status is "CPR"      See my note of May 19, 2021 for further assessment and plan  All medications and routine orders were reviewed and updated as needed  Plan discussed with:  Nursing staff  Chief Complaint     He is seen for a follow-up visit to update the care and treatment of his atrial fibrillation, debility secondary to his chronic medical conditions, chronic heart failure with preserved ejection fraction, and insulin-requiring type 2 diabetes mellitus      History of Present Illness     He is a pleasant 61-year-old gentleman who is seen for a follow-up visit to update the care and treatment of his atrial fibrillation-doing well with dabigatran, extended release diltiazem, and metoprolol tartrate, debility secondary to his chronic medical conditions-he continues to require 24/7 care/support of his ADLs, chronic heart failure with preserved ejection fraction-doing well with furosemide, and insulin-requiring type 2 diabetes mellitus-doing well with insulins glargine and lispro  He denies chest pain and shortness of breath  He endorses that his appetite is good and that he is sleeping well  The following portions of the patient's history were reviewed and updated as appropriate: current medications, past family history, past medical history, past social history, past surgical history and problem list     Allergies: Allergies   Allergen Reactions    Amoxicillin Diarrhea and Hives       Review of Systems     Review of Systems   Respiratory: Negative for shortness of breath  Cardiovascular: Negative for chest pain  Psychiatric/Behavioral: Negative for sleep disturbance  Medications and orders     All medications reviewed and updated in Nursing Home EMR  Objective     Vitals:   Monthly vitals:  Weight 197 4 lb (stable), temperature 96 9° F, pulse 75, blood pressure 138/81, fasting fingerstick blood sugar 101  Review of his BP and AP logs looks well  Physical Exam  Vitals reviewed  Constitutional:       General: He is awake  He is not in acute distress  Appearance: He is well-developed  He is not ill-appearing, toxic-appearing or diaphoretic  Comments: He appears comfortable, stated age, and frail  Cardiovascular:      Rate and Rhythm: Normal rate and regular rhythm  Heart sounds: Normal heart sounds  No murmur heard  No friction rub  No gallop  Comments: There is no pretibial edema, bilaterally  Pulmonary:      Effort: Pulmonary effort is normal  No respiratory distress  Breath sounds: Normal breath sounds  No stridor  No wheezing or rhonchi  Abdominal:      General: There is no distension  Palpations: Abdomen is soft  There is no mass        Tenderness: There is no abdominal tenderness  There is no guarding or rebound  Neurological:      Mental Status: He is alert  Psychiatric:         Mood and Affect: Mood normal          Behavior: Behavior normal  Behavior is cooperative  Pertinent Laboratory/Diagnostic Studies: The following labs were reviewed please see chart or hospital paperwork for details  May 26, 2021:     Hemoglobin A1c:  7 2%,     - His monthly order summary was reviewed and signed    Treatment plan reviewed with nursing staff    CLIFF Pinto   8/29/2021 12:43 AM

## 2021-08-29 PROBLEM — K30 STOMACH UPSET: Status: RESOLVED | Noted: 2021-08-08 | Resolved: 2021-01-01

## 2021-08-29 RX ORDER — METOPROLOL SUCCINATE 100 MG/1
1 TABLET, EXTENDED RELEASE ORAL DAILY
Status: ON HOLD | COMMUNITY
Start: 2021-08-25

## 2021-08-29 NOTE — ASSESSMENT & PLAN NOTE
· He continues on atorvastatin  · Will continue with periodic laboratory monitoring for change in his condition

## 2021-08-29 NOTE — ASSESSMENT & PLAN NOTE
· He is doing well with extended-release diltiazem and metoprolol tartrate for rate control  · Will change his metoprolol tartrate 50 mg twice daily to metoprolol succinate  mg daily to help decrease his pill burden  · He is doing well with dabigatran for anticoagulation  · Will continue with this medication regimen   · Will continue with clinical and periodic laboratory monitoring for change in his condition

## 2021-08-29 NOTE — ASSESSMENT & PLAN NOTE
· He is clinically euvolemic and his weight log shows stability   · Will continue him on furosemide 60 mg twice daily  · Will continue with clinical and periodic laboratory monitoring for change in his condition

## 2021-08-29 NOTE — ASSESSMENT & PLAN NOTE
· Review his BP and AP logs looks well with extended-release diltiazem, metoprolol tartrate (will be changing to metoprolol succinate), and hydralazine  · Will continue with monitoring for change in his condition

## 2021-08-29 NOTE — ASSESSMENT & PLAN NOTE
· He is doing well with sertraline  · He is being followed by the psychiatry service having last been seen on August 17, 2021  · Will continue his care in collaboration with the psychiatry service   · Will continue with monitoring for change in his condition

## 2021-08-29 NOTE — ASSESSMENT & PLAN NOTE
· Previously followed with neurosurgery service   · On aspirin therapy   · Continues on atorvastatin   · Will continue with current medication regimen  · Will continue with clinical and periodic laboratory monitoring for change in his condition

## 2021-08-29 NOTE — ASSESSMENT & PLAN NOTE
· May 26, 2021:  Hemoglobin A1c:  7 2%,   · After reviewing his fingerstick blood sugar log, I will lower the dosage of his insulin glargine from 30 units nightly to 25 units nightly to avoid fasting hypoglycemia   · Will change his fingerstick blood sugar monitoring to fasting on Tuesdays, before lunch on Wednesdays, and before supper on Thursdays  · Will continue with insulins glargine and lispro  · Will continue with clinical and periodic laboratory monitoring for change in his condition

## 2021-09-16 ENCOUNTER — NURSING HOME VISIT (OUTPATIENT)
Dept: GERIATRICS | Facility: OTHER | Age: 86
End: 2021-09-16
Payer: COMMERCIAL

## 2021-09-16 DIAGNOSIS — F01.50 VASCULAR DEMENTIA WITHOUT BEHAVIORAL DISTURBANCE (HCC): ICD-10-CM

## 2021-09-16 DIAGNOSIS — F33.1 MODERATE EPISODE OF RECURRENT MAJOR DEPRESSIVE DISORDER (HCC): Primary | ICD-10-CM

## 2021-09-16 PROCEDURE — 99308 SBSQ NF CARE LOW MDM 20: CPT | Performed by: NURSE PRACTITIONER

## 2021-09-21 NOTE — PROGRESS NOTES
MEDICATION MANAGEMENT NOTE        [facility]  POS: 28: NF- Long Term, [facility]      Name and Date of Birth:  Tyra Miranda 82 y o  1935 MRN: [id number]    Date of Visit: September 16, 2021    Allergies   Allergen Reactions    Amoxicillin Diarrhea and Hives     SUBJECTIVE:    Mikaela Mejia is seen today for a follow up for Major Depressive Disorder and cognitive disorder  He continues to experience on and off depressive symptoms since the last visit  In past month, was started on Zoloft 25 mg and titrated up to 50 mg  Staff report occasional tearful episodes continue, but decreased in frequency and intensity  He states he is "not sure" if he is feeling any less depressed, but presents slightly brighter and less irritable  Will continue Zoloft at 50 mg and follow up in one month  He denies any side effects from current psychiatric medications  PLAN:    All medications and routine orders were reviewed and updated as needed      Continue current medications:    Medication management every 4 weeks  Plan discussed with: Patient    Diagnoses and all orders for this visit:    Moderate episode of recurrent major depressive disorder ([facility] )    Vascular dementia without behavioral disturbance ([facility] )        Current Outpatient Medications on File Prior to Visit   Medication Sig Dispense Refill    acetaminophen (TYLENOL) 325 mg tablet Take 3 tablets by mouth every 8 (eight) hours      aspirin 81 mg chewable tablet Chew 1 tablet daily      atorvastatin (LIPITOR) 20 mg tablet Take 1 tablet by mouth daily      bisacodyl (Dulcolax) 10 mg suppository Insert 10 mg into the rectum as needed for constipation      dabigatran etexilate (PRADAXA) 150 mg capsu Take 1 capsule by mouth 2 (two) times a day      diltiazem (CARDIZEM CD) 240 mg 24 hr capsule Take 1 capsule by mouth daily      furosemide (LASIX) 40 mg tablet Take 1 5 tablets by mouth 2 (two) times a day 60 MG BID  hydrALAZINE (APRESOLINE) 10 mg tablet Take 10 mg by mouth 3 (three) times a day      insulin glargine (LANTUS) 100 units/mL subcutaneous injection Inject 25 Units under the skin daily at bedtime      insulin lispro (HumaLOG) 100 units/mL injection Inject 20 Units under the skin 3 (three) times a day with meals       magnesium hydroxide (MILK OF MAGNESIA) 400 mg/5 mL oral suspension Take 30 mL by mouth daily as needed for constipation      methocarbamol (ROBAXIN) 500 mg tablet Take 1 tablet (500 mg total) by mouth 2 (two) times a day 10 tablet 0    metoprolol succinate (TOPROL-XL) 100 mg 24 hr tablet Take 1 tablet by mouth daily      omeprazole (PriLOSEC) 40 MG capsule Take 1 capsule by mouth daily      polyethylene glycol (MIRALAX) 17 g packet Take 17 g by mouth daily      sertraline (ZOLOFT) 25 mg tablet Take 25 mg by mouth daily      sodium phosphate-biphosphate (FLEET) 7-19 g 118 mL enema Insert 1 enema into the rectum once as needed       No current facility-administered medications on file prior to visit  Psychotherapy Provided:     Individual psychotherapy provided: Yes  Supportive counseling provided  Reassurance and supportive therapy provided  HPI ROS Appetite Changes and Sleep:     He reports fluctuating sleep pattern, fluctuating appetite, fluctuating energy levels   Denies homicidal ideation, Patient denies suicidal    Review Of Systems:   all other systems are negative         Mental Status Evaluation:    Appearance:  age appropriate   Behavior:  pleasant, cooperative   Speech:  slow   Mood:  depressed   Affect:  brighter   Thought Process:  linear   Associations: concrete associations   Thought Content:  no overt delusions   Perceptual Disturbances: none   Risk Potential: Suicidal ideation - None  Homicidal ideation - None  Potential for aggression - No   Sensorium:  oriented to person, place and time/date   Memory:  recent memory impaired   Consciousness:  alert and awake Attention: attention span and concentration appear shorter than expected for age   Insight:  limited   Judgment: limited   Gait/Station: in wheelchair   Motor Activity: no abnormal movements     Past Psychiatric History Update:     Inpatient Psychiatric Admission Since Last Encounter:   no  Suicide Attempt Or Self Mutilation Since Last Encounter:   no  Incidence of Violent Behavior Since Last Encounter:   no    Traumatic History Update:     New Onset of Abuse Since Last Encounter:   no  Traumatic Events Since Last Encounter:   no    Past Medical History:    Past Medical History:   Diagnosis Date    A-fib ([place] [address] )     Cancer ([place] [address] )     Cardiac disease     [facility]est[facility]     Chronic obstructive pulmonary disease (COPD) ([place] [address] )     COPD (chronic obstructive pulmonary disease) ([place] [address] )     Coronary artery disease     Counseling regarding advance care planning and goals of care 10/7/2020    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[place] [address] )     TYPE 2    Dysphagia     Fracture of nasal bone     Gait instability     H/O cervical fracture     Hyperlipidemia     Hypertension     Muscle weakness     TBI (traumatic brain injury) ([place] [address] )      No past medical history pertinent negatives  Past Surgical History:   Procedure Laterality Date    HAND SURGERY      OK ARTHRODESIS POSTERIOR CRANIOCERVICAL N/A 10/26/2020    Procedure: Occipital cervical fusion to C4;  Surgeon: Eather Curling, MD;  [facility]: [facility];  Service: Neurosurgery     Allergies   Allergen Reactions    Amoxicillin Diarrhea and Hives     Substance Abuse History:    Social History     Substance and Sexual Activity   Alcohol Use Never     Social History     Substance and Sexual Activity   Drug Use Never     Social History:    Changes since last encounter:  no  Family Psychiatric History:     Family History   Problem Relation Age of Onset    Other Other         urinary tract malignancy    Diabetes Mother      History Review: The following portions of the patient's history were reviewed and updated as appropriate: allergies, current medications, past family history, past medical history, past social history, past surgical history and problem list     OBJECTIVE:     Vital signs in last 24 hours: Vital signs and nursing notes reviewed in facility chart  Laboratory Results: Lab results reviewed in facility chart  Medications Risks/Benefits:      Risks, Benefits And Possible Side Effects Of Medications:    Discussed risks and benefits of treatment with patient including risk of suicidality, serotonin syndrome, increased QTc interval and SIADH related to treatment with antidepressants; Risk of induction of manic symptoms in certain patient populations     Controlled Medication Discussion:     Not applicable - controlled prescriptions are not prescribed by this practice    Evaluation of Psychotropic Drugs for possible gradual dose reductions    Psychotropic medications have been reviewed  Patient continues with symptoms of depression as noted above  Any/or further dose reductions at this time would be clinically contraindicated, as it would be likely to cause worsening of symptoms          ARISTEO Stallworth

## 2021-09-27 DIAGNOSIS — S32.411D CLOSED DISPLACED FRACTURE OF ANTERIOR WALL OF RIGHT ACETABULUM WITH ROUTINE HEALING, SUBSEQUENT ENCOUNTER: Primary | ICD-10-CM

## 2021-09-28 ENCOUNTER — TELEPHONE (OUTPATIENT)
Dept: OBGYN CLINIC | Facility: MEDICAL CENTER | Age: 86
End: 2021-09-28

## 2021-09-28 DIAGNOSIS — S32.411D CLOSED DISPLACED FRACTURE OF ANTERIOR WALL OF RIGHT ACETABULUM WITH ROUTINE HEALING, SUBSEQUENT ENCOUNTER: Primary | ICD-10-CM

## 2021-09-28 NOTE — TELEPHONE ENCOUNTER
Spoke to Loy Montes  Advised mobile xrays ok per Dr Ban Mackey  She stated she needs an order faxed to them at [phone]  Also needs address of where the disc of the xray should be sent so Dr Ban Mackey can review  Pleas advise

## 2021-09-28 NOTE — TELEPHONE ENCOUNTER
Patient sees Dr Mallory Cornell at [address] [place] [address] calling about Patients appt on 9/29/2021, they are trying to limit bringing patients out, can they do the xrays at the Center and send the disc to dr office or set up a virtual for the patient  Please give her a call [phone], ext [phone]

## 2021-10-05 NOTE — ASSESSMENT & PLAN NOTE
well developed, well nourished , in no acute distress , ambulating without difficulty , normal communication ability · Patient had alleged suprapubic hematoma on admission  · Hemoglobin is remaining stable at this time; most recent is 8 9  Will recheck this afternoon    · Continue monitoring with daily hemoglobin

## 2021-10-12 ENCOUNTER — NURSING HOME VISIT (OUTPATIENT)
Dept: GERIATRICS | Facility: OTHER | Age: 86
End: 2021-10-12
Payer: COMMERCIAL

## 2021-10-12 DIAGNOSIS — F01.50 VASCULAR DEMENTIA WITHOUT BEHAVIORAL DISTURBANCE (HCC): ICD-10-CM

## 2021-10-12 DIAGNOSIS — F33.1 MODERATE EPISODE OF RECURRENT MAJOR DEPRESSIVE DISORDER (HCC): Primary | ICD-10-CM

## 2021-10-12 DIAGNOSIS — I50.32 CHRONIC HEART FAILURE WITH PRESERVED EJECTION FRACTION (HCC): Primary | ICD-10-CM

## 2021-10-12 DIAGNOSIS — R53.81 DEBILITY: ICD-10-CM

## 2021-10-12 DIAGNOSIS — I10 HYPERTENSION, UNSPECIFIED TYPE: ICD-10-CM

## 2021-10-12 PROCEDURE — 99309 SBSQ NF CARE MODERATE MDM 30: CPT | Performed by: NURSE PRACTITIONER

## 2021-10-12 PROCEDURE — 99308 SBSQ NF CARE LOW MDM 20: CPT | Performed by: NURSE PRACTITIONER

## 2021-10-14 VITALS
DIASTOLIC BLOOD PRESSURE: 78 MMHG | RESPIRATION RATE: 18 BRPM | WEIGHT: 205 LBS | SYSTOLIC BLOOD PRESSURE: 141 MMHG | BODY MASS INDEX: 31.17 KG/M2 | TEMPERATURE: 97.5 F | HEART RATE: 67 BPM

## 2021-12-13 ENCOUNTER — NURSING HOME VISIT (OUTPATIENT)
Dept: GERIATRICS | Facility: OTHER | Age: 86
End: 2021-12-13
Payer: COMMERCIAL

## 2021-12-13 DIAGNOSIS — D64.9 ANEMIA, UNSPECIFIED TYPE: ICD-10-CM

## 2021-12-13 DIAGNOSIS — R53.81 DEBILITY: ICD-10-CM

## 2021-12-13 DIAGNOSIS — E78.5 HYPERLIPIDEMIA, UNSPECIFIED HYPERLIPIDEMIA TYPE: ICD-10-CM

## 2021-12-13 DIAGNOSIS — I48.91 ATRIAL FIBRILLATION, UNSPECIFIED TYPE (HCC): Primary | ICD-10-CM

## 2021-12-13 DIAGNOSIS — E11.9 TYPE 2 DIABETES MELLITUS WITHOUT COMPLICATION, WITH LONG-TERM CURRENT USE OF INSULIN (HCC): ICD-10-CM

## 2021-12-13 DIAGNOSIS — I50.32 CHRONIC HEART FAILURE WITH PRESERVED EJECTION FRACTION (HCC): ICD-10-CM

## 2021-12-13 DIAGNOSIS — Z78.9 FULL CODE STATUS: ICD-10-CM

## 2021-12-13 DIAGNOSIS — I77.74 VERTEBRAL ARTERY DISSECTION (HCC): ICD-10-CM

## 2021-12-13 DIAGNOSIS — K59.00 CONSTIPATION, UNSPECIFIED CONSTIPATION TYPE: ICD-10-CM

## 2021-12-13 DIAGNOSIS — R54 FRAILTY SYNDROME IN GERIATRIC PATIENT: ICD-10-CM

## 2021-12-13 DIAGNOSIS — Z79.4 TYPE 2 DIABETES MELLITUS WITHOUT COMPLICATION, WITH LONG-TERM CURRENT USE OF INSULIN (HCC): ICD-10-CM

## 2021-12-13 DIAGNOSIS — I10 HYPERTENSION, UNSPECIFIED TYPE: ICD-10-CM

## 2021-12-13 PROCEDURE — 99309 SBSQ NF CARE MODERATE MDM 30: CPT | Performed by: INTERNAL MEDICINE

## 2021-12-14 VITALS — WEIGHT: 211 LBS | BODY MASS INDEX: 32.08 KG/M2

## 2021-12-14 PROBLEM — Z78.9 FULL CODE STATUS: Status: ACTIVE | Noted: 2021-01-01

## 2021-12-14 PROBLEM — R54 FRAILTY SYNDROME IN GERIATRIC PATIENT: Status: ACTIVE | Noted: 2021-12-14

## 2022-01-01 ENCOUNTER — HOME CARE VISIT (OUTPATIENT)
Dept: HOME HOSPICE | Facility: HOSPICE | Age: 87
End: 2022-01-01
Payer: MEDICARE

## 2022-01-01 ENCOUNTER — APPOINTMENT (EMERGENCY)
Dept: RADIOLOGY | Facility: HOSPITAL | Age: 87
DRG: 871 | End: 2022-01-01
Payer: COMMERCIAL

## 2022-01-01 ENCOUNTER — APPOINTMENT (INPATIENT)
Dept: RADIOLOGY | Facility: HOSPITAL | Age: 87
DRG: 871 | End: 2022-01-01
Payer: COMMERCIAL

## 2022-01-01 ENCOUNTER — HOSPITAL ENCOUNTER (EMERGENCY)
Facility: HOSPITAL | Age: 87
Discharge: HOME | End: 2022-07-24
Attending: EMERGENCY MEDICINE
Payer: COMMERCIAL

## 2022-01-01 ENCOUNTER — APPOINTMENT (EMERGENCY)
Dept: NON INVASIVE DIAGNOSTICS | Facility: HOSPITAL | Age: 87
DRG: 871 | End: 2022-01-01
Payer: COMMERCIAL

## 2022-01-01 ENCOUNTER — NURSING HOME VISIT (OUTPATIENT)
Dept: GERIATRICS | Facility: OTHER | Age: 87
End: 2022-01-01
Payer: MEDICARE

## 2022-01-01 ENCOUNTER — APPOINTMENT (EMERGENCY)
Dept: RADIOLOGY | Facility: HOSPITAL | Age: 87
End: 2022-01-01
Payer: COMMERCIAL

## 2022-01-01 ENCOUNTER — APPOINTMENT (INPATIENT)
Dept: GASTROENTEROLOGY | Facility: HOSPITAL | Age: 87
DRG: 871 | End: 2022-01-01
Payer: COMMERCIAL

## 2022-01-01 ENCOUNTER — HOSPITAL ENCOUNTER (INPATIENT)
Facility: HOSPITAL | Age: 87
LOS: 13 days | Discharge: HOSPICE | DRG: 871 | End: 2022-08-17
Attending: EMERGENCY MEDICINE | Admitting: INTERNAL MEDICINE
Payer: COMMERCIAL

## 2022-01-01 ENCOUNTER — APPOINTMENT (EMERGENCY)
Dept: RADIOLOGY | Facility: HOSPITAL | Age: 87
DRG: 872 | End: 2022-01-01
Payer: COMMERCIAL

## 2022-01-01 ENCOUNTER — HOME CARE VISIT (OUTPATIENT)
Dept: HOME HEALTH SERVICES | Facility: HOME HEALTHCARE | Age: 87
End: 2022-01-01
Payer: MEDICARE

## 2022-01-01 ENCOUNTER — TELEPHONE (OUTPATIENT)
Dept: GASTROENTEROLOGY | Facility: CLINIC | Age: 87
End: 2022-01-01

## 2022-01-01 ENCOUNTER — HOSPITAL ENCOUNTER (OUTPATIENT)
Dept: RADIOLOGY | Facility: HOSPITAL | Age: 87
Discharge: HOME | DRG: 871 | End: 2022-08-03
Payer: COMMERCIAL

## 2022-01-01 ENCOUNTER — APPOINTMENT (INPATIENT)
Dept: RADIOLOGY | Facility: HOSPITAL | Age: 87
DRG: 872 | End: 2022-01-01
Payer: COMMERCIAL

## 2022-01-01 ENCOUNTER — APPOINTMENT (INPATIENT)
Dept: PERIOP | Facility: HOSPITAL | Age: 87
DRG: 872 | End: 2022-01-01
Payer: COMMERCIAL

## 2022-01-01 ENCOUNTER — HOSPITAL ENCOUNTER (INPATIENT)
Facility: HOSPITAL | Age: 87
LOS: 4 days | Discharge: NURSING, REHAB OR LONG-TERM CARE | DRG: 872 | End: 2022-06-08
Attending: EMERGENCY MEDICINE | Admitting: EMERGENCY MEDICINE
Payer: COMMERCIAL

## 2022-01-01 ENCOUNTER — ANESTHESIA EVENT (OUTPATIENT)
Dept: GASTROENTEROLOGY | Facility: HOSPITAL | Age: 87
End: 2022-01-01

## 2022-01-01 ENCOUNTER — TELEPHONE (OUTPATIENT)
Dept: OTHER | Facility: OTHER | Age: 87
End: 2022-01-01

## 2022-01-01 ENCOUNTER — ANESTHESIA (OUTPATIENT)
Dept: GASTROENTEROLOGY | Facility: HOSPITAL | Age: 87
End: 2022-01-01

## 2022-01-01 ENCOUNTER — HOSPITAL ENCOUNTER (OUTPATIENT)
Dept: RADIOLOGY | Facility: HOSPITAL | Age: 87
Discharge: HOME | DRG: 871 | End: 2022-08-04
Payer: COMMERCIAL

## 2022-01-01 ENCOUNTER — ANESTHESIA (INPATIENT)
Dept: GASTROENTEROLOGY | Facility: HOSPITAL | Age: 87
DRG: 871 | End: 2022-01-01
Payer: COMMERCIAL

## 2022-01-01 ENCOUNTER — HOSPITAL ENCOUNTER (EMERGENCY)
Facility: HOSPITAL | Age: 87
Discharge: HOME | End: 2022-07-26
Attending: EMERGENCY MEDICINE
Payer: COMMERCIAL

## 2022-01-01 ENCOUNTER — HOSPICE ADMISSION (OUTPATIENT)
Dept: HOME HOSPICE | Facility: HOSPICE | Age: 87
End: 2022-01-01
Payer: MEDICARE

## 2022-01-01 ENCOUNTER — HOSPITAL ENCOUNTER (OUTPATIENT)
Dept: GASTROENTEROLOGY | Facility: HOSPITAL | Age: 87
Setting detail: OUTPATIENT SURGERY
Discharge: HOME | DRG: 871 | End: 2022-08-03
Attending: INTERNAL MEDICINE
Payer: COMMERCIAL

## 2022-01-01 ENCOUNTER — ANESTHESIA EVENT (INPATIENT)
Dept: GASTROENTEROLOGY | Facility: HOSPITAL | Age: 87
DRG: 871 | End: 2022-01-01
Payer: COMMERCIAL

## 2022-01-01 ENCOUNTER — TELEPHONE (OUTPATIENT)
Dept: NEUROLOGY | Facility: CLINIC | Age: 87
End: 2022-01-01

## 2022-01-01 VITALS
RESPIRATION RATE: 18 BRPM | BODY MASS INDEX: 31.64 KG/M2 | HEIGHT: 68 IN | HEART RATE: 72 BPM | DIASTOLIC BLOOD PRESSURE: 84 MMHG | TEMPERATURE: 98 F | OXYGEN SATURATION: 95 % | WEIGHT: 208.78 LBS | SYSTOLIC BLOOD PRESSURE: 162 MMHG

## 2022-01-01 VITALS
HEIGHT: 68 IN | HEART RATE: 80 BPM | SYSTOLIC BLOOD PRESSURE: 163 MMHG | WEIGHT: 208.78 LBS | OXYGEN SATURATION: 98 % | TEMPERATURE: 98.2 F | BODY MASS INDEX: 31.64 KG/M2 | DIASTOLIC BLOOD PRESSURE: 80 MMHG | RESPIRATION RATE: 16 BRPM

## 2022-01-01 VITALS
SYSTOLIC BLOOD PRESSURE: 154 MMHG | WEIGHT: 207 LBS | OXYGEN SATURATION: 93 % | RESPIRATION RATE: 18 BRPM | HEIGHT: 68 IN | BODY MASS INDEX: 31.37 KG/M2 | TEMPERATURE: 96.9 F | HEART RATE: 57 BPM | DIASTOLIC BLOOD PRESSURE: 70 MMHG

## 2022-01-01 VITALS
RESPIRATION RATE: 40 BRPM | TEMPERATURE: 99.6 F | HEART RATE: 100 BPM | DIASTOLIC BLOOD PRESSURE: 51 MMHG | SYSTOLIC BLOOD PRESSURE: 105 MMHG

## 2022-01-01 VITALS
SYSTOLIC BLOOD PRESSURE: 108 MMHG | HEART RATE: 68 BPM | RESPIRATION RATE: 20 BRPM | BODY MASS INDEX: 31.52 KG/M2 | DIASTOLIC BLOOD PRESSURE: 60 MMHG | TEMPERATURE: 98.6 F | HEIGHT: 68 IN | WEIGHT: 208 LBS

## 2022-01-01 VITALS
HEART RATE: 66 BPM | DIASTOLIC BLOOD PRESSURE: 69 MMHG | TEMPERATURE: 96.3 F | RESPIRATION RATE: 19 BRPM | OXYGEN SATURATION: 95 % | SYSTOLIC BLOOD PRESSURE: 150 MMHG

## 2022-01-01 VITALS
HEART RATE: 59 BPM | SYSTOLIC BLOOD PRESSURE: 154 MMHG | DIASTOLIC BLOOD PRESSURE: 72 MMHG | RESPIRATION RATE: 16 BRPM | OXYGEN SATURATION: 97 % | TEMPERATURE: 97.7 F

## 2022-01-01 DIAGNOSIS — F03.90 DEMENTIA (HCC): ICD-10-CM

## 2022-01-01 DIAGNOSIS — E11.9 TYPE 2 DIABETES MELLITUS WITHOUT COMPLICATION, WITH LONG-TERM CURRENT USE OF INSULIN (HCC): ICD-10-CM

## 2022-01-01 DIAGNOSIS — I50.9 CHF EXACERBATION (HCC): ICD-10-CM

## 2022-01-01 DIAGNOSIS — R62.7 FAILURE TO THRIVE IN ADULT: ICD-10-CM

## 2022-01-01 DIAGNOSIS — I63.9 STROKE (CEREBRUM) (HCC): ICD-10-CM

## 2022-01-01 DIAGNOSIS — F01.50 VASCULAR DEMENTIA WITHOUT BEHAVIORAL DISTURBANCE (HCC): ICD-10-CM

## 2022-01-01 DIAGNOSIS — R41.82 AMS (ALTERED MENTAL STATUS): Primary | ICD-10-CM

## 2022-01-01 DIAGNOSIS — I63.9 CEREBROVASCULAR ACCIDENT (CVA) INVOLVING CEREBELLUM (HCC): ICD-10-CM

## 2022-01-01 DIAGNOSIS — K83.09 CHOLANGITIS: ICD-10-CM

## 2022-01-01 DIAGNOSIS — K83.09 ASCENDING CHOLANGITIS: Primary | ICD-10-CM

## 2022-01-01 DIAGNOSIS — I47.20 VENTRICULAR TACHYARRHYTHMIA: ICD-10-CM

## 2022-01-01 DIAGNOSIS — I10 HYPERTENSION, UNSPECIFIED TYPE: ICD-10-CM

## 2022-01-01 DIAGNOSIS — Z98.890 S/P ERCP: ICD-10-CM

## 2022-01-01 DIAGNOSIS — R50.9 FEVER: ICD-10-CM

## 2022-01-01 DIAGNOSIS — E16.2 HYPOGLYCEMIA: Primary | ICD-10-CM

## 2022-01-01 DIAGNOSIS — K86.89 PANCREATIC MASS: ICD-10-CM

## 2022-01-01 DIAGNOSIS — R74.01 TRANSAMINITIS: ICD-10-CM

## 2022-01-01 DIAGNOSIS — E11.22 TYPE 2 DIABETES MELLITUS WITH DIABETIC CHRONIC KIDNEY DISEASE, UNSPECIFIED CKD STAGE, UNSPECIFIED WHETHER LONG TERM INSULIN USE (HCC): ICD-10-CM

## 2022-01-01 DIAGNOSIS — C22.1 CHOLANGIOCARCINOMA (HCC): ICD-10-CM

## 2022-01-01 DIAGNOSIS — J44.9 COPD (CHRONIC OBSTRUCTIVE PULMONARY DISEASE) (HCC): ICD-10-CM

## 2022-01-01 DIAGNOSIS — R53.81 DEBILITY: ICD-10-CM

## 2022-01-01 DIAGNOSIS — F41.9 ANXIETY: ICD-10-CM

## 2022-01-01 DIAGNOSIS — K86.89 PANCREATIC MASS: Primary | ICD-10-CM

## 2022-01-01 DIAGNOSIS — I48.91 ATRIAL FIBRILLATION, UNSPECIFIED TYPE (HCC): ICD-10-CM

## 2022-01-01 DIAGNOSIS — A49.8 INFECTION DUE TO ESBL-PRODUCING KLEBSIELLA PNEUMONIAE: ICD-10-CM

## 2022-01-01 DIAGNOSIS — Z16.12 ESBL (EXTENDED SPECTRUM BETA-LACTAMASE) PRODUCING BACTERIA INFECTION: ICD-10-CM

## 2022-01-01 DIAGNOSIS — Z53.20 PATIENT REFUSED EVALUATION OR TREATMENT: ICD-10-CM

## 2022-01-01 DIAGNOSIS — Z79.4 TYPE 2 DIABETES MELLITUS WITHOUT COMPLICATION, WITH LONG-TERM CURRENT USE OF INSULIN (HCC): ICD-10-CM

## 2022-01-01 DIAGNOSIS — A41.9 SEPSIS (HCC): ICD-10-CM

## 2022-01-01 DIAGNOSIS — Z16.12 INFECTION DUE TO ESBL-PRODUCING KLEBSIELLA PNEUMONIAE: ICD-10-CM

## 2022-01-01 DIAGNOSIS — R77.8 ELEVATED TROPONIN: ICD-10-CM

## 2022-01-01 DIAGNOSIS — Z71.89 GOALS OF CARE, COUNSELING/DISCUSSION: ICD-10-CM

## 2022-01-01 DIAGNOSIS — K83.09 CHOLANGITIS: Primary | ICD-10-CM

## 2022-01-01 DIAGNOSIS — I63.9 CEREBELLAR INFARCT (HCC): ICD-10-CM

## 2022-01-01 DIAGNOSIS — A49.9 ESBL (EXTENDED SPECTRUM BETA-LACTAMASE) PRODUCING BACTERIA INFECTION: ICD-10-CM

## 2022-01-01 DIAGNOSIS — I50.32 CHRONIC HEART FAILURE WITH PRESERVED EJECTION FRACTION (HCC): ICD-10-CM

## 2022-01-01 DIAGNOSIS — D72.829 LEUKOCYTOSIS: ICD-10-CM

## 2022-01-01 DIAGNOSIS — R54 FRAILTY SYNDROME IN GERIATRIC PATIENT: ICD-10-CM

## 2022-01-01 DIAGNOSIS — E87.0 HYPERNATREMIA: ICD-10-CM

## 2022-01-01 DIAGNOSIS — N17.9 AKI (ACUTE KIDNEY INJURY) (HCC): ICD-10-CM

## 2022-01-01 DIAGNOSIS — R51.9 HEADACHE: Primary | ICD-10-CM

## 2022-01-01 DIAGNOSIS — Z51.5 COMFORT MEASURES ONLY STATUS: ICD-10-CM

## 2022-01-01 LAB
2HR DELTA HS TROPONIN: -2 NG/L
2HR DELTA HS TROPONIN: -9 NG/L
4HR DELTA HS TROPONIN: -13 NG/L
ALBUMIN SERPL BCP-MCNC: 1.9 G/DL (ref 3.5–5)
ALBUMIN SERPL BCP-MCNC: 1.9 G/DL (ref 3.5–5)
ALBUMIN SERPL BCP-MCNC: 2 G/DL (ref 3.5–5)
ALBUMIN SERPL BCP-MCNC: 2.1 G/DL (ref 3.5–5)
ALBUMIN SERPL BCP-MCNC: 2.2 G/DL (ref 3.5–5)
ALBUMIN SERPL BCP-MCNC: 2.3 G/DL (ref 3.5–5)
ALBUMIN SERPL BCP-MCNC: 2.5 G/DL (ref 3.5–5)
ALBUMIN SERPL BCP-MCNC: 2.9 G/DL (ref 3.5–5)
ALP SERPL-CCNC: 1062 U/L (ref 46–116)
ALP SERPL-CCNC: 135 U/L (ref 46–116)
ALP SERPL-CCNC: 346 U/L (ref 46–116)
ALP SERPL-CCNC: 353 U/L (ref 46–116)
ALP SERPL-CCNC: 487 U/L (ref 46–116)
ALP SERPL-CCNC: 540 U/L (ref 46–116)
ALP SERPL-CCNC: 572 U/L (ref 46–116)
ALP SERPL-CCNC: 641 U/L (ref 46–116)
ALP SERPL-CCNC: 679 U/L (ref 46–116)
ALP SERPL-CCNC: 756 U/L (ref 46–116)
ALP SERPL-CCNC: 816 U/L (ref 46–116)
ALP SERPL-CCNC: 893 U/L (ref 46–116)
ALT SERPL W P-5'-P-CCNC: 127 U/L (ref 12–78)
ALT SERPL W P-5'-P-CCNC: 159 U/L (ref 12–78)
ALT SERPL W P-5'-P-CCNC: 18 U/L (ref 12–78)
ALT SERPL W P-5'-P-CCNC: 181 U/L (ref 12–78)
ALT SERPL W P-5'-P-CCNC: 206 U/L (ref 12–78)
ALT SERPL W P-5'-P-CCNC: 264 U/L (ref 12–78)
ALT SERPL W P-5'-P-CCNC: 282 U/L (ref 12–78)
ALT SERPL W P-5'-P-CCNC: 32 U/L (ref 12–78)
ALT SERPL W P-5'-P-CCNC: 34 U/L (ref 12–78)
ALT SERPL W P-5'-P-CCNC: 423 U/L (ref 12–78)
ALT SERPL W P-5'-P-CCNC: 68 U/L (ref 12–78)
ALT SERPL W P-5'-P-CCNC: 85 U/L (ref 12–78)
AMMONIA PLAS-SCNC: 20 UMOL/L (ref 11–35)
AMORPH URATE CRY URNS QL MICRO: ABNORMAL
ANION GAP SERPL CALCULATED.3IONS-SCNC: 1 MMOL/L (ref 4–13)
ANION GAP SERPL CALCULATED.3IONS-SCNC: 1 MMOL/L (ref 4–13)
ANION GAP SERPL CALCULATED.3IONS-SCNC: 2 MMOL/L (ref 4–13)
ANION GAP SERPL CALCULATED.3IONS-SCNC: 3 MMOL/L (ref 4–13)
ANION GAP SERPL CALCULATED.3IONS-SCNC: 4 MMOL/L (ref 4–13)
ANION GAP SERPL CALCULATED.3IONS-SCNC: 6 MMOL/L (ref 4–13)
ANION GAP SERPL CALCULATED.3IONS-SCNC: 7 MMOL/L (ref 4–13)
ANION GAP SERPL CALCULATED.3IONS-SCNC: 8 MMOL/L (ref 4–13)
ANION GAP SERPL CALCULATED.3IONS-SCNC: 8 MMOL/L (ref 4–13)
ANISOCYTOSIS BLD QL SMEAR: PRESENT
AORTIC ROOT: 3.5 CM
AORTIC VALVE MEAN VELOCITY: 8.9 M/S
APICAL FOUR CHAMBER EJECTION FRACTION: 55 %
APTT PPP: 29 SECONDS (ref 23–37)
APTT PPP: 32 SECONDS (ref 23–37)
APTT PPP: 32 SECONDS (ref 23–37)
APTT PPP: 43 SECONDS (ref 23–37)
APTT PPP: 55 SECONDS (ref 23–37)
APTT PPP: 60 SECONDS (ref 23–37)
APTT PPP: 63 SECONDS (ref 23–37)
APTT PPP: 73 SECONDS (ref 23–37)
ASCENDING AORTA: 3.7 CM
AST SERPL W P-5'-P-CCNC: 124 U/L (ref 5–45)
AST SERPL W P-5'-P-CCNC: 15 U/L (ref 5–45)
AST SERPL W P-5'-P-CCNC: 18 U/L (ref 5–45)
AST SERPL W P-5'-P-CCNC: 190 U/L (ref 5–45)
AST SERPL W P-5'-P-CCNC: 225 U/L (ref 5–45)
AST SERPL W P-5'-P-CCNC: 23 U/L (ref 5–45)
AST SERPL W P-5'-P-CCNC: 24 U/L (ref 5–45)
AST SERPL W P-5'-P-CCNC: 32 U/L (ref 5–45)
AST SERPL W P-5'-P-CCNC: 42 U/L (ref 5–45)
AST SERPL W P-5'-P-CCNC: 473 U/L (ref 5–45)
AST SERPL W P-5'-P-CCNC: 68 U/L (ref 5–45)
AST SERPL W P-5'-P-CCNC: 78 U/L (ref 5–45)
ATRIAL RATE: 130 BPM
ATRIAL RATE: 142 BPM
ATRIAL RATE: 153 BPM
ATRIAL RATE: 80 BPM
ATRIAL RATE: 92 BPM
ATRIAL RATE: 96 BPM
AV LVOT MEAN GRADIENT: 2 MMHG
AV LVOT PEAK GRADIENT: 3 MMHG
AV MEAN GRADIENT: 3 MMHG
AV PEAK GRADIENT: 5 MMHG
AV VELOCITY RATIO: 0.77
BACTERIA BLD CULT: ABNORMAL
BACTERIA BLD CULT: ABNORMAL
BACTERIA BLD CULT: NORMAL
BACTERIA BLD CULT: NORMAL
BACTERIA UR QL AUTO: ABNORMAL /HPF
BASE EX.OXY STD BLDV CALC-SCNC: 87.7 % (ref 60–80)
BASE EXCESS BLDA CALC-SCNC: 2.4 MMOL/L
BASE EXCESS BLDA CALC-SCNC: 3 MMOL/L
BASE EXCESS BLDA CALC-SCNC: 4.2 MMOL/L
BASE EXCESS BLDV CALC-SCNC: 3 MMOL/L
BASOPHILS # BLD AUTO: 0.06 THOUSANDS/ΜL (ref 0–0.1)
BASOPHILS # BLD AUTO: 0.06 THOUSANDS/ΜL (ref 0–0.1)
BASOPHILS # BLD AUTO: 0.07 THOUSANDS/ÂΜL (ref 0–0.1)
BASOPHILS # BLD AUTO: 0.07 THOUSANDS/ΜL (ref 0–0.1)
BASOPHILS # BLD AUTO: 0.07 THOUSANDS/ΜL (ref 0–0.1)
BASOPHILS # BLD AUTO: 0.08 THOUSANDS/ΜL (ref 0–0.1)
BASOPHILS # BLD MANUAL: 0 THOUSAND/UL (ref 0–0.1)
BASOPHILS # BLD MANUAL: 0 THOUSAND/UL (ref 0–0.1)
BASOPHILS NFR BLD AUTO: 0 % (ref 0–1)
BASOPHILS NFR BLD AUTO: 1 % (ref 0–1)
BASOPHILS NFR BLD AUTO: 1 % (ref 0–1)
BASOPHILS NFR MAR MANUAL: 0 % (ref 0–1)
BASOPHILS NFR MAR MANUAL: 0 % (ref 0–1)
BILIRUB DIRECT SERPL-MCNC: 0.76 MG/DL (ref 0–0.2)
BILIRUB SERPL-MCNC: 0.33 MG/DL (ref 0.2–1)
BILIRUB SERPL-MCNC: 0.53 MG/DL (ref 0.2–1)
BILIRUB SERPL-MCNC: 0.57 MG/DL (ref 0.2–1)
BILIRUB SERPL-MCNC: 0.66 MG/DL (ref 0.2–1)
BILIRUB SERPL-MCNC: 0.81 MG/DL (ref 0.2–1)
BILIRUB SERPL-MCNC: 0.87 MG/DL (ref 0.2–1)
BILIRUB SERPL-MCNC: 1.24 MG/DL (ref 0.2–1)
BILIRUB SERPL-MCNC: 1.46 MG/DL (ref 0.2–1)
BILIRUB SERPL-MCNC: 2.56 MG/DL (ref 0.2–1)
BILIRUB SERPL-MCNC: 2.97 MG/DL (ref 0.2–1)
BILIRUB SERPL-MCNC: 3.67 MG/DL (ref 0.2–1)
BILIRUB SERPL-MCNC: 3.89 MG/DL (ref 0.2–1)
BILIRUB UR QL STRIP: ABNORMAL
BILIRUB UR QL STRIP: ABNORMAL
BILIRUB UR QL STRIP: NEGATIVE
BLACTX-M ISLT/SPM QL: DETECTED
BUN SERPL-MCNC: 12 MG/DL (ref 5–25)
BUN SERPL-MCNC: 12 MG/DL (ref 5–25)
BUN SERPL-MCNC: 13 MG/DL (ref 5–25)
BUN SERPL-MCNC: 13 MG/DL (ref 5–25)
BUN SERPL-MCNC: 14 MG/DL (ref 5–25)
BUN SERPL-MCNC: 14 MG/DL (ref 5–25)
BUN SERPL-MCNC: 17 MG/DL (ref 5–25)
BUN SERPL-MCNC: 17 MG/DL (ref 5–25)
BUN SERPL-MCNC: 18 MG/DL (ref 5–25)
BUN SERPL-MCNC: 19 MG/DL (ref 5–25)
BUN SERPL-MCNC: 19 MG/DL (ref 5–25)
BUN SERPL-MCNC: 21 MG/DL (ref 5–25)
BUN SERPL-MCNC: 22 MG/DL (ref 5–25)
BUN SERPL-MCNC: 23 MG/DL (ref 5–25)
BUN SERPL-MCNC: 25 MG/DL (ref 5–25)
BUN SERPL-MCNC: 29 MG/DL (ref 5–25)
CALCIUM ALBUM COR SERPL-MCNC: 10 MG/DL (ref 8.3–10.1)
CALCIUM ALBUM COR SERPL-MCNC: 10.1 MG/DL (ref 8.3–10.1)
CALCIUM ALBUM COR SERPL-MCNC: 10.2 MG/DL (ref 8.3–10.1)
CALCIUM ALBUM COR SERPL-MCNC: 10.2 MG/DL (ref 8.3–10.1)
CALCIUM ALBUM COR SERPL-MCNC: 10.3 MG/DL (ref 8.3–10.1)
CALCIUM ALBUM COR SERPL-MCNC: 10.4 MG/DL (ref 8.3–10.1)
CALCIUM ALBUM COR SERPL-MCNC: 9.5 MG/DL (ref 8.3–10.1)
CALCIUM ALBUM COR SERPL-MCNC: 9.6 MG/DL (ref 8.3–10.1)
CALCIUM ALBUM COR SERPL-MCNC: 9.6 MG/DL (ref 8.3–10.1)
CALCIUM ALBUM COR SERPL-MCNC: 9.7 MG/DL (ref 8.3–10.1)
CALCIUM ALBUM COR SERPL-MCNC: 9.9 MG/DL (ref 8.3–10.1)
CALCIUM SERPL-MCNC: 8 MG/DL (ref 8.3–10.1)
CALCIUM SERPL-MCNC: 8.1 MG/DL (ref 8.3–10.1)
CALCIUM SERPL-MCNC: 8.2 MG/DL (ref 8.3–10.1)
CALCIUM SERPL-MCNC: 8.3 MG/DL (ref 8.3–10.1)
CALCIUM SERPL-MCNC: 8.4 MG/DL (ref 8.3–10.1)
CALCIUM SERPL-MCNC: 8.5 MG/DL (ref 8.3–10.1)
CALCIUM SERPL-MCNC: 8.6 MG/DL (ref 8.3–10.1)
CALCIUM SERPL-MCNC: 8.7 MG/DL (ref 8.3–10.1)
CALCIUM SERPL-MCNC: 8.8 MG/DL (ref 8.3–10.1)
CALCIUM SERPL-MCNC: 8.9 MG/DL (ref 8.3–10.1)
CALCIUM SERPL-MCNC: 9 MG/DL (ref 8.3–10.1)
CANCER AG19-9 SERPL-ACNC: 682 U/ML (ref 0–35)
CARDIAC TROPONIN I PNL SERPL HS: 21 NG/L
CARDIAC TROPONIN I PNL SERPL HS: 23 NG/L
CARDIAC TROPONIN I PNL SERPL HS: 76 NG/L
CARDIAC TROPONIN I PNL SERPL HS: 80 NG/L
CARDIAC TROPONIN I PNL SERPL HS: 89 NG/L
CEA SERPL-MCNC: 1.8 NG/ML (ref 0–3)
CHLORIDE SERPL-SCNC: 100 MMOL/L (ref 100–108)
CHLORIDE SERPL-SCNC: 101 MMOL/L (ref 96–108)
CHLORIDE SERPL-SCNC: 103 MMOL/L (ref 96–108)
CHLORIDE SERPL-SCNC: 104 MMOL/L (ref 100–108)
CHLORIDE SERPL-SCNC: 106 MMOL/L (ref 100–108)
CHLORIDE SERPL-SCNC: 107 MMOL/L (ref 100–108)
CHLORIDE SERPL-SCNC: 107 MMOL/L (ref 96–108)
CHLORIDE SERPL-SCNC: 108 MMOL/L (ref 96–108)
CHLORIDE SERPL-SCNC: 108 MMOL/L (ref 96–108)
CHLORIDE SERPL-SCNC: 110 MMOL/L (ref 96–108)
CHLORIDE SERPL-SCNC: 112 MMOL/L (ref 96–108)
CHLORIDE SERPL-SCNC: 112 MMOL/L (ref 96–108)
CHLORIDE SERPL-SCNC: 113 MMOL/L (ref 96–108)
CHLORIDE SERPL-SCNC: 114 MMOL/L (ref 96–108)
CHLORIDE SERPL-SCNC: 115 MMOL/L (ref 96–108)
CHLORIDE SERPL-SCNC: 116 MMOL/L (ref 96–108)
CHOLEST SERPL-MCNC: 69 MG/DL
CLARITY UR: ABNORMAL
CLARITY UR: CLEAR
CLARITY UR: CLEAR
CO2 SERPL-SCNC: 26 MMOL/L (ref 21–32)
CO2 SERPL-SCNC: 27 MMOL/L (ref 21–32)
CO2 SERPL-SCNC: 29 MMOL/L (ref 21–32)
CO2 SERPL-SCNC: 30 MMOL/L (ref 21–32)
CO2 SERPL-SCNC: 31 MMOL/L (ref 21–32)
CO2 SERPL-SCNC: 32 MMOL/L (ref 21–32)
CO2 SERPL-SCNC: 33 MMOL/L (ref 21–32)
CO2 SERPL-SCNC: 34 MMOL/L (ref 21–32)
COLOR UR: ABNORMAL
COLOR UR: ABNORMAL
COLOR UR: YELLOW
CREAT SERPL-MCNC: 0.71 MG/DL (ref 0.6–1.3)
CREAT SERPL-MCNC: 0.76 MG/DL (ref 0.6–1.3)
CREAT SERPL-MCNC: 0.81 MG/DL (ref 0.6–1.3)
CREAT SERPL-MCNC: 0.84 MG/DL (ref 0.6–1.3)
CREAT SERPL-MCNC: 0.84 MG/DL (ref 0.6–1.3)
CREAT SERPL-MCNC: 0.89 MG/DL (ref 0.6–1.3)
CREAT SERPL-MCNC: 0.92 MG/DL (ref 0.6–1.3)
CREAT SERPL-MCNC: 0.97 MG/DL (ref 0.6–1.3)
CREAT SERPL-MCNC: 1 MG/DL (ref 0.6–1.3)
CREAT SERPL-MCNC: 1.02 MG/DL (ref 0.6–1.3)
CREAT SERPL-MCNC: 1.1 MG/DL (ref 0.6–1.3)
CREAT SERPL-MCNC: 1.15 MG/DL (ref 0.6–1.3)
CREAT SERPL-MCNC: 1.37 MG/DL (ref 0.6–1.3)
CREAT SERPL-MCNC: 1.38 MG/DL (ref 0.6–1.3)
CREAT SERPL-MCNC: 1.43 MG/DL (ref 0.6–1.3)
CREAT SERPL-MCNC: 1.78 MG/DL (ref 0.6–1.3)
DOP CALC AO PEAK VEL: 1.17 M/S
DOP CALC AO VTI: 16.51 CM
DOP CALC LVOT PEAK VEL VTI: 16.22 CM
DOP CALC LVOT PEAK VEL: 0.9 M/S
EOSINOPHIL # BLD AUTO: 0.07 THOUSAND/ΜL (ref 0–0.61)
EOSINOPHIL # BLD AUTO: 0.11 THOUSAND/ÂΜL (ref 0–0.61)
EOSINOPHIL # BLD AUTO: 0.16 THOUSAND/ΜL (ref 0–0.61)
EOSINOPHIL # BLD AUTO: 0.21 THOUSAND/ΜL (ref 0–0.61)
EOSINOPHIL # BLD AUTO: 0.42 THOUSAND/ΜL (ref 0–0.61)
EOSINOPHIL # BLD AUTO: 0.55 THOUSAND/ΜL (ref 0–0.61)
EOSINOPHIL # BLD MANUAL: 0 THOUSAND/UL (ref 0–0.4)
EOSINOPHIL # BLD MANUAL: 0 THOUSAND/UL (ref 0–0.4)
EOSINOPHIL NFR BLD AUTO: 0 % (ref 0–6)
EOSINOPHIL NFR BLD AUTO: 1 % (ref 0–6)
EOSINOPHIL NFR BLD AUTO: 1 % (ref 0–6)
EOSINOPHIL NFR BLD AUTO: 2 % (ref 0–6)
EOSINOPHIL NFR BLD AUTO: 4 % (ref 0–6)
EOSINOPHIL NFR BLD AUTO: 5 % (ref 0–6)
EOSINOPHIL NFR BLD MANUAL: 0 % (ref 0–6)
EOSINOPHIL NFR BLD MANUAL: 0 % (ref 0–6)
ERYTHROCYTE [DISTWIDTH] IN BLOOD BY AUTOMATED COUNT: 18.6 % (ref 11.6–15.1)
ERYTHROCYTE [DISTWIDTH] IN BLOOD BY AUTOMATED COUNT: 18.7 % (ref 11.6–15.1)
ERYTHROCYTE [DISTWIDTH] IN BLOOD BY AUTOMATED COUNT: 18.8 % (ref 11.6–15.1)
ERYTHROCYTE [DISTWIDTH] IN BLOOD BY AUTOMATED COUNT: 18.9 % (ref 11.6–15.1)
ERYTHROCYTE [DISTWIDTH] IN BLOOD BY AUTOMATED COUNT: 19 % (ref 11.6–15.1)
ERYTHROCYTE [DISTWIDTH] IN BLOOD BY AUTOMATED COUNT: 19.6 % (ref 11.6–15.1)
ERYTHROCYTE [DISTWIDTH] IN BLOOD BY AUTOMATED COUNT: 19.9 % (ref 11.6–15.1)
ERYTHROCYTE [DISTWIDTH] IN BLOOD BY AUTOMATED COUNT: 20.1 % (ref 11.6–15.1)
ERYTHROCYTE [DISTWIDTH] IN BLOOD BY AUTOMATED COUNT: 20.2 % (ref 11.6–15.1)
ERYTHROCYTE [DISTWIDTH] IN BLOOD BY AUTOMATED COUNT: 20.4 % (ref 11.6–15.1)
ERYTHROCYTE [DISTWIDTH] IN BLOOD BY AUTOMATED COUNT: 20.4 % (ref 11.6–15.1)
EST. AVERAGE GLUCOSE BLD GHB EST-MCNC: 169 MG/DL
FLUAV RNA RESP QL NAA+PROBE: NEGATIVE
FLUBV RNA RESP QL NAA+PROBE: NEGATIVE
FRACTIONAL SHORTENING: 23 % (ref 28–44)
GFR SERPL CREATININE-BSD FRML MDRD: 34 ML/MIN/1.73SQ M
GFR SERPL CREATININE-BSD FRML MDRD: 44 ML/MIN/1.73SQ M
GFR SERPL CREATININE-BSD FRML MDRD: 46 ML/MIN/1.73SQ M
GFR SERPL CREATININE-BSD FRML MDRD: 46 ML/MIN/1.73SQ M
GFR SERPL CREATININE-BSD FRML MDRD: 57 ML/MIN/1.73SQ M
GFR SERPL CREATININE-BSD FRML MDRD: 60 ML/MIN/1.73SQ M
GFR SERPL CREATININE-BSD FRML MDRD: 66 ML/MIN/1.73SQ M
GFR SERPL CREATININE-BSD FRML MDRD: 68 ML/MIN/1.73SQ M
GFR SERPL CREATININE-BSD FRML MDRD: 70 ML/MIN/1.73SQ M
GFR SERPL CREATININE-BSD FRML MDRD: 75 ML/MIN/1.73SQ M
GFR SERPL CREATININE-BSD FRML MDRD: 77 ML/MIN/1.73SQ M
GFR SERPL CREATININE-BSD FRML MDRD: 79 ML/MIN/1.73SQ M
GFR SERPL CREATININE-BSD FRML MDRD: 79 ML/MIN/1.73SQ M
GFR SERPL CREATININE-BSD FRML MDRD: 81 ML/MIN/1.73SQ M
GFR SERPL CREATININE-BSD FRML MDRD: 83 ML/MIN/1.73SQ M
GFR SERPL CREATININE-BSD FRML MDRD: 85 ML/MIN/1.73SQ M
GLUCOSE SERPL-MCNC: 105 MG/DL (ref 65–140)
GLUCOSE SERPL-MCNC: 107 MG/DL (ref 65–140)
GLUCOSE SERPL-MCNC: 107 MG/DL (ref 65–140)
GLUCOSE SERPL-MCNC: 108 MG/DL (ref 65–140)
GLUCOSE SERPL-MCNC: 110 MG/DL (ref 65–140)
GLUCOSE SERPL-MCNC: 111 MG/DL (ref 65–140)
GLUCOSE SERPL-MCNC: 116 MG/DL (ref 65–140)
GLUCOSE SERPL-MCNC: 116 MG/DL (ref 65–140)
GLUCOSE SERPL-MCNC: 117 MG/DL (ref 65–140)
GLUCOSE SERPL-MCNC: 120 MG/DL (ref 65–140)
GLUCOSE SERPL-MCNC: 123 MG/DL (ref 65–140)
GLUCOSE SERPL-MCNC: 123 MG/DL (ref 65–140)
GLUCOSE SERPL-MCNC: 126 MG/DL (ref 65–140)
GLUCOSE SERPL-MCNC: 127 MG/DL (ref 65–140)
GLUCOSE SERPL-MCNC: 130 MG/DL (ref 65–140)
GLUCOSE SERPL-MCNC: 135 MG/DL (ref 65–140)
GLUCOSE SERPL-MCNC: 136 MG/DL (ref 65–140)
GLUCOSE SERPL-MCNC: 141 MG/DL (ref 65–140)
GLUCOSE SERPL-MCNC: 148 MG/DL (ref 65–140)
GLUCOSE SERPL-MCNC: 153 MG/DL (ref 65–140)
GLUCOSE SERPL-MCNC: 153 MG/DL (ref 65–140)
GLUCOSE SERPL-MCNC: 155 MG/DL (ref 65–140)
GLUCOSE SERPL-MCNC: 156 MG/DL (ref 65–140)
GLUCOSE SERPL-MCNC: 162 MG/DL (ref 65–140)
GLUCOSE SERPL-MCNC: 167 MG/DL (ref 65–140)
GLUCOSE SERPL-MCNC: 171 MG/DL (ref 65–140)
GLUCOSE SERPL-MCNC: 174 MG/DL (ref 65–140)
GLUCOSE SERPL-MCNC: 178 MG/DL (ref 65–140)
GLUCOSE SERPL-MCNC: 181 MG/DL (ref 65–140)
GLUCOSE SERPL-MCNC: 185 MG/DL (ref 65–140)
GLUCOSE SERPL-MCNC: 189 MG/DL (ref 65–140)
GLUCOSE SERPL-MCNC: 193 MG/DL (ref 65–140)
GLUCOSE SERPL-MCNC: 194 MG/DL (ref 65–140)
GLUCOSE SERPL-MCNC: 195 MG/DL (ref 65–140)
GLUCOSE SERPL-MCNC: 195 MG/DL (ref 65–140)
GLUCOSE SERPL-MCNC: 196 MG/DL (ref 65–140)
GLUCOSE SERPL-MCNC: 196 MG/DL (ref 65–140)
GLUCOSE SERPL-MCNC: 202 MG/DL (ref 65–140)
GLUCOSE SERPL-MCNC: 202 MG/DL (ref 65–140)
GLUCOSE SERPL-MCNC: 203 MG/DL (ref 65–140)
GLUCOSE SERPL-MCNC: 206 MG/DL (ref 65–140)
GLUCOSE SERPL-MCNC: 209 MG/DL (ref 65–140)
GLUCOSE SERPL-MCNC: 213 MG/DL (ref 65–140)
GLUCOSE SERPL-MCNC: 214 MG/DL (ref 65–140)
GLUCOSE SERPL-MCNC: 217 MG/DL (ref 65–140)
GLUCOSE SERPL-MCNC: 217 MG/DL (ref 65–140)
GLUCOSE SERPL-MCNC: 218 MG/DL (ref 65–140)
GLUCOSE SERPL-MCNC: 220 MG/DL (ref 65–140)
GLUCOSE SERPL-MCNC: 221 MG/DL (ref 65–140)
GLUCOSE SERPL-MCNC: 222 MG/DL (ref 65–140)
GLUCOSE SERPL-MCNC: 222 MG/DL (ref 65–140)
GLUCOSE SERPL-MCNC: 223 MG/DL (ref 65–140)
GLUCOSE SERPL-MCNC: 224 MG/DL (ref 65–140)
GLUCOSE SERPL-MCNC: 224 MG/DL (ref 65–140)
GLUCOSE SERPL-MCNC: 226 MG/DL (ref 65–140)
GLUCOSE SERPL-MCNC: 228 MG/DL (ref 65–140)
GLUCOSE SERPL-MCNC: 229 MG/DL (ref 65–140)
GLUCOSE SERPL-MCNC: 229 MG/DL (ref 65–140)
GLUCOSE SERPL-MCNC: 232 MG/DL (ref 65–140)
GLUCOSE SERPL-MCNC: 235 MG/DL (ref 65–140)
GLUCOSE SERPL-MCNC: 238 MG/DL (ref 65–140)
GLUCOSE SERPL-MCNC: 248 MG/DL (ref 65–140)
GLUCOSE SERPL-MCNC: 249 MG/DL (ref 65–140)
GLUCOSE SERPL-MCNC: 249 MG/DL (ref 65–140)
GLUCOSE SERPL-MCNC: 254 MG/DL (ref 65–140)
GLUCOSE SERPL-MCNC: 256 MG/DL (ref 65–140)
GLUCOSE SERPL-MCNC: 257 MG/DL (ref 65–140)
GLUCOSE SERPL-MCNC: 259 MG/DL (ref 65–140)
GLUCOSE SERPL-MCNC: 259 MG/DL (ref 65–140)
GLUCOSE SERPL-MCNC: 265 MG/DL (ref 65–140)
GLUCOSE SERPL-MCNC: 267 MG/DL (ref 65–140)
GLUCOSE SERPL-MCNC: 270 MG/DL (ref 65–140)
GLUCOSE SERPL-MCNC: 277 MG/DL (ref 65–140)
GLUCOSE SERPL-MCNC: 280 MG/DL (ref 65–140)
GLUCOSE SERPL-MCNC: 283 MG/DL (ref 65–140)
GLUCOSE SERPL-MCNC: 283 MG/DL (ref 65–140)
GLUCOSE SERPL-MCNC: 295 MG/DL (ref 65–140)
GLUCOSE SERPL-MCNC: 300 MG/DL (ref 65–140)
GLUCOSE SERPL-MCNC: 301 MG/DL (ref 65–140)
GLUCOSE SERPL-MCNC: 303 MG/DL (ref 65–140)
GLUCOSE SERPL-MCNC: 314 MG/DL (ref 65–140)
GLUCOSE SERPL-MCNC: 315 MG/DL (ref 65–140)
GLUCOSE SERPL-MCNC: 316 MG/DL (ref 65–140)
GLUCOSE SERPL-MCNC: 317 MG/DL (ref 65–140)
GLUCOSE SERPL-MCNC: 319 MG/DL (ref 65–140)
GLUCOSE SERPL-MCNC: 319 MG/DL (ref 65–140)
GLUCOSE SERPL-MCNC: 321 MG/DL (ref 65–140)
GLUCOSE SERPL-MCNC: 322 MG/DL (ref 65–140)
GLUCOSE SERPL-MCNC: 326 MG/DL (ref 65–140)
GLUCOSE SERPL-MCNC: 328 MG/DL (ref 65–140)
GLUCOSE SERPL-MCNC: 330 MG/DL (ref 65–140)
GLUCOSE SERPL-MCNC: 332 MG/DL (ref 65–140)
GLUCOSE SERPL-MCNC: 355 MG/DL (ref 65–140)
GLUCOSE SERPL-MCNC: 356 MG/DL (ref 65–140)
GLUCOSE SERPL-MCNC: 358 MG/DL (ref 65–140)
GLUCOSE SERPL-MCNC: 367 MG/DL (ref 65–140)
GLUCOSE SERPL-MCNC: 390 MG/DL (ref 65–140)
GLUCOSE SERPL-MCNC: 392 MG/DL (ref 65–140)
GLUCOSE SERPL-MCNC: 394 MG/DL (ref 65–140)
GLUCOSE SERPL-MCNC: 408 MG/DL (ref 65–140)
GLUCOSE SERPL-MCNC: 42 MG/DL (ref 65–140)
GLUCOSE SERPL-MCNC: 45 MG/DL (ref 65–140)
GLUCOSE SERPL-MCNC: 68 MG/DL (ref 65–140)
GLUCOSE SERPL-MCNC: 89 MG/DL (ref 65–140)
GLUCOSE SERPL-MCNC: 89 MG/DL (ref 65–140)
GLUCOSE SERPL-MCNC: 92 MG/DL (ref 65–140)
GLUCOSE SERPL-MCNC: 93 MG/DL (ref 65–140)
GLUCOSE SERPL-MCNC: 97 MG/DL (ref 65–140)
GLUCOSE SERPL-MCNC: 99 MG/DL (ref 65–140)
GLUCOSE UR STRIP-MCNC: ABNORMAL MG/DL
GLUCOSE UR STRIP-MCNC: ABNORMAL MG/DL
GLUCOSE UR STRIP-MCNC: NEGATIVE MG/DL
GRAM STN SPEC: ABNORMAL
HBA1C MFR BLD: 7.5 %
HCO3 BLDA-SCNC: 25.2 MMOL/L (ref 22–28)
HCO3 BLDA-SCNC: 27 MMOL/L (ref 22–28)
HCO3 BLDA-SCNC: 28.3 MMOL/L (ref 22–28)
HCO3 BLDV-SCNC: 27.2 MMOL/L (ref 24–30)
HCT VFR BLD AUTO: 30.8 % (ref 36.5–49.3)
HCT VFR BLD AUTO: 31.7 % (ref 36.5–49.3)
HCT VFR BLD AUTO: 32.6 % (ref 36.5–49.3)
HCT VFR BLD AUTO: 33.5 % (ref 36.5–49.3)
HCT VFR BLD AUTO: 33.8 % (ref 36.5–49.3)
HCT VFR BLD AUTO: 34.6 % (ref 36.5–49.3)
HCT VFR BLD AUTO: 34.7 % (ref 36.5–49.3)
HCT VFR BLD AUTO: 34.8 % (ref 36.5–49.3)
HCT VFR BLD AUTO: 34.9 % (ref 36.5–49.3)
HCT VFR BLD AUTO: 35.1 % (ref 36.5–49.3)
HCT VFR BLD AUTO: 37.3 % (ref 36.5–49.3)
HCT VFR BLD AUTO: 37.7 % (ref 36.5–49.3)
HCT VFR BLD AUTO: 40.2 % (ref 36.5–49.3)
HDLC SERPL-MCNC: 6 MG/DL
HGB BLD-MCNC: 10 G/DL (ref 12–17)
HGB BLD-MCNC: 10.2 G/DL (ref 12–17)
HGB BLD-MCNC: 10.3 G/DL (ref 12–17)
HGB BLD-MCNC: 10.5 G/DL (ref 12–17)
HGB BLD-MCNC: 10.5 G/DL (ref 12–17)
HGB BLD-MCNC: 10.6 G/DL (ref 12–17)
HGB BLD-MCNC: 10.6 G/DL (ref 12–17)
HGB BLD-MCNC: 11 G/DL (ref 12–17)
HGB BLD-MCNC: 11.1 G/DL (ref 12–17)
HGB BLD-MCNC: 11.8 G/DL (ref 12–17)
HGB BLD-MCNC: 12 G/DL (ref 12–17)
HGB BLD-MCNC: 12.5 G/DL (ref 12–17)
HGB BLD-MCNC: 9.9 G/DL (ref 12–17)
HGB UR QL STRIP.AUTO: ABNORMAL
HGB UR QL STRIP.AUTO: NEGATIVE
HGB UR QL STRIP.AUTO: NEGATIVE
HOROWITZ INDEX BLDA+IHG-RTO: 40 MM[HG]
HOROWITZ INDEX BLDA+IHG-RTO: 40 MM[HG]
HYALINE CASTS #/AREA URNS LPF: ABNORMAL /LPF
HYPERCHROMIA BLD QL SMEAR: PRESENT
HYPERCHROMIA BLD QL SMEAR: PRESENT
IMM GRANULOCYTES # BLD AUTO: 0.06 THOUSAND/UL (ref 0–0.2)
IMM GRANULOCYTES # BLD AUTO: 0.13 THOUSAND/UL (ref 0–0.2)
IMM GRANULOCYTES # BLD AUTO: 0.14 THOUSAND/UL (ref 0–0.2)
IMM GRANULOCYTES # BLD AUTO: 0.15 THOUSAND/UL (ref 0–0.2)
IMM GRANULOCYTES # BLD AUTO: 0.23 THOUSAND/UL (ref 0–0.2)
IMM GRANULOCYTES # BLD AUTO: 0.43 THOUSAND/UL (ref 0–0.2)
IMM GRANULOCYTES NFR BLD AUTO: 1 % (ref 0–2)
IMM GRANULOCYTES NFR BLD AUTO: 2 % (ref 0–2)
IMM GRANULOCYTES NFR BLD AUTO: 2 % (ref 0–2)
INR PPP: 1.3 (ref 0.84–1.19)
INR PPP: 1.32 (ref 0.84–1.19)
INR PPP: 1.5 (ref 0.84–1.19)
INTERVENTRICULAR SEPTUM IN DIASTOLE (PARASTERNAL SHORT AXIS VIEW): 1.3 CM
INTERVENTRICULAR SEPTUM: 1.3 CM (ref 0.6–1.1)
K OXYTOCA DNA BLD POS QL NAA+NON-PROBE: DETECTED
KETONES UR STRIP-MCNC: NEGATIVE MG/DL
LAAS-AP2: 29.1 CM2
LAAS-AP4: 29.3 CM2
LACTATE SERPL-SCNC: 1.8 MMOL/L (ref 0.5–2)
LACTATE SERPL-SCNC: 2.2 MMOL/L (ref 0.5–2)
LACTATE SERPL-SCNC: 2.4 MMOL/L (ref 0.5–2)
LACTATE SERPL-SCNC: 2.5 MMOL/L (ref 0.5–2)
LACTATE SERPL-SCNC: 3.5 MMOL/L (ref 0.5–2)
LACTATE SERPL-SCNC: 3.5 MMOL/L (ref 0.5–2)
LEFT ATRIUM SIZE: 4.3 CM
LEFT INTERNAL DIMENSION IN SYSTOLE: 4 CM (ref 2.1–4)
LEFT VENTRICLE DIASTOLIC VOLUME (MOD BIPLANE): 92 ML
LEFT VENTRICLE SYSTOLIC VOLUME (MOD BIPLANE): 50 ML
LEFT VENTRICULAR INTERNAL DIMENSION IN DIASTOLE: 5.2 CM (ref 3.5–6)
LEFT VENTRICULAR POSTERIOR WALL IN END DIASTOLE: 1 CM
LEFT VENTRICULAR STROKE VOLUME: 60 ML
LEUKOCYTE ESTERASE UR QL STRIP: NEGATIVE
LIPASE SERPL-CCNC: 26 U/L (ref 73–393)
LIPASE SERPL-CCNC: 28 U/L (ref 73–393)
LIPASE SERPL-CCNC: 400 U/L (ref 73–393)
LV EF: 46 %
LVSV (TEICH): 60 ML
LYMPHOCYTES # BLD AUTO: 0.32 THOUSAND/UL (ref 0.6–4.47)
LYMPHOCYTES # BLD AUTO: 0.61 THOUSANDS/ΜL (ref 0.6–4.47)
LYMPHOCYTES # BLD AUTO: 0.66 THOUSAND/UL (ref 0.6–4.47)
LYMPHOCYTES # BLD AUTO: 0.77 THOUSANDS/ÂΜL (ref 0.6–4.47)
LYMPHOCYTES # BLD AUTO: 0.85 THOUSANDS/ΜL (ref 0.6–4.47)
LYMPHOCYTES # BLD AUTO: 0.87 THOUSANDS/ΜL (ref 0.6–4.47)
LYMPHOCYTES # BLD AUTO: 0.9 THOUSANDS/ΜL (ref 0.6–4.47)
LYMPHOCYTES # BLD AUTO: 1 % (ref 14–44)
LYMPHOCYTES # BLD AUTO: 1.06 THOUSANDS/ΜL (ref 0.6–4.47)
LYMPHOCYTES # BLD AUTO: 3 % (ref 14–44)
LYMPHOCYTES NFR BLD AUTO: 10 % (ref 14–44)
LYMPHOCYTES NFR BLD AUTO: 2 % (ref 14–44)
LYMPHOCYTES NFR BLD AUTO: 4 % (ref 14–44)
LYMPHOCYTES NFR BLD AUTO: 5 % (ref 14–44)
LYMPHOCYTES NFR BLD AUTO: 6 % (ref 14–44)
LYMPHOCYTES NFR BLD AUTO: 8 % (ref 14–44)
MAGNESIUM SERPL-MCNC: 1.8 MG/DL (ref 1.6–2.6)
MAGNESIUM SERPL-MCNC: 2.1 MG/DL (ref 1.6–2.6)
MAGNESIUM SERPL-MCNC: 2.2 MG/DL (ref 1.6–2.6)
MAGNESIUM SERPL-MCNC: 2.3 MG/DL (ref 1.6–2.6)
MAGNESIUM SERPL-MCNC: 2.4 MG/DL (ref 1.6–2.6)
MAGNESIUM SERPL-MCNC: 2.4 MG/DL (ref 1.6–2.6)
MAGNESIUM SERPL-MCNC: 2.5 MG/DL (ref 1.6–2.6)
MAGNESIUM SERPL-MCNC: 2.7 MG/DL (ref 1.6–2.6)
MCH RBC QN AUTO: 24.6 PG (ref 26.8–34.3)
MCH RBC QN AUTO: 24.8 PG (ref 26.8–34.3)
MCH RBC QN AUTO: 24.9 PG (ref 26.8–34.3)
MCH RBC QN AUTO: 24.9 PG (ref 26.8–34.3)
MCH RBC QN AUTO: 25 PG (ref 26.8–34.3)
MCH RBC QN AUTO: 25 PG (ref 26.8–34.3)
MCH RBC QN AUTO: 25.1 PG (ref 26.8–34.3)
MCH RBC QN AUTO: 25.1 PG (ref 26.8–34.3)
MCH RBC QN AUTO: 25.3 PG (ref 26.8–34.3)
MCH RBC QN AUTO: 25.6 PG (ref 26.8–34.3)
MCH RBC QN AUTO: 25.7 PG (ref 26.8–34.3)
MCH RBC QN AUTO: 25.7 PG (ref 26.8–34.3)
MCH RBC QN AUTO: 26 PG (ref 26.8–34.3)
MCHC RBC AUTO-ENTMCNC: 30.3 G/DL (ref 31.4–37.4)
MCHC RBC AUTO-ENTMCNC: 30.3 G/DL (ref 31.4–37.4)
MCHC RBC AUTO-ENTMCNC: 30.4 G/DL (ref 31.4–37.4)
MCHC RBC AUTO-ENTMCNC: 30.4 G/DL (ref 31.4–37.4)
MCHC RBC AUTO-ENTMCNC: 31.1 G/DL (ref 31.4–37.4)
MCHC RBC AUTO-ENTMCNC: 31.2 G/DL (ref 31.4–37.4)
MCHC RBC AUTO-ENTMCNC: 31.3 G/DL (ref 31.4–37.4)
MCHC RBC AUTO-ENTMCNC: 31.4 G/DL (ref 31.4–37.4)
MCHC RBC AUTO-ENTMCNC: 31.6 G/DL (ref 31.4–37.4)
MCHC RBC AUTO-ENTMCNC: 31.6 G/DL (ref 31.4–37.4)
MCHC RBC AUTO-ENTMCNC: 31.8 G/DL (ref 31.4–37.4)
MCHC RBC AUTO-ENTMCNC: 31.9 G/DL (ref 31.4–37.4)
MCHC RBC AUTO-ENTMCNC: 32.5 G/DL (ref 31.4–37.4)
MCV RBC AUTO: 77 FL (ref 82–98)
MCV RBC AUTO: 78 FL (ref 82–98)
MCV RBC AUTO: 80 FL (ref 82–98)
MCV RBC AUTO: 81 FL (ref 82–98)
MCV RBC AUTO: 82 FL (ref 82–98)
MCV RBC AUTO: 82 FL (ref 82–98)
MCV RBC AUTO: 83 FL (ref 82–98)
METAMYELOCYTES NFR BLD MANUAL: 4 % (ref 0–1)
METAMYELOCYTES NFR BLD MANUAL: 7 % (ref 0–1)
MICROCYTES BLD QL AUTO: PRESENT
MONOCYTES # BLD AUTO: 0.73 THOUSAND/ΜL (ref 0.17–1.22)
MONOCYTES # BLD AUTO: 0.82 THOUSAND/ΜL (ref 0.17–1.22)
MONOCYTES # BLD AUTO: 0.86 THOUSAND/ΜL (ref 0.17–1.22)
MONOCYTES # BLD AUTO: 0.87 THOUSAND/ÂΜL (ref 0.17–1.22)
MONOCYTES # BLD AUTO: 0.88 THOUSAND/UL (ref 0–1.22)
MONOCYTES # BLD AUTO: 0.89 THOUSAND/ΜL (ref 0.17–1.22)
MONOCYTES # BLD AUTO: 1.62 THOUSAND/UL (ref 0–1.22)
MONOCYTES # BLD AUTO: 1.81 THOUSAND/ΜL (ref 0.17–1.22)
MONOCYTES NFR BLD AUTO: 4 % (ref 4–12)
MONOCYTES NFR BLD AUTO: 5 % (ref 4–12)
MONOCYTES NFR BLD AUTO: 6 % (ref 4–12)
MONOCYTES NFR BLD AUTO: 6 % (ref 4–12)
MONOCYTES NFR BLD AUTO: 7 % (ref 4–12)
MONOCYTES NFR BLD AUTO: 8 % (ref 4–12)
MONOCYTES NFR BLD: 4 % (ref 4–12)
MONOCYTES NFR BLD: 5 % (ref 4–12)
MUCOUS THREADS UR QL AUTO: ABNORMAL
MUCOUS THREADS UR QL AUTO: ABNORMAL
MV E'TISSUE VEL-SEP: 7 CM/S
NEUTROPHILS # BLD AUTO: 12.15 THOUSANDS/ΜL (ref 1.85–7.62)
NEUTROPHILS # BLD AUTO: 16.67 THOUSANDS/ΜL (ref 1.85–7.62)
NEUTROPHILS # BLD AUTO: 18.01 THOUSANDS/ÂΜL (ref 1.85–7.62)
NEUTROPHILS # BLD AUTO: 26.36 THOUSANDS/ΜL (ref 1.85–7.62)
NEUTROPHILS # BLD AUTO: 8.28 THOUSANDS/ΜL (ref 1.85–7.62)
NEUTROPHILS # BLD AUTO: 8.57 THOUSANDS/ΜL (ref 1.85–7.62)
NEUTROPHILS # BLD MANUAL: 19.65 THOUSAND/UL (ref 1.85–7.62)
NEUTROPHILS # BLD MANUAL: 28.14 THOUSAND/UL (ref 1.85–7.62)
NEUTS BAND NFR BLD MANUAL: 18 % (ref 0–8)
NEUTS BAND NFR BLD MANUAL: 8 % (ref 0–8)
NEUTS SEG NFR BLD AUTO: 69 % (ref 43–75)
NEUTS SEG NFR BLD AUTO: 76 % (ref 43–75)
NEUTS SEG NFR BLD AUTO: 77 % (ref 43–75)
NEUTS SEG NFR BLD AUTO: 81 % (ref 43–75)
NEUTS SEG NFR BLD AUTO: 85 % (ref 43–75)
NEUTS SEG NFR BLD AUTO: 89 % (ref 43–75)
NEUTS SEG NFR BLD AUTO: 89 % (ref 43–75)
NEUTS SEG NFR BLD AUTO: 90 % (ref 43–75)
NITRITE UR QL STRIP: NEGATIVE
NON-SQ EPI CELLS URNS QL MICRO: ABNORMAL /HPF
NONHDLC SERPL-MCNC: 63 MG/DL
NRBC BLD AUTO-RTO: 0 /100 WBCS
NT-PROBNP SERPL-MCNC: ABNORMAL PG/ML
O2 CT BLDA-SCNC: 14.6 ML/DL (ref 16–23)
O2 CT BLDA-SCNC: 15.1 ML/DL (ref 16–23)
O2 CT BLDA-SCNC: 15.3 ML/DL (ref 16–23)
O2 CT BLDV-SCNC: 13.8 ML/DL
OSMOLALITY UR: 597 MMOL/KG
OVALOCYTES BLD QL SMEAR: PRESENT
OXYHGB MFR BLDA: 97 % (ref 94–97)
OXYHGB MFR BLDA: 97.5 % (ref 94–97)
OXYHGB MFR BLDA: 97.6 % (ref 94–97)
P AXIS: 63 DEGREES
P AXIS: 78 DEGREES
P AXIS: 79 DEGREES
PCO2 BLDA: 33.1 MM HG (ref 36–44)
PCO2 BLDA: 39.1 MM HG (ref 36–44)
PCO2 BLDA: 40.4 MM HG (ref 36–44)
PCO2 BLDV: 40.1 MM HG (ref 42–50)
PEEP RESPIRATORY: 6 CM[H2O]
PEEP RESPIRATORY: 6 CM[H2O]
PH BLDA: 7.46 [PH] (ref 7.35–7.45)
PH BLDA: 7.46 [PH] (ref 7.35–7.45)
PH BLDA: 7.5 [PH] (ref 7.35–7.45)
PH BLDV: 7.45 [PH] (ref 7.3–7.4)
PH UR STRIP.AUTO: 5 [PH] (ref 4.5–8)
PH UR STRIP.AUTO: 6 [PH]
PH UR STRIP.AUTO: 6 [PH] (ref 4.5–8)
PHOSPHATE SERPL-MCNC: 2 MG/DL (ref 2.3–4.1)
PHOSPHATE SERPL-MCNC: 2 MG/DL (ref 2.3–4.1)
PHOSPHATE SERPL-MCNC: 2.3 MG/DL (ref 2.3–4.1)
PHOSPHATE SERPL-MCNC: 2.7 MG/DL (ref 2.3–4.1)
PHOSPHATE SERPL-MCNC: 3.1 MG/DL (ref 2.3–4.1)
PLATELET # BLD AUTO: 181 THOUSANDS/UL (ref 149–390)
PLATELET # BLD AUTO: 192 THOUSANDS/UL (ref 149–390)
PLATELET # BLD AUTO: 205 THOUSANDS/UL (ref 149–390)
PLATELET # BLD AUTO: 222 THOUSANDS/UL (ref 149–390)
PLATELET # BLD AUTO: 238 THOUSANDS/UL (ref 149–390)
PLATELET # BLD AUTO: 262 THOUSANDS/UL (ref 149–390)
PLATELET # BLD AUTO: 275 THOUSANDS/UL (ref 149–390)
PLATELET # BLD AUTO: 279 THOUSANDS/UL (ref 149–390)
PLATELET # BLD AUTO: 304 THOUSANDS/UL (ref 149–390)
PLATELET # BLD AUTO: 307 THOUSANDS/UL (ref 149–390)
PLATELET # BLD AUTO: 319 THOUSANDS/UL (ref 149–390)
PLATELET # BLD AUTO: 323 THOUSANDS/UL (ref 149–390)
PLATELET # BLD AUTO: 335 THOUSANDS/UL (ref 149–390)
PLATELET BLD QL SMEAR: ADEQUATE
PLATELET BLD QL SMEAR: ADEQUATE
PMV BLD AUTO: 10 FL (ref 8.9–12.7)
PMV BLD AUTO: 10.4 FL (ref 8.9–12.7)
PMV BLD AUTO: 10.5 FL (ref 8.9–12.7)
PMV BLD AUTO: 10.7 FL (ref 8.9–12.7)
PMV BLD AUTO: 10.7 FL (ref 8.9–12.7)
PMV BLD AUTO: 10.8 FL (ref 8.9–12.7)
PMV BLD AUTO: 11 FL (ref 8.9–12.7)
PMV BLD AUTO: 11.1 FL (ref 8.9–12.7)
PMV BLD AUTO: 11.3 FL (ref 8.9–12.7)
PMV BLD AUTO: 11.4 FL (ref 8.9–12.7)
PMV BLD AUTO: 11.4 FL (ref 8.9–12.7)
PMV BLD AUTO: 11.6 FL (ref 8.9–12.7)
PMV BLD AUTO: 11.7 FL (ref 8.9–12.7)
PO2 BLDA: 111.2 MM HG (ref 75–129)
PO2 BLDA: 133.6 MM HG (ref 75–129)
PO2 BLDA: 152.4 MM HG (ref 75–129)
PO2 BLDV: 59.8 MM HG (ref 35–45)
POIKILOCYTOSIS BLD QL SMEAR: PRESENT
POIKILOCYTOSIS BLD QL SMEAR: PRESENT
POLYCHROMASIA BLD QL SMEAR: PRESENT
POLYCHROMASIA BLD QL SMEAR: PRESENT
POTASSIUM SERPL-SCNC: 3.2 MMOL/L (ref 3.5–5.3)
POTASSIUM SERPL-SCNC: 3.3 MMOL/L (ref 3.5–5.3)
POTASSIUM SERPL-SCNC: 3.4 MMOL/L (ref 3.5–5.3)
POTASSIUM SERPL-SCNC: 3.4 MMOL/L (ref 3.5–5.3)
POTASSIUM SERPL-SCNC: 3.5 MMOL/L (ref 3.5–5.3)
POTASSIUM SERPL-SCNC: 3.5 MMOL/L (ref 3.5–5.3)
POTASSIUM SERPL-SCNC: 3.6 MMOL/L (ref 3.5–5.3)
POTASSIUM SERPL-SCNC: 3.7 MMOL/L (ref 3.5–5.3)
POTASSIUM SERPL-SCNC: 3.8 MMOL/L (ref 3.5–5.3)
POTASSIUM SERPL-SCNC: 3.8 MMOL/L (ref 3.5–5.3)
POTASSIUM SERPL-SCNC: 3.9 MMOL/L (ref 3.5–5.3)
PR INTERVAL: 212 MS
PR INTERVAL: 224 MS
PR INTERVAL: 238 MS
PR INTERVAL: 436 MS
PROCALCITONIN SERPL-MCNC: 9.85 NG/ML
PROT SERPL-MCNC: 5.8 G/DL (ref 6.4–8.4)
PROT SERPL-MCNC: 6.1 G/DL (ref 6.4–8.4)
PROT SERPL-MCNC: 6.3 G/DL (ref 6.4–8.2)
PROT SERPL-MCNC: 6.3 G/DL (ref 6.4–8.4)
PROT SERPL-MCNC: 6.3 G/DL (ref 6.4–8.4)
PROT SERPL-MCNC: 6.4 G/DL (ref 6.4–8.4)
PROT SERPL-MCNC: 6.5 G/DL (ref 6.4–8.4)
PROT SERPL-MCNC: 6.6 G/DL (ref 6.4–8.2)
PROT SERPL-MCNC: 6.8 G/DL (ref 6.4–8.2)
PROT SERPL-MCNC: 6.9 G/DL (ref 6.4–8.4)
PROT SERPL-MCNC: 7.7 G/DL (ref 6.4–8.2)
PROT SERPL-MCNC: 7.7 G/DL (ref 6.4–8.4)
PROT UR STRIP-MCNC: ABNORMAL MG/DL
PROTHROMBIN TIME: 15.7 SECONDS (ref 11.6–14.5)
PROTHROMBIN TIME: 16.6 SECONDS (ref 11.6–14.5)
PROTHROMBIN TIME: 18.3 SECONDS (ref 11.6–14.5)
QRS AXIS: -49 DEGREES
QRS AXIS: -57 DEGREES
QRS AXIS: -66 DEGREES
QRS AXIS: -72 DEGREES
QRS AXIS: 246 DEGREES
QRS AXIS: 252 DEGREES
QRSD INTERVAL: 144 MS
QRSD INTERVAL: 146 MS
QRSD INTERVAL: 150 MS
QRSD INTERVAL: 152 MS
QRSD INTERVAL: 152 MS
QRSD INTERVAL: 156 MS
QT INTERVAL: 336 MS
QT INTERVAL: 348 MS
QT INTERVAL: 350 MS
QT INTERVAL: 363 MS
QT INTERVAL: 390 MS
QT INTERVAL: 412 MS
QTC INTERVAL: 449 MS
QTC INTERVAL: 504 MS
QTC INTERVAL: 515 MS
QTC INTERVAL: 534 MS
QTC INTERVAL: 538 MS
QTC INTERVAL: 539 MS
RA PRESSURE ESTIMATED: 10 MMHG
RBC # BLD AUTO: 3.85 MILLION/UL (ref 3.88–5.62)
RBC # BLD AUTO: 3.91 MILLION/UL (ref 3.88–5.62)
RBC # BLD AUTO: 4.03 MILLION/UL (ref 3.88–5.62)
RBC # BLD AUTO: 4.1 MILLION/UL (ref 3.88–5.62)
RBC # BLD AUTO: 4.2 MILLION/UL (ref 3.88–5.62)
RBC # BLD AUTO: 4.2 MILLION/UL (ref 3.88–5.62)
RBC # BLD AUTO: 4.22 MILLION/UL (ref 3.88–5.62)
RBC # BLD AUTO: 4.22 MILLION/UL (ref 3.88–5.62)
RBC # BLD AUTO: 4.41 MILLION/UL (ref 3.88–5.62)
RBC # BLD AUTO: 4.47 MILLION/UL (ref 3.88–5.62)
RBC # BLD AUTO: 4.59 MILLION/UL (ref 3.88–5.62)
RBC # BLD AUTO: 4.86 MILLION/UL (ref 3.88–5.62)
RBC # BLD AUTO: 4.88 MILLION/UL (ref 3.88–5.62)
RBC #/AREA URNS AUTO: ABNORMAL /HPF
RBC MORPH BLD: PRESENT
RBC MORPH BLD: PRESENT
RIGHT VENTRICLE ID DIMENSION: 3.4 CM
RSV RNA RESP QL NAA+PROBE: NEGATIVE
RV PSP: 57 MMHG
SARS-COV-2 RNA RESP QL NAA+PROBE: NEGATIVE
SARS-COV-2 RNA RESP QL NAA+PROBE: POSITIVE
SARS-COV-2 RNA RESP QL NAA+PROBE: POSITIVE
SL CV LEFT ATRIUM LENGTH A2C: 6.4 CM
SL CV LV EF: 45
SL CV PED ECHO LEFT VENTRICLE DIASTOLIC VOLUME (MOD BIPLANE) 2D: 131 ML
SL CV PED ECHO LEFT VENTRICLE SYSTOLIC VOLUME (MOD BIPLANE) 2D: 70 ML
SODIUM 24H UR-SCNC: 100 MOL/L
SODIUM SERPL-SCNC: 138 MMOL/L (ref 136–145)
SODIUM SERPL-SCNC: 139 MMOL/L (ref 135–147)
SODIUM SERPL-SCNC: 139 MMOL/L (ref 135–147)
SODIUM SERPL-SCNC: 139 MMOL/L (ref 136–145)
SODIUM SERPL-SCNC: 141 MMOL/L (ref 135–147)
SODIUM SERPL-SCNC: 141 MMOL/L (ref 135–147)
SODIUM SERPL-SCNC: 143 MMOL/L (ref 136–145)
SODIUM SERPL-SCNC: 144 MMOL/L (ref 135–147)
SODIUM SERPL-SCNC: 144 MMOL/L (ref 136–145)
SODIUM SERPL-SCNC: 145 MMOL/L (ref 135–147)
SODIUM SERPL-SCNC: 145 MMOL/L (ref 135–147)
SODIUM SERPL-SCNC: 146 MMOL/L (ref 135–147)
SODIUM SERPL-SCNC: 147 MMOL/L (ref 135–147)
SODIUM SERPL-SCNC: 148 MMOL/L (ref 135–147)
SODIUM SERPL-SCNC: 148 MMOL/L (ref 135–147)
SODIUM SERPL-SCNC: 150 MMOL/L (ref 135–147)
SP GR UR STRIP.AUTO: 1.02 (ref 1–1.03)
SPECIMEN SOURCE: ABNORMAL
T WAVE AXIS: 125 DEGREES
T WAVE AXIS: 127 DEGREES
T WAVE AXIS: 129 DEGREES
T WAVE AXIS: 146 DEGREES
T WAVE AXIS: 74 DEGREES
T WAVE AXIS: 83 DEGREES
TARGETS BLD QL SMEAR: PRESENT
TR MAX PG: 47 MMHG
TR PEAK VELOCITY: 3.5 M/S
TRANS CELLS #/AREA URNS HPF: PRESENT /[HPF]
TRICUSPID VALVE PEAK REGURGITATION VELOCITY: 3.45 M/S
TRIGL SERPL-MCNC: 469 MG/DL
TSH SERPL DL<=0.05 MIU/L-ACNC: 5.33 UIU/ML (ref 0.45–4.5)
UROBILINOGEN UR QL STRIP.AUTO: 0.2 E.U./DL
UROBILINOGEN UR QL STRIP.AUTO: 4 E.U./DL
UROBILINOGEN UR STRIP-ACNC: >=12 MG/DL
VENT AC: 10
VENT AC: 14
VENT- AC: AC
VENT- AC: AC
VENTRICULAR RATE: 126 BPM
VENTRICULAR RATE: 130 BPM
VENTRICULAR RATE: 142 BPM
VENTRICULAR RATE: 155 BPM
VENTRICULAR RATE: 80 BPM
VENTRICULAR RATE: 94 BPM
VT SETTING VENT: 400 ML
VT SETTING VENT: 450 ML
WBC # BLD AUTO: 10.15 THOUSAND/UL (ref 4.31–10.16)
WBC # BLD AUTO: 10.71 THOUSAND/UL (ref 4.31–10.16)
WBC # BLD AUTO: 11.09 THOUSAND/UL (ref 4.31–10.16)
WBC # BLD AUTO: 12.17 THOUSAND/UL (ref 4.31–10.16)
WBC # BLD AUTO: 12.43 THOUSAND/UL (ref 4.31–10.16)
WBC # BLD AUTO: 12.97 THOUSAND/UL (ref 4.31–10.16)
WBC # BLD AUTO: 13.85 THOUSAND/UL (ref 4.31–10.16)
WBC # BLD AUTO: 14.25 THOUSAND/UL (ref 4.31–10.16)
WBC # BLD AUTO: 18.7 THOUSAND/UL (ref 4.31–10.16)
WBC # BLD AUTO: 19.96 THOUSAND/UL (ref 4.31–10.16)
WBC # BLD AUTO: 22.08 THOUSAND/UL (ref 4.31–10.16)
WBC # BLD AUTO: 29.44 THOUSAND/UL (ref 4.31–10.16)
WBC # BLD AUTO: 32.34 THOUSAND/UL (ref 4.31–10.16)
WBC #/AREA URNS AUTO: ABNORMAL /HPF
WBC CASTS URNS QL MICRO: ABNORMAL /LPF

## 2022-01-01 PROCEDURE — 82805 BLOOD GASES W/O2 SATURATION: CPT | Performed by: PHYSICIAN ASSISTANT

## 2022-01-01 PROCEDURE — 71045 X-RAY EXAM CHEST 1 VIEW: CPT

## 2022-01-01 PROCEDURE — 86301 IMMUNOASSAY TUMOR CA 19-9: CPT | Performed by: STUDENT IN AN ORGANIZED HEALTH CARE EDUCATION/TRAINING PROGRAM

## 2022-01-01 PROCEDURE — 99232 SBSQ HOSP IP/OBS MODERATE 35: CPT | Performed by: SURGERY

## 2022-01-01 PROCEDURE — 82140 ASSAY OF AMMONIA: CPT

## 2022-01-01 PROCEDURE — T2042 HOSPICE ROUTINE HOME CARE: HCPCS

## 2022-01-01 PROCEDURE — C1769 GUIDE WIRE: HCPCS

## 2022-01-01 PROCEDURE — 84100 ASSAY OF PHOSPHORUS: CPT

## 2022-01-01 PROCEDURE — 0F798DZ DILATION OF COMMON BILE DUCT WITH INTRALUMINAL DEVICE, VIA NATURAL OR ARTIFICIAL OPENING ENDOSCOPIC: ICD-10-PCS | Performed by: INTERNAL MEDICINE

## 2022-01-01 PROCEDURE — 70551 MRI BRAIN STEM W/O DYE: CPT

## 2022-01-01 PROCEDURE — 87040 BLOOD CULTURE FOR BACTERIA: CPT | Performed by: PHYSICIAN ASSISTANT

## 2022-01-01 PROCEDURE — 80053 COMPREHEN METABOLIC PANEL: CPT | Performed by: PHYSICIAN ASSISTANT

## 2022-01-01 PROCEDURE — 99232 SBSQ HOSP IP/OBS MODERATE 35: CPT | Performed by: INTERNAL MEDICINE

## 2022-01-01 PROCEDURE — 99222 1ST HOSP IP/OBS MODERATE 55: CPT | Performed by: STUDENT IN AN ORGANIZED HEALTH CARE EDUCATION/TRAINING PROGRAM

## 2022-01-01 PROCEDURE — 05HM33Z INSERTION OF INFUSION DEVICE INTO RIGHT INTERNAL JUGULAR VEIN, PERCUTANEOUS APPROACH: ICD-10-PCS | Performed by: INTERNAL MEDICINE

## 2022-01-01 PROCEDURE — 85025 COMPLETE CBC W/AUTO DIFF WBC: CPT | Performed by: INTERNAL MEDICINE

## 2022-01-01 PROCEDURE — 80053 COMPREHEN METABOLIC PANEL: CPT

## 2022-01-01 PROCEDURE — G1004 CDSM NDSC: HCPCS

## 2022-01-01 PROCEDURE — 71260 CT THORAX DX C+: CPT

## 2022-01-01 PROCEDURE — 43274 ERCP DUCT STENT PLACEMENT: CPT | Performed by: INTERNAL MEDICINE

## 2022-01-01 PROCEDURE — 99223 1ST HOSP IP/OBS HIGH 75: CPT | Performed by: PSYCHIATRY & NEUROLOGY

## 2022-01-01 PROCEDURE — 36415 COLL VENOUS BLD VENIPUNCTURE: CPT

## 2022-01-01 PROCEDURE — 70450 CT HEAD/BRAIN W/O DYE: CPT

## 2022-01-01 PROCEDURE — 82948 REAGENT STRIP/BLOOD GLUCOSE: CPT

## 2022-01-01 PROCEDURE — 87077 CULTURE AEROBIC IDENTIFY: CPT | Performed by: PHYSICIAN ASSISTANT

## 2022-01-01 PROCEDURE — 85027 COMPLETE CBC AUTOMATED: CPT | Performed by: PHYSICIAN ASSISTANT

## 2022-01-01 PROCEDURE — A9585 GADOBUTROL INJECTION: HCPCS | Performed by: INTERNAL MEDICINE

## 2022-01-01 PROCEDURE — 82805 BLOOD GASES W/O2 SATURATION: CPT

## 2022-01-01 PROCEDURE — NC001 PR NO CHARGE: Performed by: NURSE PRACTITIONER

## 2022-01-01 PROCEDURE — 94760 N-INVAS EAR/PLS OXIMETRY 1: CPT

## 2022-01-01 PROCEDURE — 74328 X-RAY BILE DUCT ENDOSCOPY: CPT

## 2022-01-01 PROCEDURE — 92610 EVALUATE SWALLOWING FUNCTION: CPT

## 2022-01-01 PROCEDURE — 93010 ELECTROCARDIOGRAM REPORT: CPT | Performed by: INTERNAL MEDICINE

## 2022-01-01 PROCEDURE — 84484 ASSAY OF TROPONIN QUANT: CPT

## 2022-01-01 PROCEDURE — 93306 TTE W/DOPPLER COMPLETE: CPT | Performed by: INTERNAL MEDICINE

## 2022-01-01 PROCEDURE — 88112 CYTOPATH CELL ENHANCE TECH: CPT | Performed by: PATHOLOGY

## 2022-01-01 PROCEDURE — 83036 HEMOGLOBIN GLYCOSYLATED A1C: CPT

## 2022-01-01 PROCEDURE — 83735 ASSAY OF MAGNESIUM: CPT | Performed by: SURGERY

## 2022-01-01 PROCEDURE — 96367 TX/PROPH/DG ADDL SEQ IV INF: CPT

## 2022-01-01 PROCEDURE — 83735 ASSAY OF MAGNESIUM: CPT

## 2022-01-01 PROCEDURE — 81001 URINALYSIS AUTO W/SCOPE: CPT

## 2022-01-01 PROCEDURE — 85025 COMPLETE CBC W/AUTO DIFF WBC: CPT

## 2022-01-01 PROCEDURE — 5A1945Z RESPIRATORY VENTILATION, 24-96 CONSECUTIVE HOURS: ICD-10-PCS | Performed by: INTERNAL MEDICINE

## 2022-01-01 PROCEDURE — 96375 TX/PRO/DX INJ NEW DRUG ADDON: CPT

## 2022-01-01 PROCEDURE — 84484 ASSAY OF TROPONIN QUANT: CPT | Performed by: PHYSICIAN ASSISTANT

## 2022-01-01 PROCEDURE — 84100 ASSAY OF PHOSPHORUS: CPT | Performed by: SURGERY

## 2022-01-01 PROCEDURE — 80053 COMPREHEN METABOLIC PANEL: CPT | Performed by: STUDENT IN AN ORGANIZED HEALTH CARE EDUCATION/TRAINING PROGRAM

## 2022-01-01 PROCEDURE — 85730 THROMBOPLASTIN TIME PARTIAL: CPT | Performed by: EMERGENCY MEDICINE

## 2022-01-01 PROCEDURE — 82378 CARCINOEMBRYONIC ANTIGEN: CPT | Performed by: STUDENT IN AN ORGANIZED HEALTH CARE EDUCATION/TRAINING PROGRAM

## 2022-01-01 PROCEDURE — 99232 SBSQ HOSP IP/OBS MODERATE 35: CPT | Performed by: PHYSICIAN ASSISTANT

## 2022-01-01 PROCEDURE — 0241U HB NFCT DS VIR RESP RNA 4 TRGT: CPT | Performed by: STUDENT IN AN ORGANIZED HEALTH CARE EDUCATION/TRAINING PROGRAM

## 2022-01-01 PROCEDURE — 36415 COLL VENOUS BLD VENIPUNCTURE: CPT | Performed by: EMERGENCY MEDICINE

## 2022-01-01 PROCEDURE — 85730 THROMBOPLASTIN TIME PARTIAL: CPT

## 2022-01-01 PROCEDURE — 85027 COMPLETE CBC AUTOMATED: CPT | Performed by: EMERGENCY MEDICINE

## 2022-01-01 PROCEDURE — 85027 COMPLETE CBC AUTOMATED: CPT | Performed by: STUDENT IN AN ORGANIZED HEALTH CARE EDUCATION/TRAINING PROGRAM

## 2022-01-01 PROCEDURE — 83935 ASSAY OF URINE OSMOLALITY: CPT | Performed by: STUDENT IN AN ORGANIZED HEALTH CARE EDUCATION/TRAINING PROGRAM

## 2022-01-01 PROCEDURE — 97163 PT EVAL HIGH COMPLEX 45 MIN: CPT

## 2022-01-01 PROCEDURE — 93005 ELECTROCARDIOGRAM TRACING: CPT

## 2022-01-01 PROCEDURE — 94003 VENT MGMT INPAT SUBQ DAY: CPT

## 2022-01-01 PROCEDURE — 99222 1ST HOSP IP/OBS MODERATE 55: CPT | Performed by: INTERNAL MEDICINE

## 2022-01-01 PROCEDURE — 10330087 HSPC SERVICE INTENSITY ADD-ON

## 2022-01-01 PROCEDURE — 81001 URINALYSIS AUTO W/SCOPE: CPT | Performed by: EMERGENCY MEDICINE

## 2022-01-01 PROCEDURE — 85007 BL SMEAR W/DIFF WBC COUNT: CPT | Performed by: PHYSICIAN ASSISTANT

## 2022-01-01 PROCEDURE — 99232 SBSQ HOSP IP/OBS MODERATE 35: CPT | Performed by: STUDENT IN AN ORGANIZED HEALTH CARE EDUCATION/TRAINING PROGRAM

## 2022-01-01 PROCEDURE — 36556 INSERT NON-TUNNEL CV CATH: CPT | Performed by: INTERNAL MEDICINE

## 2022-01-01 PROCEDURE — NC001 PR NO CHARGE: Performed by: INTERNAL MEDICINE

## 2022-01-01 PROCEDURE — 99285 EMERGENCY DEPT VISIT HI MDM: CPT

## 2022-01-01 PROCEDURE — 43264 ERCP REMOVE DUCT CALCULI: CPT | Performed by: INTERNAL MEDICINE

## 2022-01-01 PROCEDURE — G0155 HHCP-SVS OF CSW,EA 15 MIN: HCPCS

## 2022-01-01 PROCEDURE — 83605 ASSAY OF LACTIC ACID: CPT

## 2022-01-01 PROCEDURE — 82805 BLOOD GASES W/O2 SATURATION: CPT | Performed by: STUDENT IN AN ORGANIZED HEALTH CARE EDUCATION/TRAINING PROGRAM

## 2022-01-01 PROCEDURE — 83735 ASSAY OF MAGNESIUM: CPT | Performed by: STUDENT IN AN ORGANIZED HEALTH CARE EDUCATION/TRAINING PROGRAM

## 2022-01-01 PROCEDURE — NC001 PR NO CHARGE: Performed by: FAMILY MEDICINE

## 2022-01-01 PROCEDURE — 99285 EMERGENCY DEPT VISIT HI MDM: CPT | Performed by: PHYSICIAN ASSISTANT

## 2022-01-01 PROCEDURE — C1874 STENT, COATED/COV W/DEL SYS: HCPCS

## 2022-01-01 PROCEDURE — 99221 1ST HOSP IP/OBS SF/LOW 40: CPT | Performed by: SURGERY

## 2022-01-01 PROCEDURE — 85027 COMPLETE CBC AUTOMATED: CPT

## 2022-01-01 PROCEDURE — 83605 ASSAY OF LACTIC ACID: CPT | Performed by: EMERGENCY MEDICINE

## 2022-01-01 PROCEDURE — 80053 COMPREHEN METABOLIC PANEL: CPT | Performed by: EMERGENCY MEDICINE

## 2022-01-01 PROCEDURE — 4A133B1 MONITORING OF ARTERIAL PRESSURE, PERIPHERAL, PERCUTANEOUS APPROACH: ICD-10-PCS | Performed by: INTERNAL MEDICINE

## 2022-01-01 PROCEDURE — 99223 1ST HOSP IP/OBS HIGH 75: CPT | Performed by: INTERNAL MEDICINE

## 2022-01-01 PROCEDURE — 80048 BASIC METABOLIC PNL TOTAL CA: CPT | Performed by: STUDENT IN AN ORGANIZED HEALTH CARE EDUCATION/TRAINING PROGRAM

## 2022-01-01 PROCEDURE — 85610 PROTHROMBIN TIME: CPT

## 2022-01-01 PROCEDURE — 99233 SBSQ HOSP IP/OBS HIGH 50: CPT | Performed by: INTERNAL MEDICINE

## 2022-01-01 PROCEDURE — 80076 HEPATIC FUNCTION PANEL: CPT

## 2022-01-01 PROCEDURE — 92526 ORAL FUNCTION THERAPY: CPT

## 2022-01-01 PROCEDURE — 84443 ASSAY THYROID STIM HORMONE: CPT

## 2022-01-01 PROCEDURE — 85025 COMPLETE CBC W/AUTO DIFF WBC: CPT | Performed by: SURGERY

## 2022-01-01 PROCEDURE — 0BH17EZ INSERTION OF ENDOTRACHEAL AIRWAY INTO TRACHEA, VIA NATURAL OR ARTIFICIAL OPENING: ICD-10-PCS | Performed by: INTERNAL MEDICINE

## 2022-01-01 PROCEDURE — 99305 1ST NF CARE MODERATE MDM 35: CPT | Performed by: NURSE PRACTITIONER

## 2022-01-01 PROCEDURE — 83880 ASSAY OF NATRIURETIC PEPTIDE: CPT | Performed by: PHYSICIAN ASSISTANT

## 2022-01-01 PROCEDURE — 80061 LIPID PANEL: CPT

## 2022-01-01 PROCEDURE — 99291 CRITICAL CARE FIRST HOUR: CPT | Performed by: EMERGENCY MEDICINE

## 2022-01-01 PROCEDURE — 4A133J1 MONITORING OF ARTERIAL PULSE, PERIPHERAL, PERCUTANEOUS APPROACH: ICD-10-PCS | Performed by: INTERNAL MEDICINE

## 2022-01-01 PROCEDURE — 0FC98ZZ EXTIRPATION OF MATTER FROM COMMON BILE DUCT, VIA NATURAL OR ARTIFICIAL OPENING ENDOSCOPIC: ICD-10-PCS | Performed by: INTERNAL MEDICINE

## 2022-01-01 PROCEDURE — 84145 PROCALCITONIN (PCT): CPT | Performed by: EMERGENCY MEDICINE

## 2022-01-01 PROCEDURE — 99291 CRITICAL CARE FIRST HOUR: CPT | Performed by: INTERNAL MEDICINE

## 2022-01-01 PROCEDURE — 43262 ENDO CHOLANGIOPANCREATOGRAPH: CPT | Performed by: INTERNAL MEDICINE

## 2022-01-01 PROCEDURE — 74177 CT ABD & PELVIS W/CONTRAST: CPT

## 2022-01-01 PROCEDURE — 99238 HOSP IP/OBS DSCHRG MGMT 30/<: CPT | Performed by: INTERNAL MEDICINE

## 2022-01-01 PROCEDURE — 96365 THER/PROPH/DIAG IV INF INIT: CPT

## 2022-01-01 PROCEDURE — 74176 CT ABD & PELVIS W/O CONTRAST: CPT

## 2022-01-01 PROCEDURE — 97167 OT EVAL HIGH COMPLEX 60 MIN: CPT

## 2022-01-01 PROCEDURE — G0299 HHS/HOSPICE OF RN EA 15 MIN: HCPCS

## 2022-01-01 PROCEDURE — 99284 EMERGENCY DEPT VISIT MOD MDM: CPT

## 2022-01-01 PROCEDURE — 0FPB8DZ REMOVAL OF INTRALUMINAL DEVICE FROM HEPATOBILIARY DUCT, VIA NATURAL OR ARTIFICIAL OPENING ENDOSCOPIC: ICD-10-PCS | Performed by: INTERNAL MEDICINE

## 2022-01-01 PROCEDURE — 80053 COMPREHEN METABOLIC PANEL: CPT | Performed by: SURGERY

## 2022-01-01 PROCEDURE — 80048 BASIC METABOLIC PNL TOTAL CA: CPT

## 2022-01-01 PROCEDURE — 83605 ASSAY OF LACTIC ACID: CPT | Performed by: PHYSICIAN ASSISTANT

## 2022-01-01 PROCEDURE — 85730 THROMBOPLASTIN TIME PARTIAL: CPT | Performed by: INTERNAL MEDICINE

## 2022-01-01 PROCEDURE — 83690 ASSAY OF LIPASE: CPT | Performed by: PHYSICIAN ASSISTANT

## 2022-01-01 PROCEDURE — NC001 PR NO CHARGE: Performed by: EMERGENCY MEDICINE

## 2022-01-01 PROCEDURE — 94002 VENT MGMT INPAT INIT DAY: CPT | Performed by: SOCIAL WORKER

## 2022-01-01 PROCEDURE — 94660 CPAP INITIATION&MGMT: CPT

## 2022-01-01 PROCEDURE — 96361 HYDRATE IV INFUSION ADD-ON: CPT

## 2022-01-01 PROCEDURE — 84300 ASSAY OF URINE SODIUM: CPT | Performed by: STUDENT IN AN ORGANIZED HEALTH CARE EDUCATION/TRAINING PROGRAM

## 2022-01-01 PROCEDURE — 36415 COLL VENOUS BLD VENIPUNCTURE: CPT | Performed by: PHYSICIAN ASSISTANT

## 2022-01-01 PROCEDURE — 0241U HB NFCT DS VIR RESP RNA 4 TRGT: CPT | Performed by: PHYSICIAN ASSISTANT

## 2022-01-01 PROCEDURE — 85730 THROMBOPLASTIN TIME PARTIAL: CPT | Performed by: PHYSICIAN ASSISTANT

## 2022-01-01 PROCEDURE — 94002 VENT MGMT INPAT INIT DAY: CPT

## 2022-01-01 PROCEDURE — 94760 N-INVAS EAR/PLS OXIMETRY 1: CPT | Performed by: SOCIAL WORKER

## 2022-01-01 PROCEDURE — 99284 EMERGENCY DEPT VISIT MOD MDM: CPT | Performed by: EMERGENCY MEDICINE

## 2022-01-01 PROCEDURE — 84100 ASSAY OF PHOSPHORUS: CPT | Performed by: PHYSICIAN ASSISTANT

## 2022-01-01 PROCEDURE — 99223 1ST HOSP IP/OBS HIGH 75: CPT | Performed by: EMERGENCY MEDICINE

## 2022-01-01 PROCEDURE — 03HY32Z INSERTION OF MONITORING DEVICE INTO UPPER ARTERY, PERCUTANEOUS APPROACH: ICD-10-PCS | Performed by: INTERNAL MEDICINE

## 2022-01-01 PROCEDURE — 83690 ASSAY OF LIPASE: CPT

## 2022-01-01 PROCEDURE — 99285 EMERGENCY DEPT VISIT HI MDM: CPT | Performed by: EMERGENCY MEDICINE

## 2022-01-01 PROCEDURE — 83735 ASSAY OF MAGNESIUM: CPT | Performed by: PHYSICIAN ASSISTANT

## 2022-01-01 PROCEDURE — 85610 PROTHROMBIN TIME: CPT | Performed by: EMERGENCY MEDICINE

## 2022-01-01 PROCEDURE — 99232 SBSQ HOSP IP/OBS MODERATE 35: CPT | Performed by: PSYCHIATRY & NEUROLOGY

## 2022-01-01 PROCEDURE — 76705 ECHO EXAM OF ABDOMEN: CPT

## 2022-01-01 PROCEDURE — 85007 BL SMEAR W/DIFF WBC COUNT: CPT | Performed by: EMERGENCY MEDICINE

## 2022-01-01 PROCEDURE — 36620 INSERTION CATHETER ARTERY: CPT | Performed by: INTERNAL MEDICINE

## 2022-01-01 PROCEDURE — 43275 ERCP REMOVE FORGN BODY DUCT: CPT | Performed by: INTERNAL MEDICINE

## 2022-01-01 PROCEDURE — 97530 THERAPEUTIC ACTIVITIES: CPT

## 2022-01-01 PROCEDURE — 80048 BASIC METABOLIC PNL TOTAL CA: CPT | Performed by: INTERNAL MEDICINE

## 2022-01-01 PROCEDURE — 87040 BLOOD CULTURE FOR BACTERIA: CPT | Performed by: EMERGENCY MEDICINE

## 2022-01-01 PROCEDURE — 0241U HB NFCT DS VIR RESP RNA 4 TRGT: CPT | Performed by: EMERGENCY MEDICINE

## 2022-01-01 PROCEDURE — 85610 PROTHROMBIN TIME: CPT | Performed by: PHYSICIAN ASSISTANT

## 2022-01-01 PROCEDURE — 93306 TTE W/DOPPLER COMPLETE: CPT

## 2022-01-01 PROCEDURE — 87186 SC STD MICRODIL/AGAR DIL: CPT | Performed by: PHYSICIAN ASSISTANT

## 2022-01-01 PROCEDURE — C2617 STENT, NON-COR, TEM W/O DEL: HCPCS

## 2022-01-01 PROCEDURE — C1726 CATH, BAL DIL, NON-VASCULAR: HCPCS

## 2022-01-01 PROCEDURE — 87154 CUL TYP ID BLD PTHGN 6+ TRGT: CPT | Performed by: PHYSICIAN ASSISTANT

## 2022-01-01 PROCEDURE — 0241U HB NFCT DS VIR RESP RNA 4 TRGT: CPT | Performed by: NURSE PRACTITIONER

## 2022-01-01 RX ORDER — INSULIN LISPRO 100 [IU]/ML
1-6 INJECTION, SOLUTION INTRAVENOUS; SUBCUTANEOUS
Status: DISCONTINUED | OUTPATIENT
Start: 2022-01-01 | End: 2022-01-01

## 2022-01-01 RX ORDER — LABETALOL HYDROCHLORIDE 5 MG/ML
10 INJECTION, SOLUTION INTRAVENOUS EVERY 6 HOURS PRN
Status: DISCONTINUED | OUTPATIENT
Start: 2022-01-01 | End: 2022-01-01

## 2022-01-01 RX ORDER — PROPOFOL 10 MG/ML
INJECTION, EMULSION INTRAVENOUS AS NEEDED
Status: DISCONTINUED | OUTPATIENT
Start: 2022-01-01 | End: 2022-01-01

## 2022-01-01 RX ORDER — INSULIN LISPRO 100 [IU]/ML
8 INJECTION, SOLUTION INTRAVENOUS; SUBCUTANEOUS
Status: DISCONTINUED | OUTPATIENT
Start: 2022-01-01 | End: 2022-01-01

## 2022-01-01 RX ORDER — FUROSEMIDE 10 MG/ML
20 INJECTION INTRAMUSCULAR; INTRAVENOUS ONCE
Status: COMPLETED | OUTPATIENT
Start: 2022-01-01 | End: 2022-01-01

## 2022-01-01 RX ORDER — ASPIRIN 81 MG/1
324 TABLET, CHEWABLE ORAL DAILY
Status: DISCONTINUED | OUTPATIENT
Start: 2022-01-01 | End: 2022-01-01

## 2022-01-01 RX ORDER — FUROSEMIDE 10 MG/ML
20 INJECTION INTRAMUSCULAR; INTRAVENOUS ONCE
Status: DISCONTINUED | OUTPATIENT
Start: 2022-01-01 | End: 2022-01-01

## 2022-01-01 RX ORDER — ATORVASTATIN CALCIUM 20 MG/1
20 TABLET, FILM COATED ORAL DAILY
Status: DISCONTINUED | OUTPATIENT
Start: 2022-01-01 | End: 2022-01-01 | Stop reason: HOSPADM

## 2022-01-01 RX ORDER — LOSARTAN POTASSIUM 25 MG/1
25 TABLET ORAL DAILY
Status: DISCONTINUED | OUTPATIENT
Start: 2022-01-01 | End: 2022-01-01

## 2022-01-01 RX ORDER — ATORVASTATIN CALCIUM 20 MG/1
20 TABLET, FILM COATED ORAL
Status: DISCONTINUED | OUTPATIENT
Start: 2022-01-01 | End: 2022-01-01

## 2022-01-01 RX ORDER — MORPHINE SULFATE 100 MG/5ML
5 SOLUTION, CONCENTRATE ORAL EVERY 4 HOURS
Qty: 15 ML | Refills: 0 | Status: SHIPPED | OUTPATIENT
Start: 2022-01-01

## 2022-01-01 RX ORDER — ALBUMIN (HUMAN) 12.5 G/50ML
12.5 SOLUTION INTRAVENOUS ONCE
Status: COMPLETED | OUTPATIENT
Start: 2022-01-01 | End: 2022-01-01

## 2022-01-01 RX ORDER — POTASSIUM CHLORIDE 20 MEQ/1
40 TABLET, EXTENDED RELEASE ORAL ONCE
Status: COMPLETED | OUTPATIENT
Start: 2022-01-01 | End: 2022-01-01

## 2022-01-01 RX ORDER — SODIUM CHLORIDE, SODIUM LACTATE, POTASSIUM CHLORIDE, CALCIUM CHLORIDE 600; 310; 30; 20 MG/100ML; MG/100ML; MG/100ML; MG/100ML
100 INJECTION, SOLUTION INTRAVENOUS CONTINUOUS
Status: DISCONTINUED | OUTPATIENT
Start: 2022-01-01 | End: 2022-01-01

## 2022-01-01 RX ORDER — SUCCINYLCHOLINE/SOD CL,ISO/PF 100 MG/5ML
SYRINGE (ML) INTRAVENOUS AS NEEDED
Status: DISCONTINUED | OUTPATIENT
Start: 2022-01-01 | End: 2022-01-01

## 2022-01-01 RX ORDER — INSULIN GLARGINE 100 [IU]/ML
30 INJECTION, SOLUTION SUBCUTANEOUS EVERY 12 HOURS SCHEDULED
Status: DISCONTINUED | OUTPATIENT
Start: 2022-01-01 | End: 2022-01-01

## 2022-01-01 RX ORDER — METOPROLOL SUCCINATE 100 MG/1
100 TABLET, EXTENDED RELEASE ORAL DAILY
Status: DISCONTINUED | OUTPATIENT
Start: 2022-01-01 | End: 2022-01-01

## 2022-01-01 RX ORDER — SODIUM CHLORIDE, SODIUM LACTATE, POTASSIUM CHLORIDE, CALCIUM CHLORIDE 600; 310; 30; 20 MG/100ML; MG/100ML; MG/100ML; MG/100ML
INJECTION, SOLUTION INTRAVENOUS CONTINUOUS PRN
Status: DISCONTINUED | OUTPATIENT
Start: 2022-01-01 | End: 2022-01-01

## 2022-01-01 RX ORDER — METRONIDAZOLE 500 MG/100ML
500 INJECTION, SOLUTION INTRAVENOUS EVERY 8 HOURS
Status: DISCONTINUED | OUTPATIENT
Start: 2022-01-01 | End: 2022-01-01 | Stop reason: HOSPADM

## 2022-01-01 RX ORDER — ONDANSETRON 2 MG/ML
4 INJECTION INTRAMUSCULAR; INTRAVENOUS ONCE AS NEEDED
Status: CANCELLED | OUTPATIENT
Start: 2022-01-01

## 2022-01-01 RX ORDER — METOPROLOL TARTRATE 5 MG/5ML
5 INJECTION INTRAVENOUS ONCE
Status: COMPLETED | OUTPATIENT
Start: 2022-01-01 | End: 2022-01-01

## 2022-01-01 RX ORDER — ONDANSETRON 2 MG/ML
1 INJECTION INTRAMUSCULAR; INTRAVENOUS ONCE
Status: COMPLETED | OUTPATIENT
Start: 2022-01-01 | End: 2022-01-01

## 2022-01-01 RX ORDER — POTASSIUM CHLORIDE 20MEQ/15ML
40 LIQUID (ML) ORAL ONCE
Status: COMPLETED | OUTPATIENT
Start: 2022-01-01 | End: 2022-01-01

## 2022-01-01 RX ORDER — FUROSEMIDE 40 MG/1
40 TABLET ORAL DAILY
Status: DISCONTINUED | OUTPATIENT
Start: 2022-01-01 | End: 2022-01-01 | Stop reason: HOSPADM

## 2022-01-01 RX ORDER — ACETAMINOPHEN 325 MG/1
650 TABLET ORAL ONCE
Status: DISCONTINUED | OUTPATIENT
Start: 2022-01-01 | End: 2022-01-01

## 2022-01-01 RX ORDER — ACETAMINOPHEN 650 MG/1
650 SUPPOSITORY RECTAL ONCE
Status: COMPLETED | OUTPATIENT
Start: 2022-01-01 | End: 2022-01-01

## 2022-01-01 RX ORDER — INSULIN GLARGINE 100 [IU]/ML
40 INJECTION, SOLUTION SUBCUTANEOUS ONCE
Status: COMPLETED | OUTPATIENT
Start: 2022-01-01 | End: 2022-01-01

## 2022-01-01 RX ORDER — INSULIN GLARGINE 100 [IU]/ML
22 INJECTION, SOLUTION SUBCUTANEOUS EVERY 12 HOURS SCHEDULED
Status: COMPLETED | OUTPATIENT
Start: 2022-01-01 | End: 2022-01-01

## 2022-01-01 RX ORDER — LORAZEPAM 2 MG/ML
0.5 INJECTION INTRAMUSCULAR EVERY 4 HOURS PRN
Status: DISCONTINUED | OUTPATIENT
Start: 2022-01-01 | End: 2022-01-01

## 2022-01-01 RX ORDER — ASPIRIN 81 MG/1
81 TABLET, CHEWABLE ORAL DAILY
Status: DISCONTINUED | OUTPATIENT
Start: 2022-01-01 | End: 2022-01-01

## 2022-01-01 RX ORDER — POTASSIUM CHLORIDE 20MEQ/15ML
40 LIQUID (ML) ORAL ONCE
Status: DISCONTINUED | OUTPATIENT
Start: 2022-01-01 | End: 2022-01-01

## 2022-01-01 RX ORDER — LORAZEPAM 0.5 MG/1
0.5 TABLET ORAL EVERY 4 HOURS PRN
Qty: 30 TABLET | Refills: 0 | Status: CANCELLED | OUTPATIENT
Start: 2022-01-01

## 2022-01-01 RX ORDER — INSULIN GLARGINE 100 [IU]/ML
35 INJECTION, SOLUTION SUBCUTANEOUS EVERY MORNING
Status: DISCONTINUED | OUTPATIENT
Start: 2022-01-01 | End: 2022-01-01

## 2022-01-01 RX ORDER — POTASSIUM CHLORIDE 29.8 MG/ML
40 INJECTION INTRAVENOUS ONCE
Status: COMPLETED | OUTPATIENT
Start: 2022-01-01 | End: 2022-01-01

## 2022-01-01 RX ORDER — MIDAZOLAM HYDROCHLORIDE 2 MG/2ML
2 INJECTION, SOLUTION INTRAMUSCULAR; INTRAVENOUS ONCE
Status: COMPLETED | OUTPATIENT
Start: 2022-01-01 | End: 2022-01-01

## 2022-01-01 RX ORDER — INSULIN GLARGINE 100 [IU]/ML
15 INJECTION, SOLUTION SUBCUTANEOUS EVERY MORNING
Status: DISCONTINUED | OUTPATIENT
Start: 2022-01-01 | End: 2022-01-01

## 2022-01-01 RX ORDER — INSULIN GLARGINE 100 [IU]/ML
28 INJECTION, SOLUTION SUBCUTANEOUS EVERY MORNING
Status: DISCONTINUED | OUTPATIENT
Start: 2022-01-01 | End: 2022-01-01

## 2022-01-01 RX ORDER — INSULIN LISPRO 100 [IU]/ML
1-6 INJECTION, SOLUTION INTRAVENOUS; SUBCUTANEOUS EVERY 6 HOURS SCHEDULED
Status: DISCONTINUED | OUTPATIENT
Start: 2022-01-01 | End: 2022-01-01

## 2022-01-01 RX ORDER — LORAZEPAM 0.5 MG/1
0.5 TABLET ORAL EVERY 4 HOURS PRN
Qty: 30 TABLET | Refills: 0 | Status: SHIPPED | OUTPATIENT
Start: 2022-01-01 | End: 2022-01-01 | Stop reason: SDUPTHER

## 2022-01-01 RX ORDER — INSULIN GLARGINE 100 [IU]/ML
30 INJECTION, SOLUTION SUBCUTANEOUS
Status: DISCONTINUED | OUTPATIENT
Start: 2022-01-01 | End: 2022-01-01

## 2022-01-01 RX ORDER — BUMETANIDE 0.25 MG/ML
0.5 INJECTION, SOLUTION INTRAMUSCULAR; INTRAVENOUS ONCE
Status: COMPLETED | OUTPATIENT
Start: 2022-01-01 | End: 2022-01-01

## 2022-01-01 RX ORDER — CARVEDILOL 25 MG/1
25 TABLET ORAL 2 TIMES DAILY WITH MEALS
Status: DISCONTINUED | OUTPATIENT
Start: 2022-01-01 | End: 2022-01-01

## 2022-01-01 RX ORDER — METOPROLOL TARTRATE 5 MG/5ML
5 INJECTION INTRAVENOUS ONCE
Status: DISCONTINUED | OUTPATIENT
Start: 2022-01-01 | End: 2022-01-01

## 2022-01-01 RX ORDER — SODIUM CHLORIDE, SODIUM GLUCONATE, SODIUM ACETATE, POTASSIUM CHLORIDE, MAGNESIUM CHLORIDE, SODIUM PHOSPHATE, DIBASIC, AND POTASSIUM PHOSPHATE .53; .5; .37; .037; .03; .012; .00082 G/100ML; G/100ML; G/100ML; G/100ML; G/100ML; G/100ML; G/100ML
100 INJECTION, SOLUTION INTRAVENOUS CONTINUOUS
Status: DISCONTINUED | OUTPATIENT
Start: 2022-01-01 | End: 2022-01-01

## 2022-01-01 RX ORDER — POTASSIUM CHLORIDE 14.9 MG/ML
20 INJECTION INTRAVENOUS ONCE
Status: COMPLETED | OUTPATIENT
Start: 2022-01-01 | End: 2022-01-01

## 2022-01-01 RX ORDER — PANTOPRAZOLE SODIUM 40 MG/1
40 TABLET, DELAYED RELEASE ORAL
Status: DISCONTINUED | OUTPATIENT
Start: 2022-01-01 | End: 2022-01-01

## 2022-01-01 RX ORDER — ALBUTEROL SULFATE 2.5 MG/3ML
2.5 SOLUTION RESPIRATORY (INHALATION) EVERY 4 HOURS PRN
Refills: 0
Start: 2022-01-01 | End: 2022-01-01

## 2022-01-01 RX ORDER — METOPROLOL TARTRATE 50 MG/1
50 TABLET, FILM COATED ORAL EVERY 8 HOURS
Status: DISCONTINUED | OUTPATIENT
Start: 2022-01-01 | End: 2022-01-01

## 2022-01-01 RX ORDER — PROPOFOL 10 MG/ML
INJECTION, EMULSION INTRAVENOUS
Status: COMPLETED
Start: 2022-01-01 | End: 2022-01-01

## 2022-01-01 RX ORDER — INSULIN GLARGINE 100 [IU]/ML
40 INJECTION, SOLUTION SUBCUTANEOUS EVERY 12 HOURS SCHEDULED
Status: DISCONTINUED | OUTPATIENT
Start: 2022-01-01 | End: 2022-01-01

## 2022-01-01 RX ORDER — LORAZEPAM 0.5 MG/1
0.5 TABLET ORAL EVERY 4 HOURS PRN
Qty: 30 TABLET | Refills: 0 | Status: SHIPPED | OUTPATIENT
Start: 2022-01-01 | End: 2022-01-01

## 2022-01-01 RX ORDER — HYDRALAZINE HYDROCHLORIDE 10 MG/1
10 TABLET, FILM COATED ORAL 3 TIMES DAILY
Status: DISCONTINUED | OUTPATIENT
Start: 2022-01-01 | End: 2022-01-01

## 2022-01-01 RX ORDER — LORAZEPAM 2 MG/ML
0.5 CONCENTRATE ORAL EVERY 4 HOURS PRN
Qty: 30 ML | Refills: 0 | Status: SHIPPED | OUTPATIENT
Start: 2022-01-01

## 2022-01-01 RX ORDER — SODIUM CHLORIDE, SODIUM GLUCONATE, SODIUM ACETATE, POTASSIUM CHLORIDE, MAGNESIUM CHLORIDE, SODIUM PHOSPHATE, DIBASIC, AND POTASSIUM PHOSPHATE .53; .5; .37; .037; .03; .012; .00082 G/100ML; G/100ML; G/100ML; G/100ML; G/100ML; G/100ML; G/100ML
500 INJECTION, SOLUTION INTRAVENOUS ONCE
Status: COMPLETED | OUTPATIENT
Start: 2022-01-01 | End: 2022-01-01

## 2022-01-01 RX ORDER — INSULIN GLARGINE 100 [IU]/ML
10 INJECTION, SOLUTION SUBCUTANEOUS ONCE
Status: COMPLETED | OUTPATIENT
Start: 2022-01-01 | End: 2022-01-01

## 2022-01-01 RX ORDER — LEVALBUTEROL INHALATION SOLUTION 0.63 MG/3ML
0.63 SOLUTION RESPIRATORY (INHALATION) EVERY 8 HOURS PRN
Status: DISCONTINUED | OUTPATIENT
Start: 2022-01-01 | End: 2022-01-01

## 2022-01-01 RX ORDER — HEPARIN SODIUM 5000 [USP'U]/ML
5000 INJECTION, SOLUTION INTRAVENOUS; SUBCUTANEOUS EVERY 8 HOURS SCHEDULED
Status: DISCONTINUED | OUTPATIENT
Start: 2022-01-01 | End: 2022-01-01

## 2022-01-01 RX ORDER — OXYCODONE HYDROCHLORIDE 5 MG/1
5 TABLET ORAL EVERY 2 HOUR PRN
Status: DISCONTINUED | OUTPATIENT
Start: 2022-01-01 | End: 2022-01-01 | Stop reason: HOSPADM

## 2022-01-01 RX ORDER — FENTANYL CITRATE 50 UG/ML
50 INJECTION, SOLUTION INTRAMUSCULAR; INTRAVENOUS ONCE
Status: COMPLETED | OUTPATIENT
Start: 2022-01-01 | End: 2022-01-01

## 2022-01-01 RX ORDER — ROCURONIUM BROMIDE 10 MG/ML
INJECTION, SOLUTION INTRAVENOUS AS NEEDED
Status: DISCONTINUED | OUTPATIENT
Start: 2022-01-01 | End: 2022-01-01

## 2022-01-01 RX ORDER — SODIUM CHLORIDE, SODIUM GLUCONATE, SODIUM ACETATE, POTASSIUM CHLORIDE, MAGNESIUM CHLORIDE, SODIUM PHOSPHATE, DIBASIC, AND POTASSIUM PHOSPHATE .53; .5; .37; .037; .03; .012; .00082 G/100ML; G/100ML; G/100ML; G/100ML; G/100ML; G/100ML; G/100ML
1000 INJECTION, SOLUTION INTRAVENOUS ONCE
Status: DISCONTINUED | OUTPATIENT
Start: 2022-01-01 | End: 2022-01-01

## 2022-01-01 RX ORDER — AMOXICILLIN 250 MG
1 CAPSULE ORAL
Status: DISCONTINUED | OUTPATIENT
Start: 2022-01-01 | End: 2022-01-01 | Stop reason: HOSPADM

## 2022-01-01 RX ORDER — INSULIN LISPRO 100 [IU]/ML
12 INJECTION, SOLUTION INTRAVENOUS; SUBCUTANEOUS
Status: DISCONTINUED | OUTPATIENT
Start: 2022-01-01 | End: 2022-01-01 | Stop reason: HOSPADM

## 2022-01-01 RX ORDER — MAGNESIUM SULFATE 1 G/100ML
1 INJECTION INTRAVENOUS ONCE
Status: COMPLETED | OUTPATIENT
Start: 2022-01-01 | End: 2022-01-01

## 2022-01-01 RX ORDER — INSULIN GLARGINE 100 [IU]/ML
15 INJECTION, SOLUTION SUBCUTANEOUS
Status: DISCONTINUED | OUTPATIENT
Start: 2022-01-01 | End: 2022-01-01

## 2022-01-01 RX ORDER — INSULIN GLARGINE 100 [IU]/ML
35 INJECTION, SOLUTION SUBCUTANEOUS
Status: DISCONTINUED | OUTPATIENT
Start: 2022-01-01 | End: 2022-01-01

## 2022-01-01 RX ORDER — INSULIN GLARGINE 100 [IU]/ML
40 INJECTION, SOLUTION SUBCUTANEOUS
Status: DISCONTINUED | OUTPATIENT
Start: 2022-01-01 | End: 2022-01-01 | Stop reason: HOSPADM

## 2022-01-01 RX ORDER — ASPIRIN 81 MG/1
324 TABLET, CHEWABLE ORAL ONCE
Status: DISCONTINUED | OUTPATIENT
Start: 2022-01-01 | End: 2022-01-01

## 2022-01-01 RX ORDER — BUPROPION HYDROCHLORIDE 100 MG/1
100 TABLET ORAL DAILY
Status: DISCONTINUED | OUTPATIENT
Start: 2022-01-01 | End: 2022-01-01 | Stop reason: HOSPADM

## 2022-01-01 RX ORDER — FENTANYL CITRATE 50 UG/ML
INJECTION, SOLUTION INTRAMUSCULAR; INTRAVENOUS
Status: COMPLETED
Start: 2022-01-01 | End: 2022-01-01

## 2022-01-01 RX ORDER — HALOPERIDOL 2 MG/ML
0.5 SOLUTION ORAL EVERY 4 HOURS PRN
Qty: 120 ML | Refills: 0 | Status: SHIPPED | OUTPATIENT
Start: 2022-01-01

## 2022-01-01 RX ORDER — ASPIRIN 300 MG/1
300 SUPPOSITORY RECTAL ONCE
Status: COMPLETED | OUTPATIENT
Start: 2022-01-01 | End: 2022-01-01

## 2022-01-01 RX ORDER — LIDOCAINE HYDROCHLORIDE 10 MG/ML
5 INJECTION, SOLUTION EPIDURAL; INFILTRATION; INTRACAUDAL; PERINEURAL ONCE
Status: COMPLETED | OUTPATIENT
Start: 2022-01-01 | End: 2022-01-01

## 2022-01-01 RX ORDER — INSULIN GLARGINE 100 [IU]/ML
15 INJECTION, SOLUTION SUBCUTANEOUS ONCE
Status: COMPLETED | OUTPATIENT
Start: 2022-01-01 | End: 2022-01-01

## 2022-01-01 RX ORDER — INSULIN GLARGINE 100 [IU]/ML
12 INJECTION, SOLUTION SUBCUTANEOUS ONCE
Status: COMPLETED | OUTPATIENT
Start: 2022-01-01 | End: 2022-01-01

## 2022-01-01 RX ORDER — LIDOCAINE HYDROCHLORIDE 20 MG/ML
INJECTION, SOLUTION EPIDURAL; INFILTRATION; INTRACAUDAL; PERINEURAL AS NEEDED
Status: DISCONTINUED | OUTPATIENT
Start: 2022-01-01 | End: 2022-01-01

## 2022-01-01 RX ORDER — OXYCODONE HYDROCHLORIDE 5 MG/1
5 TABLET ORAL EVERY 2 HOUR PRN
Qty: 30 TABLET | Refills: 0 | Status: SHIPPED | OUTPATIENT
Start: 2022-01-01 | End: 2022-01-01

## 2022-01-01 RX ORDER — INSULIN GLARGINE 100 [IU]/ML
20 INJECTION, SOLUTION SUBCUTANEOUS
Status: DISCONTINUED | OUTPATIENT
Start: 2022-01-01 | End: 2022-01-01

## 2022-01-01 RX ORDER — METRONIDAZOLE 500 MG/100ML
500 INJECTION, SOLUTION INTRAVENOUS EVERY 8 HOURS
Status: DISCONTINUED | OUTPATIENT
Start: 2022-01-01 | End: 2022-01-01

## 2022-01-01 RX ORDER — CHLORHEXIDINE GLUCONATE 0.12 MG/ML
15 RINSE ORAL EVERY 12 HOURS SCHEDULED
Status: DISCONTINUED | OUTPATIENT
Start: 2022-01-01 | End: 2022-01-01

## 2022-01-01 RX ORDER — ASPIRIN 81 MG/1
81 TABLET, CHEWABLE ORAL DAILY
Status: DISCONTINUED | OUTPATIENT
Start: 2022-01-01 | End: 2022-01-01 | Stop reason: HOSPADM

## 2022-01-01 RX ORDER — SODIUM CHLORIDE, SODIUM GLUCONATE, SODIUM ACETATE, POTASSIUM CHLORIDE, MAGNESIUM CHLORIDE, SODIUM PHOSPHATE, DIBASIC, AND POTASSIUM PHOSPHATE .53; .5; .37; .037; .03; .012; .00082 G/100ML; G/100ML; G/100ML; G/100ML; G/100ML; G/100ML; G/100ML
75 INJECTION, SOLUTION INTRAVENOUS CONTINUOUS
Status: DISCONTINUED | OUTPATIENT
Start: 2022-01-01 | End: 2022-01-01

## 2022-01-01 RX ORDER — MIDAZOLAM HYDROCHLORIDE 2 MG/2ML
INJECTION, SOLUTION INTRAMUSCULAR; INTRAVENOUS
Status: COMPLETED
Start: 2022-01-01 | End: 2022-01-01

## 2022-01-01 RX ORDER — FENTANYL CITRATE/PF 50 MCG/ML
25 SYRINGE (ML) INJECTION
Status: CANCELLED | OUTPATIENT
Start: 2022-01-01

## 2022-01-01 RX ORDER — LISINOPRIL 5 MG/1
5 TABLET ORAL ONCE
Status: COMPLETED | OUTPATIENT
Start: 2022-01-01 | End: 2022-01-01

## 2022-01-01 RX ORDER — CIPROFLOXACIN 500 MG/1
500 TABLET, FILM COATED ORAL EVERY 12 HOURS SCHEDULED
Qty: 8 TABLET | Refills: 0
Start: 2022-01-01 | End: 2022-01-01

## 2022-01-01 RX ORDER — INSULIN GLARGINE 100 [IU]/ML
35 INJECTION, SOLUTION SUBCUTANEOUS
Qty: 10 ML | Refills: 0
Start: 2022-01-01 | End: 2022-01-01

## 2022-01-01 RX ORDER — ASPIRIN 81 MG/1
324 TABLET ORAL ONCE
Status: DISCONTINUED | OUTPATIENT
Start: 2022-01-01 | End: 2022-01-01

## 2022-01-01 RX ORDER — INSULIN LISPRO 100 [IU]/ML
1-5 INJECTION, SOLUTION INTRAVENOUS; SUBCUTANEOUS EVERY 6 HOURS SCHEDULED
Status: DISCONTINUED | OUTPATIENT
Start: 2022-01-01 | End: 2022-01-01

## 2022-01-01 RX ORDER — ONDANSETRON 2 MG/ML
INJECTION INTRAMUSCULAR; INTRAVENOUS AS NEEDED
Status: DISCONTINUED | OUTPATIENT
Start: 2022-01-01 | End: 2022-01-01

## 2022-01-01 RX ORDER — CARVEDILOL 12.5 MG/1
12.5 TABLET ORAL 2 TIMES DAILY WITH MEALS
Status: DISCONTINUED | OUTPATIENT
Start: 2022-01-01 | End: 2022-01-01

## 2022-01-01 RX ORDER — METOPROLOL TARTRATE 50 MG/1
50 TABLET, FILM COATED ORAL
Status: DISCONTINUED | OUTPATIENT
Start: 2022-01-01 | End: 2022-01-01

## 2022-01-01 RX ORDER — HALOPERIDOL 2 MG/ML
0.5 SOLUTION ORAL EVERY 4 HOURS PRN
Qty: 120 ML | Refills: 0 | Status: SHIPPED | OUTPATIENT
Start: 2022-01-01 | End: 2022-01-01 | Stop reason: SDUPTHER

## 2022-01-01 RX ORDER — HYDROMORPHONE HCL/PF 1 MG/ML
0.5 SYRINGE (ML) INJECTION EVERY 2 HOUR PRN
Status: DISCONTINUED | OUTPATIENT
Start: 2022-01-01 | End: 2022-01-01 | Stop reason: HOSPADM

## 2022-01-01 RX ORDER — AMIODARONE HYDROCHLORIDE 50 MG/ML
INJECTION, SOLUTION INTRAVENOUS AS NEEDED
Status: DISCONTINUED | OUTPATIENT
Start: 2022-01-01 | End: 2022-01-01

## 2022-01-01 RX ORDER — FUROSEMIDE 20 MG/1
20 TABLET ORAL ONCE
Status: DISCONTINUED | OUTPATIENT
Start: 2022-01-01 | End: 2022-01-01

## 2022-01-01 RX ORDER — ALBUMIN, HUMAN INJ 5% 5 %
SOLUTION INTRAVENOUS CONTINUOUS PRN
Status: DISCONTINUED | OUTPATIENT
Start: 2022-01-01 | End: 2022-01-01

## 2022-01-01 RX ORDER — OXYCODONE HYDROCHLORIDE 5 MG/1
5 TABLET ORAL EVERY 2 HOUR PRN
Qty: 30 TABLET | Refills: 0 | Status: SHIPPED | OUTPATIENT
Start: 2022-01-01 | End: 2022-01-01 | Stop reason: SDUPTHER

## 2022-01-01 RX ORDER — HEPARIN SODIUM 10000 [USP'U]/100ML
3-24 INJECTION, SOLUTION INTRAVENOUS
Status: DISCONTINUED | OUTPATIENT
Start: 2022-01-01 | End: 2022-01-01

## 2022-01-01 RX ORDER — INSULIN LISPRO 100 [IU]/ML
1-6 INJECTION, SOLUTION INTRAVENOUS; SUBCUTANEOUS
Status: DISCONTINUED | OUTPATIENT
Start: 2022-01-01 | End: 2022-01-01 | Stop reason: HOSPADM

## 2022-01-01 RX ORDER — PANTOPRAZOLE SODIUM 40 MG/1
40 TABLET, DELAYED RELEASE ORAL
Status: DISCONTINUED | OUTPATIENT
Start: 2022-01-01 | End: 2022-01-01 | Stop reason: HOSPADM

## 2022-01-01 RX ORDER — PROPOFOL 10 MG/ML
5-50 INJECTION, EMULSION INTRAVENOUS
Status: DISCONTINUED | OUTPATIENT
Start: 2022-01-01 | End: 2022-01-01

## 2022-01-01 RX ORDER — LORAZEPAM 0.5 MG/1
0.5 TABLET ORAL EVERY 4 HOURS PRN
Status: DISCONTINUED | OUTPATIENT
Start: 2022-01-01 | End: 2022-01-01 | Stop reason: HOSPADM

## 2022-01-01 RX ORDER — GLYCOPYRROLATE 0.2 MG/ML
0.1 INJECTION INTRAMUSCULAR; INTRAVENOUS EVERY 4 HOURS PRN
Status: DISCONTINUED | OUTPATIENT
Start: 2022-01-01 | End: 2022-01-01 | Stop reason: HOSPADM

## 2022-01-01 RX ORDER — ASPIRIN 300 MG/1
300 SUPPOSITORY RECTAL ONCE
Status: DISCONTINUED | OUTPATIENT
Start: 2022-01-01 | End: 2022-01-01

## 2022-01-01 RX ORDER — METRONIDAZOLE 500 MG/1
500 TABLET ORAL EVERY 8 HOURS SCHEDULED
Qty: 12 TABLET | Refills: 0
Start: 2022-01-01 | End: 2022-01-01

## 2022-01-01 RX ORDER — LISINOPRIL 5 MG/1
5 TABLET ORAL DAILY
Status: DISCONTINUED | OUTPATIENT
Start: 2022-01-01 | End: 2022-01-01

## 2022-01-01 RX ORDER — HALOPERIDOL 5 MG/ML
0.5 INJECTION INTRAMUSCULAR EVERY 4 HOURS PRN
Status: DISCONTINUED | OUTPATIENT
Start: 2022-01-01 | End: 2022-01-01 | Stop reason: HOSPADM

## 2022-01-01 RX ORDER — INSULIN GLARGINE 100 [IU]/ML
40 INJECTION, SOLUTION SUBCUTANEOUS EVERY MORNING
Status: DISCONTINUED | OUTPATIENT
Start: 2022-01-01 | End: 2022-01-01

## 2022-01-01 RX ORDER — METOPROLOL TARTRATE 50 MG/1
50 TABLET, FILM COATED ORAL EVERY 12 HOURS SCHEDULED
Status: DISCONTINUED | OUTPATIENT
Start: 2022-01-01 | End: 2022-01-01

## 2022-01-01 RX ORDER — FENTANYL CITRATE 50 UG/ML
INJECTION, SOLUTION INTRAMUSCULAR; INTRAVENOUS AS NEEDED
Status: DISCONTINUED | OUTPATIENT
Start: 2022-01-01 | End: 2022-01-01

## 2022-01-01 RX ORDER — PROPOFOL 10 MG/ML
INJECTION, EMULSION INTRAVENOUS CONTINUOUS PRN
Status: DISCONTINUED | OUTPATIENT
Start: 2022-01-01 | End: 2022-01-01

## 2022-01-01 RX ORDER — DILTIAZEM HYDROCHLORIDE 240 MG/1
240 CAPSULE, COATED, EXTENDED RELEASE ORAL DAILY
Status: DISCONTINUED | OUTPATIENT
Start: 2022-01-01 | End: 2022-01-01

## 2022-01-01 RX ORDER — INSULIN LISPRO 100 [IU]/ML
2-12 INJECTION, SOLUTION INTRAVENOUS; SUBCUTANEOUS
Status: DISCONTINUED | OUTPATIENT
Start: 2022-01-01 | End: 2022-01-01 | Stop reason: HOSPADM

## 2022-01-01 RX ORDER — ALBUTEROL SULFATE 2.5 MG/3ML
2.5 SOLUTION RESPIRATORY (INHALATION) EVERY 4 HOURS PRN
Status: DISCONTINUED | OUTPATIENT
Start: 2022-01-01 | End: 2022-01-01 | Stop reason: HOSPADM

## 2022-01-01 RX ADMIN — DILTIAZEM HYDROCHLORIDE 30 MG: 30 TABLET, FILM COATED ORAL at 13:14

## 2022-01-01 RX ADMIN — INSULIN LISPRO 3 UNITS: 100 INJECTION, SOLUTION INTRAVENOUS; SUBCUTANEOUS at 14:36

## 2022-01-01 RX ADMIN — APIXABAN 5 MG: 5 TABLET, FILM COATED ORAL at 08:47

## 2022-01-01 RX ADMIN — LOSARTAN POTASSIUM 25 MG: 25 TABLET, FILM COATED ORAL at 08:27

## 2022-01-01 RX ADMIN — ATORVASTATIN CALCIUM 20 MG: 20 TABLET, FILM COATED ORAL at 17:34

## 2022-01-01 RX ADMIN — LOSARTAN POTASSIUM 25 MG: 25 TABLET, FILM COATED ORAL at 09:49

## 2022-01-01 RX ADMIN — MIDAZOLAM HYDROCHLORIDE 2 MG: 2 INJECTION, SOLUTION INTRAMUSCULAR; INTRAVENOUS at 18:45

## 2022-01-01 RX ADMIN — ATORVASTATIN CALCIUM 20 MG: 20 TABLET, FILM COATED ORAL at 18:13

## 2022-01-01 RX ADMIN — METRONIDAZOLE 500 MG: 500 INJECTION, SOLUTION INTRAVENOUS at 10:34

## 2022-01-01 RX ADMIN — POTASSIUM CHLORIDE 40 MEQ: 20 SOLUTION ORAL at 14:00

## 2022-01-01 RX ADMIN — METRONIDAZOLE 500 MG: 500 INJECTION, SOLUTION INTRAVENOUS at 10:00

## 2022-01-01 RX ADMIN — FENTANYL CITRATE 50 MCG: 50 INJECTION INTRAMUSCULAR; INTRAVENOUS at 18:44

## 2022-01-01 RX ADMIN — SODIUM CHLORIDE 0.5 UNITS/HR: 9 INJECTION, SOLUTION INTRAVENOUS at 07:05

## 2022-01-01 RX ADMIN — FUROSEMIDE 20 MG: 10 INJECTION, SOLUTION INTRAMUSCULAR; INTRAVENOUS at 15:31

## 2022-01-01 RX ADMIN — INSULIN LISPRO 12 UNITS: 100 INJECTION, SOLUTION INTRAVENOUS; SUBCUTANEOUS at 11:06

## 2022-01-01 RX ADMIN — ATORVASTATIN CALCIUM 20 MG: 20 TABLET, FILM COATED ORAL at 17:49

## 2022-01-01 RX ADMIN — CEFTRIAXONE 2000 MG: 2 INJECTION, POWDER, FOR SOLUTION INTRAMUSCULAR; INTRAVENOUS at 17:00

## 2022-01-01 RX ADMIN — SODIUM CHLORIDE 2 UNITS/HR: 9 INJECTION, SOLUTION INTRAVENOUS at 06:33

## 2022-01-01 RX ADMIN — SENNOSIDES AND DOCUSATE SODIUM 1 TABLET: 50; 8.6 TABLET ORAL at 23:07

## 2022-01-01 RX ADMIN — BUPROPION HYDROCHLORIDE 100 MG: 100 TABLET, FILM COATED ORAL at 13:14

## 2022-01-01 RX ADMIN — CARVEDILOL 25 MG: 25 TABLET, FILM COATED ORAL at 17:49

## 2022-01-01 RX ADMIN — PROPOFOL 50 MCG/KG/MIN: 10 INJECTION, EMULSION INTRAVENOUS at 17:05

## 2022-01-01 RX ADMIN — HEPARIN SODIUM 15 UNITS/KG/HR: 10000 INJECTION, SOLUTION INTRAVENOUS at 13:59

## 2022-01-01 RX ADMIN — SODIUM CHLORIDE, SODIUM LACTATE, POTASSIUM CHLORIDE, AND CALCIUM CHLORIDE 500 ML: .6; .31; .03; .02 INJECTION, SOLUTION INTRAVENOUS at 10:46

## 2022-01-01 RX ADMIN — LOSARTAN POTASSIUM 25 MG: 25 TABLET, FILM COATED ORAL at 08:11

## 2022-01-01 RX ADMIN — SERTRALINE HYDROCHLORIDE 50 MG: 50 TABLET ORAL at 09:46

## 2022-01-01 RX ADMIN — APIXABAN 5 MG: 5 TABLET, FILM COATED ORAL at 18:45

## 2022-01-01 RX ADMIN — HEPARIN SODIUM 5000 UNITS: 5000 INJECTION INTRAVENOUS; SUBCUTANEOUS at 06:23

## 2022-01-01 RX ADMIN — FENTANYL CITRATE 50 MCG: 50 INJECTION, SOLUTION INTRAMUSCULAR; INTRAVENOUS at 18:44

## 2022-01-01 RX ADMIN — ASPIRIN 81 MG CHEWABLE TABLET 81 MG: 81 TABLET CHEWABLE at 10:43

## 2022-01-01 RX ADMIN — PANTOPRAZOLE SODIUM 40 MG: 40 TABLET, DELAYED RELEASE ORAL at 06:45

## 2022-01-01 RX ADMIN — METOPROLOL TARTRATE 25 MG: 25 TABLET, FILM COATED ORAL at 22:12

## 2022-01-01 RX ADMIN — FUROSEMIDE 40 MG: 40 TABLET ORAL at 05:52

## 2022-01-01 RX ADMIN — INSULIN LISPRO 4 UNITS: 100 INJECTION, SOLUTION INTRAVENOUS; SUBCUTANEOUS at 02:05

## 2022-01-01 RX ADMIN — INSULIN LISPRO 8 UNITS: 100 INJECTION, SOLUTION INTRAVENOUS; SUBCUTANEOUS at 11:29

## 2022-01-01 RX ADMIN — METOPROLOL TARTRATE 25 MG: 25 TABLET, FILM COATED ORAL at 13:52

## 2022-01-01 RX ADMIN — INSULIN LISPRO 8 UNITS: 100 INJECTION, SOLUTION INTRAVENOUS; SUBCUTANEOUS at 08:46

## 2022-01-01 RX ADMIN — POTASSIUM CHLORIDE 40 MEQ: 29.8 INJECTION, SOLUTION INTRAVENOUS at 07:05

## 2022-01-01 RX ADMIN — HEPARIN SODIUM 18 UNITS/KG/HR: 10000 INJECTION, SOLUTION INTRAVENOUS at 02:21

## 2022-01-01 RX ADMIN — INSULIN GLARGINE 15 UNITS: 100 INJECTION, SOLUTION SUBCUTANEOUS at 08:59

## 2022-01-01 RX ADMIN — INSULIN LISPRO 2 UNITS: 100 INJECTION, SOLUTION INTRAVENOUS; SUBCUTANEOUS at 23:28

## 2022-01-01 RX ADMIN — SERTRALINE HYDROCHLORIDE 50 MG: 50 TABLET ORAL at 12:12

## 2022-01-01 RX ADMIN — CARVEDILOL 25 MG: 25 TABLET, FILM COATED ORAL at 08:27

## 2022-01-01 RX ADMIN — LOSARTAN POTASSIUM 25 MG: 25 TABLET, FILM COATED ORAL at 08:47

## 2022-01-01 RX ADMIN — LISINOPRIL 5 MG: 5 TABLET ORAL at 08:54

## 2022-01-01 RX ADMIN — INSULIN LISPRO 2 UNITS: 100 INJECTION, SOLUTION INTRAVENOUS; SUBCUTANEOUS at 23:30

## 2022-01-01 RX ADMIN — FENTANYL CITRATE 50 MCG: 50 INJECTION INTRAMUSCULAR; INTRAVENOUS at 11:05

## 2022-01-01 RX ADMIN — METOPROLOL TARTRATE 25 MG: 25 TABLET, FILM COATED ORAL at 09:44

## 2022-01-01 RX ADMIN — INSULIN GLARGINE 40 UNITS: 100 INJECTION, SOLUTION SUBCUTANEOUS at 13:10

## 2022-01-01 RX ADMIN — DILTIAZEM HYDROCHLORIDE 30 MG: 30 TABLET, FILM COATED ORAL at 06:45

## 2022-01-01 RX ADMIN — CEFTRIAXONE 2000 MG: 2 INJECTION, POWDER, FOR SOLUTION INTRAMUSCULAR; INTRAVENOUS at 18:09

## 2022-01-01 RX ADMIN — CARVEDILOL 25 MG: 25 TABLET, FILM COATED ORAL at 17:34

## 2022-01-01 RX ADMIN — APIXABAN 5 MG: 5 TABLET, FILM COATED ORAL at 08:11

## 2022-01-01 RX ADMIN — INSULIN GLARGINE 15 UNITS: 100 INJECTION, SOLUTION SUBCUTANEOUS at 09:00

## 2022-01-01 RX ADMIN — ATORVASTATIN CALCIUM 20 MG: 20 TABLET, FILM COATED ORAL at 08:49

## 2022-01-01 RX ADMIN — PROPOFOL 50 MCG/KG/MIN: 10 INJECTION, EMULSION INTRAVENOUS at 20:17

## 2022-01-01 RX ADMIN — ASPIRIN 300 MG: 300 SUPPOSITORY RECTAL at 21:40

## 2022-01-01 RX ADMIN — PROPOFOL 50 MCG/KG/MIN: 10 INJECTION, EMULSION INTRAVENOUS at 18:42

## 2022-01-01 RX ADMIN — APIXABAN 5 MG: 5 TABLET, FILM COATED ORAL at 17:34

## 2022-01-01 RX ADMIN — SODIUM CHLORIDE, SODIUM GLUCONATE, SODIUM ACETATE, POTASSIUM CHLORIDE, MAGNESIUM CHLORIDE, SODIUM PHOSPHATE, DIBASIC, AND POTASSIUM PHOSPHATE 500 ML: .53; .5; .37; .037; .03; .012; .00082 INJECTION, SOLUTION INTRAVENOUS at 23:28

## 2022-01-01 RX ADMIN — PROPOFOL 50 MCG/KG/MIN: 10 INJECTION, EMULSION INTRAVENOUS at 02:29

## 2022-01-01 RX ADMIN — METRONIDAZOLE 500 MG: 500 INJECTION, SOLUTION INTRAVENOUS at 00:16

## 2022-01-01 RX ADMIN — AMIODARONE HYDROCHLORIDE 150 MG: 50 INJECTION, SOLUTION INTRAVENOUS at 16:57

## 2022-01-01 RX ADMIN — ALBUMIN (HUMAN) 12.5 G: 0.25 INJECTION, SOLUTION INTRAVENOUS at 20:09

## 2022-01-01 RX ADMIN — INSULIN GLARGINE 35 UNITS: 100 INJECTION, SOLUTION SUBCUTANEOUS at 09:15

## 2022-01-01 RX ADMIN — SENNOSIDES AND DOCUSATE SODIUM 1 TABLET: 50; 8.6 TABLET ORAL at 21:30

## 2022-01-01 RX ADMIN — HEPARIN SODIUM 5000 UNITS: 5000 INJECTION INTRAVENOUS; SUBCUTANEOUS at 13:52

## 2022-01-01 RX ADMIN — Medication 30 MG: at 16:33

## 2022-01-01 RX ADMIN — INSULIN LISPRO 5 UNITS: 100 INJECTION, SOLUTION INTRAVENOUS; SUBCUTANEOUS at 18:15

## 2022-01-01 RX ADMIN — INSULIN LISPRO 6 UNITS: 100 INJECTION, SOLUTION INTRAVENOUS; SUBCUTANEOUS at 21:34

## 2022-01-01 RX ADMIN — MAGNESIUM OXIDE TAB 400 MG (241.3 MG ELEMENTAL MG) 400 MG: 400 (241.3 MG) TAB at 09:02

## 2022-01-01 RX ADMIN — INSULIN LISPRO 6 UNITS: 100 INJECTION, SOLUTION INTRAVENOUS; SUBCUTANEOUS at 11:26

## 2022-01-01 RX ADMIN — INSULIN LISPRO 8 UNITS: 100 INJECTION, SOLUTION INTRAVENOUS; SUBCUTANEOUS at 11:19

## 2022-01-01 RX ADMIN — ASPIRIN 81 MG CHEWABLE TABLET 81 MG: 81 TABLET CHEWABLE at 09:15

## 2022-01-01 RX ADMIN — Medication 20 MG: at 08:11

## 2022-01-01 RX ADMIN — APIXABAN 5 MG: 5 TABLET, FILM COATED ORAL at 13:06

## 2022-01-01 RX ADMIN — CHLORHEXIDINE GLUCONATE 15 ML: 1.2 SOLUTION ORAL at 20:06

## 2022-01-01 RX ADMIN — HEPARIN SODIUM 5000 UNITS: 5000 INJECTION INTRAVENOUS; SUBCUTANEOUS at 13:14

## 2022-01-01 RX ADMIN — GADOBUTROL 4 ML: 604.72 INJECTION INTRAVENOUS at 17:00

## 2022-01-01 RX ADMIN — APIXABAN 5 MG: 5 TABLET, FILM COATED ORAL at 09:49

## 2022-01-01 RX ADMIN — BUMETANIDE 0.5 MG: 0.25 INJECTION INTRAMUSCULAR; INTRAVENOUS at 17:02

## 2022-01-01 RX ADMIN — APIXABAN 5 MG: 5 TABLET, FILM COATED ORAL at 10:06

## 2022-01-01 RX ADMIN — ERTAPENEM SODIUM 1000 MG: 1 INJECTION, POWDER, LYOPHILIZED, FOR SOLUTION INTRAMUSCULAR; INTRAVENOUS at 09:44

## 2022-01-01 RX ADMIN — INSULIN LISPRO 8 UNITS: 100 INJECTION, SOLUTION INTRAVENOUS; SUBCUTANEOUS at 17:12

## 2022-01-01 RX ADMIN — SODIUM CHLORIDE 250 ML: 0.9 INJECTION, SOLUTION INTRAVENOUS at 08:53

## 2022-01-01 RX ADMIN — ASPIRIN 81 MG CHEWABLE TABLET 81 MG: 81 TABLET CHEWABLE at 09:49

## 2022-01-01 RX ADMIN — ATORVASTATIN CALCIUM 20 MG: 20 TABLET, FILM COATED ORAL at 09:45

## 2022-01-01 RX ADMIN — ASPIRIN 81 MG CHEWABLE TABLET 81 MG: 81 TABLET CHEWABLE at 09:44

## 2022-01-01 RX ADMIN — GADOBUTROL 9 ML: 604.72 INJECTION INTRAVENOUS at 09:16

## 2022-01-01 RX ADMIN — DILTIAZEM HYDROCHLORIDE 30 MG: 30 TABLET, FILM COATED ORAL at 13:52

## 2022-01-01 RX ADMIN — NOREPINEPHRINE BITARTRATE 3 MCG/MIN: 1 INJECTION, SOLUTION, CONCENTRATE INTRAVENOUS at 23:12

## 2022-01-01 RX ADMIN — APIXABAN 5 MG: 5 TABLET, FILM COATED ORAL at 09:15

## 2022-01-01 RX ADMIN — HEPARIN SODIUM 5000 UNITS: 5000 INJECTION INTRAVENOUS; SUBCUTANEOUS at 14:00

## 2022-01-01 RX ADMIN — HEPARIN SODIUM 5000 UNITS: 5000 INJECTION INTRAVENOUS; SUBCUTANEOUS at 05:52

## 2022-01-01 RX ADMIN — INSULIN GLARGINE 12 UNITS: 100 INJECTION, SOLUTION SUBCUTANEOUS at 08:35

## 2022-01-01 RX ADMIN — INSULIN LISPRO 3 UNITS: 100 INJECTION, SOLUTION INTRAVENOUS; SUBCUTANEOUS at 10:17

## 2022-01-01 RX ADMIN — POTASSIUM PHOSPHATE, MONOBASIC AND POTASSIUM PHOSPHATE, DIBASIC 30 MMOL: 224; 236 INJECTION, SOLUTION, CONCENTRATE INTRAVENOUS at 09:41

## 2022-01-01 RX ADMIN — HEPARIN SODIUM 5000 UNITS: 5000 INJECTION INTRAVENOUS; SUBCUTANEOUS at 22:51

## 2022-01-01 RX ADMIN — INSULIN GLARGINE 30 UNITS: 100 INJECTION, SOLUTION SUBCUTANEOUS at 22:14

## 2022-01-01 RX ADMIN — APIXABAN 5 MG: 5 TABLET, FILM COATED ORAL at 17:00

## 2022-01-01 RX ADMIN — INSULIN GLARGINE 10 UNITS: 100 INJECTION, SOLUTION SUBCUTANEOUS at 12:44

## 2022-01-01 RX ADMIN — METOPROLOL TARTRATE 25 MG: 25 TABLET, FILM COATED ORAL at 00:24

## 2022-01-01 RX ADMIN — METOPROLOL TARTRATE 50 MG: 50 TABLET, FILM COATED ORAL at 05:29

## 2022-01-01 RX ADMIN — DILTIAZEM HYDROCHLORIDE 30 MG: 30 TABLET, FILM COATED ORAL at 17:00

## 2022-01-01 RX ADMIN — METRONIDAZOLE 500 MG: 500 INJECTION, SOLUTION INTRAVENOUS at 00:40

## 2022-01-01 RX ADMIN — INSULIN LISPRO 3 UNITS: 100 INJECTION, SOLUTION INTRAVENOUS; SUBCUTANEOUS at 07:37

## 2022-01-01 RX ADMIN — SERTRALINE HYDROCHLORIDE 50 MG: 50 TABLET ORAL at 09:40

## 2022-01-01 RX ADMIN — CEFEPIME HYDROCHLORIDE 2000 MG: 2 INJECTION, POWDER, FOR SOLUTION INTRAVENOUS at 23:25

## 2022-01-01 RX ADMIN — METRONIDAZOLE 500 MG: 500 INJECTION, SOLUTION INTRAVENOUS at 00:39

## 2022-01-01 RX ADMIN — INSULIN LISPRO 1 UNITS: 100 INJECTION, SOLUTION INTRAVENOUS; SUBCUTANEOUS at 11:50

## 2022-01-01 RX ADMIN — METRONIDAZOLE 500 MG: 500 INJECTION, SOLUTION INTRAVENOUS at 19:15

## 2022-01-01 RX ADMIN — MIDAZOLAM 2 MG: 1 INJECTION INTRAMUSCULAR; INTRAVENOUS at 18:45

## 2022-01-01 RX ADMIN — APIXABAN 5 MG: 5 TABLET, FILM COATED ORAL at 09:46

## 2022-01-01 RX ADMIN — INSULIN LISPRO 6 UNITS: 100 INJECTION, SOLUTION INTRAVENOUS; SUBCUTANEOUS at 12:09

## 2022-01-01 RX ADMIN — APIXABAN 5 MG: 5 TABLET, FILM COATED ORAL at 16:54

## 2022-01-01 RX ADMIN — INSULIN LISPRO 2 UNITS: 100 INJECTION, SOLUTION INTRAVENOUS; SUBCUTANEOUS at 18:24

## 2022-01-01 RX ADMIN — FUROSEMIDE 40 MG: 40 TABLET ORAL at 08:11

## 2022-01-01 RX ADMIN — METOPROLOL TARTRATE 25 MG: 25 TABLET, FILM COATED ORAL at 22:50

## 2022-01-01 RX ADMIN — INSULIN LISPRO 3 UNITS: 100 INJECTION, SOLUTION INTRAVENOUS; SUBCUTANEOUS at 09:44

## 2022-01-01 RX ADMIN — Medication 20 MG: at 06:17

## 2022-01-01 RX ADMIN — INSULIN LISPRO 2 UNITS: 100 INJECTION, SOLUTION INTRAVENOUS; SUBCUTANEOUS at 13:33

## 2022-01-01 RX ADMIN — SODIUM CHLORIDE, SODIUM LACTATE, POTASSIUM CHLORIDE, AND CALCIUM CHLORIDE: .6; .31; .03; .02 INJECTION, SOLUTION INTRAVENOUS at 16:18

## 2022-01-01 RX ADMIN — IOHEXOL 68 ML: 350 INJECTION, SOLUTION INTRAVENOUS at 10:56

## 2022-01-01 RX ADMIN — PROPOFOL 50 MCG/KG/MIN: 10 INJECTION, EMULSION INTRAVENOUS at 06:10

## 2022-01-01 RX ADMIN — LIDOCAINE HYDROCHLORIDE 5 ML: 10 INJECTION, SOLUTION EPIDURAL; INFILTRATION; INTRACAUDAL; PERINEURAL at 15:00

## 2022-01-01 RX ADMIN — ASPIRIN 300 MG: 300 SUPPOSITORY RECTAL at 12:30

## 2022-01-01 RX ADMIN — INSULIN LISPRO 4 UNITS: 100 INJECTION, SOLUTION INTRAVENOUS; SUBCUTANEOUS at 21:30

## 2022-01-01 RX ADMIN — DILTIAZEM HYDROCHLORIDE 30 MG: 30 TABLET, FILM COATED ORAL at 23:34

## 2022-01-01 RX ADMIN — INSULIN GLARGINE 20 UNITS: 100 INJECTION, SOLUTION SUBCUTANEOUS at 22:45

## 2022-01-01 RX ADMIN — FUROSEMIDE 40 MG: 40 TABLET ORAL at 05:56

## 2022-01-01 RX ADMIN — Medication 20 MG: at 05:29

## 2022-01-01 RX ADMIN — ASPIRIN 81 MG CHEWABLE TABLET 81 MG: 81 TABLET CHEWABLE at 08:45

## 2022-01-01 RX ADMIN — ASPIRIN 81 MG CHEWABLE TABLET 81 MG: 81 TABLET CHEWABLE at 08:11

## 2022-01-01 RX ADMIN — POTASSIUM CHLORIDE 40 MEQ: 20 SOLUTION ORAL at 18:13

## 2022-01-01 RX ADMIN — CARVEDILOL 12.5 MG: 12.5 TABLET, FILM COATED ORAL at 18:45

## 2022-01-01 RX ADMIN — CARVEDILOL 12.5 MG: 12.5 TABLET, FILM COATED ORAL at 11:29

## 2022-01-01 RX ADMIN — SENNOSIDES AND DOCUSATE SODIUM 1 TABLET: 50; 8.6 TABLET ORAL at 22:29

## 2022-01-01 RX ADMIN — METOPROLOL TARTRATE 25 MG: 25 TABLET, FILM COATED ORAL at 22:13

## 2022-01-01 RX ADMIN — CARVEDILOL 12.5 MG: 12.5 TABLET, FILM COATED ORAL at 08:47

## 2022-01-01 RX ADMIN — DILTIAZEM HYDROCHLORIDE 30 MG: 30 TABLET, FILM COATED ORAL at 05:54

## 2022-01-01 RX ADMIN — METRONIDAZOLE 500 MG: 500 INJECTION, SOLUTION INTRAVENOUS at 09:49

## 2022-01-01 RX ADMIN — ERTAPENEM SODIUM 1000 MG: 1 INJECTION, POWDER, LYOPHILIZED, FOR SOLUTION INTRAMUSCULAR; INTRAVENOUS at 10:39

## 2022-01-01 RX ADMIN — ERTAPENEM SODIUM 1000 MG: 1 INJECTION, POWDER, LYOPHILIZED, FOR SOLUTION INTRAMUSCULAR; INTRAVENOUS at 08:48

## 2022-01-01 RX ADMIN — ASPIRIN 81 MG CHEWABLE TABLET 81 MG: 81 TABLET CHEWABLE at 09:02

## 2022-01-01 RX ADMIN — INSULIN LISPRO 2 UNITS: 100 INJECTION, SOLUTION INTRAVENOUS; SUBCUTANEOUS at 11:59

## 2022-01-01 RX ADMIN — METOPROLOL TARTRATE 50 MG: 50 TABLET, FILM COATED ORAL at 13:11

## 2022-01-01 RX ADMIN — ERTAPENEM SODIUM 1000 MG: 1 INJECTION, POWDER, LYOPHILIZED, FOR SOLUTION INTRAMUSCULAR; INTRAVENOUS at 08:47

## 2022-01-01 RX ADMIN — ASPIRIN 81 MG CHEWABLE TABLET 81 MG: 81 TABLET CHEWABLE at 10:17

## 2022-01-01 RX ADMIN — HEPARIN SODIUM 5000 UNITS: 5000 INJECTION INTRAVENOUS; SUBCUTANEOUS at 22:12

## 2022-01-01 RX ADMIN — DILTIAZEM HYDROCHLORIDE 30 MG: 30 TABLET, FILM COATED ORAL at 17:10

## 2022-01-01 RX ADMIN — GADOBUTROL 8 ML: 604.72 INJECTION INTRAVENOUS at 11:29

## 2022-01-01 RX ADMIN — PROPOFOL 50 MCG/KG/MIN: 10 INJECTION, EMULSION INTRAVENOUS at 23:12

## 2022-01-01 RX ADMIN — BUPROPION HYDROCHLORIDE 100 MG: 100 TABLET, FILM COATED ORAL at 08:50

## 2022-01-01 RX ADMIN — INSULIN GLARGINE 35 UNITS: 100 INJECTION, SOLUTION SUBCUTANEOUS at 23:47

## 2022-01-01 RX ADMIN — METOROPROLOL TARTRATE 5 MG: 5 INJECTION, SOLUTION INTRAVENOUS at 05:52

## 2022-01-01 RX ADMIN — ASPIRIN 81 MG CHEWABLE TABLET 81 MG: 81 TABLET CHEWABLE at 08:27

## 2022-01-01 RX ADMIN — PHENYLEPHRINE HYDROCHLORIDE 40 MCG/MIN: 10 INJECTION INTRAVENOUS at 10:57

## 2022-01-01 RX ADMIN — METOPROLOL TARTRATE 25 MG: 25 TABLET, FILM COATED ORAL at 23:34

## 2022-01-01 RX ADMIN — NOREPINEPHRINE BITARTRATE 2 MCG/MIN: 1 INJECTION, SOLUTION, CONCENTRATE INTRAVENOUS at 13:21

## 2022-01-01 RX ADMIN — CHLORHEXIDINE GLUCONATE 15 ML: 1.2 SOLUTION ORAL at 09:00

## 2022-01-01 RX ADMIN — SODIUM CHLORIDE, SODIUM GLUCONATE, SODIUM ACETATE, POTASSIUM CHLORIDE, MAGNESIUM CHLORIDE, SODIUM PHOSPHATE, DIBASIC, AND POTASSIUM PHOSPHATE 100 ML/HR: .53; .5; .37; .037; .03; .012; .00082 INJECTION, SOLUTION INTRAVENOUS at 03:39

## 2022-01-01 RX ADMIN — INSULIN LISPRO 4 UNITS: 100 INJECTION, SOLUTION INTRAVENOUS; SUBCUTANEOUS at 08:00

## 2022-01-01 RX ADMIN — ASPIRIN 81 MG CHEWABLE TABLET 81 MG: 81 TABLET CHEWABLE at 08:47

## 2022-01-01 RX ADMIN — SODIUM CHLORIDE 500 ML: 0.9 INJECTION, SOLUTION INTRAVENOUS at 11:58

## 2022-01-01 RX ADMIN — INSULIN LISPRO 1 UNITS: 100 INJECTION, SOLUTION INTRAVENOUS; SUBCUTANEOUS at 05:16

## 2022-01-01 RX ADMIN — ROCURONIUM BROMIDE 30 MG: 50 INJECTION INTRAVENOUS at 16:49

## 2022-01-01 RX ADMIN — DILTIAZEM HYDROCHLORIDE 30 MG: 30 TABLET, FILM COATED ORAL at 23:59

## 2022-01-01 RX ADMIN — LIDOCAINE HYDROCHLORIDE 80 MG: 20 INJECTION, SOLUTION EPIDURAL; INFILTRATION; INTRACAUDAL; PERINEURAL at 10:41

## 2022-01-01 RX ADMIN — SODIUM CHLORIDE 500 ML: 0.9 INJECTION, SOLUTION INTRAVENOUS at 16:05

## 2022-01-01 RX ADMIN — CEFTRIAXONE 2000 MG: 2 INJECTION, POWDER, FOR SOLUTION INTRAMUSCULAR; INTRAVENOUS at 16:53

## 2022-01-01 RX ADMIN — METOPROLOL TARTRATE 50 MG: 50 TABLET, FILM COATED ORAL at 06:03

## 2022-01-01 RX ADMIN — INSULIN LISPRO 8 UNITS: 100 INJECTION, SOLUTION INTRAVENOUS; SUBCUTANEOUS at 16:34

## 2022-01-01 RX ADMIN — SODIUM CHLORIDE 8 UNITS/HR: 9 INJECTION, SOLUTION INTRAVENOUS at 10:06

## 2022-01-01 RX ADMIN — ASPIRIN 81 MG CHEWABLE TABLET 81 MG: 81 TABLET CHEWABLE at 11:03

## 2022-01-01 RX ADMIN — FUROSEMIDE 40 MG: 40 TABLET ORAL at 06:28

## 2022-01-01 RX ADMIN — ATORVASTATIN CALCIUM 20 MG: 20 TABLET, FILM COATED ORAL at 12:06

## 2022-01-01 RX ADMIN — INSULIN LISPRO 2 UNITS: 100 INJECTION, SOLUTION INTRAVENOUS; SUBCUTANEOUS at 14:11

## 2022-01-01 RX ADMIN — SENNOSIDES AND DOCUSATE SODIUM 1 TABLET: 50; 8.6 TABLET ORAL at 21:13

## 2022-01-01 RX ADMIN — CEFTRIAXONE 2000 MG: 2 INJECTION, POWDER, FOR SOLUTION INTRAMUSCULAR; INTRAVENOUS at 17:11

## 2022-01-01 RX ADMIN — DILTIAZEM HYDROCHLORIDE 30 MG: 30 TABLET, FILM COATED ORAL at 18:09

## 2022-01-01 RX ADMIN — INSULIN LISPRO 5 UNITS: 100 INJECTION, SOLUTION INTRAVENOUS; SUBCUTANEOUS at 19:19

## 2022-01-01 RX ADMIN — MAGNESIUM SULFATE IN DEXTROSE 1 G: 10 INJECTION, SOLUTION INTRAVENOUS at 11:02

## 2022-01-01 RX ADMIN — Medication 20 MG: at 06:09

## 2022-01-01 RX ADMIN — HEPARIN SODIUM 5000 UNITS: 5000 INJECTION INTRAVENOUS; SUBCUTANEOUS at 06:34

## 2022-01-01 RX ADMIN — METRONIDAZOLE 500 MG: 500 INJECTION, SOLUTION INTRAVENOUS at 18:12

## 2022-01-01 RX ADMIN — ONDANSETRON 4 MG: 2 INJECTION INTRAMUSCULAR; INTRAVENOUS at 11:07

## 2022-01-01 RX ADMIN — INSULIN GLARGINE 30 UNITS: 100 INJECTION, SOLUTION SUBCUTANEOUS at 22:50

## 2022-01-01 RX ADMIN — ERTAPENEM SODIUM 1000 MG: 1 INJECTION, POWDER, LYOPHILIZED, FOR SOLUTION INTRAMUSCULAR; INTRAVENOUS at 08:52

## 2022-01-01 RX ADMIN — SODIUM CHLORIDE, SODIUM LACTATE, POTASSIUM CHLORIDE, AND CALCIUM CHLORIDE: .6; .31; .03; .02 INJECTION, SOLUTION INTRAVENOUS at 10:38

## 2022-01-01 RX ADMIN — METRONIDAZOLE 500 MG: 500 INJECTION, SOLUTION INTRAVENOUS at 18:10

## 2022-01-01 RX ADMIN — ATORVASTATIN CALCIUM 20 MG: 20 TABLET, FILM COATED ORAL at 09:40

## 2022-01-01 RX ADMIN — SODIUM CHLORIDE, SODIUM GLUCONATE, SODIUM ACETATE, POTASSIUM CHLORIDE, MAGNESIUM CHLORIDE, SODIUM PHOSPHATE, DIBASIC, AND POTASSIUM PHOSPHATE 100 ML/HR: .53; .5; .37; .037; .03; .012; .00082 INJECTION, SOLUTION INTRAVENOUS at 17:35

## 2022-01-01 RX ADMIN — METRONIDAZOLE 500 MG: 500 INJECTION, SOLUTION INTRAVENOUS at 08:49

## 2022-01-01 RX ADMIN — METOPROLOL TARTRATE 50 MG: 50 TABLET, FILM COATED ORAL at 14:38

## 2022-01-01 RX ADMIN — METOPROLOL TARTRATE 25 MG: 25 TABLET, FILM COATED ORAL at 11:06

## 2022-01-01 RX ADMIN — INSULIN LISPRO 2 UNITS: 100 INJECTION, SOLUTION INTRAVENOUS; SUBCUTANEOUS at 11:30

## 2022-01-01 RX ADMIN — LISINOPRIL 5 MG: 5 TABLET ORAL at 11:29

## 2022-01-01 RX ADMIN — METOPROLOL TARTRATE 50 MG: 50 TABLET, FILM COATED ORAL at 06:17

## 2022-01-01 RX ADMIN — INSULIN LISPRO 5 UNITS: 100 INJECTION, SOLUTION INTRAVENOUS; SUBCUTANEOUS at 17:34

## 2022-01-01 RX ADMIN — DILTIAZEM HYDROCHLORIDE 30 MG: 30 TABLET, FILM COATED ORAL at 12:34

## 2022-01-01 RX ADMIN — METOPROLOL TARTRATE 25 MG: 25 TABLET, FILM COATED ORAL at 09:46

## 2022-01-01 RX ADMIN — POTASSIUM CHLORIDE 40 MEQ: 1500 TABLET, EXTENDED RELEASE ORAL at 10:06

## 2022-01-01 RX ADMIN — LISINOPRIL 5 MG: 5 TABLET ORAL at 09:15

## 2022-01-01 RX ADMIN — SODIUM CHLORIDE, SODIUM GLUCONATE, SODIUM ACETATE, POTASSIUM CHLORIDE, MAGNESIUM CHLORIDE, SODIUM PHOSPHATE, DIBASIC, AND POTASSIUM PHOSPHATE 125 ML/HR: .53; .5; .37; .037; .03; .012; .00082 INJECTION, SOLUTION INTRAVENOUS at 18:15

## 2022-01-01 RX ADMIN — PANTOPRAZOLE SODIUM 40 MG: 40 TABLET, DELAYED RELEASE ORAL at 06:23

## 2022-01-01 RX ADMIN — SENNOSIDES AND DOCUSATE SODIUM 1 TABLET: 50; 8.6 TABLET ORAL at 21:36

## 2022-01-01 RX ADMIN — METRONIDAZOLE 500 MG: 500 INJECTION, SOLUTION INTRAVENOUS at 16:52

## 2022-01-01 RX ADMIN — METRONIDAZOLE 500 MG: 500 INJECTION, SOLUTION INTRAVENOUS at 00:32

## 2022-01-01 RX ADMIN — INSULIN LISPRO 3 UNITS: 100 INJECTION, SOLUTION INTRAVENOUS; SUBCUTANEOUS at 12:05

## 2022-01-01 RX ADMIN — CEFEPIME HYDROCHLORIDE 2000 MG: 2 INJECTION, POWDER, FOR SOLUTION INTRAVENOUS at 09:11

## 2022-01-01 RX ADMIN — METOPROLOL TARTRATE 50 MG: 50 TABLET, FILM COATED ORAL at 17:34

## 2022-01-01 RX ADMIN — ERTAPENEM SODIUM 1000 MG: 1 INJECTION, POWDER, LYOPHILIZED, FOR SOLUTION INTRAMUSCULAR; INTRAVENOUS at 09:05

## 2022-01-01 RX ADMIN — METRONIDAZOLE 500 MG: 500 INJECTION, SOLUTION INTRAVENOUS at 17:10

## 2022-01-01 RX ADMIN — Medication 20 MG: at 06:20

## 2022-01-01 RX ADMIN — SENNOSIDES AND DOCUSATE SODIUM 1 TABLET: 50; 8.6 TABLET ORAL at 22:12

## 2022-01-01 RX ADMIN — PROPOFOL 50 MG: 10 INJECTION, EMULSION INTRAVENOUS at 10:41

## 2022-01-01 RX ADMIN — VANCOMYCIN HYDROCHLORIDE 2000 MG: 1 INJECTION, POWDER, LYOPHILIZED, FOR SOLUTION INTRAVENOUS at 00:16

## 2022-01-01 RX ADMIN — CARVEDILOL 25 MG: 25 TABLET, FILM COATED ORAL at 08:11

## 2022-01-01 RX ADMIN — ATORVASTATIN CALCIUM 20 MG: 20 TABLET, FILM COATED ORAL at 19:03

## 2022-01-01 RX ADMIN — ATORVASTATIN CALCIUM 20 MG: 20 TABLET, FILM COATED ORAL at 18:46

## 2022-01-01 RX ADMIN — CARVEDILOL 25 MG: 25 TABLET, FILM COATED ORAL at 07:36

## 2022-01-01 RX ADMIN — INSULIN LISPRO 3 UNITS: 100 INJECTION, SOLUTION INTRAVENOUS; SUBCUTANEOUS at 00:00

## 2022-01-01 RX ADMIN — PANTOPRAZOLE SODIUM 40 MG: 40 TABLET, DELAYED RELEASE ORAL at 06:11

## 2022-01-01 RX ADMIN — INSULIN LISPRO 5 UNITS: 100 INJECTION, SOLUTION INTRAVENOUS; SUBCUTANEOUS at 08:34

## 2022-01-01 RX ADMIN — ACETAMINOPHEN 650 MG: 650 SUPPOSITORY RECTAL at 09:44

## 2022-01-01 RX ADMIN — DILTIAZEM HYDROCHLORIDE 30 MG: 30 TABLET, FILM COATED ORAL at 00:24

## 2022-01-01 RX ADMIN — APIXABAN 5 MG: 5 TABLET, FILM COATED ORAL at 08:27

## 2022-01-01 RX ADMIN — SENNOSIDES AND DOCUSATE SODIUM 1 TABLET: 50; 8.6 TABLET ORAL at 23:10

## 2022-01-01 RX ADMIN — ROCURONIUM BROMIDE 50 MG: 50 INJECTION INTRAVENOUS at 10:41

## 2022-01-01 RX ADMIN — APIXABAN 5 MG: 5 TABLET, FILM COATED ORAL at 08:54

## 2022-01-01 RX ADMIN — INSULIN LISPRO 2 UNITS: 100 INJECTION, SOLUTION INTRAVENOUS; SUBCUTANEOUS at 08:21

## 2022-01-01 RX ADMIN — INSULIN LISPRO 6 UNITS: 100 INJECTION, SOLUTION INTRAVENOUS; SUBCUTANEOUS at 17:50

## 2022-01-01 RX ADMIN — Medication 20 MG: at 06:34

## 2022-01-01 RX ADMIN — SODIUM CHLORIDE, SODIUM GLUCONATE, SODIUM ACETATE, POTASSIUM CHLORIDE, MAGNESIUM CHLORIDE, SODIUM PHOSPHATE, DIBASIC, AND POTASSIUM PHOSPHATE 125 ML/HR: .53; .5; .37; .037; .03; .012; .00082 INJECTION, SOLUTION INTRAVENOUS at 01:51

## 2022-01-01 RX ADMIN — INSULIN LISPRO 4 UNITS: 100 INJECTION, SOLUTION INTRAVENOUS; SUBCUTANEOUS at 06:46

## 2022-01-01 RX ADMIN — METOPROLOL TARTRATE 25 MG: 25 TABLET, FILM COATED ORAL at 08:45

## 2022-01-01 RX ADMIN — POTASSIUM CHLORIDE 20 MEQ: 14.9 INJECTION, SOLUTION INTRAVENOUS at 18:16

## 2022-01-01 RX ADMIN — Medication 20 MG: at 14:00

## 2022-01-01 RX ADMIN — Medication 100 MG: at 16:33

## 2022-01-01 RX ADMIN — FUROSEMIDE 40 MG: 40 TABLET ORAL at 06:47

## 2022-01-01 RX ADMIN — SUGAMMADEX 200 MG: 100 INJECTION, SOLUTION INTRAVENOUS at 11:28

## 2022-01-01 RX ADMIN — Medication 20 MG: at 05:48

## 2022-01-01 RX ADMIN — INSULIN LISPRO 8 UNITS: 100 INJECTION, SOLUTION INTRAVENOUS; SUBCUTANEOUS at 16:40

## 2022-01-01 RX ADMIN — INSULIN LISPRO 12 UNITS: 100 INJECTION, SOLUTION INTRAVENOUS; SUBCUTANEOUS at 09:46

## 2022-01-01 RX ADMIN — ASPIRIN 81 MG CHEWABLE TABLET 81 MG: 81 TABLET CHEWABLE at 08:54

## 2022-01-01 RX ADMIN — SODIUM CHLORIDE 12 UNITS/HR: 9 INJECTION, SOLUTION INTRAVENOUS at 18:56

## 2022-01-01 RX ADMIN — BUPROPION HYDROCHLORIDE 100 MG: 100 TABLET, FILM COATED ORAL at 09:45

## 2022-01-01 RX ADMIN — INSULIN LISPRO 2 UNITS: 100 INJECTION, SOLUTION INTRAVENOUS; SUBCUTANEOUS at 13:11

## 2022-01-01 RX ADMIN — SODIUM CHLORIDE, SODIUM LACTATE, POTASSIUM CHLORIDE, AND CALCIUM CHLORIDE 500 ML: .6; .31; .03; .02 INJECTION, SOLUTION INTRAVENOUS at 14:31

## 2022-01-01 RX ADMIN — HEPARIN SODIUM 5000 UNITS: 5000 INJECTION INTRAVENOUS; SUBCUTANEOUS at 17:13

## 2022-01-01 RX ADMIN — Medication 20 MG: at 16:35

## 2022-01-01 RX ADMIN — POTASSIUM CHLORIDE 40 MEQ: 29.8 INJECTION, SOLUTION INTRAVENOUS at 10:22

## 2022-01-01 RX ADMIN — APIXABAN 5 MG: 5 TABLET, FILM COATED ORAL at 17:49

## 2022-01-01 RX ADMIN — METRONIDAZOLE 500 MG: 500 INJECTION, SOLUTION INTRAVENOUS at 09:40

## 2022-01-01 RX ADMIN — METOPROLOL TARTRATE 50 MG: 50 TABLET, FILM COATED ORAL at 23:10

## 2022-01-01 RX ADMIN — BUPROPION HYDROCHLORIDE 100 MG: 100 TABLET, FILM COATED ORAL at 12:12

## 2022-01-01 RX ADMIN — METOPROLOL TARTRATE 25 MG: 25 TABLET, FILM COATED ORAL at 08:49

## 2022-01-01 RX ADMIN — ERTAPENEM SODIUM 1000 MG: 1 INJECTION, POWDER, LYOPHILIZED, FOR SOLUTION INTRAMUSCULAR; INTRAVENOUS at 09:17

## 2022-01-01 RX ADMIN — FUROSEMIDE 40 MG: 40 TABLET ORAL at 14:37

## 2022-01-01 RX ADMIN — METOPROLOL TARTRATE 25 MG: 25 TABLET, FILM COATED ORAL at 09:40

## 2022-01-01 RX ADMIN — INSULIN LISPRO 4 UNITS: 100 INJECTION, SOLUTION INTRAVENOUS; SUBCUTANEOUS at 11:06

## 2022-01-01 RX ADMIN — METOROPROLOL TARTRATE 5 MG: 5 INJECTION, SOLUTION INTRAVENOUS at 15:56

## 2022-01-01 RX ADMIN — INSULIN LISPRO 3 UNITS: 100 INJECTION, SOLUTION INTRAVENOUS; SUBCUTANEOUS at 05:52

## 2022-01-01 RX ADMIN — INSULIN LISPRO 4 UNITS: 100 INJECTION, SOLUTION INTRAVENOUS; SUBCUTANEOUS at 07:36

## 2022-01-01 RX ADMIN — INSULIN LISPRO 2 UNITS: 100 INJECTION, SOLUTION INTRAVENOUS; SUBCUTANEOUS at 08:56

## 2022-01-01 RX ADMIN — HEPARIN SODIUM 5000 UNITS: 5000 INJECTION INTRAVENOUS; SUBCUTANEOUS at 00:24

## 2022-01-01 RX ADMIN — INSULIN LISPRO 2 UNITS: 100 INJECTION, SOLUTION INTRAVENOUS; SUBCUTANEOUS at 18:46

## 2022-01-01 RX ADMIN — MAGNESIUM OXIDE TAB 400 MG (241.3 MG ELEMENTAL MG) 400 MG: 400 (241.3 MG) TAB at 09:16

## 2022-01-01 RX ADMIN — METRONIDAZOLE 500 MG: 500 INJECTION, SOLUTION INTRAVENOUS at 02:29

## 2022-01-01 RX ADMIN — INSULIN LISPRO 8 UNITS: 100 INJECTION, SOLUTION INTRAVENOUS; SUBCUTANEOUS at 07:37

## 2022-01-01 RX ADMIN — INSULIN GLARGINE 22 UNITS: 100 INJECTION, SOLUTION SUBCUTANEOUS at 23:07

## 2022-01-01 RX ADMIN — HEPARIN SODIUM 5000 UNITS: 5000 INJECTION INTRAVENOUS; SUBCUTANEOUS at 22:30

## 2022-01-01 RX ADMIN — DILTIAZEM HYDROCHLORIDE 30 MG: 30 TABLET, FILM COATED ORAL at 12:31

## 2022-01-01 RX ADMIN — SODIUM CHLORIDE 6 UNITS/HR: 9 INJECTION, SOLUTION INTRAVENOUS at 11:45

## 2022-01-01 RX ADMIN — INSULIN LISPRO 2 UNITS: 100 INJECTION, SOLUTION INTRAVENOUS; SUBCUTANEOUS at 21:36

## 2022-01-01 RX ADMIN — PANTOPRAZOLE SODIUM 40 MG: 40 TABLET, DELAYED RELEASE ORAL at 05:54

## 2022-01-01 RX ADMIN — DILTIAZEM HYDROCHLORIDE 30 MG: 30 TABLET, FILM COATED ORAL at 06:23

## 2022-01-01 RX ADMIN — SODIUM CHLORIDE, SODIUM LACTATE, POTASSIUM CHLORIDE, AND CALCIUM CHLORIDE 100 ML/HR: .6; .31; .03; .02 INJECTION, SOLUTION INTRAVENOUS at 06:03

## 2022-01-01 RX ADMIN — CARVEDILOL 25 MG: 25 TABLET, FILM COATED ORAL at 18:13

## 2022-01-01 RX ADMIN — APIXABAN 5 MG: 5 TABLET, FILM COATED ORAL at 18:13

## 2022-01-01 RX ADMIN — INSULIN LISPRO 2 UNITS: 100 INJECTION, SOLUTION INTRAVENOUS; SUBCUTANEOUS at 20:28

## 2022-01-01 RX ADMIN — INSULIN LISPRO 1 UNITS: 100 INJECTION, SOLUTION INTRAVENOUS; SUBCUTANEOUS at 18:19

## 2022-01-01 RX ADMIN — LIDOCAINE HYDROCHLORIDE 100 MG: 20 INJECTION, SOLUTION EPIDURAL; INFILTRATION; INTRACAUDAL; PERINEURAL at 16:33

## 2022-01-01 RX ADMIN — PROPOFOL 30 MG: 10 INJECTION, EMULSION INTRAVENOUS at 16:33

## 2022-01-01 RX ADMIN — INSULIN LISPRO 1 UNITS: 100 INJECTION, SOLUTION INTRAVENOUS; SUBCUTANEOUS at 09:13

## 2022-01-01 RX ADMIN — SERTRALINE HYDROCHLORIDE 50 MG: 50 TABLET ORAL at 08:49

## 2022-01-01 RX ADMIN — ACETAMINOPHEN 650 MG: 650 SUPPOSITORY RECTAL at 16:07

## 2022-01-01 RX ADMIN — ALBUMIN (HUMAN): 12.5 INJECTION, SOLUTION INTRAVENOUS at 16:46

## 2022-01-01 RX ADMIN — SODIUM CHLORIDE, SODIUM LACTATE, POTASSIUM CHLORIDE, AND CALCIUM CHLORIDE 100 ML/HR: .6; .31; .03; .02 INJECTION, SOLUTION INTRAVENOUS at 19:03

## 2022-01-01 RX ADMIN — METOROPROLOL TARTRATE 5 MG: 5 INJECTION, SOLUTION INTRAVENOUS at 07:04

## 2022-01-01 RX ADMIN — FUROSEMIDE 40 MG: 40 TABLET ORAL at 06:03

## 2022-01-01 RX ADMIN — FUROSEMIDE 40 MG: 40 TABLET ORAL at 07:39

## 2022-01-01 RX ADMIN — METOPROLOL TARTRATE 50 MG: 50 TABLET, FILM COATED ORAL at 22:28

## 2022-01-12 ENCOUNTER — NURSING HOME VISIT (OUTPATIENT)
Dept: GERIATRICS | Facility: OTHER | Age: 87
End: 2022-01-12
Payer: COMMERCIAL

## 2022-01-12 DIAGNOSIS — F01.50 VASCULAR DEMENTIA WITHOUT BEHAVIORAL DISTURBANCE (HCC): ICD-10-CM

## 2022-01-12 DIAGNOSIS — F33.1 MODERATE EPISODE OF RECURRENT MAJOR DEPRESSIVE DISORDER (HCC): Primary | ICD-10-CM

## 2022-01-12 PROCEDURE — 99308 SBSQ NF CARE LOW MDM 20: CPT | Performed by: NURSE PRACTITIONER

## 2022-01-14 NOTE — PROGRESS NOTES
MEDICATION MANAGEMENT NOTE        [facility]  POS: 32: NF- Long Term, [facility]      Name and Date of Birth:  Baby Toya 82 y o  1935 MRN: [id number]    Date of Visit: January 12, 2022    Allergies   Allergen Reactions    Amoxicillin Diarrhea and Hives     SUBJECTIVE:    Amarilis Cummings is seen today for a follow up for Major Depressive Disorder  He continues to improve gradually since the last visit  He is seen in the hallway and noted to be brighter and joking with staff  He is denying any recent tearful episodes  He continues on Zoloft 50 mg and will follow up in 3 months  He denies any side effects from medications  PLAN:    All medications and routine orders were reviewed and updated as needed  Continue current medications:  Medication management every 3 months  Plan discussed with: Patient    Diagnoses and all orders for this visit:    Vascular dementia without behavioral disturbance ([facility] )    Moderate episode of recurrent major depressive disorder ([facility] )        Current Medications  Medications reviewed and updated in facility chart  Psychotherapy Provided:     Individual psychotherapy provided: Yes  Supportive counseling provided  Reassurance and supportive therapy provided  HPI ROS Appetite Changes and Sleep:     He reports fluctuating sleep pattern, fluctuating appetite, fluctuating energy levels   Denies homicidal ideation, denies suicidal ideation    Review Of Systems:   all other systems are negative         Mental Status Evaluation:    Appearance:  age appropriate   Behavior:  pleasant, cooperative   Speech:  normal rate, normal volume   Mood:  depressed   Affect:  brighter   Thought Process:  goal directed   Associations: concrete associations   Thought Content:  no overt delusions   Perceptual Disturbances: none   Risk Potential: Suicidal ideation - None  Homicidal ideation - None  Potential for aggression - No   Sensorium: oriented to person and place   Memory:  recent memory mildly impaired   Consciousness:  alert and awake   Attention: decreased concentration and decreased attention span   Insight:  limited   Judgment: limited   Gait/Station: in wheelchair   Motor Activity: no abnormal movements     Past Psychiatric History Update:     Inpatient Psychiatric Admission Since Last Encounter:   no  Suicide Attempt Or Self Mutilation Since Last Encounter:   no  Incidence of Violent Behavior Since Last Encounter:   no    Traumatic History Update:     New Onset of Abuse Since Last Encounter:   no  Traumatic Events Since Last Encounter:   no    Social History:    Changes since last encounter:  no    History Review: The following portions of the patient's history were reviewed and updated as appropriate: allergies, current medications, past family history, past medical history, past social history, past surgical history and problem list     OBJECTIVE:     Vital signs in last 24 hours: Vital signs and nursing notes reviewed in facility chart  Laboratory Results: Lab results reviewed in facility chart  Medications Risks/Benefits:      Risks, Benefits And Possible Side Effects Of Medications:    Discussed risks and benefits of treatment with patient including risk of suicidality, serotonin syndrome, increased QTc interval and SIADH related to treatment with antidepressants; Risk of induction of manic symptoms in certain patient populations     Controlled Medication Discussion:     Not applicable - controlled prescriptions are not prescribed by this practice    Evaluation of Psychotropic Drugs for possible gradual dose reductions    Psychotropic medications have been reviewed  Patient continues with symptoms of depression as noted above  Any/or further dose reductions at this time would be clinically contraindicated, as it would be likely to cause worsening of symptoms          ARISTEO Harding

## 2022-03-18 ENCOUNTER — NURSING HOME VISIT (OUTPATIENT)
Dept: GERIATRICS | Facility: OTHER | Age: 87
End: 2022-03-18
Payer: COMMERCIAL

## 2022-03-18 DIAGNOSIS — Z79.4 TYPE 2 DIABETES MELLITUS WITHOUT COMPLICATION, WITH LONG-TERM CURRENT USE OF INSULIN (HCC): ICD-10-CM

## 2022-03-18 DIAGNOSIS — E11.9 TYPE 2 DIABETES MELLITUS WITHOUT COMPLICATION, WITH LONG-TERM CURRENT USE OF INSULIN (HCC): ICD-10-CM

## 2022-03-18 DIAGNOSIS — F33.1 MODERATE EPISODE OF RECURRENT MAJOR DEPRESSIVE DISORDER (HCC): Primary | ICD-10-CM

## 2022-03-18 PROCEDURE — 99308 SBSQ NF CARE LOW MDM 20: CPT | Performed by: NURSE PRACTITIONER

## 2022-03-22 VITALS
BODY MASS INDEX: 32.23 KG/M2 | HEART RATE: 60 BPM | SYSTOLIC BLOOD PRESSURE: 147 MMHG | RESPIRATION RATE: 20 BRPM | WEIGHT: 212 LBS | TEMPERATURE: 99 F | DIASTOLIC BLOOD PRESSURE: 67 MMHG

## 2022-03-22 NOTE — ASSESSMENT & PLAN NOTE
· RN reports patient is sleeping more during the day   · Patient reports he is much more tired all the time   · Not as much interest in things interested in before   · Will increase buspirone dosage   · Psych to evaluate patient at next visit  · Monitor for changes

## 2022-03-22 NOTE — PROGRESS NOTES
[address]   [phone]  [facility]  Long-term care facility acute Note        NAME: Javier Ledbetter  AGE: 82 y o  SEX: male    DATE OF ENCOUNTER: 3/18/22    Assessment and Plan     1  Moderate episode of recurrent major depressive disorder [facility])  Assessment & Plan:  · RN reports patient is sleeping more during the day   · Patient reports he is much more tired all the time   · Not as much interest in things interested in before   · Will increase buspirone dosage   · Psych to evaluate patient at next visit  · Monitor for changes      2  Type 2 diabetes mellitus without complication, with long-term current use of insulin ([facility])  Assessment & Plan:  · Controlled  · Continue with current insulin   · Monitor blood sugars daily          No orders of the defined types were placed in this encounter  History of Present Illness     Javier Ledbetter 82 y o  male seen for follow-up of care and treatment plan for ambulatory dysfunction doing well at [facility], increased depression controlled with increased buspirone, DM2 controlled with insulin    The following portions of the patient's history were reviewed and updated as appropriate: allergies, current medications, past family history, past medical history, past social history, past surgical history and problem list     Review of Systems     Review of Systems   Constitutional: Negative for chills and fever  HENT: Negative for ear pain and sore throat  Eyes: Negative for pain and visual disturbance  Respiratory: Negative for cough and shortness of breath  Cardiovascular: Negative for chest pain and palpitations  Gastrointestinal: Negative for abdominal pain and vomiting  Genitourinary: Negative for dysuria and hematuria  Musculoskeletal: Positive for gait problem  Negative for arthralgias and back pain  Skin: Negative for color change and rash  Neurological: Positive for weakness   Negative for seizures and syncope  All other systems reviewed and are negative  Objective     /67   Pulse 60   Temp 99 °F (37 2 °C)   Resp 20   Wt 96 2 kg (212 lb)   BMI 32 23 kg/m²     Physical Exam  Vitals reviewed  Constitutional:       Appearance: He is well-developed  HENT:      Head: Normocephalic and atraumatic  Eyes:      Conjunctiva/sclera: Conjunctivae normal    Cardiovascular:      Rate and Rhythm: Normal rate and regular rhythm  Heart sounds: Normal heart sounds, S1 normal and S2 normal  No murmur heard  Pulmonary:      Effort: Pulmonary effort is normal  No respiratory distress  Breath sounds: Normal breath sounds  No wheezing  Abdominal:      General: Bowel sounds are normal  There is no distension  Palpations: Abdomen is soft  Tenderness: There is no abdominal tenderness  Musculoskeletal:         General: Normal range of motion  Cervical back: Normal range of motion  Comments: Generalized weakness   Skin:     General: Skin is warm and dry  Neurological:      Mental Status: He is alert  Psychiatric:         Attention and Perception: Attention normal          Mood and Affect: Mood is depressed  Speech: Speech normal          Behavior: Behavior normal          Thought Content: Thought content normal          Cognition and Memory: Cognition is impaired  Pertinent Laboratory/Diagnostic Studies:  HFM Labs Reviewed    Current Medications   Medication list reviewed and updated today  Please see paper chart for updated medication list      Barry Michaels  /55146:20 PM      Name: Eddie Dunn  : 1935  MRN: [id number]  DOS: 3/18/22    Diagnoses:   Diagnosis ICD-10-CM Associated Orders   1  Moderate episode of recurrent major depressive disorder (HCC)  F33 1    2   Type 2 diabetes mellitus without complication, with long-term current use of insulin (HCC)  E11 9     Z79 4

## 2022-03-30 ENCOUNTER — NURSING HOME VISIT (OUTPATIENT)
Dept: GERIATRICS | Facility: OTHER | Age: 87
End: 2022-03-30
Payer: COMMERCIAL

## 2022-03-30 DIAGNOSIS — I48.91 ATRIAL FIBRILLATION, UNSPECIFIED TYPE (HCC): ICD-10-CM

## 2022-03-30 DIAGNOSIS — R54 FRAILTY SYNDROME IN GERIATRIC PATIENT: ICD-10-CM

## 2022-03-30 DIAGNOSIS — E78.5 HYPERLIPIDEMIA, UNSPECIFIED HYPERLIPIDEMIA TYPE: ICD-10-CM

## 2022-03-30 DIAGNOSIS — I10 HYPERTENSION, UNSPECIFIED TYPE: ICD-10-CM

## 2022-03-30 DIAGNOSIS — Z78.9 FULL CODE STATUS: ICD-10-CM

## 2022-03-30 DIAGNOSIS — I50.32 CHRONIC HEART FAILURE WITH PRESERVED EJECTION FRACTION (HCC): Primary | ICD-10-CM

## 2022-03-30 DIAGNOSIS — Z79.4 TYPE 2 DIABETES MELLITUS WITHOUT COMPLICATION, WITH LONG-TERM CURRENT USE OF INSULIN (HCC): ICD-10-CM

## 2022-03-30 DIAGNOSIS — D64.9 ANEMIA, UNSPECIFIED TYPE: ICD-10-CM

## 2022-03-30 DIAGNOSIS — E11.9 TYPE 2 DIABETES MELLITUS WITHOUT COMPLICATION, WITH LONG-TERM CURRENT USE OF INSULIN (HCC): ICD-10-CM

## 2022-03-30 DIAGNOSIS — I77.74 VERTEBRAL ARTERY DISSECTION (HCC): ICD-10-CM

## 2022-03-30 PROCEDURE — 99309 SBSQ NF CARE MODERATE MDM 30: CPT | Performed by: INTERNAL MEDICINE

## 2022-03-30 NOTE — PROGRESS NOTES
[facility]  [address] [place] [address]  Facility:  [facility]  32  FOLLOW-UP VISIT    NAME: Medina Hester  AGE: 82 y o  SEX: male    DATE OF ENCOUNTER: 3/30/2022    Code status:  CPR    Assessment and Plan     1  Chronic heart failure with preserved ejection fraction [facility])  Assessment & Plan:  · He is doing well with his current dosage of furosemide   · Will continue with clinical and periodic laboratory monitoring for change in his condition          2  Atrial fibrillation, unspecified type [facility])  Assessment & Plan:  · He continues with apixaban for anticoagulation  · He is doing well with extended-release diltiazem and metoprolol succinate ER for rate control  · Will continue with clinical and periodic laboratory monitoring for change in his condition      3  Type 2 diabetes mellitus without complication, with long-term current use of insulin [facility])  Assessment & Plan:  · May 26, 2021:  Hemoglobin A1c:  7 2%,   · December 16, 2021: Hemoglobin A1c:  7 7%,   · He is doing well with his current dosages of insulins lispro and glargine   · He is at his hemoglobin A1c goal of less than 8% given his frailty  · Will continue with clinical and periodic laboratory monitoring for change in his condition      4  Hypertension, unspecified type  Assessment & Plan:  · Review his BP and AP logs looks well with extended-release diltiazem and metoprolol succinate ER  · Will continue with this medication regimen   · Will continue with monitoring for change in his condition      5  Anemia, unspecified type  Assessment & Plan:  · Will continue with his current iron sulfate supplement   · Will update his laboratory values  · Will continue with clinical and periodic laboratory monitoring for change in his condition      6  Hyperlipidemia, unspecified hyperlipidemia type  Assessment & Plan:  · December 16, 2021: Fasting lipid profile:   Total cholesterol 104, triglycerides 84, HDL 33, LDL 54  · December 16, 2021: LFTs within normal limits except albumin 2 8 and alkaline phosphatase 125  · Will continue his current dosage of atorvastatin  · Will continue with clinical and periodic laboratory monitoring for change in his condition      7  Vertebral artery dissection [facility])  Assessment & Plan:  · He continues on aspirin therapy as care planned by the neurosurgery service   · Will continue with monitoring for change in his condition      8  Frailty syndrome in geriatric patient  Assessment & Plan:  · Secondary to his chronic medical conditions   · He continues to require 24/7 care/support of his ADLs  · He is level 7 on the clinical frailty scale consistent with being severely frail   · I recommend continued care/support of his ADLs as a long-term resident at the nursing facility   · Will continue to monitor for change in his condition      9  Full code status    See my note of December 13, 2021 for further assessment and plan  All medications and routine orders were reviewed and updated as needed  Plan discussed with:  Nursing staff  Chief Complaint     He is seen for a follow-up visit to update the care and treatment of his chronic heart failure with preserved ejection fraction, atrial fibrillation, insulin-requiring type 2 diabetes mellitus, and frailty secondary to his chronic medical conditions  History of Present Illness     He is seen for a follow-up visit to update the care and treatment of his chronic heart failure with preserved ejection fraction-doing well with current dosage of furosemide, atrial fibrillation-doing well with apixaban, metoprolol succinate ER, and extended-release diltiazem, insulin-requiring type 2 diabetes mellitus-doing well with insulins glargine and lispro, and frailty secondary to his chronic medical conditions-he continues to require 24/7 care/support of his ADLs        Nursing staff endorses that his appetite is good, that he is sleeping well, and that he is having no difficulty with constipation  He requires assistance with his ADLs  Nursing endorses that he is not having any behaviors that are disruptive to the nursing unit or distressing to him  He denies chest pain and shortness of breath  He reports that his appetite is good and he denies constipation  The following portions of the patient's history were reviewed and updated as appropriate: current medications, past family history, past medical history, past social history, past surgical history and problem list     Allergies: Allergies   Allergen Reactions    Amoxicillin Diarrhea and Hives       Review of Systems     Review of Systems   Constitutional: Negative for appetite change  Respiratory: Negative for shortness of breath  Cardiovascular: Negative for chest pain  See HPI    Medications and orders     All medications reviewed and updated in Nursing Home EMR  Objective     Vitals:  Monthly vitals:  Weight 106 6 lb (stable for 7 months), pulse 62, blood pressure 142/68, fasting fingerstick blood sugar 185  Physical Exam  Vitals reviewed  Constitutional:       General: He is awake  He is not in acute distress  Appearance: He is well-developed  He is not ill-appearing, toxic-appearing or diaphoretic  Comments: He appears comfortable, stated age, and frail  Cardiovascular:      Rate and Rhythm: Normal rate and regular rhythm  Heart sounds: Normal heart sounds  No murmur heard  No friction rub  No gallop  Comments: There is no pretibial edema, bilaterally  Pulmonary:      Effort: Pulmonary effort is normal  No respiratory distress  Breath sounds: Normal breath sounds  No stridor  No wheezing, rhonchi or rales  Neurological:      Mental Status: He is alert  Psychiatric:         Mood and Affect: Mood normal          Behavior: Behavior normal  Behavior is cooperative  Pertinent Laboratory/Diagnostic Studies:      The following labs were reviewed please see chart or hospital paperwork for details  December 16, 2021:     CBC with diff:  WBC count 9, hemoglobin 9 8, hematocrit 31 2, platelet count [id number], MCV 77    CMP: Sodium 141, potassium 4 5, BUN 22, creatinine 1 06, fasting blood sugar 143, EGFR 64, LFTs within normal limits except alkaline phosphatase 125 and albumin 2 8    Fasting lipid profile: Total cholesterol 104, triglycerides 84, HDL 33, LDL 54     TSH:  1 6     Hemoglobin A1c:  7 7%,     - His order summary was reviewed and signed  Portions of the record may have been created with voice recognition software  Occasional wrong word or "sound a like" substitutions may have occurred due to the inherent limitations of voice recognition software  Read the chart carefully and recognize, using context, where substitutions have occurred      CLIFF Leon   4/17/2022 5:12 PM

## 2022-04-17 RX ORDER — BUPROPION HYDROCHLORIDE 100 MG/1
100 TABLET ORAL DAILY
Status: ON HOLD | COMMUNITY
Start: 2022-03-19

## 2022-04-17 RX ORDER — FERROUS SULFATE 325(65) MG
325 TABLET ORAL EVERY OTHER DAY
Status: ON HOLD | COMMUNITY
Start: 2022-01-13

## 2022-04-17 NOTE — ASSESSMENT & PLAN NOTE
· December 16, 2021: Fasting lipid profile:   Total cholesterol 104, triglycerides 84, HDL 33, LDL 54  · December 16, 2021: LFTs within normal limits except albumin 2 8 and alkaline phosphatase 125  · Will continue his current dosage of atorvastatin  · Will continue with clinical and periodic laboratory monitoring for change in his condition

## 2022-04-17 NOTE — ASSESSMENT & PLAN NOTE
· May 26, 2021:  Hemoglobin A1c:  7 2%,   · December 16, 2021: Hemoglobin A1c:  7 7%,   · He is doing well with his current dosages of insulins lispro and glargine   · He is at his hemoglobin A1c goal of less than 8% given his frailty  · Will continue with clinical and periodic laboratory monitoring for change in his condition

## 2022-04-17 NOTE — ASSESSMENT & PLAN NOTE
· He is doing well with his current dosage of furosemide   · Will continue with clinical and periodic laboratory monitoring for change in his condition

## 2022-04-17 NOTE — ASSESSMENT & PLAN NOTE
· He continues with apixaban for anticoagulation  · He is doing well with extended-release diltiazem and metoprolol succinate ER for rate control  · Will continue with clinical and periodic laboratory monitoring for change in his condition

## 2022-04-17 NOTE — ASSESSMENT & PLAN NOTE
· He continues on aspirin therapy as care planned by the neurosurgery service   · Will continue with monitoring for change in his condition

## 2022-04-17 NOTE — ASSESSMENT & PLAN NOTE
· Secondary to his chronic medical conditions   · He continues to require 24/7 care/support of his ADLs  · He is level 7 on the clinical frailty scale consistent with being severely frail   · I recommend continued care/support of his ADLs as a long-term resident at the nursing facility   · Will continue to monitor for change in his condition

## 2022-04-17 NOTE — ASSESSMENT & PLAN NOTE
· Will continue with his current iron sulfate supplement   · Will update his laboratory values  · Will continue with clinical and periodic laboratory monitoring for change in his condition

## 2022-05-02 ENCOUNTER — NURSING HOME VISIT (OUTPATIENT)
Dept: GERIATRICS | Facility: OTHER | Age: 87
End: 2022-05-02
Payer: COMMERCIAL

## 2022-05-02 DIAGNOSIS — Z79.4 TYPE 2 DIABETES MELLITUS WITHOUT COMPLICATION, WITH LONG-TERM CURRENT USE OF INSULIN (HCC): ICD-10-CM

## 2022-05-02 DIAGNOSIS — I10 HYPERTENSION, UNSPECIFIED TYPE: ICD-10-CM

## 2022-05-02 DIAGNOSIS — I48.91 ATRIAL FIBRILLATION, UNSPECIFIED TYPE (HCC): ICD-10-CM

## 2022-05-02 DIAGNOSIS — E11.9 TYPE 2 DIABETES MELLITUS WITHOUT COMPLICATION, WITH LONG-TERM CURRENT USE OF INSULIN (HCC): ICD-10-CM

## 2022-05-02 DIAGNOSIS — R53.81 DEBILITY: Primary | ICD-10-CM

## 2022-05-02 DIAGNOSIS — F33.1 MODERATE EPISODE OF RECURRENT MAJOR DEPRESSIVE DISORDER (HCC): ICD-10-CM

## 2022-05-02 PROCEDURE — 99309 SBSQ NF CARE MODERATE MDM 30: CPT | Performed by: NURSE PRACTITIONER

## 2022-05-03 VITALS
DIASTOLIC BLOOD PRESSURE: 63 MMHG | SYSTOLIC BLOOD PRESSURE: 132 MMHG | BODY MASS INDEX: 32.08 KG/M2 | TEMPERATURE: 98.1 F | RESPIRATION RATE: 18 BRPM | WEIGHT: 211 LBS | HEART RATE: 52 BPM

## 2022-05-03 NOTE — PROGRESS NOTES
[facility]  [address]  [phone]  [facility]  LTCF Progress Note        NAME: Delvis Kohli  AGE: 82 y o  SEX: male    DATE OF ENCOUNTER: 5/3/2022    Assessment and Plan     1  Debility  Assessment & Plan:  Doing well at long-term care facility  Continue with restorative care  Fall precautions  Assist with ADL and IADLs      2  Atrial fibrillation, unspecified type ([facility] )  Assessment & Plan:  · Rate controlled  · Continue with metoprolol  · eliquis      3  Moderate episode of recurrent major depressive disorder ([facility] )  Assessment & Plan:  · Doing well  · continue with bupropion  · Monitor mood and affect      4  Hypertension, unspecified type  Assessment & Plan:  · Controlled  · Continue with diltiazem, hydralazine, furosemide, metoprolol  · Monitor BP       5  Type 2 diabetes mellitus without complication, with long-term current use of insulin ([facility])  Assessment & Plan:  · Controlled  · Continue with insulin  · Monitor BS daily        No orders of the defined types were placed in this encounter  History of Present Illness     Delvis Kohli 82 y o  male seen for follow-up of care and treatment plan for ambulatory dysfunction doing well at LTCF, AFib CAD rate controlled, depression doing well with bupropion    The following portions of the patient's history were reviewed and updated as appropriate: allergies, current medications, past family history, past medical history, past social history, past surgical history and problem list     Review of Systems     Review of Systems   Constitutional: Negative for chills and fever  HENT: Negative for ear pain and sore throat  Eyes: Negative for pain and visual disturbance  Respiratory: Negative for cough and shortness of breath  Cardiovascular: Negative for chest pain and palpitations  Gastrointestinal: Negative for abdominal pain and vomiting  Genitourinary: Negative for dysuria and hematuria     Musculoskeletal: Positive for gait problem  Negative for arthralgias and back pain  Skin: Negative for color change and rash  Neurological: Positive for weakness  Negative for seizures and syncope  All other systems reviewed and are negative  Objective     /63   Pulse (!) 52   Temp 98 1 °F (36 7 °C)   Resp 18   Wt 95 7 kg (211 lb)   BMI 32 08 kg/m²     Physical Exam  Vitals reviewed  Constitutional:       Appearance: He is well-developed  HENT:      Head: Normocephalic and atraumatic  Eyes:      Conjunctiva/sclera: Conjunctivae normal    Cardiovascular:      Rate and Rhythm: Normal rate and regular rhythm  Heart sounds: Normal heart sounds, S1 normal and S2 normal  No murmur heard  Pulmonary:      Effort: Pulmonary effort is normal  No respiratory distress  Breath sounds: Normal breath sounds  No wheezing  Abdominal:      General: Bowel sounds are normal  There is no distension  Palpations: Abdomen is soft  Tenderness: There is no abdominal tenderness  Musculoskeletal:         General: Normal range of motion  Cervical back: Normal range of motion  Skin:     General: Skin is warm and dry  Neurological:      Mental Status: He is alert  Psychiatric:         Speech: Speech normal          Behavior: Behavior normal          Thought Content: Thought content normal          Pertinent Laboratory/Diagnostic Studies:  HFM Labs Reviewed    Current Medications   Medication list reviewed and updated today  Please see paper chart for updated medication list      Shu Jewell  5/3/88150:38 PM      Name: Segun Grimm  : 1935  MRN: [id number]  DOS: 24    Diagnoses:   Diagnosis ICD-10-CM Associated Orders   1  Debility  R53 81    2  Atrial fibrillation, unspecified type ([address] )  I48 91    3  Moderate episode of recurrent major depressive disorder (HCC)  F33 1    4  Hypertension, unspecified type  I10    5   Type 2 diabetes mellitus without complication, with long-term current use of insulin (HCC)  E11 9     Z79 4

## 2022-05-03 NOTE — ASSESSMENT & PLAN NOTE
Doing well at long-term care facility  Continue with restorative care  Fall precautions  Assist with ADL and IADLs

## 2022-06-04 ENCOUNTER — ANESTHESIA (OUTPATIENT)
Dept: ANESTHESIOLOGY | Facility: HOSPITAL | Age: 87
End: 2022-06-04

## 2022-06-04 ENCOUNTER — ANESTHESIA EVENT (OUTPATIENT)
Dept: ANESTHESIOLOGY | Facility: HOSPITAL | Age: 87
End: 2022-06-04

## 2022-06-04 NOTE — ANESTHESIA PREPROCEDURE EVALUATION
Procedure:  PRE-OP ONLY      82year old admitted from nursing home with encephalopathy, sepsis, bilious emesis, and CHASITY secondary to acute cholangitis      Relevant Problems   CARDIO   (+) Atrial fibrillation (HCC)   (+) Hyperlipidemia   (+) Hypertension   (+) Vertebral artery dissection (HCC)      ENDO   (+) Type 2 diabetes mellitus (HCC)      GI/HEPATIC   (+) Dysphagia      HEMATOLOGY   (+) Anemia      NEURO/PSYCH   (+) Depression   (+) Vascular dementia (HCC)      Cardiovascular and Mediastinum   (+) Chronic heart failure with preserved ejection fraction (HCC)             Anesthesia Plan  ASA Score- 4     Anesthesia Type- general with ASA Monitors  Additional Monitors:   Airway Plan: ETT  Comment: NPO aftert  Plan Factors-Exercise tolerance (METS): <4 METS  Chart reviewed  Existing labs reviewed  Patient summary reviewed  Induction-     Postoperative Plan-     Informed Consent- Anesthetic plan and risks discussed with spouse Laci Moran [phone])  I personally reviewed this patient with the CRNA  Discussed and agreed on the Anesthesia Plan with the CRNA  Jacqueline Julian

## 2022-06-04 NOTE — ED PROCEDURE NOTE
PROCEDURE  CriticalCare Time  Performed by: Bienvenido Ramsey DO  Authorized by:  Bienvenido Ramsey DO     Critical care provider statement:     Critical care time (minutes):  33    Critical care time was exclusive of:  Separately billable procedures and treating other patients and teaching time    Critical care was necessary to treat or prevent imminent or life-threatening deterioration of the following conditions:  Sepsis    Critical care was time spent personally by me on the following activities:  Blood draw for specimens, obtaining history from patient or surrogate, development of treatment plan with patient or surrogate, discussions with consultants, evaluation of patient's response to treatment, examination of patient, review of old charts, re-evaluation of patient's condition, ordering and review of radiographic studies, ordering and review of laboratory studies and ordering and performing treatments and interventions    I assumed direction of critical care for this patient from another provider in my specialty: ronak Ramsey DO  06/04/22 3215

## 2022-06-04 NOTE — ED PROVIDER NOTES
History  Chief Complaint   Patient presents with    Vomiting     Patient presents with 2 episodes of vomiting today and increase in lethargy  No other complains at this time  This is a 22-year-old male past medical history significant for hyperlipidemia, dementia, and AFib on Eliquis that presents ED today from nursing facility after being found to be lethargic in clinic  History is provided by EMS as well as a paper that was sent with that time line of the patient's condition  Based on the paper it seems that the patient was found to be lethargic and clammy  He then had 2 episodes of vomiting that was dark green to yellowish in color  He continued to be afebrile through this episode  He then was found to be coughing and coughed up green amount of substance  At that time EMS was called  EMS said that EN route the patient had another episode of bilious vomiting  He was given 4 mg of IV Zofran by them  Here in the ED he is talking to me but appears to be in acute distress  History is limited by the patient's condition  He does deny to me any pain though however when I press on his abdomen he does have pain  Per the nursing home reports the patient is normally more alert and talkative with staff  Prior to Admission Medications   Prescriptions Last Dose Informant Patient Reported? Taking?    apixaban (ELIQUIS) 5 mg   Yes No   Sig: Take 1 tablet by mouth 2 (two) times a day   aspirin 81 mg chewable tablet  Outside Facility (Specify) Yes No   Sig: Chew 1 tablet daily   atorvastatin (LIPITOR) 20 mg tablet  Outside Facility (Specify) Yes No   Sig: Take 1 tablet by mouth daily   bisacodyl (Dulcolax) 10 mg suppository  Outside Facility (Specify) Yes No   Sig: Insert 10 mg into the rectum as needed for constipation   buPROPion (WELLBUTRIN) 100 mg tablet   Yes No   Sig: Take 100 mg by mouth in the morning   diltiazem (CARDIZEM CD) 240 mg 24 hr capsule  Outside Facility (Specify) Yes No   Sig: Take 1 capsule by mouth daily   ferrous sulfate 325 (65 Fe) mg tablet   Yes No   Sig: Take 325 mg by mouth every other day   furosemide (LASIX) 40 mg tablet  Outside Facility (Specify) Yes No   Sig: Take 1 5 tablets by mouth 2 (two) times a day 60 MG BID   hydrALAZINE (APRESOLINE) 10 mg tablet  Outside Facility (Specify) Yes No   Sig: Take 10 mg by mouth 3 (three) times a day   insulin glargine (LANTUS) 100 units/mL subcutaneous injection  Outside Facility (Specify) Yes No   Sig: Inject 25 Units under the skin daily at bedtime   insulin lispro (HumaLOG) 100 units/mL injection  Outside Facility (Specify) Yes No   Sig: Inject 20 Units under the skin 3 (three) times a day with meals    magnesium hydroxide (MILK OF MAGNESIA) 400 mg/5 mL oral suspension  Outside Facility (Specify) Yes No   Sig: Take 30 mL by mouth daily as needed for constipation   metoprolol succinate (TOPROL-XL) 100 mg 24 hr tablet   Yes No   Sig: Take 1 tablet by mouth daily   omeprazole (PriLOSEC) 40 MG capsule  Outside Facility (Specify) Yes No   Sig: Take 1 capsule by mouth daily   polyethylene glycol (MIRALAX) 17 g packet   Yes No   Sig: Take 17 g by mouth every other day    sertraline (ZOLOFT) 25 mg tablet   Yes No   Sig: Take 50 mg by mouth daily   sodium phosphate-biphosphate (FLEET) 7-19 g 118 mL enema  Outside Facility (Specify) Yes No   Sig: Insert 1 enema into the rectum once as needed      Facility-Administered Medications: None       Past Medical History:   Diagnosis Date    A-fib ([place] [address] )     Cancer ([place] [address] )     Cardiac disease     CHF (congestive heart failure) (HCC)     Chronic obstructive pulmonary disease (COPD) ([place] [address] )     COPD (chronic obstructive pulmonary disease) ([place] [address] )     Coronary artery disease     Counseling regarding advance care planning and goals of care 10/7/2020    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[place] [address] )     TYPE 2    Dysphagia     Fracture of nasal bone     Gait instability     H/O cervical fracture     Hyperlipidemia     Hypertension  Muscle weakness     TBI (traumatic brain injury) ([facility] [address] )        Past Surgical History:   Procedure Laterality Date    HAND SURGERY      FL ARTHRODESIS POSTERIOR CRANIOCERVICAL N/A 10/26/2020    Procedure: Occipital cervical fusion to C4;  Surgeon: Anabelle Flores MD;  Location: [facility];  Service: Neurosurgery       Family History   Problem Relation Age of Onset    Other Other         urinary tract malignancy    Diabetes Mother      I have reviewed and agree with the history as documented  E-Cigarette/Vaping    E-Cigarette Use Never User      E-Cigarette/Vaping Substances    Nicotine No     THC No     CBD No     Flavoring No     Other No     Unknown No      Social History     Tobacco Use    Smoking status: Never Smoker    Smokeless tobacco: Never Used   Vaping Use    Vaping Use: Never used   Substance Use Topics    Alcohol use: Never    Drug use: Never        Review of Systems   Unable to perform ROS: Dementia   Constitutional: Positive for fever  Gastrointestinal: Positive for abdominal pain and vomiting  Physical Exam  ED Triage Vitals   Temperature Pulse Respirations Blood Pressure SpO2   06/04/22 1508 06/04/22 1508 06/04/22 1508 06/04/22 1508 06/04/22 1508   (!) 102 8 °F (39 3 °C) (!) 125 22 132/82 (!) 85 %      Temp Source Heart Rate Source Patient Position - Orthostatic VS BP Location FiO2 (%)   06/04/22 1508 06/04/22 1508 06/04/22 1508 06/04/22 1508 --   Rectal Monitor Lying Left arm       Pain Score       06/04/22 1607       Med Not Given for Pain - for MAR use only             Orthostatic Vital Signs  Vitals:    06/04/22 1630 06/04/22 1645 06/04/22 1700 06/04/22 1715   BP:   152/69 125/67   Pulse: (!) 110 (!) 106 (!) 110 102   Patient Position - Orthostatic VS: Lying Lying Lying Lying       Physical Exam  Vitals and nursing note reviewed  Constitutional:       General: He is in acute distress  Appearance: Normal appearance  He is ill-appearing     HENT:      Head: Normocephalic and atraumatic  Right Ear: External ear normal       Left Ear: External ear normal       Nose: Nose normal       Mouth/Throat:      Mouth: Mucous membranes are moist       Pharynx: Oropharynx is clear  No oropharyngeal exudate  Eyes:      General:         Right eye: No discharge  Left eye: No discharge  Extraocular Movements: Extraocular movements intact  Conjunctiva/sclera: Conjunctivae normal       Pupils: Pupils are equal, round, and reactive to light  Cardiovascular:      Rate and Rhythm: Normal rate and regular rhythm  Pulses: Normal pulses  Heart sounds: Normal heart sounds  Pulmonary:      Effort: Pulmonary effort is normal  No respiratory distress  Breath sounds: Normal breath sounds  No wheezing  Abdominal:      General: Abdomen is flat  Bowel sounds are normal  There is no distension  Palpations: Abdomen is soft  Tenderness: There is abdominal tenderness in the right upper quadrant  There is no guarding or rebound  Musculoskeletal:         General: No swelling  Normal range of motion  Cervical back: Normal range of motion  No rigidity  Skin:     General: Skin is warm and dry  Capillary Refill: Capillary refill takes less than 2 seconds  Comments: Stage I pressure ulcer present  Skin does quentin  Neurological:      Mental Status: He is alert  Comments: Patient responds to his name  He does oriented my voice  Unable to truly get a good neurologic exam due to the acuity of the condition     Psychiatric:         Mood and Affect: Mood normal          Behavior: Behavior normal          ED Medications  Medications   metroNIDAZOLE (FLAGYL) IVPB (premix) 500 mg 100 mL (has no administration in time range)   aspirin chewable tablet 81 mg (has no administration in time range)   atorvastatin (LIPITOR) tablet 20 mg (has no administration in time range)   buPROPion (WELLBUTRIN) tablet 100 mg (has no administration in time range)   sertraline (ZOLOFT) tablet 50 mg (has no administration in time range)   pantoprazole (PROTONIX) EC tablet 40 mg (has no administration in time range)   multi-electrolyte (PLASMALYTE-A/ISOLYTE-S PH 7 4) IV solution (has no administration in time range)   cefTRIAXone (ROCEPHIN) 2,000 mg in dextrose 5 % 50 mL IVPB (has no administration in time range)   metoprolol tartrate (LOPRESSOR) tablet 25 mg (has no administration in time range)   ondansetron (FOR EMS ONLY) (ZOFRAN) 4 mg/2 mL injection 4 mg (0 mg Does not apply Given to EMS 6/4/22 1501)   sodium chloride 0 9 % bolus 500 mL (0 mL Intravenous Stopped 6/4/22 1705)   acetaminophen (TYLENOL) rectal suppository 650 mg (650 mg Rectal Given 6/4/22 1607)   cefTRIAXone (ROCEPHIN) 2,000 mg in dextrose 5 % 50 mL IVPB (2,000 mg Intravenous New Bag 6/4/22 1700)       Diagnostic Studies  Results Reviewed     Procedure Component Value Units Date/Time    COVID/FLU/RSV - 2 hour TAT [[id number]]  (Normal) Collected: 06/04/22 1603    Lab Status: Final result Specimen: Nares from Nose Updated: 06/04/22 1708     SARS-CoV-2 Negative     INFLUENZA A PCR Negative     INFLUENZA B PCR Negative     RSV PCR Negative    Narrative:      FOR PEDIATRIC PATIENTS - copy/paste COVID Guidelines URL to browser: [email or web address]    SARS-CoV-2 assay is a Nucleic Acid Amplification assay intended for the  qualitative detection of nucleic acid from SARS-CoV-2 in nasopharyngeal  swabs  Results are for the presumptive identification of SARS-CoV-2 RNA  Positive results are indicative of infection with SARS-CoV-2, the virus  causing COVID-19, but do not rule out bacterial infection or co-infection  with other viruses  Laboratories within the [place] and its  territories are required to report all positive results to the appropriate  public health authorities   Negative results do not preclude SARS-CoV-2  infection and should not be used as the sole basis for treatment or other  patient management decisions  Negative results must be combined with  clinical observations, patient history, and epidemiological information  This test has not been FDA cleared or approved  This test has been authorized by FDA under an Emergency Use Authorization  (EUA)  This test is only authorized for the duration of time the  declaration that circumstances exist justifying the authorization of the  emergency use of an in vitro diagnostic tests for detection of SARS-CoV-2  virus and/or diagnosis of COVID-19 infection under section 564(b)(1) of  the Act, 21 U  S C  574JEE-7(W)(7), unless the authorization is terminated  or revoked sooner  The test has been validated but independent review by FDA  and CLIA is pending  Test performed using [employer] GeneXpert: This RT-PCR assay targets N2,  a region unique to SARS-CoV-2  A conserved region in the E-gene was chosen  for pan-Sarbecovirus detection which includes SARS-CoV-2      Urine Microscopic [[id number]]  (Abnormal) Collected: 06/04/22 1543    Lab Status: Final result Specimen: Urine, Indwelling Larson Catheter Updated: 06/04/22 1647     RBC, UA 10-20 /hpf      WBC, UA 2-4 /hpf      Epithelial Cells None Seen /hpf      Bacteria, UA None Seen /hpf      MUCUS THREADS Occasional     Hyaline Casts, UA 0-3 /lpf      WBC Casts, UA 0-3 /lpf      Transitional Epithelial Cells PRESENT    UA w Reflex to Microscopic w Reflex to Culture [[id number]]  (Abnormal) Collected: 06/04/22 1543    Lab Status: Final result Specimen: Urine, Indwelling Larson Catheter Updated: 06/04/22 1626     Color, UA Dark Yellow     Clarity, UA Clear     Specific Gravity, UA 1 019     pH, UA 6 0     Leukocytes, UA Negative     Nitrite, UA Negative     Protein, UA 50 (1+) mg/dl      Glucose,  (1/10%) mg/dl      Ketones, UA Negative mg/dl      Urobilinogen, UA >=12 0 mg/dl      Bilirubin, UA Small     Blood, UA Small    CBC and differential [[id number]]  (Abnormal) Collected: 06/04/22 1522    Lab Status: Final result Specimen: Blood from Arm, Left Updated: 06/04/22 1609     WBC 32 34 Thousand/uL      RBC 4 88 Million/uL      Hemoglobin 12 0 g/dL      Hematocrit 37 7 %      MCV 77 fL      MCH 24 6 pg      MCHC 31 8 g/dL      RDW 20 4 %      MPV 10 8 fL      Platelets 691 Thousands/uL     Narrative: This is an appended report  These results have been appended to a previously verified report      Manual Differential(PHLEBS Do Not Order) [[id number]]  (Abnormal) Collected: 06/04/22 1522    Lab Status: Final result Specimen: Blood from Arm, Left Updated: 06/04/22 1609     Segmented % 69 %      Bands % 18 %      Lymphocytes % 1 %      Monocytes % 5 %      Eosinophils, % 0 %      Basophils % 0 %      Metamyelocytes% 7 %      Absolute Neutrophils 28 14 Thousand/uL      Lymphocytes Absolute 0 32 Thousand/uL      Monocytes Absolute 1 62 Thousand/uL      Eosinophils Absolute 0 00 Thousand/uL      Basophils Absolute 0 00 Thousand/uL      Total Counted --     RBC Morphology Present     Anisocytosis Present     Hypochromia Present     Microcytes Present     Ovalocytes Present     Poikilocytes Present     Polychromasia Present     Target Cells Present     Platelet Estimate Adequate    Comprehensive metabolic panel [[id number]]  (Abnormal) Collected: 06/04/22 1522    Lab Status: Final result Specimen: Blood from Arm, Left Updated: 06/04/22 1606     Sodium 138 mmol/L      Potassium 3 7 mmol/L      Chloride 100 mmol/L      CO2 31 mmol/L      ANION GAP 7 mmol/L      BUN 19 mg/dL      Creatinine 1 43 mg/dL      Glucose 153 mg/dL      Calcium 8 9 mg/dL      Corrected Calcium 10 1 mg/dL       U/L       U/L      Alkaline Phosphatase 1,062 U/L      Total Protein 7 7 g/dL      Albumin 2 5 g/dL      Total Bilirubin 2 56 mg/dL      eGFR 44 ml/min/1 73sq m     Narrative:      [place] guidelines for Chronic Kidney Disease (CKD):    Stage 1 with normal or high GFR (GFR > 90 mL/min/1 73 square meters)    Stage 2 Mild CKD (GFR = 60-89 mL/min/1 73 square meters)    Stage 3A Moderate CKD (GFR = 45-59 mL/min/1 73 square meters)    Stage 3B Moderate CKD (GFR = 30-44 mL/min/1 73 square meters)    Stage 4 Severe CKD (GFR = 15-29 mL/min/1 73 square meters)    Stage 5 End Stage CKD (GFR <15 mL/min/1 73 square meters)  Note: GFR calculation is accurate only with a steady state creatinine    Lactic acid [[id number]]  (Abnormal) Collected: 06/04/22 1522    Lab Status: Final result Specimen: Blood from Arm, Left Updated: 06/04/22 1604     LACTIC ACID 2 4 mmol/L     Narrative:      Result may be elevated if tourniquet was used during collection  Lactic acid 2 Hours [[id number]]     Lab Status: No result Specimen: Blood     Procalcitonin [[id number]]  (Abnormal) Collected: 06/04/22 1522    Lab Status: Final result Specimen: Blood from Arm, Left Updated: 06/04/22 1603     Procalcitonin 9 85 ng/ml     Protime-INR [[id number]]  (Abnormal) Collected: 06/04/22 1522    Lab Status: Final result Specimen: Blood from Arm, Left Updated: 06/04/22 1602     Protime 15 7 seconds      INR 1 30    APTT [[id number]]  (Normal) Collected: 06/04/22 1522    Lab Status: Final result Specimen: Blood from Arm, Left Updated: 06/04/22 1602     PTT 32 seconds     Blood culture #1 [[id number]] Collected: 06/04/22 1502    Lab Status: In process Specimen: Blood from Arm, Left Updated: 06/04/22 1557    Blood culture #2 [[id number]] Collected: 06/04/22 1522    Lab Status: In process Specimen: Blood from Arm, Left Updated: 06/04/22 1556                 CT abdomen pelvis wo contrast   Final Result by Amanda Dong MD (06/04 1634)   1  Distended gallbladder but without significant pericholecystic inflammatory change  2  Otherwise no acute intra-abdominal pathology                    Workstation performed: [id number]         XR chest 1 view portable    (Results Pending)   US right upper quadrant (Results Pending)         Procedures  Procedures      ED Course  ED Course as of 06/04/22 1748   Sat Jun 04, 2022   1544 WBC(!!): 32 34   1609 Procalcitonin(!): 9 85   1645 Red surgery to eval    1705 Red Surgery came down to eval and want patient to SICU               Identification of Seniors at [address] [place] [address] Most Recent Value   (ISAR) Identification of Seniors at Risk    Before the illness or injury that brought you to the Emergency, did you need someone to help you on a regular basis? 1 Filed at: 06/04/2022 1512   In the last 24 hours, have you needed more help than usual? 0 Filed at: 06/04/2022 1512   Have you been hospitalized for one or more nights during the past 6 months? 0 Filed at: 06/04/2022 1512   In general, do you see well? 0 Filed at: 06/04/2022 1512   In general, do you have serious problems with your memory? 1 Filed at: 06/04/2022 1512   Do you take more than three different medications every day? 1 Filed at: 06/04/2022 1512   ISAR Score 3 Filed at: 06/04/2022 1512                 Initial Sepsis Screening     Row Name 06/04/22 1607                Is the patient's history suggestive of a new or worsening infection? Yes (Proceed)  -FG        Suspected source of infection suspect infection, source unknown  -FG        Are two or more of the following signs & symptoms of infection both present and new to the patient? Yes (Proceed)  -FG        Indicate SIRS criteria Hyperthemia > 38 3C (100 9F); Tachycardia > 90 bpm;Leukocytosis (WBC > 44807 IJL)  -FG        If the answer is yes to both questions, suspicion of sepsis is present --        If severe sepsis is present AND tissue hypoperfusion perists in the hour after fluid resuscitation or lactate > 4, the patient meets criteria for SEPTIC SHOCK --        Are any of the following organ dysfunction criteria present within 6 hours of suspected infection and SIRS criteria that are NOT considered to be chronic conditions?  Yes  -FG        Organ dysfunction Lactate > 2 0 mmol/L  -FG        Date of presentation of severe sepsis 06/04/22  -FG        Time of presentation of severe sepsis 1608  -FG        Tissue hypoperfusion persists in the hour after crystalloid fluid administration, evidenced, by either: --        Was hypotension present within one hour of the conclusion of crystalloid fluid administration? No  -FG        Date of presentation of septic shock --        Time of presentation of septic shock --              User Key  (r) = Recorded By, (t) = Taken By, (c) = Cosigned By    [address] Name Provider Type    FG Binh Guy MD Resident                SBIRT 22yo+    Flowsheet Row Most Recent Value   SBIRT (23 yo +)    In order to provide better care to our patients, we are screening all of our patients for alcohol and drug use  Would it be okay to ask you these screening questions? No Filed at: 06/04/2022 1513                [facility]  Number of Diagnoses or Management Options  Ascending cholangitis  Dementia ([facility] [address] )  Fever  Diagnosis management comments: 71-year-old male presenting to ED today from nursing facility for nausea vomiting  Was found to be febrile here  Infectious workup was sent  Patient was found to have distended gallbladder on CT scan  I discussed the case with rest surgery upon CT scan findings especially in the setting of elevated LFTs as well as elevated white count concern for ascending cholangitis  They agree and said ultrasound was ordered  Patient was then actually admitted to surgical ICU in critical condition  He was started on Rocephin and Flagyl  Ideally would like to Zosyn however the patient has an amoxicillin allergy        Disposition  Final diagnoses:   Ascending cholangitis   Fever   Dementia ([facility] [address] )     Time reflects when diagnosis was documented in both MDM as applicable and the Disposition within this note     Time User Action Codes Description Comment    6/4/2022  5:21 PM Monty Beaver Add [K83 09] Cholangitis     6/4/2022  5:26 PM Adam Espinoza Add [F01 50] Vascular dementia without behavioral disturbance ([facility] [address] )     6/4/2022  5:40 PM Carmen Jonnathan Add [K83 09] Ascending cholangitis     6/4/2022  5:40 PM Carmen Jonnathan Modify [K83 09] Cholangitis     6/4/2022  5:40 PM Carmen Jonnathan Modify [K83 09] Ascending cholangitis     6/4/2022  5:40 PM Carmen Jonnathan Add [R50 9] Fever     6/4/2022  5:40 PM Carmen Jonnathan Add [F03 90] Dementia [facility])       ED Disposition     ED Disposition   Admit    Condition   Stable    Date/Time   Sat Jun 4, 2022  5:40 PM    Comment   Case was discussed with Red surgery resident Dr Adrian Power and the patient's admission status was agreed to be Admission Status: inpatient status to the service of Dr Mario Mcgarry   Follow-up Information    None         Patient's Medications   Discharge Prescriptions    No medications on file     No discharge procedures on file  PDMP Review       Value Time User    PDMP Reviewed  Yes 10/30/2020  2:05 PM Gely Hayes PA-C           ED Provider  Attending physically available and evaluated Swapnil Latham  I managed the patient along with the ED Attending      Electronically Signed by         Kiet Son MD  06/04/22 3745

## 2022-06-04 NOTE — SEPSIS NOTE
Sepsis Note   Etienne Lou 82 y o  male MRN: [id number]  Unit/Bed#: ED 14 Encounter: [id number]       qSOFA     [address] Name 06/04/22 1545 06/04/22 1530 06/04/22 1508          Altered mental status GCS < 15 -- -- --      Respiratory Rate > / =22 1 1 1      Systolic BP < / =251 -- 0 0      Q Sofa Score 1 1 1                 Initial Sepsis Screening     Row Name 06/04/22 1607                Is the patient's history suggestive of a new or worsening infection? Yes (Proceed)  -FG        Suspected source of infection suspect infection, source unknown  -FG        Are two or more of the following signs & symptoms of infection both present and new to the patient? Yes (Proceed)  -FG        Indicate SIRS criteria Hyperthemia > 38 3C (100 9F); Tachycardia > 90 bpm;Leukocytosis (WBC > 01692 IJL)  -FG        If the answer is yes to both questions, suspicion of sepsis is present --        If severe sepsis is present AND tissue hypoperfusion perists in the hour after fluid resuscitation or lactate > 4, the patient meets criteria for SEPTIC SHOCK --        Are any of the following organ dysfunction criteria present within 6 hours of suspected infection and SIRS criteria that are NOT considered to be chronic conditions? Yes  -FG        Organ dysfunction Lactate > 2 0 mmol/L  -FG        Date of presentation of severe sepsis 06/04/22  -FG        Time of presentation of severe sepsis 1608  -FG        Tissue hypoperfusion persists in the hour after crystalloid fluid administration, evidenced, by either: --        Was hypotension present within one hour of the conclusion of crystalloid fluid administration?  No  -FG        Date of presentation of septic shock --        Time of presentation of septic shock --              User Key  (r) = Recorded By, (t) = Taken By, (c) = Cosigned By    [address] Name Provider Type    FG Leslie Daley MD Resident

## 2022-06-04 NOTE — CONSULTS
Consult Service: Gastroenterology      PATIENT INFORMATION      Chuyita Cabello 82 y o  male MRN: [id number]  Unit/Bed#: ED 14 Encounter: [id number]  PCP: Joan Munroe MD  Date of Admission:  6/4/2022  Date of Consultation: 06/04/22  Requesting Physician: Lawerance Sandhoff, MD       ASSESSMENTS & PLAN   Chuyita Cabello is a 82 y o  old male with PMH of vascular dementia, COPD, hypertension, coronary artery disease, CHF, atrial fibrillation (on Eliquis, last dose presumably this a m ) who presented with fatigue from nursing home with concerns for cholangitis     Elevated liver enzymes, sepsis, likely cholangitis - suspicious for cholangitis given elevated liver enzymes and fever/leukocytosis  He appears hemodynamically stable  I examined this evening and currently his heart rate, blood pressure, respiratory rate and O2 sat are all normal   · Continue resuscitation  · IV fluids  · Antibiotics (cefepime, Flagyl)  · Follow-up right upper quadrant ultrasound  · Blood cultures pending  · Tentative plan for ERCP tomorrow the timing may change based on clinical status  · Hold Eliquis  · I spoke with spouse in detail about patient's condition and ERCP for tomorrow including all the risks and benefits  · Trend liver enzymes  Recent Labs     06/04/22  1522   *   *   ALKPHOS 1,062*   TBILI 2 56*   INR 1 30*   ·       REASON FOR CONSULTATION      Cholangitis      HISTORY OF PRESENT ILLNESS      Chuyita Cabello is a 82 y o  old male with PMH of vascular dementia, COPD, hypertension, coronary artery disease, CHF, atrial fibrillation (on Eliquis, last dose presumably this a m ) who presented with fatigue from nursing home with concerns for cholangitis  Per chart review  That nursing home noted him to be more lethargic with 1 episode of vomiting  He is unable give much history at this time Daveymaciel Augustas does have a significance of dementia    He was found to have fever, tachycardia leukocytosis, elevated liver enzymes, lactic acidosis      REVIEW OF SYSTEMS     A thorough 12-point review of systems has been conducted  Pertinent positives and negatives are mentioned in the history of present illness  PAST MEDICAL & SURGICAL HISTORY      Past Medical History:   Diagnosis Date    A-fib ([place] [address] )     Cancer [facility])     Cardiac disease     [facility]     Chronic obstructive pulmonary disease (COPD) ([place] [address] )     COPD [facility]ch[facility] disease) [facility])     Coronary artery disease     Counseling regarding advance care planning and goals of care 10/7/2020    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[place] [address] )     TYPE 2    Dysphagia     Fracture of nasal bone     Gait instability     H/O cervical fracture     Hyperlipidemia     Hypertension     Muscle weakness     TBI (traumatic brain injury) ([place] [address] )        Past Surgical History:   Procedure Laterality Date    HAND SURGERY      TX ARTHRODESIS POSTERIOR CRANIOCERVICAL N/A 10/26/2020    Procedure: Occipital cervical fusion to C4;  Surgeon: Deidre Baidr MD;  Location: [facility];  Service: Neurosurgery         MEDICATIONS & ALLERGIES       Medications:   Prior to Admission medications    Medication Sig Start Date End Date Taking?  Authorizing Provider   apixaban (ELIQUIS) 5 mg Take 1 tablet by mouth 2 (two) times a day 10/15/21   Historical Provider, MD   aspirin 81 mg chewable tablet Chew 1 tablet daily    Historical Provider, MD   atorvastatin (LIPITOR) 20 mg tablet Take 1 tablet by mouth daily    Historical Provider, MD   bisacodyl (Dulcolax) 10 mg suppository Insert 10 mg into the rectum as needed for constipation    Historical Provider, MD   buPROPion [facility]) 100 mg tablet Take 100 mg by mouth in the morning 3/19/22   Historical Provider, MD   diltiazem (CARDIZEM CD) 240 mg 24 hr capsule Take 1 capsule by mouth daily    Historical Provider, MD   ferrous sulfate 325 (65 Fe) mg tablet Take 325 mg by mouth every other day 1/13/22   Historical Provider, MD furosemide (LASIX) 40 mg tablet Take 1 5 tablets by mouth 2 (two) times a day 60 MG BID    Historical Provider, MD   hydrALAZINE (APRESOLINE) 10 mg tablet Take 10 mg by mouth 3 (three) times a day    Historical Provider, MD   insulin glargine (LANTUS) 100 units/mL subcutaneous injection Inject 25 Units under the skin daily at bedtime 8/25/21   Historical Provider, MD   insulin lispro (HumaLOG) 100 units/mL injection Inject 20 Units under the skin 3 (three) times a day with meals     Historical Provider, MD   magnesium hydroxide (MILK OF MAGNESIA) 400 mg/5 mL oral suspension Take 30 mL by mouth daily as needed for constipation    Historical Provider, MD   metoprolol succinate (TOPROL-XL) 100 mg 24 hr tablet Take 1 tablet by mouth daily 8/25/21   Historical Provider, MD   omeprazole (PriLOSEC) 40 MG capsule Take 1 capsule by mouth daily    Historical Provider, MD   polyethylene glycol (MIRALAX) 17 g packet Take 17 g by mouth every other day  7/17/21   Historical Provider, MD   sertraline (ZOLOFT) 25 mg tablet Take 50 mg by mouth daily    Historical Provider, MD   sodium phosphate-biphosphate (FLEET) 7-19 g 118 mL enema Insert 1 enema into the rectum once as needed    Historical Provider, MD       Allergies:    Allergies   Allergen Reactions    Amoxicillin Diarrhea and Hives         SOCIAL HISTORY      Marital Status: /Civil Union    Substance Use History:   Social History     Substance and Sexual Activity   Alcohol Use Never     Social History     Tobacco Use   Smoking Status Never Smoker   Smokeless Tobacco Never Used     Social History     Substance and Sexual Activity   Drug Use Never         FAMILY HISTORY      Non-Contributory      PHYSICAL EXAM     Vitals:   Blood Pressure: 137/58 (06/04/22 1745)  Pulse: 82 (06/04/22 1800)  Temperature: (!) 101 1 °F (38 4 °C) (06/04/22 1800)  Temp Source: Rectal (06/04/22 1508)  Respirations: (!) 27 (06/04/22 1800)  Height: 5' 8" (172 7 cm) (06/04/22 1508)  Weight - Scale: 95 7 kg (211 lb) (06/04/22 1508)  SpO2: 95 % (06/04/22 1800)    Physical Exam:   GENERAL: NAD, mildly fatigued  HEENT:  NC/AT, no scleral icterus  CARDIAC:  RRR, +S1/S2  PULMONARY:  CTA B/L, no wheezing/rales/rhonci, non-labored breathing  ABDOMEN:  Mildly tender in the right upper quadrant region, +BS, no rebound/guarding/rigidity  Extremities:  No edema, cyanosis, or clubbing  NEUROLOGIC:  Alert and oriented x2, follows commands but  SKIN:  No rashes or erythema      ADDITIONAL DATA     Lab Results:     Results from last 7 days   Lab Units 06/04/22  1522   WBC Thousand/uL 32 34*   HEMOGLOBIN g/dL 12 0   HEMATOCRIT % 37 7   PLATELETS Thousands/uL 319   LYMPHO PCT % 1*   MONO PCT % 5   EOS PCT % 0     Results from last 7 days   Lab Units 06/04/22  1522   POTASSIUM mmol/L 3 7   CHLORIDE mmol/L 100   CO2 mmol/L 31   BUN mg/dL 19   CREATININE mg/dL 1 43*   CALCIUM mg/dL 8 9   ALK PHOS U/L 1,062*   ALT U/L 423*   AST U/L 473*     Results from last 7 days   Lab Units 06/04/22  1522   INR  1 30*       Imaging:    CT abdomen pelvis wo contrast    Result Date: 6/4/2022  Narrative: CT ABDOMEN AND PELVIS WITHOUT IV CONTRAST INDICATION:   Nausea/vomiting Abdominal pain, acute, nonlocalized Abdominal pain and nausea  COMPARISON:  CT 3/6/2021 and priors TECHNIQUE:  CT examination of the abdomen and pelvis was performed without intravenous contrast  This examination was performed without intravenous contrast in the context of the critical nationwide Omnipaque shortage  Axial, sagittal, and coronal 2D reformatted images were created from the source data and submitted for interpretation  Radiation dose length product (DLP) for this visit:  1117 63 mGy-cm   This examination, like all CT scans performed in the [facility], was performed utilizing techniques to minimize radiation dose exposure, including the use of iterative reconstruction and automated exposure control  Enteric contrast was not administered  FINDINGS: ABDOMEN LOWER CHEST:  No clinically significant abnormality identified in the visualized lower chest  Minimal bibasilar and left lingular atelectasis  LIVER/BILIARY TREE:  Unremarkable  GALLBLADDER: Distended gallbladder  No calcified gallstones  No pericholecystic inflammatory change  SPLEEN:  Unremarkable  PANCREAS:  Somewhat nodular configuration of the pancreatic head but similar to the prior contrast CT  ADRENAL GLANDS:  Unremarkable  KIDNEYS/URETERS:  Left lower renal cyst  No hydronephrosis  STOMACH AND BOWEL:  There is colonic diverticulosis without evidence of acute diverticulitis  APPENDIX:  No findings to suggest appendicitis  ABDOMINOPELVIC CAVITY:  No ascites  No pneumoperitoneum  No lymphadenopathy  VESSELS:  Unremarkable for patient's age  PELVIS REPRODUCTIVE ORGANS:  Unremarkable for patient's age  URINARY BLADDER:  Unremarkable  ABDOMINAL WALL/INGUINAL REGIONS:  Unremarkable  OSSEOUS STRUCTURES:  No acute fracture or destructive osseous lesion  Spine degenerative change  Mild dextroscoliosis of the lumbar spine  Old right pelvic fractures  Impression: 1  Distended gallbladder but without significant pericholecystic inflammatory change  2  Otherwise no acute intra-abdominal pathology  Workstation performed: [id number]       EKG, Pathology, and Other Studies Reviewed on Admission:   · EKG: Reviewed      Counseling / Coordination of Care Time: 30 total mins spent n consult  Greater than 50% of total time spent on patient counseling and coordination of care     ** Please Note: This note is constructed using a voice recognition dictation system   **

## 2022-06-04 NOTE — ED ATTENDING ATTESTATION
6/4/2022  IZena DO, saw and evaluated the patient  I have discussed the patient with the resident/non-physician practitioner and agree with the resident's/non-physician practitioner's findings, Plan of Care, and MDM as documented in the resident's/non-physician practitioner's note, except where noted  All available labs and Radiology studies were reviewed  I was present for key portions of any procedure(s) performed by the resident/non-physician practitioner and I was immediately available to provide assistance  At this point I agree with the current assessment done in the Emergency Department  I have conducted an independent evaluation of this patient a history and physical is as follows:    Patient is an 82-year-old male with a history of vascular dementia transferred from [facility]  Staff there indicates that today he seemed to be little bit less active, felt warm, had 1 episode of bilious emesis, no blood  No trauma  EN route by EMS patient had additional episode of bilious emesis  Patient was self remembers coming to the hospital in the ambulance , says he does not remember vomiting, right now he denies any complaints to me  Denies headache, denies chest pain, shortness of breath, denies fevers, denies chills, denies any abdominal pain  Transfer records indicate the patient code status is full code    General:  Patient is well-appearing  Head:  Atraumatic  Eyes:  Conjunctiva pink  ENT:  Mucous membranes are moist  Neck:  Supple  Cardiac:  S1-S2, without murmurs  Lungs:  Clear to auscultation bilaterally  Abdomen:  Soft, slight right-sided abdominal tenderness, no area of maximal tenderness, normal bowel sounds, no CVA tenderness, no tympany, no rigidity, no guarding  Extremities:  Normal range of motion  Neurologic:  Awake, fluent speech, no slurred speech, cranial nerves 2-12 intact, oriented to person and place, confused as far as time and recent events    Strength is 3/5 in the bilateral arms and legs  Does not seem to understand the command enough to allow for pronator drift or finger-to-nose testing  Skin:  Pink warm and dry, patient has mild stage I pressure ulcer around sacrum, no skin breakdown, no petechia or purpura elsewhere  Psychiatric:  Alert, pleasant, cooperative      ED Course     ECG interpreted by me shows a rapid wide complex tachycardia, left axis deviation, left bundle-branch block  No visible P waves, no irregularity to it, no acute change from March 6, 2021    Patient presented febrile, tachycardic, concerning for sepsis  Labs Reviewed   CBC AND DIFFERENTIAL - Abnormal       Result Value Ref Range Status    WBC 32 34 (*) 4 31 - 10 16 Thousand/uL Final    Comment: This result has been called to Shu Donohue by Snehal Pastrana on 06/04/2022 15:37:39, and has been read back  RBC 4 88  3 88 - 5 62 Million/uL Final    Hemoglobin 12 0  12 0 - 17 0 g/dL Final    Hematocrit 37 7  36 5 - 49 3 % Final    MCV 77 (*) 82 - 98 fL Final    MCH 24 6 (*) 26 8 - 34 3 pg Final    MCHC 31 8  31 4 - 37 4 g/dL Final    RDW 20 4 (*) 11 6 - 15 1 % Final    MPV 10 8  8 9 - 12 7 fL Final    Platelets 211  330 - 390 Thousands/uL Final    Narrative: This is an appended report  These results have been appended to a previously verified report  COMPREHENSIVE METABOLIC PANEL - Abnormal    Sodium 138  136 - 145 mmol/L Final    Potassium 3 7  3 5 - 5 3 mmol/L Final    Chloride 100  100 - 108 mmol/L Final    CO2 31  21 - 32 mmol/L Final    ANION GAP 7  4 - 13 mmol/L Final    BUN 19  5 - 25 mg/dL Final    Creatinine 1 43 (*) 0 60 - 1 30 mg/dL Final    Comment: Standardized to IDMS reference method    Glucose 153 (*) 65 - 140 mg/dL Final    Comment: If the patient is fasting, the ADA then defines impaired fasting glucose as > 100 mg/dL and diabetes as > or equal to 123 mg/dL    Specimen collection should occur prior to Sulfasalazine administration due to the potential for falsely depressed results  Specimen collection should occur prior to Sulfapyridine administration due to the potential for falsely elevated results  Calcium 8 9  8 3 - 10 1 mg/dL Final    Corrected Calcium 10 1  8 3 - 10 1 mg/dL Final     (*) 5 - 45 U/L Final    Comment: Specimen collection should occur prior to Sulfasalazine administration due to the potential for falsely depressed results   (*) 12 - 78 U/L Final    Comment: Specimen collection should occur prior to Sulfasalazine and/or Sulfapyridine administration due to the potential for falsely depressed results  Alkaline Phosphatase 1,062 (*) 46 - 116 U/L Final    Total Protein 7 7  6 4 - 8 2 g/dL Final    Albumin 2 5 (*) 3 5 - 5 0 g/dL Final    Total Bilirubin 2 56 (*) 0 20 - 1 00 mg/dL Final    Comment: Use of this assay is not recommended for patients undergoing treatment with eltrombopag due to the potential for falsely elevated results  eGFR 44  ml/min/1 73sq m Final    Narrative:     [place] guidelines for Chronic Kidney Disease (CKD):     Stage 1 with normal or high GFR (GFR > 90 mL/min/1 73 square meters)    Stage 2 Mild CKD (GFR = 60-89 mL/min/1 73 square meters)    Stage 3A Moderate CKD (GFR = 45-59 mL/min/1 73 square meters)    Stage 3B Moderate CKD (GFR = 30-44 mL/min/1 73 square meters)    Stage 4 Severe CKD (GFR = 15-29 mL/min/1 73 square meters)    Stage 5 End Stage CKD (GFR <15 mL/min/1 73 square meters)  Note: GFR calculation is accurate only with a steady state creatinine   LACTIC ACID, PLASMA - Abnormal    LACTIC ACID 2 4 (*) 0 5 - 2 0 mmol/L Final    Narrative:     Result may be elevated if tourniquet was used during collection     PROCALCITONIN TEST - Abnormal    Procalcitonin 9 85 (*) <=0 25 ng/ml Final    Comment: Suspected Lower Respiratory Tract Infection (LRTI):  - LESS than or EQUAL to 0 25 ng/mL:   low likelihood for bacterial LRTI; antibiotics DISCOURAGED   - GREATER than 0 25 ng/mL:   increased likelihood for bacterial LRTI; antibiotics ENCOURAGED  Suspected Sepsis:  - Strongly consider initiating antibiotics in ALL UNSTABLE patients  - LESS than or EQUAL to 0 5 ng/mL:   low likelihood for bacterial sepsis; antibiotics DISCOURAGED   - GREATER than 0 5 ng/mL:   increased likelihood for bacterial sepsis; antibiotics ENCOURAGED   - GREATER than 2 ng/mL:   high risk for severe sepsis / septic shock; antibiotics strongly ENCOURAGED  Decisions on antibiotic use should not be based solely on Procalcitonin (PCT) levels  If PCT is low but uncertainty exists with stopping antibiotics, repeat PCT in 6-24 hours to confirm the low level  If antibiotics are administered (regardless if initial PCT was high or low), repeat PCT every 1-2 days to consider early antibiotic cessation (when GREATER than 80% decrease from the peak OR when PCT drops below designated cutoffs, whichever comes first), so long as the infection is NOT one that typically requires prolonged treatment durations (e g , bone/joint infections, endocarditis, Staph  aureus bacteremia)      Situations of FALSE-POSITIVE Procalcitonin values:  1) Newborns < 67 hours old  2) Massive stress from severe trauma / burns, major surgery, acute pancreatitis, cardiogenic / hemorrhagic shock, sickle cell crisis, or other organ perfusion abnormalities  3) Malaria and some Candidal infections  4) Treatment with agents that stimulate cytokines (e g , OKT3, anti-lymphocyte globulins, alemtuzumab, IL-2, granulocyte transfusion [NOT GCSFs])  5) Chronic renal disease causes elevated baseline levels (consider GREATER than 0 75 ng/mL as an abnormal cut-off); initiating HD/CRRT may cause transient decreases  6) Paraneoplastic syndromes from medullary thyroid or SCLC, some forms of vasculitis, and acute duirp-dc-izgr disease    Situations of FALSE-NEGATIVE Procalcitonin values:  1) Too early in clinical course for PCT to have reached its peak (may repeat in 6-24 hours to confirm low level)  2) Localized infection WITHOUT systemic (SIRS / sepsis) response (e g , an abscess, osteomyelitis, cystitis)  3) Mycobacteria (e g , Tuberculosis, MAC)  4) Cystic fibrosis exacerbations     PROTIME-INR - Abnormal    Protime 15 7 (*) 11 6 - 14 5 seconds Final    INR 1 30 (*) 0 84 - 1 19 Final   UA W REFLEX TO MICROSCOPIC WITH REFLEX TO CULTURE - Abnormal    Color, UA Dark Yellow   Final    Clarity, UA Clear   Final    Specific [place], UA 1 019  1 003 - 1 030 Final    pH, UA 6 0  4 5, 5 0, 5 5, 6 0, 6 5, 7 0, 7 5, 8 0 Final    Leukocytes, UA Negative  Negative Final    Nitrite, UA Negative  Negative Final    Protein, UA 50 (1+) (*) Negative mg/dl Final    Glucose,  (1/10%) (*) Negative mg/dl Final    Ketones, UA Negative  Negative mg/dl Final    Urobilinogen, UA >=12 0 (*) <2 0 mg/dl mg/dl Final    Bilirubin, UA Small (*) Negative Final    Blood, UA Small (*) Negative Final   URINE MICROSCOPIC - Abnormal    RBC, UA 10-20 (*) None Seen, 1-2 /hpf Final    WBC, UA 2-4 (*) None Seen, 1-2 /hpf Final    Epithelial Cells None Seen  None Seen, Occasional /hpf Final    Bacteria, UA None Seen  None Seen, Occasional /hpf Final    MUCUS THREADS Occasional (*) None Seen Final    Hyaline Casts, UA 0-3 (*) None Seen /lpf Final    WBC Casts, UA 0-3 (*) (none) /lpf Final    Transitional Epithelial Cells PRESENT   Final   MANUAL DIFFERENTIAL(PHLEBS DO NOT ORDER) - Abnormal    Segmented % 69  43 - 75 % Final    Bands % 18 (*) 0 - 8 % Final    Lymphocytes % 1 (*) 14 - 44 % Final    Monocytes % 5  4 - 12 % Final    Eosinophils, % 0  0 - 6 % Final    Basophils % 0  0 - 1 % Final    Metamyelocytes% 7 (*) 0 - 1 % Final    Absolute Neutrophils 28 14 (*) 1 85 - 7 62 Thousand/uL Final    Lymphocytes Absolute 0 32 (*) 0 60 - 4 47 Thousand/uL Final    Monocytes Absolute 1 62 (*) 0 00 - 1 22 Thousand/uL Final    Eosinophils Absolute 0 00  0 00 - 0 40 Thousand/uL Final    Basophils Absolute 0 00  0 00 - 0 10 Thousand/uL Final    Total Counted     Final    RBC Morphology Present   Final    Anisocytosis Present   Final    Hypochromia Present   Final    Microcytes Present   Final    Ovalocytes Present   Final    Poikilocytes Present   Final    Polychromasia Present   Final    Target Cells Present   Final    Platelet Estimate Adequate  Adequate Final   COVID19, INFLUENZA A/B, RSV PCR, [facility] - Normal    SARS-CoV-2 Negative  Negative Final    Comment:      INFLUENZA A PCR Negative  Negative Final    Comment:      INFLUENZA B PCR Negative  Negative Final    Comment:      RSV PCR Negative  Negative Final    Comment:      Narrative:     FOR PEDIATRIC PATIENTS - copy/paste COVID Guidelines URL to browser: [email or web address]  [employer]    SARS-CoV-2 assay is a Nucleic Acid Amplification assay intended for the  qualitative detection of nucleic acid from SARS-CoV-2 in nasopharyngeal  swabs  Results are for the presumptive identification of SARS-CoV-2 RNA  Positive results are indicative of infection with SARS-CoV-2, the virus  causing COVID-19, but do not rule out bacterial infection or co-infection  with other viruses  Laboratories within the [place] and its  territories are required to report all positive results to the appropriate  public health authorities  Negative results do not preclude SARS-CoV-2  infection and should not be used as the sole basis for treatment or other  patient management decisions  Negative results must be combined with  clinical observations, patient history, and epidemiological information  This test has not been FDA cleared or approved  This test has been authorized by FDA under an Emergency Use Authorization  (EUA)   This test is only authorized for the duration of time the  declaration that circumstances exist justifying the authorization of the  emergency use of an in vitro diagnostic tests for detection of SARS-CoV-2  virus and/or diagnosis of COVID-19 infection under section 564(b)(1) of  the Act, 21 U  S C  949APX-5(Y)(5), unless the authorization is terminated  or revoked sooner  The test has been validated but independent review by FDA  and CLIA is pending  Test performed using [employer]: This RT-PCR assay targets N2,  a region unique to SARS-CoV-2  A conserved region in the E-gene was chosen  for pan-Sarbecovirus detection which includes SARS-CoV-2  APTT - Normal    PTT 32  23 - 37 seconds Final    Comment: Therapeutic Heparin Range =  60-90 seconds   BLOOD CULTURE   BLOOD CULTURE   LACTIC ACID 2 HOUR       CT abdomen pelvis wo contrast   Final Result   1  Distended gallbladder but without significant pericholecystic inflammatory change  2  Otherwise no acute intra-abdominal pathology  Workstation performed: [id number]         XR chest 1 view portable    (Results Pending)   US right upper quadrant    (Results Pending)     Lactic acid elevated, blood cultures obtained, antibiotics administered after blood cultures  Patient reassessed several times, remaining hemodynamically stable  Discussed with red surgery, patient to be admitted to the surgical ICU, they are requesting a ultrasound which they will follow up upon  Patient signed out to Dr Maritza Fletcher at [address] [place] [address] Drive Time  Procedures  I consider this patient to be critically ill given his sepsis, most likely from cholangitis  33 minutes critical care time    Please see my separate note for details

## 2022-06-04 NOTE — H&P
H&P Exam - General Surgery   Amarjit Dawson 82 y o  male MRN: [id number]  Unit/Bed#: ED 14 Encounter: [id number]    Assessment/Plan     Assessment:  83 yo male w/ encephalopathy, sepsis, CHASITY likely 2/2 acute cholangitis    Febrile to 102 , tachycardic, on 4L NC, normotensive    Labs  WBC-32K  Cr-1 43( baseline 1-1 2)  AST/ALT-473/423 Tbili 2 56  LA-2 4    CTAP-Distended GB w/o significant cholecystic fluid  Plan:  -Surgical ICU admission  -Aggressive fluid resusication, trend endpoints  -Recommend broad spectrum abx, cefepime/flagyl  -Will obtain RUQ US  -GI consult  -Monitor abdominal exam    History of Present Illness     HPI:  Amarjit Dawson is a 82 y o  male PMHx of vascular dementia, COPD, HTN, HLD, CAD, CHF, and DM who presents with lethargy and vomiting from [facility]  Per report he seemed more lethargic today and had one episode of vomiting  EMS was called and per report patient had an additional episode of bilious emesis  On our evaluation patient is very confused, but denies any pain      Review of Systems   Unable to perform ROS: Mental status change       Historical Information   Past Medical History:   Diagnosis Date    [facility] [address] [facility]     Cancer [facility] [address] [facility]     Cardiac disease     CHF (congestive heart failure) (HCC)     [facility] obstructive pulmonary disease [facility]COPD) [facility] [address] [facility]     COPD (chronic obstructive pulmonary disease) (HCC)     Coronary artery disease     Counseling regarding advance care planning and goals of care 10/7/2020    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[facility] [address] )     TYPE 2    Dysphagia     Fracture of nasal bone     Gait instability     H/O cervical fracture     Hyperlipidemia     Hypertension     Muscle weakness     TBI [facility]traumatic brain injury) [facility] [address] [facility]      Past Surgical History:   Procedure Laterality Date    HAND SURGERY      MI ARTHRODESIS POSTERIOR CRANIOCERVICAL N/A 10/26/2020    Procedure: Occipital cervical fusion to C4;  Surgeon: Karen Smart MD;  [facility];  Service: Neurosurgery Social History   Social History     Substance and Sexual Activity   Alcohol Use Never     Social History     Substance and Sexual Activity   Drug Use Never     Social History     Tobacco Use   Smoking Status Never Smoker   Smokeless Tobacco Never Used     E-Cigarette/Vaping    E-Cigarette Use Never User      E-Cigarette/Vaping Substances    Nicotine No     THC No     CBD No     Flavoring No     Other No     Unknown No      Family History:   Family History   Problem Relation Age of Onset    Other Other         urinary tract malignancy    Diabetes Mother        Meds/Allergies   all medications and allergies reviewed  Allergies   Allergen Reactions    Amoxicillin Diarrhea and Hives       Objective   First Vitals:   Blood Pressure: 132/82 (06/04/22 1508)  Pulse: (!) 125 (06/04/22 1508)  Temperature: (!) 102 8 °F (39 3 °C) (06/04/22 1508)  Temp Source: Rectal (06/04/22 1508)  Respirations: 22 (06/04/22 1508)  Height: 5' 8" (172 7 cm) (06/04/22 1508)  Weight - Scale: 95 7 kg (211 lb) (06/04/22 1508)  SpO2: (!) 85 % (06/04/22 1508)    Current Vitals:   Blood Pressure: 125/67 (06/04/22 1715)  Pulse: 102 (06/04/22 1715)  Temperature: (!) 101 5 °F (38 6 °C) (06/04/22 1715)  Temp Source: Rectal (06/04/22 1508)  Respirations: (!) 26 (06/04/22 1715)  Height: 5' 8" (172 7 cm) (06/04/22 1508)  Weight - Scale: 95 7 kg (211 lb) (06/04/22 1508)  SpO2: 93 % (06/04/22 1715)    No intake or output data in the 24 hours ending 06/04/22 1737    Invasive Devices  Report    Peripheral Intravenous Line  Duration           Peripheral IV 06/04/22 Left Forearm <1 day    Peripheral IV 06/04/22 Right Antecubital <1 day          Drain  Duration           Urethral Catheter Temperature probe 16 Fr  <1 day                Physical Exam  General: ill appearing  HENT: NCAT Dry  Neck: supple, no JVD  CV: Tachycardic  Lungs: nl wob   No resp distress  ABD: Soft, nontender, nondistended  Extrem: No CCE  Neuro: AAOx1, encephalopathic     Lab Results:   I have personally reviewed pertinent lab results  , CBC:   Lab Results   Component Value Date    WBC 32 34 (HH) 06/04/2022    HGB 12 0 06/04/2022    HCT 37 7 06/04/2022    MCV 77 (L) 06/04/2022     06/04/2022    MCH 24 6 (L) 06/04/2022    MCHC 31 8 06/04/2022    RDW 20 4 (H) 06/04/2022    MPV 10 8 06/04/2022   , CMP:   Lab Results   Component Value Date    SODIUM 138 06/04/2022    K 3 7 06/04/2022     06/04/2022    CO2 31 06/04/2022    BUN 19 06/04/2022    CREATININE 1 43 (H) 06/04/2022    CALCIUM 8 9 06/04/2022     (H) 06/04/2022     (H) 06/04/2022    ALKPHOS 1,062 (H) 06/04/2022    EGFR 44 06/04/2022   , Coagulation:   Lab Results   Component Value Date    INR 1 30 (H) 06/04/2022     Imaging: I have personally reviewed pertinent films in PACS  EKG, Pathology, and Other Studies: I have personally reviewed pertinent reports     and I have personally reviewed pertinent films in PACS    Code Status: Level 1 - Full Code  Advance Directive and Living Will:      Power of :    POLST:

## 2022-06-04 NOTE — CONSULTS
[address] [facility] [address] 82 y o  male MRN: [id number]  Unit/Bed#: ED 14 Encounter: [id number]      -------------------------------------------------------------------------------------------------------------  Chief Complaint: concern for cholangitis    History of Present Illness   HX and PE limited by: dementia  Ricky Freeman is a 82 y o  male with a past medical history of vascular dementia, COPD, hypertension, hyperlipidemia, CAD, CHF, and diabetes who presents with concern for cholangitis  Patient was brought into the ED today from his nursing facility after being found lethargic  Patient had multiple episodes of vomiting on route and while in the ED  Patient met SIRS criteria in the ED and was started on antibiotics  He was not provided with 30 cc/kilogram of fluid resuscitation due to his history of CHF  Blood pressure has remained stable  Initial lactate was 2 4  Patient had a white count of 32, elevated liver enzymes, elevated alkaline phosphatase, and elevated bilirubin, which raise concern for acute cholangitis  CT only showed a distended gallbladder  Surgery was consulted and they recommended a right upper quadrant ultrasound and a GI consult  Patient is now been taken under the critical care service due to sepsis from cholangitis  He is no longer complaining of any pain or nausea  Further history is difficult to ascertain secondary to dementia      History obtained from chart review and the patient   -------------------------------------------------------------------------------------------------------------  Assessment and Plan:    Neuro:    Diagnosis:  History of vascular dementia  o Plan:   o Cam ICU  o Delirium precautions  o Continue home Wellbutrin and Zoloft      CV:    Diagnosis:  History of hypertension, hyperlipidemia, CAD, and CHF  o Plan:   o Hold antihypertensive medications while septic as patient is normotensive at this time and is high risk of becoming Whipple procedure 10 years ago  Tolerating PO  pancrteatic enzymes resumed   hypotensive  o Continue home aspirin and Lipitor  o Maintain map over 65  o Be cautious against fluid overload due to history of CHF  o Patient did not receive initial 30 cc/kilogram of fluid resuscitation due to CHF  o Hold Eliquis for ERCP per GI      Pulm:   Diagnosis:  History of COPD  o Plan:   o Maintain O2 saturations above 90%      GI:    Diagnosis:  Concern for cholangitis  o Plan:   o CT shows distended gallbladder  o Right upper quadrant ultrasound shows concern for cholecystitis  o Patient has elevated white count, elevated liver enzymes, and elevated bilirubin  o Continue to trend white count, liver enzymes, and bilirubin  o GI consult  o Plan for ERCP in the morning  o Red surgery consult  o Serial abdominal exams  o Continue Rocephin and Flagyl  o IV fluid resuscitation as tolerated  o Continue home Protonix      :    Diagnosis:  No acute issues  o Plan:   o Monitor I/Os      F/E/N:    Plan:    Isolate 125 cc/hour   Monitor electrolytes and replete as necessary   NPO for ERCP tomorrow      Heme/Onc:    Diagnosis:  No acute issues  o Plan:   o SCDs for DVT prophylaxis  o Hold Eliquis until after ERCP      Endo:    Diagnosis:  Diabetes mellitus  o Plan:   o Sliding-scale insulin  o Maintain normal glycemia      ID:    Diagnosis:  Sepsis secondary to cholangitis  o Plan:   o See GI  o Trend white count and temperature  o Follow-up on repeat lactate  o Fluid resuscitate as tolerated  o Antibiotics as above      MSK/Skin:    Diagnosis:  No acute issues  o Plan:   o Frequent repositioning  o PT/OT    Disposition: Admit to Critical Care   Code Status: Level 1 - Full Code  --------------------------------------------------------------------------------------------------------------  Review of Systems    Review of systems was unable to be performed secondary to dementia    Physical Exam  --------------------------------------------------------------------------------------------------------------  Vitals:   Vitals:    06/04/22 1745 06/04/22 1800 06/04/22 1815 06/04/22 1830   BP: 137/58  112/68 122/65   BP Location: Left arm  Left arm Left arm   Pulse: 94 82 78 72   Resp: (!) 26 (!) 27 (!) 28 (!) 25   Temp: (!) 101 1 °F (38 4 °C) (!) 101 1 °F (38 4 °C) (!) 100 8 °F (38 2 °C) 100 4 °F (38 °C)   TempSrc:   Bladder    SpO2: 92% 95% 94% 94%   Weight:       Height:         Temp  Min: 100 4 °F (38 °C)  Max: 102 8 °F (39 3 °C)  IBW (Ideal Body Weight): 68 4 kg  Height: 5' 8" (172 7 cm)  Body mass index is 32 08 kg/m²  N/A    Laboratory and Diagnostics:  Results from last 7 days   Lab Units 06/04/22  1522   WBC Thousand/uL 32 34*   HEMOGLOBIN g/dL 12 0   HEMATOCRIT % 37 7   PLATELETS Thousands/uL 319   BANDS PCT % 18*   MONO PCT % 5     Results from last 7 days   Lab Units 06/04/22  1522   SODIUM mmol/L 138   POTASSIUM mmol/L 3 7   CHLORIDE mmol/L 100   CO2 mmol/L 31   ANION GAP mmol/L 7   BUN mg/dL 19   CREATININE mg/dL 1 43*   CALCIUM mg/dL 8 9   GLUCOSE RANDOM mg/dL 153*   ALT U/L 423*   AST U/L 473*   ALK PHOS U/L 1,062*   ALBUMIN g/dL 2 5*   TOTAL BILIRUBIN mg/dL 2 56*          Results from last 7 days   Lab Units 06/04/22  1522   INR  1 30*   PTT seconds 32          Results from last 7 days   Lab Units 06/04/22  1522   LACTIC ACID mmol/L 2 4*     ABG:    VBG:    Results from last 7 days   Lab Units 06/04/22  1522   PROCALCITONIN ng/ml 9 85*       Micro:          Imaging: I have personally reviewed pertinent reports        Historical Information   Past Medical History:   Diagnosis Date    A-fib ([place] [address] )     Cancer ([place] [address] )     Cardiac disease     CHF (congestive heart failure) (HCC)     Chronic obstructive pulmonary disease (COPD) ([place] [address] )     [facility]     Coronary artery disease     Counseling regarding advance care planning and goals of care 10/7/2020    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[facility] [address] )     TYPE 2    Dysphagia     Fracture of nasal bone     Gait instability     H/O cervical fracture     Hyperlipidemia     Hypertension     Muscle weakness     TBI (traumatic brain injury) ([facility] [address] )      Past Surgical History:   Procedure Laterality Date    HAND SURGERY      MI ARTHRODESIS POSTERIOR CRANIOCERVICAL N/A 10/26/2020    Procedure: Occipital cervical fusion to C4;  Surgeon: Marjorie Kemp MD;  [facility];  Service: Neurosurgery     Social History   Social History     Substance and Sexual Activity   Alcohol Use Never     Social History     Substance and Sexual Activity   Drug Use Never     Social History     Tobacco Use   Smoking Status Never Smoker   Smokeless Tobacco Never Used       Family History:   Family History   Problem Relation Age of Onset    Other Other         urinary tract malignancy    Diabetes Mother      I have reviewed this patient's family history and commented on sigificant items within the HPI      Medications:  Current Facility-Administered Medications   Medication Dose Route Frequency    [START ON 6/5/2022] aspirin chewable tablet 81 mg  81 mg Oral Daily    [START ON 6/5/2022] atorvastatin (LIPITOR) tablet 20 mg  20 mg Oral Daily    buPROPion (WELLBUTRIN) tablet 100 mg  100 mg Oral Daily    [START ON 6/5/2022] cefTRIAXone (ROCEPHIN) 2,000 mg in dextrose 5 % 50 mL IVPB  2,000 mg Intravenous Q24H    insulin lispro (HumaLOG) 100 units/mL subcutaneous injection 1-6 Units  1-6 Units Subcutaneous Q6H [facility]    metroNIDAZOLE (FLAGYL) IVPB (premix) 500 mg 100 mL  500 mg Intravenous Q8H    multi-electrolyte (PLASMALYTE-A/ISOLYTE-S PH 7 4) IV solution  125 mL/hr Intravenous Continuous    [START ON 6/5/2022] pantoprazole (PROTONIX) EC tablet 40 mg  40 mg Oral Early Morning    [START ON 6/5/2022] sertraline (ZOLOFT) tablet 50 mg  50 mg Oral Daily     Home medications:  Prior to Admission Medications   Prescriptions Last Dose Informant Patient Reported? Taking?    apixaban (ELIQUIS) 5 mg   Yes No   Sig: Take 1 tablet by mouth 2 (two) times a day   aspirin 81 mg chewable tablet  Outside Facility (Specify) Yes No   Sig: Chew 1 tablet daily   atorvastatin (LIPITOR) 20 mg tablet  Outside Facility (Specify) Yes No   Sig: Take 1 tablet by mouth daily   bisacodyl (Dulcolax) 10 mg suppository  Outside Facility (Specify) Yes No   Sig: Insert 10 mg into the rectum as needed for constipation   buPROPion (WELLBUTRIN) 100 mg tablet   Yes No   Sig: Take 100 mg by mouth in the morning   diltiazem (CARDIZEM CD) 240 mg 24 hr capsule  Outside Facility (Specify) Yes No   Sig: Take 1 capsule by mouth daily   ferrous sulfate 325 (65 Fe) mg tablet   Yes No   Sig: Take 325 mg by mouth every other day   furosemide (LASIX) 40 mg tablet  Outside Facility (Specify) Yes No   Sig: Take 1 5 tablets by mouth 2 (two) times a day 60 MG BID   hydrALAZINE (APRESOLINE) 10 mg tablet  Outside Facility (Specify) Yes No   Sig: Take 10 mg by mouth 3 (three) times a day   insulin glargine (LANTUS) 100 units/mL subcutaneous injection  Outside Facility (Specify) Yes No   Sig: Inject 25 Units under the skin daily at bedtime   insulin lispro (HumaLOG) 100 units/mL injection  Outside Facility (Specify) Yes No   Sig: Inject 20 Units under the skin 3 (three) times a day with meals    magnesium hydroxide (MILK OF MAGNESIA) 400 mg/5 mL oral suspension  Outside Facility (Specify) Yes No   Sig: Take 30 mL by mouth daily as needed for constipation   metoprolol succinate (TOPROL-XL) 100 mg 24 hr tablet   Yes No   Sig: Take 1 tablet by mouth daily   omeprazole (PriLOSEC) 40 MG capsule  Outside Facility (Specify) Yes No   Sig: Take 1 capsule by mouth daily   polyethylene glycol (MIRALAX) 17 g packet   Yes No   Sig: Take 17 g by mouth every other day    sertraline (ZOLOFT) 25 mg tablet   Yes No   Sig: Take 50 mg by mouth daily   sodium phosphate-biphosphate (FLEET) 7-19 g 118 mL enema  Outside Facility (Specify) Yes No   Sig: Insert 1 enema into the rectum once as needed      Facility-Administered Medications: None     Allergies: Allergies   Allergen Reactions    Amoxicillin Diarrhea and Hives     ------------------------------------------------------------------------------------------------------------  Advance Directive and Living Will:      Power of :    POLST:    ------------------------------------------------------------------------------------------------------------  Anticipated Length of Stay is > 2 midnights    Care Time Delivered:   Upon my evaluation, this patient had a high probability of imminent or life-threatening deterioration due to cholangitis, which required my direct attention, intervention, and personal management  I have personally provided 30 minutes (1800 to 1830) of critical care time, exclusive of procedures, teaching, family meetings, and any prior time recorded by providers other than myself  Navin Vallecillo, DO        Portions of the record may have been created with voice recognition software  Occasional wrong word or "sound a like" substitutions may have occurred due to the inherent limitations of voice recognition software    Read the chart carefully and recognize, using context, where substitutions have occurred

## 2022-06-05 ENCOUNTER — ANESTHESIA (INPATIENT)
Dept: PERIOP | Facility: HOSPITAL | Age: 87
DRG: 872 | End: 2022-06-05
Payer: COMMERCIAL

## 2022-06-05 ENCOUNTER — ANESTHESIA EVENT (INPATIENT)
Dept: PERIOP | Facility: HOSPITAL | Age: 87
DRG: 872 | End: 2022-06-05
Payer: COMMERCIAL

## 2022-06-05 PROBLEM — K83.09 CHOLANGITIS: Status: ACTIVE | Noted: 2022-01-01

## 2022-06-05 RX ORDER — LIDOCAINE HYDROCHLORIDE 10 MG/ML
INJECTION, SOLUTION EPIDURAL; INFILTRATION; INTRACAUDAL; PERINEURAL AS NEEDED
Status: DISCONTINUED | OUTPATIENT
Start: 2022-06-05 | End: 2022-06-05

## 2022-06-05 RX ORDER — METOPROLOL TARTRATE 5 MG/5ML
INJECTION INTRAVENOUS AS NEEDED
Status: DISCONTINUED | OUTPATIENT
Start: 2022-06-05 | End: 2022-06-05

## 2022-06-05 RX ORDER — PROPOFOL 10 MG/ML
INJECTION, EMULSION INTRAVENOUS AS NEEDED
Status: DISCONTINUED | OUTPATIENT
Start: 2022-06-05 | End: 2022-06-05

## 2022-06-05 RX ORDER — FENTANYL CITRATE 50 UG/ML
INJECTION, SOLUTION INTRAMUSCULAR; INTRAVENOUS AS NEEDED
Status: DISCONTINUED | OUTPATIENT
Start: 2022-06-05 | End: 2022-06-05

## 2022-06-05 RX ORDER — SUCCINYLCHOLINE/SOD CL,ISO/PF 100 MG/5ML
SYRINGE (ML) INTRAVENOUS AS NEEDED
Status: DISCONTINUED | OUTPATIENT
Start: 2022-06-05 | End: 2022-06-05

## 2022-06-05 RX ORDER — CIPROFLOXACIN 2 MG/ML
INJECTION, SOLUTION INTRAVENOUS CONTINUOUS PRN
Status: DISCONTINUED | OUTPATIENT
Start: 2022-06-05 | End: 2022-06-05

## 2022-06-05 RX ORDER — SODIUM CHLORIDE 9 MG/ML
INJECTION, SOLUTION INTRAVENOUS CONTINUOUS PRN
Status: DISCONTINUED | OUTPATIENT
Start: 2022-06-05 | End: 2022-06-05

## 2022-06-05 RX ADMIN — FENTANYL CITRATE 50 MCG: 50 INJECTION INTRAMUSCULAR; INTRAVENOUS at 08:51

## 2022-06-05 RX ADMIN — Medication 100 MCG: at 08:34

## 2022-06-05 RX ADMIN — Medication 100 MG: at 08:23

## 2022-06-05 RX ADMIN — Medication 100 MCG: at 08:55

## 2022-06-05 RX ADMIN — LIDOCAINE HYDROCHLORIDE 50 MG: 10 INJECTION, SOLUTION EPIDURAL; INFILTRATION; INTRACAUDAL; PERINEURAL at 08:23

## 2022-06-05 RX ADMIN — Medication 200 MCG: at 08:37

## 2022-06-05 RX ADMIN — CIPROFLOXACIN: 2 INJECTION, SOLUTION INTRAVENOUS at 08:33

## 2022-06-05 RX ADMIN — SODIUM CHLORIDE: 0.9 INJECTION, SOLUTION INTRAVENOUS at 08:16

## 2022-06-05 RX ADMIN — METOROPROLOL TARTRATE 5 MG: 5 INJECTION, SOLUTION INTRAVENOUS at 08:34

## 2022-06-05 RX ADMIN — FENTANYL CITRATE 25 MCG: 50 INJECTION INTRAMUSCULAR; INTRAVENOUS at 09:28

## 2022-06-05 RX ADMIN — FENTANYL CITRATE 25 MCG: 50 INJECTION INTRAMUSCULAR; INTRAVENOUS at 09:04

## 2022-06-05 RX ADMIN — PROPOFOL 150 MG: 10 INJECTION, EMULSION INTRAVENOUS at 08:23

## 2022-06-05 NOTE — PROGRESS NOTES
Daily Progress Note - Critical Care   Misbah Venegas 82 y o  male MRN: [id number]  Unit/Bed#: MICU 08 Encounter: [id number]        ----------------------------------------------------------------------------------------  HPI/24hr events: Patient presented yesterday to the ED after being found lethargic  He was found to have cholangitis   CT imaging showed a distended GB  RUQ US showed a distended, thick-walled gallbladder similar to prior study, concerning for acute cholecystitis  Started on rocephin and flagyl   GI consulted- plan for ERCP     ---------------------------------------------------------------------------------------  SUBJECTIVE  Denies pain    Review of Systems  Review of systems was unable to be performed secondary to dementia  ---------------------------------------------------------------------------------------  Assessment and Plan:    Neuro:    Diagnosis: Hx of vascular dementia   o Plan: Continue home wellbutrin and Zoloft   o CAM ICU  o Encourage sleep wake cycle       CV:    Diagnosis: HTN   o Plan: Holding home antihypertensive agents secondary to risk of shock  o Continue to monitor on telemetry    Diagnosis: Atrial fibrillation   o Plan: Rate controlled on Toprol XL and ER Cardizem at home- currently on hold   o Holding home Eliquis for ERCP today   o Continue low dose lopressor for rate control    Diagnosis: CHF   o Plan: Holding home lasix    Diagnosis: CAD  o Plan: Holding home ASA      Pulm:   Diagnosis: COPD  o Plan: Continue supplemental 02 as needed   o Goal Sp02 >90%      GI:    Diagnosis: Concern for acute cholangitis   o Plan: GI consulted  o Plan for ERCP today  o NPO - May have clears following procedure  o Continue rocephin and flagyl   o Trend LFTs, and bili       :    Diagnosis: CHASITY  o Plan: Baseline creatine 1 0-1 2  o Creatine on admission 1 43  o Creatine 1 15 this AM following IVF administration   o Continue to trend urine output   o Trend creatine       F/E/N:  Plan: NPO- advance following procedure   Trend electrolytes and replete prn    Decrease isolyte to 75cc/hr       Heme/Onc:    Diagnosis: No acute issues      Endo:    Diagnosis: DM  o Plan: Continue ISS  o Goal -180      ID:    Diagnosis: Cholangitis  o Plan: Continue plan as above  o Continue Rocephin and flagyl   o Follow up blood cultures       MSK/Skin:    Diagnosis: No acute issues      Disposition: Continue Critical Care   Code Status: Level 1 - Full Code  ---------------------------------------------------------------------------------------  ICU CORE MEASURES    Prophylaxis   VTE Pharmacologic Prophylaxis: Plan to restart Eliquis following ERCP  VTE Mechanical Prophylaxis: sequential compression device  Stress Ulcer Prophylaxis: Pantoprazole PO    ABCDE Protocol (if indicated)  Plan to perform spontaneous awakening trial today? Not applicable  Plan to perform spontaneous breathing trial today? Not applicable  Obvious barriers to extubation? Not applicable  CAM-ICU: Negative    Invasive Devices Review  Invasive Devices  Report    Peripheral Intravenous Line  Duration           Peripheral IV 22 Right Antecubital 1 day    Peripheral IV 22 Left Antecubital <1 day          Drain  Duration           Urethral Catheter Temperature probe 16 Fr  <1 day              Can any invasive devices be discontinued today?  No  ---------------------------------------------------------------------------------------  OBJECTIVE    Vitals   Vitals:    22 0300 22 0400 22 0500 22 0600   BP: 146/71 157/74 155/74 156/71   Pulse: 72 76 76 78   Resp:    Temp: 98 96 °F (37 2 °C) 98 96 °F (37 2 °C) 98 6 °F (37 °C) 98 24 °F (36 8 °C)   TempSrc:       SpO2: 100% 98% 99% 99%   Weight:       Height:         Temp (24hrs), Av 4 °F (38 °C), Min:98 24 °F (36 8 °C), Max:102 8 °F (39 3 °C)  Current: Temperature: 98 24 °F (36 8 °C)      Respiratory:  SpO2: SpO2: 99 %, SpO2 Activity: SpO2 Activity: At Rest, SpO2 Device: O2 Device: Nasal cannula  Nasal Cannula O2 Flow Rate (L/min): 4 L/min    Invasive/non-invasive ventilation settings   Respiratory  Report   Lab Data (Last 4 hours)    None         O2/Vent Data (Last 4 hours)    None                Physical Exam  Vitals reviewed  Constitutional:       General: He is not in acute distress  HENT:      Nose: Nose normal       Mouth/Throat:      Mouth: Mucous membranes are moist    Eyes:      Pupils: Pupils are equal, round, and reactive to light  Cardiovascular:      Rate and Rhythm: Normal rate  Rhythm irregular  Pulses: Normal pulses  Pulmonary:      Effort: Pulmonary effort is normal    Abdominal:      Palpations: Abdomen is soft  Tenderness: There is abdominal tenderness  Musculoskeletal:         General: Normal range of motion  Skin:     General: Skin is warm  Capillary Refill: Capillary refill takes less than 2 seconds  Neurological:      Mental Status: He is disoriented               Laboratory and Diagnostics:  Results from last 7 days   Lab Units 06/05/22 0442 06/04/22  1522   WBC Thousand/uL 19 96* 32 34*   HEMOGLOBIN g/dL 10 6* 12 0   HEMATOCRIT % 33 8* 37 7   PLATELETS Thousands/uL 279 319   BANDS PCT %  --  18*   MONO PCT %  --  5     Results from last 7 days   Lab Units 06/05/22 0442 06/04/22  1522   SODIUM mmol/L 139 138   POTASSIUM mmol/L 3 7 3 7   CHLORIDE mmol/L 104 100   CO2 mmol/L 32 31   ANION GAP mmol/L 3* 7   BUN mg/dL 21 19   CREATININE mg/dL 1 15 1 43*   CALCIUM mg/dL 8 8 8 9   GLUCOSE RANDOM mg/dL 178* 153*   ALT U/L 264* 423*   AST U/L 190* 473*   ALK PHOS U/L 816* 1,062*   ALBUMIN g/dL 2 0* 2 5*   TOTAL BILIRUBIN mg/dL 1 46* 2 56*     Results from last 7 days   Lab Units 06/05/22  0442   MAGNESIUM mg/dL 2 1   PHOSPHORUS mg/dL 2 7      Results from last 7 days   Lab Units 06/04/22  1522   INR  1 30*   PTT seconds 32          Results from last 7 days   Lab Units 06/04/22 2050 06/04/22  1522 LACTIC ACID mmol/L 1 8 2 4*     ABG:    VBG:    Results from last 7 days   Lab Units 06/04/22  1522   PROCALCITONIN ng/ml 9 85*       Micro  Results from last 7 days   Lab Units 06/04/22  1522 06/04/22  1502   BLOOD CULTURE  Received in Microbiology Lab  Culture in Progress  Received in Microbiology Lab  Culture in Progress  EKG: IRR  Imaging:  I have personally reviewed pertinent reports  and I have personally reviewed pertinent films in PACS    Intake and Output  I/O       06/03 0701 06/04 0700 06/04 0701  06/05 0700    I V  (mL/kg)  1470 8 (15 6)    IV Piggyback  100    Total Intake(mL/kg)  1570 8 (16 7)    Urine (mL/kg/hr)  625    Total Output  625    Net  +945 8                Height and Weights   Height: 5' 8" (172 7 cm)  IBW (Ideal Body Weight): 68 4 kg  Body mass index is 31 61 kg/m²  Weight (last 2 days)     Date/Time Weight    06/04/22 2100 94 3 (207 89)    06/04/22 1508 95 7 (211)            Nutrition       Diet Orders   (From admission, onward)             Start     Ordered    06/04/22 1724  Diet NPO; Sips with meds  Diet effective now        References:    Nutrtion Support Algorithm Enteral vs  Parenteral   Question Answer Comment   Diet Type NPO    NPO Except: Sips with meds    RD to adjust diet per protocol?  No        06/04/22 1727                Active Medications  Scheduled Meds:  Current Facility-Administered Medications   Medication Dose Route Frequency Provider Last Rate    aspirin  81 mg Oral Daily Ivy Lieberman MD      atorvastatin  20 mg Oral Daily Ivy Lieberman MD      buPROPion  100 mg Oral Daily Ivy Lieberman MD      cefTRIAXone  2,000 mg Intravenous Q24H Ivy Lieberman MD      insulin lispro  1-6 Units Subcutaneous Q6H [facility] Bety Rae DO      metroNIDAZOLE  500 mg Intravenous Q8H Mao Cross MD Stopped (06/05/22 0151)    multi-electrolyte  125 mL/hr Intravenous Continuous Ivy Lieberman  mL/hr (06/05/22 0151)    pantoprazole  40 mg Oral Daily Before Breakfast Maurilio Oates MD      sertraline  50 mg Oral Daily Maurilio Oates MD       Continuous Infusions:  multi-electrolyte, 125 mL/hr, Last Rate: 125 mL/hr (06/05/22 0156)      PRN Meds:        Allergies   Allergies   Allergen Reactions    Amoxicillin Diarrhea and Hives     ---------------------------------------------------------------------------------------  Advance Directive and Living Will:      Power of :    POLST:    ---------------------------------------------------------------------------------------  Care Time Delivered:   No Critical Care time spent     Syd Loco PA-C      Portions of the record may have been created with voice recognition software  Occasional wrong word or "sound a like" substitutions may have occurred due to the inherent limitations of voice recognition software    Read the chart carefully and recognize, using context, where substitutions have occurred

## 2022-06-05 NOTE — UTILIZATION REVIEW
Initial Clinical Review    Admission: Date/Time/Statement:   Admission Orders (From admission, onward)     Ordered        06/04/22 1727  Inpatient Admission  Once                      Orders Placed This Encounter   Procedures    Inpatient Admission     Standing Status:   Standing     Number of Occurrences:   1     Order Specific Question:   Level of Care     Answer:   Critical Care [15]     Order Specific Question:   Estimated length of stay     Answer:   More than 2 Midnights     Order Specific Question:   Certification     Answer:   I certify that inpatient services are medically necessary for this patient for a duration of greater than two midnights  See H&P and MD Progress Notes for additional information about the patient's course of treatment  ED Arrival Information     Expected   6/4/2022 13:50    Arrival   6/4/2022 14:35    Acuity   Emergent            Means of arrival   Ambulance    Escorted by   [facility]    Service   Surgery-General    Admission type   Emergency            Arrival complaint   septic, wbc count           Chief Complaint   Patient presents with    Vomiting     Patient presents with 2 episodes of vomiting today and increase in lethargy  No other complains at this time  Initial Presentation: 82 y o  male who presented by EMS to [facility] ED  Inpatient admission for evaluation and treatment of ascending cholangitis  PMHx: afib (on Eliquis), cancer, CHF, COPD, CAD, DM, HLD, HTN, TBI, vascular dementia  Presented w/ lethargy and vomiting from SNF  Episode of bilious emesis  On exam, ill-appearing, tachycardic, alert to self only  Febrile to 102  CTAP distended gallbladder w/o significant cholecystic fluid  Plan IVF, Trend labs, replete electrolytes as needed; IV ABX, RUQ US, abdominal exams  GI consulted  GI Consult: Pt elevated transaminases w/ bilirubin of 2 56  WBC elevated  Concern for ascending cholangitis   Plan: urgent ERCP w/ stent placement to decompress biliary system, NPO  Date: 06/05/22   Day 2: Pt less confused today  On exam, mild abdominal distention, AAOx3  Plan: ERCP, clear liquids after, analgesics, antiemetics, IV ABX     6/5 Procedure - ERCP   Indication: Cholangitis  Anesthesia: General ASA: IV  Findings:   · Limited views of the esophagus, stomach, duodenum and major papilla appeared normal   · Deeply cannulated the common bile duct after 5 attempts using a traction sphincterotome  Used 260 cm x 0 035 in straight guidewire  Cannulation was difficult  Injected contrast  Cannulation was mildly difficult likely due to obstructing stone  · Filling defect consistent with stones in the common bile duct  · Performed biliary sphincteroplasty using 40 mm long dilator  The dilator was expanded from 8 mm starting size  · Placed 10 Fr x 7 cm duodenal bend plastic stent  Recommendations: follow LFTs, IV ABX, hold Eliquis for 2 days, repeat ERCP in 6 weeks for removal of stent/sphincterotomy/extraction of remaining choledocholithiasis          ED Triage Vitals  06/04/22 1508   Temp Pulse Resp Blood Pressure SpO2   (!) 102 8 °F (39 3 °C) (!) 125 22 132/82 (!) 85 %  None (Room air)      Wt Readings from Last 1 Encounters:   06/04/22 94 3 kg (207 lb 14 3 oz)     Additional Vital Signs:   Date/Time Temp Pulse Resp BP MAP (mmHg) SpO2 Calculated FIO2 (%) - Nasal Cannula Nasal Cannula O2 Flow Rate (L/min) O2 Device   06/05/22 1410 99 32 °F (37 4 °C) 108 Abnormal  24 Abnormal  166/93 120 97 % -- -- --   06/05/22 1310 98 96 °F (37 2 °C) 110 Abnormal  27 Abnormal  159/82 110 91 % -- -- --   06/05/22 1110 98 24 °F (36 8 °C) 108 Abnormal  20 164/83 109 99 % -- -- --   06/05/22 1010 97 88 °F (36 6 °C) 102 20 147/87 102 95 % -- -- --   06/05/22 1000 97 88 °F (36 6 °C) 106 Abnormal  20 156/75 100 93 % 28 2 L/min Nasal cannula   06/05/22 0710 98 24 °F (36 8 °C) 80 21 146/71 87 99 % -- -- --   06/05/22 0600 98 24 °F (36 8 °C) 78 22 156/71 105 99 % -- -- --   06/05/22 0500 98 6 °F (37 °C) 76 21 155/74 102 99 % -- -- --   06/05/22 0400 98 96 °F (37 2 °C) 76 21 157/74 113 98 % -- -- --   06/05/22 0300 98 96 °F (37 2 °C) 72 19 146/71 95 100 % -- -- --   06/05/22 0200 98 6 °F (37 °C) 74 23 Abnormal  137/71 108 92 % -- -- --   06/05/22 0100 98 6 °F (37 °C) 78 22 144/73 92 97 % -- -- --   06/05/22 0000 98 96 °F (37 2 °C) 74 20 148/73 106 99 % -- -- --   06/04/22 2300 98 6 °F (37 °C) 78 16 149/74 93 100 % -- -- --   06/04/22 2200 99 32 °F (37 4 °C) 74 22 140/72 89 98 % -- -- --   06/04/22 2100 99 68 °F (37 6 °C) 64 22 119/71 87 97 % -- -- --   06/04/22 2015 99 7 °F (37 6 °C) 74 26 Abnormal  138/77 102 95 % -- -- --   06/04/22 2000 99 7 °F (37 6 °C) 76 23 Abnormal  135/75 99 93 % -- -- --   06/04/22 1830 100 4 °F (38 °C) 72 25 Abnormal  122/65 87 94 % 36 4 L/min Nasal cannula   06/04/22 1815 100 8 °F (38 2 °C)   Abnormal  78 28 Abnormal  112/68 83 94 % 36 4 L/min Nasal cannula   06/04/22 1800 101 1 °F (38 4 °C)   Abnormal  82 27 Abnormal  -- -- 95 % 36 4 L/min Nasal cannula   06/04/22 1745 101 1 °F (38 4 °C)   Abnormal  94 26 Abnormal  137/58 84 92 % 36 4 L/min Nasal cannula   06/04/22 1730 101 5 °F (38 6 °C)   Abnormal  100 26 Abnormal  123/71 91 92 % 36 4 L/min Nasal cannula   06/04/22 1715 101 5 °F (38 6 °C)   Abnormal  102 26 Abnormal  125/67 91 93 % 36 4 L/min Nasal cannula   06/04/22 1700 101 8 °F (38 8 °C)   Abnormal  110 Abnormal  27 Abnormal  152/69 90 93 % 36 4 L/min Nasal cannula   06/04/22 1645 101 8 °F (38 8 °C)   Abnormal  106 Abnormal  25 Abnormal  -- -- 94 % 36 4 L/min Nasal cannula   06/04/22 1630 102 2 °F (39 °C)   Abnormal  110 Abnormal  26 Abnormal  -- -- 93 % 36 4 L/min Nasal cannula   06/04/22 1615 102 2 °F (39 °C)   Abnormal  112 Abnormal  26 Abnormal  132/80 102 95 % 36 4 L/min Nasal cannula   06/04/22 1600 102 2 °F (39 °C)   Abnormal  112 Abnormal  28 Abnormal  118/75 91 86 % Abnormal  36 4 L/min Nasal cannula   06/04/22 1545 102 2 °F (39 °C)   Abnormal  126 Abnormal  28 Abnormal  -- -- 91 % -- -- Nasal cannula   06/04/22 1530 101 5 °F (38 6 °C)   Abnormal  126 Abnormal  28 Abnormal  140/76 102 88 % Abnormal  -- -- Nasal cannula       Pertinent Labs/Diagnostic Test Results:   6/4 - EKG  Wide QRS tachycardia with Fusion complexes  Left axis deviation  Left bundle branch block    US right upper quadrant   Final Result by Shani Diaz MD (06/04 1857)      Distended, thick-walled gallbladder similar to prior study, concerning for acute cholecystitis  Sonographic Meredith's sign potentially not elicited due to patient condition  The study was marked in EPIC for significant notification  Workstation performed: [id number]         XR chest 1 view portable   Final Result by Didi Salamanca MD (06/05 1003)      No acute cardiopulmonary disease  Workstation performed: [id number]         CT abdomen pelvis wo contrast   Final Result by Sejal Schulz MD (06/04 1634)   1  Distended gallbladder but without significant pericholecystic inflammatory change  2  Otherwise no acute intra-abdominal pathology                    Workstation performed: [id number]           Results from last 7 days   Lab Units 06/04/22  1603   SARS-COV-2  Negative     Results from last 7 days   Lab Units 06/05/22  0442 06/04/22  1522   WBC Thousand/uL 19 96* 32 34*   HEMOGLOBIN g/dL 10 6* 12 0   HEMATOCRIT % 33 8* 37 7   PLATELETS Thousands/uL 279 319   NEUTROS ABS Thousands/µL 18 01*  --    BANDS PCT %  --  18*         Results from last 7 days   Lab Units 06/05/22  0442 06/04/22  1522   SODIUM mmol/L 139 138   POTASSIUM mmol/L 3 7 3 7   CHLORIDE mmol/L 104 100   CO2 mmol/L 32 31   ANION GAP mmol/L 3* 7   BUN mg/dL 21 19   CREATININE mg/dL 1 15 1 43*   EGFR ml/min/1 73sq m 57 44   CALCIUM mg/dL 8 8 8 9   MAGNESIUM mg/dL 2 1  --    PHOSPHORUS mg/dL 2 7  --      Results from last 7 days   Lab Units 06/05/22  0442 06/04/22  1522   AST U/L 190* 473*   ALT U/L 264* 423*   ALK PHOS U/L 816* 1,062*   TOTAL PROTEIN g/dL 6 3* 7 7   ALBUMIN g/dL 2 0* 2 5*   TOTAL BILIRUBIN mg/dL 1 46* 2 56*     Results from last 7 days   Lab Units 06/05/22  1157 06/04/22  2359   POC GLUCOSE mg/dl 228* 238*     Results from last 7 days   Lab Units 06/05/22  0442 06/04/22  1522   GLUCOSE RANDOM mg/dL 178* 153*       Results from last 7 days   Lab Units 06/04/22  1522   PROTIME seconds 15 7*   INR  1 30*   PTT seconds 32         Results from last 7 days   Lab Units 06/04/22  1522   PROCALCITONIN ng/ml 9 85*     Results from last 7 days   Lab Units 06/04/22 2050 06/04/22  1522   LACTIC ACID mmol/L 1 8 2 4*       Results from last 7 days   Lab Units 06/04/22  1543   CLARITY UA  Clear   COLOR UA  Dark Yellow   SPEC GRAV UA  1 019   PH UA  6 0   GLUCOSE UA mg/dl 100 (1/10%)*   KETONES UA mg/dl Negative   BLOOD UA  Small*   PROTEIN UA mg/dl 50 (1+)*   NITRITE UA  Negative   BILIRUBIN UA  Small*   UROBILINOGEN UA (BE) mg/dl >=12 0*   LEUKOCYTES UA  Negative   WBC UA /hpf 2-4*   RBC UA /hpf 10-20*   BACTERIA UA /hpf None Seen   EPITHELIAL CELLS WET PREP /hpf None Seen   MUCUS THREADS  Occasional*     Results from last 7 days   Lab Units 06/04/22  1603   INFLUENZA A PCR  Negative   INFLUENZA B PCR  Negative   RSV PCR  Negative     Results from last 7 days   Lab Units 06/04/22  1522 06/04/22  1502   BLOOD CULTURE  Received in Microbiology Lab  Culture in Progress  Received in Microbiology Lab  Culture in Progress                 ED Treatment:   Medication Administration from 06/04/2022 1349 to 06/04/2022 2051       Date/Time Order Dose Route Action     06/04/2022 1501 ondansetron (FOR EMS ONLY) (ZOFRAN) 4 mg/2 mL injection 4 mg 0 mg Does not apply Given to EMS     06/04/2022 1605 sodium chloride 0 9 % bolus 500 mL 500 mL Intravenous New Bag     06/04/2022 1607 acetaminophen (TYLENOL) rectal suppository 650 mg 650 mg Rectal Given     06/04/2022 1700 cefTRIAXone (ROCEPHIN) 2,000 mg in dextrose 5 % 50 mL IVPB 2,000 mg Intravenous New Bag     06/04/2022 1812 metroNIDAZOLE (FLAGYL) IVPB (premix) 500 mg 100 mL 500 mg Intravenous New Bag     06/04/2022 1815 multi-electrolyte (PLASMALYTE-A/ISOLYTE-S PH 7 4) IV solution 125 mL/hr Intravenous New Bag        Past Medical History:   Diagnosis Date    A-fib ([place] [address] )     Cancer ([place] [address] )     Cardiac disease     CHF (congestive heart failure) ([place] [address] )     Chronic obstructive pulmonary disease (COPD) ([place] [address] )     [facility]chronic [facility]) [facility])     Coronary artery disease     Counseling regarding advance care planning and goals of care 10/7/2020    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[place] [address] )     TYPE 2    Dysphagia     Fracture of nasal bone     Gait instability     H/O cervical fracture     Hyperlipidemia     Hypertension     Muscle weakness     TBI (traumatic brain injury) ([place] [address] )      Present on Admission:   Cholangitis   Type 2 diabetes mellitus ([place] [address] )   Atrial fibrillation ([place] [address] )   Hypertension   Hyperlipidemia   Chronic heart failure with preserved ejection fraction ([facility])      Admitting Diagnosis: Cholangitis [K83 09]  Ascending cholangitis [K83 09]  Nausea [R11 0]  Dementia ([facility]) [F03 90]  Fever [R50 9]  Vascular dementia without behavioral disturbance ([place] [address] ) [F01 50]  Age/Sex: 82 y o  male  Admission Orders:  NPO  -- Clear Liquid Diet  Fall precautions  accuchecks q6h  Cardio-Pulm monitoring  DW   I&O  SCDs  Neuro checks q2h       Scheduled Medications:  atorvastatin, 20 mg, Oral, Daily  buPROPion, 100 mg, Oral, Daily  cefTRIAXone, 2,000 mg, Intravenous, Q24H  diltiazem, 30 mg, Oral, Q6H GABY  heparin (porcine), 5,000 Units, Subcutaneous, Q8H GABY  insulin lispro, 1-6 Units, Subcutaneous, Q6H GABY  metoprolol tartrate, 25 mg, Oral, Q12H GABY  metroNIDAZOLE, 500 mg, Intravenous, Q8H  pantoprazole, 40 mg, Oral, Daily Before Breakfast  sertraline, 50 mg, Oral, Daily    Continuous IV Infusions:    multi-electrolyte, 75 mL/hr, Intravenous, Continuous     PRN Meds: none      IP CONSULT TO GASTROENTEROLOGY  IP CONSULT TO CASE MANAGEMENT  IP CONSULT TO GASTROENTEROLOGY    Network Utilization Review Department  ATTENTION: Please call with any questions or concerns to [phone] and carefully listen to the prompts so that you are directed to the right person  All voicemails are confidential   [facility] all requests for admission clinical reviews, approved or denied determinations and any other requests to dedicated fax number below belonging to the campus where the patient is receiving treatment   List of dedicated fax numbers for the Facilities:  [address] DENIALS (Administrative/Medical Necessity) [phone]   [address] (Maternity/NICU/Pediatrics) [phone]   [address]  [address] [address] [place] [address] [address] [facility] [place] [address] [phone]

## 2022-06-05 NOTE — PROGRESS NOTES
Progress Note - Theola Shone 82 y o  male MRN: [id number]    Unit/Bed#: MICU 08 Encounter: [id number]      Assessment:  83 yo male w/ sepsis likely 2/2 to ascending cholangitis  AVSS  Uo-625 overnight    WBC 19 96(32 34)  T bili 1 46      Plan:  -Plan for ERCP today with GI  -Can have clears after the procedure  -Continue IV abx  -Pain/nausea control PRN  -Monitor abd exam    -Appreciate GI recommendations      Subjective:   No acute events overnight  Much better this morning  Less confused  Denies pain  Objective:     Vitals: Blood pressure 156/71, pulse 78, temperature 98 24 °F (36 8 °C), resp  rate 22, height 5' 8" (1 727 m), weight 94 3 kg (207 lb 14 3 oz), SpO2 99 %  ,Body mass index is 31 61 kg/m²  Intake/Output Summary (Last 24 hours) at 6/5/2022 0623  Last data filed at 6/5/2022 0601  Gross per 24 hour   Intake 1570 83 ml   Output 625 ml   Net 945 83 ml       Physical Exam:   General: NAD  HENT: NCAT MMM  Neck: supple, no JVD  CV: nl rate  Lungs: nl wob   No resp distress  ABD: Soft, nontender, mild distention  Extrem: No CCE  Neuro: AAOx3       Invasive Devices  Report    Peripheral Intravenous Line  Duration           Peripheral IV 06/04/22 Right Antecubital 1 day    Peripheral IV 06/05/22 Left Antecubital <1 day          Drain  Duration           Urethral Catheter Temperature probe 16 Fr  <1 day

## 2022-06-05 NOTE — ANESTHESIA PREPROCEDURE EVALUATION
Procedure:  ERCP      82year old admitted from nursing home with encephalopathy, sepsis, bilious emesis, and CHASITY secondary to acute cholangitis    Due for flagyl at 0915  Relevant Problems   CARDIO   (+) Atrial fibrillation (HCC)   (+) Hyperlipidemia   (+) Hypertension      ENDO   (+) Type 2 diabetes mellitus (HCC)      GI/HEPATIC   (+) Dysphagia      HEMATOLOGY   (+) Anemia      NEURO/PSYCH   (+) Depression   (+) History of 2019 novel coronavirus disease (COVID-19)   (+) Vascular dementia ([address] )      Last cardiology outpatient note:  Stable disease  2015 had YULIA placed  2017 eCHO showed improvement of EF from 30% to 50%    Physical Exam    Airway  Comment: Large tongue small mouth; poor effort with opening mouth  Mallampati score: III  TM Distance: <3 FB  Neck ROM: limited     Dental       Cardiovascular  Cardiovascular exam normal    Pulmonary  Pulmonary exam normal     Other Findings       Latest Reference Range & Units 06/05/22 04:42   Sodium 136 - 145 mmol/L 139   Potassium 3 5 - 5 3 mmol/L 3 7   Chloride 100 - 108 mmol/L 104   CO2 21 - 32 mmol/L 32   Anion Gap 4 - 13 mmol/L 3 (L)   BUN 5 - 25 mg/dL 21   Creatinine 0 60 - 1 30 mg/dL 1 15 [1]   Glucose, Random 65 - 140 mg/dL 178 (H) [2]   Calcium 8 3 - 10 1 mg/dL 8 8   CORRECTED CALCIUM 8 3 - 10 1 mg/dL 10 4 (H)   AST 5 - 45 U/L 190 (H) [3]   ALT 12 - 78 U/L 264 (H) [4]   Alkaline Phosphatase 46 - 116 U/L 816 (H)   Total Protein 6 4 - 8 2 g/dL 6 3 (L)   Albumin 3 5 - 5 0 g/dL 2 0 (L)   TOTAL BILIRUBIN 0 20 - 1 00 mg/dL 1 46 (H) [5]   eGFR ml/min/1 73sq m 57   Phosphorus 2 3 - 4 1 mg/dL 2 7   Magnesium 1 6 - 2 6 mg/dL 2 1        Latest Reference Range & Units 06/05/22 04:42   WBC 4 31 - 10 16 Thousand/uL 19 96 (H)   Red Blood Cell Count 3 88 - 5 62 Million/uL 4 22   Hemoglobin 12 0 - 17 0 g/dL 10 6 (L)   HCT 36 5 - 49 3 % 33 8 (L)   MCV 82 - 98 fL 80 (L)   MCH 26 8 - 34 3 pg 25 1 (L)   MCHC 31 4 - 37 4 g/dL 31 4   RDW 11 6 - 15 1 % 20 1 (H)   Platelet Count 728 - 390 Thousands/uL 279   MPV 8 9 - 12 7 fL 10 4       EKG  Wide QRS tachycardia with Fusion complexes  Left axis deviation  Left bundle branch block  Abnormal ECG  When compared with ECG of 06-MAR-2021 09:29,  Wide QRS tachycardia has replaced Sinus rhythm    Anesthesia Plan  ASA Score- 4     Anesthesia Type- general with ASA Monitors  Additional Monitors:   Airway Plan: ETT  Comment: NPO after MN  Patient with improving liver enzymes and mental status  Plan Factors-Exercise tolerance (METS): <4 METS  Chart reviewed  Existing labs reviewed  Patient summary reviewed  Patient is not a current smoker  Induction- intravenous  Postoperative Plan-     Informed Consent- Anesthetic plan and risks discussed with patient Denzel Rivera [phone])  I personally reviewed this patient with the CRNA  Discussed and agreed on the Anesthesia Plan with the CRNA  Brian Heck

## 2022-06-05 NOTE — ANESTHESIA POSTPROCEDURE EVALUATION
Post-Op Assessment Note    CV Status:  Stable  Pain Score: 0    Pain management: adequate     Mental Status:  Arousable   Hydration Status:  Stable   PONV Controlled:  Controlled   Airway Patency:  Patent      Post Op Vitals Reviewed: Yes      Staff: CRNA         No complications documented  BP   127/89   Temp 98 2   Pulse 102   Resp 18   SpO2 100   Bedside report given to ICU RN  Care relinquished

## 2022-06-06 NOTE — PROGRESS NOTES
Progress Note- Sharla Soni 82 y o  male MRN: [id number]    Unit/Bed#: 2 212-01 Encounter: [id number]      Assessment and Plan:    22-year-old with AFib on Eliquis presented with cholangitis status post ERCP 06/20/2022 with sphincterotomy and stent placement, distended gallbladder  Overall patient continues to improve clinically close biliary decompression with liver enzymes continuing to decrease  Total bili now normal   He continues to be on IV antibiotics  Also primary team getting swallowing evaluation before further advancement of diet from clear liquids  He is also requiring insulin drip for uncontrolled diabetes  1  Cholangitis  Secondary to obstruction from stones/sludge  Status post ERCP on 06/04/2022 with placement of biliary stent and sphincterotomy  LFTs improving with total bili normalized showing good biliary drainage at this point  Blood cultures negative  Continue antibiotics for at least 5 days since biliary stent was placed  He would need repeat ERCP in 6 weeks  Patient would need cholecystectomy for appropriate surgical candidate  Defer this to primary team     2  Atrial fibrillation  Eliquis on hold  Plan to resume tomorrow  3  Uncontrolled diabetes  Currently on insulin drip  Primary team to get endocrinology consult  We will follow  Luis Wright MD  Gastroenterology Fellow  [address]  Date: June 6, 2022      ______________________________________________________________________    Subjective:     Patient tolerating diet  Currently no abdominal pain      Medication Administration - last 24 hours from 06/05/2022 1020 to 06/06/2022 1020       Date/Time Order Dose Route Action Action by     06/06/2022 0940 metroNIDAZOLE (FLAGYL) IVPB (premix) 500 mg 100 mL 500 mg Intravenous [address] Joana Patterson RN     06/06/2022 0016 metroNIDAZOLE (FLAGYL) IVPB (premix) 500 mg 100 mL 500 mg Intravenous [address] Chris Mendez RN     06/05/2022 3331 metroNIDAZOLE (FLAGYL) IVPB (premix) 500 mg 100 mL 500 mg Intravenous New [address] Chantelle Ricketts, CARITO     06/05/2022 1100 metroNIDAZOLE (FLAGYL) IVPB (premix) 500 mg 100 mL 0 mg Intravenous Stopped Chantelle Rikcetts RN     06/05/2022 1034 metroNIDAZOLE (FLAGYL) IVPB (premix) 500 mg 100 mL 500 mg Intravenous New Bag Chantelle Ricketts, CARITO     06/06/2022 0940 atorvastatin (LIPITOR) tablet 20 mg 20 mg Oral Given Easton Mims RN     06/05/2022 1206 atorvastatin (LIPITOR) tablet 20 mg 20 mg Oral Given Chantelle Ricketts RN     06/05/2022 1212 buPROPion (WELLBUTRIN) tablet 100 mg 100 mg Oral Given Chantelle Ricketts RN     06/06/2022 0940 sertraline (ZOLOFT) tablet 50 mg 50 mg Oral Given Easton Mims RN     06/05/2022 1212 sertraline (ZOLOFT) tablet 50 mg 50 mg Oral Given Chantelle Ricketts RN     06/06/2022 0623 pantoprazole (PROTONIX) EC tablet 40 mg 40 mg Oral Given Candace Valero, RN     06/05/2022 1031 multi-electrolyte (PLASMALYTE-A/ISOLYTE-S PH 7 4) IV solution 0 mL/hr Intravenous Stopped Chantelle Ricketts RN     06/05/2022 1809 cefTRIAXone (ROCEPHIN) 2,000 mg in dextrose 5 % 50 mL IVPB 2,000 mg Intravenous New Bag Chantelle Ricketts, CARITO     06/05/2022 1821 insulin lispro (HumaLOG) 100 units/mL subcutaneous injection 1-6 Units 1 Units Subcutaneous Not Given Machelle Cardenas RN     06/05/2022 1159 insulin lispro (HumaLOG) 100 units/mL subcutaneous injection 1-6 Units 2 Units Subcutaneous Given Chantelle Ricketts RN     06/06/2022 6679 heparin (porcine) subcutaneous injection 5,000 Units 5,000 Units Subcutaneous Given Candace Valero, CARITO     06/05/2022 2212 heparin (porcine) subcutaneous injection 5,000 Units 5,000 Units Subcutaneous Given Candace Valero, RN     06/05/2022 1352 heparin (porcine) subcutaneous injection 5,000 Units 5,000 Units Subcutaneous Given Chantelle Ricketts RN     06/06/2022 6048 diltiazem (CARDIZEM) tablet 30 mg 30 mg Oral Given Candace Valero RN     06/05/2022 1249 diltiazem (CARDIZEM) tablet 30 mg 30 mg Oral Given Candace Valero, CARITO 06/05/2022 1809 diltiazem (CARDIZEM) tablet 30 mg 30 mg Oral Given Chantelle Ricketts, RN     06/05/2022 1352 diltiazem (CARDIZEM) tablet 30 mg 30 mg Oral Given Chantelle Ricketts, RN     06/06/2022 0940 metoprolol tartrate (LOPRESSOR) tablet 25 mg 25 mg Oral Given Shruti Venegas, CARITO     06/05/2022 2212 metoprolol tartrate (LOPRESSOR) tablet 25 mg 25 mg Oral Given Alfredo Duran, RN     06/05/2022 1352 metoprolol tartrate (LOPRESSOR) tablet 25 mg 25 mg Oral Given Chantelle Ricketts, RN     06/06/2022 0634 insulin regular (HumuLIN R,NovoLIN R) 1 Units/mL in sodium chloride 0 9 % 100 mL infusion 2 Units/hr Intravenous Rate/Dose Change Alfredo Duran, CARITO     06/06/2022 0151 insulin regular (HumuLIN R,NovoLIN R) 1 Units/mL in sodium chloride 0 9 % 100 mL infusion 2 Units/hr Intravenous [address] Alfredo Duran, CARITO     06/06/2022 0425 insulin regular (HumuLIN R,NovoLIN R) 1 Units/mL in sodium chloride 0 9 % 100 mL infusion 4 Units/hr Intravenous Rate/Dose Change Alfredo Duran RN     06/06/2022 0223 insulin regular (HumuLIN R,NovoLIN R) 1 Units/mL in sodium chloride 0 9 % 100 mL infusion 8 Units/hr Intravenous Rate/Dose Change Rosemarie Moctezuma, CARITO     06/06/2022 0008 insulin regular (HumuLIN R,NovoLIN R) 1 Units/mL in sodium chloride 0 9 % 100 mL infusion 11 Units/hr Intravenous Rate/Dose Change Laila Melendez, RN     06/05/2022 2206 insulin regular (HumuLIN R,NovoLIN R) 1 Units/mL in sodium chloride 0 9 % 100 mL infusion 14 Units/hr Intravenous Rate/Dose Change Alfredo Duran, CARITO     06/05/2022 1856 insulin regular (HumuLIN R,NovoLIN R) 1 Units/mL in sodium chloride 0 9 % 100 mL infusion 12 Units/hr Intravenous [address] Jamarcus, CARITO     06/06/2022 0941 potassium phosphates 30 mmol in sodium chloride 0 9 % 250 mL infusion 30 mmol Intravenous New Bag Shruti Venegas RN          Objective:     Vitals: Blood pressure (!) 174/97, pulse 80, temperature 97 9 °F (36 6 °C), temperature source Oral, resp   rate 20, height 5' 8" (1 727 m), weight 94 3 kg (207 lb 14 3 oz), SpO2 96 %  ,Body mass index is 31 61 kg/m²  Intake/Output Summary (Last 24 hours) at 6/6/2022 1020  Last data filed at 6/6/2022 4205  Gross per 24 hour   Intake 288 75 ml   Output 1272 ml   Net -983 25 ml       Physical Exam:   General Appearance:   Alert, cooperative, no distress   HEENT:   Normocephalic, atraumatic, anicteric  Neck:  Supple, symmetrical, trachea midline   Lungs:   Clear to auscultation bilaterally; no rales, rhonchi or wheezing; respirations unlabored    Heart[de-identified]   Regular rate and rhythm; no murmur, rub, or gallop     Abdomen:   Soft, non-tender, non-distended; normal bowel sounds; no masses, no organomegaly    Genitalia:   Deferred    Rectal:   Deferred    Extremities:  No cyanosis, clubbing or edema    Pulses:  2+ and symmetric all extremities    Skin:  No jaundice, rashes, or lesions    Lymph nodes:  No palpable cervical lymphadenopathy          Invasive Devices  Report    Peripheral Intravenous Line  Duration           Peripheral IV 06/04/22 Right Antecubital 2 days    Peripheral IV 06/05/22 Left Antecubital 1 day                Lab Results:  Admission on 06/04/2022   Component Date Value    WBC 06/04/2022 32 34 (A)    RBC 06/04/2022 4 88     Hemoglobin 06/04/2022 12 0     Hematocrit 06/04/2022 37 7     MCV 06/04/2022 77 (A)    MCH 06/04/2022 24 6 (A)    MCHC 06/04/2022 31 8     RDW 06/04/2022 20 4 (A)    MPV 06/04/2022 10 8     Platelets 86/64/6284 319     Sodium 06/04/2022 138     Potassium 06/04/2022 3 7     Chloride 06/04/2022 100     CO2 06/04/2022 31     ANION GAP 06/04/2022 7     BUN 06/04/2022 19     Creatinine 06/04/2022 1 43 (A)    Glucose 06/04/2022 153 (A)    Calcium 06/04/2022 8 9     Corrected Calcium 06/04/2022 10 1     AST 06/04/2022 473 (A)    ALT 06/04/2022 423 (A)    Alkaline Phosphatase 06/04/2022 1,062 (A)    Total Protein 06/04/2022 7 7     Albumin 06/04/2022 2 5 (A)    Total Bilirubin 06/04/2022 2 56 (A)    eGFR 06/04/2022 44     LACTIC ACID 06/04/2022 2 4 (A)    Procalcitonin 06/04/2022 9 85 (A)    Protime 06/04/2022 15 7 (A)    INR 06/04/2022 1 30 (A)    PTT 06/04/2022 32     Blood Culture 06/04/2022 No Growth at 24 hrs   Blood Culture 06/04/2022 No Growth at 24 hrs       Color, UA 06/04/2022 Dark Yellow     Clarity, UA 06/04/2022 Clear     Specific Gravity, UA 06/04/2022 1 019     pH, UA 06/04/2022 6 0     Leukocytes, UA 06/04/2022 Negative     Nitrite, UA 06/04/2022 Negative     Protein, UA 06/04/2022 50 (1+) (A)    Glucose, UA 06/04/2022 100 (1/10%) (A)    Ketones, UA 06/04/2022 Negative     Urobilinogen, UA 06/04/2022 >=12 0 (A)    Bilirubin, UA 06/04/2022 Small (A)    Blood, UA 06/04/2022 Small (A)    SARS-CoV-2 06/04/2022 Negative     INFLUENZA A PCR 06/04/2022 Negative     INFLUENZA B PCR 06/04/2022 Negative     RSV PCR 06/04/2022 Negative     Segmented % 06/04/2022 69     Bands % 06/04/2022 18 (A)    Lymphocytes % 06/04/2022 1 (A)    Monocytes % 06/04/2022 5     Eosinophils, % 06/04/2022 0     Basophils % 06/04/2022 0     Metamyelocytes% 06/04/2022 7 (A)    Absolute Neutrophils 06/04/2022 28 14 (A)    Lymphocytes Absolute 06/04/2022 0 32 (A)    Monocytes Absolute 06/04/2022 1 62 (A)    Eosinophils Absolute 06/04/2022 0 00     Basophils Absolute 06/04/2022 0 00     RBC Morphology 06/04/2022 Present     Anisocytosis 06/04/2022 Present     Hypochromia 06/04/2022 Present     Microcytes 06/04/2022 Present     Ovalocytes 06/04/2022 Present     Poikilocytes 06/04/2022 Present     Polychromasia 06/04/2022 Present     Target Cells 06/04/2022 Present     Platelet Estimate 27/90/8169 Adequate     RBC, UA 06/04/2022 10-20 (A)    WBC, UA 06/04/2022 2-4 (A)    Epithelial Cells 06/04/2022 None Seen     Bacteria, UA 06/04/2022 None Seen     MUCUS THREADS 06/04/2022 Occasional (A)    Hyaline Casts, UA 06/04/2022 0-3 (A)    WBC Casts, UA 06/04/2022 0-3 (A)    Transitional Epithelial * 06/04/2022 PRESENT     LACTIC ACID 06/04/2022 1 8     POC Glucose 06/04/2022 238 (A)    Sodium 06/05/2022 139     Potassium 06/05/2022 3 7     Chloride 06/05/2022 104     CO2 06/05/2022 32     ANION GAP 06/05/2022 3 (A)    BUN 06/05/2022 21     Creatinine 06/05/2022 1 15     Glucose 06/05/2022 178 (A)    Calcium 06/05/2022 8 8     Corrected Calcium 06/05/2022 10 4 (A)    AST 06/05/2022 190 (A)    ALT 06/05/2022 264 (A)    Alkaline Phosphatase 06/05/2022 816 (A)    Total Protein 06/05/2022 6 3 (A)    Albumin 06/05/2022 2 0 (A)    Total Bilirubin 06/05/2022 1 46 (A)    eGFR 06/05/2022 57     Magnesium 06/05/2022 2 1     Phosphorus 06/05/2022 2 7     WBC 06/05/2022 19 96 (A)    RBC 06/05/2022 4 22     Hemoglobin 06/05/2022 10 6 (A)    Hematocrit 06/05/2022 33 8 (A)    MCV 06/05/2022 80 (A)    MCH 06/05/2022 25 1 (A)    MCHC 06/05/2022 31 4     RDW 06/05/2022 20 1 (A)    MPV 06/05/2022 10 4     Platelets 89/60/6668 279     nRBC 06/05/2022 0     Neutrophils Relative 06/05/2022 90 (A)    Immat GRANS % 06/05/2022 1     Lymphocytes Relative 06/05/2022 4 (A)    Monocytes Relative 06/05/2022 4     Eosinophils Relative 06/05/2022 1     Basophils Relative 06/05/2022 0     Neutrophils Absolute 06/05/2022 18 01 (A)    Immature Grans Absolute 06/05/2022 0 13     Lymphocytes Absolute 06/05/2022 0 77     Monocytes Absolute 06/05/2022 0 87     Eosinophils Absolute 06/05/2022 0 11     Basophils Absolute 06/05/2022 0 07     Ventricular Rate 06/04/2022 126     Atrial Rate 06/04/2022 96     QRSD Interval 06/04/2022 152     QT Interval 06/04/2022 348     QTC Interval 06/04/2022 504     QRS Axis 06/04/2022 -66     T Wave Axis 06/04/2022 127     POC Glucose 06/05/2022 228 (A)    POC Glucose 06/05/2022 171 (A)    POC Glucose 06/05/2022 367 (A)    POC Glucose 06/05/2022 356 (A)    POC Glucose 06/05/2022 332 (A)    POC Glucose 06/05/2022 394 (A)    POC Glucose 06/06/2022 315 (A)    POC Glucose 06/06/2022 221 (A)    WBC 06/06/2022 13 85 (A)    RBC 06/06/2022 4 47     Hemoglobin 06/06/2022 11 1 (A)    Hematocrit 06/06/2022 34 8 (A)    MCV 06/06/2022 78 (A)    MCH 06/06/2022 24 8 (A)    MCHC 06/06/2022 31 9     RDW 06/06/2022 20 2 (A)    Platelets 19/48/6599 275     MPV 06/06/2022 10 5     Sodium 06/06/2022 143     Potassium 06/06/2022 3 6     Chloride 06/06/2022 106     CO2 06/06/2022 31     ANION GAP 06/06/2022 6     BUN 06/06/2022 19     Creatinine 06/06/2022 1 02     Glucose 06/06/2022 135     Calcium 06/06/2022 8 7     Corrected Calcium 06/06/2022 10 2 (A)    AST 06/06/2022 68 (A)    ALT 06/06/2022 181 (A)    Alkaline Phosphatase 06/06/2022 756 (A)    Total Protein 06/06/2022 6 8     Albumin 06/06/2022 2 1 (A)    Total Bilirubin 06/06/2022 0 81     eGFR 06/06/2022 66     Magnesium 06/06/2022 2 2     Phosphorus 06/06/2022 2 0 (A)    Lipase 06/06/2022 400 (A)    POC Glucose 06/06/2022 167 (A)    POC Glucose 06/06/2022 135     POC Glucose 06/06/2022 135        Imaging Studies: I have personally reviewed pertinent imaging studies

## 2022-06-06 NOTE — CONSULTS
Consultation - Sydney Puentes 82 y o  male MRN: [id number]    Unit/Bed#: CW2 212-01 Encounter: [id number]      Assessment/Plan     Assessment: This is a 82y o -year-old male with diabetes with hyperglycemia and cholangitis  Plan:  1  Type 2 diabetes with hyperglycemia-increase IV insulin to algorithm four  Continue to monitor blood sugars every 2 hours and make adjustments to the IV insulin as needed  I suspect the hyperglycemia that has occurred recently is due to the IV being turned off for loss of access  2  Cholangitis-management per GI and General surgery  CC: Diabetes Consult    History of Present Illness     HPI: Sydney Puentes is a 82y o  year old male with type 2 diabetes for over 20 years  He is on insulin at home  He denies any polyuria, polydipsia, nocturia and blurry vision  He denies neuropathy, nephropathy, retinopathy, stroke and claudication but does admit to heart attack  He denies any hypoglycemia  He is not eating very well  Inpatient consult to Endocrinology  Consult performed by: Tonia Donaldson MD  Consult ordered by: Prudence Pallas, MD          Review of Systems   Constitutional: Negative for chills and fever  Respiratory: Negative for shortness of breath  Cardiovascular: Negative for chest pain  Gastrointestinal: Negative for constipation, diarrhea, nausea and vomiting  Endocrine: Negative for polydipsia, polyphagia and polyuria  All other systems reviewed and are negative        Historical Information   Past Medical History:   Diagnosis Date    A-fib ([place] [address] )     Cancer ([place] [address] )     Cardiac disease     [facility]     Chronic obstructive pulmonary disease (COPD) ([place] [address] )     COPD (chronic obstructive pulmonary disease) (HCC)     Coronary artery disease     Counseling regarding advance care planning and goals of care 10/7/2020    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    Dysphagia     Fracture of nasal bone     Gait instability     H/O cervical fracture     Hyperlipidemia     Hypertension     Muscle weakness     TBI (traumatic brain injury) ([facility] )      Past Surgical History:   Procedure Laterality Date    HAND SURGERY      AR ARTHRODESIS POSTERIOR CRANIOCERVICAL N/A 10/26/2020    Procedure: Occipital cervical fusion to C4;  Surgeon: Kady Muniz MD;  [facility];  Service: Neurosurgery     Social History   Social History     Substance and Sexual Activity   Alcohol Use Never     Social History     Substance and Sexual Activity   Drug Use Never     Social History     Tobacco Use   Smoking Status Never Smoker   Smokeless Tobacco Never Used     Family History:   Family History   Problem Relation Age of Onset    Other Other         urinary tract malignancy    Diabetes Mother        Meds/Allergies   Current Facility-Administered Medications   Medication Dose Route Frequency Provider Last Rate Last Admin    atorvastatin (LIPITOR) tablet 20 mg  20 mg Oral Daily Jeremiah Shaw MD   20 mg at 06/06/22 0940    buPROPion (WELLBUTRIN) tablet 100 mg  100 mg Oral Daily Jeremiah Shaw MD   100 mg at 06/06/22 1314    cefTRIAXone (ROCEPHIN) 2,000 mg in dextrose 5 % 50 mL IVPB  2,000 mg Intravenous Q24H Nelson Cheke PA-C 100 mL/hr at 06/05/22 1809 2,000 mg at 06/05/22 1809    diltiazem (CARDIZEM) tablet 30 mg  30 mg Oral Q6H [address] Emigdio Oh PA-C   30 mg at 06/06/22 1314    heparin (porcine) subcutaneous injection 5,000 Units  5,000 Units Subcutaneous [facility] Nelson Cheek PA-C   5,000 Units at 06/06/22 1314    insulin regular (HumuLIN R,NovoLIN R) 1 Units/mL in sodium chloride 0 9 % 100 mL infusion  0 3-21 Units/hr Intravenous Titrated Lenny Haas MD 10 mL/hr at 06/06/22 1328 10 Units/hr at 06/06/22 1328    metoprolol tartrate (LOPRESSOR) tablet 25 mg  25 mg Oral Q12H [address] Emigdio Oh PA-C   25 mg at 06/06/22 0940    metroNIDAZOLE (FLAGYL) IVPB (premix) 500 mg 100 mL  500 mg Intravenous Loly Tejada  mL/hr at 06/06/22 0940 500 mg at 06/06/22 0940    pantoprazole (PROTONIX) EC tablet 40 mg  40 mg Oral Daily Before Breakfast Esha Linder MD   40 mg at 06/06/22 4912    potassium phosphates 30 mmol in sodium chloride 0 9 % 250 mL infusion  30 mmol Intravenous Once Suzette Johansen MD 41 7 mL/hr at 06/06/22 0941 30 mmol at 06/06/22 0941    sertraline (ZOLOFT) tablet 50 mg  50 mg Oral Daily Esha Linder MD   50 mg at 06/06/22 0940     Allergies   Allergen Reactions    Amoxicillin Diarrhea and Hives       Objective   Vitals: Blood pressure (!) 174/97, pulse 80, temperature 97 9 °F (36 6 °C), temperature source Oral, resp  rate 20, height 5' 8" (1 727 m), weight 94 3 kg (207 lb 14 3 oz), SpO2 96 %  Intake/Output Summary (Last 24 hours) at 6/6/2022 1440  Last data filed at 6/6/2022 0849  Gross per 24 hour   Intake 270 ml   Output 992 ml   Net -722 ml     Invasive Devices  Report    Peripheral Intravenous Line  Duration           Peripheral IV 06/04/22 Right Antecubital 2 days    Peripheral IV 06/05/22 Left Antecubital 1 day                Physical Exam  Vitals reviewed  Constitutional:       General: He is not in acute distress  Appearance: He is well-developed  He is not diaphoretic  HENT:      Head: Normocephalic and atraumatic  Mouth/Throat:      Pharynx: No oropharyngeal exudate  Eyes:      General: Lids are normal  No scleral icterus  Right eye: No discharge  Left eye: No discharge  Conjunctiva/sclera: Conjunctivae normal    Neck:      Thyroid: No thyromegaly  Cardiovascular:      Rate and Rhythm: Normal rate and regular rhythm  Heart sounds: Normal heart sounds  No murmur heard  No friction rub  No gallop  Pulmonary:      Effort: Pulmonary effort is normal  No respiratory distress  Breath sounds: Normal breath sounds  No wheezing  Chest:   Breasts:      Right: No supraclavicular adenopathy  Left: No supraclavicular adenopathy         Abdominal: General: Bowel sounds are normal  There is no distension  Palpations: Abdomen is soft  Tenderness: There is no abdominal tenderness  Musculoskeletal:         General: No tenderness or deformity  Normal range of motion  Cervical back: Neck supple  Lymphadenopathy:      Head:      Right side of head: No occipital adenopathy  Left side of head: No occipital adenopathy  Upper Body:      Right upper body: No supraclavicular adenopathy  Left upper body: No supraclavicular adenopathy  Skin:     General: Skin is warm  Findings: No erythema or rash  Neurological:      Mental Status: He is alert and oriented to person, place, and time  Cranial Nerves: No cranial nerve deficit  Coordination: Coordination normal    Psychiatric:         Mood and Affect: Mood normal          The history was obtained from the review of the chart, patient  Lab Results:       Lab Results   Component Value Date    WBC 13 85 (H) 06/06/2022    HGB 11 1 (L) 06/06/2022    HCT 34 8 (L) 06/06/2022    MCV 78 (L) 06/06/2022     06/06/2022     Lab Results   Component Value Date/Time    BUN 19 06/06/2022 06:39 AM    BUN 16 12/02/2015 02:46 PM     06/17/2014 02:29 PM    K 3 6 06/06/2022 06:39 AM    K 5 1 06/17/2014 02:29 PM     06/06/2022 06:39 AM     06/17/2014 02:29 PM    CO2 31 06/06/2022 06:39 AM    CO2 26 10/26/2020 08:35 AM    CREATININE 1 02 06/06/2022 06:39 AM    CREATININE 1 03 12/02/2015 02:46 PM    AST 68 (H) 06/06/2022 06:39 AM     (H) 06/06/2022 06:39 AM    ALB 2 1 (L) 06/06/2022 06:39 AM     No results for input(s): CHOL, HDL, LDL, TRIG, VLDL in the last 72 hours    No results found for: Karel Zaragoza  POC Glucose (mg/dl)   Date Value   06/06/2022 283 (H)   06/06/2022 303 (H)   06/06/2022 135   06/06/2022 135   06/06/2022 167 (H)   06/06/2022 221 (H)   06/06/2022 315 (H)   06/05/2022 394 (H)   06/05/2022 332 (H)   06/05/2022 356 (H)       Imaging Studies: I have personally reviewed pertinent reports  Portions of the record may have been created with voice recognition software

## 2022-06-06 NOTE — PLAN OF CARE
Problem: PHYSICAL THERAPY ADULT  Goal: Performs mobility at highest level of function for planned discharge setting  See evaluation for individualized goals  Description: Treatment/Interventions: Functional transfer training, LE strengthening/ROM, Therapeutic exercise, Endurance training, Patient/family training, Equipment eval/education, Bed mobility, Gait training, Compensatory technique education, Spoke to nursing  Equipment Recommended:  (pt already owns W/C + RW)       See flowsheet documentation for full assessment, interventions and recommendations  Note: Prognosis: Fair  Problem List: Decreased strength, Decreased endurance, Impaired balance, Decreased mobility, Decreased cognition, Impaired judgement, Decreased safety awareness  Assessment: Pt is 82 y o  male seen for a high complexity PT evaluation s/p admit to [facility] on 6/4/2022  Pt presenting w/ sepsis + CHASITY secondary to cholangitis, s/p ERCP 6/5/22  Please see above for other active problem list / PMH  PT now consulted to assess functional mobility and needs for safe d/c planning  At baseline pt spending most of his time in his W/C, uses call bell for 1 staff member to assist him to the bathroom w/ RW, lives at [facility]  Currently pt requires MaxAx1 for bed skills; MaxAx1 for functional transfers; [address] [place] [address] for limited ambulation w/ RW  Pt presents functioning below baseline and w/ overall mobility deficits 2* to: decreased LE strength; generalized weakness/deconditioning; decreased endurance; impaired balance; gait deviations; fatigue; impaired safety and judgement; limited insight into current deficits; bed/chair alarms; multiple lines  Pt currently at risk for falls   (Please find additional objective findings from PT assessment regarding body systems outlined above ) Pt will continue to benefit from skilled PT interventions to address stated impairments; to maximize functional potential; for ongoing pt/ family training; and DME needs  PT is currently recommending return to facility w/ PT services when medically cleared  PT Discharge Recommendation: Return to facility with rehabilitation services          See flowsheet documentation for full assessment

## 2022-06-06 NOTE — PROGRESS NOTES
Progress Note - general Surgery  Heidi Hood 82 y o  male MRN: [id number]  Unit/Bed#: CW2 212-01 Encounter: [id number]    Assessment:  82 y o  male with acute cholangitis  He is in a s/p ERCP on 06/05 with GI  Plan:  - Diet Clear Liquid  - Pain and Nausea control PRN  - Incentive spirometry  - OOB, ambulate  - continue antibiotics currently on ceftriaxone/Flagyl  - continue insulin drip for management of blood glucose  - endocrinology consult  - swallow evaluation due to baseline confusion  - will resume Eliquis for AFib tomorrow 6/7  - follow-up bilirubins    Subjective/Objective     Subjective:  No acute events overnight per nurse patient is confused and incontinent of urine    Objective:   Vitals: Blood pressure 136/79, pulse 68, temperature 97 8 °F (36 6 °C), temperature source Oral, resp  rate 18, height 5' 8" (1 727 m), weight 94 3 kg (207 lb 14 3 oz), SpO2 97 %  ,Body mass index is 31 61 kg/m²  I/O       06/04 0701  06/05 0700 06/05 0701  06/06 0700    I V  (mL/kg) 1470 8 (15 6) 1223 3 (13)    IV Piggyback 100 450    Total Intake(mL/kg) 1570 8 (16 7) 1673 3 (17 7)    Urine (mL/kg/hr) 625 1170 (0 5)    Stool  0    Total Output 625 1170    Net +945 8 +503 3          Unmeasured Urine Occurrence  1 x    Unmeasured Stool Occurrence  1 x          Physical Exam:  Gen: NAD, Aox3, Comfortable in bed  Chest: Normal work of breathing, no respiratory distress  Abd: Soft, ND, minimal RUQ tenderness  No rebound no guarding  Ext: No Edema  Skin: Warm, Dry, Intact    Lab, Imaging and other studies: I have personally reviewed pertinent reports    ,  VTE Pharmacologic Prophylaxis: Heparin  VTE Mechanical Prophylaxis: sequential compression device        Pina Marcelino MD  6/6/2022  5:49 AM

## 2022-06-06 NOTE — PHYSICAL THERAPY NOTE
Physical Therapy Evaluation    Patient's Name: Susan Kim    Admitting Diagnosis  Cholangitis [K83 09]  Ascending cholangitis [K83 09]  Nausea [R11 0]  Dementia ([place] [address] ) [F03 90]  Fever [R50 9]  Vascular dementia without behavioral disturbance ([place] [address] ) [F01 50]    Problem List  Patient Active Problem List   Diagnosis    Atrial fibrillation ([place] [address] )    Hypertension    Hyperlipidemia    Type 2 diabetes mellitus ([place] [address] )    Vertebral artery dissection (HCC)    Dysphagia    Ambulatory dysfunction    Abnormal EKG    Chronic heart failure with preserved ejection fraction (HCC)    Weight gain    History of 2019 novel coronavirus disease (COVID-19)    Debility    Medication management    Goals of care, counseling/discussion    Anemia    Right acetabular fracture ([facility])    Depression    Vascular dementia ([place] [address] )    Constipation    Leukocytosis    Full code status    Frailty syndrome in geriatric patient    Cholangitis       Past Medical History  Past Medical History:   Diagnosis Date    A-fib ([place] [address] )     Cancer ([place] [address] )     Cardiac disease     CHF (congestive heart failure) ([facility])     Chronic obstructive pulmonary disease (COPD) ([place] [address] )     COPD (chronic obstructive pulmonary disease) ([place] [address] )     Coronary artery disease     Counseling regarding advance care planning and goals of care 10/7/2020    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[place] [address] )     TYPE 2    Dysphagia     Fracture of nasal bone     Gait instability     H/O cervical fracture     Hyperlipidemia     Hypertension     Muscle weakness     TBI (traumatic brain injury) ([place] [address] )        Past Surgical History  Past Surgical History:   Procedure Laterality Date    HAND SURGERY      [place] ARTHRODESIS POSTERIOR CRANIOCERVICAL N/A 10/26/2020    Procedure: Occipital cervical fusion to C4;  Surgeon: Sondra Lindo MD;  [facility];  Service: Neurosurgery        06/06/22 1007   PT Last Visit   PT Visit Date 06/06/22   Note Type   Note type Evaluation   Pain Assessment   Pain Assessment Tool 0-10   Pain Score No Pain Restrictions/Precautions   Weight Bearing Precautions Per Order No   Other Precautions Cognitive; Chair Alarm; Bed Alarm;Multiple lines;O2;Fall Risk  (O2 2L/min)   Home Living   Type of Home   ([facility])   Home Layout One level   Prior Function   Level of [place] Needs assistance with ADLs and functional mobility; Needs assistance with IADLs   Lives With Facility staff   Receives Help From Personal care attendant   ADL Assistance Needs assistance   IADLs Needs assistance   Falls in the last 6 months   (unknown)   Comments Pt reports that at his facility he spends the majority of his day in a W/C  He reports using call bell when he needs to use the bathroom + he is able to walk to the bathroom w/ assist of 1 person + RW  General   Family/Caregiver Present No   Cognition   Overall Cognitive Status Impaired   Arousal/Participation Alert   Orientation Level Oriented to person;Oriented to place;Oriented to time;Disoriented to situation  (grossly oriented to time (correct month + year))   Memory Decreased short term memory;Decreased recall of recent events;Decreased recall of precautions   Following Commands Follows one step commands with increased time or repetition   Comments overall pleasant + cooperative, forgetful, decreased safety awareness   Subjective   Subjective Pt agreeable to mobilize  RLE Assessment   RLE Assessment   (grossly 3-/5)   LLE Assessment   LLE Assessment   (grossly 3-/5)   Coordination   Movements are Fluid and Coordinated 1   Bed Mobility   Supine to Sit 2  Maximal assistance   Additional items Assist x 1; Increased time required;Verbal cues;LE management;HOB elevated  (use of valentín pad to assist to EOB)   Sit to Supine Unable to assess   Additional Comments Pt greeted in supine  Transfers   Sit to Stand 2  Maximal assistance   Additional items Assist x 1; Increased time required;Verbal cues  (elevated bed)   Stand to Sit 2  Maximal assistance   Additional items Assist x 1;Increased time required;Verbal cues   Stand pivot 2  Maximal assistance   Additional items Assist x 1; Increased time required;Verbal cues   Additional Comments RW   Ambulation/Elevation   Gait pattern R Knee Brittney; Improper Weight shift;Decreased foot clearance;Shuffling; Step to;Excessively slow;Decreased heel strike;Narrow SOULEYMANE   Gait Assistance 2  Maximal assist   Additional items Assist x 1;Verbal cues; Tactile cues   Assistive Device Rolling walker   Distance 3' bed>chair   Balance   Static Sitting Fair   Dynamic Sitting Fair -   Static Standing Poor   Dynamic Standing Poor -   Ambulatory Poor -  (RW)   Endurance Deficit   Endurance Deficit Yes   Endurance Deficit Description weakness, fatigue   Activity Tolerance   Activity Tolerance Patient limited by fatigue   Medical Staff Made Aware nsg   Nurse Made Aware yes - cleared pt for PT   Assessment   Prognosis Fair   Problem List Decreased strength;Decreased endurance; Impaired balance;Decreased mobility; Decreased cognition; Impaired judgement;Decreased safety awareness   Assessment Pt is 82 y o  male seen for a high complexity PT evaluation s/p admit to [facility] on 6/4/2022  Pt presenting w/ sepsis + CHASITY secondary to cholangitis, s/p ERCP 6/5/22  Please see above for other active problem list / PMH  PT now consulted to assess functional mobility and needs for safe d/c planning  At baseline pt spending most of his time in his W/C, uses call bell for 1 staff member to assist him to the bathroom w/ RW, lives at [facility]  Currently pt requires MaxAx1 for bed skills; MaxAx1 for functional transfers; [address] [place] [address] for limited ambulation w/ RW  Pt presents functioning below baseline and w/ overall mobility deficits 2* to: decreased LE strength; generalized weakness/deconditioning; decreased endurance; impaired balance; gait deviations; fatigue; impaired safety and judgement; limited insight into current deficits; bed/chair alarms; multiple lines   Pt currently at risk for falls  (Please find additional objective findings from PT assessment regarding body systems outlined above )     Pt will continue to benefit from skilled PT interventions to address stated impairments; to maximize functional potential; for ongoing pt/ family training; and DME needs  PT is currently recommending return to facility w/ PT services when medically cleared  Goals   Patient Goals feel better   STG Expiration Date 06/20/22   Short Term Goal #1 In 14 days pt will complete: 1) Bed mobility skills with MinAx1 to facilitate safe return to previous living environment  2) Functional transfers with MinAx1 to facilitate safe return to previous living environment  3) Ambulation with RW 20'x2 w/ MinAx1 without LOB for safe ambulation in home/community environment  4) Improve balance scores by 1 grade to decrease fall risk  5) Improve LE strength grades by 1 to increase independence w/ all functional mobility, transfers and gait  6) PT for ongoing pt and family education; DME needs and D/C planning to promote highest level of function in least restrictive environment  PT Treatment Day 0   Plan   Treatment/Interventions Functional transfer training;LE strengthening/ROM; Therapeutic exercise; Endurance training;Patient/family training;Equipment eval/education; Bed mobility;Gait training; Compensatory technique education;Spoke to nursing   PT Frequency 2-3x/wk   Recommendation   PT Discharge Recommendation Return to facility with rehabilitation services   Equipment Recommended   (pt already owns W/C + RW)   AM-PAC Basic Mobility Inpatient   Turning in Bed Without Bedrails 2   Lying on Back to Sitting on Edge of Flat Bed 2   Moving Bed to Chair 2   Standing Up From Chair 2   Walk in Room 1   Climb 3-5 Stairs 1   Basic Mobility Inpatient Raw Score 10   Highest Level Of Mobility   JH-HLM Goal 4: Move to chair/commode   JH-HLM Achieved 4: Move to chair/commode   End of Consult   Patient Position at End of Consult Bedside chair;Bed/Chair alarm activated; All needs within reach  (on waffle cushion, all lines in tact)     Ming Miguel, PT, DPT

## 2022-06-06 NOTE — CASE MANAGEMENT
Case Management Assessment & Discharge Planning Note    Patient name Javier Ledbetter  Location CW2 681/XT6 212-01 MRN [id number]  : 1935 Date 2022       Current Admission Date: 2022  Current Admission Diagnosis:Cholangitis   Patient Active Problem List    Diagnosis Date Noted    Cholangitis 2022    Full code status 2021    Frailty syndrome in geriatric patient 2021    Leukocytosis 2021    Constipation 2021    Vascular dementia ([facility] ) 2021    Depression 03/15/2021    Right acetabular fracture ([facility] ) 2021    Debility 2021    Medication management 2021    Goals of care, counseling/discussion 2021    Anemia 2021    History of 2019 novel coronavirus disease (COVID-19) 2020    Weight gain 2020    Abnormal EKG 2020    Chronic heart failure with preserved ejection fraction ([facility] ) 2020    Ambulatory dysfunction 2020    Dysphagia 2020    Atrial fibrillation ([facility] ) 2020    Hypertension 2020    Hyperlipidemia 2020    Type 2 diabetes mellitus ([facility] ) 2020    Vertebral artery dissection ([facility] ) 2020      LOS (days): 2  Geometric Mean LOS (GMLOS) (days):   Days to GMLOS:     OBJECTIVE:    Risk of Unplanned Readmission Score: 16 05         Current admission status: Inpatient       Preferred Pharmacy:   [address] [place]  [address]  Phone: [phone] Fax: [phone]    [facility] , [address] - [address]  [address] [place] [address] [address]  Phone: [phone] Fax: [phone]    UNKNOWN - FOLLOW UP PRIOR TO DISCHARGE TO E-PRESCRIBE  No address on file      [address] [place],[address], [address] 210 [facility]  Phone: [phone] Fax: [phone]    Primary Care Provider: Betina Marte MD    Primary Insurance: [employer] [facility] REP  Secondary Insurance: [place] [employer]    ASSESSMENT:  [address], [address] Representative - Spouse   Primary Phone: [phone] [facility])  Mobile Phone: [phone]                         Readmission Root Cause  30 Day Readmission: No    Patient Information  Admitted from[de-identified] Facility  Mental Status: Confused  During Assessment patient was accompanied by: Other-Comment (facility staff/wife via phone)  Assessment information provided by[de-identified] Spouse, Other - please comment (facility staff)  Primary Caregiver: Other (Comment)  Caregiver's Name[de-identified] [facility] -- facility staff  Caregiver's Telephone Number[de-identified] [phone]  Support Systems: Spouse/significant other  [place] of Residence: [address] [place] [address] do you live in?: [facility]  Type of Current Residence: Facility  In the last 12 months, was there a time when you were not able to pay the mortgage or rent on time?: No  In the last 12 months, was there a time when you did not have a steady place to sleep or slept in a shelter (including now)?: No  Homeless/housing insecurity resource given?: N/A  Living Arrangements: Other (Comment) [facility]    Activities of Daily Living Prior to Admission  Functional Status: Assistance  Completes ADLs independently?: No  Level of ADL dependence: Assistance  Ambulates independently?: No  Level of ambulatory dependence: Assistance  Does patient use assisted devices?: Yes  Assisted Devices (DME) used:  Wheelchair  Does patient currently own DME?: Yes  What DME does the patient currently own?: Wheelchair         Patient Information Continued  Income Source: Pension/custodial  Does patient have prescription coverage?: Yes  Within the past 12 months, you worried that your food would run out before you got the money to buy more : Never true  Within the past 12 months, the food you bought just didn't last and you didn't have money to get more : Never true  Food insecurity resource given?: N/A  Does patient receive dialysis treatments?: No  Does patient have a history of substance abuse?: No  Does patient have a history of Mental Health Diagnosis?: No         Means of Transportation  Means of Transport to [facility][de-identified] Other (Comment)  In the past 12 months, has lack of transportation kept you from medical appointments or from getting medications?: No  In the past 12 months, has lack of transportation kept you from meetings, work, or from getting things needed for daily living?: No  Was application for public transport provided?: N/A        DISCHARGE DETAILS:    Discharge planning discussed with[de-identified] Patient's wife Laura  Freedom of Choice: Yes  Comments - Freedom of Choice: return to [address] [place] [address] contacted family/caregiver?: Yes  Were Treatment Team discharge recommendations reviewed with patient/caregiver?: Yes  Did patient/caregiver verbalize understanding of patient care needs?: Yes  Were patient/caregiver advised of the risks associated with not following Treatment Team discharge recommendations?: Yes    Contacts  Patient Contacts: Howie Cushing, wife  Relationship to Patient[de-identified] Family  Contact Method: Phone  Phone Number: [phone]  Reason/Outcome: Continuity of Care, Emergency [address]         Is the patient interested in [place] [address] at discharge?: No    DME Referral Provided  Referral made for DME?: No              Treatment Team Recommendation: Facility Return  Discharge Destination Plan[de-identified] Facility Return  Transport at Discharge : BLS Ambulance             Patient/caregiver received discharge checklist   Content reviewed  Patient/caregiver encouraged to participate in discharge plan of care prior to discharge home    CM reviewed d/c planning process including the following: identifying help at home, patient preference for d/c planning needs, [facility], availability of treatment team to discuss questions or concerns patient and/or family may have regarding understanding medications and recognizing signs and symptoms once discharged  CM also encouraged patient to follow up with all recommended appointments after discharge  Patient advised of importance for patient and family to participate in managing patients medical well being  IMM Given (Date):: 06/06/22  IMM Given to[de-identified] Family (reviewed IMM #1 with wife via telephone)     Additional Comments: Patient is a resident/bedhold at [facility]; he is wheelchair bound at baseline, receives assistance with most ADLs  Can feed and shave himself  Normally alert with some confusion  Return referral placed to [facility], CM will arrange discharge transportation

## 2022-06-07 ENCOUNTER — PREP FOR PROCEDURE (OUTPATIENT)
Dept: GASTROENTEROLOGY | Facility: CLINIC | Age: 87
End: 2022-06-07

## 2022-06-07 DIAGNOSIS — K83.09 CHOLANGITIS: Primary | ICD-10-CM

## 2022-06-07 NOTE — PHYSICAL THERAPY NOTE
Physical Therapy Treatment Note    Patient's Name: Maria De Jesus Seo  : 22 1118   PT Last Visit   PT Visit Date 22   Note Type   Note Type Treatment   Pain Assessment   Pain Assessment Tool FLACC   Pain Location/Orientation Orientation: Right;Location: Leg   Hospital Pain Intervention(s) Repositioned   Pain Rating: FLACC (Rest) - Face 0   Pain Rating: FLACC (Rest) - Legs 0   Pain Rating: FLACC (Rest) - Activity 0   Pain Rating: FLACC (Rest) - Cry 0   Pain Rating: FLACC (Rest) - Consolability 0   Score: FLACC (Rest) 0   Pain Rating: FLACC (Activity) - Face 1   Pain Rating: FLACC (Activity) - Legs 1   Pain Rating: FLACC (Activity) - Activity 1   Pain Rating: FLACC (Activity) - Cry 1   Pain Rating: FLACC (Activity) - Consolability 1   Score: FLACC (Activity) 5   Restrictions/Precautions   Weight Bearing Precautions Per Order No   Other Precautions Cognitive; Chair Alarm; Bed Alarm;Multiple lines; Fall Risk;Pain   General   Chart Reviewed Yes   Response to Previous Treatment Patient unable to report, no changes reported from family or staff   Family/Caregiver Present No   Subjective   Subjective Pt agreeable to mobilize  Bed Mobility   Supine to Sit 2  Maximal assistance   Additional items Assist x 1; Increased time required;Verbal cues;LE management   Sit to Supine Unable to assess   Additional Comments Pt greeted in supine  Transfers   Sit to Stand 3  Moderate assistance   Additional items Assist x 2; Increased time required;Verbal cues  (elevated bed)   Stand to Sit 3  Moderate assistance   Additional items Assist x 2; Increased time required;Verbal cues   Stand pivot 3  Moderate assistance   Additional items Assist x 2; Increased time required;Verbal cues   Additional Comments RW   Ambulation/Elevation   Gait pattern R Knee Brittney; Improper Weight shift;Narrow SOULEYMANE; Excessively slow; Short stride;Decreased foot clearance   Gait Assistance 3  Moderate assist   Additional items Assist x 2; Tactile cues;Verbal cues   Assistive Device Rolling walker   Distance 4' bed>chair   Balance   Static Sitting Fair   Dynamic Sitting Fair -   Static Standing Poor   Dynamic Standing Poor -   Ambulatory Poor -  (RW)   Endurance Deficit   Endurance Deficit Yes   Endurance Deficit Description weakness, fatigue   Activity Tolerance   Activity Tolerance Patient limited by fatigue   Medical Staff Made Aware OT [place] Sands, [address]   Nurse Made Aware yes   Exercises   Neuro re-ed Static standing w/ RW x30 sec for cleaning of bottom (pt found in soiled sheets)  Assessment   Prognosis Fair   Problem List Decreased strength;Decreased endurance; Impaired balance;Decreased mobility; Decreased cognition; Impaired judgement;Decreased safety awareness; Obesity   Assessment Pt seen for PT session w/ focus on bed mobility training + t/f training  Pt w/ limited activity tolerance, able to stand for 30 sec for hygiene before transferring to chair  Cues required to increase Varun  Pt required ModAx2 to safely t/f from bed>chair  R knee lexie w/ mobility  Continue to recommend return to facility w/ PT services upon d/c  Goals   Patient Goals feel better   PT Treatment Day 1   Plan   Treatment/Interventions Functional transfer training;LE strengthening/ROM; Therapeutic exercise; Endurance training;Patient/family training;Equipment eval/education; Bed mobility;Gait training; Compensatory technique education;Spoke to nursing;OT  (balance training)   Progress Progressing toward goals   PT Frequency 2-3x/wk   Recommendation   PT Discharge Recommendation Return to facility with rehabilitation services   [address]riadna Jar[address]   Turning in Bed Without Bedrails 2   Lying on Back to Sitting on Edge of Flat Bed 2   Moving Bed to Chair 2   Standing Up From Chair 2   Walk in Room 1   Climb 3-5 Stairs 1   Basic Mobility Inpatient Raw Score 10   Highest Level Of Mobility   -[facility] Goal 4: Move to chair/commode   Education   Education Provided Mobility training;Assistive device   Patient Demonstrates acceptance/verbal understanding;Reinforcement needed   End of Consult   Patient Position at End of Consult Bedside chair;Bed/Chair alarm activated; All needs within reach  (on waffle cushion, all lines in tact)     Isabelle Arias, PT, DPT

## 2022-06-07 NOTE — QUICK NOTE
QUICK NOTE - Deterioration Index  Tanner Rivera 82 y o  male MRN: [id number]  Unit/Bed#: CW2 212-01 Encounter: [id number]    Date Paged: 22  Time Paged: 0014  Room #:   Arrival Time: 0020  Deterioration index score at time of page: 64 1  %  Need to escalate level of care: no     PROBLEMS resulting in high DI score:   21 Age 82years old   17% Supplemental oxygen Nasal cannula   12% Pulse oximetry 84 %   11% Pulse 121   10% Renato coma scale 14   9% Systolic 936   6% Cardiac rhythm Sinus tachycardia          PLAN:     Patient sleeping saturating 94%  Nurse is about to give PO cardizem and lopressor    Please contact critical care via [employer] with any questions or concerns       Vitals:   Vitals:    22 0630 22 0849 22 1613 22 2355   BP: 163/96 (!) 174/97 144/82 (!) 184/107   BP Location: Right arm      Pulse: 95 80 67 (!) 121   Resp: 20   18   Temp: 97 9 °F (36 6 °C)  98 °F (36 7 °C)    TempSrc: Oral      SpO2: 96% 96% 97% (!) 84%   Weight:       Height:           Respiratory:  SpO2: SpO2: (!) 84 %, SpO2 Activity: SpO2 Activity: At Rest, SpO2 Device: O2 Device: Nasal cannula  Nasal Cannula O2 Flow Rate (L/min): 3 L/min    Temperature: Temp (24hrs), Av 9 °F (36 6 °C), Min:97 8 °F (36 6 °C), Max:98 °F (36 7 °C)  Current: Temperature: 98 °F (36 7 °C)    Labs:   Results from last 7 days   Lab Units 22  0622  0442 22  1522   WBC Thousand/uL 13 85* 19 96* 32 34*   HEMOGLOBIN g/dL 11 1* 10 6* 12 0   HEMATOCRIT % 34 8* 33 8* 37 7   PLATELETS Thousands/uL 275 279 319   NEUTROS PCT %  --  90*  --    MONOS PCT %  --  4  --    MONO PCT %  --   --  5     Results from last 7 days   Lab Units 22  0639 222 06/04/22  1522   SODIUM mmol/L 143 139 138   POTASSIUM mmol/L 3 6 3 7 3 7   CHLORIDE mmol/L 106 104 100   CO2 mmol/L 31 32 31   BUN mg/dL 19 21 19   CREATININE mg/dL 1 02 1 15 1 43*   CALCIUM mg/dL 8 7 8 8 8 9   ALK PHOS U/L 756* 816* 1,062*   ALT U/L 181* 264* 423*   AST U/L 68* 190* 473*     Results from last 7 days   Lab Units 06/06/22  0639 06/05/22  0442   MAGNESIUM mg/dL 2 2 2 1     Results from last 7 days   Lab Units 06/04/22 2050 06/04/22  1522   LACTIC ACID mmol/L 1 8 2 4*         Results from last 7 days   Lab Units 06/04/22  1522   PROCALCITONIN ng/ml 9 85*       Code Status: Level 1 - Full Code

## 2022-06-07 NOTE — CASE MANAGEMENT
Case Management Discharge Planning Note    Patient name Heidi Hood  Location CW2 829/LG8 212-01 MRN [id number]  : 1935 Date 2022       Current Admission Date: 2022  Current Admission Diagnosis:Cholangitis   Patient Active Problem List    Diagnosis Date Noted    Cholangitis 2022    Full code status 2021    Frailty syndrome in geriatric patient 2021    Leukocytosis 2021    Constipation 2021    Vascular dementia ([facility] ) 2021    Depression 03/15/2021    Right acetabular fracture ([facility] ) 2021    Debility 2021    Medication management 2021    Goals of care, counseling/discussion 2021    Anemia 2021    History of 2019 novel coronavirus disease (COVID-19) 2020    Weight gain 2020    Abnormal EKG 2020    Chronic heart failure with preserved ejection fraction ([facility] ) 2020    Ambulatory dysfunction 2020    Dysphagia 2020    Atrial fibrillation ([facility] ) 2020    Hypertension 2020    Hyperlipidemia 2020    Type 2 diabetes mellitus ([facility] ) 2020    Vertebral artery dissection ([facility] ) 2020      LOS (days): 3  Geometric Mean LOS (GMLOS) (days):   Days to GMLOS:     OBJECTIVE:  Risk of Unplanned Readmission Score: 16 25         Current admission status: Inpatient   Preferred Pharmacy:   [address] [address]  Phone: [phone] Fax: [phone]    Lindsey Vallejo [address]  [address] [facility] [address]  Phone: [phone] Fax: [phone]    UNKNOWN - FOLLOW UP PRIOR TO DISCHARGE TO E-PRESCRIBE  No address on file      [address] [place],[address] [place] [address] - [address] 38 210 [facility]  Phone: [phone] Fax: [phone]    Primary Care Provider: Scar Waters MD    Primary Insurance: [facility] REP  Secondary Insurance: [facility]    DISCHARGE DETAILS:    Discharge planning discussed with[de-identified] Patient's wife Laura  Freedom of Choice: Yes     CM contacted family/caregiver?: Yes  Were Treatment Team discharge recommendations reviewed with patient/caregiver?: Yes  Did patient/caregiver verbalize understanding of patient care needs?: Yes  Were patient/caregiver advised of the risks associated with not following Treatment Team discharge recommendations?: Yes                   Other Referral/Resources/Interventions Provided:  Interventions: Facility Return                  Dispatcher Contacted: Yes  Number/Name of Dispatcher: MICHELL/[phone]  Transported by [employer] and Unit #): [facility]  ETA of Transport (Date): 06/07/22  ETA of Transport (Time): 1300                       Additional Comments: Patient cleared for discharge back to [facility]  Transport arranged via [facility] BLS at 1300, patient's wife, facility notified  ADDENDUM:  [facility] is now requesting that insurance authorization be pursued prior to patient return  Authorization requested, BLS transport cancelled  Nursing, patient, patient's wife, facility aware  CM to follow

## 2022-06-07 NOTE — PROGRESS NOTES
Progress Note - General Surgery   Debbie Navarro 82 y o  male MRN: [id number]  Unit/Bed#: CW2 212-01 Encounter: [id number]    Assessment:  82yoM p/w acute cholangitis now s/p ERCP w sphincteroplasty and stent placement by GI on 6/5    Systolics 854J, tachy to 647C, afebrile, 2 L NC  Labs pending  Blood cultures [id number]    Plan:  - CLD  - morning CMP/CBC  - GI on board, appreciate recs, plan for repeat ERCP in 6-8weeks  - rocephin/flagyl  - endocrinology on board for BG control, appreciate recs  - will restart Eliquis today 6/7  - SQH  - pain control  - encourage ambulation  - PT: return to facility w rehab services    Subjective/Objective   Subjective:   Patient denies abdominal pain this morning, tolerating clears without nausea emesis, continues have bowel function, voiding without issues  Objective:     Blood pressure 144/82, pulse 67, temperature 98 °F (36 7 °C), resp  rate 20, height 5' 8" (1 727 m), weight 94 3 kg (207 lb 14 3 oz), SpO2 97 %  ,Body mass index is 31 61 kg/m²  Intake/Output Summary (Last 24 hours) at 6/6/2022 2319  Last data filed at 6/6/2022 0849  Gross per 24 hour   Intake 120 ml   Output 652 ml   Net -532 ml       Invasive Devices  Report    Peripheral Intravenous Line  Duration           Peripheral IV 06/04/22 Right Antecubital 2 days    Peripheral IV 06/05/22 Left Antecubital 1 day                Physical Exam:   General: NAD  Skin: Warm, dry, anicteric  HEENT: Normocephalic, atraumatic  CV: RRR, no m/r/g  Pulm: CTA b/l, no inc WOB, 2L NC  Abd: Soft, ND/NT  MSK: Symmetric, no edema, no tenderness, no deformity  Neuro: AOx3, GCS 15    Lab, Imaging and other studies:  I have personally reviewed pertinent lab results    , CBC:   Lab Results   Component Value Date    WBC 13 85 (H) 06/06/2022    HGB 11 1 (L) 06/06/2022    HCT 34 8 (L) 06/06/2022    MCV 78 (L) 06/06/2022     06/06/2022    MCH 24 8 (L) 06/06/2022    MCHC 31 9 06/06/2022    RDW 20 2 (H) 06/06/2022    MPV 10 5 06/06/2022   , CMP:   Lab Results   Component Value Date    SODIUM 143 06/06/2022    K 3 6 06/06/2022     06/06/2022    CO2 31 06/06/2022    BUN 19 06/06/2022    CREATININE 1 02 06/06/2022    CALCIUM 8 7 06/06/2022    AST 68 (H) 06/06/2022     (H) 06/06/2022    ALKPHOS 756 (H) 06/06/2022    EGFR 66 06/06/2022     VTE Pharmacologic Prophylaxis: Sequential compression device (Venodyne)  and Heparin  VTE Mechanical Prophylaxis: sequential compression device

## 2022-06-07 NOTE — PLAN OF CARE
Problem: PHYSICAL THERAPY ADULT  Goal: Performs mobility at highest level of function for planned discharge setting  See evaluation for individualized goals  Description: Treatment/Interventions: Functional transfer training, LE strengthening/ROM, Therapeutic exercise, Endurance training, Patient/family training, Equipment eval/education, Bed mobility, Gait training, Compensatory technique education, Spoke to nursing  Equipment Recommended:  (pt already owns W/C + RW)       See flowsheet documentation for full assessment, interventions and recommendations  Outcome: Not Progressing  Note: Prognosis: Fair  Problem List: Decreased strength, Decreased endurance, Impaired balance, Decreased mobility, Decreased cognition, Impaired judgement, Decreased safety awareness, Obesity  Assessment: Pt seen for PT session w/ focus on bed mobility training + t/f training  Pt w/ limited activity tolerance, able to stand for 30 sec for hygiene before transferring to chair  Cues required to increase Varun  Pt required ModAx2 to safely t/f from bed>chair  R knee lexie w/ mobility  Continue to recommend return to facility w/ PT services upon d/c            PT Discharge Recommendation: Return to facility with rehabilitation services          See flowsheet documentation for full assessment

## 2022-06-07 NOTE — DISCHARGE SUMMARY
Discharge Summary - Debbie Navarro 82 y o  male MRN: [id number]    Unit/Bed#: CW2 212-01 Encounter: [id number]    Admission Date:   Admission Orders (From admission, onward)     Ordered        06/04/22 1727  Inpatient Admission  Once                        Admitting Diagnosis: Cholangitis [K83 09]  Ascending cholangitis [K83 09]  Nausea [R11 0]  Dementia ([facility] ) [F03 90]  Fever [R50 9]  Vascular dementia without behavioral disturbance ([facility] ) [F01 50]    HPI: per resident, JANAE Magaña:  "Debbie Navarro is a 82 y o  male PMHx of vascular dementia, COPD, HTN, HLD, CAD, CHF, and DM who presents with lethargy and vomiting from [facility]  Per report he seemed more lethargic today and had one episode of vomiting  EMS was called and per report patient had an additional episode of bilious emesis  On our evaluation patient is very confused, but denies any pain "    Procedures Performed:   Orders Placed This Encounter   Procedures   [address] Course:     Significant Findings, Care, Treatment and Services Provided: ERCP Fluoro    Result Date: 6/5/2022  Impression: ERCP with sphincteroplasty and placement of common bile duct stent  RECOMMENDATION: Follow up LFTs Continue antibiotics Hold Eliquis 2 additional days Repeat ERCP in 6 weeks for removal of stent, sphincterotomy and extraction of remaining choledocholithiasis Henry Harris MD     CT abdomen pelvis wo contrast    Result Date: 6/4/2022  Impression: 1  Distended gallbladder but without significant pericholecystic inflammatory change  2  Otherwise no acute intra-abdominal pathology  Workstation performed: [id number]     XR chest 1 view portable    Result Date: 6/5/2022  Impression: No acute cardiopulmonary disease  Workstation performed: [id number]     FL ERCP biliary only    Result Date: 6/6/2022  Impression: Fluoroscopic guidance for ERCP  Please see procedure report for full details    Workstation performed: [id number]      right upper quadrant    Result Date: 6/4/2022  Impression: Distended, thick-walled gallbladder similar to prior study, concerning for acute cholecystitis  Sonographic Meredith's sign potentially not elicited due to patient condition  The study was marked in EPIC for significant notification  Workstation performed: [id number]         Complications: none    Discharge Diagnosis: Acute cholangitis  S/P Stent placement    Medical Problems             Resolved Problems  Date Reviewed: 3/30/2022   None                 Condition at Discharge: stable         Discharge instructions/Information to patient and family:   See after visit summary for information provided to patient and family  Provisions for Follow-Up Care:  See after visit summary for information related to follow-up care and any pertinent home health orders  PCP: Kasi Ordaz MD    Disposition: Facility    Planned Readmission: No      Discharge Statement   I spent 30 minutes discharging the patient  This time was spent on the day of discharge  I had direct contact with the patient on the day of discharge  Additional documentation is required if more than 30 minutes were spent on discharge  Discharge Medications:  See after visit summary for reconciled discharge medications provided to patient and family

## 2022-06-07 NOTE — PROGRESS NOTES
Progress Note - Sydney Puentes 82 y o  male MRN: [id number]    Unit/Bed#: CW2 212-01 Encounter: [id number]      CC: diabetes f/u    Subjective:   Sydney Puentes is a 82y o  year old male with type 2 diabetes  Feels well  No complaints  No hypoglycemia  He continues on IV insulin  Objective:     Vitals: Blood pressure 162/83, pulse 70, temperature 98 °F (36 7 °C), temperature source Axillary, resp  rate 18, height 5' 8" (1 727 m), weight 94 7 kg (208 lb 12 4 oz), SpO2 94 %  ,Body mass index is 31 74 kg/m²  Intake/Output Summary (Last 24 hours) at 6/7/2022 1537  Last data filed at 6/7/2022 0843  Gross per 24 hour   Intake 670 ml   Output 1174 ml   Net -504 ml       Physical Exam:  General Appearance: awake, appears stated age and cooperative  Head: Normocephalic, without obvious abnormality, atraumatic  Extremities: moves all extremities  Skin: Skin color and temperature normal    Pulm: no labored breathing    Lab, Imaging and other studies: I have personally reviewed pertinent reports  POC Glucose (mg/dl)   Date Value   06/07/2022 220 (H)   06/07/2022 97   06/07/2022 229 (H)   06/07/2022 171 (H)   06/07/2022 111   06/07/2022 116   06/07/2022 136   06/06/2022 127   06/06/2022 89   06/06/2022 130       Assessment:  diabetes with hyperglycemia    Plan:  1  Type 2 diabetes with hyperglycemia-transition to subcutaneous insulin with Lantus 35 units at bedtime and Humalog 12 units with meals  Discontinue IV insulin one after Lantus is given  Continue to monitor blood sugar over time and make adjustments to the regimen if necessary  Portions of the record may have been created with voice recognition software

## 2022-06-07 NOTE — SPEECH THERAPY NOTE
Speech Language/Pathology  Speech/Language Pathology  Assessment    Patient Name: Javier Ledbetter  Today's Date: 6/7/2022     Problem List  Principal Problem:    Cholangitis  Active Problems:    Atrial fibrillation ([place] [address] )    Hypertension    Hyperlipidemia    Type 2 diabetes mellitus ([place] [address] )    Chronic heart failure with preserved ejection fraction [facility])    Past Medical History  Past Medical History:   Diagnosis Date    A-fib ([place] [address] )     Cancer ([place] [address] )     Cardiac disease     CHF (congestive heart failure) (HCC)     Chronic obstructive pulmonary disease (COPD) ([place] [address] )     COPD (chronic obstructive pulmonary disease) (HCC)     Coronary artery disease     Counseling regarding advance care planning and goals of care 10/7/2020    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[place] [address] )     TYPE 2    Dysphagia     Fracture of nasal bone     Gait instability     H/O cervical fracture     Hyperlipidemia     Hypertension     Muscle weakness     TBI (traumatic brain injury) ([place] [address] )      Past Surgical History  Past Surgical History:   Procedure Laterality Date    HAND SURGERY      RI ARTHRODESIS POSTERIOR CRANIOCERVICAL N/A 10/26/2020    Procedure: Occipital cervical fusion to C4;  Surgeon: Rubén Guy MD;  [facility];  Service: Neurosurgery        Bedside Swallow Evaluation:    Summary:  Pt presents w/ mild oral, suspected mild pharyngeal dysphagia  Pt w/ weak mastication w/ hard solids, noted to be effortful w/ prolonged bolus formation  Control, and transfer WFL  No oral residue noted  Pt w/ suspected fairly prompt swallow initiation w/ WFL laryngeal rise to palpation  Pt w/ cough x2 w/ thin  No cough noted w/ trials of NT  Pt educated on s/s of aspiration and rationale for recommended diet  Pt agreeable to diet and requested to go back to sleep         Recommendations:  Diet: Dysphagia 3  Liquid: NT  Meds: Whole w/ liquid, as tolerated  Supervision: Intermittent  Positioning:Upright  Strategies: Pt to take PO/Meds only when fully alert and upright  Oral care: 3-4x daily  Aspiration precautions  Reflux precautions    Therapy Prognosis: Fair  Prognosis considerations: age, current medical, past medical, cognitive status  Frequency: As able and appropriate     H&P/Admit info/ pertinent provider notes: (PMH noted above)  Chief Complaint   Patient presents with    Vomiting       Patient presents with 2 episodes of vomiting today and increase in lethargy  No other complains at this time          Initial Presentation: 82 y o  male who presented by EMS to [facility] ED  Inpatient admission for evaluation and treatment of ascending cholangitis  PMHx: afib (on Eliquis), cancer, CHF, COPD, CAD, DM, HLD, HTN, TBI, vascular dementia  Presented w/ lethargy and vomiting from SNF  Episode of bilious emesis  On exam, ill-appearing, tachycardic, alert to self only  Febrile to 102  CTAP distended gallbladder w/o significant cholecystic fluid  Plan IVF, Trend labs, replete electrolytes as needed; IV ABX, RUQ US, abdominal exams  GI consulted      GI Consult: Pt elevated transaminases w/ bilirubin of 2 56  WBC elevated  Concern for ascending cholangitis  Plan: urgent ERCP w/ stent placement to decompress biliary system, NPO  Special Studies:  CT abdomen 6/4:   1  Distended gallbladder but without significant pericholecystic inflammatory change  2  Otherwise no acute intra-abdominal pathology  CXR 6/4: No acute cardiopulmonary disease  Previous VBS: No      Goal(s):  Pt will tolerate least restrictive diet w/out s/s aspiration or oral/pharyngeal difficulties  Patient's goal: "Get me out of this hole"     Did the pt report pain? No  If yes, was nursing notified/was it addressed?  N/A    Reason for consult:  R/o aspiration  Determine safest and least restrictive diet  Failed nursing dysphagia assessment  respiratory compromise  Nursing reported cough w/ PO  poor intake  h/o dysphagia Precautions:  Fall  Delirium    Food allergies: None    Current diet: Clear liquids, can have coffee     Premorbid diet::Regular w/ thin    O2 requirement: Nasal cannula     Voice/Speech: Clear    Social: half-way    Follows commands: Yes                          Cognitive Status: Awake and alert    Oral [facility] exam:  Dentition: Natural and adequate  Labial strength and ROM: WFL  Lingual strength and ROM: WFL  Mandibular strength and ROM: weak, WFL ROM  Velum: symmetrical  Secretion management: WFL    Items administered:  Puree, soft solid, hard solid, nectar thick liquid, thin liquids  Liquids were taken by straw/cup      Oral stage:  Lip closure: WFL  Mastication: weak w/ hard solids  Bolus formation: prolonged   Bolus control: WFL  Transfer: WFL  Oral residue: no  Pocketing: no    Pharyngeal stage:  Swallow promptness: Suspected fairly prompt  Laryngeal rise: WFL to palpation  Wet voice: no  Throat clear: no  Cough: yes w/ thin  Secondary swallows: no  Audible swallows: no    Esophageal stage:  No s/s reported    Aspiration precautions posted    Results d/w:  Pt, nursing, physician

## 2022-06-07 NOTE — CASE MANAGEMENT
Case Management Discharge Planning Note    Patient name Debbie Navarro  Location 2 060/RC5 212-01 MRN [id number]  : 1935 Date 2022       Current Admission Date: 2022  Current Admission Diagnosis:Cholangitis   Patient Active Problem List    Diagnosis Date Noted    Cholangitis 2022    Full code status 2021    Frailty syndrome in geriatric patient 2021    Leukocytosis 2021    Constipation 2021    Vascular dementia [facility] ) 2021    Depression 03/15/2021    Right acetabular fracture ([facility] ) 2021    Debility 2021    Medication management 2021    Goals of care, counseling/discussion 2021    Anemia 2021    History of 2019 novel coronavirus disease (COVID-19) 2020    Weight gain 2020    Abnormal EKG 2020    Chronic heart failure with preserved ejection fraction ([facility] ) 2020    Ambulatory dysfunction 2020    Dysphagia 2020    Atrial fibrillation ([facility] ) 2020    Hypertension 2020    Hyperlipidemia 2020    [facility] 2020    Vertebral artery dissection ([facility] ) 2020      LOS (days): 3  Geometric Mean LOS (GMLOS) (days): 4 80  Days to GMLOS:1 9     OBJECTIVE:  Risk of Unplanned Readmission Score: 16 29         Current admission status: Inpatient   Preferred Pharmacy:   [facility], [address] [place] [address]  [address] [address]  Phone: [phone] Fax: Kelly Morel [address] - [address] [facility]  [address] [facility] [address]  Phone: [phone] Fax: [phone]    UNKNOWN - FOLLOW UP PRIOR TO DISCHARGE TO E-PRESCRIBE  No address on file      [address] [place], [place] - [address] [facility] [address] [facility]  Phone: [phone] Fax: [phone]    Primary Care Provider: Dave Love MD    Primary Insurance: [facility] REP  Secondary Insurance: [address] Number: Submitted SNF auth request to [facility] via , pending ref# [id number] for [facility] NPI# [id number] Dr Natanael Beauchamp [id number]   [employer] faxed

## 2022-06-07 NOTE — OCCUPATIONAL THERAPY NOTE
Occupational Therapy Evaluation     Patient Name: Sydney Puentes  Today's Date: 6/7/2022  Problem List  Principal Problem:    Cholangitis  Active Problems:    Atrial fibrillation ([place] [address] )    Hypertension    Hyperlipidemia    Type 2 diabetes mellitus ([place] [address] )    Chronic heart failure with preserved ejection fraction [facility])    Past Medical History  Past Medical History:   Diagnosis Date    A-fib ([place] [address] )     Cancer ([place] [address] )     Cardiac disease     CHF (congestive heart failure) ([facility])     Chronic obstructive pulmonary disease (COPD) ([place] [address] )     COPD (chronic obstructive pulmonary disease) ([place] [address] )     Coronary artery disease     Counseling regarding advance care planning and goals of care 10/7/2020    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[place] [address] )     TYPE 2    Dysphagia     Fracture of nasal bone     Gait instability     H/O cervical fracture     Hyperlipidemia     Hypertension     Muscle weakness     TBI (traumatic brain injury) ([place] [address] )      Past Surgical History  Past Surgical History:   Procedure Laterality Date    HAND SURGERY      LA ARTHRODESIS POSTERIOR CRANIOCERVICAL N/A 10/26/2020    Procedure: Occipital cervical fusion to C4;  Surgeon: Jocelyne Redd MD;  [facility];  Service: Neurosurgery         06/07/22 1120   OT Last Visit   OT Visit Date 06/07/22   Note Type   Note type Evaluation   Restrictions/Precautions   Weight Bearing Precautions Per Order No   Other Precautions Cognitive; Chair Alarm; Bed Alarm; Fall Risk   Pain Assessment   Pain Assessment Tool 0-10   Pain Score No Pain   Home Living   Type of Home SNF  (HFM)   Home Layout One level   Prior Function   Level of [place] Needs assistance with IADLs; Needs assistance with ADLs and functional mobility   Lives With Facility staff   Receives Help From Personal care attendant   ADL Assistance Needs assistance   IADLs Needs assistance   Falls in the last 6 months   (unknown)   Vocational Retired   Lifestyle   Autonomy receives assist with adls, able to ambulate short distances with RW and assist x 1 otherwise uses w/c - meals/homemaking provided   Reciprocal Relationships supportive family and facility staff   Service to Others retired   Intrinsic Gratification sedentary   Subjective   Subjective "There you are, did you get  yet?"   ADL   Eating Assistance 5  Supervision/Setup   Grooming Assistance 4  Minimal Assistance   UB [address] 3  Moderate Assistance   LB [address] 2  Maximal Assistance   [address] 3  Moderate Assistance   LB Dressing Assistance 2  Maximal [address]  2  Maximal Assistance   Bed Mobility   Supine to Sit 2  Maximal assistance   Transfers   Sit to Stand 3  Moderate assistance   Additional items Assist x 2   Stand to Sit 3  Moderate assistance   Additional items Assist x 2   Stand pivot 3  Moderate assistance   Additional items Assist x 2   Functional Mobility   Functional Mobility 3  Moderate assistance   Additional Comments x2   Additional items Rolling walker   Balance   Static Sitting Fair   Dynamic Sitting Fair -   Static Standing Poor +   Dynamic Standing Poor   Ambulatory Poor   Activity Tolerance   Activity Tolerance Patient limited by fatigue;Patient limited by pain   Medical Staff Made Aware ALEXSANDER Lira   RUE Assessment   RUE Assessment WFL   LUE Assessment   LUE Assessment WFL   Cognition   Arousal/Participation Arousable; Cooperative   Attention Attends with cues to redirect   Orientation Level Oriented to person   Memory Decreased recall of recent events;Decreased short term memory;Decreased recall of precautions   Following Commands Follows one step commands with increased time or repetition   Assessment   Limitation Decreased ADL status; Decreased cognition;Decreased endurance;Decreased self-care trans   Prognosis Fair   Assessment Pt is a 82 y o  male who was admitted to [facility] on 6/4/2022 with Cholangitis    Pt's problem list also includes PMH of DM, HTN, previous surgery, dementia and afib, hld, vertebral artery dissection, dysphagia, ambulatory dysfunction, CHF, R acetabular fx, depression  At baseline pt was completing adls with assist from facility staff- ambulates short distances with RW and AX1 - meals/homemaking provided  Pt [facility] at [facility]  Currently pt requires max assist for overall ADLS and mod a x 2  for functional mobility/transfers  Pt currently presents with impairments in the following categories -difficulty performing ADLS, decreased initiation and engagement  and health management  activity tolerance, endurance, standing balance/tolerance, memory, safety , judgement , attention  and sequencing   These impairments, as well as pt's fatigue and risk for falls  limit pt's ability to safely engage in all baseline areas of occupation, includingbathing, dressing, toileting, functional mobility/transfers, social participation  and leisure activities however pt has adequate support available at facility -  From OT standpoint, recommend return to facility with ongoing OT intervention upon return PRN   No immediate OT needs indicated at this time- d/c from caseload   Goals   Patient Goals none stated at this time 2* cognitive limitations   Plan   OT Frequency Eval only   Recommendation   OT Discharge Recommendation Return to facility with rehabilitation services   OT - OK to Discharge Yes   AM-PAC Daily Activity Inpatient   Lower Body Dressing 2   Bathing 2   Toileting 2   Upper Body Dressing 2   Grooming 3   Eating 4   Daily Activity Raw Score 15   Daily Activity Standardized Score (Calc for Raw Score >=11) 34 69   AM-PAC Applied Cognition Inpatient   Following a Speech/Presentation 2   Understanding Ordinary Conversation 3   Taking Medications 2   Remembering Where Things Are Placed or Put Away 2   Remembering List of 4-5 Errands 2   Taking Care of Complicated Tasks 2   Applied Cognition Raw Score 13   Applied Cognition Standardized Score 30 46     [facility], OT

## 2022-06-08 ENCOUNTER — TELEPHONE (OUTPATIENT)
Dept: GASTROENTEROLOGY | Facility: CLINIC | Age: 87
End: 2022-06-08

## 2022-06-08 NOTE — PROGRESS NOTES
Progress Note - General Surgery   Ricky Freeman 82 y o  male MRN: [id number]  Unit/Bed#: CW2 212-01 Encounter: [id number]    Assessment:  82yoM p/w acute cholangitis now s/p ERCP w sphincteroplasty and stent placement by GI on 6/5     Systolics 513Z, afebrile  Blood cultures [id number]    UOP/stool unrecorded    Plan:  - dysphagia 3 diet with nectar thin liquids per speech recommendations  - GI on board, appreciate recs, plan for repeat ERCP in 6-8weeks  - rocephin/flagyl  - endocrinology on board for BG control, appreciate recs  - Eliquis  - pain control  - encourage ambulation  - PT: return to facility w rehab services, anticipate discharge today 6/8    Subjective/Objective   Subjective:   Patient reporting he had an upset stomach yesterday but feels better this, tolerating dysphagia 3 diet with nectar thin liquids, per speech recommendations, no nausea or emesis, continues to have bowel function  Ambulating with PT     Objective:     Blood pressure (!) 187/91, pulse 68, temperature 98 °F (36 7 °C), temperature source Axillary, resp  rate 18, height 5' 8" (1 727 m), weight 94 7 kg (208 lb 12 4 oz), SpO2 99 %  ,Body mass index is 31 74 kg/m²  Intake/Output Summary (Last 24 hours) at 6/8/2022 0610  Last data filed at 6/7/2022 8001  Gross per 24 hour   Intake 180 ml   Output --   Net 180 ml       Invasive Devices  Report    Peripheral Intravenous Line  Duration           Peripheral IV 06/04/22 Distal;Right;Upper;Ventral (anterior) Arm 4 days    Peripheral IV 06/05/22 Left Antecubital 3 days                Physical Exam:   General: NAD  Skin: Warm, dry, anicteric  HEENT: Normocephalic, atraumatic  CV: RRR, no m/r/g  Pulm: CTA b/l, no inc WOB  Abd: Soft, ND/NT  MSK: Symmetric, no edema, no tenderness, no deformity    Lab, Imaging and other studies:I have personally reviewed pertinent lab results    , CBC: No results found for: WBC, HGB, HCT, MCV, PLT, ADJUSTEDWBC, MCH, MCHC, RDW, MPV, NRBC, CMP: No results found for: SODIUM, K, CL, CO2, ANIONGAP, BUN, CREATININE, GLUCOSE, CALCIUM, AST, ALT, ALKPHOS, PROT, BILITOT, EGFR  VTE Pharmacologic Prophylaxis: Sequential compression device (Venodyne)  Eliquis  VTE Mechanical Prophylaxis: sequential compression device

## 2022-06-08 NOTE — TELEPHONE ENCOUNTER
----- Message from Karthikeyan Ace MD sent at 6/7/2022  1:06 PM EDT -----  Patient to be discharged today  He will need LFTs done in 2 weeks  Ordered the lab  Please have patient get them done

## 2022-06-08 NOTE — PROGRESS NOTES
Progress Note - Eddie Dunn 82 y o  male MRN: [id number]    Unit/Bed#: CW2 212-01 Encounter: [id number]      CC: diabetes f/u    Subjective:   Eddie Dunn is a 82y o  year old male with type 2 diabetes  Feels well  No complaints  No hypoglycemia  Objective:     Vitals: Blood pressure 162/84, pulse 72, temperature 98 °F (36 7 °C), temperature source Axillary, resp  rate 18, height 5' 8" (1 727 m), weight 94 7 kg (208 lb 12 4 oz), SpO2 95 %  ,Body mass index is 31 74 kg/m²  Intake/Output Summary (Last 24 hours) at 6/8/2022 1557  Last data filed at 6/8/2022 0900  Gross per 24 hour   Intake 180 ml   Output --   Net 180 ml       Physical Exam:  General Appearance: awake, appears stated age and cooperative  Head: Normocephalic, without obvious abnormality, atraumatic  Extremities: moves all extremities  Skin: Skin color and temperature normal    Pulm: no labored breathing    Lab, Imaging and other studies: I have personally reviewed pertinent reports  POC Glucose (mg/dl)   Date Value   06/08/2022 235 (H)   06/08/2022 206 (H)   06/07/2022 105   06/07/2022 171 (H)   06/07/2022 209 (H)   06/07/2022 277 (H)   06/07/2022 218 (H)   06/07/2022 220 (H)   06/07/2022 97   06/07/2022 229 (H)       Assessment:  diabetes with hyperglycemia    Plan:  1  Diabetes with hyperglycemia-increase Lantus to 40 units  Continue to monitor blood sugar over time and make adjustments to the regimen if necessary  Portions of the record may have been created with voice recognition software

## 2022-06-08 NOTE — SPEECH THERAPY NOTE
Speech Language/Pathology    Speech/Language Pathology Progress Note    Patient Name: Javier Ledbetter  [facility]'M Date: 6/8/2022     Problem List  Principal Problem:    Cholangitis  Active Problems:    Atrial fibrillation ([place] [address] )    Hypertension    Hyperlipidemia    Type 2 diabetes mellitus (HCC)    Chronic heart failure with preserved ejection fraction [facility])       Past Medical History  Past Medical History:   Diagnosis Date    A-fib ([place] [address] )     Cancer ([place] [address] )     Cardiac disease     [facility]     Chronic obstructive pulmonary disease (COPD) ([place] [address] )     COPD (chronic obstructive pulmonary disease) ([place] [address] )     Coronary artery disease     Counseling regarding advance care planning and goals of care 10/7/2020    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[place] [address] )     TYPE 2    Dysphagia     Fracture of nasal bone     Gait instability     H/O cervical fracture     Hyperlipidemia     Hypertension     Muscle weakness     TBI (traumatic brain injury) ([place] [address] )         Past Surgical History  Past Surgical History:   Procedure Laterality Date    HAND SURGERY      OR ARTHRODESIS POSTERIOR CRANIOCERVICAL N/A 10/26/2020    Procedure: Occipital cervical fusion to C4;  Surgeon: Rubén Guy MD;  Location:  MAIN OR;  Service: Neurosurgery         Subjective:  Pt alert and cooperative, seen sitting up in bed  Pt c/o stomach pain  Current diet: Level 3 and NTL (baseline regular cut up w/ thin)    Objective:  Pt seen for f/u dysphagia tx  Attempted trial of toast w/ prolonged, disorganized mastication, pocketing in the R-side, delayed swallow initiation and reduced laryngeal rise upon palpitation  Coughing across all trials of thin liquid via straw and cup sip  Facial grimacing present before and during trials of PO- noted pulling of the jaw as well  Attempted to cue pt to hold material & control but unable to follow well  Suspected loss prior to swallows given all the overt facial movements      Assessment:  Pt w/ ongoing s/s w/ thin- unable to follow commands well for strategy use  Plan/Recommendations:  Set to d/c today to facility-will reach out to SLP about current status  May need VBS in the future for objective assessment  No pna on recent CXR, no infiltrates but grossly symptomatic  Continue this diet til he can be seen at admitting facility

## 2022-06-08 NOTE — CASE MANAGEMENT
Case Management Discharge Planning Note    Patient name Kelli Ingram  Location CW2 488/PJ2 212-01 MRN [id number]  : 1935 Date 2022       Current Admission Date: 2022  Current Admission Diagnosis:Cholangitis   Patient Active Problem List    Diagnosis Date Noted    Cholangitis 2022    Full code status 2021    Frailty syndrome in geriatric patient 2021    Leukocytosis 2021    Constipation 2021    Vascular dementia ([facility] ) 2021    Depression 03/15/2021    Right acetabular fracture ([facility] ) 2021    Debility 2021    Medication management 2021    Goals of care, counseling/discussion 2021    Anemia 2021    History of 2019 novel coronavirus disease (COVID-19) 2020    Weight gain 2020    Abnormal EKG 2020    Chronic heart failure with preserved ejection fraction ([facility] ) 2020    Ambulatory dysfunction 2020    Dysphagia 2020    Atrial fibrillation ([facility] ) 2020    Hypertension 2020    Hyperlipidemia 2020    Type 2 diabetes mellitus ([facility] ) 2020    Vertebral artery dissection ([facility] ) 2020      LOS (days): 4  Geometric Mean LOS (GMLOS) (days): 4 80  Days to GMLOS:1     OBJECTIVE:  Risk of Unplanned Readmission Score: 15 59         Current admission status: Inpatient   Preferred Pharmacy:   [address] [address]  Phone: [phone] Fax: Kelly Morel [address]  [address] [facility] [address]  Phone: [phone] Fax: [phone]    UNKNOWN - FOLLOW UP PRIOR TO DISCHARGE TO E-PRESCRIBE  No address on file      [address] [place],[address] [place] [address] - [facility] [place] [address] KAYY Yunior Gonzales 38 210 [facility]  Phone: [phone] Fax: [phone]    Primary Care Provider: Bertha Davila MD    Primary Insurance: [facility] REP  Secondary Insurance: [address] DETAILS:        Transported by [employer] and Unit #): [place] [facility]  ETA of Transport (Date): 06/08/22  ETA of Transport (Time): 1530              IMM Given (Date):: 06/08/22  IMM Given to[de-identified] Family (reviewed with wife via phone)  Family notified[de-identified] wife Alana Cuencajaniya  Additional Comments: Patient cleared for discharge back to [facility], transport arranged via [address] at   Wife, facility notified  Discussed with HFM that request for insurance authorization has been submitted, although not received yet, facility agreed that they would take the patient back pending authorization  WDL

## 2022-06-08 NOTE — NURSING NOTE
Pt cleared for discharge to [facility]  D/C instructions reviewed with and faxed to receiving facility  Report called to [facility]  All questions answered at this time  IV joon and Josiane removed  No belongings to send with pt   Pt transported via stretcher with team

## 2022-06-09 ENCOUNTER — NURSING HOME VISIT (OUTPATIENT)
Dept: GERIATRICS | Facility: OTHER | Age: 87
End: 2022-06-09
Payer: COMMERCIAL

## 2022-06-09 VITALS
BODY MASS INDEX: 31.47 KG/M2 | RESPIRATION RATE: 18 BRPM | WEIGHT: 207 LBS | DIASTOLIC BLOOD PRESSURE: 75 MMHG | HEART RATE: 96 BPM | TEMPERATURE: 97.5 F | SYSTOLIC BLOOD PRESSURE: 114 MMHG

## 2022-06-09 DIAGNOSIS — Z98.890 S/P ERCP: ICD-10-CM

## 2022-06-09 DIAGNOSIS — Z79.4 TYPE 2 DIABETES MELLITUS WITHOUT COMPLICATION, WITH LONG-TERM CURRENT USE OF INSULIN (HCC): ICD-10-CM

## 2022-06-09 DIAGNOSIS — F01.50 VASCULAR DEMENTIA WITHOUT BEHAVIORAL DISTURBANCE (HCC): ICD-10-CM

## 2022-06-09 DIAGNOSIS — E11.9 TYPE 2 DIABETES MELLITUS WITHOUT COMPLICATION, WITH LONG-TERM CURRENT USE OF INSULIN (HCC): ICD-10-CM

## 2022-06-09 DIAGNOSIS — K83.09 CHOLANGITIS: Primary | ICD-10-CM

## 2022-06-09 DIAGNOSIS — I10 HYPERTENSION, UNSPECIFIED TYPE: ICD-10-CM

## 2022-06-09 DIAGNOSIS — R53.81 DEBILITY: ICD-10-CM

## 2022-06-09 PROCEDURE — 99309 SBSQ NF CARE MODERATE MDM 30: CPT | Performed by: NURSE PRACTITIONER

## 2022-06-09 NOTE — CASE MANAGEMENT
Case Management Discharge Planning Note    Patient name Nirmal Puentes  Location CW2 725/QU4 212-01 MRN [id number]  : 1935 Date 2022       Current Admission Date: 2022  Current Admission Diagnosis:Cholangitis   Patient Active Problem List    Diagnosis Date Noted    Cholangitis 2022    Full code status 2021    Frailty syndrome in geriatric patient 2021    Leukocytosis 2021    Constipation 2021    Vascular dementia ([facility] ) 2021    Depression 03/15/2021    Right acetabular fracture ([facility] ) 2021    Debility 2021    Medication management 2021    Goals of care, counseling/discussion 2021    Anemia 2021    History of 2019 novel coronavirus disease (COVID-19) 2020    Weight gain 2020    Abnormal EKG 2020    Chronic heart failure with preserved ejection fraction ([facility] ) 2020    Ambulatory dysfunction 2020    Dysphagia 2020    Atrial fibrillation ([facility] ) 2020    Hypertension 2020    Hyperlipidemia 2020    [facility] 2020    Vertebral artery dissection ([facility] ) 2020      LOS (days): 4  Geometric Mean LOS (GMLOS) (days): 4 80  Days to GMLOS:0 8     OBJECTIVE:  Risk of Unplanned Readmission Score: 15 64         Current admission status: Inpatient   Preferred Pharmacy:   [address], [address] [place] [address] [address] [address]  Phone: [phone] Fax: Kelly Morel [address]  29[address] [facility] [address]  Phone: [phone] Fax: [phone]    UNKNOWN - FOLLOW UP PRIOR TO DISCHARGE TO E-PRESCRIBE  No address on file      [address] [place], [place] - [address] [facility] 210 [facility]  Phone: [phone] Fax: [phone]    Primary Care Provider: Jonathan Marie MD    Primary Insurance: [facility] REP  Secondary Insurance: [address] Number: Approved SNF auth # [id number] Erica Crook ref# [id number]) for [facility] SOC: 6/8/22 NRD: 6/13 Care Cood: [employer] Fax review to [phone]

## 2022-06-09 NOTE — ASSESSMENT & PLAN NOTE
· Status post ERCP  · Continue with ciprofloxacin and metronidazole, end date 06/12  · Continue to monitor for acute changes   · Continue with supportive care

## 2022-06-09 NOTE — PROGRESS NOTES
[address]   [phone]  [facility] facility progress Note  Readmit        NAME: Lalo Bernardo  AGE: 82 y o  SEX: male    DATE OF ENCOUNTER: 6/9/2022    Assessment and Plan     1  Cholangitis  Assessment & Plan:  · Status post ERCP  · Continue with ciprofloxacin and metronidazole, end date 06/12  · Continue to monitor for acute changes   · Continue with supportive care      2  Debility  Assessment & Plan:  · Currently at long-term care facility  · Increased fatigue decreased mobility   · PT/OT for strength   · Assist with ADL and I ADL   · Fall precautions   · Monitor for acute changes      3  Hypertension, unspecified type  Assessment & Plan:  · Controlled   · Continue with diltiazem, furosemide, metoprolol, hydralazine  · Continue to monitor BP      4  Type 2 diabetes mellitus without complication, with long-term current use of insulin (HCC)  Assessment & Plan:  · Daily control   · Continue with current insulin   · Continue to monitor blood sugars daily  · Follow-up with endocrinology as needed      5  Vascular dementia without behavioral disturbance (HCC)  Assessment & Plan:  · At baseline  · Can be forgetful at times   · Continue with supportive care   · Assist with ADL and I ADL   · Monitor for delirium      6  S/P ERCP  Assessment & Plan:  · Doing well  · sphincterotomy, stent placement, and stone retrieval   · Continue supportive care   · Follow-up with GI in 6 weeks          No orders of the defined types were placed in this encounter        History of Present Illness     Lalo Res 82 y o  male seen for follow-up of care and treatment plan for ambulatory dysfunction doing well at [facility], cholangitis status post ERCP continue with antibiotics, hypertension doing well with BP meds, DM2 controlled with insulin, dementia at baseline     The following portions of the patient's history were reviewed and updated as appropriate: allergies, current medications, past family history, past medical history, past social history, past surgical history and problem list     Review of Systems     Review of Systems   Constitutional: Positive for activity change (Decreased) and fatigue  Negative for chills and fever  HENT: Negative for ear pain and sore throat  Eyes: Negative for pain and visual disturbance  Respiratory: Negative for cough and shortness of breath  Cardiovascular: Negative for chest pain and palpitations  Gastrointestinal: Negative for abdominal pain and vomiting  Genitourinary: Negative for dysuria and hematuria  Musculoskeletal: Positive for gait problem  Negative for arthralgias and back pain  Skin: Negative for color change and rash  Neurological: Positive for weakness  Negative for seizures and syncope  Psychiatric/Behavioral: Positive for confusion ( at times)  All other systems reviewed and are negative  Objective     /75   Pulse 96   Temp 97 5 °F (36 4 °C)   Resp 18   Wt 93 9 kg (207 lb)   BMI 31 47 kg/m²     Physical Exam  Vitals reviewed  Constitutional:       Appearance: He is well-developed  HENT:      Head: Normocephalic and atraumatic  Eyes:      Conjunctiva/sclera: Conjunctivae normal    Cardiovascular:      Rate and Rhythm: Normal rate and regular rhythm  Heart sounds: Normal heart sounds, S1 normal and S2 normal  No murmur heard  Pulmonary:      Effort: Pulmonary effort is normal  No respiratory distress  Breath sounds: Normal breath sounds  No wheezing  Abdominal:      General: Bowel sounds are normal  There is no distension  Palpations: Abdomen is soft  Tenderness: There is no abdominal tenderness  Musculoskeletal:         General: Normal range of motion  Cervical back: Normal range of motion  Comments: Generalized weakness   Skin:     General: Skin is warm and dry  Neurological:      Mental Status: He is alert     Psychiatric: Mood and Affect: Affect is labile  Speech: Speech normal          Behavior: Behavior normal          Thought Content: Thought content normal          Cognition and Memory: Cognition is impaired  Pertinent Laboratory/Diagnostic Studies:  HFM Labs Reviewed    Current Medications   Medication list reviewed and updated today  Please see paper chart for updated medication list      Naga Cornell  :22 PM      Name: Tomasa Edward  : 1935  MRN: [id number]  DOS: 2022    Diagnoses:   Diagnosis ICD-10-CM Associated Orders   1  Cholangitis  K83 09    2  Debility  R53 81    3  Hypertension, unspecified type  I10    4  Type 2 diabetes mellitus without complication, with long-term current use of insulin (HCC)  E11 9     Z79 4    5  Vascular dementia without behavioral disturbance ([facility])  F01 50    6   S/P ERCP  Z98 890

## 2022-06-09 NOTE — ASSESSMENT & PLAN NOTE
· At baseline  · Can be forgetful at times   · Continue with supportive care   · Assist with ADL and I ADL   · Monitor for delirium

## 2022-06-09 NOTE — ASSESSMENT & PLAN NOTE
· Currently at long-term care facility  · Increased fatigue decreased mobility   · PT/OT for strength   · Assist with ADL and I ADL   · Fall precautions   · Monitor for acute changes

## 2022-06-09 NOTE — ASSESSMENT & PLAN NOTE
· Controlled   · Continue with diltiazem, furosemide, metoprolol, hydralazine  · Continue to monitor BP

## 2022-06-09 NOTE — ASSESSMENT & PLAN NOTE
· Daily control   · Continue with current insulin   · Continue to monitor blood sugars daily  · Follow-up with endocrinology as needed

## 2022-06-10 NOTE — TELEPHONE ENCOUNTER
Spoke to [facility] unit coordinator at [facility] ([phone]) and informed pt needs hepatic function panel completed in 2 weeks  Advised to fax results to CV office, provided fax # addressed to Dr Kevin Medeiros verbalized understanding and had no further questions

## 2022-06-10 NOTE — UTILIZATION REVIEW
Inpatient Admission Authorization Request   NOTIFICATION OF INPATIENT ADMISSION/INPATIENT AUTHORIZATION REQUEST   SERVICING FACILITY:   [facility]  Address: [address] [facility] [address], [address] [place] [address]  Tax ID: [id number]  NPI: [id number]  Place of Service: Inpatient [facility] [address] [facility] Code: 24     ATTENDING PROVIDER:  Attending Name and NPI#: Andreia Mensah [[id number]]  Address: [address] [place] [address], [address] [address]  Phone: [phone]     UTILIZATION REVIEW CONTACT:  [facility] Utilization   Network Utilization Review Department  Phone: [phone]  Fax: [phone]  Email: [email or web address]     PHYSICIAN ADVISORY SERVICES:  FOR TEDP-VW-GTPM REVIEW - MEDICAL NECESSITY DENIAL  Phone: [phone]  Fax: [phone]  Email: [email or web address]     TYPE OF REQUEST:  Inpatient Status     ADMISSION INFORMATION:  ADMISSION DATE/TIME: 6/4/22  5:27 PM  PATIENT DIAGNOSIS CODE/DESCRIPTION:  Cholangitis [K83 09]  Ascending cholangitis [K83 09]  Nausea [R11 0]  Dementia ([address] ) [F03 90]  Fever [R50 9]  Vascular dementia without behavioral disturbance ([address] ) [F01 50]  DISCHARGE DATE/TIME: 6/8/2022  5:14 PM   IMPORTANT INFORMATION:  Please contact the [facility] directly with any questions or concerns regarding this request  Department voicemails are confidential     Send requests for admission clinical reviews, concurrent reviews, approvals, and administrative denials due to lack of clinical to fax [phone]

## 2022-06-13 ENCOUNTER — TELEPHONE (OUTPATIENT)
Dept: OTHER | Facility: OTHER | Age: 87
End: 2022-06-13

## 2022-06-13 ENCOUNTER — TELEPHONE (OUTPATIENT)
Dept: GASTROENTEROLOGY | Facility: CLINIC | Age: 87
End: 2022-06-13

## 2022-06-13 NOTE — TELEPHONE ENCOUNTER
Procedure scheduled with Mala      Scheduled date of ERCP(as of today): 8/3/22  Physician performing ERCP: Dr Menjivar  Location of ERCP: [facility]  Instructions reviewed with patient by: Katiana/faxed to [phone]  Clearances:Med Clearance faxed to Tripp Redd

## 2022-06-13 NOTE — TELEPHONE ENCOUNTER
----- Message from Kylee Munson MD sent at 6/7/2022  9:42 AM EDT -----  Schedule ercp in 6 weeks   Thank you

## 2022-06-14 NOTE — TELEPHONE ENCOUNTER
Boo ugarte called from [facility], requesting a call back from on call, regarding pt's glucose and insulin  Provider paged via TC

## 2022-06-24 DIAGNOSIS — R13.10 DYSPHAGIA, UNSPECIFIED TYPE: Primary | ICD-10-CM

## 2022-06-24 NOTE — PROGRESS NOTES
Per nursing staff at his facility, SLP service looking for a video swallow study with speech to see if his diet can be safely upgraded

## 2022-06-29 ENCOUNTER — HOSPITAL ENCOUNTER (OUTPATIENT)
Dept: RADIOLOGY | Facility: HOSPITAL | Age: 87
Discharge: HOME | End: 2022-06-29
Payer: COMMERCIAL

## 2022-06-29 DIAGNOSIS — R13.10 DYSPHAGIA, UNSPECIFIED TYPE: ICD-10-CM

## 2022-06-29 PROCEDURE — 74230 X-RAY XM SWLNG FUNCJ C+: CPT

## 2022-06-29 PROCEDURE — 92611 MOTION FLUOROSCOPY/SWALLOW: CPT

## 2022-06-29 NOTE — PROCEDURES
Speech Pathology Videofluoroscopic Swallow Study (VFSS/VBSS/MBSS)    Patient Name: Melba Gonzales    GEEBC'T Date: 6/29/2022     Problem List  Active Problems:    * No active hospital problems  *    Past Medical History  Past Medical History:   Diagnosis Date    A-fib ([facility] )     Cancer ([place] [address] )     Cardiac disease     CHF (congestive heart failure) (HCC)     Chronic obstructive pulmonary disease (COPD) ([place] [address] )     COPD (chronic obstructive pulmonary disease) (HCC)     Coronary artery disease     Counseling regarding advance care planning and goals of care 10/7/2020    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[place] [address]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 2    Dysphagia     Fracture of nasal bone     Gait instability     H/O cervical fracture     Hyperlipidemia     Hypertension     Muscle weakness     TBI (traumatic brain injury) ([place] [address] )      Past Surgical History  Past Surgical History:   Procedure Laterality Date    HAND SURGERY      LA ARTHRODESIS POSTERIOR CRANIOCERVICAL N/A 10/26/2020    Procedure: Occipital cervical fusion to C4;  Surgeon: Kwesi Mackey MD;  [facility]: [facility];  Service: Neurosurgery       General Information;  Pt is a 82 y o  male who is a resident of [facility]  His PMH is remarkable for vascular dementia, COPD, HTN, HLD, CAD, CHF, and DM  Current concerns for dysphagia include coughing with PO intake, and recent diet downgrade during hospitalization  Pt is unhappy with thickened liquids recommendation  A VFSS was recommended to assess oropharyngeal stage swallowing skills at this time  Pt was viewed in lateral and AP position and was given trials of thin liquid via tsp (5 mL) x2, thin liquid via cup sip (20 mL), thin via sequential swallow (40 mL), nectar thick by tsp (5 mL), nectar thick liquid via cup sip (20 mL), nectar thick liquid via sequential swallow (40 mL), honey thick liquid via tsp (5 mL), pudding (5 mL), and regular solid (1/2 Natalie Doone cookie with 3mL pudding)   Additional trials were given for attempted strategies  Oral stage:  Pt presented with mild oral stage dysphagia  Mastication was mildly reduced but grossly effective with materials administered today  Bolus formation and transfer were functional  Oral control appeared adequate with no gross premature spillage over the base of tongue  Pharyngeal stage:  Pt presented with moderate-severe pharyngeal dysphagia  Velar elevation noted  Swallowing initiation was delayed  Laryngeal rise and anterior hyoid excursion appeared adequate  Airway entrance closure was incomplete the for majority of trials  Aspiration occurred with thin liquid trials inconsistently, and trace aspiration was also seen with nectar thick and honey thick liquid trials  Tongue base retraction and pharyngeal constriction appeared reduced  PES opening was adequate  Strategies and Efficacy: Chin tuck resulted in larger amount of aspiration  Small sips reduced aspiration  Aspiration Response and Efficacy: No immediate cough however     Esophageal stage:  Upper esophagus appeared unremarkable  Assessment Summary:    Pt presents with mild oral and mod-severe pharyngeal dysphagia characterized by reduced mastication, reduced bolus control with premature spillage, reduced delayed swallow initiation, reduced airway closure, and reduced base of tongue retraction and pharyngeal stripping wave  Aspiration was seen intermittently across all liquid consistencies (thin, nectar thick, and honey thick)  Pt adamantly does not want thickened liquids  Based on research re: negative outcomes of aspirating thickened liquids, consider resuming thin liquids with use of safe swallow strategies to minimize risks  Ultimately recommendation for diet should be determined through discussion with patient, primary SLP, physician, and medical POA  Note: Images are available for review in PACS as desired      Recommendations:   Recommended Diet: Pending pt/family wishes, consider regular diet and thin liquids  Recommended Form of Medications: as tolerated   Aspiration precautions and compensatory swallowing strategies: upright posture, only feed when fully alert, slow rate of feeding and small bites/sips  Consider referral to: ST  SLP Dysphagia therapy recommended: yes    Results Reviewed with: patient and SLP at [facility]   Pt Education: initiated  Pt and caregivers would benefit from continued education

## 2022-07-24 NOTE — ED PROVIDER NOTES
History  Chief Complaint   Patient presents with    Hypoglycemia - Symptomatic     Per EMS pt blood glucose 45 given 25 g dextrose blood glucose now 140s for EMS     87M PMH Afib, HTN, DM, CHF presented to the emergency department with AMS  Per EMS nursing home staff called for altered mental status  Patient was found to have a fingerstick glucose in the 40s by EMS and was given 25g dextrose without significant change in mental status  Repeat fingerstick glucose 126 here in the emergency department  Limited ability of patient to provide history secondary to dementia  Patient states that he feels "funny" but otherwise denies any specific symptoms, patient is unsure of what happened today  Patient denies falls  Patient denies fevers, chest pain, shortness of breath, cough, congestion, sore throat, abdominal pain, nausea, vomiting, diarrhea, or dysuria  Prior to Admission Medications   Prescriptions Last Dose Informant Patient Reported? Taking?    apixaban (ELIQUIS) 5 mg   Yes No   Sig: Take 1 tablet by mouth 2 (two) times a day   atorvastatin (LIPITOR) 20 mg tablet  Outside Facility (Specify) Yes No   Sig: Take 1 tablet by mouth daily   bisacodyl (Dulcolax) 10 mg suppository  Outside Facility (Specify) Yes No   Sig: Insert 10 mg into the rectum as needed for constipation   buPROPion (WELLBUTRIN) 100 mg tablet   Yes No   Sig: Take 100 mg by mouth in the morning   diltiazem (CARDIZEM CD) 240 mg 24 hr capsule  Outside Facility (Specify) Yes No   Sig: Take 1 capsule by mouth daily   ferrous sulfate 325 (65 Fe) mg tablet   Yes No   Sig: Take 325 mg by mouth every other day   furosemide (LASIX) 40 mg tablet  Outside Facility (Specify) Yes No   Sig: Take 1 5 tablets by mouth 2 (two) times a day 60 MG BID   hydrALAZINE (APRESOLINE) 10 mg tablet  Outside Facility (Specify) Yes No   Sig: Take 10 mg by mouth 3 (three) times a day   insulin glargine (LANTUS) 100 units/mL subcutaneous injection   No No   Sig: Inject 35 Units under the skin daily at bedtime   insulin lispro (HumaLOG) 100 units/mL injection   No No   Sig: Inject 12 Units under the skin 3 (three) times a day with meals for 10 days   magnesium hydroxide (MILK OF MAGNESIA) 400 mg/5 mL oral suspension  Outside Facility (Specify) Yes No   Sig: Take 30 mL by mouth daily as needed for constipation   metoprolol succinate (TOPROL-XL) 100 mg 24 hr tablet   Yes No   Sig: Take 1 tablet by mouth daily   omeprazole (PriLOSEC) 40 MG capsule  Outside Facility (Specify) Yes No   Sig: Take 1 capsule by mouth daily   polyethylene glycol (MIRALAX) 17 g packet   Yes No   Sig: Take 17 g by mouth every other day    sertraline (ZOLOFT) 25 mg tablet   Yes No   Sig: Take 50 mg by mouth daily   sodium phosphate-biphosphate (FLEET) 7-19 g 118 mL enema  Outside Facility (Specify) Yes No   Sig: Insert 1 enema into the rectum once as needed      Facility-Administered Medications: None       Past Medical History:   Diagnosis Date    A-fib ([place] [address] )     Cancer ([place] [address] )     Cardiac disease     CHF (congestive heart failure) ([facility])     [facility] pulmonary disease [facility]COPD) ([facility] )     COPD (chronic obstructive pulmonary disease) ([place] [address] )     Coronary artery disease     Counseling regarding advance care planning and goals of care 10/7/2020    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[facility] )     TYPE 2    Dysphagia     Fracture of nasal bone     Gait instability     H/O cervical fracture     Hyperlipidemia     Hypertension     Muscle weakness     TBI (traumatic brain injury) ([facility] )        Past Surgical History:   Procedure Laterality Date    HAND SURGERY      DE ARTHRODESIS POSTERIOR CRANIOCERVICAL N/A 10/26/2020    Procedure: Occipital cervical fusion to C4;  Surgeon: Surekha Price MD;  Location:  MAIN OR;  Service: Neurosurgery       Family History   Problem Relation Age of Onset    Other Other         urinary tract malignancy    Diabetes Mother      I have reviewed and agree with the history as documented  E-Cigarette/Vaping    E-Cigarette Use Never User      E-Cigarette/Vaping Substances    Nicotine No     THC No     CBD No     Flavoring No     Other No     Unknown No      Social History     Tobacco Use    Smoking status: Never Smoker    Smokeless tobacco: Never Used   Vaping Use    Vaping Use: Never used   Substance Use Topics    Alcohol use: Never    Drug use: Never        Review of Systems   Constitutional: Negative for fever  HENT: Negative for congestion and sore throat  Respiratory: Negative for cough and shortness of breath  Cardiovascular: Negative for chest pain  Gastrointestinal: Negative for abdominal pain, diarrhea, nausea and vomiting  Genitourinary: Negative for dysuria  Neurological: Negative for headaches  All other systems reviewed and are negative  Physical Exam  ED Triage Vitals   Temperature Pulse Respirations Blood Pressure SpO2   07/24/22 0113 07/24/22 0036 07/24/22 0036 07/24/22 0036 07/24/22 0036   (!) 96 3 °F (35 7 °C) 57 18 144/75 96 %      Temp Source Heart Rate Source Patient Position - Orthostatic VS BP Location FiO2 (%)   07/24/22 0113 07/24/22 0036 07/24/22 0036 07/24/22 0036 --   Tympanic Monitor Sitting Right arm       Pain Score       --                    Orthostatic Vital Signs  Vitals:    07/24/22 0900 07/24/22 1000 07/24/22 1100 07/24/22 1300   BP: 152/74 (!) 189/85 (!) 171/77 150/69   Pulse: 68 70 70 66   Patient Position - Orthostatic VS:           Physical Exam  Vitals and nursing note reviewed  Constitutional:       General: He is not in acute distress  Appearance: He is not ill-appearing  HENT:      Head: Normocephalic  Comments: Small abrasion over left temporal area  No tenderness to palpation throughout head  Nose: Nose normal       Mouth/Throat:      Mouth: Mucous membranes are moist       Pharynx: Oropharynx is clear  Eyes:      Extraocular Movements: Extraocular movements intact        Pupils: Pupils are equal, round, and reactive to light  Cardiovascular:      Rate and Rhythm: Normal rate and regular rhythm  Heart sounds: No murmur heard  Pulmonary:      Effort: Pulmonary effort is normal  No respiratory distress  Breath sounds: Normal breath sounds  No wheezing, rhonchi or rales  Abdominal:      General: Abdomen is flat  There is no distension  Palpations: Abdomen is soft  Tenderness: There is no abdominal tenderness  There is no guarding or rebound  Musculoskeletal:      Cervical back: Normal range of motion  No tenderness (No midline cervical spine tenderness  )  Right lower leg: No edema  Left lower leg: No edema  Comments: No tenderness to palpation throughout bilateral upper and lower extremities  Pelvis stable, hips nontender  Skin:     General: Skin is warm and dry  Neurological:      Mental Status: He is alert  Cranial Nerves: No cranial nerve deficit  Sensory: No sensory deficit (Light touch sensation intact and symmetric throughout bilateral upper and lower extremities and face  )  Motor: No weakness (Motor strength 5/5 intact and symmetric throughout bilateral upper and lower extremities  )           ED Medications  Medications - No data to display    Diagnostic Studies  Results Reviewed     Procedure Component Value Units Date/Time    Fingerstick Glucose (POCT) [[id number]]  (Normal) Collected: 07/24/22 0905    Lab Status: Final result Updated: 07/24/22 1756     POC Glucose 108 mg/dl     Fingerstick Glucose (POCT) [[id number]]  (Normal) Collected: 07/24/22 0714    Lab Status: Final result Updated: 07/24/22 0735     POC Glucose 68 mg/dl     Urine Microscopic [[id number]]  (Abnormal) Collected: 07/24/22 0519    Lab Status: Final result Specimen: Urine, Clean Catch Updated: 07/24/22 0543     RBC, UA 2-4 /hpf      WBC, UA 1-2 /hpf      Epithelial Cells None Seen /hpf      Bacteria, UA Occasional /hpf      Hyaline Casts, UA 3-5 /lpf     Urine Macroscopic, POC [[id number]]  (Abnormal) Collected: 07/24/22 0519    Lab Status: Final result Specimen: Urine Updated: 07/24/22 0520     Color, UA Yellow     Clarity, UA Clear     pH, UA 6 0     Leukocytes, UA Negative     Nitrite, UA Negative     Protein, UA Trace mg/dl      Glucose, UA Negative mg/dl      Ketones, UA Negative mg/dl      Urobilinogen, UA 0 2 E U /dl      Bilirubin, UA Negative     Occult Blood, UA Negative     Specific Gravity, UA 1 025    Narrative:      CLINITEK RESULT    HS Troponin I 2hr [[id number]]  (Normal) Collected: 07/24/22 0313    Lab Status: Final result Specimen: Blood from Arm, Right Updated: 07/24/22 0356     hs TnI 2hr 21 ng/L      Delta 2hr hsTnI -2 ng/L     Comprehensive metabolic panel [[id number]]  (Abnormal) Collected: 07/24/22 0106    Lab Status: Final result Specimen: Blood from Arm, Right Updated: 07/24/22 0149     Sodium 139 mmol/L      Potassium 3 4 mmol/L      Chloride 103 mmol/L      CO2 32 mmol/L      ANION GAP 4 mmol/L      BUN 17 mg/dL      Creatinine 1 10 mg/dL      Glucose 107 mg/dL      Calcium 8 7 mg/dL      Corrected Calcium 9 6 mg/dL      AST 15 U/L      ALT 18 U/L      Alkaline Phosphatase 135 U/L      Total Protein 7 7 g/dL      Albumin 2 9 g/dL      Total Bilirubin 0 33 mg/dL      eGFR 60 ml/min/1 73sq m     Narrative:      National Kid[employer] Disease Foundation guidelines for Chronic Kidney Disease (CKD):     Stage 1 with normal or high GFR (GFR > 90 mL/min/1 73 square meters)    Stage 2 Mild CKD (GFR = 60-89 mL/min/1 73 square meters)    Stage 3A Moderate CKD (GFR = 45-59 mL/min/1 73 square meters)    Stage 3B Moderate CKD (GFR = 30-44 mL/min/1 73 square meters)    Stage 4 Severe CKD (GFR = 15-29 mL/min/1 73 square meters)    Stage 5 End Stage CKD (GFR <15 mL/min/1 73 square meters)  Note: GFR calculation is accurate only with a steady state creatinine    TSH [[id number]]  (Abnormal) Collected: 07/24/22 0106    Lab Status: Final result Specimen: Blood from Arm, Right Updated: 07/24/22 0149     TSH 3RD GENERATON 5 330 uIU/mL     Narrative:      Patients undergoing fluorescein dye angiography may retain small amounts of fluorescein in the body for 48-72 hours post procedure  Samples containing fluorescein can produce falsely depressed TSH values  If the patient had this procedure,a specimen should be resubmitted post fluorescein clearance  HS Troponin 0hr (reflex protocol) [[id number]]  (Normal) Collected: 07/24/22 0106    Lab Status: Final result Specimen: Blood from Arm, Right Updated: 07/24/22 0143     hs TnI 0hr 23 ng/L     CBC and differential [[id number]]  (Abnormal) Collected: 07/24/22 0106    Lab Status: Final result Specimen: Blood from Arm, Right Updated: 07/24/22 0115     WBC 10 71 Thousand/uL      RBC 4 86 Million/uL      Hemoglobin 12 5 g/dL      Hematocrit 40 2 %      MCV 83 fL      MCH 25 7 pg      MCHC 31 1 g/dL      RDW 18 7 %      MPV 10 0 fL      Platelets 797 Thousands/uL      nRBC 0 /100 WBCs      Neutrophils Relative 77 %      Immat GRANS % 1 %      Lymphocytes Relative 8 %      Monocytes Relative 8 %      Eosinophils Relative 5 %      Basophils Relative 1 %      Neutrophils Absolute 8 28 Thousands/µL      Immature Grans Absolute 0 06 Thousand/uL      Lymphocytes Absolute 0 90 Thousands/µL      Monocytes Absolute 0 86 Thousand/µL      Eosinophils Absolute 0 55 Thousand/µL      Basophils Absolute 0 06 Thousands/µL     Fingerstick Glucose (POCT) [[id number]]  (Normal) Collected: 07/24/22 0033    Lab Status: Final result Updated: 07/24/22 0034     POC Glucose 126 mg/dl                  XR chest 1 view portable   ED Interpretation by Merlyn Albright MD (07/24 8386)   No pna      Final Result by Cristi Abbasi MD (07/24 2786)      No acute cardiopulmonary disease                    Workstation performed: [id number]         CT head without contrast   ED Interpretation by Merlyn Albright MD (07/24 2983)   CT ordered by myself and the report from radiologist has been reviewed  Final Result by Servando Michaud MD (07/24 0157)      1  No acute intracranial hemorrhage, midline shift, or mass effect  2   Chronic small vessel ischemic changes  Workstation performed: [id number]               Procedures  Procedures      ED Course                             SBIRT 20yo+    Flowsheet Row Most Recent Value   SBIRT (25 yo +)    In order to provide better care to our patients, we are screening all of our patients for alcohol and drug use  Would it be okay to ask you these screening questions? No Filed at: 07/24/2022 0731                MDM  Number of Diagnoses or Management Options  Hypoglycemia  Diagnosis management comments: 87M PMH Afib, HTN, DM, CHF presented to the emergency department with hypoglycemia  Workup including vital signs, physical exam, labs, CT head  CT without acute abnormalities, labs unremarkable, urinalysis without signs of infection, chest x-ray without acute abnormalities, repeat glucose normal after dextrose  Patient signed out at end of shift, per chart review patient discharged back to nursing home  Disposition  Final diagnoses:   Hypoglycemia     Time reflects when diagnosis was documented in both MDM as applicable and the Disposition within this note     Time User Action Codes Description Comment    7/24/2022  7:08 AM Wandy Love Add [E16 2] Hypoglycemia       ED Disposition     ED Disposition   Discharge    Condition   Stable    Date/Time   Sun Jul 24, 2022  7:09 AM    Comment   Chuyita Cabello discharge to home/self care                 Follow-up Information     Follow up With Specialties Details Why Contact Info    Joan Munroe MD Internal Medicine, Geriatric Medicine Schedule an appointment as soon as possible for a visit   [address] [place] [address] [address] [facility]  [phone]            Discharge Medication List as of 7/24/2022  9:26 AM      CONTINUE these medications which have NOT CHANGED Details   apixaban (ELIQUIS) 5 mg Take 1 tablet by mouth 2 (two) times a day, Starting Fri 10/15/2021, Historical Med      atorvastatin (LIPITOR) 20 mg tablet Take 1 tablet by mouth daily, Historical Med      bisacodyl (Dulcolax) 10 mg suppository Insert 10 mg into the rectum as needed for constipation, Historical Med      buPROPion (WELLBUTRIN) 100 mg tablet Take 100 mg by mouth in the morning, Starting Sat 3/19/2022, Historical Med      diltiazem (CARDIZEM CD) 240 mg 24 hr capsule Take 1 capsule by mouth daily, Historical Med      ferrous sulfate 325 (65 Fe) mg tablet Take 325 mg by mouth every other day, Starting Thu 1/13/2022, Historical Med      furosemide (LASIX) 40 mg tablet Take 1 5 tablets by mouth 2 (two) times a day 60 MG BID, Historical Med      hydrALAZINE (APRESOLINE) 10 mg tablet Take 10 mg by mouth 3 (three) times a day, Historical Med      insulin glargine (LANTUS) 100 units/mL subcutaneous injection Inject 35 Units under the skin daily at bedtime, Starting Wed 6/8/2022, No Print      insulin lispro (HumaLOG) 100 units/mL injection Inject 12 Units under the skin 3 (three) times a day with meals for 10 days, Starting Wed 6/8/2022, Until Sat 6/18/2022, No Print      magnesium hydroxide (MILK OF MAGNESIA) 400 mg/5 mL oral suspension Take 30 mL by mouth daily as needed for constipation, Historical Med      metoprolol succinate (TOPROL-XL) 100 mg 24 hr tablet Take 1 tablet by mouth daily, Starting Wed 8/25/2021, Historical Med      omeprazole (PriLOSEC) 40 MG capsule Take 1 capsule by mouth daily, Historical Med      polyethylene glycol (MIRALAX) 17 g packet Take 17 g by mouth every other day , Starting Sat 7/17/2021, Historical Med      sertraline (ZOLOFT) 25 mg tablet Take 50 mg by mouth daily, Historical Med      sodium phosphate-biphosphate (FLEET) 7-19 g 118 mL enema Insert 1 enema into the rectum once as needed, Historical Med           No discharge procedures on file      PDMP Review       Value Time User    PDMP Reviewed  Yes 10/30/2020  2:05 PM Shelbi Sanchez PA-C           ED Provider  Attending physically available and evaluated Germán Dixon  DARBY managed the patient along with the ED Attending      Electronically Signed by         Manjit Hidalgo MD  07/29/22 9019

## 2022-07-24 NOTE — ED ATTENDING ATTESTATION
Final Diagnosis:  1  Hypoglycemia      ED Course as of 07/25/22 Colin3   Sun Jul 24, 2022   0137 WBC(!): 10 71   0137 Hemoglobin: 12 5   0137 Platelet Count: 704   0137 POC Glucose: 126   0630 POCT URINE PROTEIN(!): Trace   0630 Nitrite, UA: Negative   0630 Leukocytes, UA: Negative   0630 Bacteria, UA: Occasional       IKey MD, saw and evaluated the patient  All available labs and X-rays were ordered by me or the resident and have been reviewed by myself  I discussed the patient with the resident / non-physician and agree with the resident's / non-physician practitioner's findings and plan as documented in the resident's / non-physician practicitioner's note, except where noted  At this point, I agree with the current assessment done in the ED  I was present during key portions of all procedures performed unless otherwise stated  Chief Complaint   Patient presents with    Hypoglycemia - Symptomatic     Per EMS pt blood glucose 45 given 25 g dextrose blood glucose now 140s for EMS     This is a 82 y o  male presenting for evaluation of maybe fall? Maybe hypoglycemia? The patient himself is altered vs demented and cannot provide any useful history  Per EMS crew, they were called for "AMS maybe" but patient was noted to have an abrasion on his LEFT head, so EMS asked about falls; EMS was told he was just scratching himself a lot  Due to the AMS, glucose was checked which was low (40) and was given dextrose without any change  He was then brought in  Patient offers no complaints except he might need to poo shortly  Denies CP/SOB  He lives in a nursing home       PMH:   has a past medical history of A-fib ([address] ), Cancer ([address] ), Cardiac disease, CHF (congestive heart failure) ([address] ), Chronic obstructive pulmonary disease (COPD) ([address] ), COPD (chronic obstructive pulmonary disease) ([address] ), Coronary artery disease, Counseling regarding advance care planning and goals of care (10/7/2020), 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[facility] ), Dysphagia, Fracture of nasal bone, Gait instability, H/O cervical fracture, Hyperlipidemia, Hypertension, Muscle weakness, and TBI (traumatic brain injury) ([facility] )  PSH:   has a past surgical history that includes Hand surgery and pr arthrodesis posterior craniocervical (N/A, 10/26/2020)  Social:  Social History     Substance and Sexual Activity   Alcohol Use Never     Social History     Tobacco Use   Smoking Status Never Smoker   Smokeless Tobacco Never Used     Social History     Substance and Sexual Activity   Drug Use Never     PE:  Vitals:    07/24/22 0900 07/24/22 1000 07/24/22 1100 07/24/22 1300   BP: 152/74 (!) 189/85 (!) 171/77 150/69   BP Location:       Pulse: 68 70 70 66   Resp: 15 16 20 19   Temp:       TempSrc:       SpO2: 94% 97% 94% 95%   General: VS reviewed  Appears comfortable  Obvious bleeding from LEFT temporal region of head  No C-spine tenderness  Awake  Alert  Talking  Well-nourished, well-developed  Appears stated age  Head: Normocephalic, traumatic, tender  Eyes: PERRL, EOM-I  No diplopia  No hyphema  No subconjunctival hemorrhages  Symmetrical lids  ENTAtraumatic external nose and ears  MMM  No stridor  Normal phonation  No drooling  Base of mouth is soft  No mastoid tenderness  Neck: Symmetric, trachea midline  No JVD  CV: Peripheral pulses +2 throughout  No chest wall tenderness  Lungs:   Unlabored   No retractions  No crepitus  No tachypnea  No paradoxical motion  Abd: +BS, soft, NT/ND    MSK:   FROM passively  LE edema bilaterally noted, 2+ pitting edema   Back:   No C/T/L-spine tenderness  No CVAT  Skin: Dry, abrasion as mentioned that is bleeding   Neuro: awake  Alert  Interactive  GCS 15, CN II-XII grossly intact  Motor grossly intact  Sensory grossly intact  Psychiatric/Behavioral: Appropriate mood and affect   Exam: deferred  A:  - abrasion vs scratching of head? - AMS?  - hypoglycemia? ?   P:  - CTH for bleed  - labs  - monitor  - likely DC if negative workup    - 13 point ROS was performed and all are normal unless stated in the history above  - Nursing note reviewed  Vitals reviewed  - Orders placed by myself and/or advanced practitioner / resident     - Previous chart was reviewed  - No language barrier    - History obtained from patient  - There are no limitations to the history obtained  - Critical care time: Not applicable for this patient  Code Status: Prior  Advance Directive and Living Will: Yes    Power of :    POLST:      Medications - No data to display  XR chest 1 view portable   ED Interpretation   No pna      Final Result      No acute cardiopulmonary disease  Workstation performed: [id number]         CT head without contrast   ED Interpretation   CT ordered by myself and the report from radiologist has been reviewed  Final Result      1  No acute intracranial hemorrhage, midline shift, or mass effect  2   Chronic small vessel ischemic changes                    Workstation performed: [id number]           Orders Placed This Encounter   Procedures    CT head without contrast    XR chest 1 view portable    CBC and differential    Comprehensive metabolic panel    HS Troponin 0hr (reflex protocol)    TSH    HS Troponin I 2hr    Urine Microscopic    Straight cath    POCT glucose    POCT glucose    ECG 12 lead     Labs Reviewed   CBC AND DIFFERENTIAL - Abnormal       Result Value Ref Range Status    WBC 10 71 (*) 4 31 - 10 16 Thousand/uL Final    RBC 4 86  3 88 - 5 62 Million/uL Final    Hemoglobin 12 5  12 0 - 17 0 g/dL Final    Hematocrit 40 2  36 5 - 49 3 % Final    MCV 83  82 - 98 fL Final    MCH 25 7 (*) 26 8 - 34 3 pg Final    MCHC 31 1 (*) 31 4 - 37 4 g/dL Final    RDW 18 7 (*) 11 6 - 15 1 % Final    MPV 10 0  8 9 - 12 7 fL Final    Platelets 883  282 - 390 Thousands/uL Final    nRBC 0  /100 WBCs Final    Neutrophils Relative 77 (*) 43 - 75 % Final    Immat GRANS % 1  0 - 2 % Final    Lymphocytes Relative 8 (*) 14 - 44 % Final    Monocytes Relative 8  4 - 12 % Final    Eosinophils Relative 5  0 - 6 % Final    Basophils Relative 1  0 - 1 % Final    Neutrophils Absolute 8 28 (*) 1 85 - 7 62 Thousands/µL Final    Immature Grans Absolute 0 06  0 00 - 0 20 Thousand/uL Final    Lymphocytes Absolute 0 90  0 60 - 4 47 Thousands/µL Final    Monocytes Absolute 0 86  0 17 - 1 22 Thousand/µL Final    Eosinophils Absolute 0 55  0 00 - 0 61 Thousand/µL Final    Basophils Absolute 0 06  0 00 - 0 10 Thousands/µL Final   COMPREHENSIVE METABOLIC PANEL - Abnormal    Sodium 139  135 - 147 mmol/L Final    Potassium 3 4 (*) 3 5 - 5 3 mmol/L Final    Chloride 103  96 - 108 mmol/L Final    CO2 32  21 - 32 mmol/L Final    ANION GAP 4  4 - 13 mmol/L Final    BUN 17  5 - 25 mg/dL Final    Creatinine 1 10  0 60 - 1 30 mg/dL Final    Comment: Standardized to IDMS reference method    Glucose 107  65 - 140 mg/dL Final    Comment: If the patient is fasting, the ADA then defines impaired fasting glucose as > 100 mg/dL and diabetes as > or equal to 123 mg/dL  Specimen collection should occur prior to Sulfasalazine administration due to the potential for falsely depressed results  Specimen collection should occur prior to Sulfapyridine administration due to the potential for falsely elevated results  Calcium 8 7  8 3 - 10 1 mg/dL Final    Corrected Calcium 9 6  8 3 - 10 1 mg/dL Final    AST 15  5 - 45 U/L Final    Comment: Specimen collection should occur prior to Sulfasalazine administration due to the potential for falsely depressed results  ALT 18  12 - 78 U/L Final    Comment: Specimen collection should occur prior to Sulfasalazine and/or Sulfapyridine administration due to the potential for falsely depressed results       Alkaline Phosphatase 135 (*) 46 - 116 U/L Final    Total Protein 7 7  6 4 - 8 4 g/dL Final    Albumin 2 9 (*) 3 5 - 5 0 g/dL Final    Total Bilirubin 0 33  0 20 - 1 00 mg/dL Final    Comment: Use of this assay is not recommended for patients undergoing treatment with eltrombopag due to the potential for falsely elevated results  eGFR 60  ml/min/1 73sq m Final    Narrative:     [place] guidelines for Chronic Kidney Disease (CKD):     Stage 1 with normal or high GFR (GFR > 90 mL/min/1 73 square meters)    Stage 2 Mild CKD (GFR = 60-89 mL/min/1 73 square meters)    Stage 3A Moderate CKD (GFR = 45-59 mL/min/1 73 square meters)    Stage 3B Moderate CKD (GFR = 30-44 mL/min/1 73 square meters)    Stage 4 Severe CKD (GFR = 15-29 mL/min/1 73 square meters)    Stage 5 End Stage CKD (GFR <15 mL/min/1 73 square meters)  Note: GFR calculation is accurate only with a steady state creatinine   TSH, 3RD GENERATION - Abnormal    TSH 3RD GENERATON 5 330 (*) 0 450 - 4 500 uIU/mL Final    Comment: Adult TSH (3rd generation) reference range follows the recommended guidelines of the American Thyroid Association, January, 2020  Narrative:     Patients undergoing fluorescein dye angiography may retain small amounts of fluorescein in the body for 48-72 hours post procedure  Samples containing fluorescein can produce falsely depressed TSH values  If the patient had this procedure,a specimen should be resubmitted post fluorescein clearance       URINE MICROSCOPIC - Abnormal    RBC, UA 2-4 (*) None Seen, 1-2 /hpf Final    WBC, UA 1-2  None Seen, 1-2 /hpf Final    Epithelial Cells None Seen  None Seen, Occasional /hpf Final    Bacteria, UA Occasional  None Seen, Occasional /hpf Final    Hyaline Casts, UA 3-5 (*) None Seen /lpf Final   URINE MACROSCOPIC, POC - Abnormal    Color, UA Yellow   Final    Clarity, UA Clear   Final    pH, UA 6 0  4 5 - 8 0 Final    Leukocytes, UA Negative  Negative Final    Nitrite, UA Negative  Negative Final    Protein, UA Trace (*) Negative mg/dl Final    Glucose, UA Negative  Negative mg/dl Final    Ketones, UA Negative  Negative mg/dl Final Urobilinogen, UA 0 2  0 2, 1 0 E U /dl E U /dl Final    Bilirubin, UA Negative  Negative Final    Occult Blood, UA Negative  Negative Final    Specific Gravity, UA 1 025  1 003 - 1 030 Final    Narrative:     CLINITEK RESULT   HS TROPONIN I 0HR - Normal    hs TnI 0hr 23  "Refer to ACS Flowchart"- see link ng/L Final    Comment:                                              Initial (time 0) result  If >=50 ng/L, Myocardial injury suggested ;  Type of myocardial injury and treatment strategy  to be determined  If 5-49 ng/L, a delta result at 2 hours and or 4 hours will be needed to further evaluate  If <4 ng/L, and chest pain has been >3 hours since onset, patient may qualify for discharge based on the HEART score in the ED  If <5 ng/L and <3hours since onset of chest pain, a delta result at 2 hours will be needed to further evaluate  HS Troponin 99th Percentile URL of a Health Population=12 ng/L with a 95% Confidence Interval of 8-18 ng/L  Second Troponin (time 2 hours)  If calculated delta >= 20 ng/L,  Myocardial injury suggested ; Type of myocardial injury and treatment strategy to be determined  If 5-49 ng/L and the calculated delta is 5-19 ng/L, consult medical service for evaluation  Continue evaluation for ischemia on ecg and other possible etiology and repeat hs troponin at 4 hours  If delta is <5 ng/L at 2 hours, consider discharge based on risk stratification via the HEART score (if in ED), or DUSTIN risk score in IP/Observation  HS Troponin 99th Percentile URL of a Health Population=12 ng/L with a 95% Confidence Interval of 8-18 ng/L    HS TROPONIN I 2HR - Normal    hs TnI 2hr 21  "Refer to ACS Flowchart"- see link ng/L Final    Comment:                                              Initial (time 0) result  If >=50 ng/L, Myocardial injury suggested ;  Type of myocardial injury and treatment strategy  to be determined    If 5-49 ng/L, a delta result at 2 hours and or 4 hours will be needed to further evaluate  If <4 ng/L, and chest pain has been >3 hours since onset, patient may qualify for discharge based on the HEART score in the ED  If <5 ng/L and <3hours since onset of chest pain, a delta result at 2 hours will be needed to further evaluate  HS Troponin 99th Percentile URL of a Health Population=12 ng/L with a 95% Confidence Interval of 8-18 ng/L  Second Troponin (time 2 hours)  If calculated delta >= 20 ng/L,  Myocardial injury suggested ; Type of myocardial injury and treatment strategy to be determined  If 5-49 ng/L and the calculated delta is 5-19 ng/L, consult medical service for evaluation  Continue evaluation for ischemia on ecg and other possible etiology and repeat hs troponin at 4 hours  If delta is <5 ng/L at 2 hours, consider discharge based on risk stratification via the HEART score (if in ED), or DUSTIN risk score in IP/Observation  HS Troponin 99th Percentile URL of a Health Population=12 ng/L with a 95% Confidence Interval of 8-18 ng/L  Delta 2hr hsTnI -2  <20 ng/L Final   POCT GLUCOSE - Normal    POC Glucose 126  65 - 140 mg/dl Final   POCT GLUCOSE - Normal    POC Glucose 68  65 - 140 mg/dl Final     Time reflects when diagnosis was documented in both MDM as applicable and the Disposition within this note       Time User Action Codes Description Comment    7/24/2022  7:08 AM Aníbal Nevarez [E16 [id number]] Hypoglycemia           ED Disposition       ED Disposition   Discharge    Condition   Stable    Date/Time   Sun Jul 24, 2022  7:09 AM    Comment   Debbie Navarro discharge to home/self care                     Follow-up Information       Follow up With Specialties Details Why Contact Info    Dave Love MD Internal Medicine, Geriatric Medicine Schedule an appointment as soon as possible for a visit   [address] [place] [address]  [phone]            Discharge Medication List as of 7/24/2022  9:26 AM        CONTINUE these medications which have NOT CHANGED    Details   apixaban (ELIQUIS) 5 mg Take 1 tablet by mouth 2 (two) times a day, Starting Fri 10/15/2021, Historical Med      atorvastatin (LIPITOR) 20 mg tablet Take 1 tablet by mouth daily, Historical Med      bisacodyl (Dulcolax) 10 mg suppository Insert 10 mg into the rectum as needed for constipation, Historical Med      buPROPion (WELLBUTRIN) 100 mg tablet Take 100 mg by mouth in the morning, Starting Sat 3/19/2022, Historical Med      diltiazem (CARDIZEM CD) 240 mg 24 hr capsule Take 1 capsule by mouth daily, Historical Med      ferrous sulfate 325 (65 Fe) mg tablet Take 325 mg by mouth every other day, Starting Thu 1/13/2022, Historical Med      furosemide (LASIX) 40 mg tablet Take 1 5 tablets by mouth 2 (two) times a day 60 MG BID, Historical Med      hydrALAZINE (APRESOLINE) 10 mg tablet Take 10 mg by mouth 3 (three) times a day, Historical Med      insulin glargine (LANTUS) 100 units/mL subcutaneous injection Inject 35 Units under the skin daily at bedtime, Starting Wed 6/8/2022, No Print      insulin lispro (HumaLOG) 100 units/mL injection Inject 12 Units under the skin 3 (three) times a day with meals for 10 days, Starting Wed 6/8/2022, Until Sat 6/18/2022, No Print      magnesium hydroxide (MILK OF MAGNESIA) 400 mg/5 mL oral suspension Take 30 mL by mouth daily as needed for constipation, Historical Med      metoprolol succinate (TOPROL-XL) 100 mg 24 hr tablet Take 1 tablet by mouth daily, Starting Wed 8/25/2021, Historical Med      omeprazole (PriLOSEC) 40 MG capsule Take 1 capsule by mouth daily, Historical Med      polyethylene glycol (MIRALAX) 17 g packet Take 17 g by mouth every other day , Starting Sat 7/17/2021, Historical Med      sertraline (ZOLOFT) 25 mg tablet Take 50 mg by mouth daily, Historical Med      sodium phosphate-biphosphate (FLEET) 7-19 g 118 mL enema Insert 1 enema into the rectum once as needed, Historical Med           No discharge procedures on file    Prior to Admission Medications   Prescriptions Last Dose Informant Patient Reported? Taking?    apixaban (ELIQUIS) 5 mg   Yes No   Sig: Take 1 tablet by mouth 2 (two) times a day   atorvastatin (LIPITOR) 20 mg tablet  Outside Facility (Specify) Yes No   Sig: Take 1 tablet by mouth daily   bisacodyl (Dulcolax) 10 mg suppository  Outside Facility (Specify) Yes No   Sig: Insert 10 mg into the rectum as needed for constipation   buPROPion (WELLBUTRIN) 100 mg tablet   Yes No   Sig: Take 100 mg by mouth in the morning   diltiazem (CARDIZEM CD) 240 mg 24 hr capsule  Outside Facility (Specify) Yes No   Sig: Take 1 capsule by mouth daily   ferrous sulfate 325 (65 Fe) mg tablet   Yes No   Sig: Take 325 mg by mouth every other day   furosemide (LASIX) 40 mg tablet  Outside Facility (Specify) Yes No   Sig: Take 1 5 tablets by mouth 2 (two) times a day 60 MG BID   hydrALAZINE (APRESOLINE) 10 mg tablet  Outside Facility (Specify) Yes No   Sig: Take 10 mg by mouth 3 (three) times a day   insulin glargine (LANTUS) 100 units/mL subcutaneous injection   No No   Sig: Inject 35 Units under the skin daily at bedtime   insulin lispro (HumaLOG) 100 units/mL injection   No No   Sig: Inject 12 Units under the skin 3 (three) times a day with meals for 10 days   magnesium hydroxide (MILK OF MAGNESIA) 400 mg/5 mL oral suspension  Outside Facility (Specify) Yes No   Sig: Take 30 mL by mouth daily as needed for constipation   metoprolol succinate (TOPROL-XL) 100 mg 24 hr tablet   Yes No   Sig: Take 1 tablet by mouth daily   omeprazole (PriLOSEC) 40 MG capsule  Outside Facility (Specify) Yes No   Sig: Take 1 capsule by mouth daily   polyethylene glycol (MIRALAX) 17 g packet   Yes No   Sig: Take 17 g by mouth every other day    sertraline (ZOLOFT) 25 mg tablet   Yes No   Sig: Take 50 mg by mouth daily   sodium phosphate-biphosphate (FLEET) 7-19 g 118 mL enema  Outside Facility (Specify) Yes No   Sig: Insert 1 enema into the rectum once as needed Facility-Administered Medications: None       Portions of the record may have been created with voice recognition software  Occasional wrong word or "sound a like" substitutions may have occurred due to the inherent limitations of voice recognition software  Read the chart carefully and recognize, using context, where substitutions have occurred      Electronically signed by:  Lester Owens

## 2022-07-24 NOTE — DISCHARGE INSTRUCTIONS
You were seen in the ED for hypoglycemia  Return to the ED for any worsening symptoms or new symptoms  Follow up with your primary care doctor as soon as possible

## 2022-07-26 NOTE — ED PROVIDER NOTES
History  Chief Complaint   Patient presents with    Headache     Pt states "I don't know why I'm here" Pt sent by ems from Dr Jonathan Figueredo office in [place], where pt complained of sudden onset of headache  Concern for stroke at office  Pt denies pain, headache, deficit  Refusing EKG  HPI  46-year-old man presents to ED for evaluation of headache  Patient was at the gastroenterologist's office today  EMS brought him and had reported sudden onset of headache with slurred speech  PA at the office he did not appreciate any facial droop the patient but patient was resistant to talk to a female PA  He was sent to the ER to rule out stroke  Patient has no physical complaints or concerns  He states he would like to go home  He denies headache  He denies LOC, falls or trauma  He denies changes in vision or changes in speech  Patient denies dizziness, fevers, chills, chest pain, palpitations, abdominal pain, diarrhea, melena, hematochezia, dysuria, new skin rashes or numbness or tingling of the extremities  Patient reports he feels fine and does not want anything done  Patient is refusing EKG or labs or imaging  Prior to Admission Medications   Prescriptions Last Dose Informant Patient Reported? Taking?    apixaban (ELIQUIS) 5 mg   Yes No   Sig: Take 1 tablet by mouth 2 (two) times a day   atorvastatin (LIPITOR) 20 mg tablet  Outside Facility (Specify) Yes No   Sig: Take 1 tablet by mouth daily   bisacodyl (Dulcolax) 10 mg suppository  Outside Facility (Specify) Yes No   Sig: Insert 10 mg into the rectum as needed for constipation   buPROPion (WELLBUTRIN) 100 mg tablet   Yes No   Sig: Take 100 mg by mouth in the morning   diltiazem (CARDIZEM CD) 240 mg 24 hr capsule  Outside Facility (Specify) Yes No   Sig: Take 1 capsule by mouth daily   ferrous sulfate 325 (65 Fe) mg tablet   Yes No   Sig: Take 325 mg by mouth every other day   furosemide (LASIX) 40 mg tablet  Outside Facility (Specify) Yes No   Sig: Take 1 5 tablets by mouth 2 (two) times a day 60 MG BID   hydrALAZINE (APRESOLINE) 10 mg tablet  Outside Facility (Specify) Yes No   Sig: Take 10 mg by mouth 3 (three) times a day   insulin glargine (LANTUS) 100 units/mL subcutaneous injection   No No   Sig: Inject 35 Units under the skin daily at bedtime   insulin lispro (HumaLOG) 100 units/mL injection   No No   Sig: Inject 12 Units under the skin 3 (three) times a day with meals for 10 days   magnesium hydroxide (MILK OF MAGNESIA) 400 mg/5 mL oral suspension  Outside Facility (Specify) Yes No   Sig: Take 30 mL by mouth daily as needed for constipation   metoprolol succinate (TOPROL-XL) 100 mg 24 hr tablet   Yes No   Sig: Take 1 tablet by mouth daily   omeprazole (PriLOSEC) 40 MG capsule  Outside Facility (Specify) Yes No   Sig: Take 1 capsule by mouth daily   polyethylene glycol (MIRALAX) 17 g packet   Yes No   Sig: Take 17 g by mouth every other day    sertraline (ZOLOFT) 25 mg tablet   Yes No   Sig: Take 50 mg by mouth daily   sodium phosphate-biphosphate (FLEET) 7-19 g 118 mL enema  Outside Facility (Specify) Yes No   Sig: Insert 1 enema into the rectum once as needed      Facility-Administered Medications: None       Past Medical History:   Diagnosis Date    A-fib ([place] [address] )     Cancer ([place] [address] )     Cardiac disease     [facility]ive [facility]     [facility] obstructive pulmonary [facility]COPD) [facility] )     COPD (chronic obstructive pulmonary disease) ([facility] )     Coronary artery disease     Counseling regarding advance care planning and goals of care 10/7/2020    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[facility] )     TYPE 2    Dysphagia     Fracture of nasal bone     Gait instability     H/O cervical fracture     Hyperlipidemia     Hypertension     Muscle weakness     TBI (traumatic brain injury) ([facility] )        Past Surgical History:   Procedure Laterality Date    HAND SURGERY      SD ARTHRODESIS POSTERIOR CRANIOCERVICAL N/A 10/26/2020    Procedure: Occipital cervical fusion to C4;  Surgeon: Dallas Ferraro MD;  Location: [facility];  Service: Neurosurgery       Family History   Problem Relation Age of Onset    Other Other         urinary tract malignancy    Diabetes Mother      I have reviewed and agree with the history as documented  E-Cigarette/Vaping    E-Cigarette Use Never User      E-Cigarette/Vaping Substances    Nicotine No     THC No     CBD No     Flavoring No     Other No     Unknown No      Social History     Tobacco Use    Smoking status: Never Smoker    Smokeless tobacco: Never Used   Vaping Use    Vaping Use: Never used   Substance Use Topics    Alcohol use: Never    Drug use: Never        Review of Systems   All other systems reviewed and are negative  Physical Exam  ED Triage Vitals [07/26/22 1327]   Temperature Pulse Respirations Blood Pressure SpO2   97 7 °F (36 5 °C) 59 16 154/72 97 %      Temp Source Heart Rate Source Patient Position - Orthostatic VS BP Location FiO2 (%)   Oral Monitor -- Right arm --      Pain Score       No Pain             Orthostatic Vital Signs  Vitals:    07/26/22 1327   BP: 154/72   Pulse: 59       Physical Exam   Constitutional:  Well developed, well nourished, no acute distress, non-toxic appearance    HEENT:  Conjunctiva normal  Oropharynx moist  Respiratory:  No respiratory distress, normal breath sounds  Cardiovascular:  Normal rate, normal rhythm, no murmurs  GI:  Soft, nondistended, normal bowel sounds, nontender  :  No costovertebral angle tenderness   Musculoskeletal:  No edema, no tenderness, no deformities  Integument:  Well hydrated, no rash   Lymphatic:  No lymphadenopathy noted   Neurologic:  Alert & oriented x4, normal motor function, normal sensory function, no focal deficits noted  GCS 15  No dysarthria, no facial asymmetry, no visual field defects  No pronator drift  Good range of motion    Unable to assess gait as patient is dependent on a wheelchair  Psychiatric:  Calm, behavior appropriate, fluent speech       ED Medications  Medications - No data to display    Diagnostic Studies  Results Reviewed     None                 No orders to display         Procedures  Procedures      ED Course                                       MDM  Number of Diagnoses or Management Options  Headache  Patient refused evaluation or treatment  Diagnosis management comments: The patient has decided to leave against medical advice  The risks of doing so have been explained to the patient  Risks that we have discussed include: stroke, infection, bleeding, permanent damage to organs, loss of organs, temporary or permanent disability, temporary or permanent pain, prolonged hospitalization, emergency surgery, death  I have also explained to the patient that they may be at risk for other unpredictable adverse events by leaving without further evaluation and treatment  The patient expresses understanding of all parts of this discussion and still wishes to leave  Patient agrees to follow up with PCP  I have strongly encouraged the patient to return as soon as possible for further evaluation and treatment and expressed that we will always be willing to treat the patient        Risk of Complications, Morbidity, and/or Mortality  Presenting problems: moderate        Disposition  Final diagnoses:   Headache   Patient refused evaluation or treatment     Time reflects when diagnosis was documented in both MDM as applicable and the Disposition within this note     Time User Action Codes Description Comment    7/26/2022  2:16 PM Sidra Koch [R51 [id number]] Headache     7/26/2022  2:17 PM Ivanna Nevarez [Z53 [id number]] Patient refused evaluation or treatment       ED Disposition     ED Disposition   AMA    Condition   --    Date/Time   Tue Jul 26, 2022  2:19 PM    Comment   Date: 7/26/2022  Patient: Duane Rodas  Admitted: 7/26/2022  1:19 PM  Attending Provider: DO Junior Byrddereck Rodas or his authorized caregiver has made the decision for the patient to leave the emergency department against the advice of the emerge ncy department staff  He or his authorized caregiver has been informed and understands the inherent risks, including death, cardiac arrest, resp  arrest   He or his authorized caregiver has decided to accept the responsibility for this decision  Lilli Weinstein and all necessary parties have been advised that he may return for further evaluation or treatment  His condition at time of discharge was stable  Javier Ledbetter had current vital signs as follows:  /72 (BP Location: Right arm)   Pulse 59    Temp 97 7 °F (36 5 °C) (Oral)   Resp 16            Follow-up Information     Follow up With Specialties Details Why Contact Info    Betina Marte MD Internal Medicine, Geriatric Medicine Call  As needed [address]  [phone]            Patient's Medications   Discharge Prescriptions    No medications on file     No discharge procedures on file  PDMP Review       Value Time User    PDMP Reviewed  Yes 10/30/2020  2:05 PM Deepa Yusuf PA-C           ED Provider  Attending physically available and evaluated Javier Ledbetter  I managed the patient along with the ED Attending      Electronically Signed by         Amanda Weber MD  07/26/22 9226

## 2022-07-26 NOTE — ED NOTES
MICHELL arranging S/Krystal Zachery pu at [id number]  Vladimir Macias  07/26/22 48 Teresa Waldron  07/26/22 1610

## 2022-07-26 NOTE — TELEPHONE ENCOUNTER
Patient presented for his visit  Upon arrival EMS reported that he had acute onset headache and slurred speech  Vitals were obtained with a blood pressure of 142/70, pulse of 60, and oxygen respiration of 94  He was awake and alert with no facial droop witnessed  I attempted to obtain questions from the patient however he would not engage in conversation with me, only male staff  Recommend ED evaluation to rule out stroke

## 2022-07-27 NOTE — ED ATTENDING ATTESTATION
7/26/2022  IJosie DO, saw and evaluated the patient  I have discussed the patient with the resident/non-physician practitioner and agree with the resident's/non-physician practitioner's findings, Plan of Care, and MDM as documented in the resident's/non-physician practitioner's note, except where noted  All available labs and Radiology studies were reviewed  I was present for key portions of any procedure(s) performed by the resident/non-physician practitioner and I was immediately available to provide assistance  At this point I agree with the current assessment done in the Emergency Department  I have conducted an independent evaluation of this patient a history and physical is as follows:    61-year-old male presents for possible headache  The patient was sent in for headache and apparent dysarthria from his gastroenterologist's office  The patient however refuses to answer my questions here  He states this was nothing wrong with him he does not even want an EKG  He wants to go home  I advised him the risk of not being evaluated he understands the risks include death permanent disability or suffering  He understands that he is refusing medical care and if he is discharge he is leaving its medical advice  He understands and repeats all these risks me      ED Course         Critical Care Time  Procedures

## 2022-07-27 NOTE — TELEPHONE ENCOUNTER
Upon arrival to ER patient refused all care and left AMA   The patient does have an upcoming ERCP 8/3, will forward to endoscopist

## 2022-08-03 NOTE — ANESTHESIA PREPROCEDURE EVALUATION
Procedure:  ERCP    Relevant Problems   CARDIO   (+) Atrial fibrillation (HCC)   (+) Hyperlipidemia   (+) Hypertension      ENDO   (+) Type 2 diabetes mellitus (HCC)      GI/HEPATIC   (+) Dysphagia      HEMATOLOGY   (+) Anemia      NEURO/PSYCH   (+) Depression   (+) History of 2019 novel coronavirus disease (COVID-19)   (+) Vascular dementia (HCC)        Physical Exam    Airway    Mallampati score: II  TM Distance: >3 FB  Neck ROM: full     Dental       Cardiovascular      Pulmonary      Other Findings        Anesthesia Plan  ASA Score- 4     Anesthesia Type- general with ASA Monitors  Additional Monitors:   Airway Plan: ETT  Comment: IV induction - tachycardia during previous anesthetic  EF 50% 2017 ECHO  Wife consented via phone          Plan Factors-Exercise tolerance (METS): <4 METS  Chart reviewed  Imaging results reviewed  Existing labs reviewed  Patient summary reviewed  Patient is not a current smoker  Patient did not smoke on day of surgery  Induction- intravenous and rapid sequence induction  Postoperative Plan- Plan for postoperative opioid use  Planned trial extubation    Informed Consent- Anesthetic plan and risks discussed with patient  I personally reviewed this patient with the CRNA  Discussed and agreed on the Anesthesia Plan with the [facility] Anesthesia plan and consent discussed with Moses Olmos who expressed understanding and agreement  Risks/benefits and alternatives discussed with patient including possible PONV, sore throat, damage to teeth/lips/gums and possibility of rare anesthetic and surgical emergencies

## 2022-08-03 NOTE — DISCHARGE INSTRUCTIONS
ERCP (Endoscopic Retrograde Cholangiopancreatography)   WHAT YOU NEED TO KNOW:   An ERCP (endoscopic retrograde cholangiopancreatography) is a procedure to examine the ducts of your pancreas or gallbladder  ERCP may also be used to open blocked ducts, or to diagnose problems with your pancreas or gallbladder  An endoscope (thin, flexible tube with a light) will be used for the procedure  DISCHARGE INSTRUCTIONS:   Seek care immediately if:   You have sudden, severe abdominal pain  You have problems swallowing  You have a large amount of black, sticky bowel movements or blood in your bowel movements  You have sudden trouble breathing  You feel weak, lightheaded, or faint or your heart beats faster than normal for you  Contact your healthcare provider if:   You have a fever and chills  You have nausea or are vomiting  Your abdomen is bloated or feels full and hard  You have abdominal pain  You have a large amount of black, sticky bowel movements or blood in your bowel movements  You have not had a bowel movement for 3 days after your procedure  You have rash or hives  You lose your appetite, your skin feels itchy, and your skin turns yellow  You have questions or concerns about your procedure  Self-care:   ·      Rest when you feel it is needed  You may be drowsy for up to 24 hours after your procedure  Return to your daily activities as directed  ·       Ask when you can eat regular foods  Healthy foods include fruits, vegetables, whole-grain breads, low-fat dairy products, beans, lean meats, and fish  ·       Relieve a sore throat with ice chips, liquids, or lozenges as directed  Follow up with your healthcare provider as directed: Write down your questions so you remember to ask them during your visits  If you take a blood thinner, please review the specific instructions from your endoscopist about when you should resume it   These can be found in the Recommendation and Your Medication list sections of this After Visit Summary

## 2022-08-03 NOTE — ANESTHESIA POSTPROCEDURE EVALUATION
Post-Op Assessment Note    CV Status:  Stable  Pain Score: 0    Pain management: adequate     Mental Status:  Alert and awake   Hydration Status:  Euvolemic   PONV Controlled:  Controlled   Airway Patency:  Patent and adequate      Post Op Vitals Reviewed: Yes      Staff: CRNA         No complications documented      /74 (08/03/22 1136)    Temp (!) 96 9 °F (36 1 °C) (08/03/22 1136)    Pulse 66 (08/03/22 1136)   Resp 18 (08/03/22 1136)    SpO2 98 % (08/03/22 1136)

## 2022-08-04 PROBLEM — I63.9 CEREBROVASCULAR ACCIDENT (CVA) INVOLVING CEREBELLUM (HCC): Status: ACTIVE | Noted: 2022-01-01

## 2022-08-04 PROBLEM — A41.9 SEPSIS (HCC): Status: ACTIVE | Noted: 2022-01-01

## 2022-08-04 NOTE — CONSULTS
[facility] - Inpatient Consultation  Susan Kim 82 y o  male MRN: [id number]  Unit/Bed#: ED 10 Encounter: [id number]    Reason for Consult / Principal Problem:     S/P ERCP    ASSESSMENT & PLAN:      Sepsis, possible cholangitis  -Patient had previous episode of cholangitis for which he was hospitalized in June and underwent stent placement    -Yesterday 8/3 the stent was removed, CBD was cannulated, sphincterotomy, removal of sludge and debris   -This AM he presented with AMS, fever, low spo2  Unclear baseline mental status   -Febrile to 100 5  -SpO2 93% on admission, now 97% on 3L NC  -Cr 1 78  -, , Alk Phos 893, T  Bili 3 89  -WBC 22 08  -8/4 CT CAP: Intrahepatic and extrahepatic biliary ductal dilation with pancreatic body and tail atrophy and suspected pancreatic head mass  No gross lung infiltrates noted limited by respiratory motion   -Patient meets criteria for severe sepsis  Plan  -ERCP today for possible cholangitis  -Meets severe sepsis criteria, manage for sepsis  -Continue antibiotic coverage, tailor to culture results   -Future endoscopic ultrasound for evaluation of possible pancreatic mass  Potential future oncology consult  -Trend labs  -Will follow patient closely  Cerebrovascular Accident  -8/4 CTH: New low-density in the right cerebellum concerning for an acute infarct  Cerebral atrophy and small vessel disease    -Neurology consulted  -Management per neurology and primary team     Hypertension  -Hemodynamically stable this AM   -IVF as needed  -Monitor vitals closely  Type 2 Diabetes  -Glucose 248  -Management per primary team     ______________________________________________________________________    HISTORY OF PRESENT ILLNESS:  Patient is an 83 y/o male with history of recent ERCP and stent removal in the last 24 hours who presented with AMS, fever, low pulse ox   He had cholangitis for which he was hospitalized in June and underwent stent placement  Yesterday 8/3 the stent was removed and he presented this AM to the ED  Patient was febrile to 100 5, stable blood pressure, RR 22, elevated WBC  Patient met criteria for sepsis and was started on Cefepime and Flagyl  GI was consulted for possible cholangitis post procedure  Today on exam, patient denies most complaints though his cooperation and mental status is questionable  Unsure of his baseline mental status  He does note that he vomited once last night on repeat ROS but is unsure what the vomit looked like  Unsure if he felt well post procedure or when he started feeling worse  Denies fever, pain, trouble breathing  He seems uncomfortable and ill on exam  No point tenderness noted on abdominal exam with mild distension and possible mild jaundice  Denies nausea, vomiting, abdominal pain, blood in urine or stool  REVIEW OF SYSTEMS:  ROS limited by AMS, denies almost all symptoms    CONSTITUTIONAL: Denies any fever, chills, rigors, and weight loss  HEENT: No earache or tinnitus, denies hearing loss or visual disturbances  CARDIOVASCULAR: No chest pain or palpitations   RESPIRATORY: Denies any cough, hemoptysis, shortness of breath or dyspnea on exertion  GASTROINTESTINAL: As noted in the History of Present Illness   GENITOURINARY: No problems with urination, denies any hematuria or dysuria  NEUROLOGIC: No dizziness or vertigo, denies headaches   MUSCULOSKELETAL: Denies any muscle or joint pain   SKIN: Denies skin rashes or itching   ENDOCRINE: Denies excessive thirst, denies intolerance to heat or cold  PSYCHOSOCIAL: Denies depression or anxiety, denies any recent memory loss     Historical Information   Past Medical History:   Diagnosis Date    A-fib ([place] [address] )     Cancer ([place] [address] )     Cardiac disease     CHF (congestive heart failure) (HCC)     Chronic obstructive pulmonary disease (COPD) ([place] [address] )     COPD (chronic obstructive pulmonary disease) ([place] [address] )     Coronary artery disease     Counseling regarding advance care planning and goals of care 10/7/2020    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 [address] )     TYPE 2    Dysphagia     Fracture of nasal bone     Gait instability     H/O cervical fracture     Hyperlipidemia     Hypertension     Muscle weakness     TBI (traumatic brain injury) ([facility] [address] )      Past Surgical History:   Procedure Laterality Date    HAND SURGERY      MI ARTHRODESIS POSTERIOR CRANIOCERVICAL N/A 10/26/2020    Procedure: Occipital cervical fusion to C4;  Surgeon: Shaq Riddle MD;  Location: [facility];  Service: Neurosurgery     Social History   Social History     Substance and Sexual Activity   Alcohol Use Never     Social History     Substance and Sexual Activity   Drug Use Never     Social History     Tobacco Use   Smoking Status Never Smoker   Smokeless Tobacco Never Used     Family History   Problem Relation Age of Onset    Other Other         urinary tract malignancy    Diabetes Mother        Meds/Allergies   (Not in a hospital admission)    Current Facility-Administered Medications   Medication Dose Route Frequency    metroNIDAZOLE (FLAGYL) IVPB (premix) 500 mg 100 mL  500 mg Intravenous Q8H    sodium chloride 0 9 % bolus 500 mL  500 mL Intravenous Once     Allergies   Allergen Reactions    Amoxicillin Diarrhea and Hives       PHYSICAL EXAM:      Objective   Blood pressure 118/61, pulse 74, temperature 100 5 °F (38 1 °C), temperature source Rectal, resp  rate 22, SpO2 94 %  There is no height or weight on file to calculate BMI  Intake/Output Summary (Last 24 hours) at 8/4/2022 1043  Last data filed at 8/4/2022 1026  Gross per 24 hour   Intake 50 ml   Output 200 ml   Net -150 ml       General Appearance:   He is obese  Patient looks ill in moderate distress     HEENT:   Normocephalic, atraumatic, anicteric     Neck:   Supple, symmetrical, trachea midline   Lungs:   Equal chest rise, respirations labored   Heart:   Regular rate and rhythm   Abdomen:   Soft, no clear tenderness to palpation, mild distension; normal bowel sounds; no masses, no organomegaly    Rectal:   Deferred    Extremities:   No cyanosis, clubbing or edema    Neuro:    Moves all 4 extremities    Skin:   Possible mild jaundice, no rashes, or lesions      LAB RESULTS:     Admission on 08/04/2022   Component Date Value    Sodium 08/04/2022 139     Potassium 08/04/2022 3 4 (A)    Chloride 08/04/2022 101     CO2 08/04/2022 30     ANION GAP 08/04/2022 8     BUN 08/04/2022 23     Creatinine 08/04/2022 1 78 (A)    Glucose 08/04/2022 248 (A)    Calcium 08/04/2022 8 5     Corrected Calcium 08/04/2022 9 9     AST 08/04/2022 225 (A)    ALT 08/04/2022 282 (A)    Alkaline Phosphatase 08/04/2022 893 (A)    Total Protein 08/04/2022 6 9     Albumin 08/04/2022 2 3 (A)    Total Bilirubin 08/04/2022 3 89 (A)    eGFR 08/04/2022 34     WBC 08/04/2022 22 08 (A)    RBC 08/04/2022 4 59     Hemoglobin 08/04/2022 11 8 (A)    Hematocrit 08/04/2022 37 3     MCV 08/04/2022 81 (A)    MCH 08/04/2022 25 7 (A)    MCHC 08/04/2022 31 6     RDW 08/04/2022 18 6 (A)    MPV 08/04/2022 10 7     Platelets 43/97/2510 262     Lipase 08/04/2022 28 (A)    hs TnI 0hr 08/04/2022 89 (A)    NT-proBNP 08/04/2022 21,688 (A)    LACTIC ACID 08/04/2022 3 5 (A)    SARS-CoV-2 08/04/2022 Negative     INFLUENZA A PCR 08/04/2022 Negative     INFLUENZA B PCR 08/04/2022 Negative     RSV PCR 08/04/2022 Negative     Protime 08/04/2022 16 6 (A)    INR 08/04/2022 1 32 (A)    PTT 08/04/2022 32     Segmented % 08/04/2022 81 (A)    Bands % 08/04/2022 8     Lymphocytes % 08/04/2022 3 (A)    Monocytes % 08/04/2022 4     Eosinophils, % 08/04/2022 0     Basophils % 08/04/2022 0     Metamyelocytes% 08/04/2022 4 (A)    Absolute Neutrophils 08/04/2022 19 65 (A)    Lymphocytes Absolute 08/04/2022 0 66     Monocytes Absolute 08/04/2022 0 88     Eosinophils Absolute 08/04/2022 0 00     Basophils Absolute 08/04/2022 0 00     RBC Morphology 08/04/2022 Present     Hypochromia 08/04/2022 Present     Poikilocytes 08/04/2022 Present     Polychromasia 08/04/2022 Present     Platelet Estimate 67/17/7427 Adequate     Color, UA 08/04/2022 Rosina     Clarity, UA 08/04/2022 Cloudy     pH, UA 08/04/2022 5 0     Leukocytes, UA 08/04/2022 Negative     Nitrite, UA 08/04/2022 Negative     Protein, UA 08/04/2022 100 (2+) (A)    Glucose, UA 08/04/2022 100 (1/10%) (A)    Ketones, UA 08/04/2022 Negative     Urobilinogen, UA 08/04/2022 4 0 (A)    Bilirubin, UA 08/04/2022 Interference- unable to analyze (A)    Occult Blood, UA 08/04/2022 Negative     Specific Gravity, UA 08/04/2022 1 025        RADIOLOGY RESULTS: I have personally reviewed pertinent imaging studies      Ashlyn Lou MD  [address]  Internal Medicine Residency PGY-1

## 2022-08-04 NOTE — ASSESSMENT & PLAN NOTE
Met sepsis criteria on admission, likely in the setting of ascending cholangitis secondary to failure of stent free trial   - Blood cultures showing ESBL, gram negative and gram positive rods  Currently receiving Ertapenem  - ERCP completed    - GI following  - Management per critical care team

## 2022-08-04 NOTE — QUICK NOTE
Updated wife over the phone regarding all events since his ERCP and the need to remain on the ventilator  She appreciated the call and he is Full Code

## 2022-08-04 NOTE — CONSULTS
Consultation - Neurology   Etienne Lou 82 y o  male MRN: [id number]  Unit/Bed#: MICU 11 Encounter: [id number]      Assessment/Plan     * Cerebrovascular accident (CVA) involving cerebellum [facility])  Assessment & Plan  82year old male with extensive medical history including but not limited to Afib on Eliquis, vascular dementia, CHF, COPD, CAD, DM, HTN, HLD and prior TBI  Patient was recently admitted in June 2022 for cholangitis and underwent stent placement  Patient had the stent removed on 8/3 and now presents from his nursing home with AMS, fever and hypoxia  Patient met criteria for sepsis and was started on broad spectrum antibiotics  Labs remarkable for lactic acidosis, CHASITY (creat 1 78), elevated LFT's, elevated bilirubin, leukocytosis (22), elevated troponin, elevated BNP (07,654)  CTH revealed an acute right cerebellar infarct and neurology was consulted  Of note, patient's Eliquis was held x 3-4 days total for stent removal procedure  Patient was recently in [facility] ED on 7/26 for headache and dysarthria, concern for stroke  Patient ultimately refused all care/work up and left AMA  Neuro exam is grossly non focal, however he does have mild dysarthria and trace dysmetria with right hand coordination  He denies stroke like symptoms  Will admit patient on stroke pathway, MRI pending  Plan:  - Stroke pathway  - Continue rectal Aspirin 300 mg    - Recommend PO Aspirin 81 mg once patient is able to safely take oral medications   - MRI brain pending   - Echo pending   - Lipid Panel pending   - Hemoglobin A1c pending   - SBP goal 140-160 per attending neurologist    - Currently receiving IV pressure support   - Euglycemic, normothermic goal  - Continue telemetry  - PT/OT/ST  - Secondary risk factor modification    - Stroke education  - Frequent neuro checks   Continue to monitor and notify neurology with any changes   - STAT CT head for any acute change in neuro exam  - Medical management and supportive care per primary team  Correction of any metabolic or infectious disturbances  Plan discussed with Attending Neurologist, please see attestation for further input/recommendations  Sepsis ([address] )  Assessment & Plan  Met sepsis criteria on admission   - Currently receiving broad spectrum antibiotics   - Management per critical care team     Atrial fibrillation [facility])  Assessment & Plan  Previously on Eliquis 5 mg BID   - Currently on hold, await STAT echo results prior to starting [facility] per attending neurologist       Ashutosh Miller will need follow up in in 6 weeks with neurovascular attending  He will not require outpatient neurological testing  History of Present Illness     Reason for Consult / Principal Problem: Cerebellar Infarct   Hx and PE limited by: N/A  HPI: Ashutosh Miller is a 82 y o  male with extensive medical history including but not limited to Afib on Eliquis, vascular dementia, CHF, COPD, CAD, DM, HTN, HLD and prior TBI  Patient was recently admitted in June 2022 for cholangitis and underwent stent placement  Yesterday (8/3), patient had the stent removed and now presents from his nursing home with AMS, fever and hypoxia  Patient met criteria for sepsis and was started on broad spectrum antibiotics  Labs remarkable for lactic acidosis, CHASITY (creat 1 78), elevated LFT's, elevated bilirubin, leukocytosis (22), elevated troponin, elevated BNP (43,692)  CTH revealed an acute right cerebellar infarct and neurology was consulted  Of note, patient's Eliquis was held x 3-4 days total for stent removal procedure  Per chart review, on 7/26/22 patient was sent from outpatient GI office to [facility] ED for headache and dysarthria, there was a concern for an acute stroke  Patient ultimately refused all care/work up and left AMA  Inpatient consult to Neurology  Consult performed by: ARISTEO Summers  Consult ordered by: Marek Jerez PA-C          Review of Systems   Constitutional: Negative for fever     Eyes: Negative for photophobia and visual disturbance  Respiratory: Negative for cough and shortness of breath  Cardiovascular: Negative for chest pain  Musculoskeletal: Negative for back pain and neck pain  Skin: Negative for color change and pallor  Neurological: Positive for speech difficulty (Slurred speech, patient reports his speech is normal)  Negative for dizziness, tremors, seizures, syncope, facial asymmetry, weakness, light-headedness, numbness and headaches  Psychiatric/Behavioral: Positive for confusion  The patient is not nervous/anxious  All other systems reviewed and are negative        Historical Information   Past Medical History:   Diagnosis Date    A-fib ([place] [address] )     Cancer ([place] [address] )     Cardiac disease     [facility]     Chronic obstructive pulmonary disease (COPD) ([place] [address] )     COPD [facility]ch[facility] obstructive pulmonary disease) ([facility])     Coronary artery disease     Counseling regarding advance care planning and goals of care 10/7/2020    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[place] [address] )     TYPE 2    Dysphagia     Fracture of nasal bone     Gait instability     H/O cervical fracture     Hyperlipidemia     Hypertension     Muscle weakness     TBI (traumatic brain injury) ([place] [address] )      Past Surgical History:   Procedure Laterality Date    HAND SURGERY      NH ARTHRODESIS POSTERIOR CRANIOCERVICAL N/A 10/26/2020    Procedure: Occipital cervical fusion to C4;  Surgeon: Belem Alfredo MD;  Location:  MAIN OR;  Service: Neurosurgery     Social History   Social History     Substance and Sexual Activity   Alcohol Use Never     Social History     Substance and Sexual Activity   Drug Use Never     E-Cigarette/Vaping    E-Cigarette Use Never User      E-Cigarette/Vaping Substances    Nicotine No     THC No     CBD No     Flavoring No     Other No     Unknown No      Social History     Tobacco Use   Smoking Status Never Smoker   Smokeless Tobacco Never Used     Family History:   Family History   Problem Relation Age of Onset    Other Other         urinary tract malignancy    Diabetes Mother        Review of previous medical records was completed  Meds/Allergies   all current active meds have been reviewed, current meds:   Current Facility-Administered Medications   Medication Dose Route Frequency    aspirin rectal suppository 300 mg  300 mg Rectal Once    metroNIDAZOLE (FLAGYL) IVPB (premix) 500 mg 100 mL  500 mg Intravenous Q8H    norepinephrine (LEVOPHED) 4 mg (STANDARD CONCENTRATION) IV in sodium chloride 0 9% 250 mL  1-30 mcg/min Intravenous Titrated    and PTA meds:   Prior to Admission Medications   Prescriptions Last Dose Informant Patient Reported? Taking?    apixaban (ELIQUIS) 5 mg   Yes No   Sig: Take 1 tablet by mouth 2 (two) times a day   atorvastatin (LIPITOR) 20 mg tablet  Outside Facility (Specify) Yes No   Sig: Take 1 tablet by mouth daily   bisacodyl (DULCOLAX) 10 mg suppository  Outside Facility (Specify) Yes No   Sig: Insert 10 mg into the rectum as needed for constipation   buPROPion (WELLBUTRIN) 100 mg tablet   Yes No   Sig: Take 100 mg by mouth in the morning   diltiazem (CARDIZEM CD) 240 mg 24 hr capsule  Outside Facility (Specify) Yes No   Sig: Take 1 capsule by mouth daily   ferrous sulfate 325 (65 Fe) mg tablet   Yes No   Sig: Take 325 mg by mouth every other day   furosemide (LASIX) 40 mg tablet  Outside Facility (Specify) Yes No   Sig: Take 1 5 tablets by mouth 2 (two) times a day 60 MG BID   hydrALAZINE (APRESOLINE) 10 mg tablet  Outside Facility (Specify) Yes No   Sig: Take 10 mg by mouth 3 (three) times a day   insulin glargine (LANTUS) 100 units/mL subcutaneous injection   No No   Sig: Inject 35 Units under the skin daily at bedtime   insulin lispro (HumaLOG) 100 units/mL injection   No No   Sig: Inject 12 Units under the skin 3 (three) times a day with meals for 10 days   magnesium hydroxide (MILK OF MAGNESIA) 400 mg/5 mL oral suspension  Outside Facility (Specify) Yes No   Sig: Take 30 mL by mouth daily as needed for constipation   metoprolol succinate (TOPROL-XL) 100 mg 24 hr tablet   Yes No   Sig: Take 1 tablet by mouth daily   omeprazole (PriLOSEC) 40 MG capsule  Outside Facility (Specify) Yes No   Sig: Take 1 capsule by mouth daily   polyethylene glycol (MIRALAX) 17 g packet   Yes No   Sig: Take 17 g by mouth every other day    sodium phosphate-biphosphate (FLEET) 7-19 g 118 mL enema  Outside Facility (Specify) Yes No   Sig: Insert 1 enema into the rectum once as needed      Facility-Administered Medications: None       Allergies   Allergen Reactions    Amoxicillin Diarrhea and Hives       Objective   Vitals:Blood pressure 143/72, pulse 74, temperature 100 5 °F (38 1 °C), temperature source Rectal, resp  rate (!) 25, height 5' 8" (1 727 m), weight 93 9 kg (207 lb), SpO2 97 %  ,Body mass index is 31 47 kg/m²  Intake/Output Summary (Last 24 hours) at 8/4/2022 1448  Last data filed at 8/4/2022 1155  Gross per 24 hour   Intake 550 ml   Output 200 ml   Net 350 ml       Invasive Devices: Invasive Devices  Report    Peripheral Intravenous Line  Duration           Peripheral IV 08/04/22 Left;Ventral (anterior) Wrist <1 day    Peripheral IV 08/04/22 Right Antecubital <1 day                Physical Exam  Vitals reviewed  Constitutional:       Appearance: Normal appearance  He is well-developed  He is obese  He is ill-appearing  HENT:      Head: Normocephalic and atraumatic  Right Ear: External ear normal       Left Ear: External ear normal       Nose: Nose normal       Mouth/Throat:      Mouth: Mucous membranes are moist    Eyes:      General:         Right eye: No discharge  Left eye: No discharge  Extraocular Movements: Extraocular movements intact and EOM normal       Conjunctiva/sclera: Conjunctivae normal       Pupils: Pupils are equal, round, and reactive to light  Cardiovascular:      Rate and Rhythm: Normal rate     Pulmonary: Effort: Pulmonary effort is normal       Comments: Oxygen mask in place   Musculoskeletal:         General: No tenderness  Normal range of motion  Cervical back: Normal range of motion  Right lower leg: No edema  Left lower leg: No edema  Skin:     General: Skin is warm and dry  Coloration: Skin is not pale  Neurological:      Mental Status: He is alert  Mental status is at baseline  He is disoriented  Sensory: No sensory deficit  Motor: No weakness  Coordination: Coordination normal    Psychiatric:         Mood and Affect: Mood normal          Speech: Speech is slurred  Behavior: Behavior normal        Neurologic Exam     Mental Status   Oriented to person  Oriented to place  Disoriented to time  Follows 2 step commands  Attention: normal  Concentration: normal    Speech: slurred   Level of consciousness: alert  Knowledge: good  Normal comprehension  Cranial Nerves     CN II   Visual fields full to confrontation  CN III, IV, VI   Pupils are equal, round, and reactive to light  Extraocular motions are normal    Nystagmus: none   Diplopia: none  Conjugate gaze: present    CN V   Facial sensation intact  CN VII   Facial expression full, symmetric  CN VIII   CN VIII normal    Hearing: intact    Motor Exam   Muscle bulk: normal  Overall muscle tone: normal  5/5 strength throughout bilateral upper extremities  Unable to comprehend motor testing for BLE, however is able to lift antigravity      Sensory Exam   Light touch normal      Gait, Coordination, and Reflexes     Gait  Gait: (deferred for safety)    Tremor   Resting tremor: absent  Intention tremor: absent  Action tremor: absent  Trace dysmetria with R finger to nose   Negative clonus  + Babinski R, negative on L        Lab Results:   I have personally reviewed pertinent reports    , CBC:   Results from last 7 days   Lab Units 08/04/22  0850   WBC Thousand/uL 22 08*   RBC Million/uL 4 59 HEMOGLOBIN g/dL 11 8*   HEMATOCRIT % 37 3   MCV fL 81*   PLATELETS Thousands/uL 262   , BMP/CMP:   Results from last 7 days   Lab Units 08/04/22  0850   SODIUM mmol/L 139   POTASSIUM mmol/L 3 4*   CHLORIDE mmol/L 101   CO2 mmol/L 30   BUN mg/dL 23   CREATININE mg/dL 1 78*   CALCIUM mg/dL 8 5   AST U/L 225*   ALT U/L 282*   ALK PHOS U/L 893*   EGFR ml/min/1 73sq m 34   Coagulation:   Results from last 7 days   Lab Units 08/04/22  0850   INR  1 32*   Urinalysis:   Results from last 7 days   Lab Units 08/04/22  1015   COLOR UA  Rosina   CLARITY UA  Cloudy   SPEC GRAV UA  1 025   PH UA  5 0   LEUKOCYTES UA  Negative   NITRITE UA  Negative   GLUCOSE UA mg/dl 100 (1/10%)*   KETONES UA mg/dl Negative   BILIRUBIN UA  Interference- unable to analyze*   BLOOD UA  Negative   Imaging Studies: I have personally reviewed pertinent reports  and I have personally reviewed pertinent films in PACS [facility]   EKG, Pathology, and Other Studies: I have personally reviewed pertinent reports     and I have personally reviewed pertinent films in PACS  VTE Prophylaxis: Sequential compression device (Venodyne)     Code Status: Level 1 - Full Code

## 2022-08-04 NOTE — CONSULTS
Please see consult note written by Dr Verito Valencia 8/4 signed 2:52 PM     Hugo Walker MD  PGY-4 Gastroenterology Fellow

## 2022-08-04 NOTE — QUICK NOTE
Consent obtained for central line by wife, Kenisha Perez MD   Internal Medicine PGY1   [facility]

## 2022-08-04 NOTE — ED PROVIDER NOTES
History  Chief Complaint   Patient presents with    Altered Mental Status     Per EMS, pt had stents removed yesterday  Pt was discharged and overnight and became hypoxic and altered  Patient presents to the ER for evaluation of AMS  Patient presents from a nursing facility  Per nursing facility, patient had ERCP yesterday  States that he did not want to eat dinner last night and when they saw him this morning he was altered, mumbling and not his baseline  States that he was also tachypneic, tachycardic, and mildly hypoxic  Nursing facility states that patient is normally alert and talkative  States that they have been holding his Eliquis as they were instructed to hold his Eliquis 2 days prior to his procedure in 2 days after  Denies any recent falls  History limited due to patient condition  Prior to Admission Medications   Prescriptions Last Dose Informant Patient Reported? Taking?    apixaban (ELIQUIS) 5 mg   Yes No   Sig: Take 1 tablet by mouth 2 (two) times a day   atorvastatin (LIPITOR) 20 mg tablet  Outside Facility (Specify) Yes No   Sig: Take 1 tablet by mouth daily   bisacodyl (DULCOLAX) 10 mg suppository  Outside Facility (Specify) Yes No   Sig: Insert 10 mg into the rectum as needed for constipation   buPROPion (WELLBUTRIN) 100 mg tablet   Yes No   Sig: Take 100 mg by mouth in the morning   diltiazem (CARDIZEM CD) 240 mg 24 hr capsule  Outside Facility (Specify) Yes No   Sig: Take 1 capsule by mouth daily   ferrous sulfate 325 (65 Fe) mg tablet   Yes No   Sig: Take 325 mg by mouth every other day   furosemide (LASIX) 40 mg tablet  Outside Facility (Specify) Yes No   Sig: Take 1 5 tablets by mouth 2 (two) times a day 60 MG BID   hydrALAZINE (APRESOLINE) 10 mg tablet  Outside Facility (Specify) Yes No   Sig: Take 10 mg by mouth 3 (three) times a day   insulin glargine (LANTUS) 100 units/mL subcutaneous injection   No No   Sig: Inject 35 Units under the skin daily at bedtime   insulin lispro (HumaLOG) 100 units/mL injection   No No   Sig: Inject 12 Units under the skin 3 (three) times a day with meals for 10 days   magnesium hydroxide (MILK OF MAGNESIA) 400 mg/5 mL oral suspension  Outside Facility (Specify) Yes No   Sig: Take 30 mL by mouth daily as needed for constipation   metoprolol succinate (TOPROL-XL) 100 mg 24 hr tablet   Yes No   Sig: Take 1 tablet by mouth daily   omeprazole (PriLOSEC) 40 MG capsule  Outside Facility (Specify) Yes No   Sig: Take 1 capsule by mouth daily   polyethylene glycol (MIRALAX) 17 g packet   Yes No   Sig: Take 17 g by mouth every other day    sodium phosphate-biphosphate (FLEET) 7-19 g 118 mL enema  Outside Facility (Specify) Yes No   Sig: Insert 1 enema into the rectum once as needed      Facility-Administered Medications: None       Past Medical History:   Diagnosis Date    A[place]fib [place] [address] )     Cancer ([place] [address] )     Cardiac disease     CHF (congestive heart failure) ([place] [address] )     Chronic obstructive pulmonary disease (COPD) ([place] [address] )     COPD (chronic obstructive pulmonary disease) ([place] [address] )     Coronary artery disease     Counseling regarding advance care planning and goals of care 10/7/2020    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[place] [address] )     TYPE 2    Dysphagia     Fracture of nasal bone     Gait instability     H/O cervical fracture     Hyperlipidemia     Hypertension     Muscle weakness     TBI (traumatic brain injury) ([place] [address] )        Past Surgical History:   Procedure Laterality Date    HAND SURGERY      MD ARTHRODESIS POSTERIOR CRANIOCERVICAL N/A 10/26/2020    Procedure: Occipital cervical fusion to C4;  Surgeon: Shaq Riddle MD;  Location[facility];  Service: Neurosurgery       Family History   Problem Relation Age of Onset    Other Other         urinary tract malignancy    Diabetes Mother      I have reviewed and agree with the history as documented      E-Cigarette/Vaping    E-Cigarette Use Never User      E-Cigarette/Vaping Substances    Nicotine No     THC No     CBD No     Flavoring No     Other No     Unknown No      Social History     Tobacco Use    Smoking status: Never Smoker    Smokeless tobacco: Never Used   Vaping Use    Vaping Use: Never used   Substance Use Topics    Alcohol use: Never    Drug use: Never       Review of Systems   Unable to perform ROS: Mental status change       Physical Exam  Physical Exam  Constitutional:       General: He is not in acute distress  Appearance: He is well-developed  HENT:      Head: Normocephalic and atraumatic  Nose: Nose normal    Eyes:      Extraocular Movements: Extraocular movements intact  Conjunctiva/sclera: Conjunctivae normal       Pupils: Pupils are equal, round, and reactive to light  Cardiovascular:      Rate and Rhythm: Normal rate  Pulmonary:      Comments: Mildly increased respiratory effort  Mild rales  Abdominal:      Palpations: Abdomen is soft  Tenderness: There is no abdominal tenderness  There is no guarding  Musculoskeletal:         General: Normal range of motion  Cervical back: Normal range of motion  Right lower leg: No edema  Left lower leg: No edema  Comments: Normal  strength bilaterally  Patient able to straight leg raise bilaterally  Skin:     General: Skin is warm  Capillary Refill: Capillary refill takes less than 2 seconds  Neurological:      Mental Status: He is alert  Comments: Oriented to person and place           Vital Signs  ED Triage Vitals   Temperature Pulse Respirations Blood Pressure SpO2   08/04/22 0831 08/04/22 0824 08/04/22 0824 08/04/22 0824 08/04/22 0824   100 5 °F (38 1 °C) 88 20 121/61 93 %      Temp Source Heart Rate Source Patient Position - Orthostatic VS BP Location FiO2 (%)   08/04/22 0831 08/04/22 0824 08/04/22 0824 08/04/22 0824 --   Rectal Monitor Lying Left arm       Pain Score       08/04/22 0839       No Pain           Vitals:    08/04/22 1327 08/04/22 1332 08/04/22 1339 08/04/22 1618   BP: 134/63 (!) 140/42 143/72 116/60   Pulse: 74 74 74 72   Patient Position - Orthostatic VS: Lying            Visual Acuity  Visual Acuity    Flowsheet Row Most Recent Value   L Pupil Size (mm) 2   R Pupil Size (mm) 2          ED Medications  Medications   metroNIDAZOLE (FLAGYL) IVPB (premix) 500 mg 100 mL (0 mg Intravenous Stopped 8/4/22 1113)   norepinephrine (LEVOPHED) 4 mg (STANDARD CONCENTRATION) IV in sodium chloride 0 9% 250 mL (6 mcg/min Intravenous Rate/Dose Change 8/4/22 1650)   aspirin rectal suppository 300 mg (has no administration in time range)   lidocaine (PF) (XYLOCAINE-MPF) 1 % injection 5 mL (has no administration in time range)   cefepime (MAXIPIME) 2,000 mg in dextrose 5 % 50 mL IVPB (has no administration in time range)   cefepime (MAXIPIME) 2 g/50 mL dextrose IVPB (0 mg Intravenous Stopped 8/4/22 0941)   sodium chloride 0 9 % bolus 250 mL (0 mL Intravenous Stopped 8/4/22 1155)   acetaminophen (TYLENOL) rectal suppository 650 mg (650 mg Rectal Given 8/4/22 0944)   sodium chloride 0 9 % bolus 500 mL (500 mL Intravenous New Bag 8/4/22 1158)   iohexol (OMNIPAQUE) 350 MG/ML injection (MULTI-DOSE) 68 mL (68 mL Intravenous Given 8/4/22 1056)       Diagnostic Studies  Results Reviewed     Procedure Component Value Units Date/Time    HS Troponin I 4hr [[id number]]  (Abnormal) Collected: 08/04/22 1331    Lab Status: Final result Specimen: Blood from Arm, Right Updated: 08/04/22 1403     hs TnI 4hr 76 ng/L      Delta 4hr hsTnI -13 ng/L     Lactic acid 2 Hours [[id number]]  (Abnormal) Collected: 08/04/22 1120    Lab Status: Final result Specimen: Blood from Arm, Right Updated: 08/04/22 1348     LACTIC ACID 3 5 mmol/L     Narrative:      Result may be elevated if tourniquet was used during collection      Blood gas, venous [[id number]]  (Abnormal) Collected: 08/04/22 1331    Lab Status: Final result Specimen: Blood from Arm, Right Updated: 08/04/22 1343     pH, Christopher 7 449     pCO2, Christopher 40 1 mm Hg      pO2, Christopher 59 8 mm Hg HCO3, Christopher 27 2 mmol/L      Base Excess, Christopher 3 0 mmol/L      O2 Content, Christopher 13 8 ml/dL      O2 HGB, VENOUS 87 7 %     CBC and differential [[id number]]     Lab Status: No result Specimen: Blood     Comprehensive metabolic panel [[id number]]     Lab Status: No result Specimen: Blood     Lactic acid [[id number]]     Lab Status: No result Specimen: Blood     Magnesium [[id number]]     Lab Status: No result Specimen: Blood     Phosphorus [[id number]]     Lab Status: No result Specimen: Blood     Protime-INR [[id number]]     Lab Status: No result Specimen: Blood     Ammonia [[id number]]     Lab Status: No result Specimen: Blood     Lipase [[id number]]     Lab Status: No result Specimen: Blood     Urine Microscopic [[id number]]  (Abnormal) Collected: 08/04/22 1015    Lab Status: Final result Specimen: Urine, Clean Catch Updated: 08/04/22 1311     RBC, UA None Seen /hpf      WBC, UA 1-2 /hpf      Epithelial Cells Occasional /hpf      Bacteria, UA None Seen /hpf      MUCUS THREADS Occasional     Hyaline Casts, UA 5-10 /lpf      Amorphous Crystals, UA Occasional    Blood culture #1 [[id number]] Collected: 08/04/22 0850    Lab Status: Preliminary result Specimen: Blood from Arm, Right Updated: 08/04/22 1303     Blood Culture Received in Microbiology Lab  Culture in Progress  Blood culture #2 [[id number]] Collected: 08/04/22 0850    Lab Status: Preliminary result Specimen: Blood from Arm, Right Updated: 08/04/22 1303     Blood Culture Received in Microbiology Lab  Culture in Progress  CEA [[id number]]  (Normal) Collected: 08/04/22 1158    Lab Status: Final result Specimen: Blood from Arm, Right Updated: 08/04/22 1236     CEA 1 8 ng/mL     Cancer antigen 19-9 [[id number]] Collected: 08/04/22 1158    Lab Status:  In process Specimen: Blood from Arm, Right Updated: 08/04/22 1204    HS Troponin I 2hr [[id number]]  (Abnormal) Collected: 08/04/22 1120    Lab Status: Final result Specimen: Blood from Arm, Right Updated: 08/04/22 1156 hs TnI 2hr 80 ng/L      Delta 2hr hsTnI -9 ng/L     Urine Macroscopic, POC [[id number]]  (Abnormal) Collected: 08/04/22 1015    Lab Status: Final result Specimen: Urine Updated: 08/04/22 1017     Color, UA Rosina     Clarity, UA Cloudy     pH, UA 5 0     Leukocytes, UA Negative     Nitrite, UA Negative     Protein,  (2+) mg/dl      Glucose,  (1/10%) mg/dl      Ketones, UA Negative mg/dl      Urobilinogen, UA 4 0 E U /dl      Bilirubin, UA Interference- unable to analyze     Occult Blood, UA Negative     Specific Gravity, UA 1 025    Narrative:      CLINITEK RESULT    FLU/RSV/COVID - if FLU/RSV clinically relevant [[id number]]  (Normal) Collected: 08/04/22 0850    Lab Status: Final result Specimen: Nares from Nose Updated: 08/04/22 0955     SARS-CoV-2 Negative     INFLUENZA A PCR Negative     INFLUENZA B PCR Negative     RSV PCR Negative    Narrative:      FOR PEDIATRIC PATIENTS - copy/paste COVID Guidelines URL to browser: [email or web address]  [employer]    SARS-CoV-2 assay is a Nucleic Acid Amplification assay intended for the  qualitative detection of nucleic acid from SARS-CoV-2 in nasopharyngeal  swabs  Results are for the presumptive identification of SARS-CoV-2 RNA  Positive results are indicative of infection with SARS-CoV-2, the virus  causing COVID-19, but do not rule out bacterial infection or co-infection  with other viruses  Laboratories within the [place] and its  territories are required to report all positive results to the appropriate  public health authorities  Negative results do not preclude SARS-CoV-2  infection and should not be used as the sole basis for treatment or other  patient management decisions  Negative results must be combined with  clinical observations, patient history, and epidemiological information  This test has not been FDA cleared or approved      This test has been authorized by FDA under an Emergency Use Authorization  (EUA)  This test is only authorized for the duration of time the  declaration that circumstances exist justifying the authorization of the  emergency use of an in vitro diagnostic tests for detection of SARS-CoV-2  virus and/or diagnosis of COVID-19 infection under section 564(b)(1) of  the Act, 21 U  S C  988RWY-6(S)(8), unless the authorization is terminated  or revoked sooner  The test has been validated but independent review by FDA  and CLIA is pending  Test performed using [employer] GeneXpert: This RT-PCR assay targets N2,  a region unique to SARS-CoV-2  A conserved region in the E-gene was chosen  for pan-Sarbecovirus detection which includes SARS-CoV-2  CBC and differential [[id number]]  (Abnormal) Collected: 08/04/22 0850    Lab Status: Final result Specimen: Blood from Arm, Right Updated: 08/04/22 0947     WBC 22 08 Thousand/uL      RBC 4 59 Million/uL      Hemoglobin 11 8 g/dL      Hematocrit 37 3 %      MCV 81 fL      MCH 25 7 pg      MCHC 31 6 g/dL      RDW 18 6 %      MPV 10 7 fL      Platelets 858 Thousands/uL     Narrative: This is an appended report  These results have been appended to a previously verified report      Manual Differential(PHLEBS Do Not Order) [[id number]]  (Abnormal) Collected: 08/04/22 0850    Lab Status: Final result Specimen: Blood from Arm, Right Updated: 08/04/22 0947     Segmented % 81 %      Bands % 8 %      Lymphocytes % 3 %      Monocytes % 4 %      Eosinophils, % 0 %      Basophils % 0 %      Metamyelocytes% 4 %      Absolute Neutrophils 19 65 Thousand/uL      Lymphocytes Absolute 0 66 Thousand/uL      Monocytes Absolute 0 88 Thousand/uL      Eosinophils Absolute 0 00 Thousand/uL      Basophils Absolute 0 00 Thousand/uL      Total Counted --     RBC Morphology Present     Hypochromia Present     Poikilocytes Present     Polychromasia Present     Platelet Estimate Adequate    Lactic acid [[id number]]  (Abnormal) Collected: 08/04/22 0850    Lab Status: Final result Specimen: Blood from Arm, Right Updated: 08/04/22 0939     LACTIC ACID 3 5 mmol/L     Narrative:      Result may be elevated if tourniquet was used during collection      HS Troponin 0hr (reflex protocol) [[id number]]  (Abnormal) Collected: 08/04/22 0850    Lab Status: Final result Specimen: Blood from Arm, Right Updated: 08/04/22 0932     hs TnI 0hr 89 ng/L     Comprehensive metabolic panel [[id number]]  (Abnormal) Collected: 08/04/22 0850    Lab Status: Final result Specimen: Blood from Arm, Right Updated: 08/04/22 0926     Sodium 139 mmol/L      Potassium 3 4 mmol/L      Chloride 101 mmol/L      CO2 30 mmol/L      ANION GAP 8 mmol/L      BUN 23 mg/dL      Creatinine 1 78 mg/dL      Glucose 248 mg/dL      Calcium 8 5 mg/dL      Corrected Calcium 9 9 mg/dL       U/L       U/L      Alkaline Phosphatase 893 U/L      Total Protein 6 9 g/dL      Albumin 2 3 g/dL      Total Bilirubin 3 89 mg/dL      eGFR 34 ml/min/1 73sq m     Narrative:      [facility] guidelines for Chronic Kidney Disease (CKD):     Stage 1 with normal or high GFR (GFR > 90 mL/min/1 73 square meters)    Stage 2 Mild CKD (GFR = 60-89 mL/min/1 73 square meters)    Stage 3A Moderate CKD (GFR = 45-59 mL/min/1 73 square meters)    Stage 3B Moderate CKD (GFR = 30-44 mL/min/1 73 square meters)    Stage 4 Severe CKD (GFR = 15-29 mL/min/1 73 square meters)    Stage 5 End Stage CKD (GFR <15 mL/min/1 73 square meters)  Note: GFR calculation is accurate only with a steady state creatinine    NT-BNP PRO [[id number]]  (Abnormal) Collected: 08/04/22 0850    Lab Status: Final result Specimen: Blood from Arm, Right Updated: 08/04/22 0926     NT-proBNP 21,688 pg/mL     Protime-INR [[id number]]  (Abnormal) Collected: 08/04/22 0850    Lab Status: Final result Specimen: Blood from Arm, Right Updated: 08/04/22 0924     Protime 16 6 seconds      INR 1 32    APTT [[id number]]  (Normal) Collected: 08/04/22 0850    Lab Status: Final result Specimen: Blood from Arm, Right Updated: 08/04/22 0924     PTT 32 seconds     Lipase [[id number]]  (Abnormal) Collected: 08/04/22 0850    Lab Status: Final result Specimen: Blood from Arm, Right Updated: 08/04/22 0924     Lipase 28 u/L                  CT chest abdomen pelvis w contrast   Final Result by Brian Ribeiro MD (08/04 1128)         1  Intrahepatic and extrahepatic biliary ductal dilatation with pancreatic body and tail atrophy and suspected pancreatic head mass  Confirmation should be obtained with MRI abdomen with MRCP  2   No infiltrates on study limited by respiratory motion  I personally discussed this study with Danuta Finney on 8/4/2022 at 11:28 AM                Workstation performed: [id number]         CT head without contrast   Final Result by Brian Ribeiro MD (08/04 1129)         New low-density in the right cerebellum concerning for an acute infarct  Cerebral atrophy and small vessel disease  I personally discussed this study with Sharonzoe Finney on 8/4/2022 at 11:28 AM                      Workstation performed: [id number]         XR chest 1 view portable   Final Result by Twyla Bunn MD (08/04 0667)      Suspected left retrocardiac consolidation and/or small effusion  Cardiomegaly and aortic sclerosis  Workstation performed: [id number]         FL ERCP biliary only    (Results Pending)   CXR in AM    (Results Pending)              Procedures  Procedures         ED Course  ED Course as of 08/04/22 1656   Thu Aug 04, 2022   0844 Blood Pressure: 121/61   0844 Temperature: 100 5 °F (38 1 °C)   0844 Pulse: 88   0844 Respirations: 20   0844 SpO2: 93 %   0904 WBC(!): 22 08  Sepsis work up initiated  Patient getting abx and fluids  Blood cultures drawn   0904 Hemoglobin(!): 11 8   0905 GI tiger texted   0939 CT aware   Will send to scanner with IV contrast  Reviewed risk vs benefit of contrast and fluid hydration vs CHF exacerbation as well  Discussed with Dr Paul Christie, agrees with keeping CT C/A/P with contrast as benefit outweighs risk  Recommends gentle fluid hydration with anohter 500cc fluid, will monitor patients respiratory status as patient also in CHF exacerbation  No lasix at this time given CHASITY  Will need inpatient management of CHASITY and fluid control  Patient in no respiratory distress at this time   0940 NT-proBNP(!): 21,688   0941 Creatinine(!): 1 78   0941 AST(!): 225   0941 ALT(!): 282   0941 Alkaline Phosphatase(!): 893   0941 TOTAL BILIRUBIN(!): 3 89   0941 LACTIC ACID(!!): 3 5  Patient getting fluids and abx  Blood cultures sent   2341 GI updated on results   0948 Sepsis alert was called   1014 CT called again to take patient to CT  States patient is next   [phone] Patient in 48[address] [facility] Stroke neurologist tiger texted   1130 CT chest abdomen pelvis w contrast  IMPRESSION:        1  Intrahepatic and extrahepatic biliary ductal dilatation with pancreatic body and tail atrophy and suspected pancreatic head mass  Confirmation should be obtained with MRI abdomen with MRCP      2  No infiltrates on study limited by respiratory motion         I personally discussed this study with Cynthia Villasenor on 8/4/2022 at 11:28 AM    1134 CT head without contrast    IMPRESSION:        New low-density in the right cerebellum concerning for an acute infarct  Cerebral atrophy and small vessel disease         I personally discussed this study with Cynthia Villasenor on 8/4/2022 at 11:28 AM    1149 Per GI, plan for ERCP at 1430 today   1217 Reviewed case with neurology, Dr Daniel Hurtado  Recommends full dose aspirin (okay with 300mg DE), recommends starting pressor to keep -160, and STAT ECHO   1317 Discussed with nursing about getting meds started   1334 Discussed case with Dr Billie Nicole, Critical care   Accepts patient to ICU                            Initial Sepsis Screening     Row Name 08/04/22 7027 Is the patient's history suggestive of a new or worsening infection? Yes (Proceed)  -JN        Suspected source of infection pneumonia;acute abdominal infection  -JN        Are two or more of the following signs & symptoms of infection both present and new to the patient? --        Indicate SIRS criteria --        If the answer is yes to both questions, suspicion of sepsis is present --        If severe sepsis is present AND tissue hypoperfusion perists in the hour after fluid resuscitation or lactate > 4, the patient meets criteria for SEPTIC SHOCK --        Are any of the following organ dysfunction criteria present within 6 hours of suspected infection and SIRS criteria that are NOT considered to be chronic conditions? --        Organ dysfunction --        Date of presentation of severe sepsis --        Time of presentation of severe sepsis --        Tissue hypoperfusion persists in the hour after crystalloid fluid administration, evidenced, by either: --        Was hypotension present within one hour of the conclusion of crystalloid fluid administration? --        Date of presentation of septic shock --        Time of presentation of septic shock --              User Key  (r) = Recorded By, (t) = Taken By, (c) = Cosigned By    [facility] [address] [place] [address] Name Provider Type    Dhaval Cheek PA-C Physician Assistant                SBIRT 20yo+    Thiago Contras Most Recent Value   SBIRT (25 yo +)    In order to provide better care to our patients, we are screening all of our patients for alcohol and drug use  Would it be okay to ask you these screening questions? 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 )   Transaminitis   Leukocytosis   CHF exacerbation (HCC)   Sepsis (HCC)   Elevated troponin   Pancreatic mass     Time reflects when diagnosis was documented in both MDM as applicable and the Disposition within this note     Time User Action Codes Description Comment    8/4/2022  9:14 AM Richrd Ripa Add [R41 82] AMS (altered mental status)     8/4/2022  9:14 AM Richrd Ripa Add [Z98 890] S/P ERCP     8/4/2022 10:33 AM Callender Dam Add [K83 09] Cholangitis     8/4/2022 12:50 PM Ashley Huguenin, Montell Gosselin Add [I63 9] Cerebellar infarct ([facility] )     8/4/2022  1:32 PM Richrd Ripa Add [N17 9] CHASITY (acute kidney injury) ([facility] )     8/4/2022  1:32 PM Richrd Ripa Add [R74 01] Transaminitis     8/4/2022  1:32 PM Richrd Ripa Modify [K83 09] Cholangitis     8/4/2022  1:32 PM Richrd Ripa Add [D72 829] Leukocytosis     8/4/2022  1:33 PM Richrd Ripa Add [I50 9] CHF exacerbation ([facility] )     8/4/2022  1:34 PM Carlyon Cuellar Add [I63 9] Stroke (cerebrum) ([facility] )     8/4/2022  1:35 PM Richrd Ripa Add [A41 9] Sepsis ([facility] )     8/4/2022  1:35 PM Richrd Ripa Add [R77 8] Elevated troponin     8/4/2022  1:36 PM Richrd Ripa Add [K86 89] Pancreatic mass     8/4/2022  1:42 PM Carlyon Cuellar Add [R54] Frailty syndrome in geriatric patient       ED Disposition     ED Disposition   Admit    Condition   Stable    Date/Time   u Aug 4, 2022  1:32 PM    Comment   Case was discussed with Critical Care and the patient's admission status was agreed to be Admission Status: inpatient status to the service of Dr Yohannes Main             Follow-up Information    None         Current Discharge Medication List      CONTINUE these medications which have NOT CHANGED    Details   apixaban (ELIQUIS) 5 mg Take 1 tablet by mouth 2 (two) times a day      atorvastatin (LIPITOR) 20 mg tablet Take 1 tablet by mouth daily      bisacodyl (DULCOLAX) 10 mg suppository Insert 10 mg into the rectum as needed for constipation      buPROPion (WELLBUTRIN) 100 mg tablet Take 100 mg by mouth in the morning      diltiazem (CARDIZEM CD) 240 mg 24 hr capsule Take 1 capsule by mouth daily      ferrous sulfate 325 (65 Fe) mg tablet Take 325 mg by mouth every other day      furosemide (LASIX) 40 mg tablet Take 1 5 tablets by mouth 2 (two) times a day 60 MG BID      hydrALAZINE (APRESOLINE) 10 mg tablet Take 10 mg by mouth 3 (three) times a day      insulin glargine (LANTUS) 100 units/mL subcutaneous injection Inject 35 Units under the skin daily at bedtime  Qty: 10 mL, Refills: 0    Associated Diagnoses: Type 2 diabetes mellitus without complication, with long-term current use of insulin (Formerly [facility])      insulin lispro (HumaLOG) 100 units/mL injection Inject 12 Units under the skin 3 (three) times a day with meals for 10 days  Qty: 3 6 mL, Refills: 0    Associated Diagnoses: Type 2 diabetes mellitus without complication, with long-term current use of insulin (Formerly [facility])      magnesium hydroxide (MILK OF MAGNESIA) 400 mg/5 mL oral suspension Take 30 mL by mouth daily as needed for constipation      metoprolol succinate (TOPROL-XL) 100 mg 24 hr tablet Take 1 tablet by mouth daily      omeprazole (PriLOSEC) 40 MG capsule Take 1 capsule by mouth daily      polyethylene glycol (MIRALAX) 17 g packet Take 17 g by mouth every other day       sodium phosphate-biphosphate (FLEET) 7-19 g 118 mL enema Insert 1 enema into the rectum once as needed             No discharge procedures on file      PDMP Review       Value Time User    PDMP Reviewed  Yes 10/30/2020  2:05 PM Payton Hashimoto, PA-C          ED Provider  Electronically Signed by           Obey Reeder MARIA FERNANDA  08/05/22 1028

## 2022-08-04 NOTE — PROCEDURES
Arterial Line Insertion    Date/Time: 8/4/2022 3:00 PM  Performed by: Kirsten Pond MD  Authorized by: Shasha Dennis MD     Patient location:  Bedside  Consent:     Consent obtained:  Verbal    Consent given by:  Patient    Risks discussed:  Bleeding, infection, pain, ischemia and repeat procedure  Universal protocol:     Procedure explained and questions answered to patient or proxy's satisfaction: yes      Relevant documents present and verified: yes      Test results available and properly labeled: no (NA)      Radiology Images displayed and confirmed  If images not available, report reviewed: no (NA)      Required blood products, implants, devices, and special equipment available: no (NA)      Site/side marked: yes      Immediately prior to procedure a time out was called: yes      Patient identity confirmed:  Arm band and verbally with patient  Indications:     Indications: hemodynamic monitoring    Pre-procedure details:     Skin preparation:  Chlorhexidine    Preparation: Patient was prepped and draped in sterile fashion    Anesthesia (see MAR for exact dosages): Anesthesia method:  Local infiltration    Local anesthetic:  Lidocaine 1% w/o epi  Procedure details:     Location / Tip of Catheter:  Radial    Laterality:  Right    Sathish's test performed: no      Needle gauge:  18 G    Placement technique:  Ultrasound guided    Ultrasound image availability:  Not saved    Sterile ultrasound techniques: Sterile gel and sterile probe covers were used      Number of attempts:  2    Successful placement: yes      Transducer: waveform confirmed    Post-procedure details:     Post-procedure:  Biopatch applied, sterile dressing applied and sutured    CMS:  Normal and unchanged    Patient tolerance of procedure:   Tolerated well, no immediate complications

## 2022-08-04 NOTE — PROCEDURES
Central Line Insertion    Date/Time: 8/4/2022 7:08 PM  Performed by: Kapil Lezama MD  Authorized by: Kapil Lezama MD     Patient location:  ICU  Consent:     Consent obtained:  Written    Consent given by:  Spouse    Risks discussed:  Incorrect placement, arterial puncture, bleeding and nerve damage    Alternatives discussed:  No treatment  Universal protocol:     Procedure explained and questions answered to patient or proxy's satisfaction: yes      Relevant documents present and verified: yes      Test results available and properly labeled: yes      Radiology Images displayed and confirmed  If images not available, report reviewed: yes      Required blood products, implants, devices, and special equipment available: yes      Site/side marked: yes      Immediately prior to procedure, a time out was called: yes      Patient identity confirmed:  Arm band  Pre-procedure details:     Hand hygiene: Hand hygiene performed prior to insertion      Sterile barrier technique: All elements of maximal sterile technique followed      Skin preparation:  2% chlorhexidine and ChloraPrep  Indications:     Central line indications: medications requiring central line    Sedation:     Sedation type: Moderate (conscious) sedation  Anesthesia (see MAR for exact dosages):      Anesthesia method:  Local infiltration    Local anesthetic:  Lidocaine 1% w/o epi  Procedure details:     Location:  Right internal jugular    Vessel type: vein      Laterality:  Right    Approach: percutaneous technique used      Patient position:  Trendelenburg    Catheter type:  Triple lumen 16cm    Landmarks identified: yes      Ultrasound guidance: yes      Ultrasound image availability:  Not saved    Manometry confirmation: yes      Number of attempts:  1    Successful placement: yes    Post-procedure details:     Post-procedure:  Dressing applied and line sutured    Assessment:  Blood return through all ports and placement verified by x-ray Patient tolerance of procedure:   Tolerated well, no immediate complications

## 2022-08-04 NOTE — ED ATTENDING ATTESTATION
8/4/2022  IEsthela MD, saw and evaluated the patient  I have discussed the patient with the resident/non-physician practitioner and agree with the resident's/non-physician practitioner's findings, Plan of Care, and MDM as documented in the resident's/non-physician practitioner's note, except where noted  All available labs and Radiology studies were reviewed  I was present for key portions of any procedure(s) performed by the resident/non-physician practitioner and I was immediately available to provide assistance  At this point I agree with the current assessment done in the Emergency Department  I have conducted an independent evaluation of this patient a history and physical is as follows:    ED Course     79-year-old male with history of recent ERCP and stent removal past 24 hours now presenting with altered mental status fever, low pulse ox    Vitals reviewed  Heart tachycardic without murmurs rubs gallops, lungs clear to auscultation bilaterally abdomen distended  No point tenderness  Extremities no edema anicteric sclera    Impression:  Fever suspect likely sepsis possible intra-abdominal source  Panculture lactate screening labs CT abdomen pelvis to evaluate for intra-abdominal source  CT brain given altered mental status  Start antibiotics for intra-abdominal source  IV fluids reassess    Lab studies reviewed patient has evidence of a right cerebellar stroke on non contrast CT   CTAP reviewed  Case discussed with neurology and gastroenterology  Patient loaded with rectal aspirin  Started on norepinephrine to maintain systolic blood pressure between 140-160 per neurology recommendations  GI to see patient  Given complexity of case and concern for potential for decompensation critical care was consulted will be admitted to the MICU       Critical Care Time  CriticalCare Time  Performed by: Esthela Abernathy MD  Authorized by: Esthela Abernathy MD     Critical care provider statement:     Critical care time (minutes):  40    Critical care time was exclusive of:  Separately billable procedures and treating other patients and teaching time    Critical care was necessary to treat or prevent imminent or life-threatening deterioration of the following conditions:  Sepsis (Stroke)    Critical care was time spent personally by me on the following activities:  Obtaining history from patient or surrogate, development of treatment plan with patient or surrogate, discussions with consultants, evaluation of patient's response to treatment, examination of patient, ordering and performing treatments and interventions, ordering and review of laboratory studies, ordering and review of radiographic studies and re-evaluation of patient's condition    I assumed direction of critical care for this patient from another provider in my specialty: no

## 2022-08-04 NOTE — TELEPHONE ENCOUNTER
Gopi Godinez called in stating pt had a procedure yesterday and his blood sugar is very high now, his mental status has changed, he's not responding like he normally would, and he's very pale  On call paged via TC

## 2022-08-04 NOTE — ASSESSMENT & PLAN NOTE
82year old male with extensive medical history including but not limited to Afib on Eliquis, vascular dementia, CHF, COPD, CAD, DM, HTN, HLD and prior TBI  Patient was recently admitted in June 2022 for cholangitis and underwent stent placement  Patient had the stent removed on 8/3 and presented from his nursing home with AMS, fever and hypoxia  Patient met criteria for sepsis and was started on broad spectrum antibiotics  Labs remarkable for lactic acidosis, CHASITY (creat 1 78), elevated LFT's, elevated bilirubin, leukocytosis (22), elevated troponin, elevated BNP (68,526)  Concern for ascending cholangitis due to stent free trial failure, GI following  CTH revealed an acute right cerebellar infarct and neurology was consulted  Of note, patient's Eliquis was held x 3-4 days total for stent removal procedure  Patient was recently in [facility] ED on 7/26 for headache and dysarthria, concern for stroke  Patient ultimately refused all care/work up and left AMA  The patient is now out of the ICU (had to be intubated after the Ercp procedure) and transferred to the floor, he is being followed by the medical team - secondary to his sepsis status post ERCP and stent placement on 08/04 secondary to recurrent acute cholangitis  There is also concern for pancreatic mass and GI will follow this in the future  He also has CHASITY, CAD, CHF, AFib with Eliquis on hold, and COPD  These issues are currently being managed by the medicine team     Plan:  - Stroke pathway  - Continue Aspirin 81 mg daily, eventually will need anticoagulation for secondary prevention ( please see attending attestation for timing details)  - MRI completed - official read pending    - Echo EF 45% with hypokineses and moderately dilated L atrium  - Lipid Panel: Triglycerides 469, Cholesterol 69   Unable to calculate LDL    - Hemoglobin A1c 7 5  - Normotensive blood pressure goal, avoid hypotension for cerebral perfusion  - Euglycemic, normothermic goal  - Continue telemetry  - PT/OT/ST  - Secondary risk factor modification    - Stroke education  - Frequent neuro checks  Continue to monitor and notify neurology with any changes   - STAT CT head for any acute change in neuro exam  - Medical management and supportive care per primary team  Correction of any metabolic or infectious disturbances  Plan discussed with Attending Neurologist, please see attestation for further input/recommendations

## 2022-08-04 NOTE — RESPIRATORY THERAPY NOTE
RT Protocol Note  Javier Ledbetter 82 y o  male MRN: [id number]  Unit/Bed#: MICU 11 Encounter: [id number]    Assessment    Principal Problem:    Cerebrovascular accident (CVA) involving cerebellum ([place] [address] )  Active Problems:    Atrial fibrillation ([place] [address] )    Sepsis ([place] [address] )      Home Pulmonary Medications:   None    Past Medical History:   Diagnosis Date    A-fib ([place] [address] )     Cancer ([place] [address] )     Cardiac disease     CHF (congestive heart failure) ([facility])     Chronic obstructive pulmonary disease (COPD) ([facility])     COPD (chronic obstructive pulmonary disease) ([facility])     Coronary artery disease     Counseling regarding advance care planning and goals of care 10/7/2020    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[place] [address] )     TYPE 2    Dysphagia     Fracture of nasal bone     Gait instability     H/O cervical fracture     Hyperlipidemia     Hypertension     Muscle weakness     TBI (traumatic brain injury) ([place] [address] )      Social History     Socioeconomic History    Marital status: /Civil Union     Spouse name: None    Number of children: None    Years of education: None    Highest education level: None   Occupational History    None   Tobacco Use    Smoking status: Never Smoker    Smokeless tobacco: Never Used   Vaping Use    Vaping Use: Never used   Substance and Sexual Activity    Alcohol use: Never    Drug use: Never    Sexual activity: None   Other Topics Concern    None   Social History Narrative    ** Merged History Encounter **          Social Determinants of Health     Financial Resource Strain: Not on file   Food Insecurity: No Food Insecurity    Worried About Running Out of Food in the Last Year: Never true    Ran Out of Food in the Last Year: Never true   Transportation Needs: No Transportation Needs    Lack of Transportation (Medical): No    Lack of Transportation (Non-Medical):  No   Physical Activity: Not on file   Stress: Not on file   Social Connections: Not on file   Intimate Partner Violence: Not on file   Housing Stability: Unknown    Unable to Pay for Housing in the Last Year: No    Number of Places Lived in the Last Year: Not on file    Unstable Housing in the Last Year: No       Subjective         Objective    Physical Exam:   Assessment Type: Assess only  General Appearance: Sedated  Respiratory Pattern: Assisted  Chest Assessment: Chest expansion symmetrical  Bilateral Breath Sounds: Coarse  O2 Device: Vent    Vitals:  Blood pressure 116/60, pulse 72, temperature 98 5 °F (36 9 °C), temperature source Tympanic, resp  rate 22, height 5' 8" (1 727 m), weight 93 9 kg (207 lb), SpO2 (P) 99 %  Imaging and other studies: I have personally reviewed pertinent reports  O2 Device: Vent     Plan    Respiratory Plan: Vent/NIV/HFNC        Resp Comments: (P) Pt arrived from GI lab intubated with a size 7 ETT, pt is placed on the vent on CMV settings alarms are set and functioning  tube quinn was placed after arrival, ETT secured 22 @ lip

## 2022-08-04 NOTE — ANESTHESIA POSTPROCEDURE EVALUATION
Post-Op Assessment Note    CV Status:  Stable    Pain management: adequate     Hydration Status:  Euvolemic   PONV Controlled:  Controlled   Airway Patency:  Patent  Airway: intubated      Post Op Vitals Reviewed: Yes      Staff: CRNA, Anesthesiologist         No complications documented      BP   158/61   Temp      Pulse  74   Resp   16   SpO2   100

## 2022-08-04 NOTE — H&P
H&P Exam - Critical Care   Kelli Ingram 82 y o  male MRN: [id number]  Unit/Bed#: [facility] Encounter: [id number]      -------------------------------------------------------------------------------------------------------------  Chief Complaint: Altered Mental Status     History of Present Illness   HX and PE limited by: Mental status    Kelli Ingram is a 82 y o  male with PMH of afib on eliquis and metoprolol/diltiazem, CHF EF 48 (2017), COPD, CAD, T2DM (A1C 7 7 in 2021), HTN, HLD, dysphagia, vascular dementia, and prior TBI with no prior focal deficits who presents with altered mental status, fever, and hypoxia  Yesterday on 8/3, patient had ERCP with stent removed, no complications during procedure  Held eliquis for 3-4 days prior to ERCP stent removal  June 2022, patient was admitted for cholangitis during which antibiotics and ERCP with stent placement was done  Last known normal unknown  Arrived the ED this morning  On arrival, Trectal 100 5, HR 88, RR 20, /61, SpO2 93% on RA  Put on 3L NC  Labs were significant for: WBC 22 08, Hb 11 08, Lactate x2 3 5, initial trop 89 delta -9/-13, K 3 4, Cr 1 78, Glucose 248, , , Alk phos 893, lipase 28, BNP 21,688  Blood cultures X2  UA positive for glucose, +2 protein, 5-10 hyaline casts  VBG 7 449/40 1/59 8/27  2  CXR shows suspected left retrocardiac consolidation and/or small effusion  CT CAP showed intrahepatic and extrahepatic biliary ductal dilatation with pancreatic body and tail atrophy, suspected pancreatic head mass  CEA WNL,  pending  CT head showed acute infarct in right cerebellum  Neuro consulted for stroke and GI consulted for repeat ERCP planned today  Echo ordered and pending  Started on levo gtt at 2  Given rectal tylenol, flagyl and cefepime  Had 750 ccs bolus of NS  Of note, patient was recently at [facility] ED on 7/26 with sudden-onset headache and dysarthria, referred from GI outpatient office  At that time, refused labs, ekg, imaging  The patient decided to leave AMA  Patient admitted to MICU for pressor requirements, further workup and evaluation  History obtained from chart review  -------------------------------------------------------------------------------------------------------------  Assessment and Plan:  # Right cerebellar stroke   # Sepsis 2/2 suspected cholangitis   #CHASITY (baseline 1-1 15)   # HTN   #CHF  #COPD  # Afib   #vascular dementia   # T2DM     Neuro:    Diagnosis: Right cerebellar stroke   o CT head showed right cerebellar stroke   o Rectal aspirin 300 mg at this time until can tolerate PO   o MRI, Echo, Hb A1C, lipid panel pending   o SBP goal 140-160, on levophed to maintain   o On telemetry   o Speech eval, PT, OT pending   o Continue neuro checks   o If any changes neuro exam, repeat head CT   o Neurology consulted     CV:    Diagnosis: HTN  - Hold home HTN meds   - SBP goal 140-160 for stroke, on levophed gtt to maintain    Diagnosis: Afib   o Holding home eliquis and metoprolol/dilitazen at this time   o Echo pending   o On telemetry    CHF   o Echo pending   o Holding home lasix    CAD   o Holding home statin   o On aspirin for stroke   o EKG showed no acute ST changes       Pulm:   Diagnosis: COPD  o On suplemental O2 (2L) at this time  o CXR shows suspected left retrocardiac consolidation and/or small effusion       GI:    Diagnosis: Sepsis 2/2 to suspected cholangitis   o Plan: ERCP planned today 8/4  o Continue flagyl and cefepime   o Blood cultures pending   o Trend WBC and fever curve   o Received 750 ccs bolus on admission   o Lactate 3 5 X2    o 8/4 CTAP: Intrahepatic and extrahepatic biliary ductal dilation with pancreatic body and tail atrophy and suspected pancreatic head mass  No gross lung infiltrates noted limited by respiratory motion   Diagnosis: Suspcious pancreatic head mass   o CEA normal   o CA 19-9 pending   o ERCP plan today   o Trend CMP   o GI Consulted   Plan for future EUS for mass eval     :    Diagnosis: No acute issues at this time   o Plan: Monitor urine output and creatinine   o UA showed glucose, protein, bilirubin, and hyaline casts     F/E/N:    T2 DM  o Monitor blood sugars   o Consider restarting insulin after ERCP   o NPO at this time    Replete and monitor electrolytes     Heme/Onc:    Diagnosis: Pnacreatic head mass   o CT Scan showed suspicious pancreatic head mass   o ERCP planned   o Possible EUS   o GI consulted  o May consider future hemeonc consult once medically stable       Endo:    T2 DM  o Monitor blood sugars   o Consider restarting insulin after ERCP   o NPO at this time       ID:    Diagnosis: Suspected cholangitis with sespsis   o Continue cefepime and flagyl   o Blood cultures pending   o Trend WBC and fever curve       MSK/Skin:    Diagnosis: No acute issues at this time   o Repositioning by nursing   o OOB once able   o PT OT pending   o Neuro checks for stroke       Disposition: Admit to Critical Care   Code Status: Level 1 - Full Code  --------------------------------------------------------------------------------------------------------------  Review of Systems   Unable to perform ROS: Mental status change       Review of systems was unable to be performed secondary to mental status    Physical Exam  Vitals and nursing note reviewed  Constitutional:       General: He is not in acute distress  Appearance: He is obese  He is ill-appearing  Comments: Tired appearing   HENT:      Head: Normocephalic and atraumatic  Nose: Nose normal    Eyes:      Conjunctiva/sclera: Conjunctivae normal    Cardiovascular:      Rate and Rhythm: Normal rate and regular rhythm  Pulses: Normal pulses  Heart sounds: Normal heart sounds  Pulmonary:      Breath sounds: Normal breath sounds  Comments: Some WOB present  Abdominal:      General: Bowel sounds are normal  There is distension  Tenderness: There is no abdominal tenderness  There is no guarding or rebound  Musculoskeletal:         General: No swelling  Cervical back: Neck supple  Right lower leg: No edema  Left lower leg: No edema  Skin:     General: Skin is warm  Capillary Refill: Capillary refill takes less than 2 seconds  Neurological:      Comments: Alert and oriented X3 person, place and time  However, slow with response and following commands        --------------------------------------------------------------------------------------------------------------  Vitals:   Vitals:    08/04/22 1320 08/04/22 1327 08/04/22 1332 08/04/22 1339   BP: (!) 140/42 134/63 (!) 140/42 143/72   BP Location:  Right arm Right arm    Pulse: 74 74 74 74   Resp:  (!) 29 (!) 28 (!) 25   Temp:       TempSrc:       SpO2:  97% 96% 97%   Weight: 93 9 kg (207 lb)      Height: 5' 8" (1 727 m)        Temp  Min: 100 5 °F (38 1 °C)  Max: 100 5 °F (38 1 °C)  IBW (Ideal Body Weight): 68 4 kg  Height: 5' 8" (172 7 cm)  Body mass index is 31 47 kg/m²    N/A, SVR:      Laboratory and Diagnostics:  Results from last 7 days   Lab Units 08/04/22  0850   WBC Thousand/uL 22 08*   HEMOGLOBIN g/dL 11 8*   HEMATOCRIT % 37 3   PLATELETS Thousands/uL 262   BANDS PCT % 8   MONO PCT % 4     Results from last 7 days   Lab Units 08/04/22  0850   SODIUM mmol/L 139   POTASSIUM mmol/L 3 4*   CHLORIDE mmol/L 101   CO2 mmol/L 30   ANION GAP mmol/L 8   BUN mg/dL 23   CREATININE mg/dL 1 78*   CALCIUM mg/dL 8 5   GLUCOSE RANDOM mg/dL 248*   ALT U/L 282*   AST U/L 225*   ALK PHOS U/L 893*   ALBUMIN g/dL 2 3*   TOTAL BILIRUBIN mg/dL 3 89*          Results from last 7 days   Lab Units 08/04/22  0850   INR  1 32*   PTT seconds 32          Results from last 7 days   Lab Units 08/04/22  1120 08/04/22  0850   LACTIC ACID mmol/L 3 5* 3 5*     ABG:    VBG:  Results from last 7 days   Lab Units 08/04/22  1331   PH STEPHEN  7 449*   PCO2 STEPHEN mm Hg 40 1*   PO2 STEPHEN mm Hg 59 8*   HCO3 STEPHEN mmol/L 27 2   BASE EXC STEPHEN mmol/L 3 0 Micro:  Results from last 7 days   Lab Units 22  0850   BLOOD CULTURE  Received in Microbiology Lab  Culture in Progress  Received in Microbiology Lab  Culture in Progress         EKst degree AV block   Imaging: I have personally reviewed pertinent films in PACS    Historical Information   Past Medical History:   Diagnosis Date    A-fib ([place] [address] )     Cancer ([place] [address] )     Cardiac disease     [facility]estive [facility]     Chronic obstructive pulmonary disease (COPD) ([place] [address] )     COPD (chronic obstructive pulmonary disease) ([place] [address] )     Coronary artery disease     Counseling regarding advance care planning and goals of care 10/7/2020    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[place] [address] )     TYPE 2    Dysphagia     Fracture of nasal bone     Gait instability     H/O cervical fracture     Hyperlipidemia     Hypertension     Muscle weakness     TBI (traumatic brain injury) ([place] [address] )      Past Surgical History:   Procedure Laterality Date    HAND SURGERY      NV ARTHRODESIS POSTERIOR CRANIOCERVICAL N/A 10/26/2020    Procedure: Occipital cervical fusion to C4;  Surgeon: Rod Silva MD;  Location: [facility];  Service: Neurosurgery     Social History   Social History     Substance and Sexual Activity   Alcohol Use Never     Social History     Substance and Sexual Activity   Drug Use Never     Social History     Tobacco Use   Smoking Status Never Smoker   Smokeless Tobacco Never Used       Family History:   Family History   Problem Relation Age of Onset    Other Other         urinary tract malignancy    Diabetes Mother      I have reviewed this patient's family history and commented on sigificant items within the HPI      Medications:  Current Facility-Administered Medications   Medication Dose Route Frequency    aspirin rectal suppository 300 mg  300 mg Rectal Once    metroNIDAZOLE (FLAGYL) IVPB (premix) 500 mg 100 mL  500 mg Intravenous Q8H    norepinephrine (LEVOPHED) 4 mg (STANDARD CONCENTRATION) IV in sodium chloride 0 9% 250 mL  1-30 mcg/min Intravenous Titrated     Home medications:  Prior to Admission Medications   Prescriptions Last Dose Informant Patient Reported? Taking?    apixaban (ELIQUIS) 5 mg   Yes No   Sig: Take 1 tablet by mouth 2 (two) times a day   atorvastatin (LIPITOR) 20 mg tablet  Outside Facility (Specify) Yes No   Sig: Take 1 tablet by mouth daily   bisacodyl (DULCOLAX) 10 mg suppository  Outside Facility (Specify) Yes No   Sig: Insert 10 mg into the rectum as needed for constipation   buPROPion (WELLBUTRIN) 100 mg tablet   Yes No   Sig: Take 100 mg by mouth in the morning   diltiazem (CARDIZEM CD) 240 mg 24 hr capsule  Outside Facility (Specify) Yes No   Sig: Take 1 capsule by mouth daily   ferrous sulfate 325 (65 Fe) mg tablet   Yes No   Sig: Take 325 mg by mouth every other day   furosemide (LASIX) 40 mg tablet  Outside Facility (Specify) Yes No   Sig: Take 1 5 tablets by mouth 2 (two) times a day 60 MG BID   hydrALAZINE (APRESOLINE) 10 mg tablet  Outside Facility (Specify) Yes No   Sig: Take 10 mg by mouth 3 (three) times a day   insulin glargine (LANTUS) 100 units/mL subcutaneous injection   No No   Sig: Inject 35 Units under the skin daily at bedtime   insulin lispro (HumaLOG) 100 units/mL injection   No No   Sig: Inject 12 Units under the skin 3 (three) times a day with meals for 10 days   magnesium hydroxide (MILK OF MAGNESIA) 400 mg/5 mL oral suspension  Outside Facility (Specify) Yes No   Sig: Take 30 mL by mouth daily as needed for constipation   metoprolol succinate (TOPROL-XL) 100 mg 24 hr tablet   Yes No   Sig: Take 1 tablet by mouth daily   omeprazole (PriLOSEC) 40 MG capsule  Outside Facility (Specify) Yes No   Sig: Take 1 capsule by mouth daily   polyethylene glycol (MIRALAX) 17 g packet   Yes No   Sig: Take 17 g by mouth every other day    sodium phosphate-biphosphate (FLEET) 7-19 g 118 mL enema  Outside Facility (Specify) Yes No   Sig: Insert 1 enema into the rectum once as needed Facility-Administered Medications: None     Allergies: Allergies   Allergen Reactions    Amoxicillin Diarrhea and Hives     ------------------------------------------------------------------------------------------------------------  Advance Directive and Living Will: Yes    Power of :    POLST:    ------------------------------------------------------------------------------------------------------------  Anticipated Length of Stay is > 2 midnights    Care Time Delivered:   45 min      Malia Roberts MD        Portions of the record may have been created with voice recognition software  Occasional wrong word or "sound a like" substitutions may have occurred due to the inherent limitations of voice recognition software    Read the chart carefully and recognize, using context, where substitutions have occurred

## 2022-08-05 NOTE — PROGRESS NOTES
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  - Continue vanc and ertapenem, appreciate ID recs  - S/p repeat stent placement 8/4/22  - EUS to evaluate pancreatic mass when able  - F/u biliary stricture cytology brushings from 8/3/22 ERCP   - Okay to resume anticoagulation at this time from GI standpoint, defer to neurology and primary team   - Liver function tests downtrending; please continue checking daily LFTs  ______________________________________________________________________    SUBJECTIVE:     Repeat ERCP yesterday by Dr Fabio Leung with repeat stent placement (10 x 4 cm, fully covered metal stent)  Admission blood cx now growing GNRs - speciated to ESBL Klebsiella and GPCs not speciated  On ertapenem and vancomycin at this time, primary team consulted ID  Intubated intraprocedurally for airway protection in s/o food in oropharynx and aspiration risk  Neuro on board for cerebellar stroke noted on admission head CT       Medication Administration - last 24 hours from 08/04/2022 0849 to 08/05/2022 0849       Date/Time Order Dose Route Action Action by     08/04/2022 0941 cefepime (MAXIPIME) 2 g/50 mL dextrose IVPB 0 mg Intravenous Stopped BRITTANY RAMOS [facility], RN     08/04/2022 0911 cefepime (MAXIPIME) 2 g/50 mL dextrose IVPB 2,000 mg Intravenous [facility], [address] [place] [address]     08/05/2022 5956 metroNIDAZOLE (FLAGYL) IVPB (premix) 500 mg 100 mL 500 mg Intravenous [address] Aristeo Duong [address] [place] [address]     08/04/2022 2201 metroNIDAZOLE (FLAGYL) IVPB (premix) 500 mg 100 mL 0 mg Intravenous Stopped Aristeo Duong RN     08/04/2022 1915 metroNIDAZOLE (FLAGYL) IVPB (premix) 500 mg 100 mL 500 mg Intravenous Don 37 [facility]     08/04/2022 1113 metroNIDAZOLE (FLAGYL) IVPB (premix) 500 mg 100 mL 0 mg Intravenous Becka SOTO [facility], RN     08/04/2022 1000 metroNIDAZOLE (FLAGYL) IVPB (premix) 500 mg 100 mL 500 mg Intravenous 270 [facility]     08/04/2022 4316 acetaminophen (TYLENOL) tablet 650 mg 650 mg Oral Not Given CARITO Gooden     08/04/2022 1155 sodium chloride 0 9 % bolus 250 mL 0 mL Intravenous Stopped CARITO Gooden     08/04/2022 0853 sodium chloride 0 9 % bolus 250 mL 250 mL Intravenous 270 [facility]     08/04/2022 2230 acetaminophen (TYLENOL) rectal suppository 650 mg 650 mg Rectal Given CARITO Gooden     08/04/2022 1900 sodium chloride 0 9 % bolus 500 mL 0 mL Intravenous Stopped Bijal Stewart RN     08/04/2022 1158 sodium chloride 0 9 % bolus 500 mL 500 mL Intravenous 270 [facility]     08/04/2022 1056 iohexol (OMNIPAQUE) 350 MG/ML injection (MULTI-DOSE) 68 mL 68 mL Intravenous Given Melquiades Ambrosio     08/05/2022 0100 norepinephrine (LEVOPHED) 4 mg (STANDARD CONCENTRATION) IV in sodium chloride 0 9% 250 mL 3 mcg/min Intravenous Rate/Dose Change Bijal Stewart RN     08/04/2022 2330 norepinephrine (LEVOPHED) 4 mg (STANDARD CONCENTRATION) IV in sodium chloride 0 9% 250 mL 2 mcg/min Intravenous Rate/Dose Change Bijal Stewart RN     08/04/2022 2312 norepinephrine (LEVOPHED) 4 mg (STANDARD CONCENTRATION) IV in sodium chloride 0 9% 250 mL 3 mcg/min Intravenous [address] Bijal Stewart [facility]     08/04/2022 2130 norepinephrine (LEVOPHED) 4 mg (STANDARD CONCENTRATION) IV in sodium chloride 0 9% 250 mL 4 mcg/min Intravenous Rate/Dose Change Bijal Stewart RN     08/04/2022 1930 norepinephrine (LEVOPHED) 4 mg (STANDARD CONCENTRATION) IV in sodium chloride 0 9% 250 mL 6 mcg/min Intravenous Rate/Dose Change Bijal Stewart RN     08/04/2022 1915 norepinephrine (LEVOPHED) 4 mg (STANDARD CONCENTRATION) IV in sodium chloride 0 9% 250 mL 7 mcg/min Intravenous Rate/Dose Change Bijal Stewart RN     08/04/2022 7690 norepinephrine (LEVOPHED) 4 mg (STANDARD CONCENTRATION) IV in sodium chloride 0 9% 250 mL 8 mcg/min Intravenous Rate/Dose Change Ryan Tracy, TOSHIA     08/04/2022 1650 norepinephrine (LEVOPHED) 4 mg (STANDARD CONCENTRATION) IV in sodium chloride 0 9% 250 mL 6 mcg/min Intravenous Rate/Dose Change Ryan Tracy, TOSHIA     08/04/2022 1644 norepinephrine (LEVOPHED) 4 mg (STANDARD CONCENTRATION) IV in sodium chloride 0 9% 250 mL 8 mcg/min Intravenous Rate/Dose Change Ryan Tracy, TOSHIA     08/04/2022 1618 norepinephrine (LEVOPHED) 4 mg (STANDARD CONCENTRATION) IV in sodium chloride 0 9% 250 mL 6 mcg/min Intravenous Continue from [address] [place] [address] 2, CRNA     08/04/2022 1321 norepinephrine (LEVOPHED) 4 mg (STANDARD CONCENTRATION) IV in sodium chloride 0 9% 250 mL 2 mcg/min Intravenous New Bag Adelia Quezada RN     08/04/2022 1901 aspirin rectal suppository 300 mg 300 mg Rectal Not Given Judith Jordan RN     08/04/2022 1500 lidocaine (PF) (XYLOCAINE-MPF) 1 % injection 5 mL 5 mL Infiltration Given Judith Jordan RN     08/04/2022 2006 chlorhexidine (PERIDEX) 0 12 % oral rinse 15 mL 15 mL Mouth/Throat Given Stephanie Wall RN     08/05/2022 1540 insulin lispro (HumaLOG) 100 units/mL subcutaneous injection 1-5 Units 3 Units Subcutaneous Given Giselle Rodríguez RN     08/04/2022 2330 insulin lispro (HumaLOG) 100 units/mL subcutaneous injection 1-5 Units 2 Units Subcutaneous Given Stephanie Wall RN     08/04/2022 2057 insulin lispro (HumaLOG) 100 units/mL subcutaneous injection 1-5 Units 1 Units Subcutaneous Not Given Stephanie Wall RN     08/04/2022 2030 potassium chloride 20 mEq IVPB (premix) 0 mEq Intravenous Stopped Stephanie Wall RN     08/04/2022 1816 potassium chloride 20 mEq IVPB (premix) 20 mEq Intravenous [address] Judith Jordan [facility]     08/05/2022 0610 propofol (DIPRIVAN) 1000 mg in 100 mL infusion (premix) 50 mcg/kg/min Intravenous [address] Giselle Rodríguez RN     08/05/2022 0229 propofol (DIPRIVAN) 1000 mg in 100 mL infusion (premix) 50 mcg/kg/min Intravenous New Bag Mandeep Carey RN     08/04/2022 2312 propofol (DIPRIVAN) 1000 mg in 100 mL infusion (premix) 50 mcg/kg/min Intravenous [address] [facility]     08/04/2022 2017 propofol (DIPRIVAN) 1000 mg in 100 mL infusion (premix) 50 mcg/kg/min Intravenous [address]     08/04/2022 1842 propofol (DIPRIVAN) 1000 mg in 100 mL infusion (premix) 50 mcg/kg/min Intravenous [address] [facility]     08/04/2022 2001 aspirin rectal suppository 300 mg   Rectal Canceled Entry Mandeep Carey RN     08/04/2022 1844 fentanyl citrate (PF) 100 MCG/2ML 50 mcg 50 mcg Intravenous Given Shanique Millard RN     08/04/2022 1845 midazolam (VERSED) injection 2 mg 2 mg Intravenous Given Shanique Millard RN     08/04/2022 2012 aspirin chewable tablet 324 mg   Oral Canceled Entry Mandeep Carey RN     08/04/2022 2140 aspirin rectal suppository 300 mg 300 mg Rectal Given Mandeep Carey RN     08/05/2022 0300 vancomycin (VANCOCIN) 2,000 mg in sodium chloride 0 9 % 500 mL IVPB 0 mg Intravenous Stopped Mandeep Carey RN     08/05/2022 0016 vancomycin (VANCOCIN) 2,000 mg in sodium chloride 0 9 % 500 mL IVPB 2,000 mg Intravenous [address] [facility]     08/04/2022 2325 cefepime (MAXIPIME) 2,000 mg in dextrose 5 % 50 mL IVPB 2,000 mg Intravenous [address] Mandeep Carey [facility]     08/05/2022 0720 multi-electrolyte (ISOLYTE-S PH 7 4) bolus 500 mL 0 mL Intravenous Stopped Mandeep Carey RN     08/04/2022 2328 multi-electrolyte (ISOLYTE-S PH 7 4) bolus 500 mL 500 mL Intravenous New Bag Mandeep Carey RN          OBJECTIVE:     Objective   Blood pressure 161/71, pulse 58, temperature 97 88 °F (36 6 °C), resp  rate 22, height 5' 8" (1 727 m), weight 93 9 kg (207 lb), SpO2 99 %  Body mass index is 31 47 kg/m²      Intake/Output Summary (Last 24 hours) at 8/5/2022 0849  Last data filed at 8/5/2022 0720  Gross per 24 hour   Intake 2655 73 ml   Output 1175 ml   Net 1480 73 ml       PHYSICAL EXAM:   General Appearance: Intubated, sedated  Abdomen: Soft, non-tender, non-distended; no masses or no organomegaly    Invasive Devices  Report    Central Venous Catheter Line  Duration           CVC Central Lines 08/04/22 Triple 16cm <1 day          Peripheral Intravenous Line  Duration           Peripheral IV 08/04/22 Left;Ventral (anterior) Wrist 1 day    Peripheral IV 08/04/22 Right Antecubital 1 day          Arterial Line  Duration           Arterial Line 08/04/22 Radial <1 day          Drain  Duration           Urethral Catheter <1 day          Airway  Duration           ETT  Hi-Lo; Cuffed;Oral 7 mm <1 day                LAB RESULTS:      Lab Units 08/05/22  0547 08/04/22  1800 08/04/22  0850 07/24/22  0106 06/07/22  0504 06/06/22  0639 06/05/22  0442 03/08/21  0603 03/07/21  0545   SODIUM mmol/L 141 141 139 139 144 143 139   < > 144   POTASSIUM mmol/L 3 3* 3 5 3 4* 3 4* 3 2* 3 6 3 7   < > 4 3   CHLORIDE mmol/L 108 107 101 103 107 106 104   < > 109*   CO2 mmol/L 26 27 30 32 29 31 32   < > 30   BUN mg/dL 29* 25 23 17 12 19 21   < > 24   CREATININE mg/dL 1 37* 1 38* 1 78* 1 10 0 76 1 02 1 15   < > 1 23   GLUCOSE RANDOM mg/dL 319* 232* 248* 107 116 135 178*   < > 193*   CALCIUM mg/dL 8 1* 8 2* 8 5 8 7 8 7 8 7 8 8   < > 9 0   MAGNESIUM mg/dL  --  1 8  --   --   --  2 2 2 1  --  2 3   PHOSPHORUS mg/dL  --  3 1  --   --  2 0* 2 0* 2 7  --   --     < > = values in this interval not displayed              Lab Units 08/05/22  0547 08/04/22  1800 08/04/22  0850 07/24/22  0106 06/07/22  0504   TOTAL PROTEIN g/dL 5 8* 6 1* 6 9 7 7 6 6   ALBUMIN g/dL 2 0* 2 2* 2 3* 2 9* 2 0*   TOTAL BILIRUBIN mg/dL 2 97* 3 67* 3 89* 0 33 0 53   AST U/L 78* 124* 225* 15 42   ALT U/L 159* 206* 282* 18 127*   ALK PHOS U/L 572* 679* 893* 135* 641*           Lab Units 08/05/22  0547 08/04/22  1800 08/04/22  0850 07/24/22  0106 06/07/22  0504 06/06/22  0639 06/05/22  0442 03/08/21  1433 03/08/21  0603   WBC Thousand/uL 18 70* 29 44* 22 08* 10 71* 12 17*   < > 19 96*   < > 11 13*   HEMOGLOBIN g/dL 10 0* 10 3* 11 8* 12 5 11 0*   < > 10 6*   < > 8 9*   HEMATOCRIT % 30 8* 32 6* 37 3 40 2 35 1*   < > 33 8*   < > 28 6*   PLATELETS Thousands/uL 205 222 262 307 323   < > 279   < > 251   MCV fL 80* 81* 81* 83 80*   < > 80*   < > 85   MCH pg 26 0* 25 6* 25 7* 25 7* 24 9*   < > 25 1*   < > 26 5*   MCHC g/dL 32 5 31 6 31 6 31 1* 31 3*   < > 31 4   < > 31 1*   RDW % 18 8* 18 6* 18 6* 18 7* 20 4*   < > 20 1*   < > 17 0*   MPV fL 11 7 11 0 10 7 10 0 11 1   < > 10 4   < > 10 9   NRBC AUTO /100 WBCs 0 0  --  0  --   --  0  --  0   NEUTROS PCT % 89* 89*  --  77*  --   --  90*  --  71   IMMATURE GRANULOCYTES % % 1 2  --  1  --   --  1  --  1   LYMPHS PCT % 5* 2*  --  8*  --   --  4*  --  10*   LYMPHO PCT %  --   --  3*  --   --   --   --    < >  --    MONOS PCT % 5 6  --  8  --   --  4  --  10   MONO PCT %  --   --  4  --   --   --   --    < >  --    EOS PCT % 0 1 0 5  --   --  1   < > 7*   BASOS PCT % 0 0 0 1  --   --  0   < > 1   NEUTROS ABS Thousands/µL 16 67* 26 36*  --  8 28*  --   --  18 01*  --  7 98*   IMMATURE GRANULOCYES ABS Thousand/uL 0 15 0 43*  --  0 06  --   --  0 13  --  0 10   LYMPHS ABS Thousands/µL 0 85 0 61  --  0 90  --   --  0 77  --  1 13   MONOS ABS Thousand/µL 0 89 1 81*  --  0 86  --   --  0 87  --  1 08   EOS ABS Thousand/µL 0 07 0 16 0 00 0 55  --   --  0 11   < > 0 76*   BASOS ABS Thousands/µL 0 07 0 07 0 00 0 06  --   --  0 07   < > 0 08    < > = values in this interval not displayed  RADIOLOGY RESULTS: I have personally reviewed pertinent imaging studies  CLIFF Beck   PGY-4 Gastroenterology Fellow  [facility]  Available on [email or web address]

## 2022-08-05 NOTE — RESPIRATORY THERAPY NOTE
RT Ventilator Management Note  Kelli Ingram 82 y o  male MRN: [id number]  Unit/Bed#: [facility] 11 Encounter: [id number]      Daily Screen         8/4/2022  1737 8/5/2022  0748          Patient safety screen outcome[de-identified] Failed Failed (P)       Not Ready for Weaning due to[de-identified] Underline problem not resolved Underline problem not resolved (P)                 Physical Exam:   Assessment Type: (P) Assess only  General Appearance: (P) Sedated  Respiratory Pattern: (P) Assisted, Symmetrical  Chest Assessment: (P) Chest expansion symmetrical  O2 Device: Vent      Resp Comments: (P) found patient on PSV tolerating it well, no changes at this time   will continue tomonitor

## 2022-08-05 NOTE — PROGRESS NOTES
Progress Note - Neurology   Debbie Navarro 82 y o  male [id number]  Unit/Bed#: MICU 11/MICU 11    Assessment/Plan:  * Cerebrovascular accident (CVA) involving cerebellum [facility])  Assessment & Plan  82year old male with extensive medical history including but not limited to Afib on Eliquis, vascular dementia, CHF, COPD, CAD, DM, HTN, HLD and prior TBI  Patient was recently admitted in June 2022 for cholangitis and underwent stent placement  Patient had the stent removed on 8/3 and now presents from his nursing home with AMS, fever and hypoxia  Patient met criteria for sepsis and was started on broad spectrum antibiotics  Labs remarkable for lactic acidosis, CHAISTY (creat 1 78), elevated LFT's, elevated bilirubin, leukocytosis (22), elevated troponin, elevated BNP (29,249)  Concern for ascending cholangitis due to stent free trial failure, GI following  CTH revealed an acute right cerebellar infarct and neurology was consulted  Of note, patient's Eliquis was held x 3-4 days total for stent removal procedure  Patient was recently in [facility] ED on 7/26 for headache and dysarthria, concern for stroke  Patient ultimately refused all care/work up and left AMA  Currently intubated, secondary to ERCP with stent placement 8/4  Off sedation at time of exam, appears drowsy but arousable and is able to follow simple commands  Currently on stroke pathway, recommend MRI brain when able  Plan:  - Stroke pathway  - Continue Aspirin 81 mg daily   - Recommend MRI brain wo when able   - Echo EF 45% with hypokineses and moderately dilated L atrium  - Lipid Panel: Triglycerides 469, Cholesterol 69  Unable to calculate LDL    - Hemoglobin A1c pending   - SBP goal 140-160 per attending neurologist    - Currently receiving IV pressure support   - Euglycemic, normothermic goal  - Continue telemetry  - PT/OT/ST  - Secondary risk factor modification    - Stroke education  - Frequent neuro checks   Continue to monitor and notify neurology with any changes   - STAT CT head for any acute change in neuro exam  - Medical management and supportive care per primary team  Correction of any metabolic or infectious disturbances  Plan discussed with Attending Neurologist, please see attestation for further input/recommendations  Sepsis ([facility] )  Assessment & Plan  Met sepsis criteria on admission, likely in the setting of ascending cholangitis secondary to failure of stent free trial   - Blood cultures showing ESBL, gram negative and gram positive rods  Currently receiving Vancomycin and Ertapenem  - ERCP done yesterday to replace stent, food seen in airway so patient remained intubated and sedated  - GI following  - Management per critical care team     Atrial fibrillation [facility])  Assessment & Plan  Previously on Eliquis 5 mg BID   - Currently on hold      Caesar Michele will need follow up in in 6 weeks with neurovascular attending  He will not require outpatient neurological testing  Subjective: Intubated, off sedation  Is able to follow simple commands       Past Medical History:   Diagnosis Date    A-fib ([facility] )     Cancer ([facility] )     Cardiac disease     CHF (congestive heart failure) (HCC)     Chronic obstructive pulmonary disease (COPD) ([facility] )     COPD (chronic obstructive pulmonary disease) (HCC)     Coronary artery disease     Counseling regarding advance care planning and goals of care 10/7/2020    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[facility] )     TYPE 2    Dysphagia     Fracture of nasal bone     Gait instability     H/O cervical fracture     Hyperlipidemia     Hypertension     Muscle weakness     TBI (traumatic brain injury) ([facility] )      Past Surgical History:   Procedure Laterality Date    HAND SURGERY      ND ARTHRODESIS POSTERIOR CRANIOCERVICAL N/A 10/26/2020    Procedure: Occipital cervical fusion to C4;  Surgeon: Tammy Martinez MD;  Location: [facility];  Service: Neurosurgery     Family History   Problem Relation Age of Onset    Other Other urinary tract malignancy    Diabetes Mother      Social History     Socioeconomic History    Marital status: /Civil Union     Spouse name: None    Number of children: None    Years of education: None    Highest education level: None   Occupational History    None   Tobacco Use    Smoking status: Never Smoker    Smokeless tobacco: Never Used   Vaping Use    Vaping Use: Never used   Substance and Sexual Activity    Alcohol use: Never    Drug use: Never    Sexual activity: None   Other Topics Concern    None   Social History Narrative    ** Merged History Encounter **          Social Determinants of Health     Financial Resource Strain: Not on file   Food Insecurity: No Food Insecurity    Worried About Running Out of Food in the Last Year: Never true    Andrew of Food in the Last Year: Never true   Transportation Needs: No Transportation Needs    Lack of Transportation (Medical): No    Lack of Transportation (Non-Medical): No   Physical Activity: Not on file   Stress: Not on file   Social Connections: Not on file   Intimate Partner Violence: Not on file   Housing Stability: Unknown    Unable to Pay for Housing in the Last Year: No    Number of Places Lived in the Last Year: Not on file    Unstable Housing in the Last Year: No       Medications: Reviewed in detail by me  ROS: Review of Systems   Unable to perform ROS: Intubated        Vitals: /71   Pulse 58   Temp 97 88 °F (36 6 °C)   Resp 22   Ht 5' 8" (1 727 m)   Wt 93 9 kg (207 lb)   SpO2 99%   BMI 31 47 kg/m²     Performed by Attending Neurologist, AP present at bedside   Physical Exam:   Physical Exam  Constitutional:       General: He is not in acute distress  Appearance: Normal appearance  He is normal weight  He is not ill-appearing  Comments: Drowsy, but alert to verbal stimuli   HENT:      Head: Normocephalic and atraumatic        Right Ear: External ear normal       Left Ear: External ear normal       Nose: Nose normal       Mouth/Throat:      Mouth: Mucous membranes are moist    Eyes:      General:         Right eye: No discharge  Left eye: No discharge  Extraocular Movements: Extraocular movements intact  Conjunctiva/sclera: Conjunctivae normal       Pupils: Pupils are equal, round, and reactive to light  Cardiovascular:      Rate and Rhythm: Normal rate  Pulmonary:      Effort: Pulmonary effort is normal  No respiratory distress  Comments: ETT in place  Musculoskeletal:      Right lower leg: No edema  Left lower leg: No edema  Skin:     General: Skin is warm and dry  Coloration: Skin is not pale  Neurological:      Comments: See below       Neurologic Exam     Mental Status     Off sedation at time of exam, ETT in place  Patient is drowsy but arousable to verbal stimuli  Is able to follow simple commands  Cranial Nerves     CN III, IV, VI   Pupils are equal, round, and reactive to light  PERRL  Conjugate gaze present  EOM intact  Blink to threat intact  Facial features appear symmetric, facial sensation intact  Hearing intact  Limited CN testing secondary to intubation  Motor Exam   Normal tone throughout  Able to squeeze hands bilaterally  5/5 strength in upper extremity  Able to wiggle toes bilaterally  Sensory Exam     Light touch sensation intact throughout  Gait, Coordination, and Reflexes     Gait  Gait: (deferred for safety)  No resting tremor noted  Negative ankle clonus, negative babinski  Labs: Reviewed in detail by me  Imaging: I have personally reviewed pertinent imaging and PACS reports  VTE Prophylaxis: Sequential compression device (Venodyne)     Total time spent today 22 minutes  Greater than 50% of total time was spent with the patient and/or family counseling and/or coordination of care  A description of the counseling/coordination of care:  Patient was seen and evaluated  Discussed with attending    Chart reviewed thoroughly including laboratory and imaging studies  Discussed medication regimen, imaging reviewed   Plan of care discussed with patient and primary team

## 2022-08-05 NOTE — PHYSICAL THERAPY NOTE
PT orders received  Chart reviewed  Pt intubated/sedated and not appropriate for PT at this time will hold  Will continue to follow  Mickie Franklin PT, DPT     08/05/22 1018   PT Last Visit   PT Visit Date 08/05/22   Note Type   Note type Evaluation; Cancelled Session   Cancel Reasons Intubated/sedated

## 2022-08-05 NOTE — ANESTHESIA PREPROCEDURE EVALUATION
Procedure:  ERCP    Relevant Problems   CARDIO   (+) Atrial fibrillation (HCC)   (+) Cerebrovascular accident (CVA) involving cerebellum (HCC)   (+) Hyperlipidemia   (+) Hypertension   (+) Vertebral artery dissection (HCC)      ENDO   (+) Type 2 diabetes mellitus (HCC)      GI/HEPATIC   (+) Dysphagia      HEMATOLOGY   (+) Anemia      NEURO/PSYCH   (+) Cerebrovascular accident (CVA) involving cerebellum (HCC)   (+) Depression   (+) History of 2019 novel coronavirus disease (COVID-19)   (+) Vascular dementia (HCC)      Cardiovascular and Mediastinum   (+) Chronic heart failure with preserved ejection fraction (HCC)      Other   (+) Sepsis ([facility] )             Anesthesia Plan  ASA Score- 4 Emergent    Anesthesia Type-         Additional Monitors:   Airway Plan: ETT  Plan Factors-    Chart reviewed  EKG reviewed  Imaging results reviewed  Existing labs reviewed  Patient summary reviewed  Induction- intravenous and rapid sequence induction  Postoperative Plan-     Informed Consent- Anesthetic plan and risks discussed with patient and spouse  I personally reviewed this patient with the CRNA  Discussed and agreed on the Anesthesia Plan with the CRNA  Rupesh Mendez

## 2022-08-05 NOTE — SPEECH THERAPY NOTE
Patient is currently intubated  RN reported possible extubation today, but he may be going for repeat ERCP  Will follow up and completed dysphagia evaluation as able

## 2022-08-05 NOTE — PROGRESS NOTES
ICU Daily Progress Note - Critical Care   [facility] 82 y o  male MRN: [id number]  Unit/Bed#: [facility] 11 Encounter: [id number]      ----------------------------------------------------------------------------------------  HPI    [facility] is a 82 y o  male with PMH of afib on eliquis and metoprolol/diltiazem, CHF EF 48 (2017), COPD, CAD, T2DM (A1C 7 7 in 2021), HTN, HLD, dysphagia, vascular dementia, and prior TBI with no prior focal deficits who presents with altered mental status, fever, and hypoxia  8/3, patient had ERCP with stent removed then presented pn 8/4 with fevers  Labs concerning for cholangitis with elevated LFTs and WBC s/p ERCP  CT CAP showed intrahepatic and extrahepatic biliary ductal dilatation with pancreatic body and tail atrophy, suspected pancreatic head mass  CT head showed acute infarct in right cerebellum  Neuro consulted for stroke and GI consulted for repeat ERCP  Started on levo gtt for stroke  Patient admitted to MICU for pressor requirements, further workup and evaluation  24hr   ERCP done yesterday and stent was placed  During procedure food was noted in the airway so patient remained intubated  Patient on levo for blood pressure management and propofol   Tolerating well, remains intubated and sedated    -------------------------------------------------------------------------------------------------------------  Assessment and Plan:  # Right cerebellar stroke   # Sepsis 2/2 suspected cholangitis   #CHASITY (baseline 1-1 15)   # HTN   #CHF  #COPD  # Afib   #vascular dementia   # T2DM     Neuro:    Diagnosis: Right cerebellar stroke   o CT head showed right cerebellar stroke   o Rectal aspirin 300 mg at this time until can tolerate PO   o MRI pending   o lipid panel shows elevated triglycerides, patient is on propofol    o SBP goal 140-160, on levophed to maintain   o On telemetry   o Speech eval, PT, OT pending   o Continue neuro checks   o If any changes neuro exam, repeat head CT o Neurology consulted    · Sedation  · On propofol gtt     CV:    Diagnosis: HTN  - Hold home HTN meds   - SBP goal 140-160 for stroke, on levophed gtt to maintain for 24hrs   Diagnosis: Afib   o Holding home eliquis and metoprolol/dilitazen at this time   o On telemetry    CHF   o Echo shows EF 45% with septal wall hypokenesis  o Holding home lasix    CAD   o On lipitor 20mg    o On aspirin for stroke   o EKG showed no acute ST changes       Pulm:   Mechanical Ventilation   o Volume control 14/6/500/40  o CXR 8/4 shows suspected left retrocardiac consolidation and/or small effusion   o F/u chest xr 8/5    Diagnosis: COPD  o Cont mechanical ventilation   o Cont pulm hygeine       GI:    Diagnosis: Sepsis 2/2 to suspected cholangitis   o ERCP done 8/4, stent placed   o Started on Ertepenem and ID consulted    o Blood cultures Klebsiella and ESBL and gram positive rods      o Trend WBC and fever curve   o Received 750 ccs bolus on admission   o Lactate 2 5    o 8/4 CTAP: Intrahepatic and extrahepatic biliary ductal dilation with pancreatic body and tail atrophy and suspected pancreatic head mass  No gross lung infiltrates noted limited by respiratory motion   Diagnosis: Suspcious pancreatic head mass   o CEA normal   o CA 19-9 pending   o ERCP done   o Trend CMP   o GI Consulted   Plan for future EUS for mass eval     :    Diagnosis: No acute issues at this time   o Plan: Monitor urine output and creatinine   o UA showed glucose, protein, bilirubin, and hyaline casts     F/E/N:    T2 DM  o Monitor blood sugars   o Starting on lantus 15U in the AM and SSI   o NPO at this time    Replete and monitor electrolytes     Heme/Onc:    Diagnosis: Pancreatic head mass   o CT Scan showed suspicious pancreatic head mass   o ERCP planned   o Possible EUS   o GI consulted  o May consider future hemeonc consult once medically stable       Endo:    T2 DM  o Monitor blood sugars   o Starting on lantus 15U in the AM and SSI   o NPO at this time       ID:    Diagnosis: Suspected cholangitis with sespsis   o Started on Ertepenem   o Blood cultures growing ESBL and Klebsiella  o ID consulted     o Trend WBC and fever curve       MSK/Skin:    Diagnosis: No acute issues at this time   o Repositioning by nursing   o OOB once able   o PT OT pending   o Neuro checks for stroke       Disposition: Admit to Critical Care   Code Status: Level 1 - Full Code  --------------------------------------------------------------------------------------------------------------  Review of Systems   Unable to perform ROS: Mental status change       Review of systems was unable to be performed secondary to mental status    Physical Exam  Vitals and nursing note reviewed  Constitutional:       General: He is not in acute distress  Appearance: He is obese  Comments: Tired appearing   HENT:      Head: Normocephalic and atraumatic  Nose: Nose normal       Mouth/Throat:      Mouth: Mucous membranes are moist       Pharynx: Oropharynx is clear  Eyes:      Conjunctiva/sclera: Conjunctivae normal    Cardiovascular:      Rate and Rhythm: Normal rate and regular rhythm  Pulses: Normal pulses  Heart sounds: Normal heart sounds  Pulmonary:      Effort: Pulmonary effort is normal       Breath sounds: Normal breath sounds  Comments: Some WOB present  Abdominal:      General: Bowel sounds are normal  There is no distension  Palpations: Abdomen is soft  Musculoskeletal:         General: No swelling  Cervical back: Neck supple  Right lower leg: No edema  Left lower leg: No edema  Skin:     General: Skin is warm  Capillary Refill: Capillary refill takes less than 2 seconds     Neurological:      Comments: Intubated and sedated       --------------------------------------------------------------------------------------------------------------  Vitals:   Vitals:    08/05/22 0100 08/05/22 0122 08/05/22 0200 08/05/22 0300   BP:       BP Location:       Pulse: 62  60 60   Resp:       Temp: (!) 97 2 °F (36 2 °C)  (!) 97 2 °F (36 2 °C) (!) 97 2 °F (36 2 °C)   TempSrc:       SpO2: 99% 100% 99% 99%   Weight:       Height:         Temp  Min: 97 16 °F (36 2 °C)  Max: 100 5 °F (38 1 °C)  IBW (Ideal Body Weight): 68 4 kg  Height: 5' 8" (172 7 cm)  Body mass index is 31 47 kg/m²    N/A, SVR:      Laboratory and Diagnostics:  Results from last 7 days   Lab Units 08/05/22  0547 08/04/22  1800 08/04/22  0850   WBC Thousand/uL 18 70* 29 44* 22 08*   HEMOGLOBIN g/dL 10 0* 10 3* 11 8*   HEMATOCRIT % 30 8* 32 6* 37 3   PLATELETS Thousands/uL 205 222 262   NEUTROS PCT % 89* 89*  --    BANDS PCT %  --   --  8   MONOS PCT % 5 6  --    MONO PCT %  --   --  4     Results from last 7 days   Lab Units 08/04/22  1800 08/04/22  0850   SODIUM mmol/L 141 139   POTASSIUM mmol/L 3 5 3 4*   CHLORIDE mmol/L 107 101   CO2 mmol/L 27 30   ANION GAP mmol/L 7 8   BUN mg/dL 25 23   CREATININE mg/dL 1 38* 1 78*   CALCIUM mg/dL 8 2* 8 5   GLUCOSE RANDOM mg/dL 232* 248*   ALT U/L 206* 282*   AST U/L 124* 225*   ALK PHOS U/L 679* 893*   ALBUMIN g/dL 2 2* 2 3*   TOTAL BILIRUBIN mg/dL 3 67* 3 89*     Results from last 7 days   Lab Units 08/04/22  1800   MAGNESIUM mg/dL 1 8   PHOSPHORUS mg/dL 3 1      Results from last 7 days   Lab Units 08/04/22  1800 08/04/22  0850   INR  1 50* 1 32*   PTT seconds  --  32          Results from last 7 days   Lab Units 08/04/22  2141 08/04/22  1800 08/04/22  1120 08/04/22  0850   LACTIC ACID mmol/L 2 5* 2 2* 3 5* 3 5*     ABG:  Results from last 7 days   Lab Units 08/04/22  1955   PH ART  7 500*   PCO2 ART mm Hg 33 1*   PO2 ART mm Hg 111 2   HCO3 ART mmol/L 25 2   BASE EXC ART mmol/L 2 4   ABG SOURCE  Line, Arterial     VBG:  Results from last 7 days   Lab Units 08/04/22 1955 08/04/22  1331   PH STEPHEN   --  7 449*   PCO2 STEPHEN mm Hg  --  40 1*   PO2 STEPHEN mm Hg  --  59 8*   HCO3 STEPHEN mmol/L  --  27 2   BASE EXC STEPHEN mmol/L  --  3 0   ABG SOURCE  Line, Arterial  --            Micro:  Results from last 7 days   Lab Units 22  0850   GRAM STAIN RESULT  Gram negative rods*  Gram positive rods*  Gram negative rods*  Gram positive rods*       EKst degree AV block   Imaging: I have personally reviewed pertinent films in PACS    Historical Information   Past Medical History:   Diagnosis Date    A-fib ([place] [address] )     Cancer ([place] [address] )     Cardiac disease     [facility]estive [facility] failure) [facility])     Chronic obstructive pulmonary disease (COPD) ([place] [address] )     COPD (chronic obstructive pulmonary disease) ([facility])     Coronary artery disease     Counseling regarding advance care planning and goals of care 10/7/2020    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[place] [address] )     TYPE 2    Dysphagia     Fracture of nasal bone     Gait instability     H/O cervical fracture     Hyperlipidemia     Hypertension     Muscle weakness     TBI (traumatic brain injury) ([facility])      Past Surgical History:   Procedure Laterality Date    HAND SURGERY      OR ARTHRODESIS POSTERIOR CRANIOCERVICAL N/A 10/26/2020    Procedure: Occipital cervical fusion to C4;  Surgeon: Maisha Kelley MD;  Location: [facility];  Service: Neurosurgery     Social History   Social History     Substance and Sexual Activity   Alcohol Use Never     Social History     Substance and Sexual Activity   Drug Use Never     Social History     Tobacco Use   Smoking Status Never Smoker   Smokeless Tobacco Never Used       Family History:   Family History   Problem Relation Age of Onset    Other Other         urinary tract malignancy    Diabetes Mother      I have reviewed this patient's family history and commented on sigificant items within the HPI      Medications:  Current Facility-Administered Medications   Medication Dose Route Frequency    aspirin chewable tablet 81 mg  81 mg Oral Daily    cefepime (MAXIPIME) 2,000 mg in dextrose 5 % 50 mL IVPB  2,000 mg Intravenous Q12H    chlorhexidine (PERIDEX) 0 12 % oral rinse 15 mL 15 mL Mouth/Throat Q12H [address]    insulin lispro (HumaLOG) 100 units/mL subcutaneous injection 1-5 Units  1-5 Units Subcutaneous Q6H [address]    metroNIDAZOLE (FLAGYL) IVPB (premix) 500 mg 100 mL  500 mg Intravenous Q8H    norepinephrine (LEVOPHED) 4 mg (STANDARD CONCENTRATION) IV in sodium chloride 0 9% 250 mL  1-30 mcg/min Intravenous Titrated    propofol (DIPRIVAN) 1000 mg in 100 mL infusion (premix)  5-50 mcg/kg/min Intravenous Titrated    vancomycin (VANCOCIN) 1,250 mg in sodium chloride 0 9 % 250 mL IVPB  15 mg/kg (Adjusted) Intravenous Daily PRN     Home medications:  Prior to Admission Medications   Prescriptions Last Dose Informant Patient Reported? Taking?    apixaban (ELIQUIS) 5 mg   Yes No   Sig: Take 1 tablet by mouth 2 (two) times a day   atorvastatin (LIPITOR) 20 mg tablet  Outside Facility (Specify) Yes No   Sig: Take 1 tablet by mouth daily   bisacodyl (DULCOLAX) 10 mg suppository  Outside Facility (Specify) Yes No   Sig: Insert 10 mg into the rectum as needed for constipation   buPROPion (WELLBUTRIN) 100 mg tablet   Yes No   Sig: Take 100 mg by mouth in the morning   diltiazem (CARDIZEM CD) 240 mg 24 hr capsule  Outside Facility (Specify) Yes No   Sig: Take 1 capsule by mouth daily   ferrous sulfate 325 (65 Fe) mg tablet   Yes No   Sig: Take 325 mg by mouth every other day   furosemide (LASIX) 40 mg tablet  Outside Facility (Specify) Yes No   Sig: Take 1 5 tablets by mouth 2 (two) times a day 60 MG BID   hydrALAZINE (APRESOLINE) 10 mg tablet  Outside Facility (Specify) Yes No   Sig: Take 10 mg by mouth 3 (three) times a day   insulin glargine (LANTUS) 100 units/mL subcutaneous injection   No No   Sig: Inject 35 Units under the skin daily at bedtime   insulin lispro (HumaLOG) 100 units/mL injection   No No   Sig: Inject 12 Units under the skin 3 (three) times a day with meals for 10 days   magnesium hydroxide (MILK OF MAGNESIA) 400 mg/5 mL oral suspension  Outside Facility (Specify) Yes No   Sig: Take 30 mL by mouth daily as needed for constipation   metoprolol succinate (TOPROL-XL) 100 mg 24 hr tablet   Yes No   Sig: Take 1 tablet by mouth daily   omeprazole (PriLOSEC) 40 MG capsule  Outside Facility (Specify) Yes No   Sig: Take 1 capsule by mouth daily   polyethylene glycol (MIRALAX) 17 g packet   Yes No   Sig: Take 17 g by mouth every other day    sodium phosphate-biphosphate (FLEET) 7-19 g 118 mL enema  Outside Facility (Specify) Yes No   Sig: Insert 1 enema into the rectum once as needed      Facility-Administered Medications: None     Allergies: Allergies   Allergen Reactions    Amoxicillin Diarrhea and Hives     ------------------------------------------------------------------------------------------------------------  Advance Directive and Living Will: Yes    Power of :    POLST:    ------------------------------------------------------------------------------------------------------------  Anticipated Length of Stay is > 2 midnights    Care Time Delivered:   39 min      Miriam Ratliff MD        Portions of the record may have been created with voice recognition software  Occasional wrong word or "sound a like" substitutions may have occurred due to the inherent limitations of voice recognition software    Read the chart carefully and recognize, using context, where substitutions have occurred

## 2022-08-05 NOTE — CONSULTS
Vancomycin IV Pharmacy-to-Dose Consultation    Tapan Alamo is a 82 y o  male who was receiving Vancomycin IV with management by the Pharmacy Consult service  The patients Vancomycin therapy has been discontinued  Thank you for allowing us to take part in this patient's care  Pharmacy will sign-off now; please call or re-consult if there are any questions          Aline Orourke, PharmD, [facility] [place] Clinical Pharmacist  [phone]

## 2022-08-05 NOTE — RESPIRATORY THERAPY NOTE
Resp care   08/05/22 7426   Respiratory Assessment   Resp Comments Pt extubated without incident to bipap  Pt had cuff leak, no stridor  BS clear  Will cont to monitor bipap     Vent Information   Ventilator End Yes   Daily Screen   Patient safety screen outcome: Passed   Spont breathing trial % for 30 min Yes   Spont breathing trial outcome: Passed   Name of Medical Team Notified: Benita   Preparing to extubate/ Notify Nurse Yes   Extubation order obtained Yes   Consider Cuff Test Yes   Patient extubated Yes

## 2022-08-05 NOTE — PROGRESS NOTES
Patient's blood cultures positive to/to with g negative rods and Gram-positive rods  Vancomycin added to antibiotic regimen

## 2022-08-05 NOTE — UTILIZATION REVIEW
Initial Clinical Review    Admission: Date/Time/Statement:   Admission Orders (From admission, onward)     Ordered        08/04/22 1331  Inpatient Admission  Once                      Orders Placed This Encounter   Procedures    Inpatient Admission     Standing Status:   Standing     Number of Occurrences:   1     Order Specific Question:   Level of Care     Answer:   Critical Care [15]     Order Specific Question:   Estimated length of stay     Answer:   More than 2 Midnights     Order Specific Question:   Certification     Answer:   I certify that inpatient services are medically necessary for this patient for a duration of greater than two midnights  See H&P and MD Progress Notes for additional information about the patient's course of treatment  ED Arrival Information     Expected   -    Arrival   8/4/2022 08:19    Acuity   Emergent            Means of arrival   Ambulance    Escorted by   [facility] of [address]/ICU    Admission type   Emergency            Arrival complaint   sepsis           Chief Complaint   Patient presents with    Altered Mental Status     Per EMS, pt had stents removed yesterday  Pt was discharged and overnight and became hypoxic and altered  Initial Presentation: 82 y o  male with PMH of Afib on Eliquis, CHF, COPD, CAD, DM2, HTN,HLD dysphagia, vascular dementia, prior TBI presented to the ED from [facility] via [place] w/ altered mental status, fever and hypoxia  Pt was hospitalized in June for cholangitis and underwent stent placement  She had ERCP and stent removal yesterday  Last known normal was unknown  He was in [facility] ED on 7/26 for headache and dysarthria, concern for stroke  He refused all care/work up and left AMA  In ED, T 100 5  Placed on 3L NC  WBC 22 08, Lactate x2 3 5, initial trop 89 delta -9/-13, K 3 4, Cr 1 78  BNP [id number]  UA positive for glucose, +2 protein, 5-10 hyaline casts  VBG 7 449/40 1/59 8/27 2  Elevated LFTs   CXR showed suspected left retrocardiac consolidation and/or small effusion  CT CAP showed intrahepatic and extrahepatic biliary ductal dilatation with pancreatic body and tail atrophy, suspected pancreatic head mass  CT head showed acute infarct in right cerebellum  Started on levo gtt at 2  Given rectal tylenol, flagyl and cefepime  Had 750 ccs bolus of NS       Plan: Inpatient admission for evaluation and treatment of R cerebellar stroke, sepsis, cholangitis, abnormal LFTs, CHASITY, acute hypoxemic resp failure: MRI, Echo, Hb A1C, lipid panel pending  SBP goal 140-160, on levophed to maintain  mon on telemetry  Speech eval, PT, OT pending  Continue neuro checks  Neurology consulted  Cont supplemental o2  Cont IV abx  Bld cx pending  Trend WBC/Fever curve  Trend CMP  NPO  Mon Bld sugar  GI consulted- Plan for repeat ERCP today     08/04  GI consult: Sepsis, possible cholangitis: Plan: ERCP today for possible cholangitis  Meets severe sepsis criteria  Continue antibiotic, tailor to culture results  Future endoscopic ultrasound for evaluation of possible pancreatic mass  Potential future oncology consult  Trend labs  Neurology Consult: CVA involving cerebellum: Neuro exam is grossly non focal, however he does have mild dysarthria and trace dysmetria with right hand coordination  He denies stroke like symptoms  Admit on stroke pathway; Cont rectal ASA, recommend po ASA once pt able to safely take po meds  MRI brain pending  Echo pending  Lipid Panel pending  Hemoglobin A1c pending  SBP goal 140-160, on IV pressure suppory  Euglycemic, normothermic goal  Continue telemetry  PT/OT/ST  Frequent neuro checks  Date: 08/05   Day 2:   Critical Care Notes: ERCP done yesterday and stent was placed  During procedure food was noted in the airway so patient remained intubated  Patient on levo for blood pressure management and propofol  Tolerating well, remains intubated on vent- Vol control 14/6/500/40 and sedated   TTE with EF 79% and diastolic dysfunction and pulmonary hypertension  Stop levo for BP elevation  MRI pending  Start hep gtt  Hold statin, hold diuretics  ASA  PRN nebs  Cont Ertapanem  ID consult  Mon UOP  NPO  TF if not extubated  Lantus added this am  On SSI  Telemetry  Cont neuro checks  PE: Intubated, sedated, some WOB present  Normal breath sounds  GI Notes: Repeat ERCP done yesterday w/ rpt stent placement (10 x 4 cm, fully covered metal stent)  Admission blood cx now growing GNRs - speciated to ESBL Klebsiella and GPCs not speciated  Cont Vanc, ertapanem  EUS to eval pancreatic mass  F/u biliary stricture cytology brushings  Liver function tests downtrending; continue checking daily LFTs      ED Triage Vitals   Temperature Pulse Respirations Blood Pressure SpO2   08/04/22 0831 08/04/22 0824 08/04/22 0824 08/04/22 0824 08/04/22 0824   100 5 °F (38 1 °C) 88 20 121/61 93 %      Temp Source Heart Rate Source Patient Position - Orthostatic VS BP Location FiO2 (%)   08/04/22 0831 08/04/22 0824 08/04/22 0824 08/04/22 0824 --   Rectal Monitor Lying Left arm       Pain Score       08/04/22 0839       No Pain          Wt Readings from Last 1 Encounters:   08/04/22 93 9 kg (207 lb)     Additional Vital Signs:   Date/Time Temp Pulse Resp BP MAP (mmHg) Arterial Line BP MAP SpO2 O2 Flow Rate (L/min) O2 Device Patient Position - Orthostatic VS   08/05/22 1100 98 96 °F (37 2 °C) 74 -- -- -- 154/50 88 mmHg 99 % -- -- --   08/05/22 1000 98 6 °F (37 °C) 72 -- -- -- 172/56 96 mmHg 100 % -- -- --   08/05/22 0900 98 6 °F (37 °C) 70 -- -- -- 148/50 84 mmHg 99 % -- -- --   08/05/22 0800 98 24 °F (36 8 °C) 70 -- -- -- 164/56 92 mmHg 100 % -- -- --   08/05/22 0700 97 88 °F (36 6 °C) 58 -- -- -- 156/50 86 mmHg 99 % -- -- --   08/05/22 0600 97 9 °F (36 6 °C) 58 -- -- -- 156/50 88 mmHg 99 % -- -- --   08/05/22 0500 97 5 °F (36 4 °C) 58 -- -- -- 162/58 94 mmHg 100 % -- -- --   08/05/22 0400 97 5 °F (36 4 °C) 60 -- -- -- 162/58 96 mmHg 99 % -- -- --   08/05/22 0300 97 2 °F (36 2 °C) Abnormal  60 -- -- -- 152/52 88 mmHg 99 % -- -- --   08/05/22 0200 97 2 °F (36 2 °C) Abnormal  60 -- -- -- 152/52 88 mmHg 99 % -- -- --   08/05/22 0122 -- -- -- -- -- -- -- 100 % -- -- --   08/05/22 0100 97 2 °F (36 2 °C) Abnormal  62 -- -- -- 150/52 86 mmHg 99 % -- -- --   08/05/22 0000 97 2 °F (36 2 °C) Abnormal  60 -- -- -- 136/46 78 mmHg 99 % -- -- --   08/04/22 2345 97 2 °F (36 2 °C) Abnormal  60 -- -- -- 152/52 90 mmHg 100 % -- -- --   08/04/22 2330 97 2 °F (36 2 °C) Abnormal  62 -- -- -- 148/50 86 mmHg 100 % -- -- --   08/04/22 2300 97 16 °F (36 2 °C) Abnormal  62 -- -- -- 144/52 84 mmHg 99 % -- -- --   08/04/22 2200 97 52 °F (36 4 °C) 60 -- -- -- 148/50 86 mmHg 99 % -- -- --   08/04/22 2145 97 52 °F (36 4 °C) 60 -- -- -- 146/58 88 mmHg 99 % -- -- --   08/04/22 2130 97 5 °F (36 4 °C) 62 -- -- -- 176/66 102 mmHg 99 % -- -- --   08/04/22 2115 97 5 °F (36 4 °C) 62 -- -- -- 158/56 94 mmHg 99 % -- -- --   08/04/22 2100 97 5 °F (36 4 °C) 62 -- -- -- 156/56 92 mmHg 99 % -- -- --   08/04/22 2045 97 9 °F (36 6 °C) 62 -- 161/71 107 154/54 90 mmHg 99 % -- -- --   08/04/22 2030 -- 62 -- -- -- 142/52 84 mmHg 99 % -- -- --   08/04/22 2015 -- 64 -- -- -- 152/52 88 mmHg 99 % -- -- --   08/04/22 2000 -- 64 -- -- -- 158/54 92 mmHg 99 % -- -- --   08/04/22 1930 -- 66 -- -- -- 170/66 104 mmHg 99 % -- -- --   08/04/22 1922 -- -- -- -- -- -- -- 99 % -- -- --   08/04/22 1915 -- 68 -- -- -- 162/60 98 mmHg 99 % -- -- --   08/04/22 1900 -- 68 -- -- -- 156/58 92 mmHg 99 % -- -- --   08/04/22 1800 -- 70 -- -- -- 156/58 90 mmHg 99 % -- -- --   08/04/22 1737 -- -- -- -- -- -- -- 99 % -- -- --   08/04/22 1618 98 5 °F (36 9 °C) 72 22 116/60 -- -- -- 96 % -- -- --   08/04/22 1600 99 7 °F (37 6 °C) 72 -- -- -- 148/88 114 mmHg 98 % -- -- --   08/04/22 1500 -- -- -- -- -- -- -- 97 % 2 L/min Nasal cannula --   08/04/22 1339 -- 74 25 Abnormal  143/72 100 -- -- 97 % -- -- --   08/04/22 1332 -- 74 28 Abnormal  140/42 Abnormal  94 -- -- 96 % -- -- --   08/04/22 1327 -- 74 29 Abnormal  134/63 88 -- -- 97 % 2 L/min Simple mask Lying   08/04/22 1320 -- 74 -- 140/42 Abnormal  -- -- -- -- -- -- --   08/04/22 1232 -- 74 22 133/68 93 -- -- 100 % 2 L/min Simple mask Lying   08/04/22 1159 -- 74 22 114/60 82 -- -- 96 % 3 L/min Simple mask Lying   08/04/22 1120 -- 74 22 126/66 91 -- -- 96 % 3 L/min Simple mask Lying   08/04/22 1026 -- 74 22 118/61 83 -- -- 94 % 3 L/min Simple mask Lying   08/04/22 0930 -- 78 20 120/50 93 -- -- 97 % 3 L/min Simple mask Lying   08/04/22 0905 -- 77 20 115/60 82 -- -- 98 % 3 L/min Simple mask Lying   08/04/22 0841 -- -- -- -- -- -- -- 95 % -- Simple mask --   08/04/22 0839 -- -- -- -- -- -- -- -- 3 L/min Simple mask --   08/04/22 0831 100 5 °F (38 1 °C) -- -- -- -- -- -- -- -- -- --     Pertinent Labs/Diagnostic Test Results:   CXR in AM   Final Result by Shiv Tejada MD (08/05 5644)      ET tube and right IJ line remain in stable position  No active pulmonary disease  Workstation performed: [id number]         XR chest portable ICU   Final Result by Shiv Tejada MD (08/05 6518)      Right IJ line has been placed, tip overlies the SVC  No pneumothorax  Workstation performed: [id number]         XR chest portable ICU   Final Result by Shiv Tejada MD (31/26 7682)      ET tube appears in position  Possible small left pleural effusion, stable  Workstation performed: [id number]         FL ERCP biliary only   Final Result by Shiv Tejada MD (08/05 2347)      Distal CBD stent placement  Workstation performed: [id number]         CT chest abdomen pelvis w contrast   Final Result by Cristian Ordaz MD (08/04 1128)         1  Intrahepatic and extrahepatic biliary ductal dilatation with pancreatic body and tail atrophy and suspected pancreatic head mass  Confirmation should be obtained with MRI abdomen with MRCP        2   No infiltrates on study limited by respiratory motion  I personally discussed this study with Mateo Cancer on 8/4/2022 at 11:28 AM                Workstation performed: [id number]         CT head without contrast   Final Result by Madeline Wolff MD (08/04 1129)         New low-density in the right cerebellum concerning for an acute infarct  Cerebral atrophy and small vessel disease  I personally discussed this study with Mateo Cancer on 8/4/2022 at 11:28 AM                      Workstation performed: [id number]         XR chest 1 view portable   Final Result by Arline Baumgarten, MD (08/04 3981)      Suspected left retrocardiac consolidation and/or small effusion  Cardiomegaly and aortic sclerosis  Workstation performed: [id number]         MRI inpatient order    (Results Pending)     08/04   EKG result: Sinus rhythm with 1st degree A-V block with occasional Premature ventricular complexes  Left axis deviation  Non-specific intra-ventricular conduction block  Possible Lateral infarct , age undetermined    ECHO:   Left Ventricle: Left ventricular cavity size is normal  Wall thickness is moderately increased  There is moderate concentric hypertrophy  The left ventricular ejection fraction is 45%  Systolic function is mildly reduced  Diastolic function is moderately abnormal, consistent with grade II (pseudonormal) relaxation    The following segments are hypokinetic: basal inferoseptal, basal inferior, basal inferolateral and mid inferolateral     All other segments are normal     Left Atrium: The atrium is moderately dilated    Mitral Valve: There is mild regurgitation    Tricuspid Valve: There is mild regurgitation  The right ventricular systolic pressure is moderately elevated   The estimated right ventricular systolic pressure is 84 56 mmHg        Results from last 7 days   Lab Units 08/04/22  0850   SARS-COV-2  Negative     Results from last 7 days   Lab Units 08/05/22  0547 08/04/22  1800 08/04/22  0850   WBC Thousand/uL 18 70* 29 44* 22 08*   HEMOGLOBIN g/dL 10 0* 10 3* 11 8*   HEMATOCRIT % 30 8* 32 6* 37 3   PLATELETS Thousands/uL 205 222 262   NEUTROS ABS Thousands/µL 16 67* 26 36*  --    BANDS PCT %  --   --  8         Results from last 7 days   Lab Units 08/05/22  0547 08/04/22  1800 08/04/22  0850   SODIUM mmol/L 141 141 139   POTASSIUM mmol/L 3 3* 3 5 3 4*   CHLORIDE mmol/L 108 107 101   CO2 mmol/L 26 27 30   ANION GAP mmol/L 7 7 8   BUN mg/dL 29* 25 23   CREATININE mg/dL 1 37* 1 38* 1 78*   EGFR ml/min/1 73sq m 46 46 34   CALCIUM mg/dL 8 1* 8 2* 8 5   MAGNESIUM mg/dL  --  1 8  --    PHOSPHORUS mg/dL  --  3 1  --      Results from last 7 days   Lab Units 08/05/22  0547 08/04/22  1800 08/04/22  0850   AST U/L 78* 124* 225*   ALT U/L 159* 206* 282*   ALK PHOS U/L 572* 679* 893*   TOTAL PROTEIN g/dL 5 8* 6 1* 6 9   ALBUMIN g/dL 2 0* 2 2* 2 3*   TOTAL BILIRUBIN mg/dL 2 97* 3 67* 3 89*   AMMONIA umol/L  --  20  --      Results from last 7 days   Lab Units 08/05/22  1121 08/05/22  0550 08/04/22  2330 08/04/22  1954 08/03/22  1010   POC GLUCOSE mg/dl 326* 321* 295* 257* 110     Results from last 7 days   Lab Units 08/05/22  0547 08/04/22  1800 08/04/22  0850   GLUCOSE RANDOM mg/dL 319* 232* 248*         Results from last 7 days   Lab Units 08/05/22  0547   HEMOGLOBIN A1C % 7 5*   EAG mg/dl 169     Results from last 7 days   Lab Units 08/05/22  1020 08/04/22 1955   PH ART  7 463* 7 500*   PCO2 ART mm Hg 40 4 33 1*   PO2 ART mm Hg 133 6* 111 2   HCO3 ART mmol/L 28 3* 25 2   BASE EXC ART mmol/L 4 2 2 4   O2 CONTENT ART mL/dL 15 1* 15 3*   O2 HGB, ARTERIAL % 97 6* 97 0   ABG SOURCE  Line, Arterial Line, Arterial     Results from last 7 days   Lab Units 08/04/22  1331   PH STEPHEN  7 449*   PCO2 STEPHEN mm Hg 40 1*   PO2 STEPHEN mm Hg 59 8*   HCO3 STEPHEN mmol/L 27 2   BASE EXC STEPHEN mmol/L 3 0   O2 CONTENT STEPHEN ml/dL 13 8   O2 HGB, VENOUS % 87 7*             Results from last 7 days   Lab Units 08/04/22  1331 08/04/22  1120 08/04/22  0850   HS TNI 0HR ng/L  --   --  89*   HS TNI 2HR ng/L  --  80*  --    HSTNI D2 ng/L  --  -9  --    HS TNI 4HR ng/L 76*  --   --    HSTNI D4 ng/L -13  --   --          Results from last 7 days   Lab Units 08/04/22  1800 08/04/22  0850   PROTIME seconds 18 3* 16 6*   INR  1 50* 1 32*   PTT seconds  --  32             Results from last 7 days   Lab Units 08/04/22  2141 08/04/22  1800 08/04/22  1120 08/04/22  0850   LACTIC ACID mmol/L 2 5* 2 2* 3 5* 3 5*             Results from last 7 days   Lab Units 08/04/22  0850   NT-PRO BNP pg/mL 50,705*             Results from last 7 days   Lab Units 08/04/22  1800 08/04/22  0850   LIPASE u/L 26* 28*         Results from last 7 days   Lab Units 08/04/22  1158   CEA ng/mL 1 8         Results from last 7 days   Lab Units 08/04/22  1015   CLARITY UA  Cloudy   COLOR UA  Rosina   SPEC GRAV UA  1 025   PH UA  5 0   GLUCOSE UA mg/dl 100 (1/10%)*   KETONES UA mg/dl Negative   BLOOD UA  Negative   PROTEIN UA mg/dl 100 (2+)*   NITRITE UA  Negative   BILIRUBIN UA  Interference- unable to analyze*   UROBILINOGEN UA E U /dl 4 0*   LEUKOCYTES UA  Negative   WBC UA /hpf 1-2   RBC UA /hpf None Seen   BACTERIA UA /hpf None Seen   EPITHELIAL CELLS WET PREP /hpf Occasional   MUCUS THREADS  Occasional*     Results from last 7 days   Lab Units 08/04/22  0850   INFLUENZA A PCR  Negative   INFLUENZA B PCR  Negative   RSV PCR  Negative       Results from last 7 days   Lab Units 08/04/22  0850   BLOOD CULTURE  Klebsiella oxytoca*  Gram Negative Joel Enteric Like*   GRAM STAIN RESULT  Gram negative rods*  Gram positive rods*  Gram negative rods*  Gram positive rods*               ED Treatment:   Medication Administration from 08/04/2022 0819 to 08/04/2022 1425       Date/Time Order Dose Route Action     08/04/2022 0941 cefepime (MAXIPIME) 2 g/50 mL dextrose IVPB 0 mg Intravenous Stopped     08/04/2022 0911 cefepime (MAXIPIME) 2 g/50 mL dextrose IVPB 2,000 mg Intravenous New Bag     08/04/2022 1113 metroNIDAZOLE (FLAGYL) IVPB (premix) 500 mg 100 mL 0 mg Intravenous Stopped     08/04/2022 1000 metroNIDAZOLE (FLAGYL) IVPB (premix) 500 mg 100 mL 500 mg Intravenous New Bag     08/04/2022 0857 acetaminophen (TYLENOL) tablet 650 mg 650 mg Oral Not Given     08/04/2022 1155 sodium chloride 0 9 % bolus 250 mL 0 mL Intravenous Stopped     08/04/2022 0853 sodium chloride 0 9 % bolus 250 mL 250 mL Intravenous New Bag     08/04/2022 0944 acetaminophen (TYLENOL) rectal suppository 650 mg 650 mg Rectal Given     08/04/2022 1158 sodium chloride 0 9 % bolus 500 mL 500 mL Intravenous New Bag     08/04/2022 1056 iohexol (OMNIPAQUE) 350 MG/ML injection (MULTI-DOSE) 68 mL 68 mL Intravenous Given     08/04/2022 1321 norepinephrine (LEVOPHED) 4 mg (STANDARD CONCENTRATION) IV in sodium chloride 0 9% 250 mL 2 mcg/min Intravenous New Bag        Past Medical History:   Diagnosis Date    A-fib ([place] [address] )     Cancer ([place] [address] )     Cardiac disease     [facility]est[facility]     Chronic obstructive pulmonary disease (COPD) ([place] [address] )     COPD (chronic obstructive pulmonary disease) ([place] [address] )     Coronary artery disease     Counseling regarding advance care planning and goals of care 10/7/2020    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[place] [address] )     TYPE 2    Dysphagia     Fracture of nasal bone     Gait instability     H/O cervical fracture     Hyperlipidemia     Hypertension     Muscle weakness     TBI (traumatic brain injury) ([place] [address] )      Present on Admission:   Atrial fibrillation ([place] [address] )      Admitting Diagnosis: Cholangitis [K83 09]  Leukocytosis [D72 829]  Transaminitis [R74 01]  Stroke (cerebrum) ([facility]) [I63 9]  Pancreatic mass [K86 89]  Elevated troponin [R77 8]  CHF exacerbation ([facility]) [I50 9]  Cerebellar infarct ([facility]) [I63 9]  CHASITY (acute kidney injury) ([place] [address] ) [N17 9]  S/P ERCP [Z98 890]  Sepsis ([place] [address] ) [A41 9]  Frailty syndrome in geriatric patient [R54]  AMS (altered mental status) [R41 82]  Age/Sex: 82 y o  male  Admission Orders:  SCD  PT/OT/ST  Cardio-Pulmonary Monitoring, Neuro Checks, Nursing dysphagia assesment, I/O, Daily weights, Vital signs  NPO    Scheduled Medications:  aspirin, 81 mg, Oral, Daily  aspirin, 300 mg, Rectal, Once  chlorhexidine, 15 mL, Mouth/Throat, Q12H GABY  ertapenem, 1,000 mg, Intravenous, Q24H  potassium chloride, 40 mEq, Intravenous, Once  vancomycin (VANCOCIN) 2,000 mg in sodium chloride 0 9 % 500 mL IVPB IV once  magnesium sulfate IVPB (premix) SOLN 1 g, IV once    Continuous IV Infusions:  insulin regular (HumuLIN R,NovoLIN R) infusion, 0 3-21 Units/hr, Intravenous, Titrated  propofol, 5-50 mcg/kg/min, Intravenous, Titrated  norepinephrine (LEVOPHED) 4 mg (STANDARD CONCENTRATION) IV in sodium chloride 0 9% 250 mL  Rate: 3 8-112 5 mL/hr Dose: 1-30 mcg/min  Freq: Titrated Route: IV  Last Dose: Stopped (08/05/22 0800)  Start: 08/04/22 1230 End: 08/05/22 0934    PRN Meds:  ipratropium, 0 5 mg, Nebulization, Q8H PRN  levalbuterol, 0 63 mg, Nebulization, Q8H PRN        IP CONSULT TO GASTROENTEROLOGY  IP CONSULT TO NEUROLOGY  IP CONSULT TO CASE MANAGEMENT  IP CONSULT TO GASTROENTEROLOGY  IP CONSULT TO PHARMACY  IP CONSULT TO INFECTIOUS DISEASES    Network Utilization Review Department  ATTENTION: Please call with any questions or concerns to [phone] and carefully listen to the prompts so that you are directed to the right person  All voicemails are confidential   Maria T Reaves all requests for admission clinical reviews, approved or denied determinations and any other requests to dedicated fax number below belonging to the campus where the patient is receiving treatment   List of dedicated fax numbers for the Facilities:  [address] DENIALS (Administrative/Medical Necessity) [phone]   [address] (Maternity/NICU/Pediatrics) 261 [facility] [facility] [place] [address] [phone]   Lida Adam [address] [facility] [address] [address] [place] [address] [address] [address] [place] [id number] [phone]

## 2022-08-05 NOTE — OCCUPATIONAL THERAPY NOTE
OT CANCEL NOTE    OT orders received  Chart reviewed  Pt is currently intubated/sedated and not appropriate to engage in skilled OT services at this time  Will hold initial OT evaluation  Will continue to follow pt on caseload and see pt when medically stable and as clinically appropriate  08/05/22 0806   OT Last Visit   OT Visit Date 08/05/22   Note Type   Note type Evaluation; Cancelled Session   Cancel Reasons Intubated/sedated     Gustabo Garza MS, OTR/L

## 2022-08-05 NOTE — PROGRESS NOTES
Vancomycin IV Pharmacy-to-Dose Consultation    Tapan Alamo is a 82 y o  male who is currently receiving vancomycin IV with management by the Pharmacy Consult service  Relevant clinical data and objective / subjective history reviewed  Vancomycin Assessment:  Indication: bacteremia, suspected source intraabdominal  Status: critically ill  Micro:   8/4 blood cx (2/2): GNR and GPR  Renal Function: improving, serum creatinine 1 78 upon admission, 1 38 this evening,  mL documented  Days of Therapy: 1  Current Dose: new start  Goal Trough: 15-20  Goal AUC(24h): 400-600  Last Level: n/a    Vancomycin Plan:  New Dosing: change to 2000 mg (25 mg/kg AdjBW) IV x1 STAT followed by 1250 mg (15 mg/kg AdjBW) IV daily PRN when random vancomycin level is less than 20  Next Level: random level 8/5 at 1100  Renal Function Monitoring: daily BMP and UOP assessment      Pharmacy will continue to follow closely for s/sx of nephrotoxicity, infusion reactions and appropriateness of therapy  BMP and CBC will be ordered per protocol  We will continue to follow the patient's culture results and clinical progress daily         Thank you,  Bienvenido Merrill, PharmD, [facility] 6 Pharmacist  [phone]

## 2022-08-05 NOTE — RESPIRATORY THERAPY NOTE
Resp care   08/05/22 9455   Respiratory Assessment   Resp Comments Pt taken off bipap and placed on nc  Will place back on bipap for resp distress or signs of SONYA     Oxygen Therapy/Pulse Ox   O2 Device Nasal cannula   Nasal Cannula O2 Flow Rate (L/min) 4 L/min   Calculated FIO2 (%) - Nasal Cannula 36

## 2022-08-05 NOTE — NUTRITION
08/05/22 1337   Biochemical Data,Medical Tests, and Procedures   Biochemical Data/Medical Tests/Procedures Lab values reviewed; Meds reviewed   Recommendations/Interventions   Interventions/Recommendations Tube feeding recs provided   Recommendations to Provider 1  If unable to extubate, obtain enteral access and start EN: Vital High Protein at 50 mL/hr  Start at 10 mL and advance by 10 mL q 4 hrs to goal  Provides: 1200mL TV, 1200 kcal, 105g protein, 28g fat, 133g CHO and 1003 mL free water  2  If euvolemic and absent of IVF, FWF at 80 mL q 4 hrs    3  If extubated, goal of low fat , CCD 2 diet

## 2022-08-05 NOTE — RESPIRATORY THERAPY NOTE
Resp care   08/05/22 1533   Respiratory Assessment   Resp Comments Pt changed to psv 8  Apnea changed to 30 seconds, apnea rate changed to 6  Will cont to monitor for poss extubation  Vent Information   Vent type Asher G5   Asher C3/G5 Vent Mode SPONT   SPONT Settings   FIO2 (%) 40 %   PEEP (cmH2O) 6 cmH2O   Pressure Support (cmH2O) 8 cmH20   Flow Trigger (LPM) 2 LPM  (pressure)   P-ramp (ms) 50 ms   ETS  (%) 25 %   Humidification Heater   Heater Temp 96 8 °F (36 °C)   SPONT Actuals   Resp Rate (BPM) 20 BPM   VT (mL) 367 mL   MV (Obs) 8 8   MAP (cmH2O) 9 cmH2O   Peak Pressure (cmH2O) 15 cmH2O   Heater Temperature (Obs) 97 7 °F (36 5 °C)   SPONT Alarms   High Peak Pressure (cmH20) 40 cmH2O   High Resp Rate (BPM) 35 BPM   High MV (L/min) 20 L/min   Low MV (L/min) 4 L/min   High Spont VTE (mL) 1000 mL   Low Spont VTE (mL) 200 mL   Apnea Time (S) 30 S   SPONT Apnea Settings   Resp Rate (BPM) 6 BPM   %TI (%) 1 %   Apnea Time (S) 30 S   ETT  Hi-Lo; Cuffed;Oral 7 mm   Placement Date/Time: 08/04/22 1641   Mask Ventilation: Difficult mask ventilation (3)  Preoxygenated: Yes  Technique: Video laryngoscopy;Stylet  Type: Hi-Lo; Cuffed;Oral  Tube Size: 7 mm  Laryngoscope: Mcclure  Blade Size: 4  Location: Oral  Grade View       Secured at (cm) 22

## 2022-08-05 NOTE — PROGRESS NOTES
08/05/22 1500   Clinical Encounter Type   Visited With Patient   Routine Visit Introduction   Sacramental Encounters   Sacrament of Sick-Anointing Anointed   Sacrament Other Other (Comment)  ([place] by Fr Champion Chol)

## 2022-08-05 NOTE — RESPIRATORY THERAPY NOTE
RT Ventilator Management Note  Suzanne Montes 82 y o  male MRN: [id number]  Unit/Bed#: [facility] 11 Encounter: [id number]      Daily Screen         8/4/2022  2860             Patient safety screen outcome[de-identified] Failed    Not Ready for Weaning due to[de-identified] Underline problem not resolved              Physical Exam:   Assessment Type: Assess only  General Appearance: Sedated  Respiratory Pattern: Assisted  Chest Assessment: Chest expansion symmetrical  Bilateral Breath Sounds: Coarse  Cough: Unable to assess  Suction: ET Tube  O2 Device: (P) Vent      Resp Comments: (P) Pt stable on current vent settings, no other changes made at this time, will continue to monitor

## 2022-08-05 NOTE — CASE MANAGEMENT
Case Management Assessment    Patient name Reta Fagan  Location MICU 11/MICU 11 MRN [id number]  : 1935 Date 2022       Current Admission Date: 2022  Current Admission Diagnosis:Cerebrovascular accident (CVA) involving cerebellum [facility])   Patient Active Problem List    Diagnosis Date Noted    Cerebrovascular accident (CVA) involving cerebellum ([facility] ) 2022    Sepsis ([facility] ) 2022    S/P ERCP 2022    Cholangitis 2022    Full code status 2021    Frailty syndrome in geriatric patient 2021    Leukocytosis 2021    Constipation 2021    Vascular dementia ([facility] ) 2021    Depression 03/15/2021    Right acetabular fracture ([facility] ) 2021    Debility 2021    Medication management 2021    Goals of care, counseling/discussion 2021    Anemia 2021    History of 2019 novel coronavirus disease (COVID-19) 2020    Weight gain 2020    Abnormal EKG 2020    Chronic heart failure with preserved ejection fraction ([facility] ) 2020    Ambulatory dysfunction 2020    Dysphagia 2020    Atrial fibrillation ([facility] ) 2020    Hypertension 2020    Hyperlipidemia 2020    Type 2 diabetes mellitus ([facility] ) 2020    Vertebral artery dissection ([place] [address] ) 2020      LOS (days): 1  Geometric Mean LOS (GMLOS) (days): 4 80  Days to GMLOS:3 6     OBJECTIVE:    Risk of Unplanned Readmission Score: 29 77         Current admission status: Inpatient       Preferred Pharmacy:   [facility] [place], [address] [place] [address] Alivia Sandra [address] [address]  Phone: [phone] Fax: Kelly Morel [address] [place] [address]  [address] [place] [address]  Phone: [phone] Fax: [phone]    UNKNOWN - FOLLOW UP PRIOR TO DISCHARGE TO E-PRESCRIBE  No address on file      [facility] - [facility]INI[facility], [address] [place] [facility]  Phone: [phone] Fax: [phone]    Primary Care Provider: Jacque Boo MD    Primary Insurance: [facility]  Secondary Insurance: [address]:  [address], [address] Representative - Spouse   Primary Phone: [phone] [facility])  Mobile Phone: [phone]               Advance Directives  Does patient have a [address]?: No  Was patient offered paperwork?: No (confused)  Does patient currently have a Health Care decision maker?: Yes, please see Health Care Proxy section  Does patient have Advance Directives?: Yes  Advance Directives:  (has SNF DNR form on chart)  Primary Contact: Kana Knight- wife  Readmission Root Cause  30 Day Readmission: No    Patient Information  Admitted from[de-identified] Facility (resident of [facility])  Mental Status: Alert, Confused  During Assessment patient was accompanied by: Not accompanied during assessment  Assessment information provided by[de-identified] Spouse (spoke to wife by phone)  Primary Caregiver: Other (Comment) (staff of snf)  Caregiver's Name[de-identified] [address] Relationship to Patient[de-identified] Other (Specify) (snf)  Caregiver's Telephone Number[de-identified] [phone]  Support Systems: Spouse/significant other, Son  Type of Current Residence: Facility  Living Arrangements: Other (Comment) [facility])    Activities of Daily Living Prior to Admission  Functional Status: Assistance  Completes ADLs independently?: No  Level of ADL dependence: Assistance  Ambulates independently?: No  Level of ambulatory dependence: Total Dependent  Does patient use assisted devices?: Yes  Assisted Devices (DME) used:  Wheelchair  Does the patient have a history of Short-Term Rehab?: Yes (resident of [facility] since 2020)    Patient Information Continued  Income Source: Pension/FCI

## 2022-08-05 NOTE — CONSULTS
Consultation - Infectious Disease   Duane Rodas 82 y o  male MRN: [id number]  Unit/Bed#: MICU 11 Encounter: [id number]      IMPRESSION & RECOMMENDATIONS:   Impression/Recommendations:  1  Severe sepsis, POA  WBC, RR, lactic acidosis  Due to #2/3  No other appreciable source  Unable to obtain ROS  Exam otherwise benign  Flu/RSV/COVID PCR, UA, chest x-ray negative  Improving with antibiotics, biliary decompression  Rec:  · Continue antibiotics as below  · Follow up blood cultures as below  · Follow temperatures closely  · Recheck CBC in AM  · Supportive care as per the primary service    2  Polymicrobial bacteremia  ESBL Klebsiella, Gram-positive rods  Due to #3  No other appreciable source  UA negative  Rec:  · Continue ertapenem for now  · Hold on further vancomycin dosing  · Follow-up final blood cultures and tailor antibiotics as indicated    3  Recurrent acute cholangitis  In setting of # 4, recent attempt at stent removal   CT showed intra and extrahepatic biliary dilation  LFTs trending down  Rec:  · Continue antibiotics as above  · Recheck CMP in AM    4  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  Status post ERCP, stent placement June 2022  Status post recent ERCP, stent removal   Presented with infection as above, found to have intra and extrahepatic biliary ductal dilation  Status post repeat ERCP and stent replacement 08/04/2022     5   Cerebellar CVA  Occurring in setting of infection as above  6   Amoxicillin allergy  Tolerating cephalosporin, carbapenems  7   DM  Antibiotics:  Ertapenem #1    Thank you for this consultation  We will follow along with you  HISTORY OF PRESENT ILLNESS:  Reason for Consult:  Bacteremia    HPI: Duane Rodas is a 82 y o  male with multiple medical problems as listed below  He was recently hospitalized in early July for acute cholangitis  He underwent ERCP and plastic stent placement  He was treated with antibiotics    He underwent elective ERCP on 08/03 with stent removal, sphincterotomy, and removal of sludge and debris  He was noted to have distal stricture and brushings were sent for cytology  He presented to the emergency room on 8/4 with confusion and fever  Upon presentation he was noted to have low-grade temperature with tachypnea  Labs revealed leukocytosis and lactic acidosis  He had refractory hypotension requiring initiation of pressors  CTA/P showed intra and extrahepatic biliary ductal dilatation with suspected pancreatic head mass  CT head showed cerebellar CVA  He was started on cefepime  He was started on pressors for BP management post CVA  He was taken for urgent ERCP and metal stent replacement  He remained intubated after the procedure  Blood cultures came back growing ESBL Klebsiella as well as Gram-positive rods, changed to Ertapenem  He received a single dose of vancomycin  We are asked to comment on further evaluation and management  In performing this consult, I have reviewed prior admission and outpatient visit records in detail  REVIEW OF SYSTEMS:  Unable to obtain due to intubation  A complete system-based review of systems is otherwise negative      PAST MEDICAL HISTORY:  Past Medical History:   Diagnosis Date    A-fib ([facility] )     Cancer ([facility] )     Cardiac disease     [facility]     [facility]     COPD (chronic obstructive pulmonary disease) ([facility])     Coronary artery disease     Counseling regarding advance care planning and goals of care 10/7/2020    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[facility] )     TYPE 2    Dysphagia     Fracture of nasal bone     Gait instability     H/O cervical fracture     Hyperlipidemia     Hypertension     Muscle weakness     TBI (traumatic brain injury) ([facility] )      Past Surgical History:   Procedure Laterality Date    HAND SURGERY      ID ARTHRODESIS POSTERIOR CRANIOCERVICAL N/A 10/26/2020    Procedure: Occipital cervical fusion to C4;  Surgeon: Irvin Schmidt MD;  [facility];  Service: Neurosurgery       FAMILY HISTORY:  Non-contributory    SOCIAL HISTORY:  Social History     Substance and Sexual Activity   Alcohol Use Never     Social History     Substance and Sexual Activity   Drug Use Never     Social History     Tobacco Use   Smoking Status Never Smoker   Smokeless Tobacco Never Used       ALLERGIES:  Allergies   Allergen Reactions    Amoxicillin Diarrhea and Hives       MEDICATIONS:  All current active medications have been reviewed  PHYSICAL EXAM:  Vitals:  Temp:  [97 16 °F (36 2 °C)-99 7 °F (37 6 °C)] 98 96 °F (37 2 °C)  HR:  [58-74] 70  Resp:  [22] 22  BP: (116-161)/(60-71) 161/71  SpO2:  [96 %-100 %] 100 %  Temp (24hrs), Av °F (36 7 °C), Min:97 16 °F (36 2 °C), Max:99 7 °F (37 6 °C)  Current: Temperature: 98 96 °F (37 2 °C)     Physical Exam:  General:  Well-nourished, well-developed, in no acute distress  Eyes:  Normal lids, eyes closed  Oropharynx:  No ulcers, no lesions  Neck:  Supple, no lymphadenopathy  Lungs:  Ventilated respiratory excursion, no accessory muscle use  Cardiac:  Regular rate and rhythm on monitor, extremities well perfused  Abdomen:  Soft, non-tender, non-distended  Extremities:  No peripheral cyanosis, clubbing, or edema  Skin:  No rashes, no ulcers  Neurological:  Limited assessment due to sedation, intubation    LABS, IMAGING, & OTHER STUDIES:  Lab Results:  I have personally reviewed pertinent labs    Results from last 7 days   Lab Units 22  0547 22  1800 22  0850   POTASSIUM mmol/L 3 3* 3 5 3 4*   CHLORIDE mmol/L 108 107 101   CO2 mmol/L 26 27 30   BUN mg/dL 29* 25 23   CREATININE mg/dL 1 37* 1 38* 1 78*   EGFR ml/min/1 73sq m 46 46 34   CALCIUM mg/dL 8 1* 8 2* 8 5   AST U/L 78* 124* 225*   ALT U/L 159* 206* 282*   ALK PHOS U/L 572* 679* 893*     Results from last 7 days   Lab Units 22  0547 22  1800 22  0850   WBC Thousand/uL 18 70* 29 44* 22 08*   HEMOGLOBIN g/dL 10 0* 10 3* 11 8*   PLATELETS Thousands/uL 205 222 262     Results from last 7 days   Lab Units 08/04/22  0850   BLOOD CULTURE  Klebsiella oxytoca*  Gram Negative Joel Enteric Like*   GRAM STAIN RESULT  Gram negative rods*  Gram positive rods*  Gram negative rods*  Gram positive rods*       Imaging Studies:   I have personally reviewed pertinent imaging study reports and images in PACS  CT A/P reviewed personally intra and extrahepatic biliary ductal dilatation    EKG, Pathology, and Other Studies:   I have personally reviewed pertinent reports

## 2022-08-06 PROBLEM — J44.9 COPD (CHRONIC OBSTRUCTIVE PULMONARY DISEASE) (HCC): Status: ACTIVE | Noted: 2022-01-01

## 2022-08-06 PROBLEM — N17.9 AKI (ACUTE KIDNEY INJURY) (HCC): Status: ACTIVE | Noted: 2022-01-01

## 2022-08-06 PROBLEM — K86.89 PANCREATIC MASS: Status: ACTIVE | Noted: 2022-01-01

## 2022-08-06 PROBLEM — I25.10 CORONARY ARTERY DISEASE: Status: ACTIVE | Noted: 2022-01-01

## 2022-08-06 NOTE — ASSESSMENT & PLAN NOTE
Patient has dysphagia possibly secondary to stroke, he has aspirated and food was taken out from the respiratory tract per anesthesiology  Speech pathology has recommended dysphagia 2 diet  Advance as needed

## 2022-08-06 NOTE — ASSESSMENT & PLAN NOTE
Not on home O2 per documentation     Continue with albuterol nebs  Extubated today   Continue supplemental O2 as needed

## 2022-08-06 NOTE — PROGRESS NOTES
Progress Note - Infectious Disease   Arash Johns 82 y o  male MRN: [id number]  Unit/Bed#: MICU 11 Encounter: [id number]      Impression/Recommendations:  1  Severe sepsis, POA  WBC, RR, lactic acidosis  Due to #2/3  No other appreciable source  Unable to obtain ROS  Exam otherwise benign  Flu/RSV/COVID PCR, UA, chest x-ray negative  Improving with antibiotics, biliary decompression  Rec:  · Continue antibiotics as below  · Follow temperatures and WBC closely  · Supportive care as per the primary service     2  ESBL Klebsiella bacteremia  Due to #3  No other appreciable source  UA negative  Rec:  · Continue ertapenem for now, likely 7 days total     3  Recurrent acute cholangitis  In setting of # 4, recent attempt at stent removal   CT showed intra and extrahepatic biliary dilation  LFTs trending down  Rec:  · Continue antibiotics as above  · Recheck CMP in AM     4   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  Status post ERCP, stent placement 2022  Status post recent ERCP, stent removal   Presented with infection as above, found to have intra and extrahepatic biliary ductal dilation  Status post repeat ERCP and stent replacement 2022      5   Cerebellar CVA  Occurring in setting of infection as above      6  Amoxicillin allergy  Tolerating cephalosporin, carbapenems      7  DM      Antibiotics:  Ertapenem #2    Subjective:  Patient seen on AM rounds  Unable to provide ROS due to lethargy  24 Hour Events:  Extubated  No documented fevers, chills, sweats, nausea, vomiting, or diarrhea      Objective:  Vitals:  Temp:  [98 6 °F (37 °C)-99 32 °F (37 4 °C)] 99 32 °F (37 4 °C)  HR:  [70-84] 84  Resp:  [20] 20  BP: (135)/(60) 135/60  SpO2:  [98 %-100 %] 99 %  Temp (24hrs), Av °F (37 2 °C), Min:98 6 °F (37 °C), Max:99 32 °F (37 4 °C)  Current: Temperature: 99 32 °F (37 4 °C)    Physical Exam:   General:  No acute distress  Psychiatric:  Awake but lethargic  Pulmonary: Normal respiratory excursion without accessory muscle use  Abdomen:  Soft, nontender  Extremities:  Generalized edema  Skin:  No rashes    Lab Results:  I have personally reviewed pertinent labs  Results from last 7 days   Lab Units 08/06/22  0432 08/05/22  0547 08/04/22  1800 08/04/22  0850   POTASSIUM mmol/L 3 5 3 3* 3 5 3 4*   CHLORIDE mmol/L 113* 108 107 101   CO2 mmol/L 29 26 27 30   BUN mg/dL 22 29* 25 23   CREATININE mg/dL 1 00 1 37* 1 38* 1 78*   EGFR ml/min/1 73sq m 68 46 46 34   CALCIUM mg/dL 8 4 8 1* 8 2* 8 5   AST U/L  --  78* 124* 225*   ALT U/L  --  159* 206* 282*   ALK PHOS U/L  --  572* 679* 893*     Results from last 7 days   Lab Units 08/06/22  0432 08/05/22  0547 08/04/22  1800   WBC Thousand/uL 14 25* 18 70* 29 44*   HEMOGLOBIN g/dL 9 9* 10 0* 10 3*   PLATELETS Thousands/uL 181 205 222     Results from last 7 days   Lab Units 08/04/22  0850   BLOOD CULTURE  Klebsiella oxytoca ESBL*  Klebsiella oxytoca ESBL*   GRAM STAIN RESULT  Gram negative rods*  Gram positive rods*  Gram negative rods*  Gram positive rods*       Imaging Studies:   I have personally reviewed pertinent imaging study reports and images in PACS  EKG, Pathology, and Other Studies:   I have personally reviewed pertinent reports

## 2022-08-06 NOTE — ASSESSMENT & PLAN NOTE
CHASITY on this admission (baseline Cr around 1), Cr 1 78 when first admitted     · Monitor Cr   · Avoid nephrotoxic agents

## 2022-08-06 NOTE — ACP (ADVANCE CARE PLANNING)
Advanced Care Planning Note     Suzanne Montes 82 y o  male MRN: [id number]    Unit/Bed#: [facility] 11 Encounter: [id number]    Suzanne Montes is a 82year-old male that presented today secondary to having an acute condition requiring critical care provider evaluation  The patient has chronic comorbidities, including but not limited to Eliquis, HFpEF, COPD, CAD history stenting, Type 2 diabetes, hypertension, dysphagia, vascular dementia, prior TBI  Ambulatory dysfunctio , and now is inflicted with following acute conditions: recurrent acute cholangitis, pancreatic mass  Due to the severity of the patient's acute condition and/or the extent of chronic conditions, there is need for advanced care planning at this time  Please see my previous documentation in regards to the patient's current condition, assessment, and treatment plan  During this discussion with the patient and/or family members, it was established that all stake holders were agreeable and understood the rationale for advanced care planning to occur at this time  The following individuals were present & participated in the face to face conversation I had about the patient's advanced directives & advanced care planning: the patient  Please see my following comments for details of the conversation & decisions that were made:    Patient clearly states he would not want to be intubated or to receive CPR with an understanding that that means he would die  Total time spent, (10) minutes (12:25 pm to 12:35 pm)      CODE STATUS: Level 3 - DNAR and DNI  POA:    POLST:      SIGNATURE: Mitzie Nyhan, MD  DATE: August 6, 2022  TIME: 12:42 PM
activity/movement

## 2022-08-06 NOTE — ASSESSMENT & PLAN NOTE
·  Pancreatic head mass  · 8/4 CTAP: Intrahepatic and extrahepatic biliary ductal dilation with pancreatic body and tail atrophy and suspected pancreatic head mass  No gross lung infiltrates noted limited by respiratory motion  ? ERCP done 8/4 with stent placed  ? Possible EUS in the future  ? GI on board and appreciate recommendations  ? May consider future hemeonc consult once medically stable   ?  CEA normal  ? CA 19-9 [phone]

## 2022-08-06 NOTE — ASSESSMENT & PLAN NOTE
Wt Readings from Last 3 Encounters:   08/06/22 88 5 kg (195 lb 1 7 oz)   08/05/22 93 9 kg (207 lb 0 2 oz)   08/03/22 93 9 kg (207 lb)       ? Echo shows EF 45% with septal wall hypokenesis  ? Holding home lasix   ?  Monitor input/output

## 2022-08-06 NOTE — ASSESSMENT & PLAN NOTE
Wt Readings from Last 3 Encounters:   08/10/22 96 3 kg (212 lb 4 9 oz)   08/05/22 93 9 kg (207 lb 0 2 oz)   08/03/22 93 9 kg (207 lb)   ? not in exacerbation  ? Echo shows EF 45% with septal wall hypokinesis on this admission  ? Home lasix 60 mg bid has been on hold this admission since patient was septic and requiring pressors  BP since stable  Will give PRN diuresis as necessary to optimize volume status  ? I+Os have been inaccurate given unmeasured urinations  ? Daily weights   ?  Cardiac diet

## 2022-08-06 NOTE — PROGRESS NOTES
[address] [facility]  Progress Note - Rachel Cuellar 1935, 82 y o  male MRN: [id number]  Unit/Bed#: MICU 11 Encounter: [id number]  Primary Care Provider: Cassie Chaudhry MD   Date and time admitted to hospital: 8/4/2022  8:20 AM    Coronary artery disease  Assessment & Plan  ? Aspirin 81 for stroke   ? EKG showed no acute ST changes   ? Holding statin given transaminitis     CHASITY (acute kidney injury) ([facility] )  Assessment & Plan  CHASITY on this admission (baseline Cr around 1), Cr 1 78 when first admitted     · Monitor Cr   · Avoid nephrotoxic agents         COPD (chronic obstructive pulmonary disease) ([facility] )  Assessment & Plan  Not on home O2 per documentation     Continue with albuterol nebs  Extubated today   Continue supplemental O2 as needed      Pancreatic mass  Assessment & Plan  ·  Pancreatic head mass  · 8/4 CTAP: Intrahepatic and extrahepatic biliary ductal dilation with pancreatic body and tail atrophy and suspected pancreatic head mass  No gross lung infiltrates noted limited by respiratory motion  ? ERCP done 8/4 with stent placed  ? Possible EUS in the future  ? GI on board and appreciate recommendations  ? May consider future hemeonc consult once medically stable   ? CEA normal  ? CA 19-9 682         Sepsis ([facility] )  Assessment & Plan  ·  Suspected cholangitis with sespsis   ? Started on Ertepenem   ? Blood cultures X2 growing ESBL and Klebsiella continue to follow  ? ID consulted     ? Trend WBC and fever curve   ? S/p ERCP and stent placement 8/4   ? Monitor LFTs, improving    Chronic heart failure with preserved ejection fraction [facility])  Assessment & Plan  Wt Readings from Last 3 Encounters:   08/06/22 88 5 kg (195 lb 1 7 oz)   08/05/22 93 9 kg (207 lb 0 2 oz)   08/03/22 93 9 kg (207 lb)     ? Echo shows EF 45% with septal wall hypokenesis  ?  Holding home lasix      Dysphagia  Assessment & Plan  Patient has dysphagia possibly secondary to stroke, he has aspirated and food was taken out from the respiratory tract per anesthesiology  Speech pathology has recommended dysphagia 2 diet  Advance as needed  Type 2 diabetes mellitus [facility])  Assessment & Plan  Lab Results   Component Value Date    HGBA1C 7 5 (H) 08/05/2022       Recent Labs     08/06/22  1033 08/06/22  1212 08/06/22  1410 08/06/22  1620   POCGLU 174* 181* 214* 249*       Blood Sugar Average: Last 72 hrs:  (P) 217 [id number]     · T2 DM  · Hb A1C 7 5 on this admission   ? Monitor blood sugars   ? Starting on lantus 15U in the AM and SSI   ? Dysphagia diet        Hypertension  Assessment & Plan  § Hold home HTN meds   § SBP goal 140-160 for stroke, no longer on levophed gtt    CHF (congestive heart failure) ([facility])  Assessment & Plan  Wt Readings from Last 3 Encounters:   08/06/22 88 5 kg (195 lb 1 7 oz)   08/05/22 93 9 kg (207 lb 0 2 oz)   08/03/22 93 9 kg (207 lb)     ? Echo shows EF 45% with septal wall hypokinesis on this admission  ? Holding home lasix due to pressures  ? I+Os   ? Daily weights   ? Cardiac diet        Atrial fibrillation [facility])  Assessment & Plan  ? Holding home eliquis and metoprolol/dilitazen at this time   ? On telemetry     * Cerebrovascular accident (CVA) involving cerebellum ([facility] )  Assessment & Plan  · Right cerebellar stroke   ? CT head showed right cerebellar stroke   ? Rectal aspirin 300 mg at this time until can tolerate PO   ? MRI pending   ? lipid panel shows elevated triglycerides, patient is on propofol    ? SBP goal 140-160, on levophed to maintain   ? On telemetry   ? Speech eval, PT, OT pending   ? Continue neuro checks   ? If any changes neuro exam, repeat head CT   ? Neurology consulted          Patient seen and evaluated by Critical Care today and deemed to be appropriate for transfer to Med-Surg   Spoke to Dr Miguelina Jurado from [place] to accept patient transfer  Patient did not move from ICU on the day of transfer   See progress note from 8/6/22 for the most up-to-date treatment therapy plans  Critical care can be contacted via [employer] with any questions or concerns

## 2022-08-06 NOTE — ASSESSMENT & PLAN NOTE
Wt Readings from Last 3 Encounters:   08/06/22 88 5 kg (195 lb 1 7 oz)   08/05/22 93 9 kg (207 lb 0 2 oz)   08/03/22 93 9 kg (207 lb)     ? Echo shows EF 45% with septal wall hypokinesis on this admission  ? Holding home lasix due to pressures  ? I+Os   ? Daily weights   ?  Cardiac diet

## 2022-08-06 NOTE — ASSESSMENT & PLAN NOTE
· Right cerebellar stroke   ? CT head 8/4 showed right cerebellar stroke   ? Neurology following, appreciate recommendations   ? Continue aspirin and statin    ? SBP goal 140-160  ? Continue telemetry   ? PT/OT consulted  ? Continue neuro checks   ? Post-stroke follow-up CT Head performed on 8/10 - Findings stable  ? Discontinued Heparin ggt and initiated anticoagulation with the approval from neurology  ?

## 2022-08-06 NOTE — ASSESSMENT & PLAN NOTE
? Will need p o  a/c  ? CTH (8/10) revealed stable findings, transitioned to [address]   ? Home diltiazem 240 mg daily on hold  ? Metoprolol succ 100 mg changed for tartrate 50 mg q8 h for now, pending further cardiology recs  ?  Continue telemetry monitoring

## 2022-08-06 NOTE — ASSESSMENT & PLAN NOTE
· Right cerebellar stroke   ? CT head showed right cerebellar stroke   ? Rectal aspirin 300 mg at this time until can tolerate PO   ? MRI pending   ? lipid panel shows elevated triglycerides, patient is on propofol    ? SBP goal 140-160, on levophed to maintain   ? On telemetry   ? Speech eval, PT, OT pending   ? Continue neuro checks   ? If any changes neuro exam, repeat head CT   ?  Neurology consulted

## 2022-08-06 NOTE — ASSESSMENT & PLAN NOTE
Lab Results   Component Value Date    HGBA1C 7 5 (H) 08/05/2022       Recent Labs     08/06/22  1033 08/06/22  1212 08/06/22  1410 08/06/22  1620   POCGLU 174* 181* 214* 249*       Blood Sugar Average: Last 72 hrs:  (P) 217 [id number]     · T2 DM  · Hb A1C 7 5 on this admission   ? Monitor blood sugars   ? Starting on lantus 15U in the AM and SSI   ?  Dysphagia diet

## 2022-08-06 NOTE — PROGRESS NOTES
INTERNAL MEDICINE RESIDENCY TRANSFER ACCEPTANCE NOTE     Name: Ananth Menchaca   Age & Sex: 82 y o  male   MRN: [id number]  Unit/Bed#: [facility] 200-1   Encounter: [id number]  Hospital Stay Days: 2    Accepting team: SOD Team B   Code Status: Level 3 - DNAR and DNI  Disposition: Patient requires Med/Surg with Telemetry    ASSESSMENT/PLAN     Principal Problem:    Cerebrovascular accident (CVA) involving cerebellum ([place] [address] )  Active Problems:    Atrial fibrillation ([place] [address] )    CHF (congestive heart failure) ([place] [address] )    Hypertension    Type 2 diabetes mellitus ([place] [address] )    Dysphagia    Chronic heart failure with preserved ejection fraction (HCC)    Sepsis ([place] [address] )    Pancreatic mass    COPD (chronic obstructive pulmonary disease) ([place] [address] )    CHASITY (acute kidney injury) ([place] [address] )    Coronary artery disease      Coronary artery disease  Assessment & Plan  ? Aspirin 81 for stroke   ? EKG showed no acute ST changes   ? Holding statin given transaminitis     CHASITY (acute kidney injury) ([place] [address] )  Assessment & Plan  CHASITY on this admission (baseline Cr around 1), Cr 1 78 when first admitted     · Monitor Cr   · Avoid nephrotoxic agents         COPD (chronic obstructive pulmonary disease) ([place] [address] )  Assessment & Plan  Not on home O2 per documentation     Continue with albuterol nebs  Extubated 8/6  Continue supplemental O2 as needed      Pancreatic mass  Assessment & Plan  ·  Pancreatic head mass  · 8/4 CTAP: Intrahepatic and extrahepatic biliary ductal dilation with pancreatic body and tail atrophy and suspected pancreatic head mass  No gross lung infiltrates noted limited by respiratory motion  ? ERCP done 8/4 with stent placed  ? Possible EUS in the future  ? GI on board and appreciate recommendations  ? May consider future hemeonc consult once medically stable   ? CEA normal  ? CA 19-9 682         Sepsis ([place] [address] )  Assessment & Plan  ·  Suspected cholangitis with sespsis   ? Started on Ertepenem   ?  Blood cultures X2 growing ESBL and Klebsiella continue to follow  ? ID following     ? Trend WBC and fever curve   ? S/p ERCP and stent placement 8/4   ? Monitor LFTs, improving    Chronic heart failure with preserved ejection fraction [facility])  Assessment & Plan  Wt Readings from Last 3 Encounters:   08/06/22 88 5 kg (195 lb 1 7 oz)   08/05/22 93 9 kg (207 lb 0 2 oz)   08/03/22 93 9 kg (207 lb)     ? Echo shows EF 45% with septal wall hypokenesis  ? Holding home lasix   ? Monitor I's and O's and daily weights      Dysphagia  Assessment & Plan  Patient has dysphagia possibly secondary to stroke, he has aspirated and food was taken out from the respiratory tract per anesthesiology  Speech pathology has recommended dysphagia 2 diet  Advance as needed  Type 2 diabetes mellitus [facility])  Assessment & Plan  Lab Results   Component Value Date    HGBA1C 7 5 (H) 08/05/2022       Recent Labs     08/06/22  1212 08/06/22  1410 08/06/22  1620 08/06/22  1918   POCGLU 181* 214* 249* 322*       Blood Sugar Average: Last 72 hrs:  (P) 222 75     · T2 DM  · Hb A1C 7 5 on this admission   ? Monitor blood sugars   ? Insulin gtt dc'd 8/6 and started on lantus 15U in the AM and SSI   ? Dysphagia diet        Hypertension  Assessment & Plan  § Home HTN meds on hold in setting of SBP goal   § SBP goal 140-160   § Continue to monitor blood pressure    CHF (congestive heart failure) ([facility])  Assessment & Plan  Wt Readings from Last 3 Encounters:   08/06/22 88 5 kg (195 lb 1 7 oz)   08/05/22 93 9 kg (207 lb 0 2 oz)   08/03/22 93 9 kg (207 lb)     ? Echo shows EF 45% with septal wall hypokinesis on this admission  ? Home lasix 60 mg daily on hold -> consider restarting in the am  ? I+Os   ? Daily weights   ? Cardiac diet        Atrial fibrillation [facility])  Assessment & Plan  ? Continue metoprolol 25 mg Q12  ? Holding home Eliquis for 7 days post stroke (until 8/11) due to risk of hemorrhagic conversion  ? Home diltiazem 240 mg daily on hold -> consider restarting in the a m   ?  Continue telemetry monitoring    * Cerebrovascular accident (CVA) involving cerebellum ([facility] )  Assessment & Plan  · Right cerebellar stroke   ? CT head 8/4 showed right cerebellar stroke   ? Neurology following, appreciate recommendations   ? Continue aspirin and statin  ? MRI  ordered      ? SBP goal 140-160  ? Continue telemetry   ? PT/OT consulted  ? Continue neuro checks   ? Anticoagulation on hold for at least 7 days (until 8/11) due to risk of hemorrhagic conversion  ? If any changes neuro exam, repeat head CT       VTE Pharmacologic Prophylaxis: Reason for no pharmacologic prophylaxis Recent CVA  VTE Mechanical Prophylaxis: sequential compression device    HOSPITAL COURSE     HPI and ICU course: The patient is an 61-year-old male with a past medical history of AFib on Eliquis, CHF with an EF 40%, COPD, CAD, type 2 diabetes, hypertension, hyperlipidemia, dysphagia, vascular dementia and prior TBI with no prior focal deficits who presented to the emergency department on 08/04 complaining of altered mental status, fever and hypoxia  He presented from his nursing home  Of note, the day prior on 08/03 the patient underwent ERCP and had a biliary stent removed  Upon presentation to the emergency department his laboratory studies were concerning for cholangitis including elevated LFTs and leukocytosis  A CT chest abdomen pelvis showed intrahepatic and extrahepatic biliary ductal dilation with pancreatic body and tail a trophy, suspected pancreatic head mass  At that time a CT head was obtained which showed an acute infarct of the right cerebellum  Neurology was consulted the patient was started on the stroke pathway  Gastroenterology was also consulted for a repeat ERCP  He underwent repeat ERCP later on 08/04 with stent replacement  During the ERCP food was noted in the airway and the patient was intubated  The patient was started on a Levophed drip and was admitted to the MICU for further evaluation    He was later extubated on 08/05 and placed on BiPAP  Blood cultures grew Klebsiella ESBL and the patient was started on ertapenem  Infectious Disease is following  He was later weaned off of Levophed due to maintaining SBP goals following stroke  He was on an insulin drip as well for glucose control  Overnight on 08/05 the patient was experiencing intermittent episodes of AFib with rates in the 130s  He was given a 5 mg of Lopressor x2 and was later started on 25 mg q 12  His home Eliquis has been on hold in setting of recent CVA with plans to restart in 7 days as per Neurology  Later in the evening of 8/6 the patient was deemed medically stable for transfer out of this ICU  SUBJECTIVE     The patient was seen and examined at bedside  He was ill-appearing but responded appropriately to questioning  He was A&O x3 and denied any acute complaints  OBJECTIVE     Vitals:    08/06/22 1500 08/06/22 1600 08/06/22 1700 08/06/22 2030   BP:    140/74   BP Location:       Pulse: 74 92 (!) 106 74   Resp: (!) 23 22 22    Temp: 98 96 °F (37 2 °C) 98 96 °F (37 2 °C) 98 96 °F (37 2 °C) 99 °F (37 2 °C)   TempSrc:       SpO2: 99% 98% 98% 96%   Weight:       Height:         I/O last 24 hours: In: 270 2 [I V :120 2; IV Piggyback:150]  Out: 925 [Urine:925]    Physical Exam  Constitutional:       Appearance: He is well-developed  He is obese  He is ill-appearing  HENT:      Head: Normocephalic and atraumatic  Nose: Nose normal    Eyes:      General:         Right eye: No discharge  Left eye: No discharge  Conjunctiva/sclera: Conjunctivae normal       Pupils: Pupils are equal, round, and reactive to light  Neck:      Thyroid: No thyromegaly  Cardiovascular:      Rate and Rhythm: Normal rate and regular rhythm  Heart sounds: Normal heart sounds  No murmur heard  No friction rub  No gallop  Pulmonary:      Effort: Pulmonary effort is normal  No respiratory distress  Breath sounds: Normal breath sounds   No stridor  No wheezing or rales  Chest:      Chest wall: No tenderness  Abdominal:      General: Bowel sounds are normal  There is no distension  Palpations: Abdomen is soft  There is no mass  Tenderness: There is no abdominal tenderness  There is no guarding or rebound  Musculoskeletal:         General: No tenderness or deformity  Right lower leg: No edema  Left lower leg: No edema  Lymphadenopathy:      Cervical: No cervical adenopathy  Skin:     General: Skin is warm and dry  Neurological:      Mental Status: He is alert and oriented to person, place, and time  Psychiatric:         Mood and Affect: Mood normal          Behavior: Behavior normal          Thought Content: Thought content normal          Judgment: Judgment normal        LABORATORY DATA     Labs: I have personally reviewed pertinent reports      Results from last 7 days   Lab Units 08/06/22  0432 08/05/22  0547 08/04/22  1800   WBC Thousand/uL 14 25* 18 70* 29 44*   HEMOGLOBIN g/dL 9 9* 10 0* 10 3*   HEMATOCRIT % 31 7* 30 8* 32 6*   PLATELETS Thousands/uL 181 205 222   NEUTROS PCT % 85* 89* 89*   MONOS PCT % 6 5 6      Results from last 7 days   Lab Units 08/06/22  0432 08/05/22  0547 08/04/22  1800 08/04/22  0850   POTASSIUM mmol/L 3 5 3 3* 3 5 3 4*   CHLORIDE mmol/L 113* 108 107 101   CO2 mmol/L 29 26 27 30   BUN mg/dL 22 29* 25 23   CREATININE mg/dL 1 00 1 37* 1 38* 1 78*   CALCIUM mg/dL 8 4 8 1* 8 2* 8 5   ALK PHOS U/L  --  572* 679* 893*   ALT U/L  --  159* 206* 282*   AST U/L  --  78* 124* 225*     Results from last 7 days   Lab Units 08/06/22  0432 08/04/22  1800   MAGNESIUM mg/dL 2 3 1 8     Results from last 7 days   Lab Units 08/06/22  0432 08/04/22  1800   PHOSPHORUS mg/dL 2 3 3 1      Results from last 7 days   Lab Units 08/04/22  1800 08/04/22  0850   INR  1 50* 1 32*   PTT seconds  --  32     Results from last 7 days   Lab Units 08/04/22  2141   LACTIC ACID mmol/L 2 5*         Micro:  Lab Results Component Value Date    BLOODCX Klebsiella oxytoca ESBL (A) 08/04/2022    BLOODCX Klebsiella oxytoca ESBL (A) 08/04/2022    BLOODCX No Growth After 5 Days  06/04/2022    URINECX  04/08/2016     10,000-19,000 cfu/ml Candida sp  presumptively albicans    URINECX 60,000-69,000 cfu/ml Mixed Contaminants X3 04/08/2016     IMAGING & DIAGNOSTIC TESTING     Imaging: I have personally reviewed pertinent reports  ERCP Fluoro    Result Date: 8/4/2022  Impression: ERCP with cholangitis s/p placement of 10 x 4 cm fully covered metal stent  RECOMMENDATION: Follow LFTs Continue antibiotics Further care per ICU team May require EUS for biopsy/staging of mass at later date  Sen Mcneil MD     XR chest 1 view portable    Result Date: 8/4/2022  Impression: Suspected left retrocardiac consolidation and/or small effusion  Cardiomegaly and aortic sclerosis  Workstation performed: [id number]     CT head wo contrast    Result Date: 8/6/2022  Impression: Early infarct within the right cerebellar hemisphere is similar to the examination performed 2 days ago  No hemorrhagic transformation  Stable advanced chronic microangiopathic change within both cerebral hemispheres  Workstation performed: [id number]     CT head without contrast    Result Date: 8/4/2022  Impression: New low-density in the right cerebellum concerning for an acute infarct  Cerebral atrophy and small vessel disease  I personally discussed this study with Michelle Rae on 8/4/2022 at 11:28 AM  Workstation performed: [id number]     CT chest abdomen pelvis w contrast    Result Date: 8/4/2022  Impression: 1  Intrahepatic and extrahepatic biliary ductal dilatation with pancreatic body and tail atrophy and suspected pancreatic head mass  Confirmation should be obtained with MRI abdomen with MRCP  2   No infiltrates on study limited by respiratory motion    I personally discussed this study with Michelle Rae on 8/4/2022 at 11:28 AM  Workstation performed: [id number]     FL ERCP biliary only    Result Date: 8/5/2022  Impression: Distal CBD stent placement  Workstation performed: [id number]     CXR in AM    Result Date: 8/5/2022  Impression: ET tube and right IJ line remain in stable position  No active pulmonary disease  Workstation performed: [id number]     XR chest portable ICU    Result Date: 8/5/2022  Impression: Right IJ line has been placed, tip overlies the SVC  No pneumothorax  Workstation performed: [id number]     XR chest portable ICU    Result Date: 8/5/2022  Impression: ET tube appears in position  Possible small left pleural effusion, stable  Workstation performed: [id number]     EKG, Pathology, and Other Studies: I have personally reviewed pertinent reports  ALLERGIES     Allergies   Allergen Reactions    Amoxicillin Diarrhea and Hives     MEDICATIONS     Current Facility-Administered Medications   Medication Dose Route Frequency Provider Last Rate    albuterol  2 5 mg Nebulization Q4H PRN Alpine Lolling      aspirin  81 mg Oral Daily Abelardo Cortez      ertapenem  1,000 mg Intravenous Q24H Alpine Arabella Stopped (08/06/22 0947)    heparin (porcine)  5,000 Units Subcutaneous Q8H [facility] Abelardo Cortez      insulin glargine  30 Units Subcutaneous HS Abelardo Cortez      insulin lispro  1-6 Units Subcutaneous 4 times day Alpine Arabella      insulin lispro  8 Units Subcutaneous TID With Meals [facility]      metoprolol tartrate  25 mg Oral Q12H [facility] Abelardo Cortez      omeprazole (PRILOSEC) suspension 2 mg/mL  20 mg Oral Early Morning Abelardo Cortez      senna-docusate sodium  1 tablet Oral HS Abelardo NAVNEET Diego          albuterol, 2 5 mg, Q4H PRN        Portions of the record may have been created with voice recognition software  Occasional wrong word or "sound a like" substitutions may have occurred due to the inherent limitations of voice recognition software    Read the chart carefully and recognize, using context, where substitutions have occurred     ==  Donaldo Lopez MD  [address]  Internal Medicine Residency PGY-3

## 2022-08-06 NOTE — ASSESSMENT & PLAN NOTE
Wt Readings from Last 3 Encounters:   08/06/22 88 5 kg (195 lb 1 7 oz)   08/05/22 93 9 kg (207 lb 0 2 oz)   08/03/22 93 9 kg (207 lb)     ? Echo shows EF 45% with septal wall hypokenesis  ?  Holding home lasix

## 2022-08-06 NOTE — SPEECH THERAPY NOTE
Speech-Language Pathology Bedside Swallow Evaluation    Patient Name: Rachel Cuellar    BYBXX'Y Date: 8/6/2022     Problem List  Principal Problem:    Cerebrovascular accident (CVA) involving cerebellum ([facility] )  Active Problems:    Atrial fibrillation ([facility] )    Sepsis ([facility] )    Past Medical History  Past Medical History:   Diagnosis Date    A-fib ([facility] )     Cancer ([facility] )     Cardiac disease     CHF (congestive heart failure) ([place] [address] )     Chronic obstructive pulmonary disease (COPD) ([place] [address] )     COPD (chronic obstructive pulmonary disease) ([place] [address] )     Coronary artery disease     Counseling regarding advance care planning and goals of care 10/7/2020    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[place] [address] )     TYPE 2    Dysphagia     Fracture of nasal bone     Gait instability     H/O cervical fracture     Hyperlipidemia     Hypertension     Muscle weakness     TBI (traumatic brain injury) ([place] [address] )      Past Surgical History  Past Surgical History:   Procedure Laterality Date    HAND SURGERY      NC ARTHRODESIS POSTERIOR CRANIOCERVICAL N/A 10/26/2020    Procedure: Occipital cervical fusion to C4;  Surgeon: Kylee Trinidad MD;  Location: [facility];  Service: Neurosurgery       Summary  Pt presented with s/s suggestive of mild-moderate oral and suspected moderate-severe pharyngeal dysphagia  Report reviewed from recent previous VBS completed as OP 6/29/22 (see below)  Symptoms or concerns included reduced bolus control with occasional anterior spillage and suspected premature posterior spillage, as well as suspected pharyngeal swallow delay, suspected decreased hyolaryngeal elevation upon palpation, and multiple, effortful swallows  Immediate cough noted with trial of thin liquid  Pt was offered small bites of puree and small sips of HTL which he appeared to tolerate without s/s aspiration  Pt was alert and appropriately responsive  He acknowledged long hx of dysphagia   When asked if he had or would ever consider a feeding tube be reported that he absolutely would not be accepting of a feeding tube  Based on results of prior VBS, he is at risk for aspiration of all consistencies, however based on bedside assessment today recommend initiation of conservative diet, puree and honey thick liquids  Pt reported he does not like thickened liquids but was ultimately agreeable to this recommendation  Risk/s for Aspiration: high    Recommended Diet: puree/level 1 diet and honey thick liquids   Recommended Form of Meds: crushed with puree   Aspiration precautions and swallowing strategies: upright posture, only feed when fully alert, slow rate of feeding, small bites/sips and alternating bites and sips  Other Recommendations: Continue frequent oral care, oral suction as needed, full feed      Current Medical Status per H&P 8/4/22  Misbah Venegas is a 82 y o  male with PMH of afib on eliquis and metoprolol/diltiazem, CHF EF 48 (2017), COPD, CAD, T2DM (A1C 7 7 in 2021), HTN, HLD, dysphagia, vascular dementia, and prior TBI with no prior focal deficits who presents with altered mental status, fever, and hypoxia  Yesterday on 8/3, patient had ERCP with stent removed, no complications during procedure  Held eliquis for 3-4 days prior to ERCP stent removal  June 2022, patient was admitted for cholangitis during which antibiotics and ERCP with stent placement was done  Last known normal unknown  Arrived the ED this morning  On arrival, Trectal 100 5, HR 88, RR 20, /61, SpO2 93% on RA  Put on 3L NC  Labs were significant for: WBC 22 08, Hb 11 08, Lactate x2 3 5, initial trop 89 delta -9/-13, K 3 4, Cr 1 78, Glucose 248, , , Alk phos 893, lipase 28, BNP 21,688  Blood cultures X2  UA positive for glucose, +2 protein, 5-10 hyaline casts  VBG 7 449/40 1/59 8/27  2  CXR shows suspected left retrocardiac consolidation and/or small effusion   CT CAP showed intrahepatic and extrahepatic biliary ductal dilatation with pancreatic body and tail atrophy, suspected pancreatic head mass  CEA WNL,  pending  CT head showed acute infarct in right cerebellum  Neuro consulted for stroke and GI consulted for repeat ERCP planned today  Echo ordered and pending  Started on levo gtt at 2  Given rectal tylenol, flagyl and cefepime  Had 750 ccs bolus of NS  Of note, patient was recently at [facility] ED on 7/26 with sudden-onset headache and dysarthria, referred from GI outpatient office  At that time, refused labs, ekg, imaging  The patient decided to leave AMA  Patient admitted to MICU for pressor requirements, further workup and evaluation  History obtained from chart review      Intubated 8/4/22 - 8/5/22    Special Studies:  CXR 8/5/22 IMPRESSION:  ET tube and right IJ line remain in stable position  No active pulmonary disease  VBS as OP 6/29/22   Assessment Summary:    Pt presents with mild oral and mod-severe pharyngeal dysphagia characterized by reduced mastication, reduced bolus control with premature spillage, reduced delayed swallow initiation, reduced airway closure, and reduced base of tongue retraction and pharyngeal stripping wave  Aspiration was seen intermittently across all liquid consistencies (thin, nectar thick, and honey thick)  Pt adamantly does not want thickened liquids  Based on research re: negative outcomes of aspirating thickened liquids, consider resuming thin liquids with use of safe swallow strategies to minimize risks  Ultimately recommendation for diet should be determined through discussion with patient, primary SLP, physician, and medical POA  Note: Images are available for review in PACS as desired  Recommendations:   Recommended Diet: Pending pt/family wishes, consider regular diet and thin liquids     Recommended Form of Medications: as tolerated   Aspiration precautions and compensatory swallowing strategies: upright posture, only feed when fully alert, slow rate of feeding and small bites/sips  Consider referral to: ST  SLP Dysphagia therapy recommended: yes    Social/Education/Vocational Hx:  Pt lives at [facility]    Swallow Information   Current Risks for Dysphagia & Aspiration: known history of dysphagia, known history of aspiration and r/o new CVA  Current Symptoms/Concerns: coughing with po and change in respiratory status  Current Diet: NPO   Baseline Diet: pt reported he was on puree diet and thickened liquids at [facility]      Baseline Assessment   Behavior/Cognition: alert  Speech/Language Status: able to participate in conversation and able to follow commands  Patient Positioning: upright in bed  Pain Status/Interventions/Response to Interventions: No report of or nonverbal indications of pain  Swallow Mechanism Exam  Facial: symmetrical  Labial: WFL  Lingual: decreased ROM  Velum: symmetrical  Mandible: adequate ROM  Dentition: adequate, some missing  Vocal quality:clear/adequate   Volitional Cough: strong/productive   Respiratory Status: on 4L O2     Consistencies Assessed and Performance   Consistencies Administered: thin liquids, honey thick and puree    Oral Stage: mild-moderate, decreased bolus formation, suspected decreased control of boluses and anterior loss/spillage of HTL intermittently    Pharyngeal Stage: moderate-severe, suspected pharyngeal swallow delay, suspected decreased hyolaryngeal elevation upon palpation, multiple swallows and effortful swallow  Cough noted with thin liquid trial  No s/s aspiration with puree or HTL trials  Esophageal Concerns: none reported      Summary and Recommendations (see above)    Results Reviewed with: patient, RN and MD     Treatment Recommended: yes     Frequency of treatment: 2-3x/week      Dysphagia LTG  -Patient will demonstrate safe and effective oral intake (without overt s/s significant oral/pharyngeal dysphagia including s/s penetration or aspiration) for the highest appropriate diet level       Short Term Goals:  -Pt will tolerate Dysphagia 1/pureed diet and honey thick liquid with no significant s/s oral or pharyngeal dysphagia across 1-3 diagnostic sessions      -Patient will tolerate trials of upgraded food and/or liquid texture with no significant s/s of oral or pharyngeal dysphagia including aspiration across 1-3 diagnostic sessions      -If indicated, patient will comply with a Video/Modified Barium Swallow study for more complete assessment of swallowing anatomy/physiology/aspiration risk and to assess efficacy of treatment techniques so as to best guide treatment plan

## 2022-08-06 NOTE — ASSESSMENT & PLAN NOTE
·  Suspected cholangitis with sespsis   ? Started on Ertepenem   ? Blood cultures X2 growing ESBL and Klebsiella continue to follow  ? ID consulted     ? Trend WBC and fever curve   ? S/p ERCP and stent placement 8/4   ?  Monitor LFTs, improving

## 2022-08-07 NOTE — ASSESSMENT & PLAN NOTE
? Aspirin 81 for stroke   ? EKG showed no acute ST changes   ?  Restarted home Lipitor after resolution of AST/ALT normalized

## 2022-08-07 NOTE — OCCUPATIONAL THERAPY NOTE
Nathaly Millington called requesting a refill of the below medication which has been pended for you:     Requested Prescriptions     Pending Prescriptions Disp Refills    methylphenidate (CONCERTA) 18 MG extended release tablet [Pharmacy Med Name: METHYLPHENIDATE HCL ER 18 M 18 Tablet] 30 tablet 0     Sig: TAKE 1 TABLET BY MOUTH EVERY DAY       Last Appointment Date: 1/31/2022  Next Appointment Date: 6/7/2022    No Known Allergies Occupational Therapy Evaluation     Patient Name: Sydney Puentes  Today's Date: 8/7/2022  Problem List  Principal Problem:    Cerebrovascular accident (CVA) involving cerebellum ([facility] )  Active Problems:    Atrial fibrillation ([facility] )    CHF (congestive heart failure) ([facility] )    Hypertension    Type 2 diabetes mellitus ([facility] )    Dysphagia    Chronic heart failure with preserved ejection fraction (HCC)    Sepsis ([facility] )    Pancreatic mass    COPD (chronic obstructive pulmonary disease) ([facility] )    CHASITY (acute kidney injury) ([facility] )    Coronary artery disease    Past Medical History  Past Medical History:   Diagnosis Date    A-fib ([facility] )     Cancer ([facility] )     Cardiac disease     CHF (congestive heart failure) ([facility] )     Chronic obstructive pulmonary disease (COPD) ([facility] )     COPD (chronic obstructive pulmonary disease) ([facility] )     Coronary artery disease     Counseling regarding advance care planning and goals of care 10/7/2020    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[facility] )     TYPE 2    Dysphagia     Fracture of nasal bone     Gait instability     H/O cervical fracture     Hyperlipidemia     Hypertension     Muscle weakness     TBI (traumatic brain injury) ([facility] )      Past Surgical History  Past Surgical History:   Procedure Laterality Date    HAND SURGERY      OK ARTHRODESIS POSTERIOR CRANIOCERVICAL N/A 10/26/2020    Procedure: Occipital cervical fusion to C4;  Surgeon: Jocelyne Redd MD;  Location:  MAIN OR;  Service: Neurosurgery           08/07/22 0846   OT Last Visit   OT Visit Date 08/07/22   Note Type   Note type Evaluation   Restrictions/Precautions   Weight Bearing Precautions Per Order No   Other Precautions Contact/isolation;Cognitive; Chair Alarm; Bed Alarm;Multiple lines;Telemetry; Fall Risk;O2  (4L O2)   Pain Assessment   Pain Assessment Tool 0-10   Pain Score No Pain   Home Living   Type of Home SNF  ([facility])   Home Layout One level   [id number]  (per chart, pt primarily used WC for community mobility, is able to ambulate short distances)   Additional Comments pt questionable historian, confirmed HFM, but could not provide other info; additional info obtained from chart   Prior Function   Level of [place] Needs assistance with IADLs; Needs assistance with ADLs and functional mobility   Lives With Facility staff   Receives Help From Personal care attendant; Family   ADL Assistance Needs assistance   IADLs Needs assistance   Falls in the last 6 months 0  (per chart)   Vocational Retired   Lifestyle   Autonomy PTA pt requires A with ADLs, IADLs  Pt primarily utilizes WC but able to ambulate short distances with RW per chart and pt report; (-) driving   Reciprocal Relationships facility staff, wife   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   Intrinsic Gratification none stated   Subjective   Subjective "I'm still going"   ADL   Where Assessed Edge of bed   Eating Assistance 4  [address]; Increased time to complete   Grooming Assistance 4  Minimal Assistance   Grooming Deficit Setup;Supervision/safety; Increased time to complete   UB Bathing Assistance 4  Minimal Assistance   UB Bathing Deficit Setup;Steadying;Supervision/safety; Increased time to complete   LB Bathing Assistance 2  Maximal Assistance   LB Bathing Deficit Setup;Steadying;Supervision/safety;Verbal cueing; Increased time to complete   UB Dressing Assistance 4  Minimal Assistance   UB Dressing Deficit Setup;Steadying;Verbal cueing;Supervision/safety; Increased time to complete   LB Dressing Assistance 2  Maximal Assistance   LB Dressing Deficit Setup;Steadying;Verbal cueing;Supervision/safety; Increased time to complete   Toileting Assistance  2  Maximal Assistance   Toileting Deficit Setup;Supervison/safety; Increased time to complete   Bed Mobility   Supine to Sit 2  Maximal assistance   Additional items Assist x 2;HOB elevated; Increased time required;Verbal cues;LE management   Sit to Supine 2  Maximal assistance   Additional items Assist x 2;HOB elevated; Bedrails; Increased time required;LE management   Transfers   Sit to Stand 2  Maximal assistance   Additional items Assist x 2;HOB elevated; Increased time required;Verbal cues   Stand to Sit 2  Maximal assistance   Additional items Assist x 2;HOB elevated; Increased time required;Verbal cues   Additional Comments transfers with b/l HHA; 3 STS trials, pt noted to have RLE buckling   Functional Mobility   Additional Comments unable to perform at this time   Balance   Static Sitting Fair -   Dynamic Sitting Poor +   Static Standing Poor   Dynamic Standing Poor -   Activity Tolerance   Activity Tolerance Patient limited by fatigue;Patient limited by pain   Medical Staff Made Aware PT Fabrizio   Nurse Made Aware RN approved for sessio   RUE Assessment   RUE Assessment X  (noted decreaed R  strength vs L)   LUE Assessment   LUE Assessment WFL   Hand Function   Gross Motor Coordination Functional   Fine Motor Coordination Functional   Cognition   Overall Cognitive Status Impaired   Arousal/Participation Alert; Responsive; Cooperative   Attention Attends with cues to redirect   Orientation Level Oriented to person;Oriented to place  (oriented to year, states month is July)   Memory Decreased recall of precautions;Decreased recall of recent events   Following Commands Follows one step commands with increased time or repetition   Comments pt pleasant and cooperative t/o session, needed increased processing time   Assessment   Limitation Decreased ADL status; Decreased UE ROM; Decreased UE strength;Decreased Safe judgement during ADL;Decreased cognition;Decreased endurance;Decreased self-care trans;Decreased high-level ADLs   Prognosis Good   Assessment Pt is 82 y o  M presented to B with altered mental status, fever and hypoxia, dx with cerebellar CVA   Pt has a past medical history of A-fib, Cancer, CHF, COPD, CAD, Vascular dementia, Diabetes mellitus, Dysphagia, Gait instability, cervical fracture, Hyperlipidemia, HTN, Muscle weakness, and TBI  Pt lives in [place], [place]  PTA pt required A with ADLs, IADLs, and - driving  Pt with active OT orders and up with assistance activity orders  Pt seen as co-evaluation with PT due to comorbidities, medical complexity, and current deficits  Pt presents to session supine in bed  Pt performed Max Ax2 for bed mobility and STS trx with b/l HHA  Pt currently requires Min A for UB ADLs, eating and grooming, Max A for LB ADLs  Pt limited by decreased independence in ADLs/IADLs, fxnl and community mobility, endurance, activity engagement, activity tolerance, and fxnl standing tolerance  Occupational performance areas to be addressed include bathing, grooming, dressing, fxnl and community mobility  Pt to benefit from skilled OT services to address above deficits and improve overall independence, and maximize performance  OT recommendation at time of d/c to facility with rehab services when medically stable  Pt left supine in bed with all current needs met  The patient's raw score on the AM-PAC Daily Activity inpatient short form is 15, standardized score is 34 69, less than 39 4  Patients at this level are likely to benefit from discharge to post-acute rehabilitation services  Please refer to the recommendation of the Occupational Therapist for safe discharge planning  Goals   Patient Goals none stated   LTG Time Frame 10-14   Plan   Treatment Interventions ADL retraining;Functional transfer training; Endurance training;UE strengthening/ROM; Cognitive reorientation;Patient/family training;Equipment evaluation/education; Compensatory technique education;Continued evaluation; Energy conservation; Activityengagement   Goal Expiration Date 08/21/22   OT Frequency 3-5x/wk   Recommendation   OT Discharge Recommendation Return to facility with rehabilitation services   AM-PAC Daily Activity Inpatient   Lower Body Dressing 2   Bathing 2   Toileting 2   Upper Body Dressing 3   Grooming 3   Eating 3   Daily Activity Raw Score 15   Daily Activity Standardized Score (Calc for Raw Score >=11) 34 69   AM-PAC Applied Cognition Inpatient   Following a Speech/Presentation 3   Understanding Ordinary Conversation 3   Taking Medications 3   Remembering Where Things Are Placed or Put Away 3   Remembering List of 4-5 Errands 2   Taking Care of Complicated Tasks 2   Applied Cognition Raw Score 16   Applied Cognition Standardized Score 35 03       OT Goals:    - Pt will perform fnxl trx with Min A on/off all surfaces using AD/DME as needed  - Pt will perform UB ADLs S using AD/DME as needed  - Pt will perform LB ADLs Min A using AD/DME as needed  - Pt will perform toileting Min A with G hygiene and using AD/DME as needed  - Pt will perform fnxl mob during ADL/IADL/meaningful tasks Mod A using AD/DME as needed  - Pt will demonstrate understanding of energy conversation techniques 100% of tx session     - Pt will perform fair+ static and dynamic sitting/standing balance during fnxl trx and ADL tasks  - Pt will engage in ongoing cognitive assessments with good participation to assist with safe d/c planning/recommendations  - Pt will maintain upright sitting position for at least 10 minutes during dynamic sitting activity with fair+ balance to improve ADL performance and reduce risk of falls  - Pt will perform eating tasks with S using AD/DME as needed         DAX Miller

## 2022-08-07 NOTE — ASSESSMENT & PLAN NOTE
Pancreatic head mass  · 8/4 CTAP: Intrahepatic and extrahepatic biliary ductal dilation with pancreatic body and tail atrophy and suspected pancreatic head mass  No gross lung infiltrates noted limited by respiratory motion    · ERCP done 8/4 with stent placed  · Possible EUS in the future, although utility towards ultimate treatment plan and goals of care TBD with family outpatient pending initial ERCP brushing cytology  · GI outpatient f/u on discharge  · CEA normal  · CA 19-9 682    Biliary Brushing Pathology: evidence of adenocarcinoma   Surgical Oncology Consulted: 8/10/22 - Input Appreciated, indicated patient is not a candidate for acute surgical intervention at this time

## 2022-08-07 NOTE — PLAN OF CARE
Problem: Potential for Falls  Goal: Patient will remain free of falls  Description: INTERVENTIONS:  - Educate patient/family on patient safety including physical limitations  - Instruct patient to call for assistance with activity   - Consult OT/PT to assist with strengthening/mobility   - Keep Call bell within reach  - Keep bed low and locked with side rails adjusted as appropriate  - Keep care items and personal belongings within reach  - Initiate and maintain comfort rounds  - Make Fall Risk Sign visible to staff  - Offer Toileting every 2 Hours, in advance of need  - Initiate/Maintain bed alarm  - Apply yellow socks and bracelet for high fall risk patients  - Consider moving patient to room near nurses station  Outcome: Progressing     Problem: MOBILITY - ADULT  Goal: Maintain or return to baseline ADL function  Description: INTERVENTIONS:  -  Assess patient's ability to carry out ADLs; assess patient's baseline for ADL function and identify physical deficits which impact ability to perform ADLs (bathing, care of mouth/teeth, toileting, grooming, dressing, etc )  - Assess/evaluate cause of self-care deficits   - Assess range of motion  - Assess patient's mobility; develop plan if impaired  - Assess patient's need for assistive devices and provide as appropriate  - Encourage maximum independence but intervene and supervise when necessary  - Involve family in performance of ADLs  - Assess for home care needs following discharge   - Consider OT consult to assist with ADL evaluation and planning for discharge  - Provide patient education as appropriate  Outcome: Progressing  Goal: Maintains/Returns to pre admission functional level  Description: INTERVENTIONS:  - Perform BMAT or MOVE assessment daily    - Set and communicate daily mobility goal to care team and patient/family/caregiver     - Collaborate with rehabilitation services on mobility goals if consulted  - Record patient progress and toleration of activity level   Outcome: Progressing     Problem: Prexisting or High Potential for Compromised Skin Integrity  Goal: Skin integrity is maintained or improved  Description: INTERVENTIONS:  - Identify patients at risk for skin breakdown  - Assess and monitor skin integrity  - Assess and monitor nutrition and hydration status  - Monitor labs   - Assess for incontinence   - Turn and reposition patient  - Assist with mobility/ambulation  - Relieve pressure over bony prominences  - Avoid friction and shearing  - Provide appropriate hygiene as needed including keeping skin clean and dry  - Evaluate need for skin moisturizer/barrier cream  - Collaborate with interdisciplinary team   - Patient/family teaching  - Consider wound care consult   Outcome: Progressing     Problem: Nutrition/Hydration-ADULT  Goal: Nutrient/Hydration intake appropriate for improving, restoring or maintaining nutritional needs  Description: Monitor and assess patient's nutrition/hydration status for malnutrition  Collaborate with interdisciplinary team and initiate plan and interventions as ordered  Monitor patient's weight and dietary intake as ordered or per policy  Utilize nutrition screening tool and intervene as necessary  Determine patient's food preferences and provide high-protein, high-caloric foods as appropriate       INTERVENTIONS:  - Monitor oral intake, urinary output, labs, and treatment plans  - Assess nutrition and hydration status and recommend course of action  - Evaluate amount of meals eaten  - Assist patient with eating if necessary   - Allow adequate time for meals  - Recommend/ encourage appropriate diets, oral nutritional supplements, and vitamin/mineral supplements  - Order, calculate, and assess calorie counts as needed  - Recommend, monitor, and adjust tube feedings and TPN/PPN based on assessed needs  - Assess need for intravenous fluids  - Provide specific nutrition/hydration education as appropriate  - Include patient/family/caregiver in decisions related to nutrition  Outcome: Progressing

## 2022-08-07 NOTE — ASSESSMENT & PLAN NOTE
Not on home O2 per documentation     Continue with albuterol nebs  Extubated 8/6  Continue supplemental O2 as needed

## 2022-08-07 NOTE — ASSESSMENT & PLAN NOTE
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   · Continue Lasix 40 mg

## 2022-08-07 NOTE — ASSESSMENT & PLAN NOTE
CHASITY on this admission (baseline Cr around 1), Cr 1 78 when first admitted     8/6/2022: Resolved - Cr 1 0  Lab Results   Component Value Date    CREATININE 0 92 08/08/2022    CREATININE 0 97 08/07/2022    CREATININE 1 00 08/06/2022    CREATININE 1 37 (H) 08/05/2022    CREATININE 1 38 (H) 08/04/2022    CREATININE 1 78 (H) 08/04/2022

## 2022-08-07 NOTE — PROGRESS NOTES
INTERNAL MEDICINE RESIDENCY PROGRESS NOTE     Name: Maria De Jesus Seo   Age & Sex: 82 y o  male   MRN: [id number]  Unit/Bed#: [facility] 200-1   Encounter: [id number]  Hospital Stay Days: 3    Accepting team: SOD Team B   Code Status: Level 3 - DNAR and DNI  Disposition: Patient requires Med/Surg with Telemetry    ASSESSMENT/PLAN     Principal Problem:    Cerebrovascular accident (CVA) involving cerebellum ([facility] )  Active Problems:    Sepsis ([facility] )    Pancreatic mass    Atrial fibrillation ([facility] )    CHF (congestive heart failure) ([facility] )    Hypertension    Dysphagia    Type 2 diabetes mellitus ([place] [address] )    Chronic heart failure with preserved ejection fraction (HCC)    COPD (chronic obstructive pulmonary disease) ([place] [address] )    CHASITY (acute kidney injury) ([place] [address] )    Coronary artery disease      * Cerebrovascular accident (CVA) involving cerebellum ([facility] )  Assessment & Plan  · Right cerebellar stroke   ? CT head 8/4 showed right cerebellar stroke   ? Neurology following, appreciate recommendations   ? Continue aspirin and statin  ? MRI  ordered      ? SBP goal 140-160  ? Continue telemetry   ? PT/OT consulted  ? Continue neuro checks   ? Given stable imaging and clinical picture and high risk of re-stroking, will advance to heparin gtt 8/7 per neurology  Repeat non-con CT head in 48 hours and start DOAC if stable  Otherwise STAT non-con CT head for any neuro changes  ? If any changes neuro exam, repeat head CT     Pancreatic mass  Assessment & Plan  ·  Pancreatic head mass  · 8/4 CTAP: Intrahepatic and extrahepatic biliary ductal dilation with pancreatic body and tail atrophy and suspected pancreatic head mass  No gross lung infiltrates noted limited by respiratory motion  ? ERCP done 8/4 with stent placed  ? Possible EUS in the future  ? GI on board and appreciate recommendations  ? May consider future hemeonc consult once medically stable   ?  CEA normal  ? CA 19-9 682         Sepsis ([facility] )  Assessment & Plan  ·  Suspected cholangitis with sespsis   ? Started on Ertepenem   ? Blood cultures X2 growing ESBL and Klebsiella continue to follow  ? ID following     ? Trend WBC and fever curve   ? S/p ERCP and stent placement 8/4   ? Monitor LFTs, improving    CHF (congestive heart failure) ([facility])  Assessment & Plan  Wt Readings from Last 3 Encounters:   08/06/22 88 5 kg (195 lb 1 7 oz)   08/05/22 93 9 kg (207 lb 0 2 oz)   08/03/22 93 9 kg (207 lb)     ? Echo shows EF 45% with septal wall hypokinesis on this admission  ? Home lasix 60 mg bid has been on hold this admission since patient was septic and requiring pressors  BP since stable  Will give dose of IV lasix and assess response  ? I+Os   ? Daily weights   ? Cardiac diet        Atrial fibrillation [facility])  Assessment & Plan  ? Continue metoprolol 25 mg Q12  ? Advancing to a/c with heparin gtt on 8/7 with plan to repeat non-con CT head in 48 hours and then transition to [address] if findings stable  Otherwise STAT non-con CT head for any neuro changes in the meantime  ? Home diltiazem 240 mg daily on hold -> consider restarting in the a m   ? Continue telemetry monitoring    Hypertension  Assessment & Plan  SBP goal 140-160   § Continue to monitor blood pressure  § Given slight supplemental O2 reuirements and no diuretics for days, will give dose of IV lasix to see if this helps respiratory status    Dysphagia  Assessment & Plan  Patient has dysphagia possibly secondary to stroke, he has aspirated and food was taken out from the respiratory tract per anesthesiology  Speech pathology has recommended dysphagia 2 diet  Advance as needed  Chronic heart failure with preserved ejection fraction [facility])  Assessment & Plan  Wt Readings from Last 3 Encounters:   08/06/22 88 5 kg (195 lb 1 7 oz)   08/05/22 93 9 kg (207 lb 0 2 oz)   08/03/22 93 9 kg (207 lb)     ? Echo shows EF 45% with septal wall hypokenesis  ? Holding home lasix and using PRN as appropriate  ?  Monitor I's and O's and daily weights      Type 2 diabetes mellitus ([facility] )  Assessment & Plan    · T2 DM  · Hb A1C 7 5   ? Goal -180 inpt  ? S/p insulin gtt, now on long and short acting + SSI  Being adjusted to try and meet goal  ? Dysphagia diet        Coronary artery disease  Assessment & Plan  ? Aspirin 81 for stroke   ? EKG showed no acute ST changes   ? Holding statin given transaminitis     CHASITY (acute kidney injury) ([facility] )  Assessment & Plan  CHASITY on this admission (baseline Cr around 1), Cr 1 78 when first admitted     · Monitor Cr   · Avoid nephrotoxic agents         COPD (chronic obstructive pulmonary disease) ([facility] )  Assessment & Plan  Not on home O2 per documentation     Continue with albuterol nebs  Extubated 8/6  Continue supplemental O2 as needed        VTE Pharmacologic Prophylaxis: Reason for no pharmacologic prophylaxis Recent CVA  VTE Mechanical Prophylaxis: sequential compression device    SUBJECTIVE     The patient was seen and examined at bedside  He was ill-appearing, and very fatigued, but responded appropriately to questioning with repeated encouraging  He was A&O x3 and denied any acute complaints  Patient remains globally weak (left side slightly weaker than right) with dysarthria, dysphagia, right tongue deviation  Discussed with neurology  Given unclear timing of stroke, stable clinical exam, likely onset of symptoms many days prior, and findings on imaging, will advance to full anticoagulation today with heparin gtt, as he is at high risk of re-stroking if not a/c  If patient remains stable from a neuro exam standpoint, will repeat non-con CT head in 48 hours  If this is unchanged from prior, can transition to eliquis  If neuro changes in the meantime, repeat stat non-con CT head sooner  Patient's home diuretics (lasix 60 bid) have been on hold since admission   CHASITY resolved and BP appropriate on abx for bactermia, so will start advancing back to home diuretic dose and see if this helps his tachycardia and respiratory status  OBJECTIVE     Vitals:    08/07/22 0753 08/07/22 0835 08/07/22 1032 08/07/22 1132   BP: 155/82 163/86 137/95 139/94   Pulse: 71 72 (!) 127 (!) 122   Resp: 18      Temp: 97 7 °F (36 5 °C)      TempSrc:       SpO2: 96% 96% 98% 98%   Weight:       Height:         I/O last 24 hours: In: 230 6 [I V :80 6; IV Piggyback:150]  Out: 525 [Urine:525]    Physical Exam  Constitutional:       Appearance: He is well-developed  He is obese  He is ill-appearing  HENT:      Head: Normocephalic and atraumatic  Nose: Nose normal    Eyes:      General:         Right eye: No discharge  Left eye: No discharge  Conjunctiva/sclera: Conjunctivae normal       Pupils: Pupils are equal, round, and reactive to light  Neck:      Thyroid: No thyromegaly  Cardiovascular:      Rate and Rhythm: Normal rate and regular rhythm  Heart sounds: Normal heart sounds  No murmur heard  No friction rub  No gallop  Pulmonary:      Effort: Pulmonary effort is normal  No respiratory distress  Breath sounds: Normal breath sounds  No stridor  No wheezing or rales  Chest:      Chest wall: No tenderness  Abdominal:      General: Bowel sounds are normal  There is no distension  Palpations: Abdomen is soft  There is no mass  Tenderness: There is no abdominal tenderness  There is no guarding or rebound  Musculoskeletal:         General: No tenderness or deformity  Right lower leg: No edema  Left lower leg: No edema  Lymphadenopathy:      Cervical: No cervical adenopathy  Skin:     General: Skin is warm and dry  Neurological:      Mental Status: He is alert and oriented to person, place, and time  Psychiatric:         Mood and Affect: Mood normal          Behavior: Behavior normal          Thought Content: Thought content normal          Judgment: Judgment normal        LABORATORY DATA     Labs: I have personally reviewed pertinent reports      Results from last 7 days   Lab Units 08/07/22  0609 08/06/22  0432 08/05/22  0547 08/04/22  1800   WBC Thousand/uL 12 97* 14 25* 18 70* 29 44*   HEMOGLOBIN g/dL 10 5* 9 9* 10 0* 10 3*   HEMATOCRIT % 34 6* 31 7* 30 8* 32 6*   PLATELETS Thousands/uL 192 181 205 222   NEUTROS PCT %  --  85* 89* 89*   MONOS PCT %  --  6 5 6      Results from last 7 days   Lab Units 08/07/22  0609 08/06/22  0432 08/05/22  0547 08/04/22  1800   POTASSIUM mmol/L 3 9 3 5 3 3* 3 5   CHLORIDE mmol/L 112* 113* 108 107   CO2 mmol/L 29 29 26 27   BUN mg/dL 18 22 29* 25   CREATININE mg/dL 0 97 1 00 1 37* 1 38*   CALCIUM mg/dL 8 7 8 4 8 1* 8 2*   ALK PHOS U/L 540*  --  572* 679*   ALT U/L 85*  --  159* 206*   AST U/L 24  --  78* 124*     Results from last 7 days   Lab Units 08/07/22  0609 08/06/22  0432 08/04/22  1800   MAGNESIUM mg/dL 2 4 2 3 1 8     Results from last 7 days   Lab Units 08/06/22  0432 08/04/22  1800   PHOSPHORUS mg/dL 2 3 3 1      Results from last 7 days   Lab Units 08/04/22  1800 08/04/22  0850   INR  1 50* 1 32*   PTT seconds  --  32     Results from last 7 days   Lab Units 08/04/22  2141   LACTIC ACID mmol/L 2 5*         Micro:  Lab Results   Component Value Date    BLOODCX Klebsiella oxytoca ESBL (A) 08/04/2022    BLOODCX Klebsiella oxytoca ESBL (A) 08/04/2022    BLOODCX No Growth After 5 Days  06/04/2022    URINECX  04/08/2016     10,000-19,000 cfu/ml Candida sp  presumptively albicans    URINECX 60,000-69,000 cfu/ml Mixed Contaminants X3 04/08/2016     IMAGING & DIAGNOSTIC TESTING     Imaging: I have personally reviewed pertinent reports  ERCP Fluoro    Result Date: 8/4/2022  Impression: ERCP with cholangitis s/p placement of 10 x 4 cm fully covered metal stent  RECOMMENDATION: Follow LFTs Continue antibiotics Further care per ICU team May require EUS for biopsy/staging of mass at later date  Susan Mansfield MD     XR chest 1 view portable    Result Date: 8/4/2022  Impression: Suspected left retrocardiac consolidation and/or small effusion    Cardiomegaly and aortic sclerosis  Workstation performed: [id number]     CT head wo contrast    Result Date: 8/6/2022  Impression: Early infarct within the right cerebellar hemisphere is similar to the examination performed 2 days ago  No hemorrhagic transformation  Stable advanced chronic microangiopathic change within both cerebral hemispheres  Workstation performed: [id number]     CT head without contrast    Result Date: 8/4/2022  Impression: New low-density in the right cerebellum concerning for an acute infarct  Cerebral atrophy and small vessel disease  I personally discussed this study with Napolean Mortimer on 8/4/2022 at 11:28 AM  Workstation performed: [id number]     CT chest abdomen pelvis w contrast    Result Date: 8/4/2022  Impression: 1  Intrahepatic and extrahepatic biliary ductal dilatation with pancreatic body and tail atrophy and suspected pancreatic head mass  Confirmation should be obtained with MRI abdomen with MRCP  2   No infiltrates on study limited by respiratory motion  I personally discussed this study with Napolean Mortimer on 8/4/2022 at 11:28 AM  Workstation performed: [id number]     FL ERCP biliary only    Result Date: 8/5/2022  Impression: Distal CBD stent placement  Workstation performed: [id number]     CXR in AM    Result Date: 8/5/2022  Impression: ET tube and right IJ line remain in stable position  No active pulmonary disease  Workstation performed: [id number]     XR chest portable ICU    Result Date: 8/5/2022  Impression: Right IJ line has been placed, tip overlies the SVC  No pneumothorax  Workstation performed: [id number]     XR chest portable ICU    Result Date: 8/5/2022  Impression: ET tube appears in position  Possible small left pleural effusion, stable  Workstation performed: [id number]     EKG, Pathology, and Other Studies: I have personally reviewed pertinent reports       ALLERGIES     Allergies   Allergen Reactions    Amoxicillin Diarrhea and Hives MEDICATIONS     Current Facility-Administered Medications   Medication Dose Route Frequency Provider Last Rate    albuterol  2 5 mg Nebulization Q4H PRN Parul Moran      aspirin  81 mg Oral Daily Parul Moran      ertapenem  1,000 mg Intravenous Q24H Abelardodevang Cortez 1,000 mg (08/07/22 0944)    furosemide  20 mg Intravenous Once [employer], DO      heparin (porcine)  3-24 Units/kg/hr (Order-Specific) Intravenous Titrated Caprice Oliver, DO 15 Units/kg/hr (08/07/22 1359)    insulin glargine  30 Units Subcutaneous HS Abelardo Cortez      insulin lispro  1-6 Units Subcutaneous 4 times day Parul Moran      insulin lispro  8 Units Subcutaneous TID With Meals Parul Moran      metoprolol tartrate  25 mg Oral Q12H [facility] Abelardo Cortez      omeprazole (PRILOSEC) suspension 2 mg/mL  20 mg Oral Early Morning Abelardo Cortez      senna-docusate sodium  1 tablet Oral HS Abelardo Cortez       heparin (porcine), 3-24 Units/kg/hr (Order-Specific), Last Rate: 15 Units/kg/hr (08/07/22 1359)      albuterol, 2 5 mg, Q4H PRN        Portions of the record may have been created with voice recognition software  Occasional wrong word or "sound a like" substitutions may have occurred due to the inherent limitations of voice recognition software    Read the chart carefully and recognize, using context, where substitutions have occurred     ==  [employer], DO  Jim Lashon  Internal Medicine Residency PGY-3

## 2022-08-07 NOTE — ASSESSMENT & PLAN NOTE
·  Suspected cholangitis with sespsis status post ERCP and stent placement on 8/4  ? ID following  ? On Ertepenem given MDRO/ESBL  ? Blood cultures X2 growing ESBL and Klebsiella continue to follow  ? Trend WBC and fever curve    ? Monitor LFT, normalized 8/7/22  ? Leukocytosis resolved  ? 8/11/22: Pt seen by Infectious disease s/p 7 days of IV Ertepenem   States discontinue of antibiotic to complete course        Lab Results   Component Value Date    WBC 12 43 (H) 08/10/2022    WBC 10 15 08/08/2022    WBC 12 97 (H) 08/07/2022    WBC 14 25 (H) 08/06/2022    WBC 18 70 (H) 08/05/2022

## 2022-08-07 NOTE — PLAN OF CARE
Problem: PHYSICAL THERAPY ADULT  Goal: Performs mobility at highest level of function for planned discharge setting  See evaluation for individualized goals  Description: Treatment/Interventions: Functional transfer training, LE strengthening/ROM, Therapeutic exercise, Endurance training, Cognitive reorientation, Bed mobility, Gait training, Spoke to nursing, OT  Equipment Recommended: Gael Rodriguez Wheelchair       See flowsheet documentation for full assessment, interventions and recommendations  Note: Prognosis: Guarded  Problem List: Decreased strength, Decreased range of motion, Decreased endurance, Impaired balance, Decreased mobility, Decreased cognition, Decreased safety awareness, Obesity  Assessment: Pt is 82 y o  male admitted with hx of sudden-onset headache and dysarthria and Dx of Cerebrovascular accident (CVA) involving cerebellum, sepsis  Pt 's comorbidities affecting POC include: A-fib ([facility] ), CHF (congestive heart failure) ([facility] ), Chronic obstructive pulmonary disease (COPD) Coronary artery disease, 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[facility] ), Dysphagia, Gait instability, H/O cervical fracture, Hyperlipidemia, Hypertension, Muscle weakness, dementia, and TBI (traumatic brain injury) and personal factors of: advanced age, residing at SNF and limited mobility status prior to admission  Pt's clinical presentation is currently  unstable/unpredictable which is evident in abn lab values, suppl O2 needs and inability to progress further w/ mobility at this time due to limited standing balance/tolerance and ongoing BM w/ a need for pericare  Pt presents w/ general disorientation (likely premorbid), overall weakness, decreased (R) > (L) LE strength, decreased functional endurance and activity tolerance, bed mob and transfer deficits, impaired balance and inability to amb at this time  Will cont to follow pt in PT for progressive mobilization to max functional level, endurance, and safety to decrease overall burden of care  Anticipate pt will return to SNF level of care upon D/C --> recommend PT follow up/rehab at the facility; will follow  PT Discharge Recommendation: Return to facility with rehabilitation services    See flowsheet documentation for full assessment

## 2022-08-07 NOTE — ASSESSMENT & PLAN NOTE
· T2 DM  · Hb A1C 7 5   · Not on insulin outpatient  · Insulin requirements inpatient have been very high, likely stress-induced hyperglycemia in setting of acute illness  ? Goal -180 inpt  ? S/p insulin gtt   ? subQ insulin being adjusted as needed to meet goals  ?  Dysphagia diet

## 2022-08-07 NOTE — PROGRESS NOTES
Progress Note - Neurology   Barrie Goldmann 82 y o  male MRN: [id number]  Unit/Bed#: [facility] 711-01 Encounter: [id number]    Assessment/Plan   * Cerebrovascular accident (CVA) involving cerebellum [facility])  Assessment & Plan  82year old male with extensive medical history including but not limited to Afib on Eliquis, vascular dementia, CHF, COPD, CAD, DM, HTN, HLD and prior TBI  Patient was recently admitted in June 2022 for cholangitis and underwent stent placement  Patient had the stent removed on 8/3 and presented from his nursing home with AMS, fever and hypoxia  Patient met criteria for sepsis and was started on broad spectrum antibiotics  Labs remarkable for lactic acidosis, CHASITY (creat 1 78), elevated LFT's, elevated bilirubin, leukocytosis (22), elevated troponin, elevated BNP (07,236)  Concern for ascending cholangitis due to stent free trial failure, GI following  CTH revealed an acute right cerebellar infarct and neurology was consulted  Of note, patient's Eliquis was held x 3-4 days total for stent removal procedure  Patient was recently in [facility] ED on 7/26 for headache and dysarthria, concern for stroke  Patient ultimately refused all care/work up and left AMA  The patient is now out of the ICU (had to be intubated after the Ercp procedure) and transferred to the floor, he is being followed by the medical team - secondary to his sepsis status post ERCP and stent placement on 08/04 secondary to recurrent acute cholangitis  There is also concern for pancreatic mass and GI will follow this in the future  He also has CHASITY, CAD, CHF, AFib with Eliquis on hold, and COPD  These issues are currently being managed by the medicine team     Plan:  - Stroke pathway  - Continue Aspirin 81 mg daily, eventually will need anticoagulation for secondary prevention ( please see attending attestation for timing details)    - MRI completed - official read pending    - Echo EF 45% with hypokineses and moderately dilated L atrium  - Lipid Panel: Triglycerides 469, Cholesterol 69  Unable to calculate LDL    - Hemoglobin A1c 7 5  - Normotensive blood pressure goal, avoid hypotension for cerebral perfusion  - Euglycemic, normothermic goal  - Continue telemetry  - PT/OT/ST  - Secondary risk factor modification    - Stroke education  - Frequent neuro checks  Continue to monitor and notify neurology with any changes   - STAT CT head for any acute change in neuro exam  - Medical management and supportive care per primary team  Correction of any metabolic or infectious disturbances  Plan discussed with Attending Neurologist, please see attestation for further input/recommendations  Sepsis ([facility] [address] )  Assessment & Plan  Met sepsis criteria on admission, likely in the setting of ascending cholangitis secondary to failure of stent free trial   - Blood cultures showing ESBL, gram negative and gram positive rods  Currently receiving Ertapenem  - ERCP completed  - GI following  - Management per critical care team     Atrial fibrillation [facility])  Assessment & Plan  Previously on Eliquis 5 mg BID   - Currently on hold    Tapan Alamo will need follow up in in 6 weeks with neurovascular attending  He will not require outpatient neurological testing  Subjective:   Neurological follow up, follow up in regards to stroke  Reviewed notes by neurology previously  Reviewed notes by IM, please see HPI for complete details of admission  The patient has no new neurological complaints this a m, in particular no headache, one sided weakness or numbness or clumsiness, no problems with speech or with vision  Per nursing, no changes in neurological condition or neurological examination  ROS:  As per HPI, 12 point ROS as above  Vitals: Blood pressure 163/86, pulse 72, temperature 97 7 °F (36 5 °C), resp  rate 18, height 5' 8" (1 727 m), weight 88 5 kg (195 lb 1 7 oz), SpO2 96 %  ,Body mass index is 29 67 kg/m²       Current Facility-Administered Medications   Medication Dose Route Frequency Provider Last Rate    albuterol  2 5 mg Nebulization Q4H PRN Charise Nissen      aspirin  81 mg Oral Daily Abelardo Cortez      ertapenem  1,000 mg Intravenous Q24H Charise Nissen Stopped (08/06/22 0947)    heparin (porcine)  5,000 Units Subcutaneous Q8H [address] Abelardo Cortez      insulin glargine  30 Units Subcutaneous HS Abelardo Cortez      insulin lispro  1-6 Units Subcutaneous 4 times day Charise Nissen      insulin lispro  8 Units Subcutaneous TID With Meals Charise Nissen      metoprolol tartrate  25 mg Oral Q12H [address] Abelardo Cortez      omeprazole (PRILOSEC) suspension 2 mg/mL  20 mg Oral Early Morning Abelardo Cortez      senna-docusate sodium  1 tablet Oral HS Charise Nissen       Physical Exam:  Neurological    Mental status - The patient is awake and alert, oriented to person, place and time, he was able to tell me that he had stroke and the reason he was in the hospital, he could not tell me had a stent procedure, but was able to tell he had a stent removed prior to arrive  He was able to follow one step commands, more difficulty with complex commands, there was slowness to his speech and responses  He was unable to repeat, no aphasia or dysarthria, there was decreased attn and concentration  Cranial nerves 2 through 12 are intact - pupils are equal and reactive, EOMI, there is no facial droop noted, VF intact, SCM equal, decreased rom at the shoulders bilaterally  Motor - He was able to lift the uppers off bed with no drift, decreased ROM at the shoulders bilaterally  Lowers appears to have at least 4/5 strength, symmetric  Non focal      No tremor observed with outstretched hands  Sensation - nonfocal to touch, no gross neglect appreciated  Coordination -  no ataxia noted on finger-to-nose, decreased ROM at the shoulders slow bilaterally    No evidence of seizure activity, observed clinically       Lab, Imaging and other studies:   I have personally reviewed pertinent reports    , CBC:   Results from last 7 days   Lab Units 08/07/22  0609 08/06/22  0432 08/05/22  0547   WBC Thousand/uL 12 97* 14 25* 18 70*   RBC Million/uL 4 20 3 91 3 85*   HEMOGLOBIN g/dL 10 5* 9 9* 10 0*   HEMATOCRIT % 34 6* 31 7* 30 8*   MCV fL 82 81* 80*   PLATELETS Thousands/uL 192 181 205   , BMP/CMP:   Results from last 7 days   Lab Units 08/07/22  0609 08/06/22  0432 08/05/22  0547 08/04/22  1800   SODIUM mmol/L 144 145 141 141   POTASSIUM mmol/L 3 9 3 5 3 3* 3 5   CHLORIDE mmol/L 112* 113* 108 107   CO2 mmol/L 29 29 26 27   BUN mg/dL 18 22 29* 25   CREATININE mg/dL 0 97 1 00 1 37* 1 38*   CALCIUM mg/dL 8 7 8 4 8 1* 8 2*   AST U/L 24  --  78* 124*   ALT U/L 85*  --  159* 206*   ALK PHOS U/L 540*  --  572* 679*   EGFR ml/min/1 73sq m 70 68 46 46   , Vitamin B12:   , HgBA1C:   Results from last 7 days   Lab Units 08/05/22  0547   HEMOGLOBIN A1C % 7 5*   , TSH:   , Coagulation:   Results from last 7 days   Lab Units 08/04/22  1800   INR  1 50*   , Lipid Profile:   Results from last 7 days   Lab Units 08/05/22  0547   HDL mg/dL 6*   TRIGLYCERIDES mg/dL 469*   , Ammonia:   Results from last 7 days   Lab Units 08/04/22  1800   AMMONIA umol/L 20   , Urinalysis:   Results from last 7 days   Lab Units 08/04/22  1015   COLOR UA  Rosina   CLARITY UA  Cloudy   SPEC GRAV UA  1 025   PH UA  5 0   LEUKOCYTES UA  Negative   NITRITE UA  Negative   GLUCOSE UA mg/dl 100 (1/10%)*   KETONES UA mg/dl Negative   BILIRUBIN UA  Interference- unable to analyze*   BLOOD UA  Negative   , Drug Screen:   , Medication Drug Levels:       Invalid input(s): CARBAMAZEPINE,  PHENOBARB, LACOSAMIDE, OXCARBAZEPINE     Procedure: ERCP Fluoro    Result Date: 8/4/2022  Narrative: [address] [place], [place] [address] [phone] DATE OF SERVICE: 8/04/22 PHYSICIAN(S): Attending: Arlene Villalta MD Fellow: No Staff Documented INDICATION: Cholangitis POST-OP DIAGNOSIS: See the impression below  PREPROCEDURE: Informed consent was obtained for the procedure, including sedation  Risks of perforation, hemorrhage, adverse drug reaction and aspiration were discussed  The patient was placed in the left lateral decubitus position  Patient was explained about the risks and benefits of the procedure  Risks including but not limited to bleeding, infection, and perforation were explained in detail  Also explained about less than 100% sensitivity with the exam and other alternatives  DETAILS OF PROCEDURE: Patient was taken to the procedure room where a time out was performed to confirm correct patient and correct procedure  The patient underwent general anesthesia, which was administered by an anesthesia professional  The patient's blood pressure, heart rate, level of consciousness, respirations and oxygen were monitored throughout the procedure  Clinical intention was achieved  The patient experienced no blood loss  The procedure was not difficult  The patient tolerated the procedure well  ANESTHESIA INFORMATION: ASA: ASA status not filed in the log  Anesthesia Type: Anesthesia type not filed in the log  MEDICATIONS: No administrations occurring from 1650 to 1704 on 08/04/22 FINDINGS: Limited views of the esophagus, stomach, duodenum and major papilla appeared normal  Deeply cannulated the common bile duct after 1 attempt using a traction sphincterotome  Used 260 cm x 0 035 in straight guidewire  Cannulation was not difficult  Injected contrast  I personally reviewed and interpreted the fluoroscopy images  There was a 2 cm stricture in the distal common bile duct  Placed [employer] 10 mm x 4 cm fully covered metal stent in the common bile duct SPECIMENS: * No specimens in log *      Impression: ERCP with cholangitis s/p placement of 10 x 4 cm fully covered metal stent   RECOMMENDATION: Follow LFTs Continue antibiotics Further care per ICU team May require EUS for biopsy/staging of mass at later date  Anahy Mosqueda MD     Procedure: XR chest 1 view portable    Result Date: 8/4/2022  Narrative: CHEST INDICATION:   hypoxic, tachypnic, sepsis  COMPARISON:  7/24/2022  EXAM PERFORMED/VIEWS:  XR CHEST PORTABLE Images:  1 FINDINGS: Cardiac and mediastinal contours are stable  The is calcified  Left hemidiaphragm which may be secondary to consolidation and/or effusion  No pneumothorax  Overlying EKG wire leads  Left glenohumeral joint osteoarthrosis  Impression: Suspected left retrocardiac consolidation and/or small effusion  Cardiomegaly and aortic sclerosis  Workstation performed: [id number]     Procedure: CT head wo contrast    Result Date: 8/6/2022  Narrative: CT BRAIN - WITHOUT CONTRAST INDICATION:   R/O edema  COMPARISON:  8/4/2022, 7/24/2022  TECHNIQUE:  CT examination of the brain was performed  In addition to axial images, sagittal and coronal 2D reformatted images were created and submitted for interpretation  Radiation dose length product (DLP) for this visit:  940 4 mGy-cm   This examination, like all CT scans performed in the [facility], was performed utilizing techniques to minimize radiation dose exposure, including the use of iterative reconstruction and automated exposure control  IMAGE QUALITY:  Diagnostic  FINDINGS: PARENCHYMA:  Decreased attenuation and loss of gray-white differentiation within the right inferior cerebellum is again seen consistent with early ischemia  Moderate to advanced chronic microangiopathic change within both cerebral hemispheres is stable compared to the prior examination  No mass, mass effect or midline shift  No hemorrhage  VENTRICLES AND EXTRA-AXIAL SPACES:  Normal for the patient's age  VISUALIZED ORBITS AND PARANASAL SINUSES:  Unremarkable   CALVARIUM AND EXTRACRANIAL SOFT TISSUES:  Normal      Impression: Early infarct within the right cerebellar hemisphere is similar to the examination performed 2 days ago  No hemorrhagic transformation  Stable advanced chronic microangiopathic change within both cerebral hemispheres  Workstation performed: [id number]     Procedure: CT head without contrast    Result Date: 8/4/2022  Narrative: CT BRAIN - WITHOUT CONTRAST INDICATION: Acute mental status changes COMPARISON:  CT brain July 24, 2022 TECHNIQUE:  CT examination of the brain was performed  In addition to axial images, sagittal and coronal 2D reformatted images were created and submitted for interpretation  Radiation dose length product (DLP) for this visit:  993 mGy-cm   This examination, like all CT scans performed in the [facility], was performed utilizing techniques to minimize radiation dose exposure, including the use of iterative reconstruction and automated exposure control  IMAGE QUALITY:  Diagnostic  FINDINGS: PARENCHYMA: An area of low-attenuation is demonstrated at the level of the right inferior and mid cerebellum suggesting an acute infarct with mass effect  There is image degradation at this level due to artifacts from hardware fixation  There is no hemorrhagic transformation  Decreased attenuation is noted in periventricular and subcortical white matter demonstrating an appearance that is statistically most likely to represent moderate microangiopathic change  No acute parenchymal hemorrhage  VENTRICLES AND EXTRA-AXIAL SPACES:  Enlargement of ventricles and extra-axial CSF spaces, out of proportion to the patient's age most consistent with cerebral and cerebellar atrophy  VISUALIZED ORBITS AND PARANASAL SINUSES:  Unremarkable  CALVARIUM AND EXTRACRANIAL SOFT TISSUES:  Extensive artifacts are demonstrated from suboccipital fusion  Impression: New low-density in the right cerebellum concerning for an acute infarct  Cerebral atrophy and small vessel disease    I personally discussed this study with Nima Bess on 8/4/2022 at 11:28 AM  Workstation performed: [id number]     Procedure: CT chest abdomen pelvis w contrast    Result Date: 8/4/2022  Narrative: CT CHEST, ABDOMEN AND PELVIS WITH IV CONTRAST INDICATION:   Sepsis AMS, Sepsis, recent ERCP yesterday  COMPARISON:  CT abdomen and pelvis June 4, 2022 TECHNIQUE: CT examination of the chest, abdomen and pelvis was performed  Axial, sagittal, and coronal 2D reformatted images were created from the source data and submitted for interpretation  Radiation dose length product (DLP) for this visit:  91 21 06 mGy-cm   This examination, like all CT scans performed in the [facility], was performed utilizing techniques to minimize radiation dose exposure, including the use of iterative reconstruction and automated exposure control  IV Contrast:  68 mL of iohexol (OMNIPAQUE) Enteric Contrast: Enteric contrast was not administered  FINDINGS: CHEST LUNGS: Evaluation of the lung parenchyma is limited by respiratory motion  No gross infiltrates are seen  There is no tracheal or endobronchial lesion  PLEURA:  Unremarkable  HEART/GREAT VESSELS: Cardiomegaly with coronary artery calcifications  No thoracic aortic aneurysm  Atherosclerotic disease of the thoracic aorta  MEDIASTINUM AND FRIDA:  Nonspecific paratracheal and prevascular nodes with short axis of up to 1 cm are seen  CHEST WALL AND LOWER NECK:  Unremarkable  ABDOMEN LIVER/BILIARY TREE:  Mild hepatic steatosis with mild intrahepatic and extrahepatic biliary ductal dilatation and dilatation of the common bile duct which measures up to 19 mm  There is heterogeneous density in the region of the pancreatic head  Pancreatic atrophy is seen involving the body and tail and a 13 x 14 mm pancreatic mass is questioned on series 5 image 65  GALLBLADDER: Distended gallbladder with no stones  No calcified gallstones  No pericholecystic inflammatory change  SPLEEN:  Unremarkable  PANCREAS:  Unremarkable  ADRENAL GLANDS:  Unremarkable   KIDNEYS/URETERS:  No hydronephrosis or urinary tract calculus  One or more sharply circumscribed subcentimeter renal hypodensities are present, too small to accurately characterize, and statistically most likely benign findings  According to recent literature (Radiology 2019) no further workup of these findings is recommended  STOMACH AND BOWEL: Small bowel loops show normal caliber  Colonic diverticulosis is demonstrated without associated diverticulitis  There is large amount of stool in the rectum  APPENDIX:  No findings to suggest appendicitis  ABDOMINOPELVIC CAVITY:  No ascites  No pneumoperitoneum  No lymphadenopathy  VESSELS:  Atherosclerotic changes are present  No evidence of aneurysm  PELVIS REPRODUCTIVE ORGANS:  Prostate fiducial markers are seen  URINARY BLADDER:  Unremarkable  ABDOMINAL WALL/INGUINAL REGIONS:  Unremarkable  OSSEOUS STRUCTURES:  No acute fracture or destructive osseous lesion  Spinal degenerative changes are noted  There is lumbar dextroscoliosis  Old healed fracture of the right superior pubic ramus and acetabulum is demonstrated with severe degenerative changes of the hips  Impression: 1  Intrahepatic and extrahepatic biliary ductal dilatation with pancreatic body and tail atrophy and suspected pancreatic head mass  Confirmation should be obtained with MRI abdomen with MRCP  2   No infiltrates on study limited by respiratory motion  I personally discussed this study with Frankie Goodwin on 8/4/2022 at 11:28 AM  Workstation performed: [id number]     Procedure: FL ERCP biliary only    Result Date: 8/5/2022  Narrative: ERCP INDICATION:  Cholangitis  COMPARISON:  ERCP 8/3/2022 IMAGES:  5 FLUOROSCOPY TIME:   59 seconds CONTRAST: 4 mL of Gadobutrol injection (SINGLE-DOSE) FINDINGS: Fluoroscopic guidance was provided for performance of ERCP  BILIARY: Common bile duct was cannulated  Contrast was injected  Dilated proximal CBD  Distal CBD is not well opacified suspicious for stricture  A stent was placed at the level of the distal CBD  Impression: Distal CBD stent placement  Workstation performed: [id number]     Procedure: CXR in AM    Result Date: 8/5/2022  Narrative: CHEST INDICATION:   Chest pain  COMPARISON:  8/4/2022 1936 hours EXAM PERFORMED/VIEWS:  XR CHEST PORTABLE ICU FINDINGS:  Right IJ line tip overlies SVC  ET tube is stable satisfactory position  Heart size is not well evaluated on this single portable AP view  Central fullness of the pulmonary vasculature, stable  The lungs are clear  No pneumothorax or pleural effusion  Osseous structures appear within normal limits for patient age  Impression: ET tube and right IJ line remain in stable position  No active pulmonary disease  Workstation performed: [id number]     Procedure: XR chest portable ICU    Result Date: 8/5/2022  Narrative: CHEST INDICATION:   central line placement  COMPARISON:  8/4/2022 1749 hours EXAM PERFORMED/VIEWS:  XR CHEST PORTABLE ICU  8/4/2022 1936 hours FINDINGS:  ET tube tip is approximately 4 6 cm from the diego  Right IJ line has been placed, tip overlies the SVC  Heart size is not well evaluated on this single portable AP view  Central fullness of the pulmonary vasculature  Mild chronic interstitial prominence, stable  No pneumothorax or pleural effusion  Osseous structures appear within normal limits for patient age  Impression: Right IJ line has been placed, tip overlies the SVC  No pneumothorax  Workstation performed: [id number]     Procedure: XR chest portable ICU    Result Date: 8/5/2022  Narrative: CHEST INDICATION:   intubation, aspiration  COMPARISON:  Chest x-ray 8/4/2022 0857 hours EXAM PERFORMED/VIEWS:  XR CHEST PORTABLE ICU  8/4/2022 1746 hours FINDINGS:  ET tube has been placed, tip is approximately 3 cm from the diego  Heart size is not well evaluated on this single portable AP view  Stable subtle fullness of the pulmonary vasculature  Slight interstitial prominence, stable   No focal infiltrate  Possible small left pleural effusion, stable  Osseous structures appear within normal limits for patient age  Impression: ET tube appears in position  Possible small left pleural effusion, stable  Workstation performed: [id number]     Procedure: Echo complete w/ contrast if indicated    Result Date: 8/4/2022  Narrative: Zenobia Resendiz  Left Ventricle: Left ventricular cavity size is normal  Wall thickness is moderately increased  There is moderate concentric hypertrophy  The left ventricular ejection fraction is 45%  Systolic function is mildly reduced  Diastolic function is moderately abnormal, consistent with grade II (pseudonormal) relaxation    The following segments are hypokinetic: basal inferoseptal, basal inferior, basal inferolateral and mid inferolateral    All other segments are normal    Left Atrium: The atrium is moderately dilated    Mitral Valve: There is mild regurgitation    Tricuspid Valve: There is mild regurgitation  The right ventricular systolic pressure is moderately elevated  The estimated right ventricular systolic pressure is 02 40 mmHg  Counseling / Coordination of Care  Reviewed case with neurology attending, history and physical examination, labs and imaging completed, plan of care as per attending physician  Please see attestation for further details

## 2022-08-07 NOTE — ASSESSMENT & PLAN NOTE
Wt Readings from Last 3 Encounters:   08/10/22 96 3 kg (212 lb 4 9 oz)   08/05/22 93 9 kg (207 lb 0 2 oz)   08/03/22 93 9 kg (207 lb)     ? Echo shows EF 45% with septal wall hypokenesis  ? Holding home lasix and using PRN as appropriate  ? Monitor I's and O's and daily weights  ? Appears euvolemic at present without respiratory distress, hypoxia, LE edema or JVD   Lungs CTAB

## 2022-08-07 NOTE — PLAN OF CARE
Problem: OCCUPATIONAL THERAPY ADULT  Goal: Performs self-care activities at highest level of function for planned discharge setting  See evaluation for individualized goals  Description: Treatment Interventions: ADL retraining, Functional transfer training, Endurance training, UE strengthening/ROM, Cognitive reorientation, Patient/family training, Equipment evaluation/education, Compensatory technique education, Continued evaluation, Energy conservation, Activityengagement          See flowsheet documentation for full assessment, interventions and recommendations  Outcome: Progressing  Note: Limitation: Decreased ADL status, Decreased UE ROM, Decreased UE strength, Decreased Safe judgement during ADL, Decreased cognition, Decreased endurance, Decreased self-care trans, Decreased high-level ADLs  Prognosis: Good  Assessment: Pt is 82 y o  M presented to [facility] with altered mental status, fever and hypoxia, dx with cerebellar CVA  Pt has a past medical history of A-fib, Cancer, CHF, COPD, CAD, Vascular dementia, Diabetes mellitus, Dysphagia, Gait instability, cervical fracture, Hyperlipidemia, HTN, Muscle weakness, and TBI  Pt lives in [place], [place]  PTA pt required A with ADLs, IADLs, and - driving  Pt with active OT orders and up with assistance activity orders  Pt seen as co-evaluation with PT due to comorbidities, medical complexity, and current deficits  Pt presents to session supine in bed  Pt performed Max Ax2 for bed mobility and STS trx with b/l HHA  Pt currently requires Min A for UB ADLs, eating and grooming, Max A for LB ADLs  Pt limited by decreased independence in ADLs/IADLs, fxnl and community mobility, endurance, activity engagement, activity tolerance, and fxnl standing tolerance  Occupational performance areas to be addressed include bathing, grooming, dressing, fxnl and community mobility   Pt to benefit from skilled OT services to address above deficits and improve overall independence, and maximize performance  OT recommendation at time of d/c to facility with rehab services when medically stable  Pt left supine in bed with all current needs met  The patient's raw score on the AM-PAC Daily Activity inpatient short form is 15, standardized score is 34 69, less than 39 4  Patients at this level are likely to benefit from discharge to post-acute rehabilitation services  Please refer to the recommendation of the Occupational Therapist for safe discharge planning       OT Discharge Recommendation: Return to facility with rehabilitation services

## 2022-08-07 NOTE — PHYSICAL THERAPY NOTE
Physical Therapy Evaluation     Patient's Name: Zena Garcia    Admitting Diagnosis  Cholangitis [K83 09]  Leukocytosis [D72 829]  Transaminitis [R74 01]  Stroke (cerebrum) ([facility] ) [I63 9]  Pancreatic mass [K86 89]  Elevated troponin [R77 8]  CHF exacerbation (HCC) [I50 9]  Cerebellar infarct (HCC) [I63 9]  CHASITY (acute kidney injury) ([facility] ) [N17 9]  S/P ERCP [Z98 890]  Sepsis ([facility] ) [A41 9]  Frailty syndrome in geriatric patient [R54]  AMS (altered mental status) [R41 82]    Problem List  Patient Active Problem List   Diagnosis    Atrial fibrillation ([facility] )    CHF (congestive heart failure) ([facility] )    Hypertension    Hyperlipidemia    Type 2 diabetes mellitus ([facility] )    Vertebral artery dissection (HCC)    Dysphagia    Ambulatory dysfunction    Abnormal EKG    Chronic heart failure with preserved ejection fraction (HCC)    Weight gain    History of 2019 novel coronavirus disease (COVID-19)    Debility    Medication management    Goals of care, counseling/discussion    Anemia    Right acetabular fracture (HCC)    Depression    Vascular dementia (HCC)    Constipation    Leukocytosis    Full code status    Frailty syndrome in geriatric patient    Cholangitis    S/P ERCP    Cerebrovascular accident (CVA) involving cerebellum ([facility] )    Sepsis ([facility] )    Pancreatic mass    COPD (chronic obstructive pulmonary disease) ([facility] )    CHASITY (acute kidney injury) ([facility] )    Coronary artery disease       Past Medical History  Past Medical History:   Diagnosis Date    A-fib ([facility] )     Cancer ([facility] )     Cardiac disease     CHF (congestive heart failure) ([facility] )     Chronic obstructive pulmonary disease (COPD) ([facility] )     COPD (chronic obstructive pulmonary disease) ([facility] )     Coronary artery disease     Counseling regarding advance care planning and goals of care 10/7/2020    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[facility] )     TYPE 2    Dysphagia     Fracture of nasal bone     Gait instability     H/O cervical fracture     Hyperlipidemia     Hypertension     Muscle weakness     TBI (traumatic brain injury) [facility])        Past Surgical History  Past Surgical History:   Procedure Laterality Date    HAND SURGERY      TN ARTHRODESIS POSTERIOR CRANIOCERVICAL N/A 10/26/2020    Procedure: Occipital cervical fusion to C4;  Surgeon: Sondra Lindo MD;  [facility]: [facility];  Service: Neurosurgery        08/07/22 0847   PT Last Visit   PT Visit Date 08/07/22   Note Type   Note type Evaluation   Pain Assessment   Pain Assessment Tool 0-10   Pain Score No Pain   Restrictions/Precautions   Braces or Orthoses   (denies)   Other Precautions Contact/isolation;Cognitive;Multiple lines;Telemetry; Fall Risk;O2  (bed alarm on at the end of session)   Home Living   Type of Home SNF  ([address] [place] [address])   Home Layout One level   [address] [place] [address],[address]; Wheelchair-manual   Prior Function   Level of [place] Needs assistance with ADLs and functional mobility  (mainly uses a w/c; uses rw w/ (A))   Lives With Facility staff   Receives Help From Personal care attendant   General   Additional Pertinent History cleared for assessment (spoke to nsg)   Cognition   Overall Cognitive Status Impaired   Arousal/Participation Cooperative   Attention Attends with cues to redirect   Orientation Level Oriented to person;Oriented to place   Memory Decreased recall of biographical information;Decreased recall of recent events;Decreased recall of precautions   Following Commands Follows one step commands with increased time or repetition   Subjective   Subjective Alert; in bed; pleasant and generally disoriented; agreeable to mobilize   RUE Assessment   RUE Assessment WFL  (AROM)   LUE Assessment   LUE Assessment WFL  (AROM)   RLE Assessment   RLE Assessment X  (decreased AROM hip and knee)   Strength RLE   RLE Overall Strength   (poor - hip; poor knee; fair ankle (grossly))   LLE Assessment   LLE Assessment X  (decreased AROM hip and knee)   Strength LLE   LLE Overall Strength   (poor hip; fair - knee; fair ankle (grossly))   Bed Mobility Supine to Sit 2  Maximal assistance   Additional items Assist x 2;HOB elevated; Increased time required;Verbal cues;LE management   Sit to Supine 2  Maximal assistance   Additional items Assist x 2; Increased time required;Verbal cues;LE management   Transfers   Sit to Stand 2  Maximal assistance   Additional items Assist x 2; Increased time required;Verbal cues  (3 trials)   Stand to Sit 2  Maximal assistance   Additional items Assist x 2; Increased time required;Verbal cues  (3 trials)   Stand pivot Unable to assess  (not appropriate at this time)   Ambulation/Elevation   Gait pattern Not appropriate; Not tested   Assistive Device Rolling walker; Other (Comment)  (rw --> side by side w/ UE support)   Balance   Static Sitting Fair -   Dynamic Sitting Poor +   Static Standing Poor   Dynamic Standing Poor -   Activity Tolerance   Activity Tolerance Patient limited by fatigue  (ongoing BM while standing w/ extensive pericare performed w/ (A) of 3rd)   Medical Staff Made Aware Co-eval performed w/ OTR due to complexity of medical status and multiple comorbidities   Nurse Made Aware spoke to RN   Assessment   Prognosis Guarded   Problem List Decreased strength;Decreased range of motion;Decreased endurance; Impaired balance;Decreased mobility; Decreased cognition;Decreased safety awareness; Obesity   Assessment Pt is 82 y o  male admitted with hx of sudden-onset headache and dysarthria and Dx of Cerebrovascular accident (CVA) involving cerebellum, sepsis  Pt 's comorbidities affecting POC include: A-fib ([address] [facility] ), CHF (congestive heart failure) ([address] ), Chronic obstructive pulmonary disease (COPD) Coronary artery disease, 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[address] ), Dysphagia, Gait instability, H/O cervical fracture, Hyperlipidemia, Hypertension, Muscle weakness, dementia, and TBI (traumatic brain injury) and personal factors of: advanced age, residing at SNF and limited mobility status prior to admission   Pt's clinical presentation is currently unstable/unpredictable which is evident in abn lab values, suppl O2 needs and inability to progress further w/ mobility at this time due to limited standing balance/tolerance and ongoing BM w/ a need for pericare  Pt presents w/ general disorientation (likely premorbid), overall weakness, decreased (R) > (L) LE strength, decreased functional endurance and activity tolerance, bed mob and transfer deficits, impaired balance and inability to amb at this time  Will cont to follow pt in PT for progressive mobilization to max functional level, endurance, and safety to decrease overall burden of care  Anticipate pt will return to SNF level of care upon D/C --> recommend PT follow up/rehab at the facility; will follow  Goals   Patient Goals to rest   STG Expiration Date 08/17/22   Short Term Goal #1 7-10 days  Pt will amb 10 ft w/ rw, min (A)x1 in order to initiate return to at least household amb status  Pt will achieve min (A)x1 level w/ bed mob in order to facilitate safety with OOB and back to bed transitions in the environment and to decrease overall burden of  care  Pt will perform transfers w/ min (A)x1 to assure safety w/ functional mobility/transitions w/ all aspects of mobility/locomotion  Pt will participate in LE therex and balance activities to max progression w/ mobility skills  PT Treatment Day 0   Plan   Treatment/Interventions Functional transfer training;LE strengthening/ROM; Therapeutic exercise; Endurance training;Cognitive reorientation; Bed mobility;Gait training;Spoke to nursing;OT   PT Frequency 1-2x/wk   Recommendation   PT Discharge Recommendation Return to facility with rehabilitation services   Equipment Recommended Walker; Wheelchair   AM-PAC Basic Mobility Inpatient   Turning in Bed Without Bedrails 1   Lying on Back to Sitting on Edge of Flat Bed 1   Moving Bed to Chair 1   Standing Up From Chair 1   Walk in Room 1   Climb 3-5 Stairs 1   Basic Mobility Inpatient Raw Score 6   Turning Head Towards Sound 4   Follow Simple Instructions 3   Low Function Basic Mobility Raw Score 13   Low Function Basic Mobility Standardized Score 20 14   Highest Level Of Mobility   -[facility] Goal 2: Bed activities/Dependent transfer   -[facility] Achieved 3: Sit at edge of bed   Modified [place] Scale   Modified [place] Scale    Barthel Index score  4    10   End of Consult   Patient Position at End of Consult Supine;Bed/Chair alarm activated; All needs within reach     Barthel Index score 10      Laverne Chawla, PT

## 2022-08-08 NOTE — CASE MANAGEMENT
Case Management Discharge Planning Note    Patient name Arash Johns  Location [facility] 711/[facility] 874-84 MRN [id number]  : 1935 Date 2022       Current Admission Date: 2022  Current Admission Diagnosis:Cerebrovascular accident (CVA) involving cerebellum [facility])   Patient Active Problem List    Diagnosis Date Noted    Pancreatic mass 2022    COPD (chronic obstructive pulmonary disease) ([facility] ) 2022    CHASITY (acute kidney injury) ([facility] ) 2022    Coronary artery disease 2022    Cerebrovascular accident (CVA) involving cerebellum ([facility] ) 2022    Sepsis ([facility] ) 2022    S/P ERCP 2022    Cholangitis 2022    Full code status 2021    Frailty syndrome in geriatric patient 2021    Leukocytosis 2021    Constipation 2021    Vascular dementia ([facility] ) 2021    Depression 03/15/2021    Right acetabular fracture ([facility] ) 2021    Debility 2021    Medication management 2021    Goals of care, counseling/discussion 2021    Anemia 2021    History of 2019 novel coronavirus disease (COVID-19) 2020    Weight gain 2020    Abnormal EKG 2020    Chronic heart failure with preserved ejection fraction ([facility] ) 2020    Ambulatory dysfunction 2020    Dysphagia 2020    Atrial fibrillation ([facility] ) 2020    CHF (congestive heart failure) ([facility] ) 2020    Hypertension 2020    Hyperlipidemia 2020    Type 2 diabetes mellitus ([facility] ) 2020    Vertebral artery dissection ([facility] ) 2020      LOS (days): 4  Geometric Mean LOS (GMLOS) (days): 4 80  Days to GMLOS:0 9     OBJECTIVE:  Risk of Unplanned Readmission Score: 22 51         Current admission status: Inpatient   Preferred Pharmacy:   [facility], [address] [place], [place] [address]  Phone: [phone] Fax: Kelly Morel 95 [phone] [id number] [facility]  [phone] [facility]  9 [facility] [address]  Phone: [phone] Fax: [phone]    UNKNOWN - FOLLOW UP PRIOR TO DISCHARGE TO E-PRESCRIBE  No address on file      [address] [place],[address], [place] - [address] [place] [address] Alivia Gonzales 38 210 [facility]  Phone: [phone] Fax: [phone]    Primary Care Provider: Janelle Bloch, MD    Primary Insurance: [facility]  Secondary Insurance: [id number] DETAILS:           Additional Comments: CM performed chart review and is aware the pt transferred from MICU to  on 8/6  LOS upon service transfer is two days  Previous CM documentationn was reviewed and is appreciated  CM will confer with [facility] providers and obtain medical mgmt updates  With regards to disposition planning, the pt is admitted from [facility]  PT/OT evaluated the pt on 8/7 and recommend STR  CM will clarify with HFM whether the pt is at his functional baseline  If so, the pt can transition to [facility] for continued long term care services  Should HFM staff express concern re: the pt's functional status and request he d/c to STR prior to returning, CM will explore FOC with the pt and his spouse  CM will introduce self to pt/family, explain supportive discharge planning role, and continue to follow for post acute care planning

## 2022-08-08 NOTE — PROGRESS NOTES
Progress Note - Infectious Disease   Neri Lopez 82 y o  male MRN: [id number]  Unit/Bed#: [facility] 711-01 Encounter: [id number]      Impression/Recommendations:  1   Severe sepsis, POA   WBC, RR, lactic acidosis   Due to #2/3   No other appreciable source   Unable to obtain ROS    Exam otherwise benign   Flu/RSV/COVID PCR, UA, chest x-ray negative   Improving with antibiotics, biliary decompression  Rec:  · Continue antibiotics as below  · Follow temperatures and WBC closely  · Supportive care as per the primary service     2   ESBL Klebsiella bacteremia   Due to #3   No other appreciable source   UA negative  Rec:  · Continue ertapenem through  to complete 7 days total (would avoid quinolone in setting of advanced age)     3   Recurrent acute cholangitis   In setting of # 4, recent attempt at stent removal   CT showed intra and extrahepatic biliary dilation   LFTs trending down  Rec:  · Continue antibiotics as above     4   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   Status post ERCP, stent placement 2022   Status post recent ERCP, stent removal   Presented with infection as above, found to have intra and extrahepatic biliary ductal dilation   Status post repeat ERCP and stent replacement 2022      5   Cerebellar CVA   Occurring in setting of infection as above      6   Amoxicillin allergy   Tolerating cephalosporin, carbapenems      7   DM      The patient is stable from an ID standpoint  Antibiotics:  Ertapenem #4    Subjective:  Patient seen on AM rounds  More awake but limited ROS due to weak voice  Needs help with breakfast     24 Hour Events:  No documented fevers, chills, sweats, nausea, vomiting, or diarrhea      Objective:  Vitals:  Temp:  [97 7 °F (36 5 °C)-98 3 °F (36 8 °C)] 97 7 °F (36 5 °C)  HR:  [68-74] 74  Resp:  [16] 16  BP: (144-168)/(79-93) 168/93  SpO2:  [93 %-98 %] 97 %  Temp (24hrs), Av °F (36 7 °C), Min:97 7 °F (36 5 °C), Max:98 3 °F (36 8 °C)  Current: Temperature: 97 7 °F (36 5 °C)    Physical Exam:   General:  No acute distress  Psychiatric:  Awake and alert  Pulmonary:  Normal respiratory excursion without accessory muscle use  Abdomen:  Soft, nontender  Extremities:  No edema  Skin:  No rashes    Lab Results:  I have personally reviewed pertinent labs  Results from last 7 days   Lab Units 08/08/22  0502 08/07/22  0609 08/06/22  0432 08/05/22  0547   POTASSIUM mmol/L 3 7 3 9 3 5 3 3*   CHLORIDE mmol/L 115* 112* 113* 108   CO2 mmol/L 31 29 29 26   BUN mg/dL 17 18 22 29*   CREATININE mg/dL 0 92 0 97 1 00 1 37*   EGFR ml/min/1 73sq m 75 70 68 46   CALCIUM mg/dL 8 5 8 7 8 4 8 1*   AST U/L 32 24  --  78*   ALT U/L 68 85*  --  159*   ALK PHOS U/L 487* 540*  --  572*     Results from last 7 days   Lab Units 08/08/22  0502 08/07/22  0609 08/06/22  0432   WBC Thousand/uL 10 15 12 97* 14 25*   HEMOGLOBIN g/dL 10 6* 10 5* 9 9*   PLATELETS Thousands/uL 238 192 181     Results from last 7 days   Lab Units 08/04/22  0850   BLOOD CULTURE  Klebsiella oxytoca ESBL*  Klebsiella oxytoca ESBL*   GRAM STAIN RESULT  Gram negative rods*  Gram positive rods*  Gram negative rods*  Gram positive rods*       Imaging Studies:   I have personally reviewed pertinent imaging study reports and images in PACS  EKG, Pathology, and Other Studies:   I have personally reviewed pertinent reports

## 2022-08-08 NOTE — PROGRESS NOTES
INTERNAL MEDICINE RESIDENCY PROGRESS NOTE     Name: Amarjit Dawson   Age & Sex: 82 y o  male   MRN: [id number]  Unit/Bed#: [facility] 711-01   Encounter: [id number]  Team: SOD Team B     PATIENT INFORMATION     Name: Amarjit Dawson   Age & Sex: 82 y o  male   MRN: [id number]  Hospital Stay Days: 4    ASSESSMENT/PLAN     Principal Problem:    Cerebrovascular accident (CVA) involving cerebellum ([place] [address] )  Active Problems:    Atrial fibrillation ([place] [address] )    CHF (congestive heart failure) ([place] [address] )    Hypertension    Type 2 diabetes mellitus ([place] [address] )    Dysphagia    Chronic heart failure with preserved ejection fraction (HCC)    Sepsis ([place] [address] )    Pancreatic mass    COPD (chronic obstructive pulmonary disease) ([place] [address] )    CHASITY (acute kidney injury) ([place] [address] )    Coronary artery disease      Coronary artery disease  Assessment & Plan  ? Aspirin 81 for stroke   ? EKG showed no acute ST changes   ? Holding statin given transaminitis     CHASITY (acute kidney injury) ([place] [address] )  Assessment & Plan  CHASITY on this admission (baseline Cr around 1), Cr 1 78 when first admitted     8/6/2022: Resolved - Cr 1 0  Lab Results   Component Value Date    CREATININE 0 92 08/08/2022    CREATININE 0 97 08/07/2022    CREATININE 1 00 08/06/2022    CREATININE 1 37 (H) 08/05/2022    CREATININE 1 38 (H) 08/04/2022    CREATININE 1 78 (H) 08/04/2022         COPD (chronic obstructive pulmonary disease) (HCC)  Assessment & Plan  Not on home O2 per documentation     Continue with albuterol nebs  Extubated 8/6  Continue supplemental O2 as needed      Pancreatic mass  Assessment & Plan  ·  Pancreatic head mass  · 8/4 CTAP: Intrahepatic and extrahepatic biliary ductal dilation with pancreatic body and tail atrophy and suspected pancreatic head mass  No gross lung infiltrates noted limited by respiratory motion  ? ERCP done 8/4 with stent placed  ? Possible EUS in the future  ? GI on board and appreciate recommendations  ? May consider future hemeonc consult once medically stable   ?  CEA normal  ? CA 19-9 682         Sepsis ([facility] [address] )  Assessment & Plan  ·  Suspected cholangitis with sespsis   ? Started on Ertepenem   ? Blood cultures X2 growing ESBL and Klebsiella continue to follow  ? ID following     ? Trend WBC and fever curve   ? S/p ERCP and stent placement 8/4   ? Monitor LFT, normalized 8/7/22  ? Leukocytosis resolved      Lab Results   Component Value Date    WBC 10 15 08/08/2022    WBC 12 97 (H) 08/07/2022    WBC 14 25 (H) 08/06/2022    WBC 18 70 (H) 08/05/2022    WBC 29 44 (H) 08/04/2022         Chronic heart failure with preserved ejection fraction [facility])  Assessment & Plan  Wt Readings from Last 3 Encounters:   08/06/22 88 5 kg (195 lb 1 7 oz)   08/05/22 93 9 kg (207 lb 0 2 oz)   08/03/22 93 9 kg (207 lb)     ? Echo shows EF 45% with septal wall hypokenesis  ? Holding home lasix and using PRN as appropriate  ? Monitor I's and O's and daily weights      Dysphagia  Assessment & Plan  Patient has dysphagia possibly secondary to stroke, he has aspirated and food was taken out from the respiratory tract per anesthesiology  Speech pathology has recommended dysphagia 2 diet  Advance as needed  Type 2 diabetes mellitus (HCC)  Assessment & Plan    · T2 DM  · Hb A1C 7 5   ? Goal -180 inpt  ? S/p insulin gtt, now on long and short acting + SSI  Being adjusted to try and meet goal  ? Dysphagia diet        Hypertension  Assessment & Plan  SBP goal 140-160   § Continue to monitor blood pressure  § Given slight supplemental O2 reuirements and no diuretics for days, will give dose of IV lasix to see if this helps respiratory status    CHF (congestive heart failure) ([facility])  Assessment & Plan  Wt Readings from Last 3 Encounters:   08/06/22 88 5 kg (195 lb 1 7 oz)   08/05/22 93 9 kg (207 lb 0 2 oz)   08/03/22 93 9 kg (207 lb)   ? Echo shows EF 45% with septal wall hypokinesis on this admission  ? Home lasix 60 mg bid has been on hold this admission since patient was septic and requiring pressors   BP since stable  Will give dose of IV lasix and assess response  ? I+Os   ? Daily weights   ? Cardiac diet        Atrial fibrillation [facility])  Assessment & Plan  ? Continue metoprolol 25 mg Q12  ? Advanced to heparin gtt, plan to repeat CT Head then transition to [address] if findings stable  Otherwise STAT non-con ct head for any neuro changes  ? Home diltiazem 240 mg daily on hold  ? Continue telemetry monitoring    * Cerebrovascular accident (CVA) involving cerebellum ([facility] [address] )  Assessment & Plan  · Right cerebellar stroke   ? CT head  showed right cerebellar stroke   ? Neurology following, appreciate recommendations   ? Continue aspirin and statin  ? MRI  ordered      ? SBP goal 140-160  ? Continue telemetry   ? PT/OT consulted  ? Continue neuro checks   ? Given stable imaging and clinical picture and high risk of re-stroking, will advance to heparin gtt  per neurology  Repeat non-con CT head in 48 hours and start DOAC if stable  Otherwise STAT non-con CT head for any neuro changes  ? If any changes neuro exam, repeat head CT       SUBJECTIVE     Patient seen and examined  No acute events overnight  Currently stable on room air  Patient does not appear to be in acute stress, but is ill-appearing and very fatigued  Pt denies any acute complaints  Continues to present with global weakness worse on the left versus right, dysphagia, dysarthria, and tongue deviation to the right  OBJECTIVE     Vitals:    22 2213 22 0731 22 0809 22 1427   BP: 147/80  168/93    Pulse: 71 73 74 (!) 155   Resp:  16     Temp:   97 7 °F (36 5 °C)    TempSrc:       SpO2:  95% 97% 96%   Weight:       Height:          Temperature:   Temp (24hrs), Av °F (36 7 °C), Min:97 7 °F (36 5 °C), Max:98 3 °F (36 8 °C)    Temperature: 97 7 °F (36 5 °C)  Intake & Output:  I/O        07 07 07    P  O    360    I V  (mL/kg) 80 6 (0 9)      IV Piggyback 150      Total Intake(mL/kg) 230 6 (2 6)  360 (4 1)    Urine (mL/kg/hr) 525 (0 2)      Total Output 525      Net -294 4  +360           Unmeasured Urine Occurrence  3 x     Unmeasured Stool Occurrence  2 x         Weights:   IBW (Ideal Body Weight): 68 4 kg    Body mass index is 29 67 kg/m²  Weight (last 2 days)     Date/Time Weight    08/06/22 0545 88 5 (195 11)        Physical Exam  Vitals reviewed  Constitutional:       General: He is awake  He is not in acute distress  Appearance: He is obese  He is ill-appearing  Interventions: Nasal cannula in place  HENT:      Head: Normocephalic and atraumatic  Cardiovascular:      Rate and Rhythm: Normal rate and regular rhythm  Heart sounds: Normal heart sounds  Pulmonary:      Effort: No respiratory distress  Breath sounds: Normal breath sounds  Abdominal:      General: There is no distension  Palpations: Abdomen is soft  Musculoskeletal:      Cervical back: Normal range of motion and neck supple  Skin:     Coloration: Skin is not cyanotic  Findings: No erythema or rash  Neurological:      Mental Status: He is oriented to person, place, and time  Mental status is at baseline  He is lethargic  Motor: Weakness present  Psychiatric:         Mood and Affect: Mood normal          Behavior: Behavior normal        LABORATORY DATA         Labs: I have personally reviewed pertinent reports    Results from last 7 days   Lab Units 08/08/22  0502 08/07/22  0609 08/06/22  0432 08/05/22  0547 08/04/22  1800   WBC Thousand/uL 10 15 12 97* 14 25* 18 70* 29 44*   HEMOGLOBIN g/dL 10 6* 10 5* 9 9* 10 0* 10 3*   HEMATOCRIT % 34 9* 34 6* 31 7* 30 8* 32 6*   PLATELETS Thousands/uL 238 192 181 205 222   NEUTROS PCT %  --   --  85* 89* 89*   MONOS PCT %  --   --  6 5 6      Results from last 7 days   Lab Units 08/08/22  0502 08/07/22  0609 08/06/22  0432 08/05/22  0547   POTASSIUM mmol/L 3 7 3 9 3 5 3 3*   CHLORIDE mmol/L 115* 112* 113* 108   CO2 mmol/L 31 29 29 26   BUN mg/dL 17 18 22 29*   CREATININE mg/dL 0 92 0 97 1 00 1 37*   CALCIUM mg/dL 8 5 8 7 8 4 8 1*   ALK PHOS U/L 487* 540*  --  572*   ALT U/L 68 85*  --  159*   AST U/L 32 24  --  78*     Results from last 7 days   Lab Units 08/07/22  0609 08/06/22  0432 08/04/22  1800   MAGNESIUM mg/dL 2 4 2 3 1 8     Results from last 7 days   Lab Units 08/06/22  0432 08/04/22  1800   PHOSPHORUS mg/dL 2 3 3 1      Results from last 7 days   Lab Units 08/08/22  1216 08/08/22  0502 08/07/22  2101 08/07/22  1543 08/04/22  1800 08/04/22  0850   INR   --   --   --   --  1 50* 1 32*   PTT seconds 43* 73* 55*   < >  --  32    < > = values in this interval not displayed  Results from last 7 days   Lab Units 08/04/22  2141   LACTIC ACID mmol/L 2 5*           IMAGING & DIAGNOSTIC TESTING     Radiology Results: I have personally reviewed pertinent reports  ERCP Fluoro    Result Date: 8/4/2022  Impression: ERCP with cholangitis s/p placement of 10 x 4 cm fully covered metal stent  RECOMMENDATION: Follow LFTs Continue antibiotics Further care per ICU team May require EUS for biopsy/staging of mass at later date  Gioia Schirmer, MD     XR chest 1 view portable    Result Date: 8/4/2022  Impression: Suspected left retrocardiac consolidation and/or small effusion  Cardiomegaly and aortic sclerosis  Workstation performed: [id number]     CT head wo contrast    Result Date: 8/6/2022  Impression: Early infarct within the right cerebellar hemisphere is similar to the examination performed 2 days ago  No hemorrhagic transformation  Stable advanced chronic microangiopathic change within both cerebral hemispheres  Workstation performed: [id number]     CT head without contrast    Result Date: 8/4/2022  Impression: New low-density in the right cerebellum concerning for an acute infarct  Cerebral atrophy and small vessel disease    I personally discussed this study with Clotilde Rodríguez on 8/4/2022 at 11:28 AM  Workstation performed: [id number]     MRI brain wo contrast    Result Date: 8/7/2022  Impression: No acute intracranial abnormality  Chronic encephalomalacia and gliosis in right cerebellum, 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 is partially obscured by susceptibility artifact from posterior spinal fixation hardware extending to the occiput  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  Workstation performed: [id number]     CT chest abdomen pelvis w contrast    Result Date: 8/4/2022  Impression: 1  Intrahepatic and extrahepatic biliary ductal dilatation with pancreatic body and tail atrophy and suspected pancreatic head mass  Confirmation should be obtained with MRI abdomen with MRCP  2   No infiltrates on study limited by respiratory motion  I personally discussed this study with Vicky Kathrin on 8/4/2022 at 11:28 AM  Workstation performed: [id number]     FL ERCP biliary only    Result Date: 8/5/2022  Impression: Distal CBD stent placement  Workstation performed: [id number]     CXR in AM    Result Date: 8/5/2022  Impression: ET tube and right IJ line remain in stable position  No active pulmonary disease  Workstation performed: [id number]     XR chest portable ICU    Result Date: 8/5/2022  Impression: Right IJ line has been placed, tip overlies the SVC  No pneumothorax  Workstation performed: [id number]     XR chest portable ICU    Result Date: 8/5/2022  Impression: ET tube appears in position  Possible small left pleural effusion, stable  Workstation performed: [id number]     Other Diagnostic Testing: I have personally reviewed pertinent reports      ACTIVE MEDICATIONS     Current Facility-Administered Medications   Medication Dose Route Frequency    albuterol inhalation solution 2 5 mg  2 5 mg Nebulization Q4H PRN    aspirin chewable tablet 81 mg  81 mg Oral Daily    ertapenem (INVanz) 1,000 mg in sodium chloride 0 9 % 50 mL IVPB  1,000 mg Intravenous Q24H    heparin (porcine) 25,000 units in 0 45% NaCl 250 mL infusion (premix)  3-24 Units/kg/hr (Order-Specific) Intravenous Titrated    insulin glargine (LANTUS) subcutaneous injection 30 Units 0 3 mL  30 Units Subcutaneous HS    insulin lispro (HumaLOG) 100 units/mL subcutaneous injection 1-6 Units  1-6 Units Subcutaneous 4 times day    insulin lispro (HumaLOG) 100 units/mL subcutaneous injection 8 Units  8 Units Subcutaneous TID With Meals    labetalol (NORMODYNE) injection 10 mg  10 mg Intravenous Q6H PRN    lactated ringers bolus 500 mL  500 mL Intravenous Once    [START ON 8/9/2022] magnesium oxide (MAG-OX) tablet 400 mg  400 mg Oral Daily    metoprolol tartrate (LOPRESSOR) tablet 25 mg  25 mg Oral Q12H GABY    omeprazole (PRILOSEC) suspension 2 mg/mL  20 mg Oral Early Morning    senna-docusate sodium (SENOKOT S) 8 6-50 mg per tablet 1 tablet  1 tablet Oral HS       VTE Pharmacologic Prophylaxis: RX contraindicated due to: recent cva  VTE Mechanical Prophylaxis: sequential compression device    Portions of the record may have been created with voice recognition software  Occasional wrong word or "sound a like" substitutions may have occurred due to the inherent limitations of voice recognition software    Read the chart carefully and recognize, using context, where substitutions have occurred   ==  Alex Valle, [facility]  Internal Medicine Residency PGY-1        d

## 2022-08-08 NOTE — SPEECH THERAPY NOTE
Speech/Language Pathology Progress Note    Patient Name: Haydee Clement  Today's Date: 8/8/2022    Subjective:  Pt awake and alert, sitting upright in bed with lunch tray  Objective:  Pt seen for dysphagia therapy  Current diet is puree with HTLs  Discussed aspiration concerns with pt, he maintains that he does not want a feeding tube  Pt requires full feeding assistance  He was fed by SLP  2-3 bites of puree followed by cup sips HTL  Pt had intermittent mild cough throughout meal however overall vocal quality was clear  Cough was cued at times to encourage airway clearace  Good appetite and meal time endurance, 100% of lunch consumed  Assessment:  Pt refuses feeding tube, safest oral diet judged to be puree with HTLs  Plan/Recommendations:  Continue puree diet with HTLs  ST will continue to follow

## 2022-08-08 NOTE — PLAN OF CARE
Problem: Potential for Falls  Goal: Patient will remain free of falls  Description: INTERVENTIONS:  - Educate patient/family on patient safety including physical limitations  - Instruct patient to call for assistance with activity   - Consult OT/PT to assist with strengthening/mobility   - Keep Call bell within reach  - Keep bed low and locked with side rails adjusted as appropriate  - Keep care items and personal belongings within reach  - Initiate and maintain comfort rounds  - Make Fall Risk Sign visible to staff  - Offer Toileting every 2 Hours, in advance of need  - Apply yellow socks and bracelet for high fall risk patients  - Consider moving patient to room near nurses station  Outcome: Progressing     Problem: MOBILITY - ADULT  Goal: Maintain or return to baseline ADL function  Description: INTERVENTIONS:  -  Assess patient's ability to carry out ADLs; assess patient's baseline for ADL function and identify physical deficits which impact ability to perform ADLs (bathing, care of mouth/teeth, toileting, grooming, dressing, etc )  - Assess/evaluate cause of self-care deficits   - Assess range of motion  - Assess patient's mobility; develop plan if impaired  - Assess patient's need for assistive devices and provide as appropriate  - Encourage maximum independence but intervene and supervise when necessary  - Involve family in performance of ADLs  - Assess for home care needs following discharge   - Consider OT consult to assist with ADL evaluation and planning for discharge  - Provide patient education as appropriate  Outcome: Progressing  Goal: Maintains/Returns to pre admission functional level  Description: INTERVENTIONS:  - Perform BMAT or MOVE assessment daily    - Set and communicate daily mobility goal to care team and patient/family/caregiver  - Collaborate with rehabilitation services on mobility goals if consulted  - Perform Range of Motion 2 times a day  - Reposition patient every 2 hours    - Dangle patient 2 times a day  - Stand patient 2 times a day  - Ambulate patient 2 times a day  - Out of bed to chair 2 times a day   - Out of bed for meals 2 times a day  - Out of bed for toileting  - Record patient progress and toleration of activity level   Outcome: Progressing     Problem: Prexisting or High Potential for Compromised Skin Integrity  Goal: Skin integrity is maintained or improved  Description: INTERVENTIONS:  - Identify patients at risk for skin breakdown  - Assess and monitor skin integrity  - Assess and monitor nutrition and hydration status  - Monitor labs   - Assess for incontinence   - Turn and reposition patient  - Assist with mobility/ambulation  - Relieve pressure over bony prominences  - Avoid friction and shearing  - Provide appropriate hygiene as needed including keeping skin clean and dry  - Evaluate need for skin moisturizer/barrier cream  - Collaborate with interdisciplinary team   - Patient/family teaching  - Consider wound care consult   Outcome: Progressing     Problem: Nutrition/Hydration-ADULT  Goal: Nutrient/Hydration intake appropriate for improving, restoring or maintaining nutritional needs  Description: Monitor and assess patient's nutrition/hydration status for malnutrition  Collaborate with interdisciplinary team and initiate plan and interventions as ordered  Monitor patient's weight and dietary intake as ordered or per policy  Utilize nutrition screening tool and intervene as necessary  Determine patient's food preferences and provide high-protein, high-caloric foods as appropriate       INTERVENTIONS:  - Monitor oral intake, urinary output, labs, and treatment plans  - Assess nutrition and hydration status and recommend course of action  - Evaluate amount of meals eaten  - Assist patient with eating if necessary   - Allow adequate time for meals  - Recommend/ encourage appropriate diets, oral nutritional supplements, and vitamin/mineral supplements  - Order, calculate, and assess calorie counts as needed  - Recommend, monitor, and adjust tube feedings and TPN/PPN based on assessed needs  - Assess need for intravenous fluids  - Provide specific nutrition/hydration education as appropriate  - Include patient/family/caregiver in decisions related to nutrition  Outcome: Progressing

## 2022-08-08 NOTE — RESPIRATORY THERAPY NOTE
08/08/22 0731   Respiratory Assessment   Assessment Type Assess only   General Appearance Alert; Awake   Respiratory Pattern Symmetrical   Chest Assessment Chest expansion symmetrical   Bilateral Breath Sounds Clear;Diminished   Resp Comments No distress noted  bilateral BS clear  No complaints   No indication for UDN treatment   O2 Device nc   Additional Assessments   Pulse 73   Respirations 16   SpO2 95 %

## 2022-08-09 PROBLEM — I47.2 VENTRICULAR TACHYARRHYTHMIA (HCC): Status: ACTIVE | Noted: 2022-08-09

## 2022-08-09 PROBLEM — I47.20 VENTRICULAR TACHYARRHYTHMIA: Status: ACTIVE | Noted: 2022-01-01

## 2022-08-09 NOTE — RESTORATIVE TECHNICIAN NOTE
Restorative Technician Note      Patient Name: Susan Kim     Note Type: Mobility  Patient Position Upon Consult: Supine  Activity Performed: Repositioned  Patient Position at End of Consult: Supine; Bed/Chair alarm activated;  All needs within reach    [facility] JAYLA LUGO, Restorative Technician, [employer]

## 2022-08-09 NOTE — PLAN OF CARE
Problem: PHYSICAL THERAPY ADULT  Goal: Performs mobility at highest level of function for planned discharge setting  See evaluation for individualized goals  Description: Treatment/Interventions: Functional transfer training, LE strengthening/ROM, Therapeutic exercise, Endurance training, Cognitive reorientation, Bed mobility, Gait training, Spoke to nursing, OT  Equipment Recommended: Elfimiladys Lema, Wheelchair       See flowsheet documentation for full assessment, interventions and recommendations  Outcome: Not Progressing  Note: Prognosis: Guarded  Problem List: Decreased strength, Decreased endurance, Impaired balance, Decreased mobility, Decreased cognition, Impaired judgement, Decreased safety awareness, Obesity  Assessment: Pt agreeable to participate in PT session  Pt performed functional mobility as outlined above  Very lethargic, limiting participation  VC for upright posture and anterior trunk translation with sitting EOB as pt with retropulsion sitting EOB  Pt left supine in bed with bed alarm, call bell, phone, and all personal needs within reach  Pt will continue to benefit from skilled acute care PT to further address their functional mobility limitations  D/C recommendations remain return to SNF with rehab services  PT Discharge Recommendation: Return to facility with rehabilitation services    See flowsheet documentation for full assessment

## 2022-08-09 NOTE — RESTORATIVE TECHNICIAN NOTE
Restorative Technician Note      Patient Name: Eddie Dunn     Note Type: Mobility  Patient Position Upon Consult: Supine  Activity Performed: Dangled; Stood  Assistive Device: Other (Comment) (Assist x2, Assisted PT Brad Boswell)  Education Provided: Yes  Patient Position at End of Consult: Supine;  All needs within reach; Bed/Chair alarm activated    Ab LUGO, Restorative Technician, [employer]

## 2022-08-09 NOTE — NUTRITION
Nutrition Recommendation: To help promote optimal intakes, suggest adding Ensure Pudding at lunch and Magic Cup at dinner  Also request RD protocol be ordered

## 2022-08-09 NOTE — ASSESSMENT & PLAN NOTE
In setting of acute illness and slow re-institution of baseline cardiac meds (metoprolol succinate, coreg, lasix)  Cardiology consulted for guidance on reinstitution of medications following sustained vtach yesterday  Metoprolol tartrate 50 mg q8h for now  Tele

## 2022-08-09 NOTE — PHYSICAL THERAPY NOTE
PHYSICAL THERAPY NOTE          Patient Name: Neri Lopez  UBVCM'M Date: 8/9/2022 08/09/22 1441   PT Last Visit   PT Visit Date 08/09/22   Note Type   Note Type Treatment   Pain Assessment   Pain Assessment Tool FLACC   Pain Location/Orientation Location: Generalized   Pain Rating: FLACC (Rest) - Face 0   Pain Rating: FLACC (Rest) - Legs 0   Pain Rating: FLACC (Rest) - Activity 0   Pain Rating: FLACC (Rest) - Cry 0   Pain Rating: FLACC (Rest) - Consolability 0   Score: FLACC (Rest) 0   Pain Rating: FLACC (Activity) - Face 0   Pain Rating: FLACC (Activity) - Legs 0   Pain Rating: FLACC (Activity) - Activity 0   Pain Rating: FLACC (Activity) - Cry 1   Pain Rating: FLACC (Activity) - Consolability 0   Score: FLACC (Activity) 1   Restrictions/Precautions   Weight Bearing Precautions Per Order No   Other Precautions Contact/isolation; Bed Alarm;Cognitive;Multiple lines;Telemetry;O2;Fall Risk  (3L02 via NC)   General   Chart Reviewed Yes   Response to Previous Treatment Patient with no complaints from previous session  Family/Caregiver Present No   Cognition   Overall Cognitive Status Impaired   Arousal/Participation Arousable   Attention Attends with cues to redirect   Orientation Level Oriented to person;Oriented to place  (year only)   Following Commands Follows one step commands with increased time or repetition   Comments pt lethargic and required constant VC for arousal and participation   Subjective   Subjective pt agreeable to participate   Bed Mobility   Supine to Sit 2  Maximal assistance   Additional items Assist x 2;HOB elevated; Increased time required;Verbal cues;LE management   Sit to Supine 2  Maximal assistance   Additional items Assist x 2; Increased time required;Verbal cues;LE management   Transfers   Sit to Stand 2  Maximal assistance   Additional items Assist x 2; Increased time required;Verbal cues   Stand to Sit 2 Maximal assistance   Additional items Assist x 2; Increased time required;Verbal cues   Additional Comments b/l HHA and knee block performed  75% buttock clearance acheived   Balance   Static Sitting Poor +   Dynamic Sitting Poor   Static Standing Poor -   Endurance Deficit   Endurance Deficit Yes   Endurance Deficit Description fatigue, weakness   Activity Tolerance   Activity Tolerance Patient limited by fatigue   Medical Staff Made Laurita Dietrich, restorative tech   Nurse Made Aware pt cleared to see and mobilize per nursing   Exercises   Balance training  sat EOB with min-modAx1 x6 min   Assessment   Prognosis Guarded   Problem List Decreased strength;Decreased endurance; Impaired balance;Decreased mobility; Decreased cognition; Impaired judgement;Decreased safety awareness; Obesity   Assessment Pt agreeable to participate in PT session  Pt performed functional mobility as outlined above  Very lethargic, limiting participation  VC for upright posture and anterior trunk translation with sitting EOB as pt with retropulsion sitting EOB  Pt left supine in bed with bed alarm, call bell, phone, and all personal needs within reach  Pt will continue to benefit from skilled acute care PT to further address their functional mobility limitations  D/C recommendations remain return to SNF with rehab services  Goals   Patient Goals to rest   STG Expiration Date 08/17/22   PT Treatment Day 1   Plan   Treatment/Interventions Functional transfer training;LE strengthening/ROM; Endurance training; Therapeutic exercise;Cognitive reorientation;Patient/family training;Bed mobility; Equipment eval/education;Spoke to nursing;Spoke to case management;OT   Progress Slow progress, decreased activity tolerance   PT Frequency 1-2x/wk   Recommendation   PT Discharge Recommendation Return to facility with rehabilitation services   Equipment Recommended Walker; Wheelchair  (pt has at baseline)   Melanie 8 in Bed Without Bedrails 1   Lying on Back to Sitting on Edge of Flat Bed 1   Moving Bed to Chair 1   Standing Up From Chair 1   Walk in Room 1   Climb 3-5 Stairs 1   Basic Mobility Inpatient Raw Score 6   Turning Head Towards Sound 3   Follow Simple Instructions 3   Low Function Basic Mobility Raw Score 12   Low Function Basic Mobility Standardized Score 18 33   Highest Level Of Mobility   JH-HLM Goal 2: Bed activities/Dependent transfer   JH-HLM Achieved 3: Sit at edge of bed   Marlon Amador PT, DPT

## 2022-08-09 NOTE — PROGRESS NOTES
Progress Note - Infectious Disease   Neri Lopez 82 y o  male MRN: [id number]  Unit/Bed#: [facility] 711-01 Encounter: [id number]      Impression/Recommendations:  1   Severe sepsis, POA   WBC, RR, lactic acidosis   Due to #2/3   No other appreciable source   Unable to obtain ROS    Exam otherwise benign   Flu/RSV/COVID PCR, UA, chest x-ray negative   Improving with antibiotics, biliary decompression  Rec:  · Continue antibiotics as below  · Follow temperatures and WBC closely  · Supportive care as per the primary service     2   ESBL Klebsiella bacteremia   Due to #3   No other appreciable source   UA negative  Rec:  · Continue ertapenem through  to complete 7 days total (would avoid quinolone in setting of advanced age)     3   Recurrent acute cholangitis   In setting of # 4, recent attempt at stent removal   CT showed intra and extrahepatic biliary dilation   LFTs trending down  Rec:  · Continue antibiotics as above     4   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   Status post ERCP, stent placement 2022   Status post recent ERCP, stent removal   Presented with infection as above, found to have intra and extrahepatic biliary ductal dilation   Status post repeat ERCP and stent replacement 2022      5   Chronic cerebellar CVA   Serial head CT stable      6   Amoxicillin allergy   Tolerating cephalosporin, carbapenems      7   DM      The patient is stable from an ID standpoint      Antibiotics:  Ertapenem #5    Subjective:  Patient seen on AM rounds  Just back from [place]  Not really answering questions for me (?[place])  but PCA states was more talkative earlier, follows commands, ate breakfast     24 Hour Events:  No documented fevers, chills, sweats, nausea, vomiting, or diarrhea      Objective:  Vitals:  Temp:  [97 6 °F (36 4 °C)-98 6 °F (37 °C)] 97 6 °F (36 4 °C)  HR:  [] 58  BP: (131-169)/(85-96) 169/85  SpO2:  [93 %-97 %] 97 %  Temp (24hrs), Av 1 °F (36 7 °C), Min:97 6 °F (36 4 °C), Max:98 6 °F (37 °C)  Current: Temperature: 97 6 °F (36 4 °C)    Physical Exam:   General:  No acute distress  Psychiatric:  Awake and alert  Pulmonary:  Normal respiratory excursion without accessory muscle use  Abdomen:  Soft, nontender  Extremities:  No edema  Skin:  No rashes    Lab Results:  I have personally reviewed pertinent labs  Results from last 7 days   Lab Units 08/09/22  0903 08/08/22  0502 08/07/22  0609 08/06/22  0432 08/05/22  0547   POTASSIUM mmol/L 3 7 3 7 3 9   < > 3 3*   CHLORIDE mmol/L 114* 115* 112*   < > 108   CO2 mmol/L 33* 31 29   < > 26   BUN mg/dL 14 17 18   < > 29*   CREATININE mg/dL 0 89 0 92 0 97   < > 1 37*   EGFR ml/min/1 73sq m 77 75 70   < > 46   CALCIUM mg/dL 8 3 8 5 8 7   < > 8 1*   AST U/L  --  32 24  --  78*   ALT U/L  --  68 85*  --  159*   ALK PHOS U/L  --  487* 540*  --  572*    < > = values in this interval not displayed  Results from last 7 days   Lab Units 08/08/22  0502 08/07/22  0609 08/06/22  0432   WBC Thousand/uL 10 15 12 97* 14 25*   HEMOGLOBIN g/dL 10 6* 10 5* 9 9*   PLATELETS Thousands/uL 238 192 181     Results from last 7 days   Lab Units 08/04/22  0850   BLOOD CULTURE  Klebsiella oxytoca ESBL*  Klebsiella oxytoca ESBL*   GRAM STAIN RESULT  Gram negative rods*  Gram positive rods*  Gram negative rods*  Gram positive rods*       Imaging Studies:   I have personally reviewed pertinent imaging study reports and images in PACS  EKG, Pathology, and Other Studies:   I have personally reviewed pertinent reports

## 2022-08-09 NOTE — QUICK NOTE
Spoke to the wife over the phone extensively  Discussed that we would like to follow-up up with him and Gastroenterology office in about 2-3 weeks  At that time be expect the brushing results to be back  Also discussed that depending upon the brushing results and how he progresses with his other medical problems, will make a decision to pursue EUS at that time after family discussion  For now GI team will sign off  Please call with any questions or concerns

## 2022-08-09 NOTE — CONSULTS
Consultation - Cardiology Team One  Caesar Michele 82 y o  male MRN: [id number]  Unit/Bed#: [facility] 711-01 Encounter: [id number]    Inpatient consult to Cardiology  Consult performed by: Renan Sands PA-C  Consult ordered by: Bryant Silverio MD          Physician Requesting Consult: Brice Litten, MD  Reason for Consult / Principal Problem:  AF with RVR    Assessment:    1  PAF with RVR:  Reported to have chronic AF per primary cardiologist   Noted to have PAF with with episodes of RVR this admission  Currently on Lopressor 50 mg BID  Eliquis on hold as patient has been started on heparin infusion due to CVA as well  · Home regimen includes diltiazem 240 mg daily and Toprol- mg daily  2  Subacute right cerebellar CVA:  Management per Neurology  Currently on heparin infusion  3  Sepsis:  Secondary to cholangitis  BC growing ESBL and Klebsiella  Management per primary team and ID  4  CAD: s/p YULIA to the RCA in 2014  Maintained on beta-blocker and statin  Typically not on aspirin due to full anticoagulation with Eliquis  Currently on aspirin 81 mg daily and heparin infusion  5  Chronic combined systolic/diastolic CHF:  Not maintained on a diuretic at baseline  6  Ischemic cardiomyopathy:  EF 45% w/ RWMA, moderate concentric LVH, G2DD  When compared to the echocardiogram from 2015 at [facility] EF was 50%  7  Chronic LBBB  8  Essential hypertension:  Permissive hypertension per Neurology  Average /92 Lopressor 50 mg BID  9  Hyperlipidemia: TC 69, , HDL 6, LDL unable to calculate on atorvastatin 20 mg daily which is currently on hold  10  Type 2 DM:  Hemoglobin A1c 7 5  Management per primary team  11   Pancreatic head mass:  Management per primary team   12  Vascular dementia      Plan/Recommendations:  · Continue Lopressor and switch to Toprol-XL at discharge  · Discontinue diltiazem the setting of cardiomyopathy  · Start lisinopril 5 mg daily for GDMT when permissive hypertension no longer required  · Transition to oral anticoagulation when deemed safe from a neurological standpoint  · Follow-up with primary cardiologist Gabino Carey at [address]  ____________________________________________________________    CC: Altered mental status      History of Present Illness   HPI: Sharla Soni is a 82y o  year old male who has chronic AF, CAD s/p YULIA to the RCA in 2014, chronic combined systolic/diastolic CHF, cardiomyopathy, LBBB, essential hypertension, hyperlipidemia, type 2 DM, COPD, vascular dementia, prior TBI who follows with cardiologist Dr Yvrose Pandey at [address]  Patient presented to the emergency room at [facility] on 08/04/2022 via EMS with altered mental status  Patient had an ERCP 1 day prior and his Eliquis was held for 3-4 days in preparation for this procedure  On arrival to the ED patient was febrile tachycardic and tachypneic with oxygen saturation 93% on room air  He an elevated white count of 22 with lactic acidosis  CT CAP revealed intrahepatic and extrahepatic biliary ductal dilatation pancreatic body and tail atrophy with suspected pancreatic head mass  CTH revealed acute infarct in the right cerebellum  Patient was admitted to the ICU with right cell bear stroke and sepsis secondary to suspected cholangitis  Neurology and GI were consulted  Patient underwent repeat ERCP with stent placement on 08/04  Blood cultures were positive for polymicrobial bacteremia  Neurology felt that the stroke may have happened on 7/26 when patient presented to the ED with headache and dysarthria but left AMA without workup  Heparin infusion was initiated on 08/07  Infectious Disease was also consulted and has been following  Cardiology has been consulted for AFib with RVR  Home medication regimen includes Eliquis 5 mg BID, atorvastatin 20 mg daily, diltiazem 240 mg daily, hydralazine 10 mg TID, Toprol- mg daily      Patient resting in bed during consultation and denies any chest pain, shortness of breath, palpitations, lightheadedness or dizziness  Patient poor historian  Echocardiogram 08/04/2022:  EF 45% moderate concentric LVH, RWMA, G2DD, normal RV function, moderate LA dilatation, no significant valvular abnormality  When compared to the echocardiogram from 2015 at [facility] EF was 50%  EKG reviewed personally: 8/8/2022-AFib with RVR versus SVT at 155 beats per minute with LBBB  When compared to the EKG from earlier on the same day sinus rhythm with PVCs was noted      Telemetry reviewed personally:  Sinus rhythm in the 60s with runs AIVR and PVCs        Review of Systems   Constitutional: Negative  Negative for chills  Cardiovascular: Negative for chest pain, dyspnea on exertion, leg swelling, near-syncope, orthopnea, palpitations, paroxysmal nocturnal dyspnea and syncope  Respiratory: Negative  Negative for cough, shortness of breath and wheezing  Endocrine: Negative  Hematologic/Lymphatic: Negative  Skin: Negative  Musculoskeletal: Negative  Gastrointestinal: Negative  Negative for diarrhea, nausea and vomiting  Neurological: Negative for dizziness, light-headedness and weakness  Psychiatric/Behavioral: Negative  Negative for altered mental status  All other systems reviewed and are negative      Historical Information   Past Medical History:   Diagnosis Date    A-fib ([facility] )     Cancer ([facility] )     Cardiac disease     CHF (congestive heart failure) (HCC)     Chronic obstructive pulmonary disease (COPD) ([facility] )     COPD (chronic obstructive pulmonary disease) (HCC)     Coronary artery disease     Counseling regarding advance care planning and goals of care 10/7/2020    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[facility] )     TYPE 2    Dysphagia     Fracture of nasal bone     Gait instability     H/O cervical fracture     Hyperlipidemia     Hypertension     Muscle weakness     TBI (traumatic brain injury) ([facility] )      Past Surgical History:   Procedure Laterality Date    HAND SURGERY      MA ARTHRODESIS POSTERIOR CRANIOCERVICAL N/A 10/26/2020    Procedure: Occipital cervical fusion to C4;  Surgeon: Kady Muniz MD;  Location: [facility];  Service: Neurosurgery     Social History     Substance and Sexual Activity   Alcohol Use Never     Social History     Substance and Sexual Activity   Drug Use Never     Social History     Tobacco Use   Smoking Status Never Smoker   Smokeless Tobacco Never Used     Family History:   Family History   Problem Relation Age of Onset    Other Other         urinary tract malignancy    Diabetes Mother        Meds/Allergies   all current active meds have been reviewed, current meds:   Current Facility-Administered Medications   Medication Dose Route Frequency    albuterol inhalation solution 2 5 mg  2 5 mg Nebulization Q4H PRN    aspirin chewable tablet 81 mg  81 mg Oral Daily    ertapenem (INVanz) 1,000 mg in sodium chloride 0 9 % 50 mL IVPB  1,000 mg Intravenous Q24H    heparin (porcine) 25,000 units in 0 45% NaCl 250 mL infusion (premix)  3-24 Units/kg/hr (Order-Specific) Intravenous Titrated    insulin lispro (HumaLOG) 100 units/mL subcutaneous injection 1-6 Units  1-6 Units Subcutaneous 4 times day    magnesium oxide (MAG-OX) tablet 400 mg  400 mg Oral Daily    metoprolol tartrate (LOPRESSOR) tablet 50 mg  50 mg Oral Q12H [address]    omeprazole (PRILOSEC) suspension 2 mg/mL  20 mg Oral Early Morning    senna-docusate sodium (SENOKOT S) 8 6-50 mg per tablet 1 tablet  1 tablet Oral HS    and PTA meds:   Prior to Admission Medications   Prescriptions Last Dose Informant Patient Reported? Taking?    apixaban (ELIQUIS) 5 mg   Yes No   Sig: Take 1 tablet by mouth 2 (two) times a day   atorvastatin (LIPITOR) 20 mg tablet  Outside Facility (Specify) Yes No   Sig: Take 1 tablet by mouth daily   bisacodyl (DULCOLAX) 10 mg suppository  Outside Facility (Specify) Yes No   Sig: Insert 10 mg into the rectum as needed for constipation   buPROPion [facility]) 100 mg tablet   Yes No   Sig: Take 100 mg by mouth in the morning   diltiazem (CARDIZEM CD) 240 mg 24 hr capsule  Outside Facility (Specify) Yes No   Sig: Take 1 capsule by mouth daily   ferrous sulfate 325 (65 Fe) mg tablet   Yes No   Sig: Take 325 mg by mouth every other day   furosemide (LASIX) 40 mg tablet  Outside Facility (Specify) Yes No   Sig: Take 1 5 tablets by mouth 2 (two) times a day 60 MG BID   hydrALAZINE (APRESOLINE) 10 mg tablet  Outside Facility (Specify) Yes No   Sig: Take 10 mg by mouth 3 (three) times a day   insulin glargine (LANTUS) 100 units/mL subcutaneous injection   No No   Sig: Inject 35 Units under the skin daily at bedtime   insulin lispro (HumaLOG) 100 units/mL injection   No No   Sig: Inject 12 Units under the skin 3 (three) times a day with meals for 10 days   magnesium hydroxide (MILK OF MAGNESIA) 400 mg/5 mL oral suspension  Outside Facility (Specify) Yes No   Sig: Take 30 mL by mouth daily as needed for constipation   metoprolol succinate (TOPROL-XL) 100 mg 24 hr tablet   Yes No   Sig: Take 1 tablet by mouth daily   omeprazole (PriLOSEC) 40 MG capsule  Outside Facility (Specify) Yes No   Sig: Take 1 capsule by mouth daily   polyethylene glycol (MIRALAX) 17 g packet   Yes No   Sig: Take 17 g by mouth every other day    sodium phosphate-biphosphate (FLEET) 7-19 g 118 mL enema  Outside Facility (Specify) Yes No   Sig: Insert 1 enema into the rectum once as needed      Facility-Administered Medications: None     heparin (porcine), 3-24 Units/kg/hr (Order-Specific), Last Rate: 18 Units/kg/hr (08/09/22 0221)        Allergies   Allergen Reactions    Amoxicillin Diarrhea and Hives       Objective   Vitals: Blood pressure 169/85, pulse 58, temperature 97 6 °F (36 4 °C), resp  rate 16, height 5' 8" (1 727 m), weight 88 5 kg (195 lb 1 7 oz), SpO2 97 %  ,     Body mass index is 29 67 kg/m²  ,     Systolic (47VAJ), WDI:945 , Min:131 , GRACE:756     Diastolic (07ZIT), CEC:80, Min:85, Max:96    Wt Readings from Last 3 Encounters:   08/06/22 88 5 kg (195 lb 1 7 oz)   08/05/22 93 9 kg (207 lb 0 2 oz)   08/03/22 93 9 kg (207 lb)      Lab Results   Component Value Date    CREATININE 0 89 08/09/2022    CREATININE 0 92 08/08/2022    CREATININE 0 97 08/07/2022             Intake/Output Summary (Last 24 hours) at 8/9/2022 1003  Last data filed at 8/9/2022 0811  Gross per 24 hour   Intake 600 ml   Output --   Net 600 ml     Weight (last 2 days)     None        Invasive Devices  Report    Peripheral Intravenous Line  Duration           Peripheral IV 08/04/22 Dorsal (posterior); Right Hand 5 days    Peripheral IV 08/04/22 Right Antecubital 5 days                  Physical Exam  Vitals and nursing note reviewed  Constitutional:       General: He is not in acute distress  Appearance: He is well-developed  Comments: On RA in NAD   HENT:      Head: Normocephalic and atraumatic  Neck:      Vascular: No JVD  Cardiovascular:      Rate and Rhythm: Normal rate and regular rhythm  Heart sounds: Normal heart sounds  No murmur heard  No friction rub  No gallop  Pulmonary:      Effort: Pulmonary effort is normal  No respiratory distress  Breath sounds: Normal breath sounds  No wheezing or rales  Chest:      Chest wall: No tenderness  Abdominal:      General: Bowel sounds are normal  There is no distension  Palpations: Abdomen is soft  Tenderness: There is no abdominal tenderness  Musculoskeletal:         General: No tenderness  Normal range of motion  Cervical back: Normal range of motion and neck supple  Right lower leg: No edema  Left lower leg: No edema  Skin:     General: Skin is warm and dry  Coloration: Skin is not pale  Findings: No erythema  Neurological:      Mental Status: He is alert     Psychiatric:      Comments: forgetful           LABORATORY RESULTS:      CBC with diff:   Results from last 7 days   Lab Units 08/08/22  0500 08/07/22  0609 08/06/22  0432 08/05/22  0547 08/04/22  1800 08/04/22  0850   WBC Thousand/uL 10 15 12 97* 14 25* 18 70* 29 44* 22 08*   HEMOGLOBIN g/dL 10 6* 10 5* 9 9* 10 0* 10 3* 11 8*   HEMATOCRIT % 34 9* 34 6* 31 7* 30 8* 32 6* 37 3   MCV fL 83 82 81* 80* 81* 81*   PLATELETS Thousands/uL 238 192 181 205 222 262   MCH pg 25 1* 25 0* 25 3* 26 0* 25 6* 25 7*   MCHC g/dL 30 4* 30 3* 31 2* 32 5 31 6 31 6   RDW % 19 0* 18 9* 18 6* 18 8* 18 6* 18 6*   MPV fL 11 6 11 4 11 4 11 7 11 0 10 7   NRBC AUTO /100 WBCs  --   --  0 0 0  --        CMP:  Results from last 7 days   Lab Units 08/09/22  0903 08/08/22  0502 08/07/22  0609 08/06/22  0432 08/05/22  0547 08/04/22  1800 08/04/22  0850   POTASSIUM mmol/L 3 7 3 7 3 9 3 5 3 3* 3 5 3 4*   CHLORIDE mmol/L 114* 115* 112* 113* 108 107 101   CO2 mmol/L 33* 31 29 29 26 27 30   BUN mg/dL 14 17 18 22 29* 25 23   CREATININE mg/dL 0 89 0 92 0 97 1 00 1 37* 1 38* 1 78*   CALCIUM mg/dL 8 3 8 5 8 7 8 4 8 1* 8 2* 8 5   AST U/L  --  32 24  --  78* 124* 225*   ALT U/L  --  68 85*  --  159* 206* 282*   ALK PHOS U/L  --  487* 540*  --  572* 679* 893*   EGFR ml/min/1 73sq m 77 75 70 68 46 46 34       BMP:  Results from last 7 days   Lab Units 08/09/22  0903 08/08/22  0502 08/07/22  0609 08/06/22  0432 08/05/22  0547 08/04/22  1800 08/04/22  0850   POTASSIUM mmol/L 3 7 3 7 3 9 3 5 3 3* 3 5 3 4*   CHLORIDE mmol/L 114* 115* 112* 113* 108 107 101   CO2 mmol/L 33* 31 29 29 26 27 30   BUN mg/dL 14 17 18 22 29* 25 23   CREATININE mg/dL 0 89 0 92 0 97 1 00 1 37* 1 38* 1 78*   CALCIUM mg/dL 8 3 8 5 8 7 8 4 8 1* 8 2* 8 5          Lab Results   Component Value Date    NTBNP 21,688 (H) 08/04/2022    NTBNP 1,240 (H) 03/06/2021            Results from last 7 days   Lab Units 08/09/22  0903 08/07/22  0609 08/06/22  0432 08/04/22  1800   MAGNESIUM mg/dL 2 5 2 4 2 3 1 8          Results from last 7 days   Lab Units 08/05/22  0547   HEMOGLOBIN A1C % 7 5*              Results from last 7 days   Lab Units 08/04/22  1800 08/04/22  0850   INR  1 50* 1 32*     Lipid Profile:   No results found for: CHOL  Lab Results   Component Value Date    HDL 6 (L) 08/05/2022    HDL 38 (L) 05/02/2018    HDL 44 02/07/2017     Lab Results   Component Value Date    LDLCALC  08/05/2022      Comment:      Calculated LDL invalid, triglycerides >400 mg/dl  This screening LDL is a calculated result  It does not have the accuracy of the Direct Measured LDL in the monitoring of patients with hyperlipidemia and/or statin therapy  Direct Measure LDL (GVU600) must be ordered separately in these patients  LDLCALC 58 05/02/2018    LDLCALC 42 02/07/2017     Lab Results   Component Value Date    TRIG 469 (H) 08/05/2022    TRIG 108 05/02/2018    TRIG 103 02/07/2017         Cardiac testing:   No results found for this or any previous visit  No results found for this or any previous visit  No valid procedures specified  No results found for this or any previous visit  Imaging: I have personally reviewed pertinent reports  ERCP Fluoro    Result Date: 8/4/2022  Narrative: [address] [place] [address] [address]: 8/04/22 PHYSICIAN(S): Attending: Wesley Rebolledo MD Fellow: No Staff Documented INDICATION: Cholangitis POST-OP DIAGNOSIS: See the impression below  PREPROCEDURE: Informed consent was obtained for the procedure, including sedation  Risks of perforation, hemorrhage, adverse drug reaction and aspiration were discussed  The patient was placed in the left lateral decubitus position  Patient was explained about the risks and benefits of the procedure  Risks including but not limited to bleeding, infection, and perforation were explained in detail  Also explained about less than 100% sensitivity with the exam and other alternatives   DETAILS OF PROCEDURE: Patient was taken to the procedure room where a time out was performed to confirm correct patient and correct procedure  The patient underwent general anesthesia, which was administered by an anesthesia professional  The patient's blood pressure, heart rate, level of consciousness, respirations and oxygen were monitored throughout the procedure  Clinical intention was achieved  The patient experienced no blood loss  The procedure was not difficult  The patient tolerated the procedure well  ANESTHESIA INFORMATION: ASA: ASA status not filed in the log  Anesthesia Type: Anesthesia type not filed in the log  MEDICATIONS: No administrations occurring from 1650 to 1704 on 08/04/22 FINDINGS: Limited views of the esophagus, stomach, duodenum and major papilla appeared normal  Deeply cannulated the common bile duct after 1 attempt using a traction sphincterotome  Used 260 cm x 0 035 in straight guidewire  Cannulation was not difficult  Injected contrast  I personally reviewed and interpreted the fluoroscopy images  There was a 2 cm stricture in the distal common bile duct  Placed [employer] 10 mm x 4 cm fully covered metal stent in the common bile duct SPECIMENS: * No specimens in log *      Impression: ERCP with cholangitis s/p placement of 10 x 4 cm fully covered metal stent  RECOMMENDATION: Follow LFTs Continue antibiotics Further care per ICU team May require EUS for biopsy/staging of mass at later date  Arlyn Tamayo MD     ERCP    Result Date: 8/3/2022  Narrative: [address] [place] [id number]: 8/03/22 PHYSICIAN(S): Attending: Arlyn Tamayo MD  INDICATION: Cholangitis POST-OP DIAGNOSIS: See the impression below  PREPROCEDURE: Informed consent was obtained for the procedure, including sedation  Risks of perforation, hemorrhage, adverse drug reaction and aspiration were discussed  The patient was placed in the left lateral decubitus position  Patient was explained about the risks and benefits of the procedure   Risks including but not limited to bleeding, infection, and perforation were explained in detail  Also explained about less than 100% sensitivity with the exam and other alternatives  DETAILS OF PROCEDURE: Patient was taken to the procedure room where a time out was performed to confirm correct patient and correct procedure  The patient underwent general anesthesia, which was administered by an anesthesia professional  The patient's blood pressure, heart rate, level of consciousness, respirations and oxygen were monitored throughout the procedure  Clinical intention was achieved  The patient experienced no blood loss  The procedure was not difficult  The patient tolerated the procedure well  ANESTHESIA INFORMATION: ASA: IV Anesthesia Type: General MEDICATIONS: No administrations occurring from 1036 to 1126 on 08/03/22 FINDINGS: Limited views of the esophagus, stomach, duodenum and major papilla appeared normal  Patent plastic stent  The stent was removed using a snare Deeply cannulated the common bile duct after 1 attempt using a traction sphincterotome  Used 260 cm x 0 035 in straight guidewire  Cannulation was not difficult  Injected contrast  Distal common bile duct stricture was noted measuring approximately 2-3 cm  Performed medium major papilla sphincterotomy using a sphincterotome  No bleeding at the procedure site  Removed sludge and debris with 12 mm balloon in multiple sweeps in the proximal common bile duct  Persistent stricture was present in the distal common bile duct  No discrete stones were extracted  Performed 10 brushings in the distal common bile duct  Brushings were sent for cytology  SPECIMENS: ID Type Source Tests Collected by Time Destination 1 :  Brushing Brushing, common bile duct NON-GYNECOLOGIC CYTOLOGY Petar Barker MD 8/3/2022 11:18 AM       Impression: ERCP with removal of common bile duct stent, sphincterotomy, extraction of sludge/debris from duct, and brushings of distal common bile duct stricture   RECOMMENDATION: Hold Eliquis additional 2 days Repeat CMP in 2-3 weeks Follow up in the office Tania Hickey MD      XR chest 1 view portable    Result Date: 8/4/2022  Narrative: CHEST INDICATION:   hypoxic, tachypnic, sepsis  COMPARISON:  7/24/2022  EXAM PERFORMED/VIEWS:  XR CHEST PORTABLE Images:  1 FINDINGS: Cardiac and mediastinal contours are stable  The is calcified  Left hemidiaphragm which may be secondary to consolidation and/or effusion  No pneumothorax  Overlying EKG wire leads  Left glenohumeral joint osteoarthrosis  Impression: Suspected left retrocardiac consolidation and/or small effusion  Cardiomegaly and aortic sclerosis  Workstation performed: [id number]     XR chest 1 view portable    Result Date: 7/24/2022  Narrative: CHEST INDICATION:   Evaluate for infection  COMPARISON:  None EXAM PERFORMED/VIEWS:  XR CHEST PORTABLE  AP semierect FINDINGS: Heart shadow is mildly enlarged but unchanged from prior exam  The lungs are clear  No pneumothorax or pleural effusion  No acute osseous abnormalities  Impression: No acute cardiopulmonary disease  Workstation performed: [id number]     CT head wo contrast    Result Date: 8/6/2022  Narrative: CT BRAIN - WITHOUT CONTRAST INDICATION:   R/O edema  COMPARISON:  8/4/2022, 7/24/2022  TECHNIQUE:  CT examination of the brain was performed  In addition to axial images, sagittal and coronal 2D reformatted images were created and submitted for interpretation  Radiation dose length product (DLP) for this visit:  940 4 mGy-cm   This examination, like all CT scans performed in the [facility], was performed utilizing techniques to minimize radiation dose exposure, including the use of iterative reconstruction and automated exposure control  IMAGE QUALITY:  Diagnostic  FINDINGS: PARENCHYMA:  Decreased attenuation and loss of gray-white differentiation within the right inferior cerebellum is again seen consistent with early ischemia   Moderate to advanced chronic microangiopathic change within both cerebral hemispheres is stable compared to the prior examination  No mass, mass effect or midline shift  No hemorrhage  VENTRICLES AND EXTRA-AXIAL SPACES:  Normal for the patient's age  VISUALIZED ORBITS AND PARANASAL SINUSES:  Unremarkable  CALVARIUM AND EXTRACRANIAL SOFT TISSUES:  Normal      Impression: Early infarct within the right cerebellar hemisphere is similar to the examination performed 2 days ago  No hemorrhagic transformation  Stable advanced chronic microangiopathic change within both cerebral hemispheres  Workstation performed: [id number]     CT head without contrast    Result Date: 8/4/2022  Narrative: CT BRAIN - WITHOUT CONTRAST INDICATION: Acute mental status changes COMPARISON:  CT brain July 24, 2022 TECHNIQUE:  CT examination of the brain was performed  In addition to axial images, sagittal and coronal 2D reformatted images were created and submitted for interpretation  Radiation dose length product (DLP) for this visit:  993 mGy-cm   This examination, like all CT scans performed in the [facility], was performed utilizing techniques to minimize radiation dose exposure, including the use of iterative reconstruction and automated exposure control  IMAGE QUALITY:  Diagnostic  FINDINGS: PARENCHYMA: An area of low-attenuation is demonstrated at the level of the right inferior and mid cerebellum suggesting an acute infarct with mass effect  There is image degradation at this level due to artifacts from hardware fixation  There is no hemorrhagic transformation  Decreased attenuation is noted in periventricular and subcortical white matter demonstrating an appearance that is statistically most likely to represent moderate microangiopathic change  No acute parenchymal hemorrhage   VENTRICLES AND EXTRA-AXIAL SPACES:  Enlargement of ventricles and extra-axial CSF spaces, out of proportion to the patient's age most consistent with cerebral and cerebellar atrophy  VISUALIZED ORBITS AND PARANASAL SINUSES:  Unremarkable  CALVARIUM AND EXTRACRANIAL SOFT TISSUES:  Extensive artifacts are demonstrated from suboccipital fusion  Impression: New low-density in the right cerebellum concerning for an acute infarct  Cerebral atrophy and small vessel disease  I personally discussed this study with Sandra Banks on 8/4/2022 at 11:28 AM  Workstation performed: [id number]     CT head without contrast    Result Date: 7/24/2022  Narrative: CT BRAIN - WITHOUT CONTRAST INDICATION:   Delirium  Altered mental status  COMPARISON:  CT of the head on March 6, 2021  TECHNIQUE:  CT examination of the brain was performed  In addition to axial images, sagittal and coronal 2D reformatted images were created and submitted for interpretation  Radiation dose length product (DLP) for this visit:  922 mGy-cm   This examination, like all CT scans performed in the [facility], was performed utilizing techniques to minimize radiation dose exposure, including the use of iterative reconstruction and automated exposure control  IMAGE QUALITY:  Diagnostic  FINDINGS: PARENCHYMA: Decreased attenuation is noted in periventricular and subcortical white matter demonstrating an appearance that is statistically most likely to represent moderate microangiopathic change  Streak artifact limits evaluation of the cerebellum  No CT signs of acute infarction  No intracranial mass, mass effect or midline shift  No acute parenchymal hemorrhage  VENTRICLES AND EXTRA-AXIAL SPACES:  Normal for the patient's age  VISUALIZED ORBITS AND PARANASAL SINUSES:  Unremarkable  CALVARIUM AND EXTRACRANIAL SOFT TISSUES:  Suboccipital fixation  Impression: 1  No acute intracranial hemorrhage, midline shift, or mass effect  2   Chronic small vessel ischemic changes   Workstation performed: [id number]     MRI brain wo contrast    Result Date: 8/7/2022  Narrative: MRI BRAIN WITHOUT CONTRAST INDICATION: stroke  COMPARISON:   CT head without contrast 8/6/2022, 8/4/2022, 7/24/2022  CTA head and neck 5/22/2020  TECHNIQUE:  Sagittal T1, axial T2, axial FLAIR, axial T1, axial [place] and axial diffusion imaging  IMAGE QUALITY:  Diagnostic  FINDINGS: BRAIN PARENCHYMA:  There is no discrete mass, mass effect or midline shift  No acute intracranial hemorrhage  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  Chronic microhemorrhages in right putamen, right temporal lobes, and possibly  left posterior hippocampus  There is no evidence of acute infarction and diffusion imaging is unremarkable  Chronic encephalomalacia and gliosis in right cerebellum, 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 is partially obscured by susceptibility artifact from posterior spinal fixation hardware extending to the occiput  Small scattered hyperintensities on T2/FLAIR imaging are noted in the periventricular and subcortical white matter demonstrating an appearance that is statistically most likely to represent moderate-to-severe microangiopathic change  Multiple prominent perivascular spaces in bilateral cerebral hemispheres and bilateral basal ganglia  VENTRICLES:  Normal for the patient's age  SELLA AND PITUITARY GLAND:  Normal  ORBITS:  Normal  PARANASAL SINUSES:  Minimal mucosal thickening in bilateral ethmoid sinuses (right worse than left)  VASCULATURE:  Evaluation of the major intracranial vasculature demonstrates appropriate flow voids  CALVARIUM AND SKULL BASE:  Normal  EXTRACRANIAL SOFT TISSUES:  Susceptibility artifact from posterior spinal fixation hardware from occiput to visualized upper cervical spine  Chronic C2 base of dens fracture  Impression: No acute intracranial abnormality   Chronic encephalomalacia and gliosis in right cerebellum, 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 is partially obscured by susceptibility artifact from posterior spinal fixation hardware extending to the occiput  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  Workstation performed: [id number]     CT chest abdomen pelvis w contrast    Result Date: 8/4/2022  Narrative: CT CHEST, ABDOMEN AND PELVIS WITH IV CONTRAST INDICATION:   Sepsis AMS, Sepsis, recent ERCP yesterday  COMPARISON:  CT abdomen and pelvis June 4, 2022 TECHNIQUE: CT examination of the chest, abdomen and pelvis was performed  Axial, sagittal, and coronal 2D reformatted images were created from the source data and submitted for interpretation  Radiation dose length product (DLP) for this visit:  91 21 06 mGy-cm   This examination, like all CT scans performed in the [facility], was performed utilizing techniques to minimize radiation dose exposure, including the use of iterative reconstruction and automated exposure control  IV Contrast:  68 mL of iohexol (OMNIPAQUE) Enteric Contrast: Enteric contrast was not administered  FINDINGS: CHEST LUNGS: Evaluation of the lung parenchyma is limited by respiratory motion  No gross infiltrates are seen  There is no tracheal or endobronchial lesion  PLEURA:  Unremarkable  HEART/GREAT VESSELS: Cardiomegaly with coronary artery calcifications  No thoracic aortic aneurysm  Atherosclerotic disease of the thoracic aorta  MEDIASTINUM AND FRIDA:  Nonspecific paratracheal and prevascular nodes with short axis of up to 1 cm are seen  CHEST WALL AND LOWER NECK:  Unremarkable  ABDOMEN LIVER/BILIARY TREE:  Mild hepatic steatosis with mild intrahepatic and extrahepatic biliary ductal dilatation and dilatation of the common bile duct which measures up to 19 mm  There is heterogeneous density in the region of the pancreatic head  Pancreatic atrophy is seen involving the body and tail and a 13 x 14 mm pancreatic mass is questioned on series 5 image 65  GALLBLADDER: Distended gallbladder with no stones  No calcified gallstones  No pericholecystic inflammatory change  SPLEEN:  Unremarkable   PANCREAS: Unremarkable  ADRENAL GLANDS:  Unremarkable  KIDNEYS/URETERS:  No hydronephrosis or urinary tract calculus  One or more sharply circumscribed subcentimeter renal hypodensities are present, too small to accurately characterize, and statistically most likely benign findings  According to recent literature (Radiology 2019) no further workup of these findings is recommended  STOMACH AND BOWEL: Small bowel loops show normal caliber  Colonic diverticulosis is demonstrated without associated diverticulitis  There is large amount of stool in the rectum  APPENDIX:  No findings to suggest appendicitis  ABDOMINOPELVIC CAVITY:  No ascites  No pneumoperitoneum  No lymphadenopathy  VESSELS:  Atherosclerotic changes are present  No evidence of aneurysm  PELVIS REPRODUCTIVE ORGANS:  Prostate fiducial markers are seen  URINARY BLADDER:  Unremarkable  ABDOMINAL WALL/INGUINAL REGIONS:  Unremarkable  OSSEOUS STRUCTURES:  No acute fracture or destructive osseous lesion  Spinal degenerative changes are noted  There is lumbar dextroscoliosis  Old healed fracture of the right superior pubic ramus and acetabulum is demonstrated with severe degenerative changes of the hips  Impression: 1  Intrahepatic and extrahepatic biliary ductal dilatation with pancreatic body and tail atrophy and suspected pancreatic head mass  Confirmation should be obtained with MRI abdomen with MRCP  2   No infiltrates on study limited by respiratory motion  I personally discussed this study with Leslie Carballo on 8/4/2022 at 11:28 AM  Workstation performed: [id number]     [place] ERCP biliary only    Result Date: 8/5/2022  Narrative: ERCP INDICATION:  Cholangitis  COMPARISON:  ERCP 8/3/2022 IMAGES:  5 FLUOROSCOPY TIME:   59 seconds CONTRAST: 4 mL of Gadobutrol injection (SINGLE-DOSE) FINDINGS: Fluoroscopic guidance was provided for performance of ERCP  BILIARY: Common bile duct was cannulated  Contrast was injected  Dilated proximal CBD  Distal CBD is not well opacified suspicious for stricture  A stent was placed at the level of the distal CBD  Impression: Distal CBD stent placement  Workstation performed: [id number]     [place] ERCP biliary only    Result Date: 8/4/2022  Narrative: ERCP INDICATION:  Cholangitis  COMPARISON:  ERCP 6/5/2022 IMAGES:  2 FLUOROSCOPY TIME:   2 minutes 28 seconds CONTRAST: 8 mL of Gadobutrol injection (SINGLE-DOSE) FINDINGS: Fluoroscopic guidance was provided for performance of ERCP  BILIARY: Distal CBD is not clearly visualized  Visualized upper common duct is grossly unremarkable without intrahepatic biliary dilatation  Osseous and soft tissue detail limited by technique  Impression: Fluoroscopic guidance provided for ERCP  Please see procedure report for further details  Workstation performed: [id number]     CXR in AM    Result Date: 8/5/2022  Narrative: CHEST INDICATION:   Chest pain  COMPARISON:  8/4/2022 1936 hours EXAM PERFORMED/VIEWS:  XR CHEST PORTABLE ICU FINDINGS:  Right IJ line tip overlies SVC  ET tube is stable satisfactory position  Heart size is not well evaluated on this single portable AP view  Central fullness of the pulmonary vasculature, stable  The lungs are clear  No pneumothorax or pleural effusion  Osseous structures appear within normal limits for patient age  Impression: ET tube and right IJ line remain in stable position  No active pulmonary disease  Workstation performed: [id number]     XR chest portable ICU    Result Date: 8/5/2022  Narrative: CHEST INDICATION:   central line placement  COMPARISON:  8/4/2022 1749 hours EXAM PERFORMED/VIEWS:  XR CHEST PORTABLE ICU  8/4/2022 1936 hours FINDINGS:  ET tube tip is approximately 4 6 cm from the diego  Right IJ line has been placed, tip overlies the SVC  Heart size is not well evaluated on this single portable AP view  Central fullness of the pulmonary vasculature  Mild chronic interstitial prominence, stable   No pneumothorax or pleural effusion  Osseous structures appear within normal limits for patient age  Impression: Right IJ line has been placed, tip overlies the SVC  No pneumothorax  Workstation performed: [id number]     XR chest portable ICU    Result Date: 8/5/2022  Narrative: CHEST INDICATION:   intubation, aspiration  COMPARISON:  Chest x-ray 8/4/2022 0857 hours EXAM PERFORMED/VIEWS:  XR CHEST PORTABLE ICU  8/4/2022 1746 hours FINDINGS:  ET tube has been placed, tip is approximately 3 cm from the diego  Heart size is not well evaluated on this single portable AP view  Stable subtle fullness of the pulmonary vasculature  Slight interstitial prominence, stable  No focal infiltrate  Possible small left pleural effusion, stable  Osseous structures appear within normal limits for patient age  Impression: ET tube appears in position  Possible small left pleural effusion, stable  Workstation performed: [id number]     Echo complete w/ contrast if indicated    Result Date: 8/4/2022  Narrative: Ardeth Needs  Left Ventricle: Left ventricular cavity size is normal  Wall thickness is moderately increased  There is moderate concentric hypertrophy  The left ventricular ejection fraction is 45%  Systolic function is mildly reduced  Diastolic function is moderately abnormal, consistent with grade II (pseudonormal) relaxation    The following segments are hypokinetic: basal inferoseptal, basal inferior, basal inferolateral and mid inferolateral    All other segments are normal    Left Atrium: The atrium is moderately dilated    Mitral Valve: There is mild regurgitation    Tricuspid Valve: There is mild regurgitation  The right ventricular systolic pressure is moderately elevated  The estimated right ventricular systolic pressure is 51 95 mmHg  Counseling / Coordination of Care  Total floor / unit time spent today 45 minutes  Greater than 50% of total time was spent with the patient and / or family counseling and / or coordination of care    A description of the counseling / coordination of care: Review of history, current assessment, development of a plan  Code Status: Level 3 - DNAR and DNI    ** Please Note: Dragon 360 Dictation voice to text software may have been used in the creation of this document   **

## 2022-08-09 NOTE — PROGRESS NOTES
INTERNAL MEDICINE RESIDENCY PROGRESS NOTE     Name: Jorge Luis   Age & Sex: 82 y o  male   MRN: [id number]  Unit/Bed#: [facility] 711-01   Encounter: [id number]  Team: SOD Team B     PATIENT INFORMATION     Name: Jorge Luis   Age & Sex: 82 y o  male   MRN: [id number]  Hospital Stay Days: 5    ASSESSMENT/PLAN     Principal Problem:    Cerebrovascular accident (CVA) involving cerebellum ([facility] )  Active Problems:    Sepsis ([facility] )    Pancreatic mass    Atrial fibrillation ([facility] )    CHF (congestive heart failure) ([facility] )    Ventricular tachyarrhythmia ([facility] )    Hypertension    Dysphagia    Type 2 diabetes mellitus ([facility] )    Chronic heart failure with preserved ejection fraction (HCC)    COPD (chronic obstructive pulmonary disease) ([facility] )    CHASITY (acute kidney injury) ([facility] )    Coronary artery disease      * Cerebrovascular accident (CVA) involving cerebellum ([facility] )  Assessment & Plan  · Right cerebellar stroke   ? CT head 8/4 showed right cerebellar stroke   ? Neurology following, appreciate recommendations   ? Continue aspirin and statin  ? MRI  ordered      ? SBP goal 140-160  ? Continue telemetry   ? PT/OT consulted  ? Continue neuro checks   ? Post-stroke follow-up non-con CT head today  If findings stable will d/c heparin gtt and initiate DOAC per neurology    Pancreatic mass  Assessment & Plan  ·  Pancreatic head mass  · 8/4 CTAP: Intrahepatic and extrahepatic biliary ductal dilation with pancreatic body and tail atrophy and suspected pancreatic head mass  No gross lung infiltrates noted limited by respiratory motion  ? ERCP done 8/4 with stent placed  ? Possible EUS in the future, although utility towards ultimate treatment plan and goals of care TBD with family outpatient pending initial ERCP brushing cytology  ? GI outpatient f/u on discharge  ? CEA normal  ? CA 19-9 [id number]         Sepsis ([facility] )  Assessment & Plan  ·  Suspected cholangitis with sespsis   ? ID following  ? On Ertepenem given MDRO/ESBL  ?  Blood cultures X2 growing ESBL and Klebsiella continue to follow  ? ID following     ? Trend WBC and fever curve   ? S/p ERCP and stent placement 8/4   ? Monitor LFT, normalized 8/7/22  ? Leukocytosis resolved      Lab Results   Component Value Date    WBC 10 15 08/08/2022    WBC 12 97 (H) 08/07/2022    WBC 14 25 (H) 08/06/2022    WBC 18 70 (H) 08/05/2022    WBC 29 44 (H) 08/04/2022         Ventricular tachyarrhythmia (HCC)  Assessment & Plan  In setting of acute illness and slow re-institution of baseline cardiac meds (metoprolol succinate, coreg, lasix)  Cardiology consulted for guidance on reinstitution of medications following sustained vtach yesterday   Metoprolol tartrate 50 mg q8h for now  tele    CHF (congestive heart failure) (HCC)  Assessment & Plan  Wt Readings from Last 3 Encounters:   08/06/22 88 5 kg (195 lb 1 7 oz)   08/05/22 93 9 kg (207 lb 0 2 oz)   08/03/22 93 9 kg (207 lb)   ? not in exacerbation  ? Echo shows EF 45% with septal wall hypokinesis on this admission  ? Home lasix 60 mg bid has been on hold this admission since patient was septic and requiring pressors  BP since stable  Will give PRN diuresis as necessary to optimize volume status  ? I+Os have been inaccurate given unmeasured urinations  ? Daily weights   ? Cardiac diet        Atrial fibrillation [facility])  Assessment & Plan  ? Will need p o  a/c  ? currently heparin gtt, pending stable CTH today, and then will transition to [address]  ? Home diltiazem 240 mg daily on hold  ? Metoprolol succ 100 mg changed for tartrate 50 mg q8 h for now, pending further cardiology recs  ?  Continue telemetry monitoring    Hypertension  Assessment & Plan  SBP goal 140-160   § Continue to monitor blood pressure  § Given slight supplemental O2 reuirements and no diuretics for days, will give dose of IV lasix to see if this helps respiratory status    Dysphagia  Assessment & Plan  Patient has dysphagia possibly secondary to stroke, he has aspirated and food was taken out from the respiratory tract per anesthesiology  Speech pathology has recommended dysphagia 2 diet  Advance as needed  Chronic heart failure with preserved ejection fraction [facility])  Assessment & Plan  Wt Readings from Last 3 Encounters:   08/06/22 88 5 kg (195 lb 1 7 oz)   08/05/22 93 9 kg (207 lb 0 2 oz)   08/03/22 93 9 kg (207 lb)     ? Echo shows EF 45% with septal wall hypokenesis  ? Holding home lasix and using PRN as appropriate  ? Monitor I's and O's and daily weights  ? Appears euvolemic at present without respiratory distress, hypoxia, LE edema or JVD  Lungs CTAB      Type 2 diabetes mellitus (HCC)  Assessment & Plan    · T2 DM  · Hb A1C 7 5   · Not on insulin outpatient  · Insulin requirements inpatient have been very high, likely stress-induced hyperglycemia in setting of acute illness  ? Goal -180 inpt  ? S/p insulin gtt   ? subQ insulin being adjusted as needed to meet goals  ? Dysphagia diet        Coronary artery disease  Assessment & Plan  ? Aspirin 81 for stroke   ? EKG showed no acute ST changes   ? Holding statin given transaminitis     CHASITY (acute kidney injury) ([facility] )  Assessment & Plan  CHASITY on this admission (baseline Cr around 1), Cr 1 78 when first admitted     8/6/2022: Resolved - Cr 1 0  Lab Results   Component Value Date    CREATININE 0 92 08/08/2022    CREATININE 0 97 08/07/2022    CREATININE 1 00 08/06/2022    CREATININE 1 37 (H) 08/05/2022    CREATININE 1 38 (H) 08/04/2022    CREATININE 1 78 (H) 08/04/2022         COPD (chronic obstructive pulmonary disease) ([facility] )  Assessment & Plan  Not on home O2 per documentation     Continue with albuterol nebs  Extubated 8/6  Continue supplemental O2 as needed    Disposition: cont inpt  On IV abx for bacteremia  Will need rehab     SUBJECTIVE     Patient seen and examined  Yesterday afternoon had run of vtach that was aborted with increased beta-blocker (PRN IV lopressor and increase dose of p o )  cardiology was consulted for recommendations  His home coreg has been on hold since admission as well given initial septic shock  Increased metoprolol tartrate to 50 mg q8 h (home dose succinate 100)  Rates  Controlled this am     No pain  No changes to prior neuro exam    I&O inaccurate with frequent unmeasured urine occurrences  Will try to get better I&O  Has been afebrile on ertapenem  CT head today to assess stroke stability and will change heparin gtt to p o  anticoagulation per neurology approval if image findings stable  Will continue to monitor volume status and BP and advance diuretic (home medication) slowly as able  Patient appearing euvolemic but sodium and calcium have both been slowly rising  He has good p o  intake of food but less free water- encouraged free water intake  OBJECTIVE     Vitals:    22 2207 22 0616 22 0751 22 0811   BP: 135/94 156/96  169/85   Pulse: 65 77  58   Resp:       Temp: 98 6 °F (37 °C)   97 6 °F (36 4 °C)   TempSrc:       SpO2: 96% 96% 96% 97%   Weight:       Height:          Temperature:   Temp (24hrs), Av 1 °F (36 7 °C), Min:97 6 °F (36 4 °C), Max:98 6 °F (37 °C)    Temperature: 97 6 °F (36 4 °C)  Intake & Output:  I/O        0701   0700  0701  / 0700 / 0701  08/10 0700    P  O   360 240    I V  (mL/kg)       IV Piggyback       Total Intake(mL/kg)  360 (4 1) 240 (2 7)    Urine (mL/kg/hr)       Total Output       Net  +360 +240           Unmeasured Urine Occurrence 3 x 1 x     Unmeasured Stool Occurrence 2 x          Weights:   IBW (Ideal Body Weight): 68 4 kg    Body mass index is 29 67 kg/m²  Weight (last 2 days)     None        Physical Exam  Constitutional:       Appearance: He is ill-appearing  Comments: Frail, weak, ill   Eyes:      Pupils: Pupils are equal, round, and reactive to light  Cardiovascular:      Rate and Rhythm: Normal rate  Pulses: Normal pulses  Pulmonary:      Effort: Pulmonary effort is normal  No respiratory distress  Breath sounds: No wheezing or rales  Abdominal:      General: There is no distension  Palpations: Abdomen is soft  Tenderness: There is no abdominal tenderness  There is no guarding  Musculoskeletal:      Right lower leg: No edema  Left lower leg: No edema  Skin:     Coloration: Skin is pale  Neurological:      Mental Status: He is oriented to person, place, and time  Cranial Nerves: Cranial nerve deficit (right tongue deviation, dysarthria) present  Motor: Weakness ( LLE weaker than RLE) present  Comments: Very lethargic, requires frequent re-awakening       LABORATORY DATA     Labs: I have personally reviewed pertinent reports  Results from last 7 days   Lab Units 08/08/22  0502 08/07/22  0609 08/06/22  0432 08/05/22  0547 08/04/22  1800   WBC Thousand/uL 10 15 12 97* 14 25* 18 70* 29 44*   HEMOGLOBIN g/dL 10 6* 10 5* 9 9* 10 0* 10 3*   HEMATOCRIT % 34 9* 34 6* 31 7* 30 8* 32 6*   PLATELETS Thousands/uL 238 192 181 205 222   NEUTROS PCT %  --   --  85* 89* 89*   MONOS PCT %  --   --  6 5 6      Results from last 7 days   Lab Units 08/09/22  0903 08/08/22  0502 08/07/22  0609 08/06/22  0432 08/05/22  0547   POTASSIUM mmol/L 3 7 3 7 3 9   < > 3 3*   CHLORIDE mmol/L 114* 115* 112*   < > 108   CO2 mmol/L 33* 31 29   < > 26   BUN mg/dL 14 17 18   < > 29*   CREATININE mg/dL 0 89 0 92 0 97   < > 1 37*   CALCIUM mg/dL 8 3 8 5 8 7   < > 8 1*   ALK PHOS U/L  --  487* 540*  --  572*   ALT U/L  --  68 85*  --  159*   AST U/L  --  32 24  --  78*    < > = values in this interval not displayed       Results from last 7 days   Lab Units 08/09/22  0903 08/07/22  0609 08/06/22  0432   MAGNESIUM mg/dL 2 5 2 4 2 3     Results from last 7 days   Lab Units 08/06/22  0432 08/04/22  1800   PHOSPHORUS mg/dL 2 3 3 1      Results from last 7 days   Lab Units 08/09/22  0903 08/09/22  0007 08/08/22  1216 08/07/22  1543 08/04/22  1800 08/04/22  0850   INR   --   --   --   --  1 50* 1 32*   PTT seconds 63* 60* 43*   < >  --  32    < > = values in this interval not displayed  Results from last 7 days   Lab Units 08/04/22  2141   LACTIC ACID mmol/L 2 5*           IMAGING & DIAGNOSTIC TESTING     Radiology Results: I have personally reviewed pertinent reports  ERCP Fluoro    Result Date: 8/4/2022  Impression: ERCP with cholangitis s/p placement of 10 x 4 cm fully covered metal stent  RECOMMENDATION: Follow LFTs Continue antibiotics Further care per ICU team May require EUS for biopsy/staging of mass at later date  Wesley Rebolledo MD     XR chest 1 view portable    Result Date: 8/4/2022  Impression: Suspected left retrocardiac consolidation and/or small effusion  Cardiomegaly and aortic sclerosis  Workstation performed: [id number]     CT head wo contrast    Result Date: 8/6/2022  Impression: Early infarct within the right cerebellar hemisphere is similar to the examination performed 2 days ago  No hemorrhagic transformation  Stable advanced chronic microangiopathic change within both cerebral hemispheres  Workstation performed: [id number]     CT head without contrast    Result Date: 8/4/2022  Impression: New low-density in the right cerebellum concerning for an acute infarct  Cerebral atrophy and small vessel disease  I personally discussed this study with Corina Higgins on 8/4/2022 at 11:28 AM  Workstation performed: [id number]     MRI brain wo contrast    Result Date: 8/7/2022  Impression: No acute intracranial abnormality  Chronic encephalomalacia and gliosis in right cerebellum, 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 is partially obscured by susceptibility artifact from posterior spinal fixation hardware extending to the occiput  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  Workstation performed: [id number]     CT chest abdomen pelvis w contrast    Result Date: 8/4/2022  Impression: 1    Intrahepatic and extrahepatic biliary ductal dilatation with pancreatic body and tail atrophy and suspected pancreatic head mass  Confirmation should be obtained with MRI abdomen with MRCP  2   No infiltrates on study limited by respiratory motion  I personally discussed this study with Keisha Noble on 8/4/2022 at 11:28 AM  Workstation performed: [id number]     FL ERCP biliary only    Result Date: 8/5/2022  Impression: Distal CBD stent placement  Workstation performed: [id number]     CXR in AM    Result Date: 8/5/2022  Impression: ET tube and right IJ line remain in stable position  No active pulmonary disease  Workstation performed: [id number]     XR chest portable ICU    Result Date: 8/5/2022  Impression: Right IJ line has been placed, tip overlies the SVC  No pneumothorax  Workstation performed: [id number]     XR chest portable ICU    Result Date: 8/5/2022  Impression: ET tube appears in position  Possible small left pleural effusion, stable  Workstation performed: [id number]     Other Diagnostic Testing: I have personally reviewed pertinent reports      ACTIVE MEDICATIONS     Current Facility-Administered Medications   Medication Dose Route Frequency    albuterol inhalation solution 2 5 mg  2 5 mg Nebulization Q4H PRN    aspirin chewable tablet 81 mg  81 mg Oral Daily    ertapenem (INVanz) 1,000 mg in sodium chloride 0 9 % 50 mL IVPB  1,000 mg Intravenous Q24H    heparin (porcine) 25,000 units in 0 45% NaCl 250 mL infusion (premix)  3-24 Units/kg/hr (Order-Specific) Intravenous Titrated    insulin lispro (HumaLOG) 100 units/mL subcutaneous injection 1-6 Units  1-6 Units Subcutaneous 4 times day    magnesium oxide (MAG-OX) tablet 400 mg  400 mg Oral Daily    metoprolol tartrate (LOPRESSOR) tablet 50 mg  50 mg Oral Q8H    omeprazole (PRILOSEC) suspension 2 mg/mL  20 mg Oral Early Morning    senna-docusate sodium (SENOKOT S) 8 6-50 mg per tablet 1 tablet  1 tablet Oral HS       VTE Pharmacologic Prophylaxis: Heparin  VTE Mechanical Prophylaxis: sequential compression device    Portions of the record may have been created with voice recognition software  Occasional wrong word or "sound a like" substitutions may have occurred due to the inherent limitations of voice recognition software    Read the chart carefully and recognize, using context, where substitutions have occurred   ==  Funmilayo Greer DO  [address]  Internal Medicine Residency PGY-3

## 2022-08-10 PROBLEM — E87.0 HYPERNATREMIA: Status: ACTIVE | Noted: 2022-01-01

## 2022-08-10 PROBLEM — N17.9 AKI (ACUTE KIDNEY INJURY) (HCC): Status: RESOLVED | Noted: 2022-01-01 | Resolved: 2022-01-01

## 2022-08-10 NOTE — RESTORATIVE TECHNICIAN NOTE
Restorative Technician Note      Patient Name: Kelli Ingram     Note Type: Mobility  Patient Position Upon Consult: Supine  Activity Performed: Repositioned  Patient Position at End of Consult: Supine;  All needs within reach; Bed/Chair alarm activated    Aubree LUGO, Restorative Technician, [employer]

## 2022-08-10 NOTE — SPEECH THERAPY NOTE
Speech Language/Pathology    Speech/Language Pathology Progress Note    Patient Name: Heidi Hood  Today's Date: 8/10/2022     Problem List  Principal Problem:    Cerebrovascular accident (CVA) involving cerebellum ([facility] )  Active Problems:    Atrial fibrillation (HCC)    CHF (congestive heart failure) ([facility] )    Hypertension    Type 2 diabetes mellitus (HCC)    Dysphagia    Chronic heart failure with preserved ejection fraction (HCC)    Sepsis ([facility] )    Pancreatic mass    COPD (chronic obstructive pulmonary disease) ([facility] )    CHASITY (acute kidney injury) ([facility] )    Coronary artery disease    Ventricular tachyarrhythmia ([facility] )       Past Medical History  Past Medical History:   Diagnosis Date    A-fib ([place] [address] )     Cancer ([place] [address] )     Cardiac disease     [facility]     Chronic obstructive pulmonary disease (COPD) ([facility] )     COPD (chronic obstructive pulmonary disease) ([facility] )     Coronary artery disease     Counseling regarding advance care planning and goals of care 10/7/2020    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[facility] )     TYPE 2    Dysphagia     Fracture of nasal bone     Gait instability     H/O cervical fracture     Hyperlipidemia     Hypertension     Muscle weakness     TBI (traumatic brain injury) ([facility] )         Past Surgical History  Past Surgical History:   Procedure Laterality Date    HAND SURGERY      VT ARTHRODESIS POSTERIOR CRANIOCERVICAL N/A 10/26/2020    Procedure: Occipital cervical fusion to C4;  Surgeon: Brenda Blackman MD;  Location: [facility];  Service: Neurosurgery         Subjective:  "I hope I can go back to [facility] soon  All my clothes are there" Patient is awake and alert  Objective:  PCA reports patient has been lethargic today with decreased PO intake  Patient is agreeable to pm snack  Oral care is provided  Patient's oral cavity is dry  A minimal amount of thick secretions are removed  The patient is observed with groping oral motions throughout   SLP feeds patient applesauce and honey thick liquids  He has good retrieval of items via tsp and straw  Oral transfer and control appear adequate  Swallow appears timely  No overt s/s aspiration observed during session  The patient does not have any complaints about diet consistency at this time  Assessment:  The patient tolerated 2 oz applesauce and straw sips of honey thick liquids well  Plan/Recommendations:  Continue puree diet with honey thick liquids  Continue ST to further assess tolerance

## 2022-08-10 NOTE — PLAN OF CARE
Problem: Potential for Falls  Goal: Patient will remain free of falls  Description: INTERVENTIONS:  - Educate patient/family on patient safety including physical limitations  - Instruct patient to call for assistance with activity   - Consult OT/PT to assist with strengthening/mobility   - Keep Call bell within reach  - Keep bed low and locked with side rails adjusted as appropriate  - Keep care items and personal belongings within reach  - Initiate and maintain comfort rounds  - Make Fall Risk Sign visible to staff  - Offer Toileting every **2* Hours, in advance of need  - Initiate/Maintain **bed*alarm  - Apply yellow socks and bracelet for high fall risk patients  - Consider moving patient to room near nurses station  8/10/2022 0143 by Everrett Cooks, RN  Outcome: Progressing  8/10/2022 0142 by Everrett Cooks, RN  Outcome: Progressing     Problem: Prexisting or High Potential for Compromised Skin Integrity  Goal: Skin integrity is maintained or improved  Description: INTERVENTIONS:  - Identify patients at risk for skin breakdown  - Assess and monitor skin integrity  - Assess and monitor nutrition and hydration status  - Monitor labs   - Assess for incontinence   - Turn and reposition patient  - Assist with mobility/ambulation  - Relieve pressure over bony prominences  - Avoid friction and shearing  - Provide appropriate hygiene as needed including keeping skin clean and dry  - Evaluate need for skin moisturizer/barrier cream  - Collaborate with interdisciplinary team   - Patient/family teaching  - Consider wound care consult   8/10/2022 0143 by Everrett Cooks, RN  Outcome: Progressing  8/10/2022 0142 by Everrett Cooks, RN  Outcome: Progressing     Problem: Nutrition/Hydration-ADULT  Goal: Nutrient/Hydration intake appropriate for improving, restoring or maintaining nutritional needs  Description: Monitor and assess patient's nutrition/hydration status for malnutrition   Collaborate with interdisciplinary team and initiate plan and interventions as ordered  Monitor patient's weight and dietary intake as ordered or per policy  Utilize nutrition screening tool and intervene as necessary  Determine patient's food preferences and provide high-protein, high-caloric foods as appropriate       INTERVENTIONS:  - Monitor oral intake, urinary output, labs, and treatment plans  - Assess nutrition and hydration status and recommend course of action  - Evaluate amount of meals eaten  - Assist patient with eating if necessary   - Allow adequate time for meals  - Recommend/ encourage appropriate diets, oral nutritional supplements, and vitamin/mineral supplements  - Order, calculate, and assess calorie counts as needed  - Recommend, monitor, and adjust tube feedings and TPN/PPN based on assessed needs  - Assess need for intravenous fluids  - Provide specific nutrition/hydration education as appropriate  - Include patient/family/caregiver in decisions related to nutrition  8/10/2022 0143 by Asim Feng RN  Outcome: Progressing  8/10/2022 0142 by Asim Feng RN  Outcome: Progressing     Problem: INFECTION - ADULT  Goal: Absence or prevention of progression during hospitalization  Description: INTERVENTIONS:  - Assess and monitor for signs and symptoms of infection  - Monitor lab/diagnostic results  - Monitor all insertion sites, i e  indwelling lines, tubes, and drains  - Monitor endotracheal if appropriate and nasal secretions for changes in amount and color  - [place] appropriate cooling/warming therapies per order  - Administer medications as ordered  - Instruct and encourage patient and family to use good hand hygiene technique  - Identify and instruct in appropriate isolation precautions for identified infection/condition  Outcome: Progressing     Problem: PAIN - ADULT  Goal: Verbalizes/displays adequate comfort level or baseline comfort level  Description: Interventions:  - Encourage patient to monitor pain and request assistance  - Assess pain using appropriate pain scale  - Administer analgesics based on type and severity of pain and evaluate response  - Implement non-pharmacological measures as appropriate and evaluate response  - Consider cultural and social influences on pain and pain management  - Notify physician/advanced practitioner if interventions unsuccessful or patient reports new pain  Outcome: Progressing

## 2022-08-10 NOTE — PROGRESS NOTES
General Cardiology   Progress Note -  Team One   [facility] Floor 82 y o  male MRN: [id number]    Unit/Bed#: [facility] 711-01 Encounter: [id number]    Assessment:    1  PAF with RVR:  Reported to have chronic AF per primary cardiologist   Noted to have PAF with with episodes of RVR this admission  Currently on Lopressor 50 mg BID  Eliquis 5 mg BID restarted  · Home regimen includes diltiazem 240 mg daily and Toprol- mg daily  2  Subacute right cerebellar CVA:  Management per Neurology and primary team   3  Sepsis:  Secondary to cholangitis  BC growing ESBL and Klebsiella  Management per primary team and ID  4  CAD: s/p YULIA to the RCA in 2014  Maintained on aspirin, beta-blocker and statin  5  Chronic combined systolic/diastolic CHF:  Not maintained on a diuretic at baseline  6  Ischemic cardiomyopathy:  EF 45% w/ RWMA, moderate concentric LVH, G2DD  When compared to the echocardiogram from 2015 at [facility] EF was 50%  Currently on Lopressor with initiation of lisinopril 5 mg daily today  7  Chronic LBBB  8  Essential hypertension:  Permissive hypertension per Neurology  Average /83 Lopressor 50 mg TID  9  Hyperlipidemia: TC 69, , HDL 6, LDL unable to calculate on atorvastatin 20 mg daily which is currently on hold  10  Type 2 DM:  Hemoglobin A1c 7 5  Management per primary team  11  Pancreatic head mass:  Management per primary team   12  Vascular dementia        Plan/Recommendations:  · Switch Lopressor to Toprol-XL prior to discharge  · Continue lisinopril 5 mg daily  · Follow-up with primary cardiologist Tawana Nascimento at [address] [place] [address] [address]  ____________________________________________________________    Subjective    Patient seen and examined  No acute events overnight  He denies any chest pain, shortness breath or palpitations  He feels tired  Review of Systems   Constitutional: Positive for malaise/fatigue  Negative for chills     Cardiovascular: Negative for chest pain, dyspnea on exertion, leg swelling, near-syncope, orthopnea, palpitations, paroxysmal nocturnal dyspnea and syncope  Respiratory: Negative  Negative for cough, shortness of breath and wheezing  Endocrine: Negative  Hematologic/Lymphatic: Negative  Skin: Negative  Musculoskeletal: Negative  Gastrointestinal: Negative  Negative for diarrhea, nausea and vomiting  Neurological: Negative for dizziness, light-headedness and weakness  Psychiatric/Behavioral: Negative  Negative for altered mental status  All other systems reviewed and are negative  Objective:   Vitals: Blood pressure (!) 176/86, pulse 58, temperature 98 1 °F (36 7 °C), resp  rate 18, height 5' 8" (1 727 m), weight 96 3 kg (212 lb 4 9 oz), SpO2 99 %  ,     Wt Readings from Last 3 Encounters:   08/10/22 96 3 kg (212 lb 4 9 oz)   08/05/22 93 9 kg (207 lb 0 2 oz)   08/03/22 93 9 kg (207 lb)        Lab Results   Component Value Date    CREATININE 0 81 08/10/2022    CREATININE 0 89 08/09/2022    CREATININE 0 92 08/08/2022         Body mass index is 32 28 kg/m²  ,     Systolic (37LUD), QVF:085 , Min:155 , VBW:102     Diastolic (56IBD), JWQ:18, Min:76, Max:87          Intake/Output Summary (Last 24 hours) at 8/10/2022 0948  Last data filed at 8/9/2022 1700  Gross per 24 hour   Intake 240 ml   Output --   Net 240 ml     Weight (last 2 days)     Date/Time Weight    08/10/22 0615 96 3 (212 3)            Telemetry Review:  Sinus rhythm with runs of AIVR      Physical Exam  Vitals and nursing note reviewed  Constitutional:       General: He is not in acute distress  Appearance: He is well-developed  Comments: On RA in NAD   HENT:      Head: Normocephalic and atraumatic  Neck:      Vascular: No JVD  Cardiovascular:      Rate and Rhythm: Normal rate and regular rhythm  Heart sounds: Normal heart sounds  No murmur heard  No friction rub  No gallop  Pulmonary:      Effort: Pulmonary effort is normal  No respiratory distress        Breath sounds: Normal breath sounds  No wheezing or rales  Chest:      Chest wall: No tenderness  Abdominal:      General: Bowel sounds are normal  There is no distension  Palpations: Abdomen is soft  Tenderness: There is no abdominal tenderness  Musculoskeletal:         General: No tenderness  Normal range of motion  Cervical back: Normal range of motion and neck supple  Right lower leg: No edema  Left lower leg: No edema  Skin:     General: Skin is warm and dry  Coloration: Skin is not pale  Findings: No erythema  Neurological:      Mental Status: He is alert     Psychiatric:      Comments: forgetful         LABORATORY RESULTS      CBC with diff:   Results from last 7 days   Lab Units 08/10/22  0528 08/08/22  0502 08/07/22  0609 08/06/22  0432 08/05/22  0547 08/04/22  1800 08/04/22  0850   WBC Thousand/uL 12 43* 10 15 12 97* 14 25* 18 70* 29 44* 22 08*   HEMOGLOBIN g/dL 10 5* 10 6* 10 5* 9 9* 10 0* 10 3* 11 8*   HEMATOCRIT % 34 7* 34 9* 34 6* 31 7* 30 8* 32 6* 37 3   MCV fL 83 83 82 81* 80* 81* 81*   PLATELETS Thousands/uL 304 238 192 181 205 222 262   MCH pg 25 0* 25 1* 25 0* 25 3* 26 0* 25 6* 25 7*   MCHC g/dL 30 3* 30 4* 30 3* 31 2* 32 5 31 6 31 6   RDW % 19 6* 19 0* 18 9* 18 6* 18 8* 18 6* 18 6*   MPV fL 11 3 11 6 11 4 11 4 11 7 11 0 10 7   NRBC AUTO /100 WBCs  --   --   --  0 0 0  --        CMP:  Results from last 7 days   Lab Units 08/10/22  0528 08/09/22  0903 08/08/22  0502 08/07/22  0609 08/06/22  0432 08/05/22  0547 08/04/22  1800 08/04/22  0850   POTASSIUM mmol/L 3 8 3 7 3 7 3 9 3 5 3 3* 3 5 3 4*   CHLORIDE mmol/L 116* 114* 115* 112* 113* 108 107 101   CO2 mmol/L 32 33* 31 29 29 26 27 30   BUN mg/dL 14 14 17 18 22 29* 25 23   CREATININE mg/dL 0 81 0 89 0 92 0 97 1 00 1 37* 1 38* 1 78*   CALCIUM mg/dL 8 6 8 3 8 5 8 7 8 4 8 1* 8 2* 8 5   AST U/L  --   --  32 24  --  78* 124* 225*   ALT U/L  --   --  68 85*  --  159* 206* 282*   ALK PHOS U/L  --   --  487* 540*  --  572* 679* 893* EGFR ml/min/1 73sq m 81 77 75 70 68 46 46 34       BMP:  Results from last 7 days   Lab Units 08/10/22  0528 08/09/22  0903 08/08/22  0502 08/07/22  0609 08/06/22  0432 08/05/22  0547 08/04/22  1800   POTASSIUM mmol/L 3 8 3 7 3 7 3 9 3 5 3 3* 3 5   CHLORIDE mmol/L 116* 114* 115* 112* 113* 108 107   CO2 mmol/L 32 33* 31 29 29 26 27   BUN mg/dL 14 14 17 18 22 29* 25   CREATININE mg/dL 0 81 0 89 0 92 0 97 1 00 1 37* 1 38*   CALCIUM mg/dL 8 6 8 3 8 5 8 7 8 4 8 1* 8 2*       Lab Results   Component Value Date    NTBNP 21,688 (H) 08/04/2022    NTBNP 1,240 (H) 03/06/2021             Results from last 7 days   Lab Units 08/10/22  0528 08/09/22  0903 08/07/22  0609 08/06/22  0432 08/04/22  1800   MAGNESIUM mg/dL 2 7* 2 5 2 4 2 3 1 8          Results from last 7 days   Lab Units 08/05/22  0547   HEMOGLOBIN A1C % 7 5*              Results from last 7 days   Lab Units 08/04/22  1800 08/04/22  0850   INR  1 50* 1 32*       Lipid Profile:   No results found for: CHOL  Lab Results   Component Value Date    HDL 6 (L) 08/05/2022    HDL 38 (L) 05/02/2018    HDL 44 02/07/2017     Lab Results   Component Value Date    LDLCALC  08/05/2022      Comment:      Calculated LDL invalid, triglycerides >400 mg/dl  This screening LDL is a calculated result  It does not have the accuracy of the Direct Measured LDL in the monitoring of patients with hyperlipidemia and/or statin therapy  Direct Measure LDL (GLC747) must be ordered separately in these patients  LDLCALC 58 05/02/2018    LDLCALC 42 02/07/2017     Lab Results   Component Value Date    TRIG 469 (H) 08/05/2022    TRIG 108 05/02/2018    TRIG 103 02/07/2017       Cardiac testing:   No results found for this or any previous visit  No results found for this or any previous visit  No results found for this or any previous visit  No valid procedures specified  No results found for this or any previous visit          Meds/Allergies   all current active meds have been reviewed, current meds:   Current Facility-Administered Medications   Medication Dose Route Frequency    albuterol inhalation solution 2 5 mg  2 5 mg Nebulization Q4H PRN    apixaban (ELIQUIS) tablet 5 mg  5 mg Oral BID    aspirin chewable tablet 81 mg  81 mg Oral Daily    ertapenem (INVanz) 1,000 mg in sodium chloride 0 9 % 50 mL IVPB  1,000 mg Intravenous Q24H    insulin glargine (LANTUS) subcutaneous injection 35 Units 0 35 mL  35 Units Subcutaneous QAM    insulin lispro (HumaLOG) 100 units/mL subcutaneous injection 1-6 Units  1-6 Units Subcutaneous 4 times day    labetalol (NORMODYNE) injection 10 mg  10 mg Intravenous Q6H PRN    lactated ringers bolus 500 mL  500 mL Intravenous Once    lisinopril (ZESTRIL) tablet 5 mg  5 mg Oral Daily    magnesium oxide (MAG-OX) tablet 400 mg  400 mg Oral Daily    metoprolol tartrate (LOPRESSOR) tablet 50 mg  50 mg Oral Q8H    omeprazole (PRILOSEC) suspension 2 mg/mL  20 mg Oral Early Morning    potassium chloride oral solution 40 mEq  40 mEq Oral Once    senna-docusate sodium (SENOKOT S) 8 6-50 mg per tablet 1 tablet  1 tablet Oral HS    and PTA meds:   Prior to Admission Medications   Prescriptions Last Dose Informant Patient Reported? Taking?    apixaban (ELIQUIS) 5 mg   Yes No   Sig: Take 1 tablet by mouth 2 (two) times a day   atorvastatin (LIPITOR) 20 mg tablet  Outside Facility (Specify) Yes No   Sig: Take 1 tablet by mouth daily   bisacodyl (DULCOLAX) 10 mg suppository  Outside Facility (Specify) Yes No   Sig: Insert 10 mg into the rectum as needed for constipation   buPROPion (WELLBUTRIN) 100 mg tablet   Yes No   Sig: Take 100 mg by mouth in the morning   diltiazem (CARDIZEM CD) 240 mg 24 hr capsule  Outside Facility (Specify) Yes No   Sig: Take 1 capsule by mouth daily   ferrous sulfate 325 (65 Fe) mg tablet   Yes No   Sig: Take 325 mg by mouth every other day   furosemide (LASIX) 40 mg tablet  Outside Facility (Specify) Yes No   Sig: Take 1 5 tablets by mouth 2 (two) times a day 60 MG BID   hydrALAZINE (APRESOLINE) 10 mg tablet  Outside Facility (Specify) Yes No   Sig: Take 10 mg by mouth 3 (three) times a day   insulin glargine (LANTUS) 100 units/mL subcutaneous injection   No No   Sig: Inject 35 Units under the skin daily at bedtime   insulin lispro (HumaLOG) 100 units/mL injection   No No   Sig: Inject 12 Units under the skin 3 (three) times a day with meals for 10 days   magnesium hydroxide (MILK OF MAGNESIA) 400 mg/5 mL oral suspension  Outside Facility (Specify) Yes No   Sig: Take 30 mL by mouth daily as needed for constipation   metoprolol succinate (TOPROL-XL) 100 mg 24 hr tablet   Yes No   Sig: Take 1 tablet by mouth daily   omeprazole (PriLOSEC) 40 MG capsule  Outside Facility (Specify) Yes No   Sig: Take 1 capsule by mouth daily   polyethylene glycol (MIRALAX) 17 g packet   Yes No   Sig: Take 17 g by mouth every other day    sodium phosphate-biphosphate (FLEET) 7-19 g 118 mL enema  Outside Facility (Specify) Yes No   Sig: Insert 1 enema into the rectum once as needed      Facility-Administered Medications: None     Medications Prior to Admission   Medication    apixaban (ELIQUIS) 5 mg    atorvastatin (LIPITOR) 20 mg tablet    bisacodyl (DULCOLAX) 10 mg suppository    buPROPion (WELLBUTRIN) 100 mg tablet    diltiazem (CARDIZEM CD) 240 mg 24 hr capsule    ferrous sulfate 325 (65 Fe) mg tablet    furosemide (LASIX) 40 mg tablet    hydrALAZINE (APRESOLINE) 10 mg tablet    insulin glargine (LANTUS) 100 units/mL subcutaneous injection    insulin lispro (HumaLOG) 100 units/mL injection    magnesium hydroxide (MILK OF MAGNESIA) 400 mg/5 mL oral suspension    metoprolol succinate (TOPROL-XL) 100 mg 24 hr tablet    omeprazole (PriLOSEC) 40 MG capsule    polyethylene glycol (MIRALAX) 17 g packet    sodium phosphate-biphosphate (FLEET) 7-19 g 118 mL enema            Counseling / Coordination of Care  Total floor / unit time spent today 20 minutes    Greater than 50% of total time was spent with the patient and / or family counseling and / or coordination of care  ** Please Note: Dragon 360 Dictation voice to text software may have been used in the creation of this document   **

## 2022-08-10 NOTE — CONSULTS
Consult: Surgical Oncology  Sharla Soni 82 y o  male MRN: [id number]  Unit/Bed#: [facility] 711-01 Encounter: [id number]        Assessment/Plan     Assessment:  Patient is a 82 y o  male with pmhx of Afib on eliquis, CHF(EF: 45% 8/4/22), COPD, CAD, DM, HLD, HTN who presented with cholangitis and Cerebellar CVA on 8/4, now status post ERCP and stent placement found to have possible pancreatic head mass on CTAP  Biliary stricture brushing pathology showed adenocarcinoma as well prompting surgical oncology evaluation    Afebrile, HTN with SBP in the 160-170  Tbili: 0 87 on 8/8  CTCAP showed  intrahepatic/extrahepatic biliary ductal dilatation with atrophy of pancreatic body/tail with suspected pancreatic head mass    Plan:  No acute surgical intervention indicated at this time  Recommend medical oncology consultation for recommendations on possible chemotherapy options  Unsure whether pt would tolerate chemotherapy  History of Present Illness     HPI:  Sharla Soni is a 82 y o  male with pmhx of Afib on eliquis, CHF(EF: 45% 8/4/22), COPD, CAD, DM, HLD, HTN who presented with cholangitis and Cerebellar CVA on 8/4, now status post ERCP and stent placement found to have possible pancreatic head mass on CTAP  Pt had recent hospitalization for acute cholangitis from 6/4-6/8 where he underwent ERCP and stent and discharged back to his long term care facility  Pt was found to have biliary stricture that was biopsied and returned adenocarcinoma  Pt is poor historian, so history obtained via chart review  He does deny abdominal pain, nausea, vomiting, fevers, chills, chest pain, SOB, headaches, issues with urination  Review of Systems   All other systems reviewed and are negative  Except as noted in above HPI      Inpatient Consult to Surgical Oncology  Consult performed by: Umesh Knox MD  Consult ordered by: Bharati Cody MD          Historical Information   Past Medical History:   Diagnosis Date    [facility]-fib [facility])     Cancer [facility])     Cardiac disease     [facility]est[facility]     Chronic obstructive pulmonary disease (COPD) [facility]     COPD (chronic obstructive pulmonary disease) ([facility]     Coronary artery disease     Counseling regarding advance care planning and goals of care 10/7/2020    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[facility] [address] )     TYPE 2    Dysphagia     Fracture of nasal bone     Gait instability     H/O cervical fracture     Hyperlipidemia     Hypertension     Muscle weakness     TBI (traumatic brain injury) ([facility] [address] )      Past Surgical History:   Procedure Laterality Date    HAND SURGERY      CO ARTHRODESIS POSTERIOR CRANIOCERVICAL N/A 10/26/2020    Procedure: Occipital cervical fusion to C4;  Surgeon: Kady Muniz MD;  Location: [facility];  Service: Neurosurgery     Social History   Social History     Substance and Sexual Activity   Alcohol Use Never     Social History     Substance and Sexual Activity   Drug Use Never     Social History     Tobacco Use   Smoking Status Never Smoker   Smokeless Tobacco Never Used     Family History:   Family History   Problem Relation Age of Onset    Other Other         urinary tract malignancy    Diabetes Mother        Meds/Allergies   PTA meds:   Prior to Admission Medications   Prescriptions Last Dose Informant Patient Reported? Taking?    apixaban (ELIQUIS) 5 mg   Yes No   Sig: Take 1 tablet by mouth 2 (two) times a day   atorvastatin (LIPITOR) 20 mg tablet  Outside Facility (Specify) Yes No   Sig: Take 1 tablet by mouth daily   bisacodyl (DULCOLAX) 10 mg suppository  Outside Facility (Specify) Yes No   Sig: Insert 10 mg into the rectum as needed for constipation   buPROPion (WELLBUTRIN) 100 mg tablet   Yes No   Sig: Take 100 mg by mouth in the morning   diltiazem (CARDIZEM CD) 240 mg 24 hr capsule  Outside Facility (Specify) Yes No   Sig: Take 1 capsule by mouth daily   ferrous sulfate 325 (65 Fe) mg tablet   Yes No   Sig: Take 325 mg by mouth every other day   furosemide (LASIX) 40 mg tablet  Outside Facility (Specify) Yes No   Sig: Take 1 5 tablets by mouth 2 (two) times a day 60 MG BID   hydrALAZINE (APRESOLINE) 10 mg tablet  Outside Facility (Specify) Yes No   Sig: Take 10 mg by mouth 3 (three) times a day   insulin glargine (LANTUS) 100 units/mL subcutaneous injection   No No   Sig: Inject 35 Units under the skin daily at bedtime   insulin lispro (HumaLOG) 100 units/mL injection   No No   Sig: Inject 12 Units under the skin 3 (three) times a day with meals for 10 days   magnesium hydroxide (MILK OF MAGNESIA) 400 mg/5 mL oral suspension  Outside Facility (Specify) Yes No   Sig: Take 30 mL by mouth daily as needed for constipation   metoprolol succinate (TOPROL-XL) 100 mg 24 hr tablet   Yes No   Sig: Take 1 tablet by mouth daily   omeprazole (PriLOSEC) 40 MG capsule  Outside Facility (Specify) Yes No   Sig: Take 1 capsule by mouth daily   polyethylene glycol (MIRALAX) 17 g packet   Yes No   Sig: Take 17 g by mouth every other day    sodium phosphate-biphosphate (FLEET) 7-19 g 118 mL enema  Outside Facility (Specify) Yes No   Sig: Insert 1 enema into the rectum once as needed      Facility-Administered Medications: None     Allergies   Allergen Reactions    Amoxicillin Diarrhea and Hives       Objective   First Vitals:   Blood Pressure: 121/61 (08/04/22 0824)  Pulse: 88 (08/04/22 0824)  Temperature: 100 5 °F (38 1 °C) (08/04/22 0831)  Temp Source: Rectal (08/04/22 0831)  Respirations: 20 (08/04/22 0824)  Height: 5' 8" (172 7 cm) (08/04/22 1320)  Weight - Scale: 93 9 kg (207 lb) (08/04/22 1320)  SpO2: 93 % (08/04/22 0824)    Current Vitals:   Blood Pressure: (!) 176/87 (08/10/22 1434)  Pulse: 61 (08/10/22 1434)  Temperature: 98 1 °F (36 7 °C) (08/10/22 0752)  Temp Source: Bladder (08/06/22 0400)  Respirations: 18 (08/09/22 1507)  Height: 5' 8" (172 7 cm) (08/04/22 1618)  Weight - Scale: 96 3 kg (212 lb 4 9 oz) (08/10/22 0615)  SpO2: 98 % (08/10/22 1434)      Intake/Output Summary (Last 24 hours) at 8/10/2022 1457  Last data filed at 8/10/2022 1131  Gross per 24 hour   Intake 408 ml   Output --   Net 408 ml       Invasive Devices  Report    Peripheral Intravenous Line  Duration           Peripheral IV 08/09/22 Left;Upper Arm <1 day          Drain  Duration           External Urinary Catheter Medium <1 day                Physical Exam  Vitals reviewed  Constitutional:       General: He is not in acute distress  Appearance: He is well-developed  He is ill-appearing  He is not toxic-appearing or diaphoretic  HENT:      Head: Normocephalic and atraumatic  Eyes:      Extraocular Movements: Extraocular movements intact  Cardiovascular:      Rate and Rhythm: Normal rate  Pulmonary:      Effort: Pulmonary effort is normal  No respiratory distress  Abdominal:      General: There is distension  Palpations: Abdomen is soft  Tenderness: There is no abdominal tenderness  There is no guarding or rebound  Comments: No visible abdominal scars   Musculoskeletal:      Right lower leg: No edema  Left lower leg: No edema  Skin:     General: Skin is warm and dry  Neurological:      Mental Status: He is lethargic  Lab Results:   CBC:   Lab Results   Component Value Date    WBC 12 43 (H) 08/10/2022    HGB 10 5 (L) 08/10/2022    HCT 34 7 (L) 08/10/2022    MCV 83 08/10/2022     08/10/2022    MCH 25 0 (L) 08/10/2022    MCHC 30 3 (L) 08/10/2022    RDW 19 6 (H) 08/10/2022    MPV 11 3 08/10/2022   , CMP:   Lab Results   Component Value Date    SODIUM 150 (H) 08/10/2022    K 3 8 08/10/2022     (H) 08/10/2022    CO2 32 08/10/2022    BUN 14 08/10/2022    CREATININE 0 81 08/10/2022    CALCIUM 8 6 08/10/2022    EGFR 81 08/10/2022     Imaging: I have personally reviewed pertinent reports  CT head wo contrast   Final Result by Augustus Ivan DO (08/09 1200)      Stable examination    Moderate chronic microangiopathic change within the cerebral hemispheres  The previously identified right cerebellar infarct is unchanged from multiple prior examinations  In retrospect this infarct has been present on prior CT exams for more linear and is chronic, rather than early as stated on the most recent CT report  Workstation performed: [id number]         MRI brain wo contrast   Final Result by Hannah Collier MD (08/07 1044)      No acute intracranial abnormality  Chronic encephalomalacia and gliosis in right cerebellum, 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 is partially obscured by susceptibility artifact from posterior spinal fixation hardware extending to the occiput  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  Workstation performed: [id number]         CT head wo contrast   Final Result by Augustus Raphael DO (08/06 1336)      Early infarct within the right cerebellar hemisphere is similar to the examination performed 2 days ago  No hemorrhagic transformation  Stable advanced chronic microangiopathic change within both cerebral hemispheres  Workstation performed: [id number]         CXR in AM   Final Result by Evelin Herrera MD (08/05 8387)      ET tube and right IJ line remain in stable position  No active pulmonary disease  Workstation performed: [id number]         XR chest portable ICU   Final Result by Evelin Herrera MD (08/05 6313)      Right IJ line has been placed, tip overlies the SVC  No pneumothorax  Workstation performed: [id number]         XR chest portable ICU   Final Result by Evelin Herrera MD (60/24 2670)      ET tube appears in position  Possible small left pleural effusion, stable  Workstation performed: [id number]         FL ERCP biliary only   Final Result by Evelin Herrera MD (08/05 4908)      Distal CBD stent placement           Workstation performed: [id number]         CT chest abdomen pelvis w contrast   Final Result by Ash Pathak MD (08/04 1128)         1  Intrahepatic and extrahepatic biliary ductal dilatation with pancreatic body and tail atrophy and suspected pancreatic head mass  Confirmation should be obtained with MRI abdomen with MRCP  2   No infiltrates on study limited by respiratory motion  I personally discussed this study with Corina Higgins on 8/4/2022 at 11:28 AM                Workstation performed: [id number]         CT head without contrast   Final Result by Ash Pathak MD (08/04 9149)         New low-density in the right cerebellum concerning for an acute infarct  Cerebral atrophy and small vessel disease  I personally discussed this study with Corina Higgins on 8/4/2022 at 11:28 AM                      Workstation performed: [id number]         XR chest 1 view portable   Final Result by Kassy Pepper MD (08/04 6970)      Suspected left retrocardiac consolidation and/or small effusion  Cardiomegaly and aortic sclerosis  Workstation performed: [id number]             EKG, Pathology, and Other Studies: I have personally reviewed pertinent reports        Code Status: Level 3 - DNAR and DNI  Advance Directive and Living Will: Yes    Power of :    POLST:

## 2022-08-10 NOTE — PROGRESS NOTES
Progress Note - Infectious Disease   Tanner Rivera 82 y o  male MRN: [id number]  Unit/Bed#: [facility] 711-01 Encounter: [id number]      Impression/Recommendations:  1   Severe sepsis, POA   WBC, RR, lactic acidosis   Due to #2/3   No other appreciable source   Unable to obtain ROS    Exam otherwise benign   Flu/RSV/COVID PCR, UA, chest x-ray negative   Improving with antibiotics, biliary decompression  Rec:  · Continue antibiotics as below  · Follow temperatures and WBC closely  · Supportive care as per the primary service     2   ESBL Klebsiella bacteremia   Due to #3   No other appreciable source   UA negative  Rec:  · Continue ertapenem through  to complete 7 days total (would avoid quinolone in setting of advanced age)     3   Recurrent acute cholangitis   In setting of # 4, recent attempt at stent removal   CT showed intra and extrahepatic biliary dilation   LFTs trending down  Rec:  · Continue antibiotics as above     4   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   Status post ERCP, stent placement 2022   Status post recent ERCP, stent removal 8/3/22   Presented with infection as above, found to have intra and extrahepatic biliary ductal dilation   Status post repeat ERCP and stent replacement 2022  Brushings show adenocarcinoma      5   Chronic cerebellar CVA   Serial head CT stable      6   Amoxicillin allergy   Tolerating cephalosporin, carbapenems      7   DM      The patient is stable from an ID standpoint      Antibiotics:  Ertapenem #6    Subjective:  Patient seen on AM rounds  Offers no complaints  Denies pain  24 Hour Events:  No documented fevers, chills, sweats, nausea, vomiting, or diarrhea      Objective:  Vitals:  Temp:  [97 5 °F (36 4 °C)-98 3 °F (36 8 °C)] 98 1 °F (36 7 °C)  HR:  [57-65] 58  Resp:  [18] 18  BP: (155-184)/(76-87) 176/86  SpO2:  [96 %-99 %] 99 %  Temp (24hrs), Av °F (36 7 °C), Min:97 5 °F (36 4 °C), Max:98 3 °F (36 8 °C)  Current: Temperature: 98 1 °F (36 7 °C)    Physical Exam:   General:  No acute distress  Psychiatric:  Sleeping but arousable to loud voice  Pulmonary:  Normal respiratory excursion without accessory muscle use  Abdomen:  Soft, nontender  Extremities:  No edema  Skin:  No rashes    Lab Results:  I have personally reviewed pertinent labs  Results from last 7 days   Lab Units 08/10/22  0528 08/09/22  0903 08/08/22  0502 08/07/22  0609 08/06/22  0432 08/05/22  0547   POTASSIUM mmol/L 3 8 3 7 3 7 3 9   < > 3 3*   CHLORIDE mmol/L 116* 114* 115* 112*   < > 108   CO2 mmol/L 32 33* 31 29   < > 26   BUN mg/dL 14 14 17 18   < > 29*   CREATININE mg/dL 0 81 0 89 0 92 0 97   < > 1 37*   EGFR ml/min/1 73sq m 81 77 75 70   < > 46   CALCIUM mg/dL 8 6 8 3 8 5 8 7   < > 8 1*   AST U/L  --   --  32 24  --  78*   ALT U/L  --   --  68 85*  --  159*   ALK PHOS U/L  --   --  487* 540*  --  572*    < > = values in this interval not displayed  Results from last 7 days   Lab Units 08/10/22  0528 08/08/22  0502 08/07/22  0609   WBC Thousand/uL 12 43* 10 15 12 97*   HEMOGLOBIN g/dL 10 5* 10 6* 10 5*   PLATELETS Thousands/uL 304 238 192     Results from last 7 days   Lab Units 08/04/22  0850   BLOOD CULTURE  Klebsiella oxytoca ESBL*  Klebsiella oxytoca ESBL*   GRAM STAIN RESULT  Gram negative rods*  Gram positive rods*  Gram negative rods*  Gram positive rods*       Imaging Studies:   I have personally reviewed pertinent imaging study reports and images in PACS  EKG, Pathology, and Other Studies:   I have personally reviewed pertinent reports

## 2022-08-11 PROBLEM — R62.7 FAILURE TO THRIVE IN ADULT: Status: ACTIVE | Noted: 2022-01-01

## 2022-08-11 NOTE — PLAN OF CARE
Problem: Potential for Falls  Goal: Patient will remain free of falls  Description: INTERVENTIONS:  - Educate patient/family on patient safety including physical limitations  - Instruct patient to call for assistance with activity   - Consult OT/PT to assist with strengthening/mobility   - Keep Call bell within reach  - Keep bed low and locked with side rails adjusted as appropriate  - Keep care items and personal belongings within reach  - Initiate and maintain comfort rounds  - Make Fall Risk Sign visible to staff  - Offer Toileting every  Hours, in advance of need  - Initiate/Maintain alarm  - Obtain necessary fall risk management equipment:   - Apply yellow socks and bracelet for high fall risk patients  - Consider moving patient to room near nurses station  Outcome: Progressing     Problem: MOBILITY - ADULT  Goal: Maintain or return to baseline ADL function  Description: INTERVENTIONS:  -  Assess patient's ability to carry out ADLs; assess patient's baseline for ADL function and identify physical deficits which impact ability to perform ADLs (bathing, care of mouth/teeth, toileting, grooming, dressing, etc )  - Assess/evaluate cause of self-care deficits   - Assess range of motion  - Assess patient's mobility; develop plan if impaired  - Assess patient's need for assistive devices and provide as appropriate  - Encourage maximum independence but intervene and supervise when necessary  - Involve family in performance of ADLs  - Assess for home care needs following discharge   - Consider OT consult to assist with ADL evaluation and planning for discharge  - Provide patient education as appropriate  Outcome: Progressing  Goal: Maintains/Returns to pre admission functional level  Description: INTERVENTIONS:  - Perform BMAT or MOVE assessment daily    - Set and communicate daily mobility goal to care team and patient/family/caregiver     - Collaborate with rehabilitation services on mobility goals if consulted  - Perform Range of Motion  times a day  - Reposition patient every  hours  - Dangle patient  times a day  - Stand patient  times a day  - Ambulate patient  times a day  - Out of bed to chair  times a day   - Out of bed for meals times a day  - Out of bed for toileting  - Record patient progress and toleration of activity level   Outcome: Progressing     Problem: Prexisting or High Potential for Compromised Skin Integrity  Goal: Skin integrity is maintained or improved  Description: INTERVENTIONS:  - Identify patients at risk for skin breakdown  - Assess and monitor skin integrity  - Assess and monitor nutrition and hydration status  - Monitor labs   - Assess for incontinence   - Turn and reposition patient  - Assist with mobility/ambulation  - Relieve pressure over bony prominences  - Avoid friction and shearing  - Provide appropriate hygiene as needed including keeping skin clean and dry  - Evaluate need for skin moisturizer/barrier cream  - Collaborate with interdisciplinary team   - Patient/family teaching  - Consider wound care consult   Outcome: Progressing     Problem: Nutrition/Hydration-ADULT  Goal: Nutrient/Hydration intake appropriate for improving, restoring or maintaining nutritional needs  Description: Monitor and assess patient's nutrition/hydration status for malnutrition  Collaborate with interdisciplinary team and initiate plan and interventions as ordered  Monitor patient's weight and dietary intake as ordered or per policy  Utilize nutrition screening tool and intervene as necessary  Determine patient's food preferences and provide high-protein, high-caloric foods as appropriate       INTERVENTIONS:  - Monitor oral intake, urinary output, labs, and treatment plans  - Assess nutrition and hydration status and recommend course of action  - Evaluate amount of meals eaten  - Assist patient with eating if necessary   - Allow adequate time for meals  - Recommend/ encourage appropriate diets, oral nutritional supplements, and vitamin/mineral supplements  - Order, calculate, and assess calorie counts as needed  - Recommend, monitor, and adjust tube feedings and TPN/PPN based on assessed needs  - Assess need for intravenous fluids  - Provide specific nutrition/hydration education as appropriate  - Include patient/family/caregiver in decisions related to nutrition  Outcome: Progressing     Problem: PAIN - ADULT  Goal: Verbalizes/displays adequate comfort level or baseline comfort level  Description: Interventions:  - Encourage patient to monitor pain and request assistance  - Assess pain using appropriate pain scale  - Administer analgesics based on type and severity of pain and evaluate response  - Implement non-pharmacological measures as appropriate and evaluate response  - Consider cultural and social influences on pain and pain management  - Notify physician/advanced practitioner if interventions unsuccessful or patient reports new pain  Outcome: Progressing

## 2022-08-11 NOTE — PROGRESS NOTES
INTERNAL MEDICINE RESIDENCY PROGRESS NOTE     Name: Heidi Hood   Age & Sex: 82 y o  male   MRN: [id number]  Unit/Bed#: [facility] 711-01   Encounter: [id number]  Team: SOD Team B     PATIENT INFORMATION     Name: Heidi Hood   Age & Sex: 82 y o  male   MRN: [id number]  Hospital Stay Days: 6  ASSESSMENT/PLAN     Principal Problem:    Cerebrovascular accident (CVA) involving cerebellum ([place] [address] )  Active Problems:    Atrial fibrillation ([place] [address] )    CHF (congestive heart failure) ([place] [address] )    Hypertension    Type 2 diabetes mellitus ([place] [address] )    Dysphagia    Chronic heart failure with preserved ejection fraction (HCC)    Sepsis ([place] [address] )    Pancreatic mass    COPD (chronic obstructive pulmonary disease) ([place] [address] )    CHASITY (acute kidney injury) ([place] [address] )    Coronary artery disease    Ventricular tachyarrhythmia ([place] [address] )    Hypernatremia      Hypernatremia  Assessment & Plan  Recent rise in sodium levels during hospital stay  Likely 2/2 to significant deficit in free water intake  - Pt has been noted to have difficulty feeding and drinking without assistance  - Nursing staff have been notified and encouraged free water intake  - LR Hydration   0   Lab Value Date/Time    SODIUM 150 (H) 08/10/2022 0528    SODIUM 148 (H) 08/09/2022 0903    SODIUM 148 (H) 08/08/2022 0502    SODIUM 144 08/07/2022 0609    SODIUM 145 08/06/2022 0432    SODIUM 141 08/05/2022 0547       Ventricular tachyarrhythmia ([facility] )  Assessment & Plan  In setting of acute illness and slow re-institution of baseline cardiac meds (metoprolol succinate, coreg, lasix)  Cardiology consulted for guidance on reinstitution of medications following sustained vtach yesterday  Metoprolol tartrate 50 mg q8h for now  Tele  8/10: Cardiology Consulted - recommended starting lisinopril 5mg, and to continue remainder of medications    Coronary artery disease  Assessment & Plan  ? Aspirin 81 for stroke   ? EKG showed no acute ST changes   ?  Restarted home Lipitor after resolution of AST/ALT normalized    CHASITY (acute kidney injury) [facility])  Assessment & Plan  CHASITY on this admission (baseline Cr around 1), Cr 1 78 when first admitted     8/6/2022: Resolved - Cr 1 0  Lab Results   Component Value Date    CREATININE 0 92 08/08/2022    CREATININE 0 97 08/07/2022    CREATININE 1 00 08/06/2022    CREATININE 1 37 (H) 08/05/2022    CREATININE 1 38 (H) 08/04/2022    CREATININE 1 78 (H) 08/04/2022         COPD (chronic obstructive pulmonary disease) (HCC)  Assessment & Plan  Not on home O2 per documentation     Continue with albuterol nebs  Extubated 8/6  Continue supplemental O2 as needed      Pancreatic mass  Assessment & Plan  ·  Pancreatic head mass  · 8/4 CTAP: Intrahepatic and extrahepatic biliary ductal dilation with pancreatic body and tail atrophy and suspected pancreatic head mass  No gross lung infiltrates noted limited by respiratory motion  ? ERCP done 8/4 with stent placed  ? Possible EUS in the future, although utility towards ultimate treatment plan and goals of care TBD with family outpatient pending initial ERCP brushing cytology  ? GI outpatient f/u on discharge  ? CEA normal  ? CA 19-9 682         Sepsis ([facility] )  Assessment & Plan  ·  Suspected cholangitis with sespsis   ? ID following  ? On Ertepenem given MDRO/ESBL  ? Blood cultures X2 growing ESBL and Klebsiella continue to follow  ? ID following     ? Trend WBC and fever curve   ? S/p ERCP and stent placement 8/4   ? Monitor LFT, normalized 8/7/22  ? Leukocytosis resolved      Lab Results   Component Value Date    WBC 10 15 08/08/2022    WBC 12 97 (H) 08/07/2022    WBC 14 25 (H) 08/06/2022    WBC 18 70 (H) 08/05/2022    WBC 29 44 (H) 08/04/2022         Chronic heart failure with preserved ejection fraction [facility])  Assessment & Plan  Wt Readings from Last 3 Encounters:   08/10/22 96 3 kg (212 lb 4 9 oz)   08/05/22 93 9 kg (207 lb 0 2 oz)   08/03/22 93 9 kg (207 lb)     ? Echo shows EF 45% with septal wall hypokenesis  ?  Holding home lasix and using PRN as appropriate  ? Monitor I's and O's and daily weights  ? Appears euvolemic at present without respiratory distress, hypoxia, LE edema or JVD  Lungs CTAB      Dysphagia  Assessment & Plan  Patient has dysphagia possibly secondary to stroke, he has aspirated and food was taken out from the respiratory tract per anesthesiology  Speech pathology has recommended dysphagia 2 diet  Advance as needed  Type 2 diabetes mellitus (HCC)  Assessment & Plan    · T2 DM  · Hb A1C 7 5   · Not on insulin outpatient  · Insulin requirements inpatient have been very high, likely stress-induced hyperglycemia in setting of acute illness  ? Goal -180 inpt  ? S/p insulin gtt   ? subQ insulin being adjusted as needed to meet goals  ? Dysphagia diet        Hypertension  Assessment & Plan    SBP goal 140-160   § Continue to monitor blood pressure  § Given slight supplemental O2 reuirements and no diuretics for days, will give dose of IV lasix to see if this helps respiratory status  § Started Lisinopril 5 mg   § Per cardiology, d/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    CHF (congestive heart failure) ([facility] )  Assessment & Plan  Wt Readings from Last 3 Encounters:   08/10/22 96 3 kg (212 lb 4 9 oz)   08/05/22 93 9 kg (207 lb 0 2 oz)   08/03/22 93 9 kg (207 lb)   ? not in exacerbation  ? Echo shows EF 45% with septal wall hypokinesis on this admission  ? Home lasix 60 mg bid has been on hold this admission since patient was septic and requiring pressors  BP since stable  Will give PRN diuresis as necessary to optimize volume status  ? I+Os have been inaccurate given unmeasured urinations  ? Daily weights   ? Cardiac diet        Atrial fibrillation [facility])  Assessment & Plan  ? Will need p o  a/c  ? CTH (8/10) revealed stable findings, transitioned to [address]   ? Home diltiazem 240 mg daily on hold  ? Metoprolol succ 100 mg changed for tartrate 50 mg q8 h for now, pending further cardiology recs  ?  Continue telemetry monitoring    * Cerebrovascular accident (CVA) involving cerebellum ([facility] )  Assessment & Plan  · Right cerebellar stroke   ? CT head  showed right cerebellar stroke   ? Neurology following, appreciate recommendations   ? Continue aspirin and statin    ? SBP goal 140-160  ? Continue telemetry   ? PT/OT consulted  ? Continue neuro checks   ? Post-stroke follow-up CT Head performed on 8/10 - Findings stable  ? Discontinued Heparin ggt and initiated anticoagulation with the approval from neurology  ? SUBJECTIVE     Patient seen and examined  No acute events overnight  No pain or acute changes seen on neurologic examination  Remains afebrile on ertapenem  Patient has difficulty eating without assistance resulting in poor PO intake and limited free water  Will continue to monitor volume status and BP  Encourage free water intake and requested assistance with patient meals  Euvolemic on examination  Repeated CT Head yesterday stable - heparin gtt switched to anticoagulation per neurology approval   Bumex given for BP control, plans to initiate home lasix tomorrow,     OBJECTIVE     Vitals:    08/10/22 1434 08/10/22 1546 08/10/22 1701 08/10/22 2112   BP: (!) 176/87 (!) 174/88 (!) 175/87 (!) 180/89   Pulse: 61 60 58 57   Resp:       Temp:  97 7 °F (36 5 °C)  98 1 °F (36 7 °C)   TempSrc:       SpO2: 98% 99% 99% 96%   Weight:       Height:          Temperature:   Temp (24hrs), Av °F (36 7 °C), Min:97 7 °F (36 5 °C), Max:98 1 °F (36 7 °C)    Temperature: 98 1 °F (36 7 °C)  Intake & Output:  I/O        0701  08/10 0700 08/10 0701   0700    P  O  480 168    I V  (mL/kg)  453 3 (4 7)    Total Intake(mL/kg) 480 (5) 621 3 (6 5)    Net +480 +621 3          Unmeasured Urine Occurrence  1 x        Weights:   IBW (Ideal Body Weight): 68 4 kg    Body mass index is 32 28 kg/m²  Weight (last 2 days)     Date/Time Weight    08/10/22 0615 96 3 (212 3)        Physical Exam  Vitals reviewed     Constitutional: General: He is not in acute distress  Appearance: He is ill-appearing and toxic-appearing  Interventions: Nasal cannula in place  HENT:      Head: Normocephalic and atraumatic  Eyes:      Extraocular Movements: Extraocular movements intact  Conjunctiva/sclera: Conjunctivae normal    Cardiovascular:      Rate and Rhythm: Regular rhythm  Tachycardia present  Pulses: Normal pulses  Pulmonary:      Effort: Pulmonary effort is normal  No respiratory distress  Abdominal:      General: There is distension  Palpations: Abdomen is soft  Tenderness: There is no abdominal tenderness  There is no guarding or rebound  Musculoskeletal:      Right lower leg: No edema  Left lower leg: No edema  Skin:     General: Skin is warm and dry  Capillary Refill: Capillary refill takes less than 2 seconds  Neurological:      General: No focal deficit present  Mental Status: He is easily aroused  Mental status is at baseline  Psychiatric:         Attention and Perception: Attention normal          Behavior: Behavior is cooperative  LABORATORY DATA     Labs: I have personally reviewed pertinent reports    Results from last 7 days   Lab Units 08/10/22  0528 08/08/22  0502 08/07/22  0609 08/06/22  0432 08/05/22  0547 08/04/22  1800   WBC Thousand/uL 12 43* 10 15 12 97* 14 25* 18 70* 29 44*   HEMOGLOBIN g/dL 10 5* 10 6* 10 5* 9 9* 10 0* 10 3*   HEMATOCRIT % 34 7* 34 9* 34 6* 31 7* 30 8* 32 6*   PLATELETS Thousands/uL 304 238 192 181 205 222   NEUTROS PCT %  --   --   --  85* 89* 89*   MONOS PCT %  --   --   --  6 5 6      Results from last 7 days   Lab Units 08/10/22  2036 08/10/22  0528 08/09/22  0903 08/08/22  0502 08/07/22  0609 08/06/22  0432 08/05/22  0547   POTASSIUM mmol/L 3 7 3 8 3 7 3 7 3 9   < > 3 3*   CHLORIDE mmol/L 112* 116* 114* 115* 112*   < > 108   CO2 mmol/L 34* 32 33* 31 29   < > 26   BUN mg/dL 12 14 14 17 18   < > 29*   CREATININE mg/dL 0 71 0 81 0 89 0 92 0 97   < > 1 37*   CALCIUM mg/dL 9 0 8 6 8 3 8 5 8 7   < > 8 1*   ALK PHOS U/L  --   --   --  487* 540*  --  572*   ALT U/L  --   --   --  68 85*  --  159*   AST U/L  --   --   --  32 24  --  78*    < > = values in this interval not displayed  Results from last 7 days   Lab Units 08/10/22  0528 08/09/22  0903 08/07/22  0609   MAGNESIUM mg/dL 2 7* 2 5 2 4     Results from last 7 days   Lab Units 08/06/22  0432 08/04/22  1800   PHOSPHORUS mg/dL 2 3 3 1      Results from last 7 days   Lab Units 08/09/22  0903 08/09/22  0007 08/08/22  1216 08/07/22  1543 08/04/22  1800 08/04/22  0850   INR   --   --   --   --  1 50* 1 32*   PTT seconds 63* 60* 43*   < >  --  32    < > = values in this interval not displayed  Results from last 7 days   Lab Units 08/04/22  2141   LACTIC ACID mmol/L 2 5*           IMAGING & DIAGNOSTIC TESTING     Radiology Results: I have personally reviewed pertinent reports  ERCP Fluoro    Result Date: 8/4/2022  Impression: ERCP with cholangitis s/p placement of 10 x 4 cm fully covered metal stent  RECOMMENDATION: Follow LFTs Continue antibiotics Further care per ICU team May require EUS for biopsy/staging of mass at later date  Hanane Lewis MD     XR chest 1 view portable    Result Date: 8/4/2022  Impression: Suspected left retrocardiac consolidation and/or small effusion  Cardiomegaly and aortic sclerosis  Workstation performed: [id number]     CT head wo contrast    Result Date: 8/9/2022  Impression: Stable examination  Moderate chronic microangiopathic change within the cerebral hemispheres  The previously identified right cerebellar infarct is unchanged from multiple prior examinations  In retrospect this infarct has been present on prior CT exams for more linear and is chronic, rather than early as stated on the most recent CT report   Workstation performed: [id number]     CT head wo contrast    Result Date: 8/6/2022  Impression: Early infarct within the right cerebellar hemisphere is similar to the examination performed 2 days ago  No hemorrhagic transformation  Stable advanced chronic microangiopathic change within both cerebral hemispheres  Workstation performed: [id number]     CT head without contrast    Result Date: 8/4/2022  Impression: New low-density in the right cerebellum concerning for an acute infarct  Cerebral atrophy and small vessel disease  I personally discussed this study with Luke Araceli on 8/4/2022 at 11:28 AM  Workstation performed: [id number]     MRI brain wo contrast    Result Date: 8/7/2022  Impression: No acute intracranial abnormality  Chronic encephalomalacia and gliosis in right cerebellum, 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 is partially obscured by susceptibility artifact from posterior spinal fixation hardware extending to the occiput  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  Workstation performed: [id number]     CT chest abdomen pelvis w contrast    Result Date: 8/4/2022  Impression: 1  Intrahepatic and extrahepatic biliary ductal dilatation with pancreatic body and tail atrophy and suspected pancreatic head mass  Confirmation should be obtained with MRI abdomen with MRCP  2   No infiltrates on study limited by respiratory motion  I personally discussed this study with Luke Charles on 8/4/2022 at 11:28 AM  Workstation performed: [id number]     FL ERCP biliary only    Result Date: 8/5/2022  Impression: Distal CBD stent placement  Workstation performed: [id number]     CXR in AM    Result Date: 8/5/2022  Impression: ET tube and right IJ line remain in stable position  No active pulmonary disease  Workstation performed: [id number]     XR chest portable ICU    Result Date: 8/5/2022  Impression: Right IJ line has been placed, tip overlies the SVC  No pneumothorax  Workstation performed: [id number]     XR chest portable ICU    Result Date: 8/5/2022  Impression: ET tube appears in position   Possible small left pleural effusion, stable  Workstation performed: [id number]     Other Diagnostic Testing: I have personally reviewed pertinent reports  ACTIVE MEDICATIONS     Current Facility-Administered Medications   Medication Dose Route Frequency    albuterol inhalation solution 2 5 mg  2 5 mg Nebulization Q4H PRN    apixaban (ELIQUIS) tablet 5 mg  5 mg Oral BID    aspirin chewable tablet 81 mg  81 mg Oral Daily    atorvastatin (LIPITOR) tablet 20 mg  20 mg Oral Daily With Dinner    ertapenem (INVanz) 1,000 mg in sodium chloride 0 9 % 50 mL IVPB  1,000 mg Intravenous Q24H    furosemide (LASIX) tablet 40 mg  40 mg Oral Daily    [START ON 8/11/2022] insulin glargine (LANTUS) subcutaneous injection 28 Units 0 28 mL  28 Units Subcutaneous QAM    insulin lispro (HumaLOG) 100 units/mL subcutaneous injection 1-6 Units  1-6 Units Subcutaneous TID AC    insulin lispro (HumaLOG) 100 units/mL subcutaneous injection 1-6 Units  1-6 Units Subcutaneous HS    labetalol (NORMODYNE) injection 10 mg  10 mg Intravenous Q6H PRN    lactated ringers infusion  100 mL/hr Intravenous Continuous    lisinopril (ZESTRIL) tablet 5 mg  5 mg Oral Daily    metoprolol tartrate (LOPRESSOR) tablet 50 mg  50 mg Oral Q8H    omeprazole (PRILOSEC) suspension 2 mg/mL  20 mg Oral Early Morning    potassium chloride oral solution 40 mEq  40 mEq Oral Once    senna-docusate sodium (SENOKOT S) 8 6-50 mg per tablet 1 tablet  1 tablet Oral HS       VTE Pharmacologic Prophylaxis: Reason for no pharmacologic prophylaxis recent CVA  VTE Mechanical Prophylaxis: sequential compression device    Portions of the record may have been created with voice recognition software  Occasional wrong word or "sound a like" substitutions may have occurred due to the inherent limitations of voice recognition software    Read the chart carefully and recognize, using context, where substitutions have occurred   ==  Alex Valle, [facility] Medicine Residency PGY-1 - - -

## 2022-08-11 NOTE — ASSESSMENT & PLAN NOTE
In the setting of both acute and chronic problems, patient has overall dismal clinical picture  Continuous progression of disease sequale despite maximal medical therapy  Poor overal prognosis  · Patient's son contacted and informed of current status, and agrees to oragnizing family meeting pallitative to disccus goals of care and possible pallitative vs hospice    · Palliative Care Consult - pending

## 2022-08-11 NOTE — CONSULTS
Consultation - Palliative and Supportive Care   Jorge Reach 82 y o  male [id number]    Patient Active Problem List   Diagnosis    Atrial fibrillation ([facility] )    CHF (congestive heart failure) ([facility] )    Hypertension    Hyperlipidemia    Type 2 diabetes mellitus ([facility] )    Vertebral artery dissection (HCC)    Dysphagia    Ambulatory dysfunction    Abnormal EKG    Chronic heart failure with preserved ejection fraction (HCC)    Weight gain    History of 2019 novel coronavirus disease (COVID-19)    Debility    Medication management    Goals of care, counseling/discussion    Anemia    Right acetabular fracture (HCC)    Depression    Vascular dementia ([facility] )    Constipation    Leukocytosis    Full code status    Frailty syndrome in geriatric patient    Cholangitis    S/P ERCP    Cerebrovascular accident (CVA) involving cerebellum ([place] [address] )    Sepsis ([place] [address] )    Pancreatic mass    COPD (chronic obstructive pulmonary disease) ([place] [address] )    Coronary artery disease    Ventricular tachyarrhythmia (HCC)    Hypernatremia    Failure to thrive in adult     Active issues specifically addressed today include:     Sepsis  Afib with RVR  CHF  Pancreatic mass  CVA  Dysphagia  Hypernatremia  GOC discussion    Plan:  1  Symptom management -    Defer to primary team at this time    2  Goals - unclear - possible family meeting for hospice consideration     Code Status: DNR/DNI - Level 3   Decisional apparatus:  Patient (unknown) competent on my exam today  If competence is lost, patient's substitute decision maker would default to wife by [place]  Advance Directive / Living Will / POLST:  none     I have reviewed the patient's controlled substance dispensing history in the Prescription Drug Monitoring Program in compliance with the [facility] regulations before prescribing any controlled substances  We appreciate the invitation to be involved in this patient's care    We will await a call from the family or from SOD with news about whether this meeting is to proceed  Please do not hesitate to reach our on call provider through our clinic answering service at  should you have acute symptom control concerns  Yogesh Nguyen MD  Palliative and Supportive Care  Clinic/Answering Service: [phone]  You can find me on [employer]! IDENTIFICATION:  Inpatient consult to Palliative Care  Consult performed by: Becky Webster MD  Consult ordered by: Zoey Roth DO        Physician Requesting Consult: Aries Ball MD  Reason for Consult / Principal Problem: Possible family meeting for [address]  Hx and PE limited by: pt sleeping    HISTORY OF PRESENT ILLNESS:       Kelli Ingram is a 82 y o  male who presented with sepsis 2/2 cholangitis and cerebellar CVA  Pt underwent ERCP and stent placement and was found to have apparent pancreatic head mass  Initial pathology suggests adenocarcinoma  The pt has a Hx of prostate cancer  Pt is being seen by cardiology for CHF and PAF with RVR, by ID for  Sepsis and ESBL Klebsiella bacteremia  He has received 7 days of Abx and continues to weaken  He has dysphagia likely 2/2 CVA and continues to be hypernatremic despite all efforts to manage his electrolytes  Medicine service had discussion with family regarding the decline of the patient and possibility of transitioning to comfort care  There was disagreement among family members and Lyle Simental was consulted to help with a family meeting  Lyle Simental fellow reached out to son, Sara Mcmillan to arrange the meeting and he stated he was uncertain that they actually need a family meeting and will call Lyle Simental fellow back to let us know  Will await return call, [place] is available if needed to assist with this meeting yet unclear that the family wishes to proceed    Review of Systems   Unable to perform ROS: other   Constitutional:        Unable to perform as pt was sleeping       Past Medical History:   Diagnosis Date    A-fib ([facility] )     CHASITY (acute kidney injury) ([place] [address] ) 8/6/2022    Cancer ([place] [address] )     Cardiac disease     [facility]     Chronic obstructive pulmonary disease (COPD) (HCC)     COPD [facility]ch[facility] disease) [facility])     Coronary artery disease     Counseling regarding advance care planning and goals of care 10/7/2020    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[place] [address] )     TYPE 2    Dysphagia     Fracture of nasal bone     Gait instability     H/O cervical fracture     Hyperlipidemia     Hypertension     Muscle weakness     TBI (traumatic brain injury) ([place] [address] )      Past Surgical History:   Procedure Laterality Date    HAND SURGERY      IL ARTHRODESIS POSTERIOR CRANIOCERVICAL N/A 10/26/2020    Procedure: Occipital cervical fusion to C4;  Surgeon: Mortimer Rush, MD;  Location: [facility];  Service: Neurosurgery     Social History     Socioeconomic History    Marital status: /Civil Union     Spouse name: Not on file    Number of children: Not on file    Years of education: Not on file    Highest education level: Not on file   Occupational History    Not on file   Tobacco Use    Smoking status: Never Smoker    Smokeless tobacco: Never Used   Vaping Use    Vaping Use: Never used   Substance and Sexual Activity    Alcohol use: Never    Drug use: Never    Sexual activity: Not on file   Other Topics Concern    Not on file   Social History Narrative    ** Merged History Encounter **          Social Determinants of Health     Financial Resource Strain: Not on file   Food Insecurity: No Food Insecurity    Worried About Running Out of Food in the Last Year: Never true    [address] in the Last Year: Never true   Transportation Needs: No Transportation Needs    Lack of Transportation (Medical): No    Lack of Transportation (Non-Medical):  No   Physical Activity: Not on file   Stress: Not on file   Social Connections: Not on file   Intimate Partner Violence: Not on file   Housing Stability: Unknown    Unable to Pay for Housing in the Last Year: No    Number of Places Lived in the Last Year: Not on file    Unstable Housing in the Last Year: No     Family History   Problem Relation Age of Onset    Other Other         urinary tract malignancy    Diabetes Mother        MEDICATIONS / ALLERGIES:    Allergies   Allergen Reactions    Amoxicillin Diarrhea and Hives       OBJECTIVE:    Physical Exam  Physical Exam  Vitals reviewed: unable to perform exam as pt was sleeping  Lab Results:   I have personally reviewed pertinent labs  ,    Lab Results   Component Value Date    SODIUM 147 08/10/2022    K 3 7 08/10/2022     (H) 08/10/2022    CO2 34 (H) 08/10/2022    BUN 12 08/10/2022    CREATININE 0 71 08/10/2022    CALCIUM 9 0 08/10/2022    EGFR 85 08/10/2022   , BMP:  Lab Results   Component Value Date    SODIUM 147 08/10/2022    K 3 7 08/10/2022     (H) 08/10/2022    CO2 34 (H) 08/10/2022    BUN 12 08/10/2022    CREATININE 0 71 08/10/2022    GLUC 89 08/10/2022    CALCIUM 9 0 08/10/2022    AGAP 1 (L) 08/10/2022    EGFR 85 08/10/2022     Imaging Studies: pertinent imaging reviewed  EKG, Pathology, and Other Studies: pertinent studies reviewed    Counseling / Coordination of Care    Total floor / unit time spent today 30 minutes  Greater than 50% of total time was spent with the patient and / or family counseling and / or coordination of care   A description of the counseling / coordination of care: [address] discussion with family, discussion of possible family meeting, emotional support provided

## 2022-08-11 NOTE — PROGRESS NOTES
INTERNAL MEDICINE RESIDENCY PROGRESS NOTE     Name: Susan Kim   Age & Sex: 82 y o  male   MRN: [id number]  Unit/Bed#: [facility] 711-01   Encounter: [id number]  Team: SOD Team B     PATIENT INFORMATION     Name: Susan Kim   Age & Sex: 82 y o  male   MRN: [id number]  Hospital Stay Days: 7    ASSESSMENT/PLAN     Principal Problem:    Cerebrovascular accident (CVA) involving cerebellum ([facility] )  Active Problems:    Atrial fibrillation ([place] [address] )    CHF (congestive heart failure) ([place] [address] )    Hypertension    Type 2 diabetes mellitus ([place] [address] )    Dysphagia    Chronic heart failure with preserved ejection fraction (HCC)    Sepsis ([place] [address] )    Pancreatic mass    COPD (chronic obstructive pulmonary disease) ([place] [address] )    Coronary artery disease    Ventricular tachyarrhythmia (HCC)    Hypernatremia    Failure to thrive in adult      Ventricular tachyarrhythmia ([place] [address] )  Assessment & Plan  In setting of acute illness and slow re-institution of baseline cardiac meds (metoprolol succinate, coreg, lasix)  Cardiology consulted for guidance on reinstitution of medications following sustained vtach yesterday  Switch Metoprolol and Coreg 12 5 BID  Tele      Hypertension  Assessment & Plan  SBP goal 140-160   § Continue to monitor blood pressure  § Given slight supplemental O2 requirements and no diuretics for days, will give dose of IV lasix to see if this helps respiratory status  § Started Lisinopril 5 mg   § Per cardiology, d/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    8/10: Cardiology Consulted - recommended starting lisinopril 5mg, and to continue remainder of medications    8/11/22:   As Per Cardiology:   · Switch Lopressor to Caredilol 12 5 BID and titrate for optomize BP  · Linsinopril increased to 10 mg and will switch to Losartan 25mg tomorrow   · Continue Lasix 40 mg       Disposition: Pending palliative care consult and family meeting to establish goals of care    SUBJECTIVE   Pt remains hypertensive Earlier this morning VS: /121, HR 49, T 97 1, O2Sat 99  Patient seen at bedside and evaluated  No acute changes, currently stable on room air  Continues to have limited PO intake and inadequate free water hydration  Ill-appearing, toxic appearance  Increased lethargy appreciated noted during encounter compared to yesterday  S/P 7 days of IV antibiotics, per ID can discontinue to complete course  ROS could not be performed due to patients baseline status  OBJECTIVE     Vitals:    22 0603 22 0722 22 0830 22 1125   BP: (!) 175/73 (!) 188/121  167/76   Pulse: 57 (!) 49  56   Resp:       Temp:  (!) 97 1 °F (36 2 °C)     TempSrc:       SpO2:  99% 99% 96%   Weight:       Height:          Temperature:   Temp (24hrs), Av 6 °F (36 4 °C), Min:97 1 °F (36 2 °C), Max:98 1 °F (36 7 °C)    Temperature: (!) 97 1 °F (36 2 °C)  Intake & Output:  I/O        0701  08/10 0700 08/10 0701   0700  0701  / 0700    P  O  480 168 240    I V  (mL/kg)  1453 3 (15 1)     Total Intake(mL/kg) 480 (5) 1621 3 (16 9) 240 (2 5)    Net +480 +1621 3 +240           Unmeasured Urine Occurrence  3 x         Weights:   IBW (Ideal Body Weight): 68 4 kg    Body mass index is 32 18 kg/m²  Weight (last 2 days)     Date/Time Weight    22 0543 96 (211 64)    08/10/22 0615 96 3 (212 3)        Physical Exam  Vitals reviewed  Constitutional:       Appearance: He is obese  He is ill-appearing and toxic-appearing  HENT:      Head: Normocephalic and atraumatic  Eyes:      Extraocular Movements: Extraocular movements intact  Pupils: Pupils are equal, round, and reactive to light  Cardiovascular:      Rate and Rhythm: Normal rate and regular rhythm  Pulmonary:      Effort: Pulmonary effort is normal  No respiratory distress  Abdominal:      Palpations: Abdomen is soft  Tenderness: There is no abdominal tenderness  Comments: Diffuse anasarca   Musculoskeletal:         General: Normal range of motion        Right lower leg: No edema  Left lower leg: No edema  Skin:     General: Skin is dry  Capillary Refill: Capillary refill takes less than 2 seconds  Comments: Cool to touch isolated to the hands and feet b/l   Neurological:      General: No focal deficit present  Mental Status: Mental status is at baseline  Comments: lethargic   Psychiatric:         Mood and Affect: Mood normal          Behavior: Behavior normal        LABORATORY DATA     Labs: I have personally reviewed pertinent reports  Results from last 7 days   Lab Units 08/10/22  0528 08/08/22  0502 08/07/22  0609 08/06/22  0432 08/05/22  0547 08/04/22  1800   WBC Thousand/uL 12 43* 10 15 12 97* 14 25* 18 70* 29 44*   HEMOGLOBIN g/dL 10 5* 10 6* 10 5* 9 9* 10 0* 10 3*   HEMATOCRIT % 34 7* 34 9* 34 6* 31 7* 30 8* 32 6*   PLATELETS Thousands/uL 304 238 192 181 205 222   NEUTROS PCT %  --   --   --  85* 89* 89*   MONOS PCT %  --   --   --  6 5 6      Results from last 7 days   Lab Units 08/10/22  2036 08/10/22  0528 08/09/22  0903 08/08/22  0502 08/07/22  0609 08/06/22  0432 08/05/22  0547   POTASSIUM mmol/L 3 7 3 8 3 7 3 7 3 9   < > 3 3*   CHLORIDE mmol/L 112* 116* 114* 115* 112*   < > 108   CO2 mmol/L 34* 32 33* 31 29   < > 26   BUN mg/dL 12 14 14 17 18   < > 29*   CREATININE mg/dL 0 71 0 81 0 89 0 92 0 97   < > 1 37*   CALCIUM mg/dL 9 0 8 6 8 3 8 5 8 7   < > 8 1*   ALK PHOS U/L  --   --   --  487* 540*  --  572*   ALT U/L  --   --   --  68 85*  --  159*   AST U/L  --   --   --  32 24  --  78*    < > = values in this interval not displayed       Results from last 7 days   Lab Units 08/10/22  0528 08/09/22  0903 08/07/22  0609   MAGNESIUM mg/dL 2 7* 2 5 2 4     Results from last 7 days   Lab Units 08/06/22  0432 08/04/22  1800   PHOSPHORUS mg/dL 2 3 3 1      Results from last 7 days   Lab Units 08/09/22  0903 08/09/22  0007 08/08/22  1216 08/07/22  1543 08/04/22  1800   INR   --   --   --   --  1 50*   PTT seconds 63* 60* 43*   < >  --     < > = values in this interval not displayed  Results from last 7 days   Lab Units 08/04/22  2141   LACTIC ACID mmol/L 2 5*           IMAGING & DIAGNOSTIC TESTING     Radiology Results: I have personally reviewed pertinent reports  ERCP Fluoro    Result Date: 8/4/2022  Impression: ERCP with cholangitis s/p placement of 10 x 4 cm fully covered metal stent  RECOMMENDATION: Follow LFTs Continue antibiotics Further care per ICU team May require EUS for biopsy/staging of mass at later date  Donita Bobo MD     XR chest 1 view portable    Result Date: 8/4/2022  Impression: Suspected left retrocardiac consolidation and/or small effusion  Cardiomegaly and aortic sclerosis  Workstation performed: [id number]     CT head wo contrast    Result Date: 8/9/2022  Impression: Stable examination  Moderate chronic microangiopathic change within the cerebral hemispheres  The previously identified right cerebellar infarct is unchanged from multiple prior examinations  In retrospect this infarct has been present on prior CT exams for more linear and is chronic, rather than early as stated on the most recent CT report  Workstation performed: [id number]     CT head wo contrast    Result Date: 8/6/2022  Impression: Early infarct within the right cerebellar hemisphere is similar to the examination performed 2 days ago  No hemorrhagic transformation  Stable advanced chronic microangiopathic change within both cerebral hemispheres  Workstation performed: [id number]     CT head without contrast    Result Date: 8/4/2022  Impression: New low-density in the right cerebellum concerning for an acute infarct  Cerebral atrophy and small vessel disease  I personally discussed this study with Deb Neff on 8/4/2022 at 11:28 AM  Workstation performed: [id number]     MRI brain wo contrast    Result Date: 8/7/2022  Impression: No acute intracranial abnormality   Chronic encephalomalacia and gliosis in right cerebellum, 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 is partially obscured by susceptibility artifact from posterior spinal fixation hardware extending to the occiput  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  Workstation performed: [id number]     CT chest abdomen pelvis w contrast    Result Date: 8/4/2022  Impression: 1  Intrahepatic and extrahepatic biliary ductal dilatation with pancreatic body and tail atrophy and suspected pancreatic head mass  Confirmation should be obtained with MRI abdomen with MRCP  2   No infiltrates on study limited by respiratory motion  I personally discussed this study with Kae Cobian on 8/4/2022 at 11:28 AM  Workstation performed: [id number]     FL ERCP biliary only    Result Date: 8/5/2022  Impression: Distal CBD stent placement  Workstation performed: [id number]     CXR in AM    Result Date: 8/5/2022  Impression: ET tube and right IJ line remain in stable position  No active pulmonary disease  Workstation performed: [id number]     XR chest portable ICU    Result Date: 8/5/2022  Impression: Right IJ line has been placed, tip overlies the SVC  No pneumothorax  Workstation performed: [id number]     XR chest portable ICU    Result Date: 8/5/2022  Impression: ET tube appears in position  Possible small left pleural effusion, stable  Workstation performed: [id number]     Other Diagnostic Testing: I have personally reviewed pertinent reports      ACTIVE MEDICATIONS     Current Facility-Administered Medications   Medication Dose Route Frequency    albuterol inhalation solution 2 5 mg  2 5 mg Nebulization Q4H PRN    apixaban (ELIQUIS) tablet 5 mg  5 mg Oral BID    aspirin chewable tablet 81 mg  81 mg Oral Daily    atorvastatin (LIPITOR) tablet 20 mg  20 mg Oral Daily With Dinner    carvedilol (COREG) tablet 12 5 mg  12 5 mg Oral BID With Meals    furosemide (LASIX) tablet 40 mg  40 mg Oral Daily    insulin lispro (HumaLOG) 100 units/mL subcutaneous injection 1-6 Units  1-6 Units Subcutaneous TID AC    insulin lispro (HumaLOG) 100 units/mL subcutaneous injection 1-6 Units  1-6 Units Subcutaneous HS    labetalol (NORMODYNE) injection 10 mg  10 mg Intravenous Q6H PRN    [START ON 8/12/2022] losartan (COZAAR) tablet 25 mg  25 mg Oral Daily    omeprazole (PRILOSEC) suspension 2 mg/mL  20 mg Oral Early Morning    potassium chloride oral solution 40 mEq  40 mEq Oral Once    senna-docusate sodium (SENOKOT S) 8 6-50 mg per tablet 1 tablet  1 tablet Oral HS       VTE Pharmacologic Prophylaxis: RX contraindicated due to: current medical therapy for Afib   VTE Mechanical Prophylaxis: sequential compression device    Portions of the record may have been created with voice recognition software  Occasional wrong word or "sound a like" substitutions may have occurred due to the inherent limitations of voice recognition software    Read the chart carefully and recognize, using context, where substitutions have occurred   ==  Latasha Ingram, [facility]  Internal Medicine Residency PGY-1

## 2022-08-11 NOTE — ASSESSMENT & PLAN NOTE
Recent rise in sodium levels during hospital stay  Likely 2/2 to significant deficit in free water intake     - Pt has been noted to have difficulty feeding and drinking without assistance  - Nursing staff have been notified and encouraged free water intake  - LR Hydration   0   Lab Value Date/Time    SODIUM 150 (H) 08/10/2022 0528    SODIUM 148 (H) 08/09/2022 0903    SODIUM 148 (H) 08/08/2022 0502    SODIUM 144 08/07/2022 0609    SODIUM 145 08/06/2022 0432    SODIUM 141 08/05/2022 0547

## 2022-08-11 NOTE — QUICK NOTE
Had a long discussion with patient's son, Sonja Weber, regarding progression of illness despite maximal medical therapy  Sonja Weber is very understanding of poor prognosis, understood all of the patient's active and progressive problems as I explained to him, and is amenable to organizing family meeting with palliative to discuss goals of care and possible palliative vs hospice  I had tried to contact the spouse multiple times without answer  Sonja Weber is going to update his mother, two brothers, and uncle  Palliative physician has been consulted and will be reaching out to Sonja Weber this afternoon to set-up timing of goals of care discussion

## 2022-08-11 NOTE — PROGRESS NOTES
Progress Note - Infectious Disease   Ananth Flatness 82 y o  male MRN: [id number]  Unit/Bed#: [facility] 711-01 Encounter: [id number]      Impression/Recommendations:  1   Severe sepsis, POA   WBC, RR, lactic acidosis   Due to #2/3   No other appreciable source   Unable to obtain ROS    Exam otherwise benign   Flu/RSV/COVID PCR, UA, chest x-ray negative   Improving with antibiotics, biliary decompression  Rec:  · Discontinue antibiotics as below to complete treatment course  · Follow temperatures and WBC closely  · Supportive care as per the primary service     2   ESBL Klebsiella bacteremia   Due to #3   No other appreciable source   UA negative  Status post 7 days IV ertapenem  Rec:  · Discontinue ertapenem today to complete treatment course     3   Recurrent acute cholangitis   In setting of # 4, recent attempt at stent removal   CT showed intra and extrahepatic biliary dilation   LFTs trending down  Status post 7 days IV ertapenem  Rec:  · Discontinue antibiotics as above     4   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   Status post ERCP, stent placement June 2022   Status post recent ERCP, stent removal 8/3/22   Presented with infection as above, found to have intra and extrahepatic biliary ductal dilation   Status post repeat ERCP and stent replacement 08/04/2022  Brushings show adenocarcinoma      5   Chronic cerebellar CVA   Serial head CT stable      6   Amoxicillin allergy   Tolerating cephalosporin, carbapenems      7   DM  8   Failure to thrive  Poor PO intake and mental status in setting of advanced age, multiple comorbidities, new diagnosis of malignancy  Rec:  · Continue to address [address]     The patient is stable from an ID standpoint  The above plan was discussed in detail with Dr Alvarado  We will sign off for now  Please call with new questions      Antibiotics:  Ertapenem #7    Subjective:  Patient seen on AM rounds  Less responsive today, unable to obtain ROS      24 Hour Events:  No documented fevers, chills, sweats, nausea, vomiting, or diarrhea  Poor PO intake noted  Sodium has been elevated  Objective:  Vitals:  Temp:  [97 1 °F (36 2 °C)-98 1 °F (36 7 °C)] 97 1 °F (36 2 °C)  HR:  [49-61] 56  BP: (167-188)/() 167/76  SpO2:  [96 %-99 %] 96 %  Temp (24hrs), Av 6 °F (36 4 °C), Min:97 1 °F (36 2 °C), Max:98 1 °F (36 7 °C)  Current: Temperature: (!) 97 1 °F (36 2 °C)    Physical Exam:   General:  No acute distress  Psychiatric:  Awake and alert  Pulmonary:  Normal respiratory excursion without accessory muscle use  Abdomen:  Soft, nontender  Extremities:  Generalized anasarca  Skin:  No rashes    Lab Results:  I have personally reviewed pertinent labs  Results from last 7 days   Lab Units 08/10/22  2036 08/10/22  0528 22  1763 22  0502 22  0609 22  0432 22  0547   POTASSIUM mmol/L 3 7 3 8 3 7 3 7 3 9   < > 3 3*   CHLORIDE mmol/L 112* 116* 114* 115* 112*   < > 108   CO2 mmol/L 34* 32 33* 31 29   < > 26   BUN mg/dL 12 14 14 17 18   < > 29*   CREATININE mg/dL 0 71 0 81 0 89 0 92 0 97   < > 1 37*   EGFR ml/min/1 73sq m 85 81 77 75 70   < > 46   CALCIUM mg/dL 9 0 8 6 8 3 8 5 8 7   < > 8 1*   AST U/L  --   --   --  32 24  --  78*   ALT U/L  --   --   --  68 85*  --  159*   ALK PHOS U/L  --   --   --  487* 540*  --  572*    < > = values in this interval not displayed  Results from last 7 days   Lab Units 08/10/22  0528 22  0502 22  0609   WBC Thousand/uL 12 43* 10 15 12 97*   HEMOGLOBIN g/dL 10 5* 10 6* 10 5*   PLATELETS Thousands/uL 304 238 192           Imaging Studies:   I have personally reviewed pertinent imaging study reports and images in PACS  EKG, Pathology, and Other Studies:   I have personally reviewed pertinent reports

## 2022-08-11 NOTE — PLAN OF CARE
Problem: Potential for Falls  Goal: Patient will remain free of falls  Description: INTERVENTIONS:  - Educate patient/family on patient safety including physical limitations  - Instruct patient to call for assistance with activity   - Consult OT/PT to assist with strengthening/mobility   - Keep Call bell within reach  - Keep bed low and locked with side rails adjusted as appropriate  - Keep care items and personal belongings within reach  - Initiate and maintain comfort rounds  - Make Fall Risk Sign visible to staff  - Offer Toileting every 2 Hours, in advance of need  - Initiate/Maintain chair alarm  - Obtain necessary fall risk management equipment: alarm  - Apply yellow socks and bracelet for high fall risk patients  - Consider moving patient to room near nurses station  Outcome: Progressing     Problem: MOBILITY - ADULT  Goal: Maintain or return to baseline ADL function  Description: INTERVENTIONS:  -  Assess patient's ability to carry out ADLs; assess patient's baseline for ADL function and identify physical deficits which impact ability to perform ADLs (bathing, care of mouth/teeth, toileting, grooming, dressing, etc )  - Assess/evaluate cause of self-care deficits   - Assess range of motion  - Assess patient's mobility; develop plan if impaired  - Assess patient's need for assistive devices and provide as appropriate  - Encourage maximum independence but intervene and supervise when necessary  - Involve family in performance of ADLs  - Assess for home care needs following discharge   - Consider OT consult to assist with ADL evaluation and planning for discharge  - Provide patient education as appropriate  Outcome: Progressing     Problem: MOBILITY - ADULT  Goal: Maintains/Returns to pre admission functional level  Description: INTERVENTIONS:  - Perform BMAT or MOVE assessment daily    - Set and communicate daily mobility goal to care team and patient/family/caregiver     - Collaborate with rehabilitation services on mobility goals if consulted  - Perform Range of Motion 4 times a day  - Reposition patient every 2 hours  - Dangle patient 1 times a day  - Stand patient 1 times a day  - Ambulate patient 1 times a day  - Out of bed to chair 1 times a day   - Record patient progress and toleration of activity level   Outcome: Progressing     Problem: Prexisting or High Potential for Compromised Skin Integrity  Goal: Skin integrity is maintained or improved  Description: INTERVENTIONS:  - Identify patients at risk for skin breakdown  - Assess and monitor skin integrity  - Assess and monitor nutrition and hydration status  - Monitor labs   - Assess for incontinence   - Turn and reposition patient  - Assist with mobility/ambulation  - Relieve pressure over bony prominences  - Avoid friction and shearing  - Provide appropriate hygiene as needed including keeping skin clean and dry  - Evaluate need for skin moisturizer/barrier cream  - Collaborate with interdisciplinary team   - Patient/family teaching  - Consider wound care consult   Outcome: Progressing     Problem: Nutrition/Hydration-ADULT  Goal: Nutrient/Hydration intake appropriate for improving, restoring or maintaining nutritional needs  Description: Monitor and assess patient's nutrition/hydration status for malnutrition  Collaborate with interdisciplinary team and initiate plan and interventions as ordered  Monitor patient's weight and dietary intake as ordered or per policy  Utilize nutrition screening tool and intervene as necessary  Determine patient's food preferences and provide high-protein, high-caloric foods as appropriate       INTERVENTIONS:  - Monitor oral intake, urinary output, labs, and treatment plans  - Assess nutrition and hydration status and recommend course of action  - Evaluate amount of meals eaten  - Assist patient with eating if necessary   - Allow adequate time for meals  - Recommend/ encourage appropriate diets, oral nutritional supplements, and vitamin/mineral supplements  - Order, calculate, and assess calorie counts as needed  - Recommend, monitor, and adjust tube feedings and TPN/PPN based on assessed needs  - Assess need for intravenous fluids  - Provide specific nutrition/hydration education as appropriate  - Include patient/family/caregiver in decisions related to nutrition  Outcome: Progressing     Problem: PAIN - ADULT  Goal: Verbalizes/displays adequate comfort level or baseline comfort level  Description: Interventions:  - Encourage patient to monitor pain and request assistance  - Assess pain using appropriate pain scale  - Administer analgesics based on type and severity of pain and evaluate response  - Implement non-pharmacological measures as appropriate and evaluate response  - Consider cultural and social influences on pain and pain management  - Notify physician/advanced practitioner if interventions unsuccessful or patient reports new pain  Outcome: Progressing     Problem: INFECTION - ADULT  Goal: Absence or prevention of progression during hospitalization  Description: INTERVENTIONS:  - Assess and monitor for signs and symptoms of infection  - Monitor lab/diagnostic results  - Monitor all insertion sites, i e  indwelling lines, tubes, and drains  - Monitor endotracheal if appropriate and nasal secretions for changes in amount and color  - Johnston appropriate cooling/warming therapies per order  - Administer medications as ordered  - Instruct and encourage patient and family to use good hand hygiene technique  - Identify and instruct in appropriate isolation precautions for identified infection/condition  Outcome: Progressing     Problem: DISCHARGE PLANNING  Goal: Discharge to home or other facility with appropriate resources  Description: INTERVENTIONS:  - Identify barriers to discharge w/patient and caregiver  - Arrange for needed discharge resources and transportation as appropriate  - Identify discharge learning needs (meds, wound care, etc )  - Arrange for interpretive services to assist at discharge as needed  - Refer to Case Management Department for coordinating discharge planning if the patient needs post-hospital services based on physician/advanced practitioner order or complex needs related to functional status, cognitive ability, or social support system  Outcome: Progressing     Problem: Knowledge Deficit  Goal: Patient/family/caregiver demonstrates understanding of disease process, treatment plan, medications, and discharge instructions  Description: Complete learning assessment and assess knowledge base    Interventions:  - Provide teaching at level of understanding  - Provide teaching via preferred learning methods  Outcome: Progressing

## 2022-08-11 NOTE — PROGRESS NOTES
General Cardiology   Progress Note -  Team One   Baby Toya 82 y o  male MRN: [id number]    Unit/Bed#: [facility] 711-01 Encounter: [id number]    Assessment:    1  PAF with RVR:  Reported to have chronic AF per primary cardiologist  Noted to have PAF with with episodes of RVR this admission   Currently on Lopressor 50 mg BID   Eliquis 5 mg BID restarted  · Home regimen includes diltiazem 240 mg daily and Toprol- mg daily  2  Subacute right cerebellar CVA:  Management per Neurology and primary team   3  Sepsis:  Secondary to cholangitis  BC growing ESBL and Klebsiella   Management per primary team and ID  4  CAD: s/p YULIA to the RCA in 2014  Maintained on aspirin, beta-blocker and statin     5  Chronic combined systolic/diastolic CHF:  Lasix 40 mg daily restarted 8/10   6  Ischemic cardiomyopathy:  EF 45% w/ RWMA, moderate concentric LVH, G2DD   When compared to the echocardiogram from 2015 at [facility] EF was 50%  Currently on Lopressor with initiation of lisinopril 5 mg daily today  7  Chronic LBBB  8  Essential hypertension:  Average /90 Lopressor 50 mg TID and lisinopril 5 mg daily  9  Hyperlipidemia: TC 69, , HDL 6, LDL unable to calculate on atorvastatin 20 mg daily  10  Type 2 DM:  Hemoglobin A1c 7 5   Management per primary team  11  Pancreatic head mass:  Management per primary team   12  Vascular dementia        Plan/Recommendations:  · Follow-up on BMP today  · Switch Lopressor to carvedilol 12 5 mg BID and titrate to optimize BP  · Increase lisinopril to 10 mg daily today and switch to losartan 25 mg daily starting tomorrow  · Continue Lasix 40 mg daily  · Follow-up with primary cardiologist Suzette Mcfarlane [facility]  ____________________________________________________________    Subjective    Patient seen and examined  No acute events overnight  He denies any chest pain, shortness of breath or palpitations  Review of Systems   Constitutional: Negative  Negative for chills     Cardiovascular: Negative for chest pain, dyspnea on exertion, leg swelling, near-syncope, orthopnea, palpitations, paroxysmal nocturnal dyspnea and syncope  Respiratory: Negative  Negative for cough, shortness of breath and wheezing  Endocrine: Negative  Hematologic/Lymphatic: Negative  Skin: Negative  Musculoskeletal: Negative  Gastrointestinal: Negative  Negative for diarrhea, nausea and vomiting  Neurological: Negative for dizziness, light-headedness and weakness  Psychiatric/Behavioral: Negative  Negative for altered mental status  All other systems reviewed and are negative  Objective:   Vitals: Blood pressure (!) 188/121, pulse (!) 49, temperature (!) 97 1 °F (36 2 °C), resp  rate 18, height 5' 8" (1 727 m), weight 96 kg (211 lb 10 3 oz), SpO2 99 %  ,     Wt Readings from Last 3 Encounters:   08/11/22 96 kg (211 lb 10 3 oz)   08/05/22 93 9 kg (207 lb 0 2 oz)   08/03/22 93 9 kg (207 lb)        Lab Results   Component Value Date    CREATININE 0 71 08/10/2022    CREATININE 0 81 08/10/2022    CREATININE 0 89 08/09/2022         Body mass index is 32 18 kg/m²  ,     Systolic (35AOS), ALI:065 , Min:174 , PCA:456     Diastolic (21ORU), MZM:28, Min:73, Max:121          Intake/Output Summary (Last 24 hours) at 8/11/2022 0912  Last data filed at 8/11/2022 0903  Gross per 24 hour   Intake 1743 33 ml   Output --   Net 1743 33 ml     Weight (last 2 days)     Date/Time Weight    08/11/22 0543 96 (211 64)    08/10/22 0615 96 3 (212 3)        Telemetry Review:  Sinus rhythm with runs of AIVR and NSVT      Physical Exam  Vitals and nursing note reviewed  Constitutional:       General: He is not in acute distress  Appearance: He is well-developed  Comments: On RA in NAD   HENT:      Head: Normocephalic and atraumatic  Neck:      Vascular: No JVD  Cardiovascular:      Rate and Rhythm: Normal rate and regular rhythm  Heart sounds: Normal heart sounds  No murmur heard  No friction rub  No gallop  Pulmonary:      Effort: Pulmonary effort is normal  No respiratory distress  Breath sounds: Normal breath sounds  No wheezing or rales  Chest:      Chest wall: No tenderness  Abdominal:      General: Bowel sounds are normal  There is no distension  Palpations: Abdomen is soft  Tenderness: There is no abdominal tenderness  Musculoskeletal:         General: No tenderness  Normal range of motion  Cervical back: Normal range of motion and neck supple  Right lower leg: No edema  Left lower leg: No edema  Skin:     General: Skin is warm and dry  Coloration: Skin is not pale  Findings: No erythema  Neurological:      Mental Status: He is alert        Comments: forgetful         LABORATORY RESULTS      CBC with diff:   Results from last 7 days   Lab Units 08/10/22  0528 08/08/22  0502 08/07/22  0609 08/06/22  0432 08/05/22  0547 08/04/22  1800   WBC Thousand/uL 12 43* 10 15 12 97* 14 25* 18 70* 29 44*   HEMOGLOBIN g/dL 10 5* 10 6* 10 5* 9 9* 10 0* 10 3*   HEMATOCRIT % 34 7* 34 9* 34 6* 31 7* 30 8* 32 6*   MCV fL 83 83 82 81* 80* 81*   PLATELETS Thousands/uL 304 238 192 181 205 222   MCH pg 25 0* 25 1* 25 0* 25 3* 26 0* 25 6*   MCHC g/dL 30 3* 30 4* 30 3* 31 2* 32 5 31 6   RDW % 19 6* 19 0* 18 9* 18 6* 18 8* 18 6*   MPV fL 11 3 11 6 11 4 11 4 11 7 11 0   NRBC AUTO /100 WBCs  --   --   --  0 0 0       CMP:  Results from last 7 days   Lab Units 08/10/22  2036 08/10/22  0528 08/09/22  0903 08/08/22  0502 08/07/22  0609 08/06/22  0432 08/05/22  0547 08/04/22  1800   POTASSIUM mmol/L 3 7 3 8 3 7 3 7 3 9 3 5 3 3* 3 5   CHLORIDE mmol/L 112* 116* 114* 115* 112* 113* 108 107   CO2 mmol/L 34* 32 33* 31 29 29 26 27   BUN mg/dL 12 14 14 17 18 22 29* 25   CREATININE mg/dL 0 71 0 81 0 89 0 92 0 97 1 00 1 37* 1 38*   CALCIUM mg/dL 9 0 8 6 8 3 8 5 8 7 8 4 8 1* 8 2*   AST U/L  --   --   --  32 24  --  78* 124*   ALT U/L  --   --   --  68 85*  --  159* 206*   ALK PHOS U/L  --   --   --  487* 540*  --  572* 679*   EGFR ml/min/1 73sq m 85 81 77 75 70 68 46 46       BMP:  Results from last 7 days   Lab Units 08/10/22  2036 08/10/22  0528 08/09/22  0903 08/08/22  0502 08/07/22  0609 08/06/22  0432 08/05/22  0547   POTASSIUM mmol/L 3 7 3 8 3 7 3 7 3 9 3 5 3 3*   CHLORIDE mmol/L 112* 116* 114* 115* 112* 113* 108   CO2 mmol/L 34* 32 33* 31 29 29 26   BUN mg/dL 12 14 14 17 18 22 29*   CREATININE mg/dL 0 71 0 81 0 89 0 92 0 97 1 00 1 37*   CALCIUM mg/dL 9 0 8 6 8 3 8 5 8 7 8 4 8 1*       Lab Results   Component Value Date    NTBNP 21,688 (H) 08/04/2022    NTBNP 1,240 (H) 03/06/2021             Results from last 7 days   Lab Units 08/10/22  0528 08/09/22  0903 08/07/22  0609 08/06/22  0432 08/04/22  1800   MAGNESIUM mg/dL 2 7* 2 5 2 4 2 3 1 8          Results from last 7 days   Lab Units 08/05/22  0547   HEMOGLOBIN A1C % 7 5*              Results from last 7 days   Lab Units 08/04/22  1800   INR  1 50*       Lipid Profile:   No results found for: CHOL  Lab Results   Component Value Date    HDL 6 (L) 08/05/2022    HDL 38 (L) 05/02/2018    HDL 44 02/07/2017     Lab Results   Component Value Date    LDLCALC  08/05/2022      Comment:      Calculated LDL invalid, triglycerides >400 mg/dl  This screening LDL is a calculated result  It does not have the accuracy of the Direct Measured LDL in the monitoring of patients with hyperlipidemia and/or statin therapy  Direct Measure LDL (KFZ072) must be ordered separately in these patients  LDLCALC 58 05/02/2018    LDLCALC 42 02/07/2017     Lab Results   Component Value Date    TRIG 469 (H) 08/05/2022    TRIG 108 05/02/2018    TRIG 103 02/07/2017       Cardiac testing:   No results found for this or any previous visit  No results found for this or any previous visit  No results found for this or any previous visit  No valid procedures specified  No results found for this or any previous visit          Meds/Allergies   all current active meds have been reviewed, current meds:   Current Facility-Administered Medications   Medication Dose Route Frequency    albuterol inhalation solution 2 5 mg  2 5 mg Nebulization Q4H PRN    apixaban (ELIQUIS) tablet 5 mg  5 mg Oral BID    aspirin chewable tablet 81 mg  81 mg Oral Daily    atorvastatin (LIPITOR) tablet 20 mg  20 mg Oral Daily With Dinner    ertapenem (INVanz) 1,000 mg in sodium chloride 0 9 % 50 mL IVPB  1,000 mg Intravenous Q24H    furosemide (LASIX) tablet 40 mg  40 mg Oral Daily    insulin lispro (HumaLOG) 100 units/mL subcutaneous injection 1-6 Units  1-6 Units Subcutaneous TID AC    insulin lispro (HumaLOG) 100 units/mL subcutaneous injection 1-6 Units  1-6 Units Subcutaneous HS    labetalol (NORMODYNE) injection 10 mg  10 mg Intravenous Q6H PRN    lisinopril (ZESTRIL) tablet 5 mg  5 mg Oral Daily    metoprolol tartrate (LOPRESSOR) tablet 50 mg  50 mg Oral Q8H    omeprazole (PRILOSEC) suspension 2 mg/mL  20 mg Oral Early Morning    potassium chloride oral solution 40 mEq  40 mEq Oral Once    senna-docusate sodium (SENOKOT S) 8 6-50 mg per tablet 1 tablet  1 tablet Oral HS    and PTA meds:   Prior to Admission Medications   Prescriptions Last Dose Informant Patient Reported? Taking?    apixaban (ELIQUIS) 5 mg   Yes No   Sig: Take 1 tablet by mouth 2 (two) times a day   atorvastatin (LIPITOR) 20 mg tablet  Outside Facility (Specify) Yes No   Sig: Take 1 tablet by mouth daily   bisacodyl (DULCOLAX) 10 mg suppository  Outside Facility (Specify) Yes No   Sig: Insert 10 mg into the rectum as needed for constipation   buPROPion (WELLBUTRIN) 100 mg tablet   Yes No   Sig: Take 100 mg by mouth in the morning   diltiazem (CARDIZEM CD) 240 mg 24 hr capsule  Outside Facility (Specify) Yes No   Sig: Take 1 capsule by mouth daily   ferrous sulfate 325 (65 Fe) mg tablet   Yes No   Sig: Take 325 mg by mouth every other day   furosemide (LASIX) 40 mg tablet  Outside Facility (Specify) Yes No   Sig: Take 1 5 tablets by mouth 2 (two) times a day 60 MG BID   hydrALAZINE (APRESOLINE) 10 mg tablet  Outside Facility (Specify) Yes No   Sig: Take 10 mg by mouth 3 (three) times a day   insulin glargine (LANTUS) 100 units/mL subcutaneous injection   No No   Sig: Inject 35 Units under the skin daily at bedtime   insulin lispro (HumaLOG) 100 units/mL injection   No No   Sig: Inject 12 Units under the skin 3 (three) times a day with meals for 10 days   magnesium hydroxide (MILK OF MAGNESIA) 400 mg/5 mL oral suspension  Outside Facility (Specify) Yes No   Sig: Take 30 mL by mouth daily as needed for constipation   metoprolol succinate (TOPROL-XL) 100 mg 24 hr tablet   Yes No   Sig: Take 1 tablet by mouth daily   omeprazole (PriLOSEC) 40 MG capsule  Outside Facility (Specify) Yes No   Sig: Take 1 capsule by mouth daily   polyethylene glycol (MIRALAX) 17 g packet   Yes No   Sig: Take 17 g by mouth every other day    sodium phosphate-biphosphate (FLEET) 7-19 g 118 mL enema  Outside Facility (Specify) Yes No   Sig: Insert 1 enema into the rectum once as needed      Facility-Administered Medications: None     Medications Prior to Admission   Medication    apixaban (ELIQUIS) 5 mg    atorvastatin (LIPITOR) 20 mg tablet    bisacodyl (DULCOLAX) 10 mg suppository    buPROPion (WELLBUTRIN) 100 mg tablet    diltiazem (CARDIZEM CD) 240 mg 24 hr capsule    ferrous sulfate 325 (65 Fe) mg tablet    furosemide (LASIX) 40 mg tablet    hydrALAZINE (APRESOLINE) 10 mg tablet    insulin glargine (LANTUS) 100 units/mL subcutaneous injection    insulin lispro (HumaLOG) 100 units/mL injection    magnesium hydroxide (MILK OF MAGNESIA) 400 mg/5 mL oral suspension    metoprolol succinate (TOPROL-XL) 100 mg 24 hr tablet    omeprazole (PriLOSEC) 40 MG capsule    polyethylene glycol (MIRALAX) 17 g packet    sodium phosphate-biphosphate (FLEET) 7-19 g 118 mL enema            Counseling / Coordination of Care  Total floor / unit time spent today 20 minutes    Greater than 50% of total time was spent with the patient and / or family counseling and / or coordination of care  ** Please Note: Dragon 360 Dictation voice to text software may have been used in the creation of this document   **

## 2022-08-11 NOTE — SPEECH THERAPY NOTE
Speech/Language Pathology Progress Note    Patient Name: Tapan Alamo  Today's Date: 8/11/2022     Subjective:  Pt awake, sitting upright in bed  Said "not good" when asked how he is feeling  Objective:  Pt seen for dysphagia therapy  Current diet is puree with honey thick liquids  RN expressed intakes have been reduced for the past few meals  Pt reported poor appetite today but was willing to eat approx 25% of his lunch  Bolus retrieval was adequate, with mildly reduced bolus formation and transfer  Mild oral residual noted with improved clearance when alternating puree and HTL  No s/s aspiration with puree or thin liquids  Assessment:  Puree diet with HTL was recommended as least restrictive diet following VBS 8/6/22  He is currently tolerating this diet  Limited appetite however pt reported no complaints re: diet texture  Plan/Recommendations:  Continue puree diet with honey thick liquids  No additional ST f/u needed at this time  Please re consult with any new changes or concerns

## 2022-08-12 NOTE — PROGRESS NOTES
General Cardiology   Progress Note -  Team One   Ananth Menchaca 82 y o  male MRN: [id number]    Unit/Bed#: [facility] 711-01 Encounter: [id number]    Assessment:    1  PAF with RVR:  Reported to have chronic AF per primary cardiologist  Noted to have PAF with with episodes of RVR this admission   Currently on Coreg 12 5 mg BID   Anticoagulated on Eliquis 5 mg BID  2  Subacute right cerebellar CVA:  Management per Neurology and primary team   3  Sepsis:  Secondary to cholangitis  BC growing ESBL and Klebsiella   Management per primary team and ID  4  CAD: s/p YULIA to the RCA in 2014  Maintained on aspirin, beta-blocker and statin     5  Chronic combined systolic/diastolic CHF:  Lasix 40 mg daily restarted 8/10  Volume status appears stable  6  Ischemic cardiomyopathy:  EF 45% w/ RWMA, moderate concentric LVH, G2DD   When compared to the echocardiogram from 2015 at [facility] EF was 50%      · ON GDMT with Coreg and Losartan  7  Chronic LBBB  8  Essential hypertension:  Average BP 163/74 Coreg 12 5 mg TID and losartan 25 mg daily  9  Hyperlipidemia: TC 69, , HDL 6, LDL unable to calculate on atorvastatin 20 mg daily  10  Type 2 DM:  Hemoglobin A1c 7 5   Management per primary team  11  Pancreatic head mass:  Management per primary team   12  Vascular dementia        Plan/Recommendations:  · Increase carvedilol to 25 mg BID to optimize BP  · Continue losartan 25 mg daily and titrate as needed for BPs  · Continue Lasix 40 mg daily  · Patient's family considering hospice as been offered by palliative team  · Follow-up with primary cardiologist Eliezer [facility]baum [facility]  ____________________________________________________________    Subjective    Patient seen and examined  No acute events overnight  Review of Systems   Constitutional: Negative  Negative for chills  Cardiovascular: Negative for chest pain, dyspnea on exertion, leg swelling, near-syncope, orthopnea, palpitations, paroxysmal nocturnal dyspnea and syncope     Respiratory: Negative  Negative for cough, shortness of breath and wheezing  Endocrine: Negative  Hematologic/Lymphatic: Negative  Skin: Negative  Musculoskeletal: Negative  Gastrointestinal: Negative  Negative for diarrhea, nausea and vomiting  Neurological: Negative for dizziness, light-headedness and weakness  Psychiatric/Behavioral: Negative  Negative for altered mental status  All other systems reviewed and are negative  Objective:   Vitals: Blood pressure (!) 175/75, pulse 66, temperature 98 3 °F (36 8 °C), resp  rate 18, height 5' 8" (1 727 m), weight 96 1 kg (211 lb 12 8 oz), SpO2 98 %  ,     Wt Readings from Last 3 Encounters:   22 96 1 kg (211 lb 12 8 oz)   22 93 9 kg (207 lb 0 2 oz)   22 93 9 kg (207 lb)        Lab Results   Component Value Date    CREATININE 0 84 2022    CREATININE 0 84 2022    CREATININE 0 71 08/10/2022         Body mass index is 32 2 kg/m²  ,     Systolic (66POB), WW , Min:156 , LBS:800     Diastolic (01JMJ), XQY:79, Min:73, Max:76          Intake/Output Summary (Last 24 hours) at 2022 0904  Last data filed at 2022 1730  Gross per 24 hour   Intake 354 ml   Output --   Net 354 ml     Weight (last 2 days)     Date/Time Weight    22 0547 96 1 (211 8)    22 0543 96 (211 64)    08/10/22 0615 96 3 (212 3)        Telemetry Review: NSR with 4 beats NSVT      Physical Exam  Vitals and nursing note reviewed  Constitutional:       General: He is not in acute distress  Appearance: He is well-developed  Comments: ON RA in NAD   HENT:      Head: Normocephalic and atraumatic  Neck:      Vascular: No JVD  Cardiovascular:      Rate and Rhythm: Normal rate and regular rhythm  Heart sounds: Normal heart sounds  No murmur heard  No friction rub  No gallop  Pulmonary:      Effort: Pulmonary effort is normal  No respiratory distress  Breath sounds: Normal breath sounds  No wheezing or rales     Chest: Chest wall: No tenderness  Abdominal:      General: Bowel sounds are normal  There is no distension  Palpations: Abdomen is soft  Tenderness: There is no abdominal tenderness  Musculoskeletal:         General: No tenderness  Normal range of motion  Cervical back: Normal range of motion and neck supple  Right lower leg: No edema  Left lower leg: No edema  Skin:     General: Skin is warm and dry  Coloration: Skin is not pale  Findings: No erythema  Neurological:      Mental Status: He is alert     Psychiatric:      Comments: forgetful         LABORATORY RESULTS      CBC with diff:   Results from last 7 days   Lab Units 08/12/22  0600 08/10/22  0528 08/08/22  0502 08/07/22  0609 08/06/22  0432   WBC Thousand/uL 11 09* 12 43* 10 15 12 97* 14 25*   HEMOGLOBIN g/dL 10 2* 10 5* 10 6* 10 5* 9 9*   HEMATOCRIT % 33 5* 34 7* 34 9* 34 6* 31 7*   MCV fL 82 83 83 82 81*   PLATELETS Thousands/uL 335 304 238 192 181   MCH pg 24 9* 25 0* 25 1* 25 0* 25 3*   MCHC g/dL 30 4* 30 3* 30 4* 30 3* 31 2*   RDW % 19 9* 19 6* 19 0* 18 9* 18 6*   MPV fL 10 7 11 3 11 6 11 4 11 4   NRBC AUTO /100 WBCs 0  --   --   --  0       CMP:  Results from last 7 days   Lab Units 08/12/22  0600 08/11/22  1554 08/10/22  2036 08/10/22  0528 08/09/22  0903 08/08/22  0502 08/07/22  0609   POTASSIUM mmol/L 3 7 3 8 3 7 3 8 3 7 3 7 3 9   CHLORIDE mmol/L 110* 108 112* 116* 114* 115* 112*   CO2 mmol/L 34* 34* 34* 32 33* 31 29   BUN mg/dL 13 13 12 14 14 17 18   CREATININE mg/dL 0 84 0 84 0 71 0 81 0 89 0 92 0 97   CALCIUM mg/dL 8 0* 8 5 9 0 8 6 8 3 8 5 8 7   AST U/L 18 23  --   --   --  32 24   ALT U/L 32 34  --   --   --  68 85*   ALK PHOS U/L 346* 353*  --   --   --  487* 540*   EGFR ml/min/1 73sq m 79 79 85 81 77 75 70       BMP:  Results from last 7 days   Lab Units 08/12/22  0600 08/11/22  1554 08/10/22  2036 08/10/22  0528 08/09/22  0903 08/08/22  0502 08/07/22  0609   POTASSIUM mmol/L 3 7 3 8 3 7 3 8 3 7 3 7 3 9 CHLORIDE mmol/L 110* 108 112* 116* 114* 115* 112*   CO2 mmol/L 34* 34* 34* 32 33* 31 29   BUN mg/dL 13 13 12 14 14 17 18   CREATININE mg/dL 0 84 0 84 0 71 0 81 0 89 0 92 0 97   CALCIUM mg/dL 8 0* 8 5 9 0 8 6 8 3 8 5 8 7       Lab Results   Component Value Date    NTBNP 21,688 (H) 08/04/2022    NTBNP 1,240 (H) 03/06/2021             Results from last 7 days   Lab Units 08/11/22  1554 08/10/22  0528 08/09/22  0903 08/07/22  0609 08/06/22  0432   MAGNESIUM mg/dL 2 4 2 7* 2 5 2 4 2 3                           Lipid Profile:   No results found for: CHOL  Lab Results   Component Value Date    HDL 6 (L) 08/05/2022    HDL 38 (L) 05/02/2018    HDL 44 02/07/2017     Lab Results   Component Value Date    LDLCALC  08/05/2022      Comment:      Calculated LDL invalid, triglycerides >400 mg/dl  This screening LDL is a calculated result  It does not have the accuracy of the Direct Measured LDL in the monitoring of patients with hyperlipidemia and/or statin therapy  Direct Measure LDL (WTA027) must be ordered separately in these patients  LDLCALC 58 05/02/2018    LDLCALC 42 02/07/2017     Lab Results   Component Value Date    TRIG 469 (H) 08/05/2022    TRIG 108 05/02/2018    TRIG 103 02/07/2017       Cardiac testing:   No results found for this or any previous visit  No results found for this or any previous visit  No results found for this or any previous visit  No valid procedures specified  No results found for this or any previous visit          Meds/Allergies   all current active meds have been reviewed, current meds:   Current Facility-Administered Medications   Medication Dose Route Frequency    albuterol inhalation solution 2 5 mg  2 5 mg Nebulization Q4H PRN    apixaban (ELIQUIS) tablet 5 mg  5 mg Oral BID    aspirin chewable tablet 81 mg  81 mg Oral Daily    atorvastatin (LIPITOR) tablet 20 mg  20 mg Oral Daily With Dinner    carvedilol (COREG) tablet 12 5 mg  12 5 mg Oral BID With Meals    furosemide (LASIX) tablet 40 mg  40 mg Oral Daily    insulin lispro (HumaLOG) 100 units/mL subcutaneous injection 1-6 Units  1-6 Units Subcutaneous TID AC    insulin lispro (HumaLOG) 100 units/mL subcutaneous injection 1-6 Units  1-6 Units Subcutaneous HS    labetalol (NORMODYNE) injection 10 mg  10 mg Intravenous Q6H PRN    losartan (COZAAR) tablet 25 mg  25 mg Oral Daily    omeprazole (PRILOSEC) suspension 2 mg/mL  20 mg Oral Early Morning    potassium chloride oral solution 40 mEq  40 mEq Oral Once    senna-docusate sodium (SENOKOT S) 8 6-50 mg per tablet 1 tablet  1 tablet Oral HS    and PTA meds:   Prior to Admission Medications   Prescriptions Last Dose Informant Patient Reported? Taking?    apixaban (ELIQUIS) 5 mg   Yes No   Sig: Take 1 tablet by mouth 2 (two) times a day   atorvastatin (LIPITOR) 20 mg tablet  Outside Facility (Specify) Yes No   Sig: Take 1 tablet by mouth daily   bisacodyl (DULCOLAX) 10 mg suppository  Outside Facility (Specify) Yes No   Sig: Insert 10 mg into the rectum as needed for constipation   buPROPion (WELLBUTRIN) 100 mg tablet   Yes No   Sig: Take 100 mg by mouth in the morning   diltiazem (CARDIZEM CD) 240 mg 24 hr capsule  Outside Facility (Specify) Yes No   Sig: Take 1 capsule by mouth daily   ferrous sulfate 325 (65 Fe) mg tablet   Yes No   Sig: Take 325 mg by mouth every other day   furosemide (LASIX) 40 mg tablet  Outside Facility (Specify) Yes No   Sig: Take 1 5 tablets by mouth 2 (two) times a day 60 MG BID   hydrALAZINE (APRESOLINE) 10 mg tablet  Outside Facility (Specify) Yes No   Sig: Take 10 mg by mouth 3 (three) times a day   insulin glargine (LANTUS) 100 units/mL subcutaneous injection   No No   Sig: Inject 35 Units under the skin daily at bedtime   insulin lispro (HumaLOG) 100 units/mL injection   No No   Sig: Inject 12 Units under the skin 3 (three) times a day with meals for 10 days   magnesium hydroxide (MILK OF MAGNESIA) 400 mg/5 mL oral suspension  Outside Facility (Specify) Yes No   Sig: Take 30 mL by mouth daily as needed for constipation   metoprolol succinate (TOPROL-XL) 100 mg 24 hr tablet   Yes No   Sig: Take 1 tablet by mouth daily   omeprazole (PriLOSEC) 40 MG capsule  Outside Facility (Specify) Yes No   Sig: Take 1 capsule by mouth daily   polyethylene glycol (MIRALAX) 17 g packet   Yes No   Sig: Take 17 g by mouth every other day    sodium phosphate-biphosphate (FLEET) 7-19 g 118 mL enema  Outside Facility (Specify) Yes No   Sig: Insert 1 enema into the rectum once as needed      Facility-Administered Medications: None     Medications Prior to Admission   Medication    apixaban (ELIQUIS) 5 mg    atorvastatin (LIPITOR) 20 mg tablet    bisacodyl (DULCOLAX) 10 mg suppository    buPROPion (WELLBUTRIN) 100 mg tablet    diltiazem (CARDIZEM CD) 240 mg 24 hr capsule    ferrous sulfate 325 (65 Fe) mg tablet    furosemide (LASIX) 40 mg tablet    hydrALAZINE (APRESOLINE) 10 mg tablet    insulin glargine (LANTUS) 100 units/mL subcutaneous injection    insulin lispro (HumaLOG) 100 units/mL injection    magnesium hydroxide (MILK OF MAGNESIA) 400 mg/5 mL oral suspension    metoprolol succinate (TOPROL-XL) 100 mg 24 hr tablet    omeprazole (PriLOSEC) 40 MG capsule    polyethylene glycol (MIRALAX) 17 g packet    sodium phosphate-biphosphate (FLEET) 7-19 g 118 mL enema            Counseling / Coordination of Care  Total floor / unit time spent today 20 minutes  Greater than 50% of total time was spent with the patient and / or family counseling and / or coordination of care  ** Please Note: Dragon 360 Dictation voice to text software may have been used in the creation of this document   **

## 2022-08-12 NOTE — RESTORATIVE TECHNICIAN NOTE
Restorative Technician Note      Patient Name: Eliu Ruiz     Note Type: Mobility  Patient Position Upon Consult: Supine  Activity Performed: Repositioned  Patient Position at End of Consult: Supine;  All needs within reach; Bed/Chair alarm activated      Marielle LUGO, Restorative Technician, [employer]

## 2022-08-12 NOTE — ASSESSMENT & PLAN NOTE
Pancreatic head mass  · 8/4 CTAP: Intrahepatic and extrahepatic biliary ductal dilation with pancreatic body and tail atrophy and suspected pancreatic head mass  No gross lung infiltrates noted limited by respiratory motion    · ERCP done 8/4 with stent placed  · CEA normal  · CA 19-9 682    Biliary Brushing Pathology: evidence of adenocarcinoma   Surgical Oncology Consulted: 8/10/22 - Input Appreciated, indicated patient is not a candidate for acute surgical intervention  Not a candidate for med-onc given poor functional status     Patient now comfort measures  Pursuing placement of home hospice back at [facility]-  following

## 2022-08-12 NOTE — PLAN OF CARE
Problem: Potential for Falls  Goal: Patient will remain free of falls  Description: INTERVENTIONS:  - Educate patient/family on patient safety including physical limitations  - Instruct patient to call for assistance with activity   - Consult OT/PT to assist with strengthening/mobility   - Keep Call bell within reach  - Keep bed low and locked with side rails adjusted as appropriate  - Keep care items and personal belongings within reach  - Initiate and maintain comfort rounds  - Make Fall Risk Sign visible to staff    Problem: MOBILITY - ADULT  Goal: Maintain or return to baseline ADL function  Description: INTERVENTIONS:  -  Assess patient's ability to carry out ADLs; assess patient's baseline for ADL function and identify physical deficits which impact ability to perform ADLs (bathing, care of mouth/teeth, toileting, grooming, dressing, etc )  - Assess/evaluate cause of self-care deficits   - Assess range of motion  - Assess patient's mobility; develop plan if impaired  - Assess patient's need for assistive devices and provide as appropriate  - Encourage maximum independence but intervene and supervise when necessary  - Involve family in performance of ADLs  - Assess for home care needs following discharge   - Consider OT consult to assist with ADL evaluation and planning for discharge  - Provide patient education as appropriate  Outcome: Progressing  Goal: Maintains/Returns to pre admission functional level  Description: INTERVENTIONS:  - Perform BMAT or MOVE assessment daily    - Set and communicate daily mobility goal to care team and patient/family/caregiver     - Collaborate with rehabilitation services on mobility goals if consulted    Problem: Prexisting or High Potential for Compromised Skin Integrity  Goal: Skin integrity is maintained or improved  Description: INTERVENTIONS:  - Identify patients at risk for skin breakdown  - Assess and monitor skin integrity  - Assess and monitor nutrition and hydration status  - Monitor labs   - Assess for incontinence   - Turn and reposition patient  - Assist with mobility/ambulation  - Relieve pressure over bony prominences  - Avoid friction and shearing  - Provide appropriate hygiene as needed including keeping skin clean and dry  - Evaluate need for skin moisturizer/barrier cream  - Collaborate with interdisciplinary team   - Patient/family teaching  - Consider wound care consult   Outcome: Progressing     Problem: Nutrition/Hydration-ADULT  Goal: Nutrient/Hydration intake appropriate for improving, restoring or maintaining nutritional needs  Description: Monitor and assess patient's nutrition/hydration status for malnutrition  Collaborate with interdisciplinary team and initiate plan and interventions as ordered  Monitor patient's weight and dietary intake as ordered or per policy  Utilize nutrition screening tool and intervene as necessary  Determine patient's food preferences and provide high-protein, high-caloric foods as appropriate       INTERVENTIONS:  - Monitor oral intake, urinary output, labs, and treatment plans  - Assess nutrition and hydration status and recommend course of action  - Evaluate amount of meals eaten  - Assist patient with eating if necessary   - Allow adequate time for meals  - Recommend/ encourage appropriate diets, oral nutritional supplements, and vitamin/mineral supplements  - Order, calculate, and assess calorie counts as needed  - Recommend, monitor, and adjust tube feedings and TPN/PPN based on assessed needs  - Assess need for intravenous fluids  - Provide specific nutrition/hydration education as appropriate  - Include patient/family/caregiver in decisions related to nutrition  Outcome: Progressing     Problem: PAIN - ADULT  Goal: Verbalizes/displays adequate comfort level or baseline comfort level  Description: Interventions:  - Encourage patient to monitor pain and request assistance  - Assess pain using appropriate pain scale  - Administer analgesics based on type and severity of pain and evaluate response  - Implement non-pharmacological measures as appropriate and evaluate response  - Consider cultural and social influences on pain and pain management  - Notify physician/advanced practitioner if interventions unsuccessful or patient reports new pain  Outcome: Progressing     Problem: INFECTION - ADULT  Goal: Absence or prevention of progression during hospitalization  Description: INTERVENTIONS:  - Assess and monitor for signs and symptoms of infection  - Monitor lab/diagnostic results  - Monitor all insertion sites, i e  indwelling lines, tubes, and drains  - Monitor endotracheal if appropriate and nasal secretions for changes in amount and color  - [place] appropriate cooling/warming therapies per order  - Administer medications as ordered  - Instruct and encourage patient and family to use good hand hygiene technique  - Identify and instruct in appropriate isolation precautions for identified infection/condition  Outcome: Progressing  Goal: Absence of fever/infection during neutropenic period  Description: INTERVENTIONS:  - Monitor WBC    Outcome: Progressing     Problem: SAFETY ADULT  Goal: Patient will remain free of falls  Description: INTERVENTIONS:  - Educate patient/family on patient safety including physical limitations  - Instruct patient to call for assistance with activity   - Consult OT/PT to assist with strengthening/mobility   - Keep Call bell within reach  - Keep bed low and locked with side rails adjusted as appropriate  - Keep care items and personal belongings within reach  - Initiate and maintain comfort rounds  - Make Fall Risk Sign visible to staff  - Apply yellow socks and bracelet for high fall risk patients  - Consider moving patient to room near nurses station  Outcome: Progressing  Goal: Maintain or return to baseline ADL function  Description: INTERVENTIONS:  -  Assess patient's ability to carry out ADLs; assess patient's baseline for ADL function and identify physical deficits which impact ability to perform ADLs (bathing, care of mouth/teeth, toileting, grooming, dressing, etc )  - Assess/evaluate cause of self-care deficits   - Assess range of motion  - Assess patient's mobility; develop plan if impaired  - Assess patient's need for assistive devices and provide as appropriate  - Encourage maximum independence but intervene and supervise when necessary  - Involve family in performance of ADLs  - Assess for home care needs following discharge   - Consider OT consult to assist with ADL evaluation and planning for discharge  - Provide patient education as appropriate  Outcome: Progressing  Goal: Maintains/Returns to pre admission functional level  Description: INTERVENTIONS:  - Perform BMAT or MOVE assessment daily    - Set and communicate daily mobility goal to care team and patient/family/caregiver  - Collaborate with rehabilitation services on mobility goals if consulted    Problem: DISCHARGE PLANNING  Goal: Discharge to home or other facility with appropriate resources  Description: INTERVENTIONS:  - Identify barriers to discharge w/patient and caregiver  - Arrange for needed discharge resources and transportation as appropriate  - Identify discharge learning needs (meds, wound care, etc )  - Arrange for interpretive services to assist at discharge as needed  - Refer to Case Management Department for coordinating discharge planning if the patient needs post-hospital services based on physician/advanced practitioner order or complex needs related to functional status, cognitive ability, or social support system  Outcome: Progressing     Problem: Knowledge Deficit  Goal: Patient/family/caregiver demonstrates understanding of disease process, treatment plan, medications, and discharge instructions  Description: Complete learning assessment and assess knowledge base    Interventions:  - Provide teaching at level of understanding  - Provide teaching via preferred learning methods  Outcome: Progressing   - Out of bed for toileting  - Record patient progress and toleration of activity level   Outcome: Progressing   - Out of bed for toileting  - Record patient progress and toleration of activity level   Outcome: Progressing   - Apply yellow socks and bracelet for high fall risk patients  - Consider moving patient to room near nurses station  Outcome: Progressing

## 2022-08-12 NOTE — PROGRESS NOTES
INTERNAL MEDICINE RESIDENCY PROGRESS NOTE     Name: Theola Shone   Age & Sex: 82 y o  male   MRN: [id number]  Unit/Bed#: [facility] 711-01   Encounter: [id number]  Team: SOD Team B     PATIENT INFORMATION     Name: Theola Shone   Age & Sex: 82 y o  male   MRN: [id number]  Hospital Stay Days: 8    ASSESSMENT/PLAN     Principal Problem:    Sepsis ([facility] )  Active Problems:    Cerebrovascular accident (CVA) involving cerebellum ([facility] )    Pancreatic mass    Atrial fibrillation ([facility] )    CHF (congestive heart failure) ([facility] )    Ventricular tachyarrhythmia ([facility] )    Hypertension    Dysphagia    Type 2 diabetes mellitus ([facility] )    Chronic heart failure with preserved ejection fraction (HCC)    COPD (chronic obstructive pulmonary disease) ([facility] )    Coronary artery disease    Hypernatremia    Failure to thrive in adult      * Sepsis ([facility] )  Assessment & Plan  ·  Suspected cholangitis with sespsis status post ERCP and stent placement on 8/4  ? ID now signed off  ? S/p Ertepenem given MDRO/ESBL  ? Blood cultures X2 growing ESBL and Klebsiella continue to follow  ? Trend WBC and fever curve    ? Monitor LFT, normalized 8/7/22  ? Leukocytosis resolved        Lab Results   Component Value Date    WBC 11 09 (H) 08/12/2022    WBC 12 43 (H) 08/10/2022    WBC 10 15 08/08/2022    WBC 12 97 (H) 08/07/2022    WBC 14 25 (H) 08/06/2022         Pancreatic mass  Assessment & Plan   Pancreatic head mass  · 8/4 CTAP: Intrahepatic and extrahepatic biliary ductal dilation with pancreatic body and tail atrophy and suspected pancreatic head mass  No gross lung infiltrates noted limited by respiratory motion    · ERCP done 8/4 with stent placed  · Possible EUS in the future, although utility towards ultimate treatment plan and goals of care TBD with family outpatient pending initial ERCP brushing cytology  · GI outpatient f/u on discharge  · CEA normal  · CA 19-9 682    Biliary Brushing Pathology: evidence of adenocarcinoma   Surgical Oncology Consulted: 8/10/22 - Input Appreciated, indicated patient is not a candidate for acute surgical intervention at this time  Not a candidate for med-onc given poor functional status     Cerebrovascular accident (CVA) involving cerebellum ([facility] )  Assessment & Plan  · Right cerebellar stroke   ? CT head 8/4 showed right cerebellar stroke   ? Neurology following, appreciate recommendations   ? Continue aspirin and statin    ? SBP goal 140-160  ? Continue telemetry   ? PT/OT consulted  ? Continue neuro checks   ? Post-stroke follow-up CT Head performed on 8/10 - Findings stable  ? Discontinued Heparin ggt and initiated anticoagulation with the approval from neurology  ? Ventricular tachyarrhythmia ([facility] )  Assessment & Plan  In setting of acute illness and slow re-institution of baseline cardiac meds (metoprolol succinate, coreg, lasix)  Cardiology consulted for guidance on reinstitution of medications following sustained vtach yesterday  Switch Metoprolol and Coreg 12 5 BID  Tele      Hypertension  Assessment & Plan  Cards managing  BP goal 140-160  · coreg 25 bid  · Losartan 25mg   · Continue Lasix 40 mg     Dysphagia  Assessment & Plan  2/2 chronic Stroke  Dysphagia diet and aspiration precautions with meal       Type 2 diabetes mellitus (HCC)  Assessment & Plan    · T2 DM  · Hb A1C 7 5   · Not on insulin outpatient  · Insulin requirements inpatient have been very high, likely stress-induced hyperglycemia in setting of acute illness  ? Goal -180 inpt  ? S/p insulin gtt   ? subQ insulin being adjusted as needed to meet goals  ? Dysphagia diet   ? SSI for now; higher BG inpatient acceptable since patient had hypoglycemic episodes when glycemic control was too tightly regulated         Disposition: pending d/c to facility     SUBJECTIVE     Patient seen and examined  No acute events overnight  Patient is medically stable and optimized at this point and appropriate for discharge back to facility   Unfortunately he incidentally tested COVID positive on routine discharge test  CM working on possible placement options accepting of incidental COVID positive patients  Patient is not requiring oxygen  No acute complaints but in general feels very weak  Patient wishes his family would come visit him  OBJECTIVE     Vitals:    22 0551 22 0553 22 0805 22 1206   BP: 156/73 156/73 (!) 175/75 149/73   Pulse: 67 66  75   Resp:       Temp:   98 3 °F (36 8 °C)    TempSrc:       SpO2: 97% 98%  93%   Weight:       Height:          Temperature:   Temp (24hrs), Av 4 °F (36 9 °C), Min:98 3 °F (36 8 °C), Max:98 5 °F (36 9 °C)    Temperature: 98 3 °F (36 8 °C)  Intake & Output:  I/O       08/10 0701   0700  0701   0700  0701   0700    P  O  168 594     I V  (mL/kg) 1453 3 (15 1)      Total Intake(mL/kg) 1621 3 (16 9) 594 (6 2)     Net +1621 3 +594            Unmeasured Urine Occurrence 3 x 2 x         Weights:   IBW (Ideal Body Weight): 68 4 kg    Body mass index is 32 2 kg/m²  Weight (last 2 days)     Date/Time Weight    22 0547 96 1 (211 8)    22 0543 96 (211 64)    08/10/22 0615 96 3 (212 3)        Physical Exam  Constitutional:       General: He is not in acute distress  Appearance: He is ill-appearing (chronically)  He is not toxic-appearing or diaphoretic  Cardiovascular:      Rate and Rhythm: Normal rate and regular rhythm  Pulses: Normal pulses  Pulmonary:      Effort: Pulmonary effort is normal  No respiratory distress  Breath sounds: Normal breath sounds  No wheezing or rales  Abdominal:      General: There is no distension  Palpations: Abdomen is soft  Tenderness: There is no abdominal tenderness  Musculoskeletal:      Right lower leg: No edema  Left lower leg: No edema  Skin:     General: Skin is warm  Neurological:      Mental Status: He is alert and oriented to person, place, and time     Psychiatric:         Mood and Affect: Mood normal  LABORATORY DATA     Labs: I have personally reviewed pertinent reports  Results from last 7 days   Lab Units 08/12/22  0600 08/10/22  0528 08/08/22  0502 08/07/22  0609 08/06/22  0432   WBC Thousand/uL 11 09* 12 43* 10 15   < > 14 25*   HEMOGLOBIN g/dL 10 2* 10 5* 10 6*   < > 9 9*   HEMATOCRIT % 33 5* 34 7* 34 9*   < > 31 7*   PLATELETS Thousands/uL 335 304 238   < > 181   NEUTROS PCT % 76*  --   --   --  85*   MONOS PCT % 7  --   --   --  6    < > = values in this interval not displayed  Results from last 7 days   Lab Units 08/12/22  0600 08/11/22  1554 08/10/22  2036 08/09/22  0903 08/08/22  0502   POTASSIUM mmol/L 3 7 3 8 3 7   < > 3 7   CHLORIDE mmol/L 110* 108 112*   < > 115*   CO2 mmol/L 34* 34* 34*   < > 31   BUN mg/dL 13 13 12   < > 17   CREATININE mg/dL 0 84 0 84 0 71   < > 0 92   CALCIUM mg/dL 8 0* 8 5 9 0   < > 8 5   ALK PHOS U/L 346* 353*  --   --  487*   ALT U/L 32 34  --   --  68   AST U/L 18 23  --   --  32    < > = values in this interval not displayed  Results from last 7 days   Lab Units 08/11/22  1554 08/10/22  0528 08/09/22  0903   MAGNESIUM mg/dL 2 4 2 7* 2 5     Results from last 7 days   Lab Units 08/06/22  0432   PHOSPHORUS mg/dL 2 3      Results from last 7 days   Lab Units 08/09/22  0903 08/09/22  0007 08/08/22  1216   PTT seconds 63* 60* 43*               IMAGING & DIAGNOSTIC TESTING     Radiology Results: I have personally reviewed pertinent reports  ERCP Fluoro    Result Date: 8/4/2022  Impression: ERCP with cholangitis s/p placement of 10 x 4 cm fully covered metal stent  RECOMMENDATION: Follow LFTs Continue antibiotics Further care per ICU team May require EUS for biopsy/staging of mass at later date  Arlyn Tamayo MD     XR chest 1 view portable    Result Date: 8/4/2022  Impression: Suspected left retrocardiac consolidation and/or small effusion  Cardiomegaly and aortic sclerosis   Workstation performed: [id number]     CT head wo contrast    Result Date: 8/9/2022  Impression: Stable examination  Moderate chronic microangiopathic change within the cerebral hemispheres  The previously identified right cerebellar infarct is unchanged from multiple prior examinations  In retrospect this infarct has been present on prior CT exams for more linear and is chronic, rather than early as stated on the most recent CT report  Workstation performed: [id number]     CT head wo contrast    Result Date: 8/6/2022  Impression: Early infarct within the right cerebellar hemisphere is similar to the examination performed 2 days ago  No hemorrhagic transformation  Stable advanced chronic microangiopathic change within both cerebral hemispheres  Workstation performed: [id number]     CT head without contrast    Result Date: 8/4/2022  Impression: New low-density in the right cerebellum concerning for an acute infarct  Cerebral atrophy and small vessel disease  I personally discussed this study with Garret Betancourt on 8/4/2022 at 11:28 AM  Workstation performed: [id number]     MRI brain wo contrast    Result Date: 8/7/2022  Impression: No acute intracranial abnormality  Chronic encephalomalacia and gliosis in right cerebellum, 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 is partially obscured by susceptibility artifact from posterior spinal fixation hardware extending to the occiput  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  Workstation performed: [id number]     CT chest abdomen pelvis w contrast    Result Date: 8/4/2022  Impression: 1  Intrahepatic and extrahepatic biliary ductal dilatation with pancreatic body and tail atrophy and suspected pancreatic head mass  Confirmation should be obtained with MRI abdomen with MRCP  2   No infiltrates on study limited by respiratory motion    I personally discussed this study with Garret Betancourt on 8/4/2022 at 11:28 AM  Workstation performed: [id number]     FL ERCP biliary only    Result Date: 8/5/2022  Impression: Distal CBD stent placement  Workstation performed: [id number]     CXR in AM    Result Date: 8/5/2022  Impression: ET tube and right IJ line remain in stable position  No active pulmonary disease  Workstation performed: [id number]     XR chest portable ICU    Result Date: 8/5/2022  Impression: Right IJ line has been placed, tip overlies the SVC  No pneumothorax  Workstation performed: [id number]     XR chest portable ICU    Result Date: 8/5/2022  Impression: ET tube appears in position  Possible small left pleural effusion, stable  Workstation performed: [id number]     Other Diagnostic Testing: I have personally reviewed pertinent reports  ACTIVE MEDICATIONS     Current Facility-Administered Medications   Medication Dose Route Frequency    albuterol inhalation solution 2 5 mg  2 5 mg Nebulization Q4H PRN    apixaban (ELIQUIS) tablet 5 mg  5 mg Oral BID    aspirin chewable tablet 81 mg  81 mg Oral Daily    atorvastatin (LIPITOR) tablet 20 mg  20 mg Oral Daily With Dinner    carvedilol (COREG) tablet 25 mg  25 mg Oral BID With Meals    furosemide (LASIX) tablet 40 mg  40 mg Oral Daily    insulin lispro (HumaLOG) 100 units/mL subcutaneous injection 1-6 Units  1-6 Units Subcutaneous TID AC    insulin lispro (HumaLOG) 100 units/mL subcutaneous injection 1-6 Units  1-6 Units Subcutaneous HS    labetalol (NORMODYNE) injection 10 mg  10 mg Intravenous Q6H PRN    losartan (COZAAR) tablet 25 mg  25 mg Oral Daily    omeprazole (PRILOSEC) suspension 2 mg/mL  20 mg Oral Early Morning    potassium chloride oral solution 40 mEq  40 mEq Oral Once    senna-docusate sodium (SENOKOT S) 8 6-50 mg per tablet 1 tablet  1 tablet Oral HS       VTE Pharmacologic Prophylaxis: eliquis  VTE Mechanical Prophylaxis: sequential compression device    Portions of the record may have been created with voice recognition software    Occasional wrong word or "sound a like" substitutions may have occurred due to the inherent limitations of voice recognition software    Read the chart carefully and recognize, using context, where substitutions have occurred   ==  Godfrey Pinon, DO  [address]  Internal Medicine Residency PGY-3

## 2022-08-12 NOTE — ASSESSMENT & PLAN NOTE
·  Suspected cholangitis with sespsis status post ERCP and stent placement on 8/4  ? ID now signed off  ? S/p Ertepenem given MDRO/ESBL  ? Blood cultures X2 growing ESBL and Klebsiella continue to follow  ? Trend WBC and fever curve    ? Monitor LFT, normalized 8/7/22  ?  Leukocytosis resolved        Lab Results   Component Value Date    WBC 11 09 (H) 08/12/2022    WBC 12 43 (H) 08/10/2022    WBC 10 15 08/08/2022    WBC 12 97 (H) 08/07/2022    WBC 14 25 (H) 08/06/2022

## 2022-08-13 NOTE — PROGRESS NOTES
INTERNAL MEDICINE RESIDENCY PROGRESS NOTE     Name: Rachel Cuellar   Age & Sex: 82 y o  male   MRN: [id number]  Unit/Bed#: [facility] 711-01   Encounter: [id number]  Team: SOD Team B     PATIENT INFORMATION     Name: Rachel Cuellar   Age & Sex: 82 y o  male   MRN: [id number]  Hospital Stay Days: 9    ASSESSMENT/PLAN     Principal Problem:    Sepsis ([facility] )  Active Problems:    Cerebrovascular accident (CVA) involving cerebellum ([facility] )    Pancreatic mass    Atrial fibrillation ([facility] )    CHF (congestive heart failure) ([facility] )    Ventricular tachyarrhythmia ([facility] )    Hypertension    Dysphagia    Type 2 diabetes mellitus ([facility] )    Chronic heart failure with preserved ejection fraction (HCC)    COPD (chronic obstructive pulmonary disease) ([facility] )    Coronary artery disease    Hypernatremia    Failure to thrive in adult      * Sepsis ([facility] )  Assessment & Plan  ·  Suspected cholangitis with sespsis status post ERCP and stent placement on 8/4  ? ID now signed off  ? S/p Ertepenem given MDRO/ESBL  ? Blood cultures X2 growing ESBL and Klebsiella continue to follow  ? Trend WBC and fever curve    ? Monitor LFT, normalized 8/7/22  ? Leukocytosis resolved        Lab Results   Component Value Date    WBC 11 09 (H) 08/12/2022    WBC 12 43 (H) 08/10/2022    WBC 10 15 08/08/2022    WBC 12 97 (H) 08/07/2022    WBC 14 25 (H) 08/06/2022         Pancreatic mass  Assessment & Plan   Pancreatic head mass  · 8/4 CTAP: Intrahepatic and extrahepatic biliary ductal dilation with pancreatic body and tail atrophy and suspected pancreatic head mass  No gross lung infiltrates noted limited by respiratory motion    · ERCP done 8/4 with stent placed  · Possible EUS in the future, although utility towards ultimate treatment plan and goals of care TBD with family outpatient pending initial ERCP brushing cytology  · GI outpatient f/u on discharge  · CEA normal  · CA 19-9 682    Biliary Brushing Pathology: evidence of adenocarcinoma   Surgical Oncology Consulted: 8/10/22 - Input Appreciated, indicated patient is not a candidate for acute surgical intervention at this time  Not a candidate for med-onc given poor functional status     Cerebrovascular accident (CVA) involving cerebellum ([facility] )  Assessment & Plan  · Right cerebellar stroke   ? CT head 8/4 showed right cerebellar stroke   ? Neurology following, appreciate recommendations   ? Continue aspirin and statin    ? SBP goal 140-160  ? Continue telemetry   ? PT/OT consulted  ? Continue neuro checks   ? Post-stroke follow-up CT Head performed on 8/10 - Findings stable  ? Discontinued Heparin ggt and initiated anticoagulation with the approval from neurology  ? Ventricular tachyarrhythmia ([facility] )  Assessment & Plan  In setting of acute illness and slow re-institution of baseline cardiac meds (metoprolol succinate, coreg, lasix)  Cardiology consulted for guidance on reinstitution of medications following sustained vtach yesterday  Switch Metoprolol and Coreg 12 5 BID  Tele      Hypertension  Assessment & Plan  Cards managing  BP goal 140-160  · coreg 25 bid  · Losartan 25mg   · Continue Lasix 40 mg     Dysphagia  Assessment & Plan  2/2 chronic Stroke  Dysphagia diet and aspiration precautions with meal       Type 2 diabetes mellitus (HCC)  Assessment & Plan    · T2 DM  · Hb A1C 7 5   · Not on insulin outpatient  · Insulin requirements inpatient have been very high, likely stress-induced hyperglycemia in setting of acute illness  ? Goal -180 inpt  ? S/p insulin gtt   ? subQ insulin being adjusted as needed to meet goals  ? Dysphagia diet   ? SSI for now; higher BG inpatient acceptable since patient had hypoglycemic episodes when glycemic control was too tightly regulated         Disposition: pending d/c to facility Monday 8/15    SUBJECTIVE     Patient is medically stable and optimized at this point and appropriate for discharge back to facility   Will be able to go back to [facility] Monday 8/15 given he incidentally tested positive for COVID on   Family has since declined palliative/hospice discussions  No acute complaints but in general feels very weak  Patient wishes his family would come visit him  OBJECTIVE     Vitals:    22 1206 22 1626 22 1820 227   BP: 149/73 162/75 159/74 156/73   Pulse: 75 64 67 68   Resp:  17  17   Temp:  98 6 °F (37 °C)  98 2 °F (36 8 °C)   TempSrc:       SpO2: 93% 98% 94% 90%   Weight:       Height:          Temperature:   Temp (24hrs), Av 4 °F (36 9 °C), Min:98 2 °F (36 8 °C), Max:98 6 °F (37 °C)    Temperature: 98 2 °F (36 8 °C)  Intake & Output:  I/O       08/10 0701   0700  0701   0700  0701   0700    P  O  168 594     I V  (mL/kg) 1453 3 (15 1)      Total Intake(mL/kg) 1621 3 (16 9) 594 (6 2)     Net +1621 3 +594            Unmeasured Urine Occurrence 3 x 2 x         Weights:   IBW (Ideal Body Weight): 68 4 kg    Body mass index is 32 2 kg/m²  Weight (last 2 days)     Date/Time Weight    22 0547 96 1 (211 8)    22 0543 96 (211 64)        Physical Exam  Constitutional:       General: He is not in acute distress  Appearance: He is ill-appearing (chronically)  He is not toxic-appearing or diaphoretic  Eyes:      Pupils: Pupils are equal, round, and reactive to light  Cardiovascular:      Rate and Rhythm: Normal rate and regular rhythm  Pulses: Normal pulses  Pulmonary:      Effort: Pulmonary effort is normal  No respiratory distress  Breath sounds: Normal breath sounds  No wheezing or rales  Abdominal:      General: There is no distension  Palpations: Abdomen is soft  Tenderness: There is no abdominal tenderness  There is no guarding  Musculoskeletal:      Right lower leg: No edema  Left lower leg: No edema  Skin:     General: Skin is warm  Neurological:      Mental Status: He is alert and oriented to person, place, and time        Cranial Nerves: Cranial nerve deficit (right tongue deviation, dysarthria) present  Psychiatric:         Mood and Affect: Mood normal        LABORATORY DATA     Labs: I have personally reviewed pertinent reports  Results from last 7 days   Lab Units 08/12/22  0600 08/10/22  0528 08/08/22  0502   WBC Thousand/uL 11 09* 12 43* 10 15   HEMOGLOBIN g/dL 10 2* 10 5* 10 6*   HEMATOCRIT % 33 5* 34 7* 34 9*   PLATELETS Thousands/uL 335 304 238   NEUTROS PCT % 76*  --   --    MONOS PCT % 7  --   --       Results from last 7 days   Lab Units 08/12/22  0600 08/11/22  1554 08/10/22  2036 08/09/22  0903 08/08/22  0502   POTASSIUM mmol/L 3 7 3 8 3 7   < > 3 7   CHLORIDE mmol/L 110* 108 112*   < > 115*   CO2 mmol/L 34* 34* 34*   < > 31   BUN mg/dL 13 13 12   < > 17   CREATININE mg/dL 0 84 0 84 0 71   < > 0 92   CALCIUM mg/dL 8 0* 8 5 9 0   < > 8 5   ALK PHOS U/L 346* 353*  --   --  487*   ALT U/L 32 34  --   --  68   AST U/L 18 23  --   --  32    < > = values in this interval not displayed  Results from last 7 days   Lab Units 08/11/22  1554 08/10/22  0528 08/09/22  0903   MAGNESIUM mg/dL 2 4 2 7* 2 5          Results from last 7 days   Lab Units 08/09/22  0903 08/09/22  0007 08/08/22  1216   PTT seconds 63* 60* 43*               IMAGING & DIAGNOSTIC TESTING     Radiology Results: I have personally reviewed pertinent reports  ERCP Fluoro    Result Date: 8/4/2022  Impression: ERCP with cholangitis s/p placement of 10 x 4 cm fully covered metal stent  RECOMMENDATION: Follow LFTs Continue antibiotics Further care per ICU team May require EUS for biopsy/staging of mass at later date  Conner Alvarez MD     XR chest 1 view portable    Result Date: 8/4/2022  Impression: Suspected left retrocardiac consolidation and/or small effusion  Cardiomegaly and aortic sclerosis  Workstation performed: [id number]     CT head wo contrast    Result Date: 8/9/2022  Impression: Stable examination  Moderate chronic microangiopathic change within the cerebral hemispheres   The previously identified right cerebellar infarct is unchanged from multiple prior examinations  In retrospect this infarct has been present on prior CT exams for more linear and is chronic, rather than early as stated on the most recent CT report  Workstation performed: [id number]     CT head wo contrast    Result Date: 8/6/2022  Impression: Early infarct within the right cerebellar hemisphere is similar to the examination performed 2 days ago  No hemorrhagic transformation  Stable advanced chronic microangiopathic change within both cerebral hemispheres  Workstation performed: [id number]     CT head without contrast    Result Date: 8/4/2022  Impression: New low-density in the right cerebellum concerning for an acute infarct  Cerebral atrophy and small vessel disease  I personally discussed this study with Vik Marquis on 8/4/2022 at 11:28 AM  Workstation performed: [id number]     MRI brain wo contrast    Result Date: 8/7/2022  Impression: No acute intracranial abnormality  Chronic encephalomalacia and gliosis in right cerebellum, 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 is partially obscured by susceptibility artifact from posterior spinal fixation hardware extending to the occiput  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  Workstation performed: [id number]     CT chest abdomen pelvis w contrast    Result Date: 8/4/2022  Impression: 1  Intrahepatic and extrahepatic biliary ductal dilatation with pancreatic body and tail atrophy and suspected pancreatic head mass  Confirmation should be obtained with MRI abdomen with MRCP  2   No infiltrates on study limited by respiratory motion  I personally discussed this study with Vik Marquis on 8/4/2022 at 11:28 AM  Workstation performed: [id number]     FL ERCP biliary only    Result Date: 8/5/2022  Impression: Distal CBD stent placement   Workstation performed: [id number]     CXR in AM    Result Date: 8/5/2022  Impression: ET tube and right IJ line remain in stable position  No active pulmonary disease  Workstation performed: [id number]     XR chest portable ICU    Result Date: 8/5/2022  Impression: Right IJ line has been placed, tip overlies the SVC  No pneumothorax  Workstation performed: [id number]     XR chest portable ICU    Result Date: 8/5/2022  Impression: ET tube appears in position  Possible small left pleural effusion, stable  Workstation performed: [id number]     Other Diagnostic Testing: I have personally reviewed pertinent reports  ACTIVE MEDICATIONS     Current Facility-Administered Medications   Medication Dose Route Frequency    albuterol inhalation solution 2 5 mg  2 5 mg Nebulization Q4H PRN    apixaban (ELIQUIS) tablet 5 mg  5 mg Oral BID    aspirin chewable tablet 81 mg  81 mg Oral Daily    atorvastatin (LIPITOR) tablet 20 mg  20 mg Oral Daily With Dinner    carvedilol (COREG) tablet 25 mg  25 mg Oral BID With Meals    furosemide (LASIX) tablet 40 mg  40 mg Oral Daily    insulin glargine (LANTUS) subcutaneous injection 12 Units 0 12 mL  12 Units Subcutaneous Once    insulin lispro (HumaLOG) 100 units/mL subcutaneous injection 1-6 Units  1-6 Units Subcutaneous TID AC    insulin lispro (HumaLOG) 100 units/mL subcutaneous injection 1-6 Units  1-6 Units Subcutaneous HS    labetalol (NORMODYNE) injection 10 mg  10 mg Intravenous Q6H PRN    losartan (COZAAR) tablet 25 mg  25 mg Oral Daily    omeprazole (PRILOSEC) suspension 2 mg/mL  20 mg Oral Early Morning    senna-docusate sodium (SENOKOT S) 8 6-50 mg per tablet 1 tablet  1 tablet Oral HS       VTE Pharmacologic Prophylaxis: eliquis  VTE Mechanical Prophylaxis: sequential compression device    Portions of the record may have been created with voice recognition software  Occasional wrong word or "sound a like" substitutions may have occurred due to the inherent limitations of voice recognition software    Read the chart carefully and recognize, using context, where substitutions have occurred   ==  Zoey Roth, [address] [facility]  Internal Medicine Residency PGY-3

## 2022-08-13 NOTE — PLAN OF CARE
Problem: Potential for Falls  Goal: Patient will remain free of falls  Description: INTERVENTIONS:  - Educate patient/family on patient safety including physical limitations  - Instruct patient to call for assistance with activity   - Consult OT/PT to assist with strengthening/mobility   - Keep Call bell within reach  - Keep bed low and locked with side rails adjusted as appropriate  - Keep care items and personal belongings within reach  - Initiate and maintain comfort rounds  - Make Fall Risk Sign visible to staff  - Apply yellow socks and bracelet for high fall risk patients  - Consider moving patient to room near nurses station  Outcome: Progressing     Problem: MOBILITY - ADULT  Goal: Maintain or return to baseline ADL function  Description: INTERVENTIONS:  -  Assess patient's ability to carry out ADLs; assess patient's baseline for ADL function and identify physical deficits which impact ability to perform ADLs (bathing, care of mouth/teeth, toileting, grooming, dressing, etc )  - Assess/evaluate cause of self-care deficits   - Assess range of motion  - Assess patient's mobility; develop plan if impaired  - Assess patient's need for assistive devices and provide as appropriate  - Encourage maximum independence but intervene and supervise when necessary  - Involve family in performance of ADLs  - Assess for home care needs following discharge   - Consider OT consult to assist with ADL evaluation and planning for discharge  - Provide patient education as appropriate  Outcome: Progressing  Goal: Maintains/Returns to pre admission functional level  Description: INTERVENTIONS:  - Perform BMAT or MOVE assessment daily    - Set and communicate daily mobility goal to care team and patient/family/caregiver     - Collaborate with rehabilitation services on mobility goals if consulted    - Out of bed for toileting  - Record patient progress and toleration of activity level   Outcome: Progressing     Problem: Prexisting or High Potential for Compromised Skin Integrity  Goal: Skin integrity is maintained or improved  Description: INTERVENTIONS:  - Identify patients at risk for skin breakdown  - Assess and monitor skin integrity  - Assess and monitor nutrition and hydration status  - Monitor labs   - Assess for incontinence   - Turn and reposition patient  - Assist with mobility/ambulation  - Relieve pressure over bony prominences  - Avoid friction and shearing  - Provide appropriate hygiene as needed including keeping skin clean and dry  - Evaluate need for skin moisturizer/barrier cream  - Collaborate with interdisciplinary team   - Patient/family teaching  - Consider wound care consult   Outcome: Progressing     Problem: Nutrition/Hydration-ADULT  Goal: Nutrient/Hydration intake appropriate for improving, restoring or maintaining nutritional needs  Description: Monitor and assess patient's nutrition/hydration status for malnutrition  Collaborate with interdisciplinary team and initiate plan and interventions as ordered  Monitor patient's weight and dietary intake as ordered or per policy  Utilize nutrition screening tool and intervene as necessary  Determine patient's food preferences and provide high-protein, high-caloric foods as appropriate       INTERVENTIONS:  - Monitor oral intake, urinary output, labs, and treatment plans  - Assess nutrition and hydration status and recommend course of action  - Evaluate amount of meals eaten  - Assist patient with eating if necessary   - Allow adequate time for meals  - Recommend/ encourage appropriate diets, oral nutritional supplements, and vitamin/mineral supplements  - Order, calculate, and assess calorie counts as needed  - Recommend, monitor, and adjust tube feedings and TPN/PPN based on assessed needs  - Assess need for intravenous fluids  - Provide specific nutrition/hydration education as appropriate  - Include patient/family/caregiver in decisions related to nutrition  Outcome: Progressing     Problem: PAIN - ADULT  Goal: Verbalizes/displays adequate comfort level or baseline comfort level  Description: Interventions:  - Encourage patient to monitor pain and request assistance  - Assess pain using appropriate pain scale  - Administer analgesics based on type and severity of pain and evaluate response  - Implement non-pharmacological measures as appropriate and evaluate response  - Consider cultural and social influences on pain and pain management  - Notify physician/advanced practitioner if interventions unsuccessful or patient reports new pain  Outcome: Progressing     Problem: INFECTION - ADULT  Goal: Absence or prevention of progression during hospitalization  Description: INTERVENTIONS:  - Assess and monitor for signs and symptoms of infection  - Monitor lab/diagnostic results  - Monitor all insertion sites, i e  indwelling lines, tubes, and drains  - Monitor endotracheal if appropriate and nasal secretions for changes in amount and color  - Foss appropriate cooling/warming therapies per order  - Administer medications as ordered  - Instruct and encourage patient and family to use good hand hygiene technique  - Identify and instruct in appropriate isolation precautions for identified infection/condition  Outcome: Progressing  Goal: Absence of fever/infection during neutropenic period  Description: INTERVENTIONS:  - Monitor WBC    Outcome: Progressing     Problem: SAFETY ADULT  Goal: Patient will remain free of falls  Description: INTERVENTIONS:  - Educate patient/family on patient safety including physical limitations  - Instruct patient to call for assistance with activity   - Consult OT/PT to assist with strengthening/mobility   - Keep Call bell within reach  - Keep bed low and locked with side rails adjusted as appropriate  - Keep care items and personal belongings within reach  - Initiate and maintain comfort rounds  - Make Fall Risk Sign visible to staff  - Apply yellow socks and bracelet for high fall risk patients  - Consider moving patient to room near nurses station  Outcome: Progressing  Goal: Maintain or return to baseline ADL function  Description: INTERVENTIONS:  -  Assess patient's ability to carry out ADLs; assess patient's baseline for ADL function and identify physical deficits which impact ability to perform ADLs (bathing, care of mouth/teeth, toileting, grooming, dressing, etc )  - Assess/evaluate cause of self-care deficits   - Assess range of motion  - Assess patient's mobility; develop plan if impaired  - Assess patient's need for assistive devices and provide as appropriate  - Encourage maximum independence but intervene and supervise when necessary  - Involve family in performance of ADLs  - Assess for home care needs following discharge   - Consider OT consult to assist with ADL evaluation and planning for discharge  - Provide patient education as appropriate  Outcome: Progressing  Goal: Maintains/Returns to pre admission functional level  Description: INTERVENTIONS:  - Perform BMAT or MOVE assessment daily    - Set and communicate daily mobility goal to care team and patient/family/caregiver     - Collaborate with rehabilitation services on mobility goals if consulted  - Out of bed for toileting  - Record patient progress and toleration of activity level   Outcome: Progressing     Problem: DISCHARGE PLANNING  Goal: Discharge to home or other facility with appropriate resources  Description: INTERVENTIONS:  - Identify barriers to discharge w/patient and caregiver  - Arrange for needed discharge resources and transportation as appropriate  - Identify discharge learning needs (meds, wound care, etc )  - Arrange for interpretive services to assist at discharge as needed  - Refer to Case Management Department for coordinating discharge planning if the patient needs post-hospital services based on physician/advanced practitioner order or complex needs related to functional status, cognitive ability, or social support system  Outcome: Progressing     Problem: Knowledge Deficit  Goal: Patient/family/caregiver demonstrates understanding of disease process, treatment plan, medications, and discharge instructions  Description: Complete learning assessment and assess knowledge base    Interventions:  - Provide teaching at level of understanding  - Provide teaching via preferred learning methods  Outcome: Progressing

## 2022-08-14 NOTE — PLAN OF CARE
Problem: Potential for Falls  Goal: Patient will remain free of falls  Description: INTERVENTIONS:  - Educate patient/family on patient safety including physical limitations  - Instruct patient to call for assistance with activity   - Consult OT/PT to assist with strengthening/mobility   - Keep Call bell within reach  - Keep bed low and locked with side rails adjusted as appropriate  - Keep care items and personal belongings within reach  - Initiate and maintain comfort rounds  - Make Fall Risk Sign visible to staff  - Offer Toileting every 2 Hours, in advance of need  - Initiate/Maintain bed alarm  - Obtain necessary fall risk management equipment: bed alarm   - Apply yellow socks and bracelet for high fall risk patients  - Consider moving patient to room near nurses station  Outcome: Progressing     Problem: MOBILITY - ADULT  Goal: Maintain or return to baseline ADL function  Description: INTERVENTIONS:  - Educate patient/family on patient safety including physical limitations  - Instruct patient to call for assistance with activity   - Consult OT/PT to assist with strengthening/mobility   - Keep Call bell within reach  - Keep bed low and locked with side rails adjusted as appropriate  - Keep care items and personal belongings within reach  - Initiate and maintain comfort rounds  - Make Fall Risk Sign visible to staff  - Offer Toileting every 2 Hours, in advance of need  - Initiate/Maintain bed alarm  - Apply yellow socks and bracelet for high fall risk patients  - Consider moving patient to room near nurses station  Outcome: Progressing  Goal: Maintains/Returns to pre admission functional level  Description: INTERVENTIONS:  - Perform BMAT or MOVE assessment daily    - Set and communicate daily mobility goal to care team and patient/family/caregiver     - Collaborate with rehabilitation services on mobility goals if consulted    Problem: Prexisting or High Potential for Compromised Skin Integrity  Goal: Skin integrity is maintained or improved  Description: INTERVENTIONS:  - Identify patients at risk for skin breakdown  - Assess and monitor skin integrity  - Assess and monitor nutrition and hydration status  - Monitor labs   - Assess for incontinence   - Turn and reposition patient  - Assist with mobility/ambulation  - Relieve pressure over bony prominences  - Avoid friction and shearing  - Provide appropriate hygiene as needed including keeping skin clean and dry  - Evaluate need for skin moisturizer/barrier cream  - Collaborate with interdisciplinary team   - Patient/family teaching  - Consider wound care consult   Outcome: Progressing     Problem: Nutrition/Hydration-ADULT  Goal: Nutrient/Hydration intake appropriate for improving, restoring or maintaining nutritional needs  Description: Monitor and assess patient's nutrition/hydration status for malnutrition  Collaborate with interdisciplinary team and initiate plan and interventions as ordered  Monitor patient's weight and dietary intake as ordered or per policy  Utilize nutrition screening tool and intervene as necessary  Determine patient's food preferences and provide high-protein, high-caloric foods as appropriate       INTERVENTIONS:  - Monitor oral intake, urinary output, labs, and treatment plans  - Assess nutrition and hydration status and recommend course of action  - Evaluate amount of meals eaten  - Assist patient with eating if necessary   - Allow adequate time for meals  - Recommend/ encourage appropriate diets, oral nutritional supplements, and vitamin/mineral supplements  - Order, calculate, and assess calorie counts as needed  - Recommend, monitor, and adjust tube feedings and TPN/PPN based on assessed needs  - Assess need for intravenous fluids  - Provide specific nutrition/hydration education as appropriate  - Include patient/family/caregiver in decisions related to nutrition  Outcome: Progressing     Problem: PAIN - ADULT  Goal: Verbalizes/displays adequate comfort level or baseline comfort level  Description: Interventions:  - Encourage patient to monitor pain and request assistance  - Assess pain using appropriate pain scale  - Administer analgesics based on type and severity of pain and evaluate response  - Implement non-pharmacological measures as appropriate and evaluate response  - Consider cultural and social influences on pain and pain management  - Notify physician/advanced practitioner if interventions unsuccessful or patient reports new pain  Outcome: Progressing     Problem: INFECTION - ADULT  Goal: Absence or prevention of progression during hospitalization  Description: INTERVENTIONS:  - Assess and monitor for signs and symptoms of infection  - Monitor lab/diagnostic results  - Monitor all insertion sites, i e  indwelling lines, tubes, and drains  - Monitor endotracheal if appropriate and nasal secretions for changes in amount and color  - Stella appropriate cooling/warming therapies per order  - Administer medications as ordered  - Instruct and encourage patient and family to use good hand hygiene technique  - Identify and instruct in appropriate isolation precautions for identified infection/condition  Outcome: Progressing  Goal: Absence of fever/infection during neutropenic period  Description: INTERVENTIONS:  - Monitor WBC    Outcome: Progressing     Problem: SAFETY ADULT  Goal: Patient will remain free of falls  Description: INTERVENTIONS:  - Educate patient/family on patient safety including physical limitations  - Instruct patient to call for assistance with activity   - Consult OT/PT to assist with strengthening/mobility   - Keep Call bell within reach  - Keep bed low and locked with side rails adjusted as appropriate  - Keep care items and personal belongings within reach  - Initiate and maintain comfort rounds  - Make Fall Risk Sign visible to staff  - Offer Toileting every 2 Hours, in advance of need  - Initiate/Maintain bed alarm  - Obtain necessary fall risk management equipment: bed alarm   - Apply yellow socks and bracelet for high fall risk patients  - Consider moving patient to room near nurses station  Outcome: Progressing  Goal: Maintain or return to baseline ADL function  Description: INTERVENTIONS:  - Educate patient/family on patient safety including physical limitations  - Instruct patient to call for assistance with activity   - Consult OT/PT to assist with strengthening/mobility   - Keep Call bell within reach  - Keep bed low and locked with side rails adjusted as appropriate  - Keep care items and personal belongings within reach  - Initiate and maintain comfort rounds  - Make Fall Risk Sign visible to staff  - Offer Toileting every 2 Hours, in advance of need  - Initiate/Maintain bed alarm  - Apply yellow socks and bracelet for high fall risk patients  - Consider moving patient to room near nurses station  Outcome: Progressing  Goal: Maintains/Returns to pre admission functional level  Description: INTERVENTIONS:  - Perform BMAT or MOVE assessment daily    - Set and communicate daily mobility goal to care team and patient/family/caregiver     - Collaborate with rehabilitation services on mobility goals if consulted    Problem: DISCHARGE PLANNING  Goal: Discharge to home or other facility with appropriate resources  Description: INTERVENTIONS:  - Identify barriers to discharge w/patient and caregiver  - Arrange for needed discharge resources and transportation as appropriate  - Identify discharge learning needs (meds, wound care, etc )  - Arrange for interpretive services to assist at discharge as needed  - Refer to Case Management Department for coordinating discharge planning if the patient needs post-hospital services based on physician/advanced practitioner order or complex needs related to functional status, cognitive ability, or social support system  Outcome: Progressing     Problem: Knowledge Deficit  Goal: Patient/family/caregiver demonstrates understanding of disease process, treatment plan, medications, and discharge instructions  Description: Complete learning assessment and assess knowledge base    Interventions:  - Provide teaching at level of understanding  - Provide teaching via preferred learning methods  Outcome: Progressing   - Out of bed for toileting  - Record patient progress and toleration of activity level   Outcome: Progressing   - Out of bed for toileting  - Record patient progress and toleration of activity level   Outcome: Progressing

## 2022-08-14 NOTE — PROGRESS NOTES
INTERNAL MEDICINE RESIDENCY PROGRESS NOTE     Name: Kathi Funes   Age & Sex: 82 y o  male   MRN: [id number]  Unit/Bed#: [facility] 711-01   Encounter: [id number]  Team: SOD Team B     PATIENT INFORMATION     Name: Kathi Funes   Age & Sex: 82 y o  male   MRN: [id number]  Hospital Stay Days: 10    ASSESSMENT/PLAN     Principal Problem:    Sepsis ([place] [address] )  Active Problems:    Atrial fibrillation ([place] [address] )    CHF (congestive heart failure) ([place] [address] )    Hypertension    Type 2 diabetes mellitus ([place] [address] )    Dysphagia    Chronic heart failure with preserved ejection fraction ([place] [address] )    Cerebrovascular accident (CVA) involving cerebellum ([place] [address] )    Pancreatic mass    COPD (chronic obstructive pulmonary disease) ([place] [address] )    Coronary artery disease    Ventricular tachyarrhythmia ([place] [address] )    Hypernatremia    Failure to thrive in adult      Hypernatremia  Assessment & Plan  Recent rise in sodium levels during hospital stay  Likely 2/2 to significant deficit in free water intake  - Pt has been noted to have difficulty feeding and drinking without assistance  - Nursing staff have been notified and encouraged free water intake  - LR Hydration   0   Lab Value Date/Time    SODIUM 150 (H) 08/10/2022 0528    SODIUM 148 (H) 08/09/2022 0903    SODIUM 148 (H) 08/08/2022 0502    SODIUM 144 08/07/2022 0609    SODIUM 145 08/06/2022 0432    SODIUM 141 08/05/2022 0547       Ventricular tachyarrhythmia ([place] [address] )  Assessment & Plan  In setting of acute illness and slow re-institution of baseline cardiac meds (metoprolol succinate, coreg, lasix)  Cardiology evaluated  for guidance on reinstitution of medications  Switched metoprolol to coreg 25 BID         Coronary artery disease  Assessment & Plan  ? Aspirin 81 for stroke   ? EKG showed no acute ST changes   ?  Restarted home Lipitor after resolution of AST/ALT normalized    COPD (chronic obstructive pulmonary disease) ([place] [address] )  Assessment & Plan  Not on home O2 per documentation     Continue with albuterol nebs  Extubated 8/6  Continue supplemental O2 as needed      Pancreatic mass  Assessment & Plan   Pancreatic head mass  · 8/4 CTAP: Intrahepatic and extrahepatic biliary ductal dilation with pancreatic body and tail atrophy and suspected pancreatic head mass  No gross lung infiltrates noted limited by respiratory motion  · ERCP done 8/4 with stent placed  · Possible EUS in the future, although utility towards ultimate treatment plan and goals of care TBD with family outpatient pending initial ERCP brushing cytology  · GI outpatient f/u on discharge  · CEA normal  · CA 19-9 682    Biliary Brushing Pathology: evidence of adenocarcinoma   Surgical Oncology Consulted: 8/10/22 - Input Appreciated, indicated patient is not a candidate for acute surgical intervention at this time  Not a candidate for med-onc given poor functional status     Cerebrovascular accident (CVA) involving cerebellum ([facility] [address] )  Assessment & Plan  · Right cerebellar stroke   ? CT head 8/4 showed right cerebellar stroke   ? Neurology following, appreciate recommendations   ? Continue aspirin and statin    ? SBP goal 140-160  ? Continue telemetry   ? PT/OT consulted  ? Continue neuro checks   ? Post-stroke follow-up CT Head performed on 8/10 - Findings stable  ? Discontinued Heparin ggt and initiated anticoagulation with the approval from neurology  ? Chronic heart failure with preserved ejection fraction [facility])  Assessment & Plan  Wt Readings from Last 3 Encounters:   08/14/22 94 7 kg (208 lb 12 4 oz)   08/05/22 93 9 kg (207 lb 0 2 oz)   08/03/22 93 9 kg (207 lb)     ? Echo shows EF 45% with septal wall hypokenesis  ? Continue home lasix 40 mg   ? Monitor I's and O's and daily weights  ? Appears euvolemic at present without respiratory distress, hypoxia, LE edema or JVD   Lungs CTAB      Dysphagia  Assessment & Plan  2/2 chronic Stroke  Dysphagia diet and aspiration precautions with meal       Type 2 diabetes mellitus ([facility] [address] )  Assessment & Plan  Recent Labs 22  1559 22  2100 22  0648   POCGLU 358* 195* 256*     · T2 DM  · Hb A1C 7 5   · Not on insulin outpatient  · Insulin requirements inpatient have been very high, likely stress-induced hyperglycemia in setting of acute illness  ? Goal -180 inpt  ? S/p insulin gtt   ? subQ insulin being adjusted as needed to meet goals  ? Dysphagia diet   ? SSI for now; higher BG inpatient acceptable since patient had hypoglycemic episodes when glycemic control was too tightly regulated       Hypertension  Assessment & Plan  Cards managing  BP goal 140-160  · coreg 25 bid  · Losartan 25mg   · Continue Lasix 40 mg     * Sepsis ([facility])  Assessment & Plan  ·  Suspected cholangitis with sespsis status post ERCP and stent placement on   ? ID now signed off  ? S/p Ertepenem given MDRO/ESBL  ? Blood cultures X2 growing ESBL and Klebsiella continue to follow  ? Trend WBC and fever curve    ? Monitor LFT, normalized 22  ? Leukocytosis resolved        Lab Results   Component Value Date    WBC 11 09 (H) 2022    WBC 12 43 (H) 08/10/2022    WBC 10 15 2022    WBC 12 97 (H) 2022    WBC 14 25 (H) 2022           Disposition: pending d/c to facility Monday 8/15    SUBJECTIVE     Patient is medically stable and optimized at this point and appropriate for discharge back to facility  Will be able to [facility] back [facility]/15 given he incidentally tested positive for COVID on   Family has since declined palliative/hospice discussions  No acute complaints but in general feels very weak  Patient wishes his family would come visit him      OBJECTIVE     Vitals:    22 1949 22 2102 22 0600 22 0644   BP: 140/60 143/61  (!) 179/96   Pulse: 65 66  74   Resp: 18 16     Temp: 97 8 °F (36 6 °C) 98 1 °F (36 7 °C)     TempSrc:       SpO2: 91% 92%  97%   Weight:   94 7 kg (208 lb 12 4 oz)    Height:          Temperature:   Temp (24hrs), Av °F (36 7 °C), Min:97 7 °F (36 5 °C), Max:98 5 °F (36 9 °C)    Temperature: 98 1 °F (36 7 °C)  Intake & Output:  I/O       08/10 0701  08/11 0700 08/11 0701 08/12 0700 08/12 0701 08/13 0700    P  O  168 594     I V  (mL/kg) 1453 3 (15 1)      Total Intake(mL/kg) 1621 3 (16 9) 594 (6 2)     Net +1621 3 +594            Unmeasured Urine Occurrence 3 x 2 x         Weights:   IBW (Ideal Body Weight): 68 4 kg    Body mass index is 31 74 kg/m²  Weight (last 2 days)     Date/Time Weight    08/14/22 0600 94 7 (208 78)    08/12/22 0547 96 1 (211 8)        Physical Exam  Constitutional:       General: He is not in acute distress  Appearance: He is ill-appearing (chronically)  He is not toxic-appearing or diaphoretic  Eyes:      Pupils: Pupils are equal, round, and reactive to light  Cardiovascular:      Rate and Rhythm: Normal rate and regular rhythm  Pulses: Normal pulses  Pulmonary:      Effort: Pulmonary effort is normal  No respiratory distress  Breath sounds: Normal breath sounds  No wheezing or rales  Abdominal:      General: There is no distension  Palpations: Abdomen is soft  Tenderness: There is no abdominal tenderness  There is no guarding  Musculoskeletal:      Right lower leg: No edema  Left lower leg: No edema  Skin:     General: Skin is warm  Neurological:      Mental Status: He is alert and oriented to person, place, and time  Cranial Nerves: Cranial nerve deficit (right tongue deviation, dysarthria) present  Psychiatric:         Mood and Affect: Mood normal        LABORATORY DATA     Labs: I have personally reviewed pertinent reports    Results from last 7 days   Lab Units 08/12/22  0600 08/10/22  0528 08/08/22  0502   WBC Thousand/uL 11 09* 12 43* 10 15   HEMOGLOBIN g/dL 10 2* 10 5* 10 6*   HEMATOCRIT % 33 5* 34 7* 34 9*   PLATELETS Thousands/uL 335 304 238   NEUTROS PCT % 76*  --   --    MONOS PCT % 7  --   --       Results from last 7 days   Lab Units 08/12/22  0600 08/11/22  1554 08/10/22  2036 08/09/22  0903 08/08/22  0502   POTASSIUM mmol/L 3 7 3 8 3 7   < > 3 7   CHLORIDE mmol/L 110* 108 112*   < > 115*   CO2 mmol/L 34* 34* 34*   < > 31   BUN mg/dL 13 13 12   < > 17   CREATININE mg/dL 0 84 0 84 0 71   < > 0 92   CALCIUM mg/dL 8 0* 8 5 9 0   < > 8 5   ALK PHOS U/L 346* 353*  --   --  487*   ALT U/L 32 34  --   --  68   AST U/L 18 23  --   --  32    < > = values in this interval not displayed  Results from last 7 days   Lab Units 08/11/22  1554 08/10/22  0528 08/09/22  0903   MAGNESIUM mg/dL 2 4 2 7* 2 5          Results from last 7 days   Lab Units 08/09/22  0903 08/09/22  0007 08/08/22  1216   PTT seconds 63* 60* 43*               IMAGING & DIAGNOSTIC TESTING     Radiology Results: I have personally reviewed pertinent reports  ERCP Fluoro    Result Date: 8/4/2022  Impression: ERCP with cholangitis s/p placement of 10 x 4 cm fully covered metal stent  RECOMMENDATION: Follow LFTs Continue antibiotics Further care per ICU team May require EUS for biopsy/staging of mass at later date  Meghna Torres MD     XR chest 1 view portable    Result Date: 8/4/2022  Impression: Suspected left retrocardiac consolidation and/or small effusion  Cardiomegaly and aortic sclerosis  Workstation performed: [id number]     CT head wo contrast    Result Date: 8/9/2022  Impression: Stable examination  Moderate chronic microangiopathic change within the cerebral hemispheres  The previously identified right cerebellar infarct is unchanged from multiple prior examinations  In retrospect this infarct has been present on prior CT exams for more linear and is chronic, rather than early as stated on the most recent CT report  Workstation performed: [id number]     CT head wo contrast    Result Date: 8/6/2022  Impression: Early infarct within the right cerebellar hemisphere is similar to the examination performed 2 days ago  No hemorrhagic transformation   Stable advanced chronic microangiopathic change within both cerebral hemispheres  Workstation performed: [id number]     CT head without contrast    Result Date: 8/4/2022  Impression: New low-density in the right cerebellum concerning for an acute infarct  Cerebral atrophy and small vessel disease  I personally discussed this study with Keisha Noble on 8/4/2022 at 11:28 AM  Workstation performed: [id number]     MRI brain wo contrast    Result Date: 8/7/2022  Impression: No acute intracranial abnormality  Chronic encephalomalacia and gliosis in right cerebellum, 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 is partially obscured by susceptibility artifact from posterior spinal fixation hardware extending to the occiput  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  Workstation performed: [id number]     CT chest abdomen pelvis w contrast    Result Date: 8/4/2022  Impression: 1  Intrahepatic and extrahepatic biliary ductal dilatation with pancreatic body and tail atrophy and suspected pancreatic head mass  Confirmation should be obtained with MRI abdomen with MRCP  2   No infiltrates on study limited by respiratory motion  I personally discussed this study with Keisha Noble on 8/4/2022 at 11:28 AM  Workstation performed: [id number]     FL ERCP biliary only    Result Date: 8/5/2022  Impression: Distal CBD stent placement  Workstation performed: [id number]     CXR in AM    Result Date: 8/5/2022  Impression: ET tube and right IJ line remain in stable position  No active pulmonary disease  Workstation performed: [id number]     XR chest portable ICU    Result Date: 8/5/2022  Impression: Right IJ line has been placed, tip overlies the SVC  No pneumothorax  Workstation performed: [id number]     XR chest portable ICU    Result Date: 8/5/2022  Impression: ET tube appears in position  Possible small left pleural effusion, stable  Workstation performed: [id number]     Other Diagnostic Testing: I have personally reviewed pertinent reports      ACTIVE MEDICATIONS     Current Facility-Administered Medications   Medication Dose Route Frequency    albuterol inhalation solution 2 5 mg  2 5 mg Nebulization Q4H PRN    apixaban (ELIQUIS) tablet 5 mg  5 mg Oral BID    aspirin chewable tablet 81 mg  81 mg Oral Daily    atorvastatin (LIPITOR) tablet 20 mg  20 mg Oral Daily With Dinner    carvedilol (COREG) tablet 25 mg  25 mg Oral BID With Meals    furosemide (LASIX) tablet 40 mg  40 mg Oral Daily    insulin lispro (HumaLOG) 100 units/mL subcutaneous injection 1-6 Units  1-6 Units Subcutaneous TID AC    insulin lispro (HumaLOG) 100 units/mL subcutaneous injection 1-6 Units  1-6 Units Subcutaneous HS    labetalol (NORMODYNE) injection 10 mg  10 mg Intravenous Q6H PRN    losartan (COZAAR) tablet 25 mg  25 mg Oral Daily    omeprazole (PRILOSEC) suspension 2 mg/mL  20 mg Oral Early Morning    senna-docusate sodium (SENOKOT S) 8 6-50 mg per tablet 1 tablet  1 tablet Oral HS       VTE Pharmacologic Prophylaxis: eliquis  VTE Mechanical Prophylaxis: sequential compression device    Portions of the record may have been created with voice recognition software  Occasional wrong word or "sound a like" substitutions may have occurred due to the inherent limitations of voice recognition software    Read the chart carefully and recognize, using context, where substitutions have occurred   ==  Dorene Campbell, [address]  Internal Medicine Residency PGY-3

## 2022-08-15 PROBLEM — A41.9 SEPSIS (HCC): Status: RESOLVED | Noted: 2022-01-01 | Resolved: 2022-01-01

## 2022-08-15 NOTE — CASE MANAGEMENT
Case Management Discharge Planning Note    Patient name Tamar Mobley  Location [facility] 711/[facility] 623-55 MRN [id number]  : 1935 Date 8/15/2022       Current Admission Date: 2022  Current Admission Diagnosis:Pancreatic mass   Patient Active Problem List    Diagnosis Date Noted    Failure to thrive in adult 2022    Hypernatremia 08/10/2022    Ventricular tachyarrhythmia ([facility] ) 2022    Pancreatic mass 2022    COPD (chronic obstructive pulmonary disease) ([facility] ) 2022    Coronary artery disease 2022    Cerebrovascular accident (CVA) involving cerebellum ([facility] ) 2022    S/P ERCP 2022    Cholangitis 2022    Full code status 2021    Frailty syndrome in geriatric patient 2021    Leukocytosis 2021    Constipation 2021    Vascular dementia ([facility] ) 2021    Depression 03/15/2021    Right acetabular fracture ([facility] ) 2021    Debility 2021    Medication management 2021    Goals of care, counseling/discussion 2021    Anemia 2021    History of 2019 novel coronavirus disease (COVID-19) 2020    Weight gain 2020    Abnormal EKG 2020    Chronic heart failure with preserved ejection fraction ([facility] ) 2020    Ambulatory dysfunction 2020    Dysphagia 2020    Atrial fibrillation ([facility] ) 2020    CHF (congestive heart failure) ([facility] ) 2020    Hypertension 2020    Hyperlipidemia 2020    Type 2 diabetes mellitus ([facility] ) 2020    Vertebral artery dissection ([facility] ) 2020      LOS (days): 11  Geometric Mean LOS (GMLOS) (days): 4 80  Days to GMLOS:-6 3     OBJECTIVE:  Risk of Unplanned Readmission Score: 28 41         Current admission status: Inpatient   Preferred Pharmacy:   [facility], [address] [place] [address] Al[address] [address]  Phone: [phone] Fax: Kelly Morel [address] [address] , [place] - [address] [facility]  [address] [address]  Phone: [phone] Fax: [phone]    UNKNOWN - FOLLOW UP PRIOR TO DISCHARGE TO E-PRESCRIBE  No address on file      [address] [place], [place] - [address] [facility]  Phone: [phone] Fax: [phone]    Primary Care Provider: Daxa Jacobo MD    Primary Insurance: [facility]  Secondary Insurance: [address]  Number: submitted SNF auth request to [facility] via [facility]  pending ref # [id number] for [facility]  NPI: [id number]  Dr Joan Stephenson  NPI: [id number]   Clinicals attached via [facility]

## 2022-08-15 NOTE — PLAN OF CARE
Paged covering resident regarding hyperglycemia  Lantus ordered  Pt moans with any physical touch  Denies pain when asked where it hurts  Call bell in reach bed exit alarm on

## 2022-08-15 NOTE — PROGRESS NOTES
INTERNAL MEDICINE RESIDENCY PROGRESS NOTE     Name: Sydney Puentes   Age & Sex: 82 y o  male   MRN: [id number]  Unit/Bed#: [facility] 711-01   Encounter: [id number]  Team: SOD Team B     PATIENT INFORMATION     Name: Sydney Puentes   Age & Sex: 82 y o  male   MRN: [id number]  Hospital Stay Days: 11    ASSESSMENT/PLAN     Principal Problem:    Pancreatic mass  Active Problems:    Cerebrovascular accident (CVA) involving cerebellum ([facility] )    Failure to thrive in adult    Atrial fibrillation ([facility] )    CHF (congestive heart failure) ([facility] )    Ventricular tachyarrhythmia ([facility] )    Hypertension    Dysphagia    Type 2 diabetes mellitus ([facility] )    Chronic heart failure with preserved ejection fraction (HCC)    COPD (chronic obstructive pulmonary disease) ([facility] )    Coronary artery disease    Hypernatremia      * Pancreatic mass  Assessment & Plan   Pancreatic head mass  8/4 CTAP: Intrahepatic and extrahepatic biliary ductal dilation with pancreatic body and tail atrophy and suspected pancreatic head mass  No gross lung infiltrates noted limited by respiratory motion    ERCP done 8/4 with stent placed  CEA normal  CA 19-9 682    Biliary Brushing Pathology: evidence of adenocarcinoma   Surgical Oncology Consulted: 8/10/22 - Input Appreciated, indicated patient is not a candidate for acute surgical intervention at this time  Not a candidate for med-onc given poor functional status     Wife opting to pursue palliative vs hospice discussions now, which is very appropriate given this cancer, as well as multiple serious underlying comorbidities that are progressing    Failure to thrive in adult  Assessment & Plan  Palliative vs hospice discussion today with wife  Patient will have ongoing issues with meeting his daily needs in terms of food and drink without 24 hour assistance given his inability to feed himself at this point      Cerebrovascular accident (CVA) involving cerebellum [facility])  Assessment & Plan  Right cerebellar stroke   CT head 8/4 showed right cerebellar stroke   Neurology following, appreciate recommendations   Continue aspirin and statin    SBP goal 140-160  Continue telemetry   PT/OT consulted  Continue neuro checks   Post-stroke follow-up CT Head performed on 8/10 - Findings stable  Discontinued Heparin ggt and initiated anticoagulation with the approval from neurology      Sepsis (HCC)-resolved as of 8/15/2022  Assessment & Plan   Suspected cholangitis with sespsis status post ERCP and stent placement on 8/4  ID now signed off  S/p Ertepenem given MDRO/ESBL  Blood cultures X2 growing ESBL and Klebsiella continue to follow  Trend WBC and fever curve    Monitor LFT, normalized 8/7/22  Leukocytosis resolved        Lab Results   Component Value Date    WBC 11 09 (H) 08/12/2022    WBC 12 43 (H) 08/10/2022    WBC 10 15 08/08/2022    WBC 12 97 (H) 08/07/2022    WBC 14 25 (H) 08/06/2022         Ventricular tachyarrhythmia (HCC)  Assessment & Plan  In setting of acute illness and slow re-institution of baseline cardiac meds (metoprolol succinate, coreg, lasix)  Cardiology evaluated  for guidance on reinstitution of medications  Switched metoprolol to coreg 25 BID         Hypertension  Assessment & Plan  Cards managing  BP goal 140-160  coreg 25 bid  Losartan 25mg   Continue Lasix 40 mg     Dysphagia  Assessment & Plan  2/2 chronic Stroke  Dysphagia diet and aspiration precautions with meal       Chronic heart failure with preserved ejection fraction (HCC)  Assessment & Plan  Wt Readings from Last 3 Encounters:   08/14/22 94 7 kg (208 lb 12 4 oz)   08/05/22 93 9 kg (207 lb 0 2 oz)   08/03/22 93 9 kg (207 lb)     Echo shows EF 45% with septal wall hypokenesis  Continue home lasix 40 mg   Monitor I's and O's and daily weights  Appears euvolemic at present without respiratory distress, hypoxia, LE edema or JVD   Lungs CTAB      Type 2 diabetes mellitus [facility])  Assessment & Plan  Recent Labs     08/13/22  1559 08/13/22  2100 08/14/22  0251   POCGLU 358* 195* 256*     T2 DM  Hb A1C 7 5   Not on insulin outpatient  Insulin requirements inpatient have been very high, likely stress-induced hyperglycemia in setting of acute illness  Goal -180 inpt  S/p insulin gtt   subQ insulin being adjusted as needed to meet goals  Dysphagia diet   SSI for now; higher BG inpatient acceptable since patient had hypoglycemic episodes when glycemic control was too tightly regulated       Hypernatremia  Assessment & Plan  Recent rise in sodium levels during hospital stay  Likely 2/2 to significant deficit in free water intake  - Pt has been noted to have difficulty feeding and drinking without assistance  - Nursing staff have been notified and encouraged free water intake  - LR Hydration   0   Lab Value Date/Time    SODIUM 150 (H) 08/10/2022 0528    SODIUM 148 (H) 08/09/2022 0903    SODIUM 148 (H) 08/08/2022 0502    SODIUM 144 08/07/2022 0609    SODIUM 145 08/06/2022 0432    SODIUM 141 08/05/2022 0547       Coronary artery disease  Assessment & Plan  Aspirin 81 for stroke   EKG showed no acute ST changes   Restarted home Lipitor after resolution of AST/ALT normalized    COPD (chronic obstructive pulmonary disease) ([facility] )  Assessment & Plan  Not on home O2 per documentation     Continue with albuterol nebs  Extubated 8/6  Continue supplemental O2 as needed    Disposition: palliative/hospice discussions on-going; pending [facility]    SUBJECTIVE     Patient is medically optimized at this point and pending placement back to [facility]  After discussion with wife today, given patient has had no meaningful progression in his disposition since treating his acute illnesses and stabilizing his chronic conditions as best as able, she now wishes to proceed with hospice vs palliative discussions  She expressed repeated desires for her  to be comfortable  She is the POA   She would ultimately like him to be at [facility], if possible, for either hospice vs palliative  I discussed this patient again with palliative physicians and they will be reaching out to the wife today to facilitate discussions  Repeat COVID test ordered per HFM wishes  OBJECTIVE     Vitals:    22 2130 08/15/22 0823 08/15/22 0952 08/15/22 0953   BP: 106/62 102/75 133/69 133/69   BP Location: Left arm      Pulse: 69 70 66 66   Resp:       Temp:  97 9 °F (36 6 °C)  97 8 °F (36 6 °C)   TempSrc:       SpO2: 93% 98% 96% 96%   Weight:       Height:          Temperature:   Temp (24hrs), Av 1 °F (36 7 °C), Min:97 8 °F (36 6 °C), Max:98 7 °F (37 1 °C)    Temperature: 97 8 °F (36 6 °C)  Intake & Output:  I/O       08/10 0701   0700  0701   0700  0701   0700    P  O  168 594     I V  (mL/kg) 1453 3 (15 1)      Total Intake(mL/kg) 1621 3 (16 9) 594 (6 2)     Net +1621 3 +594            Unmeasured Urine Occurrence 3 x 2 x         Weights:   IBW (Ideal Body Weight): 68 4 kg    Body mass index is 31 74 kg/m²  Weight (last 2 days)     Date/Time Weight    22 0600 94 7 (208 78)        Physical Exam  Constitutional:       General: He is not in acute distress  Appearance: He is ill-appearing (chronically)  He is not toxic-appearing or diaphoretic  Eyes:      Pupils: Pupils are equal, round, and reactive to light  Cardiovascular:      Rate and Rhythm: Normal rate and regular rhythm  Pulses: Normal pulses  Pulmonary:      Effort: Pulmonary effort is normal  No respiratory distress  Breath sounds: Normal breath sounds  No wheezing or rales  Abdominal:      General: There is no distension  Palpations: Abdomen is soft  Tenderness: There is no abdominal tenderness  There is no guarding  Musculoskeletal:      Right lower leg: No edema  Left lower leg: No edema  Skin:     General: Skin is warm  Neurological:      Mental Status: He is alert and oriented to person, place, and time        Cranial Nerves: Cranial nerve deficit (right tongue deviation, dysarthria) present  Psychiatric:         Mood and Affect: Mood normal        LABORATORY DATA     Labs: I have personally reviewed pertinent reports  Results from last 7 days   Lab Units 08/12/22  0600 08/10/22  0528   WBC Thousand/uL 11 09* 12 43*   HEMOGLOBIN g/dL 10 2* 10 5*   HEMATOCRIT % 33 5* 34 7*   PLATELETS Thousands/uL 335 304   NEUTROS PCT % 76*  --    MONOS PCT % 7  --       Results from last 7 days   Lab Units 08/12/22  0600 08/11/22  1554 08/10/22  2036   POTASSIUM mmol/L 3 7 3 8 3 7   CHLORIDE mmol/L 110* 108 112*   CO2 mmol/L 34* 34* 34*   BUN mg/dL 13 13 12   CREATININE mg/dL 0 84 0 84 0 71   CALCIUM mg/dL 8 0* 8 5 9 0   ALK PHOS U/L 346* 353*  --    ALT U/L 32 34  --    AST U/L 18 23  --      Results from last 7 days   Lab Units 08/11/22  1554 08/10/22  0528 08/09/22  0903   MAGNESIUM mg/dL 2 4 2 7* 2 5          Results from last 7 days   Lab Units 08/09/22  0903 08/09/22  0007   PTT seconds 63* 60*               IMAGING & DIAGNOSTIC TESTING     Radiology Results: I have personally reviewed pertinent reports  ERCP Fluoro    Result Date: 8/4/2022  Impression: ERCP with cholangitis s/p placement of 10 x 4 cm fully covered metal stent  RECOMMENDATION: Follow LFTs Continue antibiotics Further care per ICU team May require EUS for biopsy/staging of mass at later date  Iris Gardner MD     XR chest 1 view portable    Result Date: 8/4/2022  Impression: Suspected left retrocardiac consolidation and/or small effusion  Cardiomegaly and aortic sclerosis  Workstation performed: [id number]     CT head wo contrast    Result Date: 8/9/2022  Impression: Stable examination  Moderate chronic microangiopathic change within the cerebral hemispheres  The previously identified right cerebellar infarct is unchanged from multiple prior examinations    In retrospect this infarct has been present on prior CT exams for more linear and is chronic, rather than early as stated on the most recent CT report  Workstation performed: [id number]     CT head wo contrast    Result Date: 8/6/2022  Impression: Early infarct within the right cerebellar hemisphere is similar to the examination performed 2 days ago  No hemorrhagic transformation  Stable advanced chronic microangiopathic change within both cerebral hemispheres  Workstation performed: [id number]     CT head without contrast    Result Date: 8/4/2022  Impression: New low-density in the right cerebellum concerning for an acute infarct  Cerebral atrophy and small vessel disease  I personally discussed this study with [employer] on 8/4/2022 at 11:28 AM  Workstation performed: [id number]     MRI brain wo contrast    Result Date: 8/7/2022  Impression: No acute intracranial abnormality  Chronic encephalomalacia and gliosis in right cerebellum, 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 is partially obscured by susceptibility artifact from posterior spinal fixation hardware extending to the occiput  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  Workstation performed: [id number]     CT chest abdomen pelvis w contrast    Result Date: 8/4/2022  Impression: 1  Intrahepatic and extrahepatic biliary ductal dilatation with pancreatic body and tail atrophy and suspected pancreatic head mass  Confirmation should be obtained with MRI abdomen with MRCP  2   No infiltrates on study limited by respiratory motion  I personally discussed this study with [employer] on 8/4/2022 at 11:28 AM  Workstation performed: [id number]     FL ERCP biliary only    Result Date: 8/5/2022  Impression: Distal CBD stent placement  Workstation performed: [id number]     CXR in AM    Result Date: 8/5/2022  Impression: ET tube and right IJ line remain in stable position  No active pulmonary disease  Workstation performed: [id number]     XR chest portable ICU    Result Date: 8/5/2022  Impression: Right IJ line has been placed, tip overlies the SVC   No pneumothorax  Workstation performed: [id number]     XR chest portable ICU    Result Date: 8/5/2022  Impression: ET tube appears in position  Possible small left pleural effusion, stable  Workstation performed: [id number]     Other Diagnostic Testing: I have personally reviewed pertinent reports  ACTIVE MEDICATIONS     Current Facility-Administered Medications   Medication Dose Route Frequency    albuterol inhalation solution 2 5 mg  2 5 mg Nebulization Q4H PRN    apixaban (ELIQUIS) tablet 5 mg  5 mg Oral BID    aspirin chewable tablet 81 mg  81 mg Oral Daily    atorvastatin (LIPITOR) tablet 20 mg  20 mg Oral Daily With Dinner    carvedilol (COREG) tablet 25 mg  25 mg Oral BID With Meals    furosemide (LASIX) tablet 40 mg  40 mg Oral Daily    insulin glargine (LANTUS) subcutaneous injection 20 Units 0 2 mL  20 Units Subcutaneous HS    insulin lispro (HumaLOG) 100 units/mL subcutaneous injection 1-6 Units  1-6 Units Subcutaneous TID AC    insulin lispro (HumaLOG) 100 units/mL subcutaneous injection 1-6 Units  1-6 Units Subcutaneous HS    labetalol (NORMODYNE) injection 10 mg  10 mg Intravenous Q6H PRN    losartan (COZAAR) tablet 25 mg  25 mg Oral Daily    omeprazole (PRILOSEC) suspension 2 mg/mL  20 mg Oral Early Morning    senna-docusate sodium (SENOKOT S) 8 6-50 mg per tablet 1 tablet  1 tablet Oral HS       VTE Pharmacologic Prophylaxis: eliquis  VTE Mechanical Prophylaxis: sequential compression device    Portions of the record may have been created with voice recognition software  Occasional wrong word or "sound a like" substitutions may have occurred due to the inherent limitations of voice recognition software    Read the chart carefully and recognize, using context, where substitutions have occurred   ==  Marianne Doshi DO  [address]  Internal Medicine Residency PGY-3

## 2022-08-15 NOTE — QUICK NOTE
Internal Medicine night team called by the patient's RN, as is the patient's glucose levels running high mid 300s to 400, the short review patient is on uncertain type patient prior to admission, numbers today on the sliding scale algorithm 3 high, plan:  -outpatient regimen include glargine 35 units, will start at 20 units bedtime till further a discussion with the morning team  -changing diet to low carb diet  -RN updated will continue to monitor and evaluate as needed

## 2022-08-15 NOTE — QUICK NOTE
Progress Note - Palliative & Supportive Care       8/15/2022 10:58 AM -  Isidro oCok chart and case were reviewed by Darleen Pimentel DO  Mode of review included electronic chart check    Per review, symptoms remain controlled on current regimen and no changes are made at this time  Please continue the regimen in place, and review our last note for details  For dispo plan, please review Case Management notes  Palliative will sign off at this time  For urgent issues or any questions/concerns, please notify on-call provider via [address] [place] [address]  You may also call our answering service 24/7 at   Darleen Pimentel DO  Palliative and Supportive Care  Clinic/Answering Service: [phone]  You can find me on [email or web address]!

## 2022-08-15 NOTE — PROGRESS NOTES
Progress note - Palliative and Supportive Care   Tapan Alamo 82 y o  male [id number]    Patient Active Problem List   Diagnosis    Atrial fibrillation ([facility] )    CHF (congestive heart failure) ([facility] )    Hypertension    Hyperlipidemia    Type 2 diabetes mellitus (HCC)    Vertebral artery dissection (HCC)    Dysphagia    Ambulatory dysfunction    Abnormal EKG    Chronic heart failure with preserved ejection fraction (HCC)    Weight gain    History of 2019 novel coronavirus disease (COVID-19)    Debility    Medication management    Goals of care, counseling/discussion    Anemia    Right acetabular fracture (HCC)    Depression    Vascular dementia ([facility] )    Constipation    Leukocytosis    Full code status    Frailty syndrome in geriatric patient    Cholangitis    S/P ERCP    Cerebrovascular accident (CVA) involving cerebellum ([facility] )    Pancreatic mass    COPD (chronic obstructive pulmonary disease) ([facility] )    Coronary artery disease    Ventricular tachyarrhythmia (HCC)    Hypernatremia    Failure to thrive in adult     Active issues specifically addressed today include:   Vascular dementia  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  CVA  COPD  Palliative care encounter  Code status change    Plan:  1  Symptom management -    Orders reconciled to reflect comfort measures only   Oxy 5 mg p o  Q 2 hours p r n  Pain or increased work of breathing   Dilaudid 0 5 mg IV q 2 hours p r n  Breakthrough pain or increased work of breathing, or unable to tolerate p o   Ativan 0 5 mg IV Q 2 hours p r n  Anxiety or increased work of breathing   Haldol 0 5 mg IV q 4 hours p r n  Agitation or vomiting   Robinul 0 1 mg IV q 4 hours p r n  Secretions    2  Goals -    Comfort measures only while admitted, goal to return to [facility] man narrow on hospice services   Spoke with spouse, Kenisha Blanco, today    She expresses thorough understanding of patient's poor functional status, poor candidacy for cancer directed treatments, and general likelihood for ongoing debility  She is clear that she feels that proceeding with comfort directed care is in patient's best interest   She is aware that nutrition or hydration will be given artificially  However, pleasure feeds/hydration will be offered  She is aware that medications will be reconciled reflect comfort care and that the goal is for him not to report back to the hospital after being established hospice care  Code Status:  Comfort - Level 4   Decisional apparatus:  Patient is not competent on my exam today  If competence is lost, patient's substitute decision maker would default to spouse by [place]  Advance Directive / Living Will / POLST:  On file    3  Social support-   Time spent providing supportive listening to spouse via phone   Attempted to call son, William Michael, to provide update  No answer  Left message on machine    4  Follow-up   Palliative Care will continue to follow for support and symptom management call admitted   Communicated with primary team in Case Management plan as above    Interval history:       No events overnight  Patient remains exceedingly weak, difficulty lifting his extremities off the bed  Unable to feed himself  Pathology consistent with cholangiocarcinoma  Palliative Care re-engage with patient's family upon request of primary team at which time revisited consideration of comfort focused care  At that time spouse, Mavis García, expressed understanding that patient is unlikely to improve despite ongoing medical management  It is very unlikely that he would withstand any aggressive cancer treatments  Therefore, she is in favor of proceeding with comfort directed care at [facility] where she feels he is very well taking care of and comfortable      MEDICATIONS / ALLERGIES:     all current active meds have been reviewed    Allergies   Allergen Reactions    Amoxicillin Diarrhea and Hives OBJECTIVE:    Physical Exam  Physical Exam  HENT:      Head: Normocephalic and atraumatic  Eyes:      Conjunctiva/sclera: Conjunctivae normal    Cardiovascular:      Rate and Rhythm: Normal rate  Pulmonary:      Effort: Pulmonary effort is normal  No respiratory distress  Abdominal:      Tenderness: There is no guarding  Genitourinary:     Comments: Urinary catheter placed  Musculoskeletal:         General: Swelling present  Skin:     General: Skin is warm and dry  Neurological:      Comments: Confused, but answers simple questions appropriately   Psychiatric:         Mood and Affect: Mood normal          Lab Results:   Results from last 7 days   Lab Units 08/12/22  0600 08/10/22  0528   WBC Thousand/uL 11 09* 12 43*   HEMOGLOBIN g/dL 10 2* 10 5*   HEMATOCRIT % 33 5* 34 7*   PLATELETS Thousands/uL 335 304   NEUTROS PCT % 76*  --    MONOS PCT % 7  --      Results from last 7 days   Lab Units 08/12/22  0600 08/11/22  1554 08/10/22  2036   POTASSIUM mmol/L 3 7 3 8 3 7   CHLORIDE mmol/L 110* 108 112*   CO2 mmol/L 34* 34* 34*   BUN mg/dL 13 13 12   CREATININE mg/dL 0 84 0 84 0 71   CALCIUM mg/dL 8 0* 8 5 9 0   ALK PHOS U/L 346* 353*  --    ALT U/L 32 34  --    AST U/L 18 23  --        Imaging Studies: reviewed pertinent studies   EKG, Pathology, and Other Studies: reviewed pertinent studies    Counseling / Coordination of Care    Total floor / unit time spent today 60 minutes  Greater than 50% of total time was spent with the patient and / or family counseling and / or coordination of care  A description of the counseling / coordination of care:  Time spent assessing patient, communicating with primary team, coordinating care with RN, Case Management, discussing goals of care and changing code status

## 2022-08-16 NOTE — CASE MANAGEMENT
Case Management Discharge Planning Note    Patient name Barrie Goldmann  Location [facility] 711/[facility] 527-40 MRN [id number]  : 1935 Date 2022       Current Admission Date: 2022  Current Admission Diagnosis:Pancreatic mass   Patient Active Problem List    Diagnosis Date Noted    Failure to thrive in adult 2022    Hypernatremia 08/10/2022    Ventricular tachyarrhythmia ([facility] ) 2022    Pancreatic mass 2022    COPD (chronic obstructive pulmonary disease) ([facility] ) 2022    Coronary artery disease 2022    Cerebrovascular accident (CVA) involving cerebellum ([facility] ) 2022    S/P ERCP 2022    Cholangitis 2022    Full code status 2021    Frailty syndrome in geriatric patient 2021    Leukocytosis 2021    Constipation 2021    Vascular dementia ([facility] ) 2021    Depression 03/15/2021    Right acetabular fracture ([facility] ) 2021    Debility 2021    Medication management 2021    Goals of care, counseling/discussion 2021    Anemia 2021    History of 2019 novel coronavirus disease (COVID-19) 2020    Weight gain 2020    Abnormal EKG 2020    Chronic heart failure with preserved ejection fraction ([facility] ) 2020    Ambulatory dysfunction 2020    Dysphagia 2020    Atrial fibrillation ([facility] ) 2020    CHF (congestive heart failure) ([facility] ) 2020    Hypertension 2020    Hyperlipidemia 2020    Type 2 diabetes mellitus ([facility] ) 2020    Vertebral artery dissection ([facility] ) 2020      LOS (days): 12  Geometric Mean LOS (GMLOS) (days): 4 80  Days to GMLOS:-7 4     OBJECTIVE:  Risk of Unplanned Readmission Score: 32 45         Current admission status: Inpatient   Preferred Pharmacy:   [facility], [address] [place] [address] [address] [address]  Phone: [phone] Fax: Kelly Morel [address] [address] , [place] - [address] [address]  Phone: [phone] Fax: [phone]    UNKNOWN - FOLLOW UP PRIOR TO DISCHARGE TO E-PRESCRIBE  No address on file      [address] [place], [place] - [address] [facility]  Phone: [phone] Fax: [phone]    Primary Care Provider: Maile Babinski, MD    Primary Insurance: [facility]  Secondary Insurance: [id number]    DISCHARGE DETAILS:    Discharge planning discussed with[de-identified] CM spoke with the pt's wife  Freedom of Choice: Yes  Comments - Freedom of Choice: FOC explored and preference is the pt return to [facility] with hospice  CM contacted family/caregiver?: Yes  Were Treatment Team discharge recommendations reviewed with patient/caregiver?: Yes  Did patient/caregiver verbalize understanding of patient care needs?: Yes  Were patient/caregiver advised of the risks associated with not following Treatment Team discharge recommendations?: Yes    Contacts  Relationship to Patient[de-identified] Family  Contact Method: Phone  Reason/Outcome: Continuity of Care, Discharge [address]         Is the patient interested in [address] at discharge?: No    DME Referral Provided  Referral made for DME?: No    Other Referral/Resources/Interventions Provided:  Interventions: SNF, Hospice    Would you like to participate in our [address] service program?  : No - Declined    Treatment Team Recommendation: Home, SNF  Discharge Destination Plan[de-identified] Hospice, SNF  Transport at Discharge : [facility]  Dispatcher Contacted: Yes  Number/Name of Dispatcher: [phone]  Transported by (Company and Unit #): [address] [facility]           Transfer Mode: Stretcher  Accompanied by: Alone              Additional Comments: The pt is medically stable for discharge and disposition planning is finalized   [facility] is able to admit the pt on this date to a zone capable of manage COVID positive residents  Hospice referral was placed and [facility] is able to accept the pt and provide end of life care  Start of care is scheduled for today  CM arranged BLS transport, and  is confirmed for 6:30pm  No further CM intervention is warranted at this time

## 2022-08-16 NOTE — PROGRESS NOTES
Progress note - Palliative and Supportive Care   Susan Kim 82 y o  male [id number]    Patient Active Problem List   Diagnosis    Atrial fibrillation ([facility] )    CHF (congestive heart failure) ([facility] )    Hypertension    Hyperlipidemia    Type 2 diabetes mellitus (HCC)    Vertebral artery dissection (HCC)    Dysphagia    Ambulatory dysfunction    Abnormal EKG    Chronic heart failure with preserved ejection fraction (HCC)    Weight gain    History of 2019 novel coronavirus disease (COVID-19)    Debility    Medication management    Goals of care, counseling/discussion    Anemia    Right acetabular fracture (HCC)    Depression    Vascular dementia ([facility] )    Constipation    Leukocytosis    Full code status    Frailty syndrome in geriatric patient    Cholangitis    S/P ERCP    Cerebrovascular accident (CVA) involving cerebellum ([facility] )    Pancreatic mass    COPD (chronic obstructive pulmonary disease) (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  CVA  COPD  Palliative care encounter  Code status change    Plan:  1  Symptom management -    Orders reconciled to reflect comfort measures only   Oxy 5 mg p o  Q 2 hours p r n  Pain or increased work of breathing   Dilaudid 0 5 mg IV q 2 hours p r n  Breakthrough pain or increased work of breathing, or unable to tolerate p o   Ativan 0 5 mg SL Q 4 hours p r n  Anxiety or increased work of breathing   Haldol 0 5 mg IV q 4 hours p r n  Agitation or vomiting   Robinul 0 1 mg IV q 4 hours p r n  Secretions   Larson for comfort   No PRNs used in the past 24H    2   Goals -    Comfort measures only while admitted, goal to return to [facility] on hospice services     Code Status:  Comfort - Level 4   Decisional apparatus:  Patient is not competent on my exam today  If competence is lost, patient's substitute decision maker would default to spouse by [place]  Advance Directive / Living Will / POLST:  On file    3  Social support-   Called spouse, she will call back later if she wants an update   Attempted to call son, Reagan Alexander, to provide update 8/15  No answer  Left message on machine    4  Follow-up   Paper scripts on chart in anticipation of discharge with hospice   Palliative care will otherwise sign off as symptoms are well controlled or goals are well defined    Interval history:       No events overnight  Patient denies complaints  He states the temperature in his room is better after the thermostat was adjusted last evening  He otherwise denies pain or shortness of breath  He requests soda  When patient was made aware that the focus is providing comfort for the remainder of his life as opposed to ongoing medical management he responds I think you have a good plan     MEDICATIONS / ALLERGIES:     all current active meds have been reviewed    Allergies   Allergen Reactions    Amoxicillin Diarrhea and Hives       OBJECTIVE:    Physical Exam  Physical Exam  HENT:      Head: Normocephalic and atraumatic  Eyes:      Conjunctiva/sclera: Conjunctivae normal    Cardiovascular:      Rate and Rhythm: Normal rate  Pulmonary:      Effort: No respiratory distress  Comments: O2 via NC  Abdominal:      Tenderness: There is no guarding  Genitourinary:     Comments: Urinary catheter in place  Musculoskeletal:         General: No swelling  Skin:     General: Skin is warm and dry  Neurological:      Mental Status: He is alert  Comments: Mildly  Confused, but answers simple questions appropriately     Psychiatric:         Mood and Affect: Mood normal          Lab Results:   Results from last 7 days   Lab Units 08/12/22  0600 08/10/22  0528   WBC Thousand/uL 11 09* 12 43*   HEMOGLOBIN g/dL 10 2* 10 5*   HEMATOCRIT % 33 5* 34 7*   PLATELETS Thousands/uL 335 304   NEUTROS PCT % 76*  --    MONOS PCT % 7  --      Results from last 7 days   Lab Units 08/12/22  0600 08/11/22  1554 08/10/22  2036   POTASSIUM mmol/L 3 7 3 8 3 7   CHLORIDE mmol/L 110* 108 112*   CO2 mmol/L 34* 34* 34*   BUN mg/dL 13 13 12   CREATININE mg/dL 0 84 0 84 0 71   CALCIUM mg/dL 8 0* 8 5 9 0   ALK PHOS U/L 346* 353*  --    ALT U/L 32 34  --    AST U/L 18 23  --        Imaging Studies: reviewed pertinent studies   EKG, Pathology, and Other Studies: reviewed pertinent studies    Counseling / Coordination of Care    Total floor / unit time spent today 20 minutes  Greater than 50% of total time was spent with the patient and / or family counseling and / or coordination of care  A description of the counseling / coordination of care:  Time spent assessing patient, symptom management, coordination of care with family, RN,

## 2022-08-16 NOTE — PLAN OF CARE
Problem: Potential for Falls  Goal: Patient will remain free of falls  Description: INTERVENTIONS:  - Educate patient/family on patient safety including physical limitations  - Instruct patient to call for assistance with activity   - Consult OT/PT to assist with strengthening/mobility   - Keep Call bell within reach  - Keep bed low and locked with side rails adjusted as appropriate  - Keep care items and personal belongings within reach  - Initiate and maintain comfort rounds  - Make Fall Risk Sign visible to staff  - Offer Toileting every 2 Hours, in advance of need  - Initiate/Maintain bed alarm  - Obtain necessary fall risk management equipment: bed alarm   - Apply yellow socks and bracelet for high fall risk patients  - Consider moving patient to room near nurses station  Outcome: Progressing     Problem: MOBILITY - ADULT  Goal: Maintain or return to baseline ADL function  Description: INTERVENTIONS:  - Educate patient/family on patient safety including physical limitations  - Instruct patient to call for assistance with activity   - Consult OT/PT to assist with strengthening/mobility   - Keep Call bell within reach  - Keep bed low and locked with side rails adjusted as appropriate  - Keep care items and personal belongings within reach  - Initiate and maintain comfort rounds  - Make Fall Risk Sign visible to staff  - Offer Toileting every 2 Hours, in advance of need  - Initiate/Maintain bed alarm  - Apply yellow socks and bracelet for high fall risk patients  - Consider moving patient to room near nurses station  Outcome: Progressing  Goal: Maintains/Returns to pre admission functional level  Description: INTERVENTIONS:  - Perform BMAT or MOVE assessment daily    - Set and communicate daily mobility goal to care team and patient/family/caregiver  - Collaborate with rehabilitation services on mobility goals if consulted  - Perform Range of Motion 2 times a day  - Reposition patient every 2 hours    - Dangle patient 2 times a day  - Stand patient 2 times a day  - Ambulate patient 2 times a day  - Out of bed to chair 2 times a day   - Out of bed for meals 2 times a day  - Out of bed for toileting  - Record patient progress and toleration of activity level   Outcome: Progressing     Problem: Prexisting or High Potential for Compromised Skin Integrity  Goal: Skin integrity is maintained or improved  Description: INTERVENTIONS:  - Identify patients at risk for skin breakdown  - Assess and monitor skin integrity  - Assess and monitor nutrition and hydration status  - Monitor labs   - Assess for incontinence   - Turn and reposition patient  - Assist with mobility/ambulation  - Relieve pressure over bony prominences  - Avoid friction and shearing  - Provide appropriate hygiene as needed including keeping skin clean and dry  - Evaluate need for skin moisturizer/barrier cream  - Collaborate with interdisciplinary team   - Patient/family teaching  - Consider wound care consult   Outcome: Progressing     Problem: Nutrition/Hydration-ADULT  Goal: Nutrient/Hydration intake appropriate for improving, restoring or maintaining nutritional needs  Description: Monitor and assess patient's nutrition/hydration status for malnutrition  Collaborate with interdisciplinary team and initiate plan and interventions as ordered  Monitor patient's weight and dietary intake as ordered or per policy  Utilize nutrition screening tool and intervene as necessary  Determine patient's food preferences and provide high-protein, high-caloric foods as appropriate       INTERVENTIONS:  - Monitor oral intake, urinary output, labs, and treatment plans  - Assess nutrition and hydration status and recommend course of action  - Evaluate amount of meals eaten  - Assist patient with eating if necessary   - Allow adequate time for meals  - Recommend/ encourage appropriate diets, oral nutritional supplements, and vitamin/mineral supplements  - Order, calculate, and assess calorie counts as needed  - Recommend, monitor, and adjust tube feedings and TPN/PPN based on assessed needs  - Assess need for intravenous fluids  - Provide specific nutrition/hydration education as appropriate  - Include patient/family/caregiver in decisions related to nutrition  Outcome: Progressing     Problem: PAIN - ADULT  Goal: Verbalizes/displays adequate comfort level or baseline comfort level  Description: Interventions:  - Encourage patient to monitor pain and request assistance  - Assess pain using appropriate pain scale  - Administer analgesics based on type and severity of pain and evaluate response  - Implement non-pharmacological measures as appropriate and evaluate response  - Consider cultural and social influences on pain and pain management  - Notify physician/advanced practitioner if interventions unsuccessful or patient reports new pain  Outcome: Progressing     Problem: INFECTION - ADULT  Goal: Absence or prevention of progression during hospitalization  Description: INTERVENTIONS:  - Assess and monitor for signs and symptoms of infection  - Monitor lab/diagnostic results  - Monitor all insertion sites, i e  indwelling lines, tubes, and drains  - Monitor endotracheal if appropriate and nasal secretions for changes in amount and color  - [place] appropriate cooling/warming therapies per order  - Administer medications as ordered  - Instruct and encourage patient and family to use good hand hygiene technique  - Identify and instruct in appropriate isolation precautions for identified infection/condition  Outcome: Progressing  Goal: Absence of fever/infection during neutropenic period  Description: INTERVENTIONS:  - Monitor WBC    Outcome: Progressing     Problem: SAFETY ADULT  Goal: Patient will remain free of falls  Description: INTERVENTIONS:  - Educate patient/family on patient safety including physical limitations  - Instruct patient to call for assistance with activity   - Consult OT/PT to assist with strengthening/mobility   - Keep Call bell within reach  - Keep bed low and locked with side rails adjusted as appropriate  - Keep care items and personal belongings within reach  - Initiate and maintain comfort rounds  - Make Fall Risk Sign visible to staff  - Offer Toileting every 2 Hours, in advance of need  - Initiate/Maintain bed alarm  - Obtain necessary fall risk management equipment: bed alarm   - Apply yellow socks and bracelet for high fall risk patients  - Consider moving patient to room near nurses station  Outcome: Progressing  Goal: Maintain or return to baseline ADL function  Description: INTERVENTIONS:  - Educate patient/family on patient safety including physical limitations  - Instruct patient to call for assistance with activity   - Consult OT/PT to assist with strengthening/mobility   - Keep Call bell within reach  - Keep bed low and locked with side rails adjusted as appropriate  - Keep care items and personal belongings within reach  - Initiate and maintain comfort rounds  - Make Fall Risk Sign visible to staff  - Offer Toileting every 2 Hours, in advance of need  - Initiate/Maintain bed alarm  - Apply yellow socks and bracelet for high fall risk patients  - Consider moving patient to room near nurses station  Outcome: Progressing  Goal: Maintains/Returns to pre admission functional level  Description: INTERVENTIONS:  - Perform BMAT or MOVE assessment daily    - Set and communicate daily mobility goal to care team and patient/family/caregiver  - Collaborate with rehabilitation services on mobility goals if consulted  - Perform Range of Motion 2 times a day  - Reposition patient every 2 hours    - Dangle patient 2 times a day  - Stand patient 2 times a day  - Ambulate patient 2 times a day  - Out of bed to chair 2 times a day   - Out of bed for meals 2 times a day  - Out of bed for toileting  - Record patient progress and toleration of activity level   Outcome: Progressing

## 2022-08-16 NOTE — QUICK NOTE
Spouse returned phonecall to palliative medicine  Update and support was provided  Radha Dee asks for ongoing daily updates  Also attempted to print scripts for discharge but printer was not functioning  If patient is planned for d/c on 8/17 please reach out to palliative medicine to assure he leaves with necessary hospice prescriptions

## 2022-08-16 NOTE — PROGRESS NOTES
INTERNAL MEDICINE RESIDENCY PROGRESS NOTE     Name: Eddie Dunn   Age & Sex: 82 y o  male   MRN: [id number]  Unit/Bed#: [facility] 711-01   Encounter: [id number]  Team: SOD Team B     PATIENT INFORMATION     Name: Eddie Dunn   Age & Sex: 82 y o  male   MRN: [id number]  Hospital Stay Days: 12    ASSESSMENT/PLAN     Principal Problem:    Pancreatic mass  Active Problems:    Cerebrovascular accident (CVA) involving cerebellum ([facility] )    Failure to thrive in adult    Atrial fibrillation ([facility] )    CHF (congestive heart failure) ([facility] )    Ventricular tachyarrhythmia ([facility] )    Hypertension    Dysphagia    Type 2 diabetes mellitus ([facility] )    Chronic heart failure with preserved ejection fraction (HCC)    COPD (chronic obstructive pulmonary disease) ([place] [address] )    Coronary artery disease    Hypernatremia      * Pancreatic mass  Assessment & Plan   Pancreatic head mass  · 8/4 CTAP: Intrahepatic and extrahepatic biliary ductal dilation with pancreatic body and tail atrophy and suspected pancreatic head mass  No gross lung infiltrates noted limited by respiratory motion  · ERCP done 8/4 with stent placed  · CEA normal  · CA 19-9 682    Biliary Brushing Pathology: evidence of adenocarcinoma   Surgical Oncology Consulted: 8/10/22 - Input Appreciated, indicated patient is not a candidate for acute surgical intervention  Not a candidate for med-onc given poor functional status     Patient now comfort measures  Pursuing placement of home hospice back at [facility] following    Failure to thrive in adult  Assessment & Plan  Comfort measures only  Pleasure feeds as patient desires with no restrictions      Cerebrovascular accident (CVA) involving cerebellum ([facility] )  Assessment & Plan  Comfort measures only  ? Sepsis (HCC)-resolved as of 8/15/2022  Assessment & Plan  ·  Suspected cholangitis with sespsis status post ERCP and stent placement on 8/4  ? ID now signed off  ? S/p Ertepenem given MDRO/ESBL  ?  Blood cultures X2 growing ESBL and Klebsiella continue to follow  ? Trend WBC and fever curve    ? Monitor LFT, normalized 22  ? Leukocytosis resolved        Lab Results   Component Value Date    WBC 11 09 (H) 2022    WBC 12 43 (H) 08/10/2022    WBC 10 15 2022    WBC 12 97 (H) 2022    WBC 14 25 (H) 2022         Ventricular tachyarrhythmia ([facility])  Assessment & Plan  Comfort measures only      CHF (congestive heart failure) ([facility])  Assessment & Plan  Comfort measures only  Can diurese if necessary to control symptomatic SOB should it occur    Atrial fibrillation ([facility])  Assessment & Plan  Comfort measures only    Hypertension  Assessment & Plan  Comfort measures only    Dysphagia  Assessment & Plan  2/2 chronic Stroke  No diet restrictions as patient is comfort care and can pleasure fed       Chronic heart failure with preserved ejection fraction ([facility])  Assessment & Plan  Comfort measures only    Type 2 diabetes mellitus ([facility])  Assessment & Plan  Comfort measures only    Hypernatremia  Assessment & Plan  Comfort measures only    Coronary artery disease  Assessment & Plan  Comfort measures only    COPD (chronic obstructive pulmonary disease) ([facility])  Assessment & Plan  Not on home O2 per documentation   Comfort measures only  Disposition: pending d/c back to [facility] on hospice    SUBJECTIVE     Patient is now comfort care after discussion with wife and palliative and hospice teams  Comfort measures in place  Pending placement back to [facility] on hospice  Case management following  Patient in no acute distress      OBJECTIVE     Vitals:    08/15/22 0952 08/15/22 0953 22 0650 22 0819   BP: 133/69 133/69 164/80 163/80   Pulse: 66 66 77 80   Resp:       Temp:  97 8 °F (36 6 °C)  98 2 °F (36 8 °C)   TempSrc:       SpO2: 96% 96% 94% 98%   Weight:       Height:          Temperature:   Temp (24hrs), Av 2 °F (36 8 °C), Min:98 2 °F (36 8 °C), Max:98 2 °F (36 8 °C)    Temperature: 98 2 °F (36 8 °C)  Intake & Output:  I/O       08/10 0701 08/11 0700 08/11 0701  08/12 0700 08/12 0701  08/13 0700    P  O  168 594     I V  (mL/kg) 1453 3 (15 1)      Total Intake(mL/kg) 1621 3 (16 9) 594 (6 2)     Net +1621 3 +594            Unmeasured Urine Occurrence 3 x 2 x         Weights:   IBW (Ideal Body Weight): 68 4 kg    Body mass index is 31 74 kg/m²  Weight (last 2 days)     Date/Time Weight    08/14/22 0600 94 7 (208 78)        Physical Exam  Constitutional:       General: He is not in acute distress  Appearance: He is ill-appearing (chronically)  He is not toxic-appearing or diaphoretic  Eyes:      Pupils: Pupils are equal, round, and reactive to light  Cardiovascular:      Rate and Rhythm: Normal rate and regular rhythm  Pulses: Normal pulses  Pulmonary:      Effort: Pulmonary effort is normal  No respiratory distress  Breath sounds: Normal breath sounds  No wheezing or rales  Abdominal:      General: There is no distension  Palpations: Abdomen is soft  Tenderness: There is no abdominal tenderness  There is no guarding  Musculoskeletal:      Right lower leg: No edema  Left lower leg: No edema  Skin:     General: Skin is warm  Neurological:      Mental Status: He is alert and oriented to person, place, and time  Cranial Nerves: Cranial nerve deficit (right tongue deviation, dysarthria) present  Psychiatric:         Mood and Affect: Mood normal        LABORATORY DATA     Labs: I have personally reviewed pertinent reports    Results from last 7 days   Lab Units 08/12/22  0600 08/10/22  0528   WBC Thousand/uL 11 09* 12 43*   HEMOGLOBIN g/dL 10 2* 10 5*   HEMATOCRIT % 33 5* 34 7*   PLATELETS Thousands/uL 335 304   NEUTROS PCT % 76*  --    MONOS PCT % 7  --       Results from last 7 days   Lab Units 08/12/22  0600 08/11/22  1554 08/10/22  2036   POTASSIUM mmol/L 3 7 3 8 3 7   CHLORIDE mmol/L 110* 108 112*   CO2 mmol/L 34* 34* 34*   BUN mg/dL 13 13 12   CREATININE mg/dL 0 84 0 84 0 71 CALCIUM mg/dL 8 0* 8 5 9 0   ALK PHOS U/L 346* 353*  --    ALT U/L 32 34  --    AST U/L 18 23  --      Results from last 7 days   Lab Units 08/11/22  1554 08/10/22  0528   MAGNESIUM mg/dL 2 4 2 7*                        IMAGING & DIAGNOSTIC TESTING     Radiology Results: I have personally reviewed pertinent reports  ERCP Fluoro    Result Date: 8/4/2022  Impression: ERCP with cholangitis s/p placement of 10 x 4 cm fully covered metal stent  RECOMMENDATION: Follow LFTs Continue antibiotics Further care per ICU team May require EUS for biopsy/staging of mass at later date  Zulma Downs MD     XR chest 1 view portable    Result Date: 8/4/2022  Impression: Suspected left retrocardiac consolidation and/or small effusion  Cardiomegaly and aortic sclerosis  Workstation performed: [id number]     CT head wo contrast    Result Date: 8/9/2022  Impression: Stable examination  Moderate chronic microangiopathic change within the cerebral hemispheres  The previously identified right cerebellar infarct is unchanged from multiple prior examinations  In retrospect this infarct has been present on prior CT exams for more linear and is chronic, rather than early as stated on the most recent CT report  Workstation performed: [id number]     CT head wo contrast    Result Date: 8/6/2022  Impression: Early infarct within the right cerebellar hemisphere is similar to the examination performed 2 days ago  No hemorrhagic transformation  Stable advanced chronic microangiopathic change within both cerebral hemispheres  Workstation performed: [id number]     CT head without contrast    Result Date: 8/4/2022  Impression: New low-density in the right cerebellum concerning for an acute infarct  Cerebral atrophy and small vessel disease    I personally discussed this study with Dorina Galdamez on 8/4/2022 at 11:28 AM  Workstation performed: [id number]     MRI brain wo contrast    Result Date: 8/7/2022  Impression: No acute intracranial abnormality  Chronic encephalomalacia and gliosis in right cerebellum, 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 is partially obscured by susceptibility artifact from posterior spinal fixation hardware extending to the occiput  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  Workstation performed: [id number]     CT chest abdomen pelvis w contrast    Result Date: 8/4/2022  Impression: 1  Intrahepatic and extrahepatic biliary ductal dilatation with pancreatic body and tail atrophy and suspected pancreatic head mass  Confirmation should be obtained with MRI abdomen with MRCP  2   No infiltrates on study limited by respiratory motion  I personally discussed this study with Javier Shepard on 8/4/2022 at 11:28 AM  Workstation performed: [id number]     FL ERCP biliary only    Result Date: 8/5/2022  Impression: Distal CBD stent placement  Workstation performed: [id number]     CXR in AM    Result Date: 8/5/2022  Impression: ET tube and right IJ line remain in stable position  No active pulmonary disease  Workstation performed: [id number]     XR chest portable ICU    Result Date: 8/5/2022  Impression: Right IJ line has been placed, tip overlies the SVC  No pneumothorax  Workstation performed: [id number]     XR chest portable ICU    Result Date: 8/5/2022  Impression: ET tube appears in position  Possible small left pleural effusion, stable  Workstation performed: [id number]     Other Diagnostic Testing: I have personally reviewed pertinent reports      ACTIVE MEDICATIONS     Current Facility-Administered Medications   Medication Dose Route Frequency    albuterol inhalation solution 2 5 mg  2 5 mg Nebulization Q4H PRN    aspirin chewable tablet 81 mg  81 mg Oral Daily    furosemide (LASIX) tablet 40 mg  40 mg Oral Daily    glycopyrrolate (ROBINUL) injection 0 1 mg  0 1 mg Intravenous Q4H PRN    haloperidol lactate (HALDOL) injection 0 5 mg  0 5 mg Intravenous Q4H PRN    HYDROmorphone (DILAUDID) injection 0 5 mg  0 5 mg Intravenous Q2H PRN    LORazepam (ATIVAN) injection 0 5 mg  0 5 mg Intravenous Q4H PRN    oxyCODONE (ROXICODONE) IR tablet 5 mg  5 mg Oral Q2H PRN    senna-docusate sodium (SENOKOT S) 8 6-50 mg per tablet 1 tablet  1 tablet Oral HS       VTE Pharmacologic Prophylaxis: eliquis  VTE Mechanical Prophylaxis: sequential compression device    Portions of the record may have been created with voice recognition software  Occasional wrong word or "sound a like" substitutions may have occurred due to the inherent limitations of voice recognition software    Read the chart carefully and recognize, using context, where substitutions have occurred   ==  Blessing Weber, DO  [address]  Internal Medicine Residency PGY-3

## 2022-08-16 NOTE — HOSPICE NOTE
Hospice referral received  Pt approved for RLOC by Dr Li Fu  Spoke with pts wife Koko Banuelos and Navin Villarreal and reviewed hospice care and services per Medicare guidelines  They are in agreement with the same  Palliative wrote scripts for comfort meds  Wife will sign consents via docusign  For open to hospice tomorrow

## 2022-08-16 NOTE — CASE COMMUNICATION
For [employer]   [address] [age]year old man with history of atrial fibrillation on Eliquis, HFpEF, COPD, CAD history stenting, Type 2 diabetes, hypertension, dysphagia, vascular dementia, prior TBI  Ambulatory dysfunction uses a wheelchair  Came in with [facility]  Had fever and hypoxia  Has an ERCP with stent removed  He had admission for cholangitis in   Was febrile inthe ED and was found ot have a cerebellar stroke on CT head  Pan creatic head mass   CTAP: Intrahepatic and extrahepatic biliary ductal dilation with pancreatic body and tail atrophy and suspected pancreatic head mass  No gross lung infiltrates noted limited by respiratory motion  ERCP done  with stent placed  CEA normal  CA 19-9 682  Biliary Brushing Pathology: evidence of adenocarcinoma   PPS 30 ECOG 4 He would like to go back to [facility] with hospice      Approved for RLOC by Dr Mirna Bashir   Dx Adenocarcinoma with unknown primary C80 1

## 2022-08-16 NOTE — CASE MANAGEMENT
Case Management Discharge Planning Note    Patient name Duane Rodas  Location [facility]30-04 MRN [id number]  : 1935 Date 2022       Current Admission Date: 2022  Current Admission Diagnosis:Pancreatic mass   Patient Active Problem List    Diagnosis Date Noted    Failure to thrive in adult 2022    Hypernatremia 08/10/2022    [facility]arr[facility]ia [facility] ) 2022    Pancreatic mass 2022    COPD [facility]chronic [facility] disease) [facility] 2022    Coronary artery disease 2022    Cerebrovascular accident (CVA) involving cerebellum ([facility] ) 2022    S/P ERCP 2022    Cholangitis 2022    Full code status 2021    Frailty syndrome in geriatric patient 2021    Leukocytosis 2021    Constipation 2021    Vascular dementia ([facility] 2021    Depression 03/15/2021    Right acetabular fracture ([facility] ) 2021    Debility 2021    Medication management 2021    Goals of care, counseling/discussion 2021    Anemia 2021    History of 2019 novel coronavirus disease (COVID-19) 2020    Weight gain 2020    Abnormal EKG 2020    Chronic heart failure with preserved ejection fraction ([facility] ) 2020    Ambulatory dysfunction 2020    Dysphagia 2020    Atrial fibrillation ([facility] 2020    CHF (congestive heart failure) ([facility] 2020    Hypertension 2020    Hyperlipidemia 2020    Type 2 diabetes mellitus ([facility] ) 2020    Vertebral artery dissection ([facility] ) 2020      LOS (days): 12  Geometric Mean LOS (GMLOS) (days): 4 80  Days to GMLOS:-7 2     OBJECTIVE:  Risk of Unplanned Readmission Score: 31 29         Current admission status: Inpatient   Preferred Pharmacy:   [facility], [address] [place] [address] [address] [address]  Phone: [phone] Fax: Kelly Morel [address] [place] [address] , [place] - [address] [facility]  [address] [address]  Phone: [phone] Fax: [phone]    UNKNOWN - FOLLOW UP PRIOR TO DISCHARGE TO E-PRESCRIBE  No address on file      [address] [place], [place] - [address] [facility] [address] [place] [address] [address] [facility] [address]  Phone: [phone] Fax: [phone]    Primary Care Provider: Maile Babinski, MD    Primary Insurance: [facility] REP  Secondary Insurance: [address] Number: P2P offered by insurance  P: [phone] Opt 5   Expires 8/17 9AM  CM notified

## 2022-08-17 NOTE — PROGRESS NOTES
8/17/2022 10:38 AM -  Elizabeth Abbasi's chart and case were reviewed by Jacob Dickey  Mode of review included electronic chart check, and rounding with registered nurse  Per review, symptoms remain controlled on current regimen and no changes are made at this time  Please continue the regimen in place, and review our last note for details  For dispo plan, please review Case Management notes  Scripts for discharge printed and placed in chart  Patient is not appropriate for outpatient follow-up  We will make arrangements through our office  For urgent issues or any questions/concerns, please notify on-call provider via [employer]  You may also call our answering service 24/7 at   ARISTEO Dickey  Palliative and Supportive Care  Clinic/Answering Service: [phone]  You can find me on [employer]!

## 2022-08-17 NOTE — DISCHARGE SUMMARY
INTERNAL MEDICINE RESIDENCY DISCHARGE SUMMARY     Haydee Clement   82 y o  male  MRN: [id number]  Room/Bed: [facility] 711/[facility] 200-1     [address] [facility]    Encounter: [id number]    Principal Problem:    Pancreatic mass  Active Problems:    Cerebrovascular accident (CVA) involving cerebellum ([facility] )    Failure to thrive in adult    Atrial fibrillation (HCC)    CHF (congestive heart failure) ([facility])    Ventricular tachyarrhythmia ([facility] )    Hypertension    Dysphagia    Type 2 diabetes mellitus (HCC)    Chronic heart failure with preserved ejection fraction (HCC)    COPD (chronic obstructive pulmonary disease) (HCC)    Coronary artery disease    Hypernatremia      * Pancreatic mass  Assessment & Plan   Pancreatic head mass  · 8/4 CTAP: Intrahepatic and extrahepatic biliary ductal dilation with pancreatic body and tail atrophy and suspected pancreatic head mass  No gross lung infiltrates noted limited by respiratory motion  · ERCP done 8/4 with stent placed  · CEA normal  · CA 19-9 682    Biliary Brushing Pathology: evidence of adenocarcinoma   Surgical Oncology Consulted: 8/10/22 - Input Appreciated, indicated patient is not a candidate for acute surgical intervention  Not a candidate for med-onc given poor functional status     Patient now comfort measures  Pursuing placement of home hospice back at [facility] following    Failure to thrive in adult  Assessment & Plan  Comfort measures only  Pleasure feeds as patient desires with no restrictions      Cerebrovascular accident (CVA) involving cerebellum ([facility] )  Assessment & Plan  Comfort measures only  ? Sepsis (HCC)-resolved as of 8/15/2022  Assessment & Plan  ·  Suspected cholangitis with sespsis status post ERCP and stent placement on 8/4  ? ID now signed off  ? S/p Ertepenem given MDRO/ESBL  ? Blood cultures X2 growing ESBL and Klebsiella continue to follow  ? Trend WBC and fever curve    ?  Monitor LFT, normalized 8/7/22  ? Leukocytosis resolved        Lab Results   Component Value Date    WBC 11 09 (H) 08/12/2022    WBC 12 43 (H) 08/10/2022    WBC 10 15 08/08/2022    WBC 12 97 (H) 08/07/2022    WBC 14 25 (H) 08/06/2022         Ventricular tachyarrhythmia (HCC)  Assessment & Plan  Comfort measures only      CHF (congestive heart failure) ([facility])  Assessment & Plan  Comfort measures only  Can diurese if necessary to control symptomatic SOB should it occur    Atrial fibrillation (HCC)  Assessment & Plan  Comfort measures only    Hypertension  Assessment & Plan  Comfort measures only    Dysphagia  Assessment & Plan  2/2 chronic Stroke  No diet restrictions as patient is comfort care and can pleasure fed       Chronic heart failure with preserved ejection fraction (HCC)  Assessment & Plan  Comfort measures only    Type 2 diabetes mellitus ([facility])  Assessment & Plan  Comfort measures only    Hypernatremia  Assessment & Plan  Comfort measures only    Coronary artery disease  Assessment & Plan  Comfort measures only    COPD (chronic obstructive pulmonary disease) ([facility] )  Assessment & Plan  Not on home O2 per documentation   Comfort measures only        [address]     Per ICU H&P by Dr Andrew Marcial: "82year old man with history of atrial fibrillation on Eliquis, HFpEF, COPD, CAD history stenting, Type 2 diabetes, hypertension, dysphagia, vascular dementia, prior TBI  Ambulatory dysfunction uses a wheelchair  Presents with altered mental status  Presents with fever and hypoxia  Yesterday he had an ERCP with stent removed  He had admission for cholangitis in June  Uneventful procedure  Today- febrile in ED  Found to have cerebellar stroke on CT head  Started on cefepime, flagyl    Given IVF   Started on levophed not for shock but for BP management post-CVA     Reportedly at GI suite 7/26 had dysarthria and headache- refused ED transfer "    Patient ultimately found to have bacteremia of GI source s/p ERCP for biliary obstruction and was treated with ertapenem  Bacteremia resolved, however patient's overall prognosis given ERCP brushing demonstrating adenocarcinoma, his stroke deficits of dysphagia/dysarthria, his extreme debility, afib RVR, and multiple other underlying comorbidities, come with poor prognosis and limited utility to treat  Patient's wife ultimately decided after patient was optimized to the best of our abilities to proceed with comfort measures only, which is very appropriate given his disposition, underlying problems, and biliary adenocarcinoma  Patient discharged to hospice  Physical Exam  Constitutional:       General: He is not in acute distress  Appearance: He is not toxic-appearing  Comments: Chronically ill, extremely frail and debilitated   Cardiovascular:      Rate and Rhythm: Normal rate  Rhythm irregular  Pulmonary:      Effort: Pulmonary effort is normal    Musculoskeletal:      Right lower leg: No edema  Left lower leg: No edema  Skin:     General: Skin is warm  Neurological:      Mental Status: He is alert and oriented to person, place, and time             DISCHARGE INFORMATION     Admitting Provider: Wu Clark MD  Admission Date: 8/4/2022    Discharge Provider: No att  providers found  Discharge Date: 8/17/22    Discharge Disposition: Non [address] [place] [address] Avenue  Discharge Condition: terminal, d/c on hospice  Discharge with Lines: no    Discharge Diet: regular diet    Discharge Diagnoses:  Principal Problem:    Pancreatic mass  Active Problems:    Cerebrovascular accident (CVA) involving cerebellum ([facility] )    Failure to thrive in adult    Atrial fibrillation (HCC)    CHF (congestive heart failure) ([facility] )    Ventricular tachyarrhythmia ([facility] )    Hypertension    Dysphagia    Type 2 diabetes mellitus ([facility] )    Chronic heart failure with preserved ejection fraction (HCC)    COPD (chronic obstructive pulmonary disease) ([facility] )    Coronary artery disease Hypernatremia  Resolved Problems:    Sepsis ([address] )    CHASITY (acute kidney injury) [facility])    Diagnostic & Therapeutic Procedures Performed:  ERCP Fluoro    Result Date: 8/4/2022  Impression: ERCP with cholangitis s/p placement of 10 x 4 cm fully covered metal stent  RECOMMENDATION: Follow LFTs Continue antibiotics Further care per ICU team May require EUS for biopsy/staging of mass at later date  Imtiaz De Anda MD     XR chest 1 view portable    Result Date: 8/4/2022  Impression: Suspected left retrocardiac consolidation and/or small effusion  Cardiomegaly and aortic sclerosis  Workstation performed: [id number]     CT head wo contrast    Result Date: 8/9/2022  Impression: Stable examination  Moderate chronic microangiopathic change within the cerebral hemispheres  The previously identified right cerebellar infarct is unchanged from multiple prior examinations  In retrospect this infarct has been present on prior CT exams for more linear and is chronic, rather than early as stated on the most recent CT report  Workstation performed: [id number]     CT head wo contrast    Result Date: 8/6/2022  Impression: Early infarct within the right cerebellar hemisphere is similar to the examination performed 2 days ago  No hemorrhagic transformation  Stable advanced chronic microangiopathic change within both cerebral hemispheres  Workstation performed: [id number]     CT head without contrast    Result Date: 8/4/2022  Impression: New low-density in the right cerebellum concerning for an acute infarct  Cerebral atrophy and small vessel disease  I personally discussed this study with Sejal Boggs on 8/4/2022 at 11:28 AM  Workstation performed: [id number]     MRI brain wo contrast    Result Date: 8/7/2022  Impression: No acute intracranial abnormality   Chronic encephalomalacia and gliosis in right cerebellum, 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 is partially obscured by susceptibility artifact from posterior spinal fixation hardware extending to the occiput  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  Workstation performed: [id number]     CT chest abdomen pelvis w contrast    Result Date: 8/4/2022  Impression: 1  Intrahepatic and extrahepatic biliary ductal dilatation with pancreatic body and tail atrophy and suspected pancreatic head mass  Confirmation should be obtained with MRI abdomen with MRCP  2   No infiltrates on study limited by respiratory motion  I personally discussed this study with Deb Neff on 8/4/2022 at 11:28 AM  Workstation performed: [id number]     FL ERCP biliary only    Result Date: 8/5/2022  Impression: Distal CBD stent placement  Workstation performed: [id number]     CXR in AM    Result Date: 8/5/2022  Impression: ET tube and right IJ line remain in stable position  No active pulmonary disease  Workstation performed: [id number]     XR chest portable ICU    Result Date: 8/5/2022  Impression: Right IJ line has been placed, tip overlies the SVC  No pneumothorax  Workstation performed: [id number]     XR chest portable ICU    Result Date: 8/5/2022  Impression: ET tube appears in position  Possible small left pleural effusion, stable   Workstation performed: [id number]       Code Status: Prior  Advance Directive & Living Will: Received  Power of :    POLST:      Medications:  Discharge Medication List as of 8/16/2022  4:23 PM      STOP taking these medications       apixaban (ELIQUIS) 5 mg Comments:   Reason for Stopping:         atorvastatin (LIPITOR) 20 mg tablet Comments:   Reason for Stopping:         buPROPion (WELLBUTRIN) 100 mg tablet Comments:   Reason for Stopping:         diltiazem (CARDIZEM CD) 240 mg 24 hr capsule Comments:   Reason for Stopping:         ferrous sulfate 325 (65 Fe) mg tablet Comments:   Reason for Stopping:         furosemide (LASIX) 40 mg tablet Comments:   Reason for Stopping:         hydrALAZINE (APRESOLINE) 10 mg tablet Comments: Reason for Stopping:         insulin glargine (LANTUS) 100 units/mL subcutaneous injection Comments:   Reason for Stopping:         insulin lispro (HumaLOG) 100 units/mL injection Comments:   Reason for Stopping:         magnesium hydroxide (MILK OF MAGNESIA) 400 mg/5 mL oral suspension Comments:   Reason for Stopping:         metoprolol succinate (TOPROL-XL) 100 mg 24 hr tablet Comments:   Reason for Stopping:         omeprazole (PriLOSEC) 40 MG capsule Comments:   Reason for Stopping:         polyethylene glycol (MIRALAX) 17 g packet Comments:   Reason for Stopping:         sodium phosphate-biphosphate (FLEET) 7-19 g 118 mL enema Comments:   Reason for Stopping:             Discharge Medication List as of 8/16/2022  4:23 PM      START taking these medications    Details   albuterol (2 5 mg/3 mL) 0 083 % nebulizer solution Take 3 mL (2 5 mg total) by nebulization every 4 (four) hours as needed for wheezing, Starting Tue 8/16/2022, Until Thu 9/15/2022 at 2359, No Print           Discharge Medication List as of 8/16/2022  4:23 PM      CONTINUE these medications which have NOT CHANGED    Details   bisacodyl (DULCOLAX) 10 mg suppository Insert 10 mg into the rectum as needed for constipation, Historical Med             Allergies: Allergies   Allergen Reactions    Amoxicillin Diarrhea and Hives       FOLLOW-UP     PCP Outpatient Follow-up:  Not appropriate, patient hospice    Consulting Providers Follow-up:  Discharging on hospice    Discharge Statement:   I spent 45 minutes minutes discharging the patient  This time was spent on the day of discharge  I had direct contact with the patient on the day of discharge  Additional documentation is required if more than 30 minutes were spent on discharge  Portions of the record may have been created with voice recognition software  Occasional wrong word or "sound a like" substitutions may have occurred due to the inherent limitations of voice recognition software    Read the chart carefully and recognize, using context, where substitutions have occurred     ==  Shayan Edwards DO  [address]  Internal Medicine Resident PGY-3

## 2022-08-17 NOTE — CASE MANAGEMENT
Case Management Discharge Planning Note    Patient name Sydney Puentes  Location [facility] 711/[facility] [id number] MRN [id number]  : 1935 Date 2022       Current Admission Date: 2022  Current Admission Diagnosis:Pancreatic mass   Patient Active Problem List    Diagnosis Date Noted    Failure to thrive in adult 2022    Hypernatremia 08/10/2022    [facility] 2022    Pancreatic mass 2022    COPD (chronic obstructive pulmonary disease) ([facility] ) 2022    Coronary artery disease 2022    Cerebrovascular accident (CVA) involving cerebellum ([facility] ) 2022    S/P ERCP 2022    Cholangitis 2022    Full code status 2021    Frailty syndrome in geriatric patient 2021    Leukocytosis 2021    Constipation 2021    Vascular dementia ([facility] ) 2021    Depression 03/15/2021    Right acetabular fracture ([facility] ) 2021    Debility 2021    Medication management 2021    Goals of care, counseling/discussion 2021    Anemia 2021    History of 2019 novel coronavirus disease (COVID-19) 2020    Weight gain 2020    Abnormal EKG 2020    Chronic heart failure with preserved ejection fraction ([facility] ) 2020    Ambulatory dysfunction 2020    Dysphagia 2020    Atrial fibrillation ([facility] ) 2020    CHF (congestive heart failure) ([facility] ) 2020    Hypertension 2020    Hyperlipidemia 2020    Type 2 diabetes mellitus ([facility] ) 2020    Vertebral artery dissection ([facility] ) 2020      LOS (days): 13  Geometric Mean LOS (GMLOS) (days): 4 80  Days to GMLOS:-8 1     OBJECTIVE:  Risk of Unplanned Readmission Score: 32 7         Current admission status: Inpatient   Preferred Pharmacy:   [facility], [address] [place] [address] [address] [address]  Phone: [phone] Fax: Kelly Morel [phone] [facility] [address]  [address] [place] [address]  Phone: [phone] Fax: [phone]    UNKNOWN - FOLLOW UP PRIOR TO DISCHARGE TO E-PRESCRIBE  No address on file      [address] [place],[address], [facility] [address] [facility] [address] [place] [address] [facility] [address] [address]  Phone: [phone] Fax: [phone]    Primary Care Provider: Jacque Boo MD    Primary Insurance: [facility] REP  Secondary Insurance: [address] Number: SNF auth denied by MD at insurance   Family may utilize fast appeal P: [phone]

## 2022-08-17 NOTE — PLAN OF CARE
Problem: Potential for Falls  Goal: Patient will remain free of falls  Description: INTERVENTIONS:  - Educate patient/family on patient safety including physical limitations  - Instruct patient to call for assistance with activity   - Consult OT/PT to assist with strengthening/mobility   - Keep Call bell within reach  - Keep bed low and locked with side rails adjusted as appropriate  - Keep care items and personal belongings within reach  - Initiate and maintain comfort rounds  - Make Fall Risk Sign visible to staff  - Offer Toileting every 2 Hours, in advance of need  - Initiate/Maintain bed alarm  - Obtain necessary fall risk management equipment: bed alarm   - Apply yellow socks and bracelet for high fall risk patients  - Consider moving patient to room near nurses station  Outcome: Progressing     Problem: MOBILITY - ADULT  Goal: Maintain or return to baseline ADL function  Description: INTERVENTIONS:  - Educate patient/family on patient safety including physical limitations  - Instruct patient to call for assistance with activity   - Consult OT/PT to assist with strengthening/mobility   - Keep Call bell within reach  - Keep bed low and locked with side rails adjusted as appropriate  - Keep care items and personal belongings within reach  - Initiate and maintain comfort rounds  - Make Fall Risk Sign visible to staff  - Offer Toileting every 2 Hours, in advance of need  - Initiate/Maintain bed alarm  - Apply yellow socks and bracelet for high fall risk patients  - Consider moving patient to room near nurses station  Outcome: Progressing  Goal: Maintains/Returns to pre admission functional level  Description: INTERVENTIONS:  - Perform BMAT or MOVE assessment daily    - Set and communicate daily mobility goal to care team and patient/family/caregiver     - Collaborate with rehabilitation services on mobility goals if consulted  - Out of bed for toileting  - Record patient progress and toleration of activity level Outcome: Progressing     Problem: Prexisting or High Potential for Compromised Skin Integrity  Goal: Skin integrity is maintained or improved  Description: INTERVENTIONS:  - Identify patients at risk for skin breakdown  - Assess and monitor skin integrity  - Assess and monitor nutrition and hydration status  - Monitor labs   - Assess for incontinence   - Turn and reposition patient  - Assist with mobility/ambulation  - Relieve pressure over bony prominences  - Avoid friction and shearing  - Provide appropriate hygiene as needed including keeping skin clean and dry  - Evaluate need for skin moisturizer/barrier cream  - Collaborate with interdisciplinary team   - Patient/family teaching  - Consider wound care consult   Outcome: Progressing     Problem: PAIN - ADULT  Goal: Verbalizes/displays adequate comfort level or baseline comfort level  Description: Interventions:  - Encourage patient to monitor pain and request assistance  - Assess pain using appropriate pain scale  - Administer analgesics based on type and severity of pain and evaluate response  - Implement non-pharmacological measures as appropriate and evaluate response  - Consider cultural and social influences on pain and pain management  - Notify physician/advanced practitioner if interventions unsuccessful or patient reports new pain  Outcome: Progressing     Problem: INFECTION - ADULT  Goal: Absence or prevention of progression during hospitalization  Description: INTERVENTIONS:  - Assess and monitor for signs and symptoms of infection  - Monitor lab/diagnostic results  - Monitor all insertion sites, i e  indwelling lines, tubes, and drains  - Monitor endotracheal if appropriate and nasal secretions for changes in amount and color  - [place] appropriate cooling/warming therapies per order  - Administer medications as ordered  - Instruct and encourage patient and family to use good hand hygiene technique  - Identify and instruct in appropriate isolation precautions for identified infection/condition  Outcome: Progressing  Goal: Absence of fever/infection during neutropenic period  Description: INTERVENTIONS:  - Monitor WBC    Outcome: Progressing     Problem: SAFETY ADULT  Goal: Patient will remain free of falls  Description: INTERVENTIONS:  - Educate patient/family on patient safety including physical limitations  - Instruct patient to call for assistance with activity   - Consult OT/PT to assist with strengthening/mobility   - Keep Call bell within reach  - Keep bed low and locked with side rails adjusted as appropriate  - Keep care items and personal belongings within reach  - Initiate and maintain comfort rounds  - Make Fall Risk Sign visible to staff  - Offer Toileting every 2 Hours, in advance of need  - Initiate/Maintain bed alarm  - Obtain necessary fall risk management equipment: bed alarm   - Apply yellow socks and bracelet for high fall risk patients  - Consider moving patient to room near nurses station  Outcome: Progressing  Goal: Maintain or return to baseline ADL function  Description: INTERVENTIONS:  - Educate patient/family on patient safety including physical limitations  - Instruct patient to call for assistance with activity   - Consult OT/PT to assist with strengthening/mobility   - Keep Call bell within reach  - Keep bed low and locked with side rails adjusted as appropriate  - Keep care items and personal belongings within reach  - Initiate and maintain comfort rounds  - Make Fall Risk Sign visible to staff  - Offer Toileting every 2 Hours, in advance of need  - Initiate/Maintain bed alarm  - Apply yellow socks and bracelet for high fall risk patients  - Consider moving patient to room near nurses station  Outcome: Progressing  Goal: Maintains/Returns to pre admission functional level  Description: INTERVENTIONS:  - Perform BMAT or MOVE assessment daily    - Set and communicate daily mobility goal to care team and patient/family/caregiver  - Collaborate with rehabilitation services on mobility goals if consulted  - Out of bed for toileting  - Record patient progress and toleration of activity level   Outcome: Progressing     Problem: DISCHARGE PLANNING  Goal: Discharge to home or other facility with appropriate resources  Description: INTERVENTIONS:  - Identify barriers to discharge w/patient and caregiver  - Arrange for needed discharge resources and transportation as appropriate  - Identify discharge learning needs (meds, wound care, etc )  - Arrange for interpretive services to assist at discharge as needed  - Refer to Case Management Department for coordinating discharge planning if the patient needs post-hospital services based on physician/advanced practitioner order or complex needs related to functional status, cognitive ability, or social support system  Outcome: Progressing     Problem: Knowledge Deficit  Goal: Patient/family/caregiver demonstrates understanding of disease process, treatment plan, medications, and discharge instructions  Description: Complete learning assessment and assess knowledge base    Interventions:  - Provide teaching at level of understanding  - Provide teaching via preferred learning methods  Outcome: Progressing

## 2022-08-18 NOTE — UTILIZATION REVIEW
Notification of Discharge   This is a Notification of Discharge from our facility [address]  Please be advised that this patient has been discharge from our facility  Below you will find the admission and discharge date and time including the patients disposition  UTILIZATION REVIEW CONTACT:  Ginger Galindo  Utilization   Network Utilization Review Department  Phone: [phone] x carefully listen to the prompts  All voicemails are confidential   Email: [email or web address]     PHYSICIAN ADVISORY SERVICES:  FOR [id number] REVIEW - MEDICAL NECESSITY DENIAL  Phone: [phone]  Fax: [phone]  Email: [email or web address]     PRESENTATION DATE: 8/4/2022  8:20 AM  OBERVATION ADMISSION DATE:   INPATIENT ADMISSION DATE: 8/4/22  1:31 PM   DISCHARGE DATE: 8/17/2022 12:03 PM  DISPOSITION: Home with [address] with Hospice Care      IMPORTANT INFORMATION:  Send all requests for admission clinical reviews, approved or denied determinations and any other requests to dedicated fax number below belonging to the campus where the patient is receiving treatment   List of dedicated fax numbers:  [address] DENIALS (Administrative/Medical Necessity) [phone]   [address] (Maternity/NICU/Pediatrics) [phone]   [facility] [phone]   130 [facility] [phone]   [address] [place] [address] [phone]   2000 [facility] [address] [place] [address],[address]Th Floor [address] [phone]   [facility]  [phone]   2205 [facility]  2401 [facility] [place] 1000 W [facility] [phone]

## 2022-08-18 NOTE — TELEPHONE ENCOUNTER
SLB/AMS        Patient was placed on hospice after 8/4/22 discharge  I will not be scheduling an appointment at this time   I will remove the patient from the patient list

## 2022-08-19 PROBLEM — Z78.9 FULL CODE STATUS: Status: RESOLVED | Noted: 2021-01-01 | Resolved: 2022-01-01

## 2022-08-19 NOTE — TELEPHONE ENCOUNTER
Pt's status is declining and requesting orders to accommodate  TC on call provider Dr Marcos Sosa to contact

## 2022-08-19 NOTE — PROGRESS NOTES
On Call- [address] [facility] for Rodolfo Owen  Contacted by nursing that patient unable to take pills (PRN Oxycodone and lorazepam) as ordered by hospice; requesting liquid medications  Patient with tachypnea, tachycardia and hypoxia, placed on 6LNC  Will d/c oxycodone and lorazepam; start morphine oral concentrate 5mg Q4h + Q2h PRN pain or dyspnea; start lorazepam concentrate 0 5mg Q4h PRN  Eprescribed to [employer]

## 2022-08-20 NOTE — TELEPHONE ENCOUNTER
Kelli Boss called, requesting a call back from on call provider, regarding medication orders   Provider paged via [facility]

## 2022-08-22 ENCOUNTER — HOME CARE VISIT (OUTPATIENT)
Dept: HOME HOSPICE | Facility: HOSPICE | Age: 87
End: 2022-08-22
Payer: MEDICARE

## 2022-08-23 NOTE — PROGRESS NOTES
[address]  2707 [facility]  [phone]  [address] [facility]  ADMISSION NOTE        NAME: Ashutosh Miller  AGE: 82 y o  SEX: male  :  1935  DATE OF ENCOUNTER: 2022    Chief Complaint   Patient seen and examined for admission to subacute rehabilitation facility  History of Present Illness     Ashutosh Miller is a patient of [facility] who is being seen and examined today for admission to the subacute rehabilitation facility to update his care after an acute care hospital stay for  Pancreatic mass, CVA involving cerebellum, failure to thrive in an adult, and atrial fibrillation  He was hospitalized from -22 at [facility]  The patient will be signing onto [facility] hospice services  Upon examination, the patient is laying in bed  Due to the patient's vascular dementia, I am unable to obtain ROS  The following portions of the patient's history were reviewed and updated as appropriate: allergies, current medications, past family history, past medical history, past social history, past surgical history and problem list     Review of Systems     A 10-point comprehensive review of symptoms was obtained with pertinent positives and negatives noted per HPI      History     Past Medical History:   Diagnosis Date    A-fib [facility])     CHASITY (acute kidney injury) ([address] ) 2022    Cancer ([address] )     Cardiac disease     CHF (congestive heart failure) (HCC)     Chronic obstructive pulmonary disease (COPD) ([address] )     COPD (chronic obstructive pulmonary disease) (HCC)     Coronary artery disease     Counseling regarding advance care planning and goals of care 10/7/2020    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[address] )     TYPE 2    Dysphagia     Fracture of nasal bone     Gait instability     H/O cervical fracture     Hyperlipidemia     Hypertension     Muscle weakness     TBI (traumatic brain injury) ([address] )      Past Surgical History:   Procedure Laterality Date    HAND SURGERY      TN ARTHRODESIS POSTERIOR CRANIOCERVICAL N/A 10/26/2020    Procedure: Occipital cervical fusion to C4;  Surgeon: Ivana Price MD;  [facility]: [facility];  Service: Neurosurgery     Family History   Problem Relation Age of Onset    Other Other         urinary tract malignancy    Diabetes Mother      Social History     Socioeconomic History    Marital status: /Civil Union     Spouse name: Not on file    Number of children: Not on file    Years of education: Not on file    Highest education level: Not on file   Occupational History    Not on file   Tobacco Use    Smoking status: Never Smoker    Smokeless tobacco: Never Used   Vaping Use    Vaping Use: Never used   Substance and Sexual Activity    Alcohol use: Never    Drug use: Never    Sexual activity: Not on file   Other Topics Concern    Not on file   Social History Narrative    ** Merged History Encounter **          Social Determinants of Health     Financial Resource Strain: Not on file   Food Insecurity: Not on file   Transportation Needs: Not on file   Physical Activity: Not on file   Stress: Not on file   Social Connections: Not on file   Intimate Partner Violence: Not on file   Housing Stability: Not on file     Allergies   Allergen Reactions    Amoxicillin Diarrhea and Hives       Objective     Vital Signs  BP:  110/64       HR:  86 T:  98 8    RR:  19 O2Sat:  96% W:  208  General: NAD, Well Nourished, Well Developed  Oral: Oropharynx dry and Clear  Neck: Supple, +ROM  CV: S1, S2, normal rate, regular rhythm, no murmur appreciated  Pulmonary: Lung sounds clear to air, no wheezing, rhonchi, rales  Abdominal:BS + x4 in all quadrants, soft, no mass, no tenderness  Extremities: No edema, +ROM, +weakness  Skin: Warm, Dry, no lesions, no rash, no erythema present, no ecchymosis present  Neurological/Psych: Alert and oriented to self, pleasant mood, no affect, fair judgement    Pertinent Laboratory/Diagnostic Studies have been independently reviewed  Current Medications     Current Medications Reviewed and updated in Nursing Home EMR      Assessment and Plan     Pancreatic mass  · Per Surgical Oncology consult patient is not a candidate for surgical intervention  · Per med Oncology given poor functional status patient is now comfort measures  · Patient will be signing on to [address] services    Cerebrovascular accident (CVA) involving cerebellum ([facility] )  · Continue comfort measures  · Patient will be signing on with [facility] hospice services    Failure to thrive in adult  · Continue comfort measures  · Patient with no limitations to diet  · Patient will be signing on with [facility] hospice services    Atrial fibrillation [facility])  · Anticoagulation to be discharged  · Continue comfort measures  · Patient will be signing on with [facility] [address] [place] [address]  8/18/2022

## 2022-08-30 NOTE — ASSESSMENT & PLAN NOTE
· Per Surgical Oncology consult patient is not a candidate for surgical intervention  · Per med Oncology given poor functional status patient is now comfort measures  · Patient will be signing on to [facility] access hospital

## 2022-08-30 NOTE — ASSESSMENT & PLAN NOTE
· Anticoagulation to be discharged  · Continue comfort measures  · Patient will be signing on with [facility] services

## 2022-08-30 NOTE — ASSESSMENT & PLAN NOTE
· Continue comfort measures  · Patient with no limitations to diet  · Patient will be signing on with [address] [place] [address]

## 2024-01-29 NOTE — ASSESSMENT & PLAN NOTE
· Review his BP and AP logs looks well with extended-release diltiazem and metoprolol succinate ER  · Will continue with this medication regimen   · Will continue with monitoring for change in his condition no

## 2025-03-28 NOTE — UTILIZATION REVIEW
Notification of Discharge  This is a Notification of Discharge from our facility [address]  Please be advised that this patient has been discharge from our facility  Below you will find the admission and discharge date and time including the patients disposition  PRESENTATION DATE: 3/6/2021  8:00 AM  OBS ADMISSION DATE:   IP ADMISSION DATE: 3/6/21 1441   DISCHARGE DATE: 3/10/2021  4:02 PM  DISPOSITION: Non [facility]   Admission Orders listed below:  Admission Orders (From admission, onward)     Ordered        03/06/21 [address] [place] [address]  Once                   Please contact the UR Department if additional information is required to close this patient's authorization/case  [address] Utilization Review Department  Main: [phone] x carefully listen to the prompts  All voicemails are confidential   [email or web address]  Send all requests for admission clinical reviews, approved or denied determinations and any other requests to dedicated fax number below belonging to the campus where the patient is receiving treatment   List of dedicated fax numbers:  [address] DENIALS (Administrative/Medical Necessity) [phone]   [address] (Maternity/NICU/Pediatrics) [phone]   Stephanie Moore [phone]   Destiney Roman [phone]   [facility] [phone]   [facility] [address] [place] [address] [phone]   [facility]  [phone]   2205 [facility]  2401 [facility] 1000 [address] [facility] [phone] Render Risk Assessment In Note?: no Comment: Discussed bx if not resolved with LN2. Detail Level: Simple

## 2025-06-24 NOTE — ASSESSMENT & PLAN NOTE
·  His resuscitation status is "CPR " Brow Lift Text: A midfrontal incision was made medially to the defect to allow access to the tissues just superior to the left eyebrow. Following careful dissection inferiorly in a supraperiosteal plane to the level of the left eyebrow, several 3-0 monocryl sutures were used to resuspend the eyebrow orbicularis oculi muscular unit to the superior frontal bone periosteum. This resulted in an appropriate reapproximation of static eyebrow symmetry and correction of the left brow ptosis.

## (undated) DEVICE — SUPPLY FEE STD

## (undated) DEVICE — TIBURON SPLIT SHEET: Brand: CONVERTORS

## (undated) DEVICE — LIGHT HANDLE COVER SLEEVE DISP BLUE STELLAR

## (undated) DEVICE — DRAPE SHEET X-LG

## (undated) DEVICE — ANTIBACTERIAL VIOLET BRAIDED (POLYGLACTIN 910), SYNTHETIC ABSORBABLE SUTURE: Brand: COATED VICRYL

## (undated) DEVICE — SWABSTCK, BENZOIN TINCTURE, 1/PK, STRL: Brand: APLICARE

## (undated) DEVICE — INTENDED FOR TISSUE SEPARATION, AND OTHER PROCEDURES THAT REQUIRE A SHARP SURGICAL BLADE TO PUNCTURE OR CUT.: Brand: BARD-PARKER ® CARBON RIB-BACK BLADES

## (undated) DEVICE — DRILL BIT G3600321 OC 3.2MM FLEX

## (undated) DEVICE — PLUMEPEN PRO 10FT

## (undated) DEVICE — 3M™ TEGADERM™ TRANSPARENT FILM DRESSING FRAME STYLE, 1626W, 4 IN X 4-3/4 IN (10 CM X 12 CM), 50/CT 4CT/CASE: Brand: 3M™ TEGADERM™

## (undated) DEVICE — GLOVE INDICATOR PI UNDERGLOVE SZ 8.5 BLUE

## (undated) DEVICE — BIPOLAR SEALER 23-113-1 AQM 2.3: Brand: AQUAMANTYS™

## (undated) DEVICE — DRAPE SURGIKIT SADDLE BAG

## (undated) DEVICE — BETADINE OINTMENT FOIL PACK

## (undated) DEVICE — INTENDED FOR TISSUE SEPARATION, AND OTHER PROCEDURES THAT REQUIRE A SHARP SURGICAL BLADE TO PUNCTURE OR CUT.: Brand: BARD-PARKER SAFETY BLADES SIZE 15, STERILE

## (undated) DEVICE — HEMOSTATIC MATRIX SURGIFLO 8ML W/THROMBIN

## (undated) DEVICE — NEURO SURGICAL CLIPPER BLADE

## (undated) DEVICE — NEURO PATTIES 1/2 X 3

## (undated) DEVICE — TRAY FOLEY 16FR URIMETER SURESTEP

## (undated) DEVICE — DRILL BIT G3600320 OC 3.2MM

## (undated) DEVICE — BETHLEHEM UNIVERSAL SPINE, KIT: Brand: CARDINAL HEALTH

## (undated) DEVICE — SPECIMEN CONTAINER STERILE PEEL PACK

## (undated) DEVICE — SNAP KOVER: Brand: UNBRANDED

## (undated) DEVICE — SURGICEL 2 X 3

## (undated) DEVICE — DRESSING MEPILEX AG BORDER 4 X 8 IN

## (undated) DEVICE — GLOVE SRG BIOGEL 8

## (undated) DEVICE — PREP SURGICAL PURPREP 26ML

## (undated) DEVICE — DRILL BIT G3606010 2.4MM

## (undated) DEVICE — SPONGE PVP SCRUB WING STERILE

## (undated) DEVICE — SUT PROLENE 3-0 V-7 36 IN 8976H

## (undated) DEVICE — ELECTRODE BLADE E-Z CLEAN 4IN -0014A

## (undated) DEVICE — MONITORING SPINAL IMPULSE CASE FEE

## (undated) DEVICE — TOOL 9MH30 LEGEND 9CM 3MM MH: Brand: MIDAS REX

## (undated) DEVICE — MAYFIELD® DISPOSABLE ADULT SKULL PIN (PLASTIC BASE): Brand: MAYFIELD®

## (undated) DEVICE — PROXIMATE PLUS MD MULTI-DIRECTIONAL RELEASE SKIN STAPLERS CONTAINS 35 STAINLESS STEEL STAPLES APPROXIMATE CLOSED DIMENSIONS: 6.9MM X 3.9MM WIDE: Brand: PROXIMATE